# Patient Record
Sex: MALE | Race: WHITE | NOT HISPANIC OR LATINO
[De-identification: names, ages, dates, MRNs, and addresses within clinical notes are randomized per-mention and may not be internally consistent; named-entity substitution may affect disease eponyms.]

---

## 2017-01-06 ENCOUNTER — RECORD ABSTRACTING (OUTPATIENT)
Age: 57
End: 2017-01-06

## 2017-03-16 ENCOUNTER — APPOINTMENT (OUTPATIENT)
Dept: ENDOCRINOLOGY | Facility: CLINIC | Age: 57
End: 2017-03-16

## 2017-03-16 VITALS
DIASTOLIC BLOOD PRESSURE: 80 MMHG | HEART RATE: 72 BPM | SYSTOLIC BLOOD PRESSURE: 102 MMHG | WEIGHT: 220 LBS | BODY MASS INDEX: 29.84 KG/M2

## 2017-03-16 VITALS — BODY MASS INDEX: 32.51 KG/M2 | WEIGHT: 240 LBS | HEIGHT: 72 IN

## 2017-03-16 DIAGNOSIS — Z86.39 PERSONAL HISTORY OF OTHER ENDOCRINE, NUTRITIONAL AND METABOLIC DISEASE: ICD-10-CM

## 2017-03-16 RX ORDER — GLIPIZIDE 5 MG/1
5 TABLET ORAL DAILY
Qty: 30 | Refills: 0 | Status: DISCONTINUED | COMMUNITY
Start: 2017-03-16 | End: 2017-03-16

## 2017-03-17 RX ORDER — CANAGLIFLOZIN 100 MG/1
100 TABLET, FILM COATED ORAL
Qty: 30 | Refills: 5 | Status: DISCONTINUED | COMMUNITY
Start: 2017-03-16 | End: 2017-03-17

## 2017-05-30 ENCOUNTER — EMERGENCY (EMERGENCY)
Facility: HOSPITAL | Age: 57
LOS: 0 days | Discharge: HOME | End: 2017-05-30
Admitting: FAMILY MEDICINE

## 2017-05-30 DIAGNOSIS — M25.532 PAIN IN LEFT WRIST: ICD-10-CM

## 2017-05-30 DIAGNOSIS — I11.9 HYPERTENSIVE HEART DISEASE WITHOUT HEART FAILURE: ICD-10-CM
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
## 2017-05-30 DIAGNOSIS — I25.2 OLD MYOCARDIAL INFARCTION: ICD-10-CM

## 2017-05-30 DIAGNOSIS — S63.502A UNSPECIFIED SPRAIN OF LEFT WRIST, INITIAL ENCOUNTER: ICD-10-CM

## 2017-05-30 DIAGNOSIS — I51.9 HEART DISEASE, UNSPECIFIED: ICD-10-CM

## 2017-05-30 DIAGNOSIS — Z95.0 PRESENCE OF CARDIAC PACEMAKER: ICD-10-CM

## 2017-05-30 DIAGNOSIS — S50.812A ABRASION OF LEFT FOREARM, INITIAL ENCOUNTER: ICD-10-CM

## 2017-06-15 ENCOUNTER — APPOINTMENT (OUTPATIENT)
Dept: ENDOCRINOLOGY | Facility: CLINIC | Age: 57
End: 2017-06-15

## 2017-07-26 ENCOUNTER — APPOINTMENT (OUTPATIENT)
Dept: ENDOCRINOLOGY | Facility: CLINIC | Age: 57
End: 2017-07-26

## 2017-07-28 ENCOUNTER — EMERGENCY (EMERGENCY)
Facility: HOSPITAL | Age: 57
LOS: 0 days | Discharge: HOME | End: 2017-07-29
Admitting: FAMILY MEDICINE

## 2017-07-28 DIAGNOSIS — E78.00 PURE HYPERCHOLESTEROLEMIA, UNSPECIFIED: ICD-10-CM

## 2017-07-28 DIAGNOSIS — I25.10 ATHEROSCLEROTIC HEART DISEASE OF NATIVE CORONARY ARTERY WITHOUT ANGINA PECTORIS: ICD-10-CM

## 2017-07-28 DIAGNOSIS — E11.9 TYPE 2 DIABETES MELLITUS WITHOUT COMPLICATIONS: ICD-10-CM

## 2017-07-28 DIAGNOSIS — I10 ESSENTIAL (PRIMARY) HYPERTENSION: ICD-10-CM

## 2017-07-28 DIAGNOSIS — Z95.0 PRESENCE OF CARDIAC PACEMAKER: ICD-10-CM

## 2017-07-28 DIAGNOSIS — M79.645 PAIN IN LEFT FINGER(S): ICD-10-CM

## 2017-07-28 DIAGNOSIS — I44.1 ATRIOVENTRICULAR BLOCK, SECOND DEGREE: ICD-10-CM

## 2017-07-28 DIAGNOSIS — I21.3 ST ELEVATION (STEMI) MYOCARDIAL INFARCTION OF UNSPECIFIED SITE: ICD-10-CM

## 2017-07-28 DIAGNOSIS — Z87.891 PERSONAL HISTORY OF NICOTINE DEPENDENCE: ICD-10-CM

## 2017-07-28 DIAGNOSIS — L03.012 CELLULITIS OF LEFT FINGER: ICD-10-CM

## 2017-08-31 ENCOUNTER — OUTPATIENT (OUTPATIENT)
Dept: OUTPATIENT SERVICES | Facility: HOSPITAL | Age: 57
LOS: 1 days | Discharge: HOME | End: 2017-08-31

## 2017-08-31 ENCOUNTER — APPOINTMENT (OUTPATIENT)
Dept: ENDOCRINOLOGY | Facility: CLINIC | Age: 57
End: 2017-08-31

## 2017-08-31 VITALS
HEART RATE: 67 BPM | WEIGHT: 220 LBS | HEIGHT: 72 IN | SYSTOLIC BLOOD PRESSURE: 135 MMHG | BODY MASS INDEX: 29.8 KG/M2 | DIASTOLIC BLOOD PRESSURE: 78 MMHG

## 2017-08-31 DIAGNOSIS — E78.00 PURE HYPERCHOLESTEROLEMIA, UNSPECIFIED: ICD-10-CM

## 2017-08-31 DIAGNOSIS — I44.1 ATRIOVENTRICULAR BLOCK, SECOND DEGREE: ICD-10-CM

## 2017-08-31 DIAGNOSIS — E06.3 AUTOIMMUNE THYROIDITIS: ICD-10-CM

## 2017-08-31 DIAGNOSIS — E11.65 TYPE 2 DIABETES MELLITUS WITH HYPERGLYCEMIA: ICD-10-CM

## 2017-08-31 DIAGNOSIS — I25.10 ATHEROSCLEROTIC HEART DISEASE OF NATIVE CORONARY ARTERY WITHOUT ANGINA PECTORIS: ICD-10-CM

## 2017-08-31 DIAGNOSIS — E11.9 TYPE 2 DIABETES MELLITUS WITHOUT COMPLICATIONS: ICD-10-CM

## 2017-08-31 DIAGNOSIS — I10 ESSENTIAL (PRIMARY) HYPERTENSION: ICD-10-CM

## 2017-09-11 DIAGNOSIS — E11.65 TYPE 2 DIABETES MELLITUS WITH HYPERGLYCEMIA: ICD-10-CM

## 2017-09-11 DIAGNOSIS — E78.2 MIXED HYPERLIPIDEMIA: ICD-10-CM

## 2017-09-11 DIAGNOSIS — E66.01 MORBID (SEVERE) OBESITY DUE TO EXCESS CALORIES: ICD-10-CM

## 2017-11-24 ENCOUNTER — OUTPATIENT (OUTPATIENT)
Dept: OUTPATIENT SERVICES | Facility: HOSPITAL | Age: 57
LOS: 1 days | Discharge: HOME | End: 2017-11-24

## 2017-11-24 DIAGNOSIS — E11.9 TYPE 2 DIABETES MELLITUS WITHOUT COMPLICATIONS: ICD-10-CM

## 2017-11-24 DIAGNOSIS — I44.1 ATRIOVENTRICULAR BLOCK, SECOND DEGREE: ICD-10-CM

## 2017-11-24 DIAGNOSIS — R22.1 LOCALIZED SWELLING, MASS AND LUMP, NECK: ICD-10-CM

## 2017-11-24 DIAGNOSIS — I10 ESSENTIAL (PRIMARY) HYPERTENSION: ICD-10-CM

## 2017-11-24 DIAGNOSIS — E78.00 PURE HYPERCHOLESTEROLEMIA, UNSPECIFIED: ICD-10-CM

## 2017-11-24 DIAGNOSIS — I25.10 ATHEROSCLEROTIC HEART DISEASE OF NATIVE CORONARY ARTERY WITHOUT ANGINA PECTORIS: ICD-10-CM

## 2017-11-25 ENCOUNTER — OUTPATIENT (OUTPATIENT)
Dept: OUTPATIENT SERVICES | Facility: HOSPITAL | Age: 57
LOS: 1 days | Discharge: HOME | End: 2017-11-25

## 2017-11-25 DIAGNOSIS — I25.10 ATHEROSCLEROTIC HEART DISEASE OF NATIVE CORONARY ARTERY WITHOUT ANGINA PECTORIS: ICD-10-CM

## 2017-11-25 DIAGNOSIS — E11.9 TYPE 2 DIABETES MELLITUS WITHOUT COMPLICATIONS: ICD-10-CM

## 2017-11-25 DIAGNOSIS — R22.1 LOCALIZED SWELLING, MASS AND LUMP, NECK: ICD-10-CM

## 2017-11-25 DIAGNOSIS — E78.00 PURE HYPERCHOLESTEROLEMIA, UNSPECIFIED: ICD-10-CM

## 2017-11-25 DIAGNOSIS — I10 ESSENTIAL (PRIMARY) HYPERTENSION: ICD-10-CM

## 2017-11-25 DIAGNOSIS — I44.1 ATRIOVENTRICULAR BLOCK, SECOND DEGREE: ICD-10-CM

## 2017-12-08 ENCOUNTER — OUTPATIENT (OUTPATIENT)
Dept: OUTPATIENT SERVICES | Facility: HOSPITAL | Age: 57
LOS: 1 days | Discharge: HOME | End: 2017-12-08

## 2017-12-08 DIAGNOSIS — E11.9 TYPE 2 DIABETES MELLITUS WITHOUT COMPLICATIONS: ICD-10-CM

## 2017-12-08 DIAGNOSIS — I25.10 ATHEROSCLEROTIC HEART DISEASE OF NATIVE CORONARY ARTERY WITHOUT ANGINA PECTORIS: ICD-10-CM

## 2017-12-08 DIAGNOSIS — I10 ESSENTIAL (PRIMARY) HYPERTENSION: ICD-10-CM

## 2017-12-08 DIAGNOSIS — E78.00 PURE HYPERCHOLESTEROLEMIA, UNSPECIFIED: ICD-10-CM

## 2017-12-08 DIAGNOSIS — R22.1 LOCALIZED SWELLING, MASS AND LUMP, NECK: ICD-10-CM

## 2017-12-08 DIAGNOSIS — I44.1 ATRIOVENTRICULAR BLOCK, SECOND DEGREE: ICD-10-CM

## 2018-02-04 ENCOUNTER — EMERGENCY (EMERGENCY)
Facility: HOSPITAL | Age: 58
LOS: 0 days | Discharge: HOME | End: 2018-02-04
Attending: EMERGENCY MEDICINE | Admitting: FAMILY MEDICINE

## 2018-02-04 VITALS
SYSTOLIC BLOOD PRESSURE: 151 MMHG | HEART RATE: 77 BPM | TEMPERATURE: 98 F | DIASTOLIC BLOOD PRESSURE: 88 MMHG | RESPIRATION RATE: 18 BRPM

## 2018-02-04 VITALS — WEIGHT: 229.94 LBS

## 2018-02-04 DIAGNOSIS — R59.0 LOCALIZED ENLARGED LYMPH NODES: ICD-10-CM

## 2018-02-04 DIAGNOSIS — R22.1 LOCALIZED SWELLING, MASS AND LUMP, NECK: ICD-10-CM

## 2018-02-04 LAB
ALBUMIN SERPL ELPH-MCNC: 4.7 G/DL — SIGNIFICANT CHANGE UP (ref 3–5.5)
ALP SERPL-CCNC: 57 U/L — SIGNIFICANT CHANGE UP (ref 30–115)
ALT FLD-CCNC: 20 U/L — SIGNIFICANT CHANGE UP (ref 0–41)
ANION GAP SERPL CALC-SCNC: 8 MMOL/L — SIGNIFICANT CHANGE UP (ref 7–14)
APTT BLD: 26.4 SEC — LOW (ref 27–39.2)
AST SERPL-CCNC: 19 U/L — SIGNIFICANT CHANGE UP (ref 0–41)
BASOPHILS # BLD AUTO: 0.06 K/UL — SIGNIFICANT CHANGE UP (ref 0–0.2)
BASOPHILS NFR BLD AUTO: 0.9 % — SIGNIFICANT CHANGE UP (ref 0–1)
BILIRUB SERPL-MCNC: 1.7 MG/DL — HIGH (ref 0.2–1.2)
BUN SERPL-MCNC: 20 MG/DL — SIGNIFICANT CHANGE UP (ref 10–20)
CALCIUM SERPL-MCNC: 9.9 MG/DL — SIGNIFICANT CHANGE UP (ref 8.5–10.1)
CHLORIDE SERPL-SCNC: 103 MMOL/L — SIGNIFICANT CHANGE UP (ref 98–110)
CO2 SERPL-SCNC: 27 MMOL/L — SIGNIFICANT CHANGE UP (ref 17–32)
CREAT SERPL-MCNC: 1 MG/DL — SIGNIFICANT CHANGE UP (ref 0.7–1.5)
EOSINOPHIL # BLD AUTO: 0.26 K/UL — SIGNIFICANT CHANGE UP (ref 0–0.7)
EOSINOPHIL NFR BLD AUTO: 3.9 % — SIGNIFICANT CHANGE UP (ref 0–8)
GLUCOSE SERPL-MCNC: 245 MG/DL — HIGH (ref 70–110)
HCT VFR BLD CALC: 49.4 % — SIGNIFICANT CHANGE UP (ref 42–52)
HGB BLD-MCNC: 16.5 G/DL — SIGNIFICANT CHANGE UP (ref 14–18)
IMM GRANULOCYTES NFR BLD AUTO: 0.3 % — SIGNIFICANT CHANGE UP (ref 0.1–0.3)
INR BLD: 1.06 RATIO — SIGNIFICANT CHANGE UP (ref 0.65–1.3)
LYMPHOCYTES # BLD AUTO: 1.52 K/UL — SIGNIFICANT CHANGE UP (ref 1.2–3.4)
LYMPHOCYTES # BLD AUTO: 22.9 % — SIGNIFICANT CHANGE UP (ref 20.5–51.1)
MCHC RBC-ENTMCNC: 28.8 PG — SIGNIFICANT CHANGE UP (ref 27–31)
MCHC RBC-ENTMCNC: 33.4 G/DL — SIGNIFICANT CHANGE UP (ref 32–37)
MCV RBC AUTO: 86.4 FL — SIGNIFICANT CHANGE UP (ref 80–94)
MONOCYTES # BLD AUTO: 0.52 K/UL — SIGNIFICANT CHANGE UP (ref 0.1–0.6)
MONOCYTES NFR BLD AUTO: 7.8 % — SIGNIFICANT CHANGE UP (ref 1.7–9.3)
NEUTROPHILS # BLD AUTO: 4.25 K/UL — SIGNIFICANT CHANGE UP (ref 1.4–6.5)
NEUTROPHILS NFR BLD AUTO: 64.2 % — SIGNIFICANT CHANGE UP (ref 42.2–75.2)
PLATELET # BLD AUTO: 178 K/UL — SIGNIFICANT CHANGE UP (ref 130–400)
POTASSIUM SERPL-MCNC: 4.3 MMOL/L — SIGNIFICANT CHANGE UP (ref 3.5–5)
POTASSIUM SERPL-SCNC: 4.3 MMOL/L — SIGNIFICANT CHANGE UP (ref 3.5–5)
PROT SERPL-MCNC: 7.2 G/DL — SIGNIFICANT CHANGE UP (ref 6–8)
PROTHROM AB SERPL-ACNC: 11.5 SEC — SIGNIFICANT CHANGE UP (ref 9.95–12.87)
RBC # BLD: 5.72 M/UL — SIGNIFICANT CHANGE UP (ref 4.7–6.1)
RBC # FLD: 12.3 % — SIGNIFICANT CHANGE UP (ref 11.5–14.5)
SODIUM SERPL-SCNC: 138 MMOL/L — SIGNIFICANT CHANGE UP (ref 135–146)
WBC # BLD: 6.63 K/UL — SIGNIFICANT CHANGE UP (ref 4.8–10.8)
WBC # FLD AUTO: 6.63 K/UL — SIGNIFICANT CHANGE UP (ref 4.8–10.8)

## 2018-02-04 NOTE — ED PROVIDER NOTE - MEDICAL DECISION MAKING DETAILS
Pt presents for swollen gland of neck. Supraclavicular node on the right>> hard>. other lymph nodes also noted. Will need investigation to evaluate for possible malignancy.

## 2018-02-04 NOTE — ED PROVIDER NOTE - ATTENDING CONTRIBUTION TO CARE
Pt is here to evaluate lymphadenopathy of the neck. He has noted swelling to LN right side of the neck. Today also noted pain. No issues swallowing. No chest or abdominal pain. No wt loss. no night sweats. On exam S1S2 rrr, lungs clear, Right side posterior cervical nodes are enlarged the lowest one is about 2.5 cm and nontender. This lymph node appears to be supraclavicular. Abdomen exam is significant for enlarged liver and mild tenderness. Pt states he states he was seen by pmd and Ct scan was not authorized by his insurance. Pt will need labs, Ct scan neck, abd, chest to evaluate lymphadenopathy.

## 2018-02-04 NOTE — ED ADULT NURSE NOTE - OBJECTIVE STATEMENT
Pt alert and oriented and ambulatory. Pt comes in for right neck swollen along with abdominal pain. Pt states it started overnight and denies neck pain but complains of abdominal pain. Right sided neck is swollen

## 2018-02-04 NOTE — ED PROVIDER NOTE - CHIEF COMPLAINT
The patient is a 57y Male complaining of medical evaluation. The patient is a 57y Male complaining of swelling gland right side of the neck

## 2018-02-04 NOTE — ED PROVIDER NOTE - PHYSICAL EXAMINATION
large lymphadenopathy present along the right cervical and supra clavicular nodes - no other lymphadenopathy

## 2018-02-16 ENCOUNTER — TRANSCRIPTION ENCOUNTER (OUTPATIENT)
Age: 58
End: 2018-02-16

## 2018-02-22 ENCOUNTER — APPOINTMENT (OUTPATIENT)
Dept: ENDOCRINOLOGY | Facility: CLINIC | Age: 58
End: 2018-02-22

## 2018-03-20 ENCOUNTER — OUTPATIENT (OUTPATIENT)
Dept: OUTPATIENT SERVICES | Facility: HOSPITAL | Age: 58
LOS: 1 days | Discharge: HOME | End: 2018-03-20

## 2018-03-20 ENCOUNTER — APPOINTMENT (OUTPATIENT)
Dept: PULMONOLOGY | Facility: CLINIC | Age: 58
End: 2018-03-20

## 2018-03-20 VITALS
SYSTOLIC BLOOD PRESSURE: 110 MMHG | OXYGEN SATURATION: 96 % | HEART RATE: 77 BPM | BODY MASS INDEX: 29.8 KG/M2 | WEIGHT: 220 LBS | DIASTOLIC BLOOD PRESSURE: 70 MMHG | HEIGHT: 72 IN

## 2018-03-20 DIAGNOSIS — Z86.79 PERSONAL HISTORY OF OTHER DISEASES OF THE CIRCULATORY SYSTEM: ICD-10-CM

## 2018-03-20 DIAGNOSIS — Z87.891 PERSONAL HISTORY OF NICOTINE DEPENDENCE: ICD-10-CM

## 2018-03-20 DIAGNOSIS — R06.02 SHORTNESS OF BREATH: ICD-10-CM

## 2018-03-20 DIAGNOSIS — Z82.5 FAMILY HISTORY OF ASTHMA AND OTHER CHRONIC LOWER RESPIRATORY DISEASES: ICD-10-CM

## 2018-03-20 DIAGNOSIS — Z87.09 PERSONAL HISTORY OF OTHER DISEASES OF THE RESPIRATORY SYSTEM: ICD-10-CM

## 2018-03-20 DIAGNOSIS — F17.200 NICOTINE DEPENDENCE, UNSPECIFIED, UNCOMPLICATED: ICD-10-CM

## 2018-04-03 ENCOUNTER — OUTPATIENT (OUTPATIENT)
Dept: OUTPATIENT SERVICES | Facility: HOSPITAL | Age: 58
LOS: 1 days | Discharge: HOME | End: 2018-04-03

## 2018-04-03 DIAGNOSIS — R06.02 SHORTNESS OF BREATH: ICD-10-CM

## 2018-04-04 ENCOUNTER — INPATIENT (INPATIENT)
Facility: HOSPITAL | Age: 58
LOS: 4 days | Discharge: HOME | End: 2018-04-09
Attending: INTERNAL MEDICINE | Admitting: INTERNAL MEDICINE

## 2018-04-04 VITALS
DIASTOLIC BLOOD PRESSURE: 63 MMHG | HEART RATE: 69 BPM | SYSTOLIC BLOOD PRESSURE: 120 MMHG | OXYGEN SATURATION: 95 % | TEMPERATURE: 96 F | RESPIRATION RATE: 18 BRPM

## 2018-04-04 DIAGNOSIS — Z95.0 PRESENCE OF CARDIAC PACEMAKER: Chronic | ICD-10-CM

## 2018-04-04 LAB
ALBUMIN SERPL ELPH-MCNC: 4.3 G/DL — SIGNIFICANT CHANGE UP (ref 3.5–5.2)
ALP SERPL-CCNC: 46 U/L — SIGNIFICANT CHANGE UP (ref 30–115)
ALT FLD-CCNC: 20 U/L — SIGNIFICANT CHANGE UP (ref 0–41)
ANION GAP SERPL CALC-SCNC: 14 MMOL/L — SIGNIFICANT CHANGE UP (ref 7–14)
ANION GAP SERPL CALC-SCNC: 14 MMOL/L — SIGNIFICANT CHANGE UP (ref 7–14)
AST SERPL-CCNC: 35 U/L — SIGNIFICANT CHANGE UP (ref 0–41)
BILIRUB SERPL-MCNC: 1.3 MG/DL — HIGH (ref 0.2–1.2)
BUN SERPL-MCNC: 21 MG/DL — HIGH (ref 10–20)
BUN SERPL-MCNC: 23 MG/DL — HIGH (ref 10–20)
CALCIUM SERPL-MCNC: 8.8 MG/DL — SIGNIFICANT CHANGE UP (ref 8.5–10.1)
CALCIUM SERPL-MCNC: 9.1 MG/DL — SIGNIFICANT CHANGE UP (ref 8.5–10.1)
CHLORIDE SERPL-SCNC: 98 MMOL/L — SIGNIFICANT CHANGE UP (ref 98–110)
CHLORIDE SERPL-SCNC: 99 MMOL/L — SIGNIFICANT CHANGE UP (ref 98–110)
CO2 SERPL-SCNC: 25 MMOL/L — SIGNIFICANT CHANGE UP (ref 17–32)
CO2 SERPL-SCNC: 25 MMOL/L — SIGNIFICANT CHANGE UP (ref 17–32)
CREAT SERPL-MCNC: 1.1 MG/DL — SIGNIFICANT CHANGE UP (ref 0.7–1.5)
CREAT SERPL-MCNC: 1.1 MG/DL — SIGNIFICANT CHANGE UP (ref 0.7–1.5)
GLUCOSE SERPL-MCNC: 166 MG/DL — HIGH (ref 70–99)
GLUCOSE SERPL-MCNC: 322 MG/DL — HIGH (ref 70–99)
HCT VFR BLD CALC: 44.2 % — SIGNIFICANT CHANGE UP (ref 42–52)
HGB BLD-MCNC: 14.7 G/DL — SIGNIFICANT CHANGE UP (ref 14–18)
INR BLD: 1.05 RATIO — SIGNIFICANT CHANGE UP (ref 0.65–1.3)
LACTATE SERPL-SCNC: 1.1 MMOL/L — SIGNIFICANT CHANGE UP (ref 0.5–2.2)
MCHC RBC-ENTMCNC: 29.5 PG — SIGNIFICANT CHANGE UP (ref 27–31)
MCHC RBC-ENTMCNC: 33.3 G/DL — SIGNIFICANT CHANGE UP (ref 32–37)
MCV RBC AUTO: 88.6 FL — SIGNIFICANT CHANGE UP (ref 80–94)
NRBC # BLD: 0 /100 WBCS — SIGNIFICANT CHANGE UP (ref 0–0)
PLATELET # BLD AUTO: 169 K/UL — SIGNIFICANT CHANGE UP (ref 130–400)
POTASSIUM SERPL-MCNC: 4.3 MMOL/L — SIGNIFICANT CHANGE UP (ref 3.5–5)
POTASSIUM SERPL-MCNC: 6 MMOL/L — CRITICAL HIGH (ref 3.5–5)
POTASSIUM SERPL-SCNC: 4.3 MMOL/L — SIGNIFICANT CHANGE UP (ref 3.5–5)
POTASSIUM SERPL-SCNC: 6 MMOL/L — CRITICAL HIGH (ref 3.5–5)
PROT SERPL-MCNC: 6.8 G/DL — SIGNIFICANT CHANGE UP (ref 6–8)
PROTHROM AB SERPL-ACNC: 11.3 SEC — SIGNIFICANT CHANGE UP (ref 9.95–12.87)
RBC # BLD: 4.99 M/UL — SIGNIFICANT CHANGE UP (ref 4.7–6.1)
RBC # FLD: 13.3 % — SIGNIFICANT CHANGE UP (ref 11.5–14.5)
SODIUM SERPL-SCNC: 137 MMOL/L — SIGNIFICANT CHANGE UP (ref 135–146)
SODIUM SERPL-SCNC: 138 MMOL/L — SIGNIFICANT CHANGE UP (ref 135–146)
WBC # BLD: 6.9 K/UL — SIGNIFICANT CHANGE UP (ref 4.8–10.8)
WBC # FLD AUTO: 6.9 K/UL — SIGNIFICANT CHANGE UP (ref 4.8–10.8)

## 2018-04-04 RX ORDER — ALBUTEROL 90 UG/1
2 AEROSOL, METERED ORAL EVERY 6 HOURS
Qty: 0 | Refills: 0 | Status: DISCONTINUED | OUTPATIENT
Start: 2018-04-04 | End: 2018-04-06

## 2018-04-04 RX ORDER — METFORMIN HYDROCHLORIDE 850 MG/1
0 TABLET ORAL
Qty: 0 | Refills: 0 | COMMUNITY

## 2018-04-04 RX ORDER — ACETAMINOPHEN 500 MG
650 TABLET ORAL EVERY 6 HOURS
Qty: 0 | Refills: 0 | Status: DISCONTINUED | OUTPATIENT
Start: 2018-04-04 | End: 2018-04-06

## 2018-04-04 RX ORDER — MORPHINE SULFATE 50 MG/1
4 CAPSULE, EXTENDED RELEASE ORAL EVERY 8 HOURS
Qty: 0 | Refills: 0 | Status: DISCONTINUED | OUTPATIENT
Start: 2018-04-04 | End: 2018-04-05

## 2018-04-04 RX ORDER — METFORMIN HYDROCHLORIDE 850 MG/1
1000 TABLET ORAL
Qty: 0 | Refills: 0 | Status: DISCONTINUED | OUTPATIENT
Start: 2018-04-04 | End: 2018-04-05

## 2018-04-04 RX ORDER — ONDANSETRON 8 MG/1
4 TABLET, FILM COATED ORAL EVERY 8 HOURS
Qty: 0 | Refills: 0 | Status: COMPLETED | OUTPATIENT
Start: 2018-04-04 | End: 2018-04-04

## 2018-04-04 RX ORDER — MORPHINE SULFATE 50 MG/1
4 CAPSULE, EXTENDED RELEASE ORAL ONCE
Qty: 0 | Refills: 0 | Status: DISCONTINUED | OUTPATIENT
Start: 2018-04-04 | End: 2018-04-04

## 2018-04-04 RX ORDER — DAPAGLIFLOZIN 10 MG/1
0 TABLET, FILM COATED ORAL
Qty: 0 | Refills: 0 | COMMUNITY

## 2018-04-04 RX ORDER — ENOXAPARIN SODIUM 100 MG/ML
40 INJECTION SUBCUTANEOUS DAILY
Qty: 0 | Refills: 0 | Status: DISCONTINUED | OUTPATIENT
Start: 2018-04-04 | End: 2018-04-05

## 2018-04-04 RX ORDER — PIOGLITAZONE HYDROCHLORIDE 15 MG/1
0 TABLET ORAL
Qty: 0 | Refills: 0 | COMMUNITY

## 2018-04-04 RX ORDER — SIMVASTATIN 20 MG/1
0 TABLET, FILM COATED ORAL
Qty: 0 | Refills: 0 | COMMUNITY

## 2018-04-04 RX ORDER — SIMVASTATIN 20 MG/1
40 TABLET, FILM COATED ORAL AT BEDTIME
Qty: 0 | Refills: 0 | Status: DISCONTINUED | OUTPATIENT
Start: 2018-04-04 | End: 2018-04-06

## 2018-04-04 RX ORDER — PIOGLITAZONE HYDROCHLORIDE 15 MG/1
45 TABLET ORAL DAILY
Qty: 0 | Refills: 0 | Status: DISCONTINUED | OUTPATIENT
Start: 2018-04-04 | End: 2018-04-05

## 2018-04-04 RX ORDER — ONDANSETRON 8 MG/1
8 TABLET, FILM COATED ORAL THREE TIMES A DAY
Qty: 0 | Refills: 0 | Status: DISCONTINUED | OUTPATIENT
Start: 2018-04-04 | End: 2018-04-04

## 2018-04-04 RX ADMIN — Medication 20 MILLIGRAM(S): at 18:35

## 2018-04-04 RX ADMIN — MORPHINE SULFATE 4 MILLIGRAM(S): 50 CAPSULE, EXTENDED RELEASE ORAL at 13:01

## 2018-04-04 RX ADMIN — METFORMIN HYDROCHLORIDE 1000 MILLIGRAM(S): 850 TABLET ORAL at 18:35

## 2018-04-04 RX ADMIN — MORPHINE SULFATE 4 MILLIGRAM(S): 50 CAPSULE, EXTENDED RELEASE ORAL at 14:40

## 2018-04-04 RX ADMIN — PIOGLITAZONE HYDROCHLORIDE 45 MILLIGRAM(S): 15 TABLET ORAL at 18:35

## 2018-04-04 RX ADMIN — MORPHINE SULFATE 4 MILLIGRAM(S): 50 CAPSULE, EXTENDED RELEASE ORAL at 21:30

## 2018-04-04 RX ADMIN — ALBUTEROL 2 PUFF(S): 90 AEROSOL, METERED ORAL at 16:46

## 2018-04-04 RX ADMIN — ONDANSETRON 4 MILLIGRAM(S): 8 TABLET, FILM COATED ORAL at 21:30

## 2018-04-04 RX ADMIN — SIMVASTATIN 40 MILLIGRAM(S): 20 TABLET, FILM COATED ORAL at 21:30

## 2018-04-04 NOTE — CONSULT NOTE ADULT - ATTENDING COMMENTS
Above reviewed and agree. Patient seen and evaluated. Images reviewed.    - Patient will need excisional biopsy of right cervical node. Will plan for Friday as add-on  - please proceed with medical clearance

## 2018-04-04 NOTE — H&P ADULT - PMH
Chronic obstructive pulmonary disease, unspecified COPD type    DM (diabetes mellitus)    High cholesterol    HTN (hypertension)

## 2018-04-04 NOTE — ED PROVIDER NOTE - PHYSICAL EXAMINATION
Vital Signs: I have reviewed the initial vital signs.   Constitutional: WDWN in NAD.  Integumentary: No rash.   HEENT: MMM, no pallor or icterus  NECK: firm nodularity to proximal left neck tracking to the submandibular and supraclavicular region with extension to the right axillary region.    Cardiovascular: RRR   Respiratory: CTA b/l  Gastrointestinal: soft, non tender.   Musculoskeletal: FROM, no edema, no calf pain/swelling/erythema. Neurologic: AAOx3, CN II-XII intact, No facial droop or slurring of speech. No focal deficits.

## 2018-04-04 NOTE — ED PROVIDER NOTE - MEDICAL DECISION MAKING DETAILS
Spoke with ENT SAMI Rondon.  According to old records, patient is in the midst of a pulmonary/sarcoid workup.  ENT will follow and arrange for IR biopsy.  Due to worsening symptoms, increased pain and discomfort and high coordination of care, will admit.

## 2018-04-04 NOTE — H&P ADULT - FAMILY HISTORY
Sibling  Still living? Unknown  Family history of breast cancer, Age at diagnosis: Age Unknown     Mother  Still living? Unknown  Family history of cancer, Age at diagnosis: Age Unknown

## 2018-04-04 NOTE — CONSULT NOTE ADULT - ASSESSMENT
57 year old Male with painful supraclavicular, axillary, mesenteric LAD with soft tissue centrally hypodense nodule adjacent to cervical LN.    PLAN:  1.	Admit to medicine for further work up, possible inpt bx  2.	Further recs to follow from attending

## 2018-04-04 NOTE — ED PROVIDER NOTE - OBJECTIVE STATEMENT
56 y/o male with hx of HTN/DM/Dyslipidemia/PPM presents to ED with progressively worsening right sided neck and axillary swelling, which began approximately 2 months ago.  Is awaiting ENT biopsy, but pain and swelling worsened so patient came to ED.  No fever, chills or nightsweats.  No weight loss.

## 2018-04-04 NOTE — H&P ADULT - NSHPLABSRESULTS_GEN_ALL_CORE
14.7   6.90  )-----------( 169      ( 04 Apr 2018 12:53 )             44.2     04-04    138  |  99  |  23<H>  ----------------------------<  166<H>  x    |  25  |  1.1    Ca    9.1      04 Apr 2018 12:53      < from: 12 Lead ECG (04.04.18 @ 13:19) >      Diagnosis Line Electronic pacemaker  QTc 503 ms  69 BPM    < end of copied text >    < from: CT Abdomen and Pelvis w/ IV Cont (02.04.18 @ 14:00) >    Enlarged right axillary lymph node. Haziness of the mesentery with   enlarged mesenteric lymph nodes, given enlarged cervical lymph nodes   findings concerning for neoplasm versus inflammatory condition.      < end of copied text >      < from: CT Neck Soft Tissue w/ IV Cont (02.04.18 @ 14:00) >    1.  Clustered level V lymphadenopathy along the right posterior triangle   of the neck. At least one lymph node demonstrate central necrosis    2.  Upper aerodigestive tract is unremarkable by CT.    3.  Equivocal lymph nodes elsewhere in the neck which would otherwise be   considered normal.    < end of copied text >

## 2018-04-04 NOTE — H&P ADULT - ASSESSMENT
ffff THis is a 56 yo M patient with COPD, DM2, Heart block s/p PPM, DLD presented with painful right cervical and axillary lymphadenopathy. central necrosis seen in one of them.    # Painful LAD    DDx: sacrcoidosis (if non caseating) vs. lymphoma vs. TB (if caseating) vs. HIV (less likely)    Follow ENT for plan of excisional biopsy  check Quantiferon and PeriHIV  pain meds. patient got morphine in ED and has pinpoint pupils    # DM2  cw oral antidiabetics  check FS    # DLD   cw oral meds    # COPD  stable follow PFTs results done yesterday    #HTN cw Enalapril THis is a 56 yo M patient with COPD, DM2, Heart block s/p PPM, DLD presented with painful right cervical and axillary lymphadenopathy. central necrosis seen in one of them.    # Painful diffuse LAD    DDx: sacrcoidosis (if non caseating) vs. lymphoma vs. TB (if caseating) vs. HIV (less likely) vs. metastatic LAD (less likely)    Follow ENT for plan of excisional biopsy  check Quantiferon and PeriHIV  pain meds. patient got morphine in ED and has pinpoint pupils  age appropriate cancer screening    # DM2  cw oral antidiabetics  check FS    # DLD   cw oral meds    # COPD  stable follow PFTs results done yesterday    #HTN cw Enalapril

## 2018-04-04 NOTE — H&P ADULT - HISTORY OF PRESENT ILLNESS
58 yo m, PMH of DM II, HTN, DLD, and pacemaker placed in 2015 secondary to syncope from high-degree heart block presents for painful Right lymphadenopathy. Patient was in Dr Patrice García for evaluation of potential sarcoidosis and COPD. In 2015, at the time of the pacemaker placement, patient had normal cardiac catheterization and echocardiogram with EF of 55-65% with no significant valvular abnormalities.  CXRs from that time were reviewed and showed no hilar LAD or any evidence to suggest sarcoidosis. Patient went to the emergency room on February 4, 2018 with complaints of Right sided cervical Lymphadenopathy, which he had noticed several months prior to presentation.  Lymph node was originally not painful but became painful about one week prior to his visit, which prompted him to go to the emergency room back then.  No fevers, night sweats, anorexia or significant changes in weight. He underwent CT neck soft tissue, chest, abdomen and pelvis, which found multiple areas of LAD in the R supraclavicular lymph node, R axillary lymph node, and mesenteric lymph nodes.  There was no evidence of mediastinal or hilar LAD at that time.  However, patient is not certain if he took prednisone prior to imaging.  As per wife, patient takes a short-course prednisone tapers every few months secondary to bronchitis.  Patient was discharged from the ED with recommendations to follow up with ENT.  He has an upcoming appointment on 4/12/18 for a lymph node biopsy. As per Dr. Ibrahim assessment, sacrcoidosis is a possibility that explains the cardiac and lymph nodes findings, absence of mediastinal lymph nodes could have been masked by taking steroids. Other considerations were lymphoma with associated bronchiolitis obliterans. Lymph node biopsy by ENT, cardiac MRI and PFTs were recommended prior to follow-up. But patient could not wait for his ENT appointment given the increase in pain.    Also, approximately one week prior to presentation to pulmonary clinic, patient went to an Urgent Care Center with cough with dark sputum (change from baseline clear sputum) and URI symptoms.  He was found to have an oxygen saturation of 90% on room air, which improved to 95% after several nebulizer treatments.  The doctor at the Urgent Care suggested that the patient might have COPD and that he should see a pulmonologist.  The patient was discharged on ProAir and flonase.  He is not taking the flonase and last used the ProAir several days ago.  ROS positive for daily cough with clear sputum and dyspnea on exertion, which has increased over the past year.  No chest pain, no joint pain or arthritis, no eye or skin problems.    Patient is a former 1 PPD smoker for 30-40 years who quit about five months prior to presentation and denies any previous history of asbestosis exposure.  He worked as a .  He has no pets at home. 58 yo m, PMH of DM II, HTN, DLD, and pacemaker placed in 2015 secondary to syncope from high-degree heart block presents for painful Right lymphadenopathy. Patient was in Dr Patrice García for evaluation of potential sarcoidosis and COPD. In 2015, at the time of the pacemaker placement, patient had normal cardiac catheterization and echocardiogram with EF of 55-65% with no significant valvular abnormalities.  CXRs from that time were reviewed and showed no hilar LAD or any evidence to suggest sarcoidosis. Patient went to the emergency room on February 4, 2018 with complaints of Right sided cervical Lymphadenopathy, which he had noticed several months prior to presentation.  Lymph node was originally not painful but became painful about one week prior to his visit, which prompted him to go to the emergency room back then.  No fevers, night sweats, anorexia or significant changes in weight. He underwent CT neck soft tissue, chest, abdomen and pelvis, which found multiple areas of LAD in the R supraclavicular lymph node, R axillary lymph node, and mesenteric lymph nodes.  There was no evidence of mediastinal or hilar LAD at that time.  However, patient is not certain if he took prednisone prior to imaging.  As per wife, patient takes a short-course prednisone tapers every few months secondary to bronchitis.  Patient was discharged from the ED with recommendations to follow up with ENT.  He has an upcoming appointment on 4/12/18 for a lymph node biopsy. As per Dr. Ibrahim assessment, sacrcoidosis is a possibility that explains the cardiac and lymph nodes findings, absence of mediastinal lymph nodes could have been masked by taking steroids. Other considerations were lymphoma with associated bronchiolitis obliterans. Lymph node biopsy by ENT, cardiac MRI and PFTs (done yesterday) were recommended prior to follow-up. But patient could not wait for his ENT appointment given the increase in pain.    Patient is a former 1 PPD smoker for 30-40 years who quit about 6 months prior to presentation and denies any previous history of asbestosis exposure.  He worked as a .  He has no pets at home. Denies exposure to TB but wife works in the hospital. 56 yo m, PMH of DM II, HTN, DLD, and pacemaker placed in 2015 secondary to syncope from high-degree heart block presents for painful Right lymphadenopathy. Patient was in Dr Patrice García for evaluation of potential sarcoidosis and COPD. In 2015, at the time of the pacemaker placement, patient had normal cardiac catheterization and echocardiogram with EF of 55-65% with no significant valvular abnormalities.  CXRs from that time were reviewed and showed no hilar LAD or any evidence to suggest sarcoidosis. Patient went to the emergency room on February 4, 2018 with complaints of Right sided cervical Lymphadenopathy, which he had noticed several months prior to presentation.  Lymph node was originally not painful but became painful about one week prior to his visit, which prompted him to go to the emergency room back then.  No fevers, night sweats, anorexia or significant changes in weight. He underwent CT neck soft tissue, chest, abdomen and pelvis, which found multiple areas of LAD in the R supraclavicular lymph node, R axillary lymph node, and mesenteric lymph nodes.  There was no evidence of mediastinal or hilar LAD at that time.  However, patient is not certain if he took prednisone prior to imaging.  As per wife, patient takes a short-course prednisone tapers every few months secondary to bronchitis.  Patient was discharged from the ED with recommendations to follow up with ENT.  He has an upcoming appointment on 4/12/18 for a lymph node biopsy. As per Dr. Ibrahim assessment, sacrcoidosis is a possibility that explains the cardiac and lymph nodes findings, absence of mediastinal lymph nodes could have been masked by taking steroids. Other considerations were lymphoma with associated bronchiolitis obliterans. Lymph node biopsy by ENT, cardiac MRI and PFTs (done yesterday) were recommended prior to follow-up. But patient could not wait for his ENT appointment given the increase in pain.    Patient is a former 1 PPD smoker for 30-40 years who quit about 6 months prior to presentation and denies any previous history of asbestosis exposure.  He worked as a .  He has no pets at home. Denies exposure to TB but wife works in the hospital. Patient denies any masses other than the LAD and says had a colonoscopy done long time ago that was unremarkable.

## 2018-04-04 NOTE — CONSULT NOTE ADULT - SUBJECTIVE AND OBJECTIVE BOX
Patient is a 57y old Male who presented to ER 18 c/o R neck swelling x few days, found to have cervical/supraclavicular/axillary LAD. CT chest/abd/pelvis was done at that time (results below) and pt was d/c home. Pt returns to ER today c/o increasing size of LN over past 2 months with pain/tenderness x 1 week. Denies fever, chills, N/V, unintentional weight loss, cough, congestion, night sweats, dysphagia, odynophagia. Pt with intermittent steroid use 2* bronchitis but denies no recent steroid or abx use. Seen by pulmo yesterday, PFT done, r/o sarcoidosis, rec cardiac MRI.    PMHx: DM II, HTN, DLD, heart block s/p PPM  PSHx: PPM   MEDS:  Home Medications:  busPIRone:  (2018 11:29)  enalapril:  (2018 11:29)  Farxiga:  (2018 11:29)  metFORMIN:  (2018 11:29)  pioglitazone:  (2018 11:29)  simvastatin:  (2018 11:29)  ALLERGIES: No Known Allergies  SOCIAL Hx: former 1ppd smoker x40 yrs, quit 6 mo ago    VS: T(F): 96.2, Max: 96.2 ( @ 10:51), HR: 69 (69 - 69), BP: 120/63 (120/63 - 120/63), RR: 18, SpO2: 95% (95% - 95%)  GEN: Alert, awake, NAD  HEENT: R superficial cervical, R posterior cervical, R supraclavicular (largest), & R axillary LAD with tenderness to palpation to posterior cervical LN, neck supple, uvula midline, mucosa pink    LABS/IMAGIN.7   6.90  )-----------( 169      ( 2018 12:53 )             44.2     < from: CT Abdomen and Pelvis w/ IV Cont (18 @ 14:00) >  MEDIASTINUM/THORACIC NODES: Partially imaged enlarged right   supraclavicular lymph nodes. Enlarged right axillary lymph node measuring   up to 1.6 cm in short axis.   ABDOMINOPELVIC NODES: Enlarged mesenteric lymph nodes measuring up to 1.3   x 1 cm (series 2 image 92).    < from: CT Neck Soft Tissue w/ IV Cont (18 @ 14:00) >  Right-sided enlarged level 5 cervical chain lymph nodes measuring up to   1.9 x 1.7 cm (series 9 image 193). Enlarged right supraclavicular lymph   nodes measuring up to 1.6 x 1 cm(series 9 image 214).  1 x 1 cm centrally hypodense soft tissue nodule adjacent to right level 5   cervical lymph nodes with surrounding fat stranding (series 9 image 171).   This could reflect inflammatory change or central necrosis with   extracapsular spread.  Subcentimeter right thyroid lobe hypodense nodule.  IMPRESSION:   Clustered level V lymphadenopathy along the right posterior triangle   of the neck. At least one lymph node demonstrate central necrosis Patient is a 57y old Male who presented to ER 18 c/o R neck swelling x few days, found to have cervical/supraclavicular/axillary LAD. CT chest/abd/pelvis was done at that time (results below) and pt was d/c home. Pt returns to ER today c/o increasing size of LN over past 2 months with pain/tenderness x 1 week. Denies fever, chills, N/V, unintentional weight loss, cough, congestion, night sweats, dysphagia, odynophagia. Pt with intermittent steroid use 2* bronchitis but denies no recent steroid or abx use. Seen by pulmo yesterday, PFT done, r/o sarcoidosis, rec cardiac MRI.    PMHx: DM II, HTN, DLD, heart block s/p PPM  PSHx: PPM   MEDS:  Home Medications:  busPIRone:  (2018 11:29)  enalapril:  (2018 11:29)  Farxiga:  (2018 11:29)  metFORMIN:  (2018 11:29)  pioglitazone:  (2018 11:29)  simvastatin:  (2018 11:29)  ALLERGIES: No Known Allergies  SOCIAL Hx: former 1ppd smoker x40 yrs, quit 6 mo ago    VS: T(F): 96.2, Max: 96.2 ( @ 10:51), HR: 69 (69 - 69), BP: 120/63 (120/63 - 120/63), RR: 18, SpO2: 95% (95% - 95%)  GEN: Alert, awake, NAD  HEENT: R superficial cervical, R posterior cervical, R supraclavicular (largest), & R axillary LAD with tenderness to palpation to posterior cervical LN -- non-mobile, no warmth, neck supple, uvula midline, mucosa pink    LABS/IMAGIN.7   6.90  )-----------( 169      ( 2018 12:53 )             44.2     < from: CT Abdomen and Pelvis w/ IV Cont (18 @ 14:00) >  MEDIASTINUM/THORACIC NODES: Partially imaged enlarged right   supraclavicular lymph nodes. Enlarged right axillary lymph node measuring   up to 1.6 cm in short axis.   ABDOMINOPELVIC NODES: Enlarged mesenteric lymph nodes measuring up to 1.3   x 1 cm (series 2 image 92).    < from: CT Neck Soft Tissue w/ IV Cont (18 @ 14:00) >  Right-sided enlarged level 5 cervical chain lymph nodes measuring up to   1.9 x 1.7 cm (series 9 image 193). Enlarged right supraclavicular lymph   nodes measuring up to 1.6 x 1 cm(series 9 image 214).  1 x 1 cm centrally hypodense soft tissue nodule adjacent to right level 5   cervical lymph nodes with surrounding fat stranding (series 9 image 171).   This could reflect inflammatory change or central necrosis with   extracapsular spread.  Subcentimeter right thyroid lobe hypodense nodule.  IMPRESSION:   Clustered level V lymphadenopathy along the right posterior triangle   of the neck. At least one lymph node demonstrate central necrosis Patient is a 57y old Male who presented to ER 18 c/o R neck swelling x few days, found to have cervical/supraclavicular/axillary LAD. CT chest/abd/pelvis was done at that time (results below) and pt was d/c home. Pt returns to ER today c/o increasing size of LN over past 2 months with pain/tenderness x 1 week. Denies fever, chills, N/V, unintentional weight loss, cough, congestion, night sweats, dysphagia, odynophagia. Pt with intermittent steroid use 2* bronchitis but denies no recent steroid or abx use. Seen by pulmo 3/20/18, PFT done yesterday, patient not aware of results, r/o sarcoidosis, rec cardiac MRI.    PMHx: DM II, HTN, DLD, heart block s/p PPM  PSHx: PPM   MEDS:  Home Medications:  busPIRone:  (2018 11:29)  enalapril:  (2018 11:29)  Farxiga:  (2018 11:29)  metFORMIN:  (2018 11:29)  pioglitazone:  (2018 11:29)  simvastatin:  (2018 11:29)  ALLERGIES: No Known Allergies  SOCIAL Hx: former 1ppd smoker x40 yrs, quit 6 mo ago    VS: T(F): 96.2, Max: 96.2 (- @ 10:51), HR: 69 (69 - 69), BP: 120/63 (120/63 - 120/63), RR: 18, SpO2: 95% (95% - 95%)  GEN: Alert, awake, NAD  HEENT: R superficial cervical, R posterior cervical, R supraclavicular (largest), & R axillary LAD with tenderness to palpation to posterior cervical LN -- non-mobile, no warmth, neck supple, uvula midline, mucosa pink  NEURO: CN II-XII intact bilaterally    LABS/IMAGIN.7   6.90  )-----------( 169      ( 2018 12:53 )             44.2     < from: CT Abdomen and Pelvis w/ IV Cont (18 @ 14:00) >  MEDIASTINUM/THORACIC NODES: Partially imaged enlarged right   supraclavicular lymph nodes. Enlarged right axillary lymph node measuring   up to 1.6 cm in short axis.   ABDOMINOPELVIC NODES: Enlarged mesenteric lymph nodes measuring up to 1.3   x 1 cm (series 2 image 92).    < from: CT Neck Soft Tissue w/ IV Cont (18 @ 14:00) >  Right-sided enlarged level 5 cervical chain lymph nodes measuring up to   1.9 x 1.7 cm (series 9 image 193). Enlarged right supraclavicular lymph   nodes measuring up to 1.6 x 1 cm(series 9 image 214).  1 x 1 cm centrally hypodense soft tissue nodule adjacent to right level 5   cervical lymph nodes with surrounding fat stranding (series 9 image 171).   This could reflect inflammatory change or central necrosis with   extracapsular spread.  Subcentimeter right thyroid lobe hypodense nodule.  IMPRESSION:   Clustered level V lymphadenopathy along the right posterior triangle   of the neck. At least one lymph node demonstrate central necrosis

## 2018-04-04 NOTE — ED ADULT NURSE NOTE - OBJECTIVE STATEMENT
pt presents to ED with c/o right sided neck pain and right armpit pain. Pt has swollen lymph nodes, denies sore throat, denies difficulty breathing. Pt denies fevers or chills at home. Pt has appt with ENT.

## 2018-04-04 NOTE — ED PROVIDER NOTE - NS ED ROS FT
Constitutional: (-) fever  (-)chills  (-)sweats  Eyes/ENT: (-) blurry vision, (-) epistaxis  (-)rhinorrhea   (-) sore throat    Cardiovascular: (-) chest pain, (-) palpitations (-) edema   Respiratory: (-) cough, (-) shortness of breath   Gastrointestinal: (-)nausea  (-)vomiting, (-) diarrhea  (-) abdominal pain   Musculoskeletal: (+) neck pain, (-) back pain, (-) joint pain  Integumentary: (-) rash, (-) edema  Neurological: (-) headache, (-) altered mental status  (-)LOC  Psychiatric: (-) hallucinations  Allergic/Immunologic: (-) pruritus

## 2018-04-04 NOTE — H&P ADULT - NSHPPHYSICALEXAM_GEN_ALL_CORE
ICU Vital Signs Last 24 Hrs  T(C): 35.7 (04 Apr 2018 10:51), Max: 35.7 (04 Apr 2018 10:51)  T(F): 96.2 (04 Apr 2018 10:51), Max: 96.2 (04 Apr 2018 10:51)  HR: 69 (04 Apr 2018 10:51) (69 - 69)  BP: 120/63 (04 Apr 2018 10:51) (120/63 - 120/63)  BP(mean): --  ABP: --  ABP(mean): --  RR: 18 (04 Apr 2018 10:51) (18 - 18)  SpO2: 95% (04 Apr 2018 10:51) (95% - 95%)    PHYSICAL EXAM:  GENERAL: NAD, well-developed  HEAD:  Atraumatic, Normocephalic  EYES: EOMI, PERRLA, conjunctiva and sclera clear  NECK: Supple, No JVD  CHEST/LUNG: Clear to auscultation bilaterally; No wheeze  HEART: Regular rate and rhythm; No murmurs, rubs, or gallops  ABDOMEN: Soft, Nontender, Nondistended; Bowel sounds present  EXTREMITIES:  2+ Peripheral Pulses, No clubbing, cyanosis, or edema  PSYCH: AAOx3  NEUROLOGY: non-focal  SKIN: No rashes or lesions ICU Vital Signs Last 24 Hrs  T(C): 35.7 (04 Apr 2018 10:51), Max: 35.7 (04 Apr 2018 10:51)  T(F): 96.2 (04 Apr 2018 10:51), Max: 96.2 (04 Apr 2018 10:51)  HR: 69 (04 Apr 2018 10:51) (69 - 69)  BP: 120/63 (04 Apr 2018 10:51) (120/63 - 120/63)  BP(mean): --  ABP: --  ABP(mean): --  RR: 18 (04 Apr 2018 10:51) (18 - 18)  SpO2: 95% (04 Apr 2018 10:51) (95% - 95%)    PHYSICAL EXAM:  GENERAL: NAD, well-developed  HEAD:  Atraumatic, Normocephalic  EYES: strabismus, bilateral pinpoint pupils (s/p morphine), conjunctiva and sclera clear  NECK: Supple, No JVD. Palpable right cervical LAD in anterior and posterior triangle and in the Right Axilla, mobile, tender LN  CHEST/LUNG: Clear to auscultation bilaterally; No wheeze  HEART: Regular rate and rhythm; No murmurs, rubs, or gallops  ABDOMEN: Soft, Nontender, Nondistended; Bowel sounds present  EXTREMITIES:  2+ Peripheral Pulses, No clubbing, cyanosis, or edema  PSYCH: AAOx3  NEUROLOGY: non-focal  SKIN: No rashes or lesions

## 2018-04-04 NOTE — H&P ADULT - ATTENDING COMMENTS
57 yr old male is being evaluated for painful rt cervical LN.   VITAL SIGNS (Last 24 hrs):  T(C): 35.7 (04-05-18 @ 08:10), Max: 36.1 (04-05-18 @ 04:29)  HR: 71 (04-05-18 @ 08:10) (64 - 72)  BP: 113/63 (04-05-18 @ 08:10) (108/55 - 119/76)  RR: 18 (04-05-18 @ 08:10) (18 - 18)  SpO2: 94% (04-05-18 @ 08:10) (94% - 95%)  Wt(kg): --  Daily     Daily     I&O's Summary                        14.7   6.90  )-----------( 169      ( 04 Apr 2018 12:53 )             44.2   04-05    139  |  101  |  22<H>  ----------------------------<  184<H>  4.4   |  26  |  1.0    Ca    8.9      05 Apr 2018 07:53    TPro  6.8  /  Alb  4.3  /  TBili  1.3<H>  /  DBili  x   /  AST  35  /  ALT  20  /  AlkPhos  46  04-04  O/E  AAOX3  CHEST-B/L AIR ENTRY  CVS-S1S2N  ABD-SOFT, BS+  NO EDEMA  [id number]/MIN PACED RHYTHM.  ASSESSMENT AND PLAN  # PAINFUL CERVICAL LYMPHADENOPATHY-patient is awaiting biopsy. Has extensive hx, he was treated in the past with steroids for bronchitis. He is being evaluated by Dr Ibrahim for potential sarcoidosis. lymph nodes became painful  1 week prior. HIV test is pending.  # S/p pacemaker that was implanted in 2015.  had normal cath done and denies chest pain presently.  # Diabetes on oral agents- metformin and pioglitazone.  Patient is at moderate risk for complications for exicisional biopsy under [place].

## 2018-04-05 LAB
ANION GAP SERPL CALC-SCNC: 12 MMOL/L — SIGNIFICANT CHANGE UP (ref 7–14)
BUN SERPL-MCNC: 22 MG/DL — HIGH (ref 10–20)
CALCIUM SERPL-MCNC: 8.9 MG/DL — SIGNIFICANT CHANGE UP (ref 8.5–10.1)
CHLORIDE SERPL-SCNC: 101 MMOL/L — SIGNIFICANT CHANGE UP (ref 98–110)
CO2 SERPL-SCNC: 26 MMOL/L — SIGNIFICANT CHANGE UP (ref 17–32)
CREAT SERPL-MCNC: 1 MG/DL — SIGNIFICANT CHANGE UP (ref 0.7–1.5)
GLUCOSE SERPL-MCNC: 184 MG/DL — HIGH (ref 70–99)
HIV 1+2 AB+HIV1 P24 AG SERPL QL IA: SIGNIFICANT CHANGE UP
POTASSIUM SERPL-MCNC: 4.4 MMOL/L — SIGNIFICANT CHANGE UP (ref 3.5–5)
POTASSIUM SERPL-SCNC: 4.4 MMOL/L — SIGNIFICANT CHANGE UP (ref 3.5–5)
SODIUM SERPL-SCNC: 139 MMOL/L — SIGNIFICANT CHANGE UP (ref 135–146)

## 2018-04-05 RX ORDER — MORPHINE SULFATE 50 MG/1
4 CAPSULE, EXTENDED RELEASE ORAL EVERY 4 HOURS
Qty: 0 | Refills: 0 | Status: DISCONTINUED | OUTPATIENT
Start: 2018-04-05 | End: 2018-04-06

## 2018-04-05 RX ORDER — ONDANSETRON 8 MG/1
4 TABLET, FILM COATED ORAL EVERY 6 HOURS
Qty: 0 | Refills: 0 | Status: DISCONTINUED | OUTPATIENT
Start: 2018-04-05 | End: 2018-04-06

## 2018-04-05 RX ADMIN — MORPHINE SULFATE 4 MILLIGRAM(S): 50 CAPSULE, EXTENDED RELEASE ORAL at 17:14

## 2018-04-05 RX ADMIN — MORPHINE SULFATE 4 MILLIGRAM(S): 50 CAPSULE, EXTENDED RELEASE ORAL at 12:15

## 2018-04-05 RX ADMIN — MORPHINE SULFATE 4 MILLIGRAM(S): 50 CAPSULE, EXTENDED RELEASE ORAL at 23:05

## 2018-04-05 RX ADMIN — MORPHINE SULFATE 4 MILLIGRAM(S): 50 CAPSULE, EXTENDED RELEASE ORAL at 22:44

## 2018-04-05 RX ADMIN — MORPHINE SULFATE 4 MILLIGRAM(S): 50 CAPSULE, EXTENDED RELEASE ORAL at 10:59

## 2018-04-05 RX ADMIN — METFORMIN HYDROCHLORIDE 1000 MILLIGRAM(S): 850 TABLET ORAL at 06:22

## 2018-04-05 RX ADMIN — ENOXAPARIN SODIUM 40 MILLIGRAM(S): 100 INJECTION SUBCUTANEOUS at 11:01

## 2018-04-05 RX ADMIN — Medication 20 MILLIGRAM(S): at 11:04

## 2018-04-05 RX ADMIN — MORPHINE SULFATE 4 MILLIGRAM(S): 50 CAPSULE, EXTENDED RELEASE ORAL at 16:44

## 2018-04-05 RX ADMIN — SIMVASTATIN 40 MILLIGRAM(S): 20 TABLET, FILM COATED ORAL at 22:39

## 2018-04-05 RX ADMIN — PIOGLITAZONE HYDROCHLORIDE 45 MILLIGRAM(S): 15 TABLET ORAL at 11:00

## 2018-04-05 RX ADMIN — MORPHINE SULFATE 4 MILLIGRAM(S): 50 CAPSULE, EXTENDED RELEASE ORAL at 01:36

## 2018-04-05 NOTE — PROGRESS NOTE ADULT - SUBJECTIVE AND OBJECTIVE BOX
[place], male, 57y ([birth year]),   MRN-[id number]  Admit Date: 04-04-18 (1d)    Chief Complaint  Patient is a 57y old  Male who presents with a chief complaint of Painful Right cervical and axillary LAD (04 Apr 2018 15:30)      Past Medical and Surgical History  PAST MEDICAL & SURGICAL HISTORY:  Chronic obstructive pulmonary disease, unspecified COPD type  DM (diabetes mellitus)  High cholesterol  HTN (hypertension)  Artificial cardiac pacemaker    Current Medications:  MEDICATIONS  (STANDING):  enalapril 20 milliGRAM(s) Oral daily  enoxaparin Injectable 40 milliGRAM(s) SubCutaneous daily  metFORMIN 1000 milliGRAM(s) Oral two times a day  pioglitazone 45 milliGRAM(s) Oral daily  simvastatin 40 milliGRAM(s) Oral at bedtime    MEDICATIONS  (PRN):  acetaminophen   Tablet 650 milliGRAM(s) Oral every 6 hours PRN moderate pain  ALBUTerol    90 MICROgram(s) HFA Inhaler 2 Puff(s) Inhalation every 6 hours PRN Shortness of Breath and/or Wheezing  morphine  - Injectable 4 milliGRAM(s) IV Push every 4 hours PRN Severe Pain (7 - 10)      Interval History:  No acute events overnight. Patient still having pain but tolerable with pain medications    Vital Signs:  T(F): 96.3 (04-05-18 @ 08:10), Max: 97 (04-05-18 @ 04:29)  HR: 71 (04-05-18 @ 08:10) (64 - 72)  BP: 113/63 (04-05-18 @ 08:10) (108/55 - 120/63)  RR: 18 (04-05-18 @ 08:10) (18 - 18)  SpO2: 94% (04-05-18 @ 08:10) (94% - 95%)  CAPILLARY BLOOD GLUCOSE  309 (04 Apr 2018 23:04)  156 (04 Apr 2018 18:35)  156 (04 Apr 2018 16:17)    Physical Exam:  General: Not in distress.   HEENT: Right cervical and axillary palpable lymphadenopathies, tender to palpation  Cardio: Regular rate and rhythm, S1, S2, no murmur, rub, or gallop.  Pulm: Clear to auscultation bilaterally. No wheezing, rales, or rhonchi  Abdomen: Soft, non-tender, non-distended; no guarding or rebound tenderness.  Extremities: No cyanosis or edema bilaterally. No calf tenderness to palpation    Labs and Imaging:  CBC Full  -  ( 04 Apr 2018 12:53 )  WBC Count : 6.90 K/uL  Hemoglobin : 14.7 g/dL  Hematocrit : 44.2 %  Platelet Count - Automated : 169 K/uL  Mean Cell Volume : 88.6 fL  Mean Cell Hemoglobin : 29.5 pg  Mean Cell Hemoglobin Concentration : 33.3 g/dL  Auto Neutrophil # : x  Auto Lymphocyte # : x  Auto Monocyte # : x  Auto Eosinophil # : x  Auto Basophil # : x  Auto Neutrophil % : x  Auto Lymphocyte % : x  Auto Monocyte % : x  Auto Eosinophil % : x  Auto Basophil % : x    RDW: 13.3    PT/INR/PTT: 04-04-18 @ 12:53  11.30 | --        ^      1.05    BMP: 04-05-18 @ 07:53  139 | 101 | 22   -----------------< 184  4.4  | 26 | 1.0  eGFR(AA): 97, eGFR (non-AA): 84  Ca 8.9, Mg --, P --  BMP: 04-04-18 @ 20:38  137 | 98 | 21   -----------------< 322  4.3  | 25 | 1.1  eGFR(AA): 87, eGFR (non-AA): 75  Ca 8.8, Mg --, P --  BMP: 04-04-18 @ 12:53  138 | 99 | 23   -----------------< 166  6.0  | 25 | 1.1  eGFR(AA): 87, eGFR (non-AA): 75  Ca 9.1, Mg --, P --    LFTs: 04-04-18 @ 12:53  TP  6.8  | 4.3 Alb   ---------------  TB  1.3  | --  DB   ---------------  ALT 20  | 35  AST            ^          46  ALK      LFTs: 04-05-18 @ 07:53  Ca  8.9  | -- AST   -----------------  TP  --  | -- ALT  -----------------  Alb --  | -- ALK          ^        --         TB  LFTs: 04-04-18 @ 20:38  Ca  8.8  | -- AST   -----------------  TP  --  | -- ALT  -----------------  Alb --  | -- ALK          ^        --         TB  LFTs: 04-04-18 @ 12:53  Ca  9.1  | 35 AST   -----------------  TP  6.8  | 20 ALT  -----------------  Alb 4.3  | 46 ALK          ^        1.3         TB    Home Medications:  Home Medications:  enalapril: 20 milligram(s) orally once a day (04 Apr 2018 16:36)  ezetimibe 10 mg oral tablet: 1 tab(s) orally once a day (04 Apr 2018 16:36)  Jardiance 25 mg oral tablet: 1 tab(s) orally once a day (in the morning) (04 Apr 2018 16:36)  metFORMIN: 1000 milligram(s) orally 2 times a day (04 Apr 2018 16:36)  pioglitazone: 45 milligram(s) orally once a day (04 Apr 2018 16:36)  ProAir HFA 90 mcg/inh inhalation aerosol: 2 puff(s) inhaled 4 times a day, As Needed (04 Apr 2018 16:36)  simvastatin: 40 milligram(s) orally once a day (at bedtime) (04 Apr 2018 16:36)

## 2018-04-05 NOTE — PROGRESS NOTE ADULT - ASSESSMENT
57 year old Male with painful supraclavicular, cervical, axillary, mesenteric LAD, increasing in size x2 months.     PLAN:  1.	Please provide medical clearance for excisional LN bx under general anesthesia -- to be done 4/6/18 with Dr. Alfaro  2.	NPO after midnight  3.	Pain control prn 57 year old Male with painful supraclavicular, cervical, axillary, mesenteric LAD, increasing in size x2 months.     PLAN:  1.	Please provide medical clearance for excisional LN bx under general anesthesia -- to be done 4/6/18 with Dr. Alfaro  2.	NPO after midnight  3.	Hold Lovenox 4/5 PM, 4/6 AM  4.	Pain control prn

## 2018-04-05 NOTE — PROGRESS NOTE ADULT - ASSESSMENT
THis is a 56 yo M patient with COPD, DM2, Heart block s/p PPM, DLD presented with painful right cervical and axillary lymphadenopathy. central necrosis seen in one of them.    # Painful diffuse lymphadenopathy  - Sacrcoidosis (if non caseating) vs. lymphoma vs. TB (if caseating) vs. HIV vs. metastatic disease  - Excisional biopsy to be done tomorrow by ENT: NPO after midnight starting tonight; hold Lovenox for DVT prophylaxis  - F/U Quantiferon and HIV  - Pain control  - Based on biopsy results, further management will be decided    # DM2  - Will hold oral antidiabetics for now  - Monitor FS     # DLD  - Continue home meds    # COPD  - No acute exacerbation at the moment  - F/U PFT results    #HTN  - Continue Enalapril  - Monitor BP THis is a 56 yo M patient with COPD, DM2, Heart block s/p PPM, DLD presented with painful right cervical and axillary lymphadenopathy. central necrosis seen in one of them.    # Painful diffuse lymphadenopathy  - Sacrcoidosis (if non caseating) vs. lymphoma vs. TB (if caseating) vs. HIV vs. metastatic disease  - Excisional biopsy to be done tomorrow by ENT: NPO after midnight starting tonight; hold Lovenox for DVT prophylaxis (restart prophylaxis after procedure)  - F/U Quantiferon and HIV  - Pain control  - Based on biopsy results, further management will be decided    # DM2  - Will hold oral antidiabetics for now  - Monitor FS     # DLD  - Continue home meds    # COPD  - No acute exacerbation at the moment  - F/U PFT results    #HTN  - Continue Enalapril  - Monitor BP

## 2018-04-05 NOTE — PROGRESS NOTE ADULT - SUBJECTIVE AND OBJECTIVE BOX
Patient is a 57y old Male with painful supraclavicular, cervical, axillary, mesenteric LAD, increasing in size x2 months. Patient seen and examined at bedside. +pain to neck. No fever/chills.    VS: T(F): 97, Max: 97 (04-05-18 @ 04:29)  HR: 69 (64 - 72)  BP: 119/76 (108/55 - 120/63)  RR: 18  SpO2: 95%  GEN: Awake, alert, NAD  HEENT: R cervical, R supraclavicular, & R axillary LAD with tenderness to palpation to posterior cervical LN -- non-mobile, no warmth, neck supple, uvula midline, mucosa pink  NEURO: CN II-XII intact bilaterally

## 2018-04-06 ENCOUNTER — RESULT REVIEW (OUTPATIENT)
Age: 58
End: 2018-04-06

## 2018-04-06 LAB
ALBUMIN SERPL ELPH-MCNC: 4 G/DL — SIGNIFICANT CHANGE UP (ref 3.5–5.2)
ALP SERPL-CCNC: 49 U/L — SIGNIFICANT CHANGE UP (ref 30–115)
ALT FLD-CCNC: 12 U/L — SIGNIFICANT CHANGE UP (ref 0–41)
ANION GAP SERPL CALC-SCNC: 13 MMOL/L — SIGNIFICANT CHANGE UP (ref 7–14)
AST SERPL-CCNC: 13 U/L — SIGNIFICANT CHANGE UP (ref 0–41)
BASOPHILS # BLD AUTO: 0.04 K/UL — SIGNIFICANT CHANGE UP (ref 0–0.2)
BASOPHILS NFR BLD AUTO: 0.6 % — SIGNIFICANT CHANGE UP (ref 0–1)
BILIRUB SERPL-MCNC: 1.3 MG/DL — HIGH (ref 0.2–1.2)
BUN SERPL-MCNC: 21 MG/DL — HIGH (ref 10–20)
CALCIUM SERPL-MCNC: 8.4 MG/DL — LOW (ref 8.5–10.1)
CHLORIDE SERPL-SCNC: 100 MMOL/L — SIGNIFICANT CHANGE UP (ref 98–110)
CO2 SERPL-SCNC: 24 MMOL/L — SIGNIFICANT CHANGE UP (ref 17–32)
CREAT SERPL-MCNC: 0.9 MG/DL — SIGNIFICANT CHANGE UP (ref 0.7–1.5)
EOSINOPHIL # BLD AUTO: 0.25 K/UL — SIGNIFICANT CHANGE UP (ref 0–0.7)
EOSINOPHIL NFR BLD AUTO: 4 % — SIGNIFICANT CHANGE UP (ref 0–8)
GLUCOSE SERPL-MCNC: 138 MG/DL — HIGH (ref 70–99)
HCT VFR BLD CALC: 41.3 % — LOW (ref 42–52)
HGB BLD-MCNC: 13.4 G/DL — LOW (ref 14–18)
IMM GRANULOCYTES NFR BLD AUTO: 0.3 % — SIGNIFICANT CHANGE UP (ref 0.1–0.3)
LYMPHOCYTES # BLD AUTO: 1.02 K/UL — LOW (ref 1.2–3.4)
LYMPHOCYTES # BLD AUTO: 16.2 % — LOW (ref 20.5–51.1)
M TB TUBERC IFN-G BLD QL: 0 IU/ML — SIGNIFICANT CHANGE UP
M TB TUBERC IFN-G BLD QL: 0.02 IU/ML — SIGNIFICANT CHANGE UP
M TB TUBERC IFN-G BLD QL: NEGATIVE — SIGNIFICANT CHANGE UP
MAGNESIUM SERPL-MCNC: 2 MG/DL — SIGNIFICANT CHANGE UP (ref 1.8–2.4)
MCHC RBC-ENTMCNC: 29.1 PG — SIGNIFICANT CHANGE UP (ref 27–31)
MCHC RBC-ENTMCNC: 32.4 G/DL — SIGNIFICANT CHANGE UP (ref 32–37)
MCV RBC AUTO: 89.6 FL — SIGNIFICANT CHANGE UP (ref 80–94)
MITOGEN IGNF BCKGRD COR BLD-ACNC: >10 IU/ML — SIGNIFICANT CHANGE UP
MONOCYTES # BLD AUTO: 0.88 K/UL — HIGH (ref 0.1–0.6)
MONOCYTES NFR BLD AUTO: 14 % — HIGH (ref 1.7–9.3)
NEUTROPHILS # BLD AUTO: 4.09 K/UL — SIGNIFICANT CHANGE UP (ref 1.4–6.5)
NEUTROPHILS NFR BLD AUTO: 64.9 % — SIGNIFICANT CHANGE UP (ref 42.2–75.2)
PLATELET # BLD AUTO: 151 K/UL — SIGNIFICANT CHANGE UP (ref 130–400)
POTASSIUM SERPL-MCNC: 4.5 MMOL/L — SIGNIFICANT CHANGE UP (ref 3.5–5)
POTASSIUM SERPL-SCNC: 4.5 MMOL/L — SIGNIFICANT CHANGE UP (ref 3.5–5)
PROT SERPL-MCNC: 6 G/DL — SIGNIFICANT CHANGE UP (ref 6–8)
RBC # BLD: 4.61 M/UL — LOW (ref 4.7–6.1)
RBC # FLD: 13.2 % — SIGNIFICANT CHANGE UP (ref 11.5–14.5)
SODIUM SERPL-SCNC: 137 MMOL/L — SIGNIFICANT CHANGE UP (ref 135–146)
WBC # BLD: 6.3 K/UL — SIGNIFICANT CHANGE UP (ref 4.8–10.8)
WBC # FLD AUTO: 6.3 K/UL — SIGNIFICANT CHANGE UP (ref 4.8–10.8)

## 2018-04-06 RX ORDER — MORPHINE SULFATE 50 MG/1
2 CAPSULE, EXTENDED RELEASE ORAL
Qty: 0 | Refills: 0 | Status: DISCONTINUED | OUTPATIENT
Start: 2018-04-06 | End: 2018-04-07

## 2018-04-06 RX ORDER — SODIUM CHLORIDE 9 MG/ML
1000 INJECTION INTRAMUSCULAR; INTRAVENOUS; SUBCUTANEOUS
Qty: 0 | Refills: 0 | Status: DISCONTINUED | OUTPATIENT
Start: 2018-04-06 | End: 2018-04-07

## 2018-04-06 RX ORDER — ONDANSETRON 8 MG/1
4 TABLET, FILM COATED ORAL ONCE
Qty: 0 | Refills: 0 | Status: COMPLETED | OUTPATIENT
Start: 2018-04-06 | End: 2018-04-07

## 2018-04-06 RX ORDER — ONDANSETRON 8 MG/1
4 TABLET, FILM COATED ORAL EVERY 6 HOURS
Qty: 0 | Refills: 0 | Status: DISCONTINUED | OUTPATIENT
Start: 2018-04-06 | End: 2018-04-07

## 2018-04-06 RX ORDER — MORPHINE SULFATE 50 MG/1
4 CAPSULE, EXTENDED RELEASE ORAL
Qty: 0 | Refills: 0 | Status: DISCONTINUED | OUTPATIENT
Start: 2018-04-06 | End: 2018-04-07

## 2018-04-06 RX ADMIN — MORPHINE SULFATE 4 MILLIGRAM(S): 50 CAPSULE, EXTENDED RELEASE ORAL at 20:53

## 2018-04-06 RX ADMIN — MORPHINE SULFATE 4 MILLIGRAM(S): 50 CAPSULE, EXTENDED RELEASE ORAL at 09:43

## 2018-04-06 RX ADMIN — ONDANSETRON 4 MILLIGRAM(S): 8 TABLET, FILM COATED ORAL at 09:44

## 2018-04-06 RX ADMIN — SODIUM CHLORIDE 75 MILLILITER(S): 9 INJECTION INTRAMUSCULAR; INTRAVENOUS; SUBCUTANEOUS at 20:53

## 2018-04-06 RX ADMIN — Medication 20 MILLIGRAM(S): at 13:24

## 2018-04-06 RX ADMIN — MORPHINE SULFATE 4 MILLIGRAM(S): 50 CAPSULE, EXTENDED RELEASE ORAL at 10:05

## 2018-04-06 RX ADMIN — SODIUM CHLORIDE 100 MILLILITER(S): 9 INJECTION INTRAMUSCULAR; INTRAVENOUS; SUBCUTANEOUS at 19:23

## 2018-04-06 RX ADMIN — ONDANSETRON 4 MILLIGRAM(S): 8 TABLET, FILM COATED ORAL at 19:20

## 2018-04-06 RX ADMIN — ONDANSETRON 4 MILLIGRAM(S): 8 TABLET, FILM COATED ORAL at 00:02

## 2018-04-06 RX ADMIN — MORPHINE SULFATE 4 MILLIGRAM(S): 50 CAPSULE, EXTENDED RELEASE ORAL at 21:15

## 2018-04-06 NOTE — BRIEF OPERATIVE NOTE - PROCEDURE
<<-----Click on this checkbox to enter Procedure Lymph node biopsy, open, cervical  04/06/2018    Active  [id number]

## 2018-04-06 NOTE — PROGRESS NOTE ADULT - SUBJECTIVE AND OBJECTIVE BOX
No events overnight.  NPO since midnight.    Patient seen and evaluated. To OR for excisional biopsy right neck. Informed written consent obtained after risks, benefits and alternatives discussed with patient extensively.

## 2018-04-06 NOTE — PROGRESS NOTE ADULT - SUBJECTIVE AND OBJECTIVE BOX
POST OP CHECK  Patient is a 57y old Male s/p R cervical LN excisional bx POD#0. Patient seen and examined at bedside. Pain controlled, mild nausea. No fever, chills, vomiting, RUE weakness/paresthesias.    VS: T(F): 98, Max: 98.1 (04-06-18 @ 05:38)  HR: 92 (69 - 99)  BP: 124/70 (121/71 - 140/77)  RR: 29  SpO2: 100%  GEN: Sleepy but arousable  HEENT: adan-incisional TTP R neck, incision soft with penrose drain in place, steri-strip intact with blood stain, R supraclavicular, & R axillary LAD with tenderness to palpation -- non-mobile, no warmth, neck supple  NEURO: full ROM RUE & sensation intact to light touch

## 2018-04-06 NOTE — CHART NOTE - NSCHARTNOTEFT_GEN_A_CORE
PACU ANESTHESIA ADMISSION NOTE      Procedure:   Post op diagnosis:  Lymphadenopathy of head and neck      ____  Intubated  TV:______       Rate: ______      FiO2: ______    _x___  Patent Airway    _x___  Full return of protective reflexes    _x___  Full recovery from anesthesia / back to baseline status    Vitals:            T:  97.5              BP : 140/77               R: 14              Sat: 96%              P: 101      Mental Status:  _x___ Awake   _____ Alert   _____ Drowsy   _____ Sedated    Nausea/Vomiting:  _x___  NO       ______Yes,   See Post - Op Orders         Pain Scale (0-10):  __0___    Treatment: _x___ None    ____ See Post - Op/PCA Orders    Post - Operative Fluids:   __x__ Oral   ____ See Post - Op Orders    Plan: Discharge:   ____Home       _x____Floor     _____Critical Care    _____  Other:_________________    Comments:  No anesthesia issues or complications noted.  Discharge when criteria met.

## 2018-04-06 NOTE — PROGRESS NOTE ADULT - SUBJECTIVE AND OBJECTIVE BOX
SUBJECTIVE:    Patient is a 57y old Male who presents with a chief complaint of Painful Right cervical and axillary LAD (04 Apr 2018 15:30)    Currently admitted to medicine with the primary diagnosis of Neck mass     Today is hospital day 2d. This morning he is resting comfortably in bed and reports no new issues or overnight events.     PAST MEDICAL & SURGICAL HISTORY  Chronic obstructive pulmonary disease, unspecified COPD type  DM (diabetes mellitus)  High cholesterol  HTN (hypertension)  Artificial cardiac pacemaker    SOCIAL HISTORY:  Negative for smoking/alcohol/drug use.     ALLERGIES:  No Known Allergies    MEDICATIONS:  STANDING MEDICATIONS  enalapril 20 milliGRAM(s) Oral daily  simvastatin 40 milliGRAM(s) Oral at bedtime    PRN MEDICATIONS  acetaminophen   Tablet 650 milliGRAM(s) Oral every 6 hours PRN  ALBUTerol    90 MICROgram(s) HFA Inhaler 2 Puff(s) Inhalation every 6 hours PRN  morphine  - Injectable 4 milliGRAM(s) IV Push every 4 hours PRN  ondansetron    Tablet 4 milliGRAM(s) Oral every 6 hours PRN    VITALS:   T(F): 98.1  HR: 69  BP: 125/70  RR: 18  SpO2: 94%    LABS:                        14.7   6.90  )-----------( 169      ( 04 Apr 2018 12:53 )             44.2     04-05    139  |  101  |  22<H>  ----------------------------<  184<H>  4.4   |  26  |  1.0    Ca    8.9      05 Apr 2018 07:53    TPro  6.8  /  Alb  4.3  /  TBili  1.3<H>  /  DBili  x   /  AST  35  /  ALT  20  /  AlkPhos  46  04-04    PT/INR - ( 04 Apr 2018 12:53 )   PT: 11.30 sec;   INR: 1.05 ratio         RADIOLOGY:  CXR (04/04):   No radiographic evidence of acute cardiopulmonary disease.    CT Neck Soft tissue (02/04):  1.  Clustered level V lymphadenopathy along the right posterior triangle of the neck. At least one lymph node demonstrate central necrosis  2.  Equivocal lymph nodes elsewhere in the neck which would otherwise be considered normal.    CT Abdomen and Pelvis (02/04)  Enlarged right axillary lymph node. Haziness of the mesentery with enlarged mesenteric lymph nodes, given enlarged cervical lymph nodes findings concerning for neoplasm versus inflammatory condition.        PHYSICAL EXAM:  GEN: No acute distress, well-developed  EYES: strabismus  NECK: Supple, No JVD. Palpable right cervical LAD in anterior and posterior triangle and in the Right Axilla, mobile, tender LN  LUNGS: Clear to auscultation bilaterally   HEART: S1/S2 present. RRR.   ABD: Soft, non-tender, non-distended. Bowel sounds present  EXT: NC/NC/NE/2+PP/PADRON  NEURO: AAOX3 SUBJECTIVE:    Patient is a 57y old Male who presents with a chief complaint of Painful Right cervical and axillary LAD (04 Apr 2018 15:30)    Currently admitted to medicine with the primary diagnosis of Neck mass     Today is hospital day 2d. This morning he is resting comfortably in bed and reports no new issues or overnight events.     PAST MEDICAL & SURGICAL HISTORY  Chronic obstructive pulmonary disease, unspecified COPD type  DM (diabetes mellitus)  High cholesterol  HTN (hypertension)  Artificial cardiac pacemaker    SOCIAL HISTORY:  Negative for smoking/alcohol/drug use.     ALLERGIES:  No Known Allergies    MEDICATIONS:  STANDING MEDICATIONS  enalapril 20 milliGRAM(s) Oral daily  simvastatin 40 milliGRAM(s) Oral at bedtime    PRN MEDICATIONS  acetaminophen   Tablet 650 milliGRAM(s) Oral every 6 hours PRN  ALBUTerol    90 MICROgram(s) HFA Inhaler 2 Puff(s) Inhalation every 6 hours PRN  morphine  - Injectable 4 milliGRAM(s) IV Push every 4 hours PRN  ondansetron    Tablet 4 milliGRAM(s) Oral every 6 hours PRN    VITALS:   T(F): 98.1  HR: 69  BP: 125/70  RR: 18  SpO2: 94%    LABS:                        14.7   6.90  )-----------( 169      ( 04 Apr 2018 12:53 )             44.2     04-05    139  |  101  |  22<H>  ----------------------------<  184<H>  4.4   |  26  |  1.0    Ca    8.9      05 Apr 2018 07:53    TPro  6.8  /  Alb  4.3  /  TBili  1.3<H>  /  DBili  x   /  AST  35  /  ALT  20  /  AlkPhos  46  04-04    PT/INR - ( 04 Apr 2018 12:53 )   PT: 11.30 sec;   INR: 1.05 ratio         RADIOLOGY:  CXR (04/04):   No radiographic evidence of acute cardiopulmonary disease.    CT Neck Soft tissue (02/04):  1.  Clustered level V lymphadenopathy along the right posterior triangle of the neck. At least one lymph node demonstrate central necrosis  2.  Equivocal lymph nodes elsewhere in the neck which would otherwise be considered normal.    CT Abdomen and Pelvis (02/04)  Enlarged right axillary lymph node. Haziness of the mesentery with enlarged mesenteric lymph nodes, given enlarged cervical lymph nodes findings concerning for neoplasm versus inflammatory condition.        PHYSICAL EXAM:  GEN: No acute distress, well-developed  EYES: strabismus  NECK: Supple, No JVD. Palpable. tender right cervical LAD in anterior and posterior triangle and in the Right Axilla (non tender)  LUNGS: Clear to auscultation bilaterally   HEART: S1/S2 present. RRR.   ABD: Soft, non-tender, non-distended. Bowel sounds present  EXT: NC/NC/NE/2+PP/PADRON  NEURO: AAOX3

## 2018-04-06 NOTE — PROGRESS NOTE ADULT - ASSESSMENT
57 year old Male with painful supraclavicular, cervical, axillary, mesenteric LAD, increasing in size x2 months. S/p R cervical LN excisional bx POD#0.     PLAN:  1.	IVF until tolerating full PO diet, advance as tolerated  2.	Change neck fluff and spandage dressing prn soiling  3.	Penrose to stay in place for at least 24H  4.	Continue IV Clinda  5.	Pain control  6.	Re-start Lovenox 4/7 evening  7.	F/u path  8.	Med/onc c/s

## 2018-04-06 NOTE — PROGRESS NOTE ADULT - ASSESSMENT
56 yo M patient with COPD, DM2, Heart block s/p PPM (2015), DLD presented with painful right cervical and axillary lymphadenopathy    #) Painful Cervical LAD  - ENT consulted: excisional biopsy of right cervical node today  - pt being evaluated by Dr Ibrahim (outpt) for potential sarcoidosis. lymph nodes became painful 1 week prior.   - f/u Quantiferon and HIV  - pain control  - Will consult Hem/Onc with biopsy results    #) DM2  - check Finger stick  - continue metformin and pioglitazone (on hold for procedure)    #) DLD   - continue with atorvastatin    #) COPD  - stable  - Seen by pulmo 3/20/18, f/u PFT done earlier this week  - cw home meds    #) HTN   - continue with Enalapril  - monitor blood pressure    #) Disposition  - discharge to home in 24-48 hrs    #) Full code status 56 yo M patient with COPD, DM2, Heart block s/p PPM (2015), DLD presented with painful right cervical and axillary lymphadenopathy    #) Painful Cervical LAD  - Holding abx for now: no WBC elevation, no fever, patient does not look septic  - ENT consulted: excisional biopsy of right cervical node today  - pt being evaluated by Dr Ibrahim (outpt) for potential sarcoidosis. lymph nodes became painful 1 week prior.   - f/u Quantiferon and HIV  - pain control with morphine  - Will consult Hem/Onc with biopsy results    #) DM2  - check Finger stick  - continue metformin and pioglitazone (on hold for procedure)    #) DLD   - continue with atorvastatin    #) COPD  - stable  - Seen by pulmo 3/20/18, f/u PFT done earlier this week  - continue home meds    #) HTN   - continue with Enalapril  - monitor blood pressure    #) Disposition  - discharge to home in 24-48 hrs    #) Full code status 58 yo M patient with COPD, DM2, Heart block s/p PPM (2015), DLD presented with painful right cervical and axillary lymphadenopathy    #) Painful Cervical LAD/lymphadenitis  - will start pt on clindamycin  - ENT consulted: excisional biopsy of right cervical node today  - pt being evaluated by Dr Ibrahim (outpt) for potential sarcoidosis. lymph nodes became painful 1 week prior.   - f/u Quantiferon and HIV  - pain control with morphine  - Will consult Hem/Onc with biopsy results    #) DM2  - check Finger stick  - continue metformin and pioglitazone (on hold for procedure)    #) DLD   - continue with atorvastatin    #) COPD  - stable  - Seen by pulmo 3/20/18, f/u PFT done earlier this week  - continue home meds    #) HTN   - continue with Enalapril  - monitor blood pressure    #) Disposition  - discharge to home in 24-48 hrs    #) Full code status

## 2018-04-07 LAB
ANION GAP SERPL CALC-SCNC: 15 MMOL/L — HIGH (ref 7–14)
BASOPHILS # BLD AUTO: 0.04 K/UL — SIGNIFICANT CHANGE UP (ref 0–0.2)
BASOPHILS NFR BLD AUTO: 0.6 % — SIGNIFICANT CHANGE UP (ref 0–1)
BUN SERPL-MCNC: 18 MG/DL — SIGNIFICANT CHANGE UP (ref 10–20)
CALCIUM SERPL-MCNC: 8.7 MG/DL — SIGNIFICANT CHANGE UP (ref 8.5–10.1)
CHLORIDE SERPL-SCNC: 97 MMOL/L — LOW (ref 98–110)
CO2 SERPL-SCNC: 25 MMOL/L — SIGNIFICANT CHANGE UP (ref 17–32)
CREAT SERPL-MCNC: 0.9 MG/DL — SIGNIFICANT CHANGE UP (ref 0.7–1.5)
EOSINOPHIL # BLD AUTO: 0.17 K/UL — SIGNIFICANT CHANGE UP (ref 0–0.7)
EOSINOPHIL NFR BLD AUTO: 2.6 % — SIGNIFICANT CHANGE UP (ref 0–8)
GLUCOSE SERPL-MCNC: 169 MG/DL — HIGH (ref 70–99)
HCT VFR BLD CALC: 39.9 % — LOW (ref 42–52)
HGB BLD-MCNC: 13.1 G/DL — LOW (ref 14–18)
IMM GRANULOCYTES NFR BLD AUTO: 0.3 % — SIGNIFICANT CHANGE UP (ref 0.1–0.3)
LYMPHOCYTES # BLD AUTO: 0.67 K/UL — LOW (ref 1.2–3.4)
LYMPHOCYTES # BLD AUTO: 10.1 % — LOW (ref 20.5–51.1)
MCHC RBC-ENTMCNC: 29.2 PG — SIGNIFICANT CHANGE UP (ref 27–31)
MCHC RBC-ENTMCNC: 32.8 G/DL — SIGNIFICANT CHANGE UP (ref 32–37)
MCV RBC AUTO: 89.1 FL — SIGNIFICANT CHANGE UP (ref 80–94)
MONOCYTES # BLD AUTO: 0.76 K/UL — HIGH (ref 0.1–0.6)
MONOCYTES NFR BLD AUTO: 11.4 % — HIGH (ref 1.7–9.3)
NEUTROPHILS # BLD AUTO: 4.98 K/UL — SIGNIFICANT CHANGE UP (ref 1.4–6.5)
NEUTROPHILS NFR BLD AUTO: 75 % — SIGNIFICANT CHANGE UP (ref 42.2–75.2)
NRBC # BLD: 0 /100 WBCS — SIGNIFICANT CHANGE UP (ref 0–0)
PLATELET # BLD AUTO: 162 K/UL — SIGNIFICANT CHANGE UP (ref 130–400)
POTASSIUM SERPL-MCNC: 4.1 MMOL/L — SIGNIFICANT CHANGE UP (ref 3.5–5)
POTASSIUM SERPL-SCNC: 4.1 MMOL/L — SIGNIFICANT CHANGE UP (ref 3.5–5)
RBC # BLD: 4.48 M/UL — LOW (ref 4.7–6.1)
RBC # FLD: 13.2 % — SIGNIFICANT CHANGE UP (ref 11.5–14.5)
SODIUM SERPL-SCNC: 137 MMOL/L — SIGNIFICANT CHANGE UP (ref 135–146)
WBC # BLD: 6.64 K/UL — SIGNIFICANT CHANGE UP (ref 4.8–10.8)
WBC # FLD AUTO: 6.64 K/UL — SIGNIFICANT CHANGE UP (ref 4.8–10.8)

## 2018-04-07 RX ORDER — ENOXAPARIN SODIUM 100 MG/ML
40 INJECTION SUBCUTANEOUS DAILY
Qty: 0 | Refills: 0 | Status: DISCONTINUED | OUTPATIENT
Start: 2018-04-07 | End: 2018-04-09

## 2018-04-07 RX ORDER — SENNA PLUS 8.6 MG/1
1 TABLET ORAL DAILY
Qty: 0 | Refills: 0 | Status: DISCONTINUED | OUTPATIENT
Start: 2018-04-07 | End: 2018-04-09

## 2018-04-07 RX ORDER — ACETAMINOPHEN 500 MG
650 TABLET ORAL EVERY 6 HOURS
Qty: 0 | Refills: 0 | Status: DISCONTINUED | OUTPATIENT
Start: 2018-04-07 | End: 2018-04-09

## 2018-04-07 RX ORDER — MORPHINE SULFATE 50 MG/1
4 CAPSULE, EXTENDED RELEASE ORAL EVERY 4 HOURS
Qty: 0 | Refills: 0 | Status: DISCONTINUED | OUTPATIENT
Start: 2018-04-07 | End: 2018-04-07

## 2018-04-07 RX ORDER — DOCUSATE SODIUM 100 MG
100 CAPSULE ORAL DAILY
Qty: 0 | Refills: 0 | Status: DISCONTINUED | OUTPATIENT
Start: 2018-04-07 | End: 2018-04-09

## 2018-04-07 RX ADMIN — MORPHINE SULFATE 2 MILLIGRAM(S): 50 CAPSULE, EXTENDED RELEASE ORAL at 09:11

## 2018-04-07 RX ADMIN — SENNA PLUS 1 TABLET(S): 8.6 TABLET ORAL at 11:45

## 2018-04-07 RX ADMIN — MORPHINE SULFATE 4 MILLIGRAM(S): 50 CAPSULE, EXTENDED RELEASE ORAL at 03:40

## 2018-04-07 RX ADMIN — ONDANSETRON 4 MILLIGRAM(S): 8 TABLET, FILM COATED ORAL at 09:11

## 2018-04-07 RX ADMIN — MORPHINE SULFATE 4 MILLIGRAM(S): 50 CAPSULE, EXTENDED RELEASE ORAL at 23:14

## 2018-04-07 RX ADMIN — Medication 100 MILLIGRAM(S): at 11:46

## 2018-04-07 RX ADMIN — SODIUM CHLORIDE 75 MILLILITER(S): 9 INJECTION INTRAMUSCULAR; INTRAVENOUS; SUBCUTANEOUS at 09:11

## 2018-04-07 RX ADMIN — MORPHINE SULFATE 2 MILLIGRAM(S): 50 CAPSULE, EXTENDED RELEASE ORAL at 15:28

## 2018-04-07 RX ADMIN — ONDANSETRON 4 MILLIGRAM(S): 8 TABLET, FILM COATED ORAL at 15:32

## 2018-04-07 RX ADMIN — MORPHINE SULFATE 2 MILLIGRAM(S): 50 CAPSULE, EXTENDED RELEASE ORAL at 15:51

## 2018-04-07 RX ADMIN — MORPHINE SULFATE 2 MILLIGRAM(S): 50 CAPSULE, EXTENDED RELEASE ORAL at 11:43

## 2018-04-07 RX ADMIN — MORPHINE SULFATE 4 MILLIGRAM(S): 50 CAPSULE, EXTENDED RELEASE ORAL at 04:00

## 2018-04-07 RX ADMIN — MORPHINE SULFATE 2 MILLIGRAM(S): 50 CAPSULE, EXTENDED RELEASE ORAL at 10:10

## 2018-04-07 RX ADMIN — MORPHINE SULFATE 2 MILLIGRAM(S): 50 CAPSULE, EXTENDED RELEASE ORAL at 12:00

## 2018-04-07 NOTE — PROGRESS NOTE ADULT - SUBJECTIVE AND OBJECTIVE BOX
56 yo male POD #1 s/p right sided cervical LN excisional biopsy  no events noted over night  pt seen and examined at bedside  pt c/o minor incisional pain, no cp/n/v/f/c     Vital Signs Last 24 Hrs  T(C): 36.9 (07 Apr 2018 05:05), Max: 36.9 (07 Apr 2018 05:05)  T(F): 98.4 (07 Apr 2018 05:05), Max: 98.4 (07 Apr 2018 05:05)  HR: 78 (07 Apr 2018 05:05) (76 - 99)  BP: 121/52 (07 Apr 2018 05:05) (121/52 - 140/77)  RR: 18 (07 Apr 2018 05:05) (18 - 29)  SpO2: 97% (07 Apr 2018 08:28) (96% - 100%)    a+ox3 nad  nc/at, perrl  right neck - incision c/d/i, +penrose, fluff moderately soaked with sersonaguinous fluid, fluff changed, no swelling                          13.4   6.30  )-----------( 151      ( 06 Apr 2018 08:10 )             41.3

## 2018-04-07 NOTE — PROGRESS NOTE ADULT - ASSESSMENT
56 yo M patient with COPD, DM2, Heart block s/p PPM (2015), DLD presented with painful right cervical and axillary lymphadenopathy    #) Painful Cervical LAD/lymphadenitis  - Continue IV Clindamycin  - ENT consulted:  S/p R cervical LN excisional bx (04/06/18)  - pt being evaluated by Dr Ibrahim (outpt) for potential sarcoidosis. lymph nodes became painful 1 week prior.   - pain control with morphine  - Will consult Hem/Onc with biopsy results    #) DM2  - check Finger stick  - continue metformin and pioglitazone (on hold for procedure)    #) DLD   - continue with atorvastatin    #) COPD  - stable  - Seen by pulmo 3/20/18, f/u PFT done earlier this week  - continue home meds    #) HTN   - continue with Enalapril  - monitor blood pressure    #) DVT Ppx  - Restart Lovenox 4/7 evening    #) Disposition  - discharge to home in 24-48 hrs    #) Full code status 58 yo M patient with COPD, DM2, Heart block s/p PPM (2015), DLD presented with painful right cervical and axillary lymphadenopathy    #) Painful Cervical LAD/lymphadenitis  - Continue IV Clindamycin  - ENT consulted:  S/p R cervical LN excisional bx (04/06/18)  - pt being evaluated by Dr Ibrahim (outpt) for potential sarcoidosis. lymph nodes became painful 1 week prior.   - pain control with morphine  - Will consult Hem/Onc with biopsy results    #) DM2  - check Finger stick  - continue metformin and pioglitazone (on hold for procedure)    #) DLD   - continue with atorvastatin    #) COPD  - stable  - Seen by pulmo 3/20/18, f/u PFT done earlier this week  - continue home meds    #) HTN   - continue with Enalapril  - monitor blood pressure    #) DVT Ppx  - Restart Lovenox    #) Disposition  - discharge to home in 24-48 hrs    #) Full code status

## 2018-04-07 NOTE — PROGRESS NOTE ADULT - SUBJECTIVE AND OBJECTIVE BOX
Chief Complaint:  Patient is a 57y old  Male who presents with a chief complaint of Painful Right cervical and axillary LAD (04 Apr 2018 15:30)      Interval Events:     Allergies:  No Known Allergies      Home Medications:    Hospital Medications:  docusate sodium 100 milliGRAM(s) Oral daily  enoxaparin Injectable 40 milliGRAM(s) SubCutaneous daily  senna 1 Tablet(s) Oral daily      PMHX/PSHX:  Chronic obstructive pulmonary disease, unspecified COPD type  DM (diabetes mellitus)  High cholesterol  HTN (hypertension)  No pertinent past medical history  Artificial cardiac pacemaker  No significant past surgical history      Family history:  Family history of cancer (Mother)  Family history of breast cancer (Sibling)  No pertinent family history in first degree relatives      ROS:     General:  No wt loss, fevers, chills, night sweats, fatigue,   Eyes:  Good vision, no reported pain  ENT:  No sore throat, pain, runny nose, dysphagia  CV:  No pain, palpitations, hypo/hypertension  Resp:  No dyspnea, cough, tachypnea, wheezing  GI:  No pain, No nausea, No vomiting, No diarrhea, No constipation, No weight loss, No fever, No pruritis, No rectal bleeding, No tarry stools, No dysphagia,  :  No pain, bleeding, incontinence, nocturia  Muscle:  No pain, weakness  Neuro:  No weakness, tingling, memory problems  Psych:  No fatigue, insomnia, mood problems, depression  Endocrine:  No polyuria, polydipsia, cold/heat intolerance  Heme:  No petechiae, ecchymosis, easy bruisability  Skin:  No rash, tattoos, scars, edema      PHYSICAL EXAM:   Vital Signs:  Vital Signs Last 24 Hrs  T(C): 35.9 (07 Apr 2018 14:38), Max: 36.9 (07 Apr 2018 05:05)  T(F): 96.6 (07 Apr 2018 14:38), Max: 98.4 (07 Apr 2018 05:05)  HR: 68 (07 Apr 2018 14:38) (68 - 96)  BP: 130/69 (07 Apr 2018 14:38) (121/52 - 137/75)  BP(mean): --  RR: 20 (07 Apr 2018 14:38) (18 - 29)  SpO2: 97% (07 Apr 2018 08:28) (96% - 100%)  Daily     Daily     GENERAL:  Appears stated age, well-groomed, well-nourished, no distress  HEENT:  NC/AT,  conjunctivae clear and pink, no thyromegaly, nodules, adenopathy, no JVD, sclera -anicteric  CHEST:  Full & symmetric excursion, no increased effort, breath sounds clear  HEART:  Regular rhythm, S1, S2, no murmur/rub/S3/S4, no abdominal bruit, no edema  ABDOMEN:  Soft, non-tender, non-distended, normoactive bowel sounds,  no masses ,no hepato-splenomegaly, no signs of chronic liver disease  EXTEREMITIES:  no cyanosis,clubbing or edema  SKIN:  No rash/erythema/ecchymoses/petechiae/wounds/abscess/warm/dry  NEURO:  Alert, oriented, no asterixis, no tremor, no encephalopathy    LABS:                        13.1   6.64  )-----------( 162      ( 07 Apr 2018 09:14 )             39.9     04-07    137  |  97<L>  |  18  ----------------------------<  169<H>  4.1   |  25  |  0.9    Ca    8.7      07 Apr 2018 09:14  Mg     2.0     04-06    TPro  6.0  /  Alb  4.0  /  TBili  1.3<H>  /  DBili  x   /  AST  13  /  ALT  12  /  AlkPhos  49  04-06    LIVER FUNCTIONS - ( 06 Apr 2018 08:10 )  Alb: 4.0 g/dL / Pro: 6.0 g/dL / ALK PHOS: 49 U/L / ALT: 12 U/L / AST: 13 U/L / GGT: x                   Imaging:

## 2018-04-07 NOTE — PROGRESS NOTE ADULT - ASSESSMENT
56 yo male POD #1 s/p excisional lymph node biopsy of right sided cervical lymph node  doing well, minimal incisional pain    Plan  -medical oncology consult  -may restart Lovenox today  -will keep Penrose for at least 24 hours post op  -analgesia prn  -f/u pathology report

## 2018-04-07 NOTE — PROGRESS NOTE ADULT - ASSESSMENT
58 yo M patient with COPD, DM2, Heart block s/p PPM (2015), DLD presented with painful right cervical and axillary lymphadenopathy    #) Painful Cervical LAD/lymphadenitis  - Continue IV Clindamycin  - ENT consulted:  S/p R cervical LN excisional bx (04/06/18)  - pt being evaluated by Dr Ibrahim (outpt) for potential sarcoidosis. lymph nodes became painful 1 week prior.   - pain control with morphine  - Will consult Hem/Onc with biopsy results    #) DM2  - check Finger stick  - continue metformin and pioglitazone (on hold for procedure)    #) DLD   - continue with atorvastatin    #) COPD  - stable  - Seen by pulmo 3/20/18, f/u PFT done earlier this week  - continue home meds    #) HTN   - continue with Enalapril  - monitor blood pressure    #) DVT Ppx    Disposoition Planning: Feels better- Will observe for the next 24 hrs    Pager No. [phone]

## 2018-04-07 NOTE — PROGRESS NOTE ADULT - SUBJECTIVE AND OBJECTIVE BOX
SUBJECTIVE:    Patient is a 57y old Male who presents with a chief complaint of Painful Right cervical and axillary LAD (04 Apr 2018 15:30)    Currently admitted to medicine with the primary diagnosis of Neck mass     Today is hospital day 3d. This morning he is resting comfortably in bed and reports no new issues or overnight events.     PAST MEDICAL & SURGICAL HISTORY  Chronic obstructive pulmonary disease, unspecified COPD type  DM (diabetes mellitus)  High cholesterol  HTN (hypertension)  Artificial cardiac pacemaker    SOCIAL HISTORY:  Negative for smoking/alcohol/drug use.     ALLERGIES:  No Known Allergies    MEDICATIONS:  STANDING MEDICATIONS  sodium chloride 0.9%. 1000 milliLiter(s) IV Continuous <Continuous>  sodium chloride 0.9%. 1000 milliLiter(s) IV Continuous <Continuous>    PRN MEDICATIONS  morphine  - Injectable 2 milliGRAM(s) IV Push every 10 minutes PRN  ondansetron Injectable 4 milliGRAM(s) IV Push once PRN  ondansetron Injectable 4 milliGRAM(s) IV Push every 6 hours PRN    VITALS:   T(F): 98.4  HR: 78  BP: 121/52  RR: 18  SpO2: 97%    LABS:                        13.4   6.30  )-----------( 151      ( 06 Apr 2018 08:10 )             41.3     04-06    137  |  100  |  21<H>  ----------------------------<  138<H>  4.5   |  24  |  0.9    Ca    8.4<L>      06 Apr 2018 08:10  Mg     2.0     04-06    TPro  6.0  /  Alb  4.0  /  TBili  1.3<H>  /  DBili  x   /  AST  13  /  ALT  12  /  AlkPhos  49  04-06        RADIOLOGY:  CXR (04/04):   No radiographic evidence of acute cardiopulmonary disease.    CT Neck Soft tissue (02/04):  1.  Clustered level V lymphadenopathy along the right posterior triangle of the neck. At least one lymph node demonstrate central necrosis  2.  Equivocal lymph nodes elsewhere in the neck which would otherwise be considered normal.    CT Abdomen and Pelvis (02/04)  Enlarged right axillary lymph node. Haziness of the mesentery with enlarged mesenteric lymph nodes, given enlarged cervical lymph nodes findings concerning for neoplasm versus inflammatory condition.        PHYSICAL EXAM:  GEN: No acute distress, well-developed  EYES: strabismus  NECK: Supple, No JVD. Palpable tender right cervical LAD in anterior and posterior triangle and in the Right Axilla (non tender)  LUNGS: Clear to auscultation bilaterally   HEART: S1/S2 present. RRR.   ABD: Soft, non-tender, non-distended. Bowel sounds present  EXT: NC/NC/NE/2+PP/PADRON  NEURO: AAOX3

## 2018-04-08 LAB
ANION GAP SERPL CALC-SCNC: 10 MMOL/L — SIGNIFICANT CHANGE UP (ref 7–14)
BASOPHILS # BLD AUTO: 0.05 K/UL — SIGNIFICANT CHANGE UP (ref 0–0.2)
BASOPHILS NFR BLD AUTO: 0.9 % — SIGNIFICANT CHANGE UP (ref 0–1)
BUN SERPL-MCNC: 14 MG/DL — SIGNIFICANT CHANGE UP (ref 10–20)
CALCIUM SERPL-MCNC: 8.6 MG/DL — SIGNIFICANT CHANGE UP (ref 8.5–10.1)
CHLORIDE SERPL-SCNC: 99 MMOL/L — SIGNIFICANT CHANGE UP (ref 98–110)
CO2 SERPL-SCNC: 29 MMOL/L — SIGNIFICANT CHANGE UP (ref 17–32)
CREAT SERPL-MCNC: 0.9 MG/DL — SIGNIFICANT CHANGE UP (ref 0.7–1.5)
EOSINOPHIL # BLD AUTO: 0.37 K/UL — SIGNIFICANT CHANGE UP (ref 0–0.7)
EOSINOPHIL NFR BLD AUTO: 6.8 % — SIGNIFICANT CHANGE UP (ref 0–8)
GLUCOSE SERPL-MCNC: 154 MG/DL — HIGH (ref 70–99)
HCT VFR BLD CALC: 39.2 % — LOW (ref 42–52)
HGB BLD-MCNC: 13 G/DL — LOW (ref 14–18)
IMM GRANULOCYTES NFR BLD AUTO: 0.2 % — SIGNIFICANT CHANGE UP (ref 0.1–0.3)
LYMPHOCYTES # BLD AUTO: 0.92 K/UL — LOW (ref 1.2–3.4)
LYMPHOCYTES # BLD AUTO: 16.8 % — LOW (ref 20.5–51.1)
MCHC RBC-ENTMCNC: 29.4 PG — SIGNIFICANT CHANGE UP (ref 27–31)
MCHC RBC-ENTMCNC: 33.2 G/DL — SIGNIFICANT CHANGE UP (ref 32–37)
MCV RBC AUTO: 88.7 FL — SIGNIFICANT CHANGE UP (ref 80–94)
MONOCYTES # BLD AUTO: 0.85 K/UL — HIGH (ref 0.1–0.6)
MONOCYTES NFR BLD AUTO: 15.6 % — HIGH (ref 1.7–9.3)
NEUTROPHILS # BLD AUTO: 3.26 K/UL — SIGNIFICANT CHANGE UP (ref 1.4–6.5)
NEUTROPHILS NFR BLD AUTO: 59.7 % — SIGNIFICANT CHANGE UP (ref 42.2–75.2)
NRBC # BLD: 0 /100 WBCS — SIGNIFICANT CHANGE UP (ref 0–0)
PLATELET # BLD AUTO: 152 K/UL — SIGNIFICANT CHANGE UP (ref 130–400)
POTASSIUM SERPL-MCNC: 4.4 MMOL/L — SIGNIFICANT CHANGE UP (ref 3.5–5)
POTASSIUM SERPL-SCNC: 4.4 MMOL/L — SIGNIFICANT CHANGE UP (ref 3.5–5)
RBC # BLD: 4.42 M/UL — LOW (ref 4.7–6.1)
RBC # FLD: 13 % — SIGNIFICANT CHANGE UP (ref 11.5–14.5)
SODIUM SERPL-SCNC: 138 MMOL/L — SIGNIFICANT CHANGE UP (ref 135–146)
WBC # BLD: 5.46 K/UL — SIGNIFICANT CHANGE UP (ref 4.8–10.8)
WBC # FLD AUTO: 5.46 K/UL — SIGNIFICANT CHANGE UP (ref 4.8–10.8)

## 2018-04-08 RX ADMIN — Medication 650 MILLIGRAM(S): at 11:30

## 2018-04-08 RX ADMIN — Medication 650 MILLIGRAM(S): at 09:56

## 2018-04-08 RX ADMIN — Medication 650 MILLIGRAM(S): at 18:11

## 2018-04-08 RX ADMIN — Medication 100 MILLIGRAM(S): at 11:31

## 2018-04-08 RX ADMIN — SENNA PLUS 1 TABLET(S): 8.6 TABLET ORAL at 11:31

## 2018-04-08 NOTE — PROGRESS NOTE ADULT - ASSESSMENT
56 yo male POD #2 s/p lymph node excisional biopsy, drain removed, incision healing well, pt experiencing minor incisional pain    Medical Oncology did not see pt yet ,awaiting input     cbc wnl    Plan  -analgesia prn  -med/onc c/s  -f/u pathology results

## 2018-04-08 NOTE — PROGRESS NOTE ADULT - ATTENDING COMMENTS
- will plan to remove penrose tomorrow.   - Patient will follow up with me as outpatient on 4/12/18 at 12:00. Patient has follow up information
- followup Heme/Onc recs  - patient has outpatient followup with me on 4/12/18 at 12 noon.
Patient seen and examined independently. Agree with resident note.

## 2018-04-08 NOTE — PROGRESS NOTE ADULT - SUBJECTIVE AND OBJECTIVE BOX
58 yo male POD #2 s/p right cervical lymph node excisional biopsy  no events noted over night  pt seen and examined at bedside  c/o minor incisional pain, no n/v/f/c    a+ox3, NAD  nc/at, perrl  neck - minimal drainage from penrose drain, gauze slightly stained with serous fluid, penrose drain removed w/o issue  minimal ecchymosis around incision site, no swelling or redness  incision covered with fluff and spandage    Vital Signs Last 24 Hrs  T(C): 36.6 (08 Apr 2018 04:40), Max: 36.6 (08 Apr 2018 04:40)  T(F): 97.8 (08 Apr 2018 04:40), Max: 97.8 (08 Apr 2018 04:40)  HR: 72 (08 Apr 2018 04:40) (68 - 77)  BP: 142/85 (08 Apr 2018 04:40) (130/69 - 166/77)  RR: 17 (08 Apr 2018 04:40) (17 - 20)  SpO2: 98% (08 Apr 2018 08:38) (98% - 98%)                          13.0   5.46  )-----------( 152      ( 08 Apr 2018 06:39 )             39.2 58 yo male POD #2 s/p right cervical lymph node excisional biopsy  no events noted over night  pt seen and examined at bedside  c/o minor incisional pain, no n/v/f/c    a+ox3, NAD  nc/at, perrl  neck - minimal drainage from penrose drain, gauze slightly stained with serous fluid, penrose drain removed w/o issue  minimal ecchymosis around incision site, stable supraclavicular fullness/LAD  incision covered with fluff and spandage    Vital Signs Last 24 Hrs  T(C): 36.6 (08 Apr 2018 04:40), Max: 36.6 (08 Apr 2018 04:40)  T(F): 97.8 (08 Apr 2018 04:40), Max: 97.8 (08 Apr 2018 04:40)  HR: 72 (08 Apr 2018 04:40) (68 - 77)  BP: 142/85 (08 Apr 2018 04:40) (130/69 - 166/77)  RR: 17 (08 Apr 2018 04:40) (17 - 20)  SpO2: 98% (08 Apr 2018 08:38) (98% - 98%)                          13.0   5.46  )-----------( 152      ( 08 Apr 2018 06:39 )             39.2

## 2018-04-08 NOTE — PROGRESS NOTE ADULT - SUBJECTIVE AND OBJECTIVE BOX
Chief Complaint:  Patient is a 57y old  Male who presents with a chief complaint of Painful Right cervical and axillary LAD (04 Apr 2018 15:30)      Interval Events:     Allergies:  No Known Allergies      Home Medications:    Hospital Medications:  acetaminophen    Suspension. 650 milliGRAM(s) Oral every 6 hours PRN  docusate sodium 100 milliGRAM(s) Oral daily  enoxaparin Injectable 40 milliGRAM(s) SubCutaneous daily  morphine  - Injectable 4 milliGRAM(s) IV Push every 4 hours PRN  senna 1 Tablet(s) Oral daily      PMHX/PSHX:  Chronic obstructive pulmonary disease, unspecified COPD type  DM (diabetes mellitus)  High cholesterol  HTN (hypertension)  No pertinent past medical history  Artificial cardiac pacemaker  No significant past surgical history      Family history:  Family history of cancer (Mother)  Family history of breast cancer (Sibling)  No pertinent family history in first degree relatives      ROS:     General:  No wt loss, fevers, chills, night sweats, fatigue,   Eyes:  Good vision, no reported pain  ENT:  No sore throat, pain, runny nose, dysphagia  CV:  No pain, palpitations, hypo/hypertension  Resp:  No dyspnea, cough, tachypnea, wheezing  GI:  No pain, No nausea, No vomiting, No diarrhea, No constipation, No weight loss, No fever, No pruritis, No rectal bleeding, No tarry stools, No dysphagia,  :  No pain, bleeding, incontinence, nocturia  Muscle:  No pain, weakness  Neuro:  No weakness, tingling, memory problems  Psych:  No fatigue, insomnia, mood problems, depression  Endocrine:  No polyuria, polydipsia, cold/heat intolerance  Heme:  No petechiae, ecchymosis, easy bruisability  Skin:  No rash, tattoos, scars, edema      PHYSICAL EXAM:   Vital Signs:  Vital Signs Last 24 Hrs  T(C): 36.2 (08 Apr 2018 20:45), Max: 36.6 (08 Apr 2018 04:40)  T(F): 97.2 (08 Apr 2018 20:45), Max: 97.8 (08 Apr 2018 04:40)  HR: 68 (08 Apr 2018 20:45) (68 - 72)  BP: 125/54 (08 Apr 2018 20:45) (125/54 - 142/85)  BP(mean): --  RR: 17 (08 Apr 2018 20:45) (17 - 17)  SpO2: 98% (08 Apr 2018 08:38) (98% - 98%)  Daily     Daily     GENERAL:  Appears stated age, well-groomed, well-nourished, no distress  HEENT:  NC/AT,  conjunctivae clear and pink, no thyromegaly, nodules, adenopathy, no JVD, sclera -anicteric  CHEST:  Full & symmetric excursion, no increased effort, breath sounds clear  HEART:  Regular rhythm, S1, S2, no murmur/rub/S3/S4, no abdominal bruit, no edema  ABDOMEN:  Soft, non-tender, non-distended, normoactive bowel sounds,  no masses ,no hepato-splenomegaly, no signs of chronic liver disease  EXTEREMITIES:  no cyanosis,clubbing or edema  SKIN:  No rash/erythema/ecchymoses/petechiae/wounds/abscess/warm/dry  NEURO:  Alert, oriented, no asterixis, no tremor, no encephalopathy    LABS:                        13.0   5.46  )-----------( 152      ( 08 Apr 2018 06:39 )             39.2     04-08    138  |  99  |  14  ----------------------------<  154<H>  4.4   |  29  |  0.9    Ca    8.6      08 Apr 2018 06:39                Imaging:

## 2018-04-08 NOTE — PROGRESS NOTE ADULT - ASSESSMENT
58 yo M patient with COPD, DM2, Heart block s/p PPM (2015), DLD presented with painful right cervical and axillary lymphadenopathy    #) Painful Cervical LAD/lymphadenitis  - Continue IV Clindamycin  - ENT consulted:  S/p R cervical LN excisional bx (04/06/18)  - pt being evaluated by Dr Ibrahim (outpt) for potential sarcoidosis. lymph nodes became painful 1 week prior.   - pain control with morphine  - Will consult Hem/Onc with biopsy results    #) DM2  - check Finger stick  - continue metformin and pioglitazone (on hold for procedure)    #) DLD   - continue with atorvastatin    #) COPD  - stable  - Seen by pulmo 3/20/18, f/u PFT done earlier this week  - continue home meds    #) HTN   - continue with Enalapril  - monitor blood pressure    #) DVT Ppx  Awaiting biopsy results    Pager No. [phone]

## 2018-04-09 ENCOUNTER — TRANSCRIPTION ENCOUNTER (OUTPATIENT)
Age: 58
End: 2018-04-09

## 2018-04-09 VITALS
TEMPERATURE: 97 F | DIASTOLIC BLOOD PRESSURE: 72 MMHG | SYSTOLIC BLOOD PRESSURE: 128 MMHG | HEART RATE: 74 BPM | RESPIRATION RATE: 18 BRPM

## 2018-04-09 RX ORDER — EZETIMIBE 10 MG/1
1 TABLET ORAL
Qty: 0 | Refills: 0 | COMMUNITY

## 2018-04-09 RX ORDER — ACETAMINOPHEN 500 MG
650 TABLET ORAL EVERY 4 HOURS
Qty: 0 | Refills: 0 | Status: DISCONTINUED | OUTPATIENT
Start: 2018-04-09 | End: 2018-04-09

## 2018-04-09 RX ORDER — OXYCODONE HYDROCHLORIDE 5 MG/1
1 TABLET ORAL
Qty: 10 | Refills: 0
Start: 2018-04-09 | End: 2018-04-11

## 2018-04-09 RX ORDER — ACETAMINOPHEN 500 MG
1 TABLET ORAL
Qty: 60 | Refills: 0 | OUTPATIENT
Start: 2018-04-09 | End: 2018-04-23

## 2018-04-09 RX ORDER — ACETAMINOPHEN 500 MG
500 TABLET ORAL
Qty: 0 | Refills: 0 | Status: DISCONTINUED | OUTPATIENT
Start: 2018-04-09 | End: 2018-04-09

## 2018-04-09 RX ORDER — ACETAMINOPHEN 500 MG
2 TABLET ORAL
Qty: 80 | Refills: 0
Start: 2018-04-09 | End: 2018-04-18

## 2018-04-09 RX ADMIN — ENOXAPARIN SODIUM 40 MILLIGRAM(S): 100 INJECTION SUBCUTANEOUS at 13:15

## 2018-04-09 RX ADMIN — Medication 100 MILLIGRAM(S): at 13:15

## 2018-04-09 RX ADMIN — SENNA PLUS 1 TABLET(S): 8.6 TABLET ORAL at 13:16

## 2018-04-09 RX ADMIN — Medication 650 MILLIGRAM(S): at 13:15

## 2018-04-09 RX ADMIN — Medication 300 MILLIGRAM(S): at 13:15

## 2018-04-09 RX ADMIN — Medication 650 MILLIGRAM(S): at 13:45

## 2018-04-09 NOTE — DISCHARGE NOTE ADULT - PLAN OF CARE
Medical management - Continue PO Clindamycin (till 04/15)  - ENT consulted:  s/p R cervical LN excisional bx (04/06/18)  - pain control with tylenol  - consult Hem/Onc with biopsy results next week at [facility] - check Finger stick  - continue home meds - continue with Enalapril  - monitor blood pressure - continue with atorvastatin

## 2018-04-09 NOTE — PROGRESS NOTE ADULT - SUBJECTIVE AND OBJECTIVE BOX
GERARDLONG  57y  Male  ***My note supercedes ALL resident notes that I sign.  My corrections for their notes are in my note.***    INTERVAL EVENTS: Pt has pain, brittany in neck, but it is controlled w/ oral meds.  Pt prefers to go home and f/u as outpt w/ bx results.  Pt can eat/drink/walk.  He has no B symptoms.  Outpt f/u seems reasonable.    T(F): 97 (04-09-18 @ 13:27), Max: 97.2 (04-08-18 @ 20:45)  HR: 74 (04-09-18 @ 13:27) (68 - 74)  BP: 128/72 (04-09-18 @ 13:27) (125/54 - 138/83)  RR: 18 (04-09-18 @ 13:27) (17 - 18)  SpO2: --    General:  No wt loss, fevers, chills, night sweats, fatigue,   ENT:  No sore throat,  neck: + dressing wrap from LN rxn; + pain in right neck   CV:  S1 S2 RRR  Resp:  clear  GI:  non-tender, non-dist, soft, mildly obese  Ext: no edema, no c/c  LN: + sm node in right axilla  Skin:  No rash    LABS:                        13.0    (    88.7   5.46  )-----------( ---------      152      ( 08 Apr 2018 06:39 )             39.2    (    13.0     RADIOLOGY & ADDITIONAL TESTS:  < from: CT Abdomen and Pelvis w/ IV Cont (02.04.18 @ 14:00) >  IMPRESSION:     Enlarged right axillary lymph node.Haziness of the mesentery with   enlarged mesenteric lymph nodes, given enlarged cervical lymph nodes   findings concerning for neoplasm versus inflammatory condition.      < end of copied text >      < from: CT Neck Soft Tissue w/ IV Cont (02.04.18 @ 14:00) >    IMPRESSION:     1.  Clustered level V lymphadenopathy along the right posterior triangle   of the neck. At least one lymph node demonstrate central necrosis    2.  Upper aerodigestive tract is unremarkable by CT.    3.  Equivocal lymph nodes elsewhere in the neck which would otherwise be   considered normal.    < end of copied text >        clindamycin   Capsule 300 milliGRAM(s) Oral three times a day    acetaminophen   Tablet. 650 milliGRAM(s) Oral every 4 hours PRN  docusate sodium 100 milliGRAM(s) Oral daily  enoxaparin Injectable 40 milliGRAM(s) SubCutaneous daily  senna 1 Tablet(s) Oral daily

## 2018-04-09 NOTE — PROGRESS NOTE ADULT - ASSESSMENT
58 yo M patient with COPD, DM2, Heart block s/p PPM (2015), DLD presented with painful right cervical and axillary lymphadenopathy    #) Painful Cervical LAD/lymphadenitis  can finish clinda 300mg po q8 for total 7 days - doubt infectious cause for this long  - ENT consulted:  S/p R cervical LN excisional bx (04/06/18) - f/u results as outpt  - pain control with oxycodone and APAP  - Will consult Hem/Onc as outpt once biopsy results are known - should be given appt at [facility] for early next week (Mon or Tues)  could start on prednisone as outpt to help pain  told pt hem/onc might readmit him for chemo, he understands    #) DM2  - check Finger stick  - continue metformin and pioglitazone on d/c    #) DLD   - continue with atorvastatin    #) COPD  - stable  - continue home meds    #) HTN   - continue with Enalapril    #) DVT Ppx    OK to d/c home today

## 2018-04-09 NOTE — DISCHARGE NOTE ADULT - MEDICATION SUMMARY - MEDICATIONS TO STOP TAKING
I will STOP taking the medications listed below when I get home from the hospital:    ezetimibe 10 mg oral tablet  -- 1 tab(s) by mouth once a day

## 2018-04-09 NOTE — PROGRESS NOTE ADULT - ASSESSMENT
56 yo M patient with COPD, DM2, Heart block s/p PPM (2015), DLD presented with painful right cervical and axillary lymphadenopathy    #) Painful Cervical LAD/lymphadenitis  - Continue IV Clindamycin  - ENT consulted:  S/p R cervical LN excisional bx (04/06/18)  - pt being evaluated by Dr Ibrahim (outpt) for potential sarcoidosis. lymph nodes became painful 1 week prior.   - pain control with morphine  - Will consult Hem/Onc with biopsy results    #) DM2  - check Finger stick  - will start metformin and pioglitazone    #) DLD   - continue with atorvastatin    #) COPD  - stable  - Seen by pulmo 3/20/18, f/u PFT done earlier this week  - continue home meds    #) HTN   - continue with Enalapril  - monitor blood pressure    #) DVT Ppx  - on Lovenox    #) Disposition  - discharge to home today  - ENT outpatient followup on 4/12/18 at 12 noon.      #) Full code status 56 yo M patient with COPD, DM2, Heart block s/p PPM (2015), DLD presented with painful right cervical and axillary lymphadenopathy    #) Painful Cervical LAD/lymphadenitis vs Lymphoma  - Continue PO Clindamycin (till 04/15)  - ENT consulted:  s/p R cervical LN excisional bx (04/06/18)  - pt being evaluated by Dr Ibrahim (outpt) for potential sarcoidosis. lymph nodes became painful 1 week prior.   - pain control with morphine  - Will consult Hem/Onc with biopsy results    #) DM2  - check Finger stick  - start metformin and pioglitazone    #) DLD   - continue with atorvastatin    #) COPD  - stable  - continue home meds    #) HTN   - continue with Enalapril  - monitor blood pressure    #) DVT Ppx  - on Lovenox    #) Disposition  - discharge to home today  - ENT outpatient followup on 4/12/18 at 12 noon.      #) Full code status

## 2018-04-09 NOTE — DISCHARGE NOTE ADULT - CARE PLAN
Principal Discharge DX:	Neck mass  Goal:	Medical management  Assessment and plan of treatment:	- Continue PO Clindamycin (till 04/15)  - ENT consulted:  s/p R cervical LN excisional bx (04/06/18)  - pain control with tylenol  - consult Hem/Onc with biopsy results next week at [facility]  Secondary Diagnosis:	DM (diabetes mellitus)  Goal:	Medical management  Assessment and plan of treatment:	- check Finger stick  - continue home meds  Secondary Diagnosis:	HTN (hypertension)  Goal:	Medical management  Assessment and plan of treatment:	- continue with Enalapril  - monitor blood pressure  Secondary Diagnosis:	High cholesterol  Goal:	Medical management  Assessment and plan of treatment:	- continue with atorvastatin

## 2018-04-09 NOTE — DISCHARGE NOTE ADULT - CARE PROVIDER_API CALL
Tarun Rider), Internal Medicine; Medical Oncology  [address] [place] [address]  Phone: [phone]  Fax: [phone] Tarun Rider), Internal Medicine; Medical Oncology  [address] [place] [address]  Phone: [phone]  Fax: [phone]    Katerina Alfaro), Surgical Physicians  [address] [place] [address]  Phone: [phone]  Fax: [phone]

## 2018-04-09 NOTE — DISCHARGE NOTE ADULT - CARE PROVIDERS DIRECT ADDRESSES
[email or web address],DirectAddress_Unknown

## 2018-04-09 NOTE — DISCHARGE NOTE ADULT - ADDITIONAL INSTRUCTIONS
Please follow up with ENT outpatient on 04/12/18 at 12 noon  Please follow up with Oncology outpatient at [facility] next week with biopsy results Please follow up with ENT outpatient on 04/12/18 at 12 noon  Please follow up with Oncology outpatient at [facility] next week with biopsy results (Dr. Chambersr: [phone])

## 2018-04-09 NOTE — PROGRESS NOTE ADULT - SUBJECTIVE AND OBJECTIVE BOX
SUBJECTIVE:    Patient is a 57y old Male who presents with a chief complaint of Painful Right cervical and axillary LAD (04 Apr 2018 15:30)    Currently admitted to medicine with the primary diagnosis of Neck mass     Today is hospital day 5d. This morning he is resting comfortably in bed and reports no new issues or overnight events.     PAST MEDICAL & SURGICAL HISTORY  Chronic obstructive pulmonary disease, unspecified COPD type  DM (diabetes mellitus)  High cholesterol  HTN (hypertension)  Artificial cardiac pacemaker    SOCIAL HISTORY:  Negative for smoking/alcohol/drug use.     ALLERGIES:  No Known Allergies    MEDICATIONS:  STANDING MEDICATIONS  docusate sodium 100 milliGRAM(s) Oral daily  enoxaparin Injectable 40 milliGRAM(s) SubCutaneous daily  senna 1 Tablet(s) Oral daily    PRN MEDICATIONS  acetaminophen    Suspension. 650 milliGRAM(s) Oral every 6 hours PRN    VITALS:   T(F): 97  HR: 71  BP: 127/77  RR: 17  SpO2: 98%    LABS:                        13.0   5.46  )-----------( 152      ( 08 Apr 2018 06:39 )             39.2     04-08    138  |  99  |  14  ----------------------------<  154<H>  4.4   |  29  |  0.9    Ca    8.6      08 Apr 2018 06:39      RADIOLOGY:  CXR (04/04):   No radiographic evidence of acute cardiopulmonary disease.    CT Neck Soft tissue (02/04):  1.  Clustered level V lymphadenopathy along the right posterior triangle of the neck. At least one lymph node demonstrate central necrosis  2.  Equivocal lymph nodes elsewhere in the neck which would otherwise be considered normal.    CT Abdomen and Pelvis (02/04)  Enlarged right axillary lymph node. Haziness of the mesentery with enlarged mesenteric lymph nodes, given enlarged cervical lymph nodes findings concerning for neoplasm versus inflammatory condition.        PHYSICAL EXAM:  GEN: No acute distress, well-developed  EYES: strabismus  NECK: Supple, No JVD. Palpable tender right cervical LAD in anterior and posterior triangle and in the Right Axilla (non tender), bandage on right side of neck  LUNGS: Clear to auscultation bilaterally   HEART: S1/S2 present. RRR.   ABD: Soft, non-tender, non-distended. Bowel sounds present  EXT: NC/NC/NE/2+PP/PADRON  NEURO: AAOX3 SUBJECTIVE:    Patient is a 57y old Male who presents with a chief complaint of Painful Right cervical and axillary LAD (04 Apr 2018 15:30)    Currently admitted to medicine with the primary diagnosis of Neck mass     Today is hospital day 5d. This morning he is resting comfortably in bed and reports no new issues or overnight events. He has pain but controlled with Tylenol    PAST MEDICAL & SURGICAL HISTORY  Chronic obstructive pulmonary disease, unspecified COPD type  DM (diabetes mellitus)  High cholesterol  HTN (hypertension)  Artificial cardiac pacemaker    SOCIAL HISTORY:  Negative for smoking/alcohol/drug use.     ALLERGIES:  No Known Allergies    MEDICATIONS:  STANDING MEDICATIONS  docusate sodium 100 milliGRAM(s) Oral daily  enoxaparin Injectable 40 milliGRAM(s) SubCutaneous daily  senna 1 Tablet(s) Oral daily    PRN MEDICATIONS  acetaminophen    Suspension. 650 milliGRAM(s) Oral every 6 hours PRN    VITALS:   T(F): 97  HR: 71  BP: 127/77  RR: 17  SpO2: 98%    LABS:                        13.0   5.46  )-----------( 152      ( 08 Apr 2018 06:39 )             39.2     04-08    138  |  99  |  14  ----------------------------<  154<H>  4.4   |  29  |  0.9    Ca    8.6      08 Apr 2018 06:39      RADIOLOGY:  CXR (04/04):   No radiographic evidence of acute cardiopulmonary disease.    CT Neck Soft tissue (02/04):  1.  Clustered level V lymphadenopathy along the right posterior triangle of the neck. At least one lymph node demonstrate central necrosis  2.  Equivocal lymph nodes elsewhere in the neck which would otherwise be considered normal.    CT Abdomen and Pelvis (02/04)  Enlarged right axillary lymph node. Haziness of the mesentery with enlarged mesenteric lymph nodes, given enlarged cervical lymph nodes findings concerning for neoplasm versus inflammatory condition.        PHYSICAL EXAM:  GEN: No acute distress, well-developed  EYES: strabismus  NECK: Supple, No JVD. Palpable tender right cervical LAD in anterior and posterior triangle and in the Right Axilla (non tender), bandage on right side of neck  LUNGS: Clear to auscultation bilaterally   HEART: S1/S2 present. RRR.   ABD: Soft, non-tender, non-distended. Bowel sounds present  EXT: NC/NC/NE/2+PP/PADRON  NEURO: AAOX3

## 2018-04-09 NOTE — DISCHARGE NOTE ADULT - MEDICATION SUMMARY - MEDICATIONS TO TAKE
I will START or STAY ON the medications listed below when I get home from the hospital:    acetaminophen 500 mg oral tablet  -- 1 tab(s) by mouth 4 times a day, As needed, Moderate Pain (4 - 6)  -- Indication: For Neck pain     enalapril  -- 20 milligram(s) by mouth once a day  -- Indication: For Hypertension    pioglitazone  -- 45 milligram(s) by mouth once a day  -- Indication: For Diabetes Mellitus    metFORMIN  -- 1000 milligram(s) by mouth 2 times a day  -- Indication: For Diabetes Mellitus    Jardiance 25 mg oral tablet  -- 1 tab(s) by mouth once a day (in the morning)  -- Indication: For Diabetes Mellitus    simvastatin  -- 40 milligram(s) by mouth once a day (at bedtime)  -- Indication: For Dislipidemia    ProAir HFA 90 mcg/inh inhalation aerosol  -- 2 puff(s) inhaled 4 times a day, As Needed  -- Indication: For Asthma    clindamycin 300 mg oral capsule  -- 1 cap(s) by mouth 3 times a day  -- Indication: For Lymphadenitis

## 2018-04-09 NOTE — DISCHARGE NOTE ADULT - HOSPITAL COURSE
In progress 56 yo m, PMH of DM II, HTN, DLD, and pacemaker placed in 2015 secondary to heart block presents for painful right lymphadenopathy. Patient previously presented to emergency room on February 4, 2018 with complaints of painless Right sided cervical Lymphadenopathy, which he had noticed several months prior to presentation. He was discharged from ER with outpt followup. This visit  LAD became painful about one week prior to presentation. He denied any fevers, night sweats, anorexia or significant changes in weight. He underwent CT neck soft tissue, chest, abdomen and pelvis, which found multiple areas of LAD in the R supraclavicular lymph node, R axillary lymph node, and mesenteric lymph nodes. Patient was admitted to the floor for further evaluation. He was started on Clindamycin for possible lymphadenitis. ENT was consulted for R cervical LN excisional bx done on 04/06/18 - biopsy results pending. His pain was controlled by opioids and later Tylenol On discharge, patient had stable vital signs and ambulating by himself. He was advised to complete abx course and f/u outpatient ENT on 4/12/18 at 12 noon and oncology output at the [facility] after one week.

## 2018-04-09 NOTE — DISCHARGE NOTE ADULT - PATIENT PORTAL LINK FT
You can access the [facility] Patient Portal, offered by [facility], by registering with the following website: [email or web address]

## 2018-04-10 DIAGNOSIS — L04.0 ACUTE LYMPHADENITIS OF FACE, HEAD AND NECK: ICD-10-CM

## 2018-04-10 DIAGNOSIS — E78.5 HYPERLIPIDEMIA, UNSPECIFIED: ICD-10-CM

## 2018-04-10 DIAGNOSIS — I10 ESSENTIAL (PRIMARY) HYPERTENSION: ICD-10-CM

## 2018-04-10 DIAGNOSIS — M54.2 CERVICALGIA: ICD-10-CM

## 2018-04-10 DIAGNOSIS — J44.9 CHRONIC OBSTRUCTIVE PULMONARY DISEASE, UNSPECIFIED: ICD-10-CM

## 2018-04-10 DIAGNOSIS — Z79.84 LONG TERM (CURRENT) USE OF ORAL HYPOGLYCEMIC DRUGS: ICD-10-CM

## 2018-04-10 DIAGNOSIS — Z95.0 PRESENCE OF CARDIAC PACEMAKER: ICD-10-CM

## 2018-04-10 DIAGNOSIS — Z87.891 PERSONAL HISTORY OF NICOTINE DEPENDENCE: ICD-10-CM

## 2018-04-10 DIAGNOSIS — E11.9 TYPE 2 DIABETES MELLITUS WITHOUT COMPLICATIONS: ICD-10-CM

## 2018-04-10 DIAGNOSIS — Z80.3 FAMILY HISTORY OF MALIGNANT NEOPLASM OF BREAST: ICD-10-CM

## 2018-04-11 PROBLEM — E11.9 TYPE 2 DIABETES MELLITUS WITHOUT COMPLICATIONS: Chronic | Status: ACTIVE | Noted: 2018-04-04

## 2018-04-11 PROBLEM — I10 ESSENTIAL (PRIMARY) HYPERTENSION: Chronic | Status: ACTIVE | Noted: 2018-04-04

## 2018-04-11 PROBLEM — E78.00 PURE HYPERCHOLESTEROLEMIA, UNSPECIFIED: Chronic | Status: ACTIVE | Noted: 2018-04-04

## 2018-04-11 LAB — TM INTERPRETATION: SIGNIFICANT CHANGE UP

## 2018-04-12 ENCOUNTER — APPOINTMENT (OUTPATIENT)
Dept: OTOLARYNGOLOGY | Facility: CLINIC | Age: 58
End: 2018-04-12
Payer: MEDICAID

## 2018-04-12 PROCEDURE — 99024 POSTOP FOLLOW-UP VISIT: CPT

## 2018-04-13 DIAGNOSIS — C85.14 UNSPECIFIED B-CELL LYMPHOMA, LYMPH NODES OF AXILLA AND UPPER LIMB: ICD-10-CM

## 2018-04-14 ENCOUNTER — OUTPATIENT (OUTPATIENT)
Dept: OUTPATIENT SERVICES | Facility: HOSPITAL | Age: 58
LOS: 1 days | Discharge: HOME | End: 2018-04-14

## 2018-04-14 DIAGNOSIS — C83.00 SMALL CELL B-CELL LYMPHOMA, UNSPECIFIED SITE: ICD-10-CM

## 2018-04-14 DIAGNOSIS — E78.2 MIXED HYPERLIPIDEMIA: ICD-10-CM

## 2018-04-14 DIAGNOSIS — Z95.0 PRESENCE OF CARDIAC PACEMAKER: Chronic | ICD-10-CM

## 2018-04-14 DIAGNOSIS — E11.9 TYPE 2 DIABETES MELLITUS WITHOUT COMPLICATIONS: ICD-10-CM

## 2018-04-14 DIAGNOSIS — E66.01 MORBID (SEVERE) OBESITY DUE TO EXCESS CALORIES: ICD-10-CM

## 2018-04-16 ENCOUNTER — OUTPATIENT (OUTPATIENT)
Dept: OUTPATIENT SERVICES | Facility: HOSPITAL | Age: 58
LOS: 1 days | Discharge: HOME | End: 2018-04-16

## 2018-04-16 DIAGNOSIS — C85.90 NON-HODGKIN LYMPHOMA, UNSPECIFIED, UNSPECIFIED SITE: ICD-10-CM

## 2018-04-16 DIAGNOSIS — Z95.0 PRESENCE OF CARDIAC PACEMAKER: Chronic | ICD-10-CM

## 2018-04-20 ENCOUNTER — LABORATORY RESULT (OUTPATIENT)
Age: 58
End: 2018-04-20

## 2018-04-20 ENCOUNTER — APPOINTMENT (OUTPATIENT)
Dept: HEMATOLOGY ONCOLOGY | Facility: CLINIC | Age: 58
End: 2018-04-20

## 2018-04-20 ENCOUNTER — OUTPATIENT (OUTPATIENT)
Dept: OUTPATIENT SERVICES | Facility: HOSPITAL | Age: 58
LOS: 1 days | Discharge: HOME | End: 2018-04-20

## 2018-04-20 VITALS
HEIGHT: 74 IN | DIASTOLIC BLOOD PRESSURE: 72 MMHG | HEART RATE: 68 BPM | BODY MASS INDEX: 28.49 KG/M2 | WEIGHT: 222 LBS | SYSTOLIC BLOOD PRESSURE: 148 MMHG | TEMPERATURE: 96.6 F

## 2018-04-20 DIAGNOSIS — Z95.0 PRESENCE OF CARDIAC PACEMAKER: Chronic | ICD-10-CM

## 2018-04-20 DIAGNOSIS — Z80.3 FAMILY HISTORY OF MALIGNANT NEOPLASM OF BREAST: ICD-10-CM

## 2018-04-20 DIAGNOSIS — C85.90 NON-HODGKIN LYMPHOMA, UNSPECIFIED, UNSPECIFIED SITE: ICD-10-CM

## 2018-04-20 LAB
HCT VFR BLD CALC: 44.3 %
HGB BLD-MCNC: 14.4 G/DL
MCHC RBC-ENTMCNC: 28.6 PG
MCHC RBC-ENTMCNC: 32.5 G/DL
MCV RBC AUTO: 88.1 FL
PLATELET # BLD AUTO: 236 K/UL
PMV BLD: 10.6 FL
RBC # BLD: 5.03 M/UL
RBC # FLD: 13.2 %
WBC # FLD AUTO: 7.17 K/UL

## 2018-04-23 ENCOUNTER — APPOINTMENT (OUTPATIENT)
Dept: OTOLARYNGOLOGY | Facility: CLINIC | Age: 58
End: 2018-04-23

## 2018-04-23 LAB
ALBUMIN SERPL ELPH-MCNC: 4.3 G/DL
ALP BLD-CCNC: 60 U/L
ALT SERPL-CCNC: 23 U/L
ANION GAP SERPL CALC-SCNC: 17 MMOL/L
AST SERPL-CCNC: 19 U/L
BILIRUB SERPL-MCNC: 1.2 MG/DL
BUN SERPL-MCNC: 18 MG/DL
CALCIUM SERPL-MCNC: 9.7 MG/DL
CHLORIDE SERPL-SCNC: 99 MMOL/L
CHROM ANALY OVERALL INTERP SPEC-IMP: SIGNIFICANT CHANGE UP
CO2 SERPL-SCNC: 24 MMOL/L
CREAT SERPL-MCNC: 0.9 MG/DL
GLUCOSE SERPL-MCNC: 194 MG/DL
HBV CORE IGG+IGM SER QL: NONREACTIVE
HBV SURFACE AB SER QL: NONREACTIVE
HBV SURFACE AG SER QL: NONREACTIVE
IGG SER QL IEP: 808 MG/DL
LDH SERPL-CCNC: 228 U/L
POTASSIUM SERPL-SCNC: 4.5 MMOL/L
PROT SERPL-MCNC: 6.7 G/DL
SODIUM SERPL-SCNC: 140 MMOL/L
URATE SERPL-MCNC: 4.7 MG/DL

## 2018-04-24 ENCOUNTER — OTHER (OUTPATIENT)
Age: 58
End: 2018-04-24

## 2018-04-24 ENCOUNTER — APPOINTMENT (OUTPATIENT)
Dept: PULMONOLOGY | Facility: CLINIC | Age: 58
End: 2018-04-24

## 2018-04-24 ENCOUNTER — OUTPATIENT (OUTPATIENT)
Dept: OUTPATIENT SERVICES | Facility: HOSPITAL | Age: 58
LOS: 1 days | Discharge: HOME | End: 2018-04-24

## 2018-04-24 ENCOUNTER — APPOINTMENT (OUTPATIENT)
Dept: HEMATOLOGY ONCOLOGY | Facility: CLINIC | Age: 58
End: 2018-04-24

## 2018-04-24 VITALS
BODY MASS INDEX: 28.23 KG/M2 | WEIGHT: 220 LBS | SYSTOLIC BLOOD PRESSURE: 120 MMHG | HEART RATE: 75 BPM | HEIGHT: 74 IN | DIASTOLIC BLOOD PRESSURE: 80 MMHG

## 2018-04-24 DIAGNOSIS — C83.01 SMALL CELL B-CELL LYMPHOMA, LYMPH NODES OF HEAD, FACE, AND NECK: ICD-10-CM

## 2018-04-24 DIAGNOSIS — Z95.0 PRESENCE OF CARDIAC PACEMAKER: Chronic | ICD-10-CM

## 2018-04-24 DIAGNOSIS — J44.9 CHRONIC OBSTRUCTIVE PULMONARY DISEASE, UNSPECIFIED: ICD-10-CM

## 2018-04-24 DIAGNOSIS — R59.1 GENERALIZED ENLARGED LYMPH NODES: ICD-10-CM

## 2018-04-24 DIAGNOSIS — C83.04 SMALL CELL B-CELL LYMPHOMA, LYMPH NODES OF AXILLA AND UPPER LIMB: ICD-10-CM

## 2018-04-26 ENCOUNTER — APPOINTMENT (OUTPATIENT)
Dept: INFUSION THERAPY | Facility: CLINIC | Age: 58
End: 2018-04-26

## 2018-04-26 ENCOUNTER — LABORATORY RESULT (OUTPATIENT)
Age: 58
End: 2018-04-26

## 2018-04-26 LAB
HCT VFR BLD CALC: 44.4 %
HGB BLD-MCNC: 14.7 G/DL
MCHC RBC-ENTMCNC: 29.3 PG
MCHC RBC-ENTMCNC: 33.1 G/DL
MCV RBC AUTO: 88.4 FL
PLATELET # BLD AUTO: 205 K/UL
PMV BLD: 11 FL
RBC # BLD: 5.02 M/UL
RBC # FLD: 12.9 %
WBC # FLD AUTO: 6.38 K/UL

## 2018-04-26 RX ORDER — BENDAMUSTINE HYDROCHLORIDE 100 MG/20ML
203 INJECTION, POWDER, LYOPHILIZED, FOR SOLUTION INTRAVENOUS ONCE
Qty: 0 | Refills: 0 | Status: COMPLETED | OUTPATIENT
Start: 2018-04-26 | End: 2018-04-26

## 2018-04-26 RX ORDER — ACETAMINOPHEN 500 MG
650 TABLET ORAL ONCE
Qty: 0 | Refills: 0 | Status: COMPLETED | OUTPATIENT
Start: 2018-04-26 | End: 2018-04-26

## 2018-04-26 RX ORDER — DIPHENHYDRAMINE HCL 50 MG
50 CAPSULE ORAL ONCE
Qty: 0 | Refills: 0 | Status: COMPLETED | OUTPATIENT
Start: 2018-04-26 | End: 2018-04-26

## 2018-04-26 RX ORDER — RITUXIMAB 10 MG/ML
848 INJECTION, SOLUTION INTRAVENOUS ONCE
Qty: 0 | Refills: 0 | Status: COMPLETED | OUTPATIENT
Start: 2018-04-26 | End: 2018-04-26

## 2018-04-26 RX ORDER — DEXAMETHASONE 0.5 MG/5ML
8 ELIXIR ORAL ONCE
Qty: 0 | Refills: 0 | Status: COMPLETED | OUTPATIENT
Start: 2018-04-26 | End: 2018-04-26

## 2018-04-26 RX ADMIN — Medication 153 MILLIGRAM(S): at 10:21

## 2018-04-26 RX ADMIN — Medication 650 MILLIGRAM(S): at 10:21

## 2018-04-26 RX ADMIN — Medication 176.4 MILLIGRAM(S): at 10:21

## 2018-04-26 RX ADMIN — RITUXIMAB 169.6 MILLIGRAM(S): 10 INJECTION, SOLUTION INTRAVENOUS at 11:24

## 2018-04-26 RX ADMIN — BENDAMUSTINE HYDROCHLORIDE 313.56 MILLIGRAM(S): 100 INJECTION, POWDER, LYOPHILIZED, FOR SOLUTION INTRAVENOUS at 11:03

## 2018-04-27 ENCOUNTER — APPOINTMENT (OUTPATIENT)
Dept: INFUSION THERAPY | Facility: CLINIC | Age: 58
End: 2018-04-27

## 2018-04-27 LAB
ALBUMIN SERPL ELPH-MCNC: 4.1 G/DL
ALP BLD-CCNC: 64 U/L
ALT SERPL-CCNC: 15 U/L
ANION GAP SERPL CALC-SCNC: 22 MMOL/L
AST SERPL-CCNC: 17 U/L
BILIRUB SERPL-MCNC: 1.3 MG/DL
BUN SERPL-MCNC: 22 MG/DL
CALCIUM SERPL-MCNC: 9.5 MG/DL
CHLORIDE SERPL-SCNC: 100 MMOL/L
CO2 SERPL-SCNC: 20 MMOL/L
CREAT SERPL-MCNC: 1.1 MG/DL
GLUCOSE SERPL-MCNC: 293 MG/DL
POTASSIUM SERPL-SCNC: 4.7 MMOL/L
PROT SERPL-MCNC: 6.7 G/DL
SODIUM SERPL-SCNC: 142 MMOL/L

## 2018-04-27 RX ORDER — BENDAMUSTINE HYDROCHLORIDE 100 MG/20ML
203 INJECTION, POWDER, LYOPHILIZED, FOR SOLUTION INTRAVENOUS ONCE
Qty: 0 | Refills: 0 | Status: COMPLETED | OUTPATIENT
Start: 2018-04-27 | End: 2018-04-27

## 2018-04-27 RX ORDER — DIPHENHYDRAMINE HCL 50 MG
50 CAPSULE ORAL ONCE
Qty: 0 | Refills: 0 | Status: COMPLETED | OUTPATIENT
Start: 2018-04-27 | End: 2018-04-27

## 2018-04-27 RX ORDER — ACETAMINOPHEN 500 MG
650 TABLET ORAL ONCE
Qty: 0 | Refills: 0 | Status: COMPLETED | OUTPATIENT
Start: 2018-04-27 | End: 2018-04-27

## 2018-04-27 RX ORDER — DEXAMETHASONE 0.5 MG/5ML
8 ELIXIR ORAL ONCE
Qty: 0 | Refills: 0 | Status: COMPLETED | OUTPATIENT
Start: 2018-04-27 | End: 2018-04-27

## 2018-04-27 RX ADMIN — Medication 153 MILLIGRAM(S): at 12:31

## 2018-04-27 RX ADMIN — Medication 650 MILLIGRAM(S): at 13:13

## 2018-04-27 RX ADMIN — BENDAMUSTINE HYDROCHLORIDE 313.56 MILLIGRAM(S): 100 INJECTION, POWDER, LYOPHILIZED, FOR SOLUTION INTRAVENOUS at 13:10

## 2018-04-27 RX ADMIN — Medication 176.4 MILLIGRAM(S): at 12:31

## 2018-04-27 RX ADMIN — Medication 650 MILLIGRAM(S): at 12:31

## 2018-05-07 ENCOUNTER — INPATIENT (INPATIENT)
Facility: HOSPITAL | Age: 58
LOS: 2 days | Discharge: HOME HEALTH | End: 2018-05-10
Attending: HOSPITALIST | Admitting: HOSPITALIST
Payer: MEDICAID

## 2018-05-07 VITALS
SYSTOLIC BLOOD PRESSURE: 115 MMHG | TEMPERATURE: 100 F | DIASTOLIC BLOOD PRESSURE: 53 MMHG | OXYGEN SATURATION: 95 % | HEART RATE: 68 BPM | RESPIRATION RATE: 18 BRPM

## 2018-05-07 DIAGNOSIS — Z95.0 PRESENCE OF CARDIAC PACEMAKER: Chronic | ICD-10-CM

## 2018-05-07 LAB
ALBUMIN SERPL ELPH-MCNC: 4.1 G/DL — SIGNIFICANT CHANGE UP (ref 3.5–5.2)
ALP SERPL-CCNC: 55 U/L — SIGNIFICANT CHANGE UP (ref 30–115)
ALT FLD-CCNC: 20 U/L — SIGNIFICANT CHANGE UP (ref 0–41)
ANION GAP SERPL CALC-SCNC: 18 MMOL/L — HIGH (ref 7–14)
APPEARANCE UR: CLEAR — SIGNIFICANT CHANGE UP
AST SERPL-CCNC: 27 U/L — SIGNIFICANT CHANGE UP (ref 0–41)
BASE EXCESS BLDV CALC-SCNC: -1.8 MMOL/L — SIGNIFICANT CHANGE UP (ref -2–2)
BASOPHILS # BLD AUTO: 0 K/UL — SIGNIFICANT CHANGE UP (ref 0–0.2)
BASOPHILS NFR BLD AUTO: 0 % — SIGNIFICANT CHANGE UP (ref 0–1)
BILIRUB SERPL-MCNC: 1.8 MG/DL — HIGH (ref 0.2–1.2)
BILIRUB UR-MCNC: NEGATIVE — SIGNIFICANT CHANGE UP
BUN SERPL-MCNC: 13 MG/DL — SIGNIFICANT CHANGE UP (ref 10–20)
CA-I SERPL-SCNC: 1.16 MMOL/L — SIGNIFICANT CHANGE UP (ref 1.12–1.3)
CALCIUM SERPL-MCNC: 9.1 MG/DL — SIGNIFICANT CHANGE UP (ref 8.5–10.1)
CHLORIDE SERPL-SCNC: 100 MMOL/L — SIGNIFICANT CHANGE UP (ref 98–110)
CK SERPL-CCNC: 59 U/L — SIGNIFICANT CHANGE UP (ref 0–225)
CO2 SERPL-SCNC: 19 MMOL/L — SIGNIFICANT CHANGE UP (ref 17–32)
COLOR SPEC: YELLOW — SIGNIFICANT CHANGE UP
CREAT SERPL-MCNC: 1 MG/DL — SIGNIFICANT CHANGE UP (ref 0.7–1.5)
DIFF PNL FLD: NEGATIVE — SIGNIFICANT CHANGE UP
EOSINOPHIL # BLD AUTO: 0 K/UL — SIGNIFICANT CHANGE UP (ref 0–0.7)
EOSINOPHIL NFR BLD AUTO: 0 % — SIGNIFICANT CHANGE UP (ref 0–8)
GAS PNL BLDV: 139 MMOL/L — SIGNIFICANT CHANGE UP (ref 136–145)
GAS PNL BLDV: SIGNIFICANT CHANGE UP
GIANT PLATELETS BLD QL SMEAR: PRESENT — SIGNIFICANT CHANGE UP
GLUCOSE SERPL-MCNC: 203 MG/DL — HIGH (ref 70–99)
GLUCOSE UR QL: >=1000
HCO3 BLDV-SCNC: 22 MMOL/L — SIGNIFICANT CHANGE UP (ref 22–29)
HCT VFR BLD CALC: 44 % — SIGNIFICANT CHANGE UP (ref 42–52)
HCT VFR BLDA CALC: 46 % — HIGH (ref 34–44)
HGB BLD CALC-MCNC: 15 G/DL — SIGNIFICANT CHANGE UP (ref 14–18)
HGB BLD-MCNC: 14.8 G/DL — SIGNIFICANT CHANGE UP (ref 14–18)
KETONES UR-MCNC: 40
LACTATE BLDV-MCNC: 1.6 MMOL/L — SIGNIFICANT CHANGE UP (ref 0.5–1.6)
LEUKOCYTE ESTERASE UR-ACNC: NEGATIVE — SIGNIFICANT CHANGE UP
LYMPHOCYTES # BLD AUTO: 0 % — LOW (ref 20.5–51.1)
LYMPHOCYTES # BLD AUTO: 0 K/UL — LOW (ref 1.2–3.4)
MANUAL SMEAR VERIFICATION: SIGNIFICANT CHANGE UP
MCHC RBC-ENTMCNC: 29 PG — SIGNIFICANT CHANGE UP (ref 27–31)
MCHC RBC-ENTMCNC: 33.6 G/DL — SIGNIFICANT CHANGE UP (ref 32–37)
MCV RBC AUTO: 86.1 FL — SIGNIFICANT CHANGE UP (ref 80–94)
MONOCYTES # BLD AUTO: 0.6 K/UL — SIGNIFICANT CHANGE UP (ref 0.1–0.6)
MONOCYTES NFR BLD AUTO: 5.8 % — SIGNIFICANT CHANGE UP (ref 1.7–9.3)
NEUTROPHILS # BLD AUTO: 9.68 K/UL — HIGH (ref 1.4–6.5)
NEUTROPHILS NFR BLD AUTO: 94.2 % — HIGH (ref 42.2–75.2)
NITRITE UR-MCNC: NEGATIVE — SIGNIFICANT CHANGE UP
NRBC # BLD: 0 /100 WBCS — SIGNIFICANT CHANGE UP (ref 0–0)
PCO2 BLDV: 35 MMHG — LOW (ref 41–51)
PH BLDV: 7.41 — SIGNIFICANT CHANGE UP (ref 7.26–7.43)
PH UR: 6 — SIGNIFICANT CHANGE UP (ref 5–8)
PLAT MORPH BLD: NORMAL — SIGNIFICANT CHANGE UP
PLATELET # BLD AUTO: 158 K/UL — SIGNIFICANT CHANGE UP (ref 130–400)
PO2 BLDV: 40 MMHG — SIGNIFICANT CHANGE UP (ref 20–40)
POTASSIUM BLDV-SCNC: 4.1 MMOL/L — SIGNIFICANT CHANGE UP (ref 3.3–5.6)
POTASSIUM SERPL-MCNC: 4.5 MMOL/L — SIGNIFICANT CHANGE UP (ref 3.5–5)
POTASSIUM SERPL-SCNC: 4.5 MMOL/L — SIGNIFICANT CHANGE UP (ref 3.5–5)
PROT SERPL-MCNC: 6.5 G/DL — SIGNIFICANT CHANGE UP (ref 6–8)
PROT UR-MCNC: NEGATIVE — SIGNIFICANT CHANGE UP
RBC # BLD: 5.11 M/UL — SIGNIFICANT CHANGE UP (ref 4.7–6.1)
RBC # FLD: 13.3 % — SIGNIFICANT CHANGE UP (ref 11.5–14.5)
RBC BLD AUTO: NORMAL — SIGNIFICANT CHANGE UP
SAO2 % BLDV: 79 % — SIGNIFICANT CHANGE UP
SODIUM SERPL-SCNC: 137 MMOL/L — SIGNIFICANT CHANGE UP (ref 135–146)
SP GR SPEC: >=1.03 — SIGNIFICANT CHANGE UP (ref 1.01–1.03)
URATE SERPL-MCNC: 3.6 MG/DL — SIGNIFICANT CHANGE UP (ref 3.4–8.8)
UROBILINOGEN FLD QL: 0.2 — SIGNIFICANT CHANGE UP (ref 0.2–0.2)
WBC # BLD: 10.28 K/UL — SIGNIFICANT CHANGE UP (ref 4.8–10.8)
WBC # FLD AUTO: 10.28 K/UL — SIGNIFICANT CHANGE UP (ref 4.8–10.8)

## 2018-05-07 RX ORDER — IPRATROPIUM/ALBUTEROL SULFATE 18-103MCG
3 AEROSOL WITH ADAPTER (GRAM) INHALATION ONCE
Qty: 0 | Refills: 0 | Status: DISCONTINUED | OUTPATIENT
Start: 2018-05-07 | End: 2018-05-10

## 2018-05-07 RX ORDER — SODIUM CHLORIDE 9 MG/ML
1000 INJECTION INTRAMUSCULAR; INTRAVENOUS; SUBCUTANEOUS ONCE
Qty: 0 | Refills: 0 | Status: COMPLETED | OUTPATIENT
Start: 2018-05-07 | End: 2018-05-07

## 2018-05-07 RX ORDER — OXYCODONE AND ACETAMINOPHEN 5; 325 MG/1; MG/1
2 TABLET ORAL ONCE
Qty: 0 | Refills: 0 | Status: DISCONTINUED | OUTPATIENT
Start: 2018-05-07 | End: 2018-05-07

## 2018-05-07 RX ORDER — FAMOTIDINE 10 MG/ML
20 INJECTION INTRAVENOUS ONCE
Qty: 0 | Refills: 0 | Status: COMPLETED | OUTPATIENT
Start: 2018-05-07 | End: 2018-05-07

## 2018-05-07 RX ORDER — IPRATROPIUM/ALBUTEROL SULFATE 18-103MCG
3 AEROSOL WITH ADAPTER (GRAM) INHALATION ONCE
Qty: 0 | Refills: 0 | Status: COMPLETED | OUTPATIENT
Start: 2018-05-07 | End: 2018-05-07

## 2018-05-07 RX ORDER — DIPHENHYDRAMINE HCL 50 MG
50 CAPSULE ORAL ONCE
Qty: 0 | Refills: 0 | Status: COMPLETED | OUTPATIENT
Start: 2018-05-07 | End: 2018-05-07

## 2018-05-07 RX ORDER — EPINEPHRINE 0.3 MG/.3ML
0.3 INJECTION INTRAMUSCULAR; SUBCUTANEOUS ONCE
Qty: 0 | Refills: 0 | Status: COMPLETED | OUTPATIENT
Start: 2018-05-07 | End: 2018-05-07

## 2018-05-07 RX ORDER — SODIUM CHLORIDE 9 MG/ML
1000 INJECTION, SOLUTION INTRAVENOUS ONCE
Qty: 0 | Refills: 0 | Status: COMPLETED | OUTPATIENT
Start: 2018-05-07 | End: 2018-05-07

## 2018-05-07 RX ORDER — CEFEPIME 1 G/1
INJECTION, POWDER, FOR SOLUTION INTRAMUSCULAR; INTRAVENOUS
Qty: 0 | Refills: 0 | Status: DISCONTINUED | OUTPATIENT
Start: 2018-05-07 | End: 2018-05-08

## 2018-05-07 RX ORDER — ACETAMINOPHEN 500 MG
650 TABLET ORAL ONCE
Qty: 0 | Refills: 0 | Status: COMPLETED | OUTPATIENT
Start: 2018-05-07 | End: 2018-05-07

## 2018-05-07 RX ORDER — CEFEPIME 1 G/1
1000 INJECTION, POWDER, FOR SOLUTION INTRAMUSCULAR; INTRAVENOUS ONCE
Qty: 0 | Refills: 0 | Status: COMPLETED | OUTPATIENT
Start: 2018-05-07 | End: 2018-05-07

## 2018-05-07 RX ORDER — CEFEPIME 1 G/1
1000 INJECTION, POWDER, FOR SOLUTION INTRAMUSCULAR; INTRAVENOUS EVERY 12 HOURS
Qty: 0 | Refills: 0 | Status: DISCONTINUED | OUTPATIENT
Start: 2018-05-08 | End: 2018-05-08

## 2018-05-07 RX ORDER — CEFTRIAXONE 500 MG/1
1 INJECTION, POWDER, FOR SOLUTION INTRAMUSCULAR; INTRAVENOUS ONCE
Qty: 0 | Refills: 0 | Status: DISCONTINUED | OUTPATIENT
Start: 2018-05-07 | End: 2018-05-07

## 2018-05-07 RX ADMIN — SODIUM CHLORIDE 1000 MILLILITER(S): 9 INJECTION, SOLUTION INTRAVENOUS at 20:05

## 2018-05-07 RX ADMIN — SODIUM CHLORIDE 2400 MILLILITER(S): 9 INJECTION INTRAMUSCULAR; INTRAVENOUS; SUBCUTANEOUS at 18:05

## 2018-05-07 RX ADMIN — EPINEPHRINE 0.3 MILLIGRAM(S): 0.3 INJECTION INTRAMUSCULAR; SUBCUTANEOUS at 18:46

## 2018-05-07 RX ADMIN — EPINEPHRINE 0.3 MILLIGRAM(S): 0.3 INJECTION INTRAMUSCULAR; SUBCUTANEOUS at 20:02

## 2018-05-07 RX ADMIN — Medication 3 MILLILITER(S): at 19:57

## 2018-05-07 RX ADMIN — CEFEPIME 100 MILLIGRAM(S): 1 INJECTION, POWDER, FOR SOLUTION INTRAMUSCULAR; INTRAVENOUS at 20:17

## 2018-05-07 RX ADMIN — Medication 50 MILLIGRAM(S): at 18:05

## 2018-05-07 RX ADMIN — Medication 650 MILLIGRAM(S): at 20:05

## 2018-05-07 RX ADMIN — FAMOTIDINE 20 MILLIGRAM(S): 10 INJECTION INTRAVENOUS at 18:04

## 2018-05-07 RX ADMIN — Medication 125 MILLIGRAM(S): at 18:04

## 2018-05-07 RX ADMIN — OXYCODONE AND ACETAMINOPHEN 2 TABLET(S): 5; 325 TABLET ORAL at 23:36

## 2018-05-07 NOTE — ED ADULT TRIAGE NOTE - CHIEF COMPLAINT QUOTE
BIBA from home, pt took chemotherapy called [facility], has redness from nipple line up, dyspnea on exertion

## 2018-05-07 NOTE — ED PROVIDER NOTE - CARE PLAN
Principal Discharge DX:	Medication reaction  Secondary Diagnosis:	Leg pain, bilateral  Secondary Diagnosis:	Lymphoma

## 2018-05-07 NOTE — ED PROVIDER NOTE - OBJECTIVE STATEMENT
57 yr old male hx of lymphoma recently started on chemo here for eval of possible allergic reaction. pt last chemo was april 25th/26th with Dr. Rider. Today pt woke up was fine. around 11am took acyclovir and bactrim which was followed by development of diffuse red rash, sob, leg cramping. pt has been taking bactrim and acyclovir for approx 1 week now. pt denies vomiting, abd pain, back pain.

## 2018-05-07 NOTE — ED PROVIDER NOTE - PROGRESS NOTE DETAILS
discussed with heme onc unlikely related to chemo meds pt not improved after solumedrol, pepcid, benadryl will give epin pt noted to hypotensive, epi given. no improvement in sx's map now 67, pt moved to crit. Dr. Wong aware PT ENDORSED TO ME BY DR. CODY. BURN AT BEDSIDE. PT GIVEN ADDITIONAL DOSE OF EPI, AND ALBUTEROL. PT REPORTS SXS IMPROVED. LEG PAIN PERSISTENT. PT STILL WITH TACHYPNEA. AWAITING CT CHEST TO R/O PE. CASE D/W HEME/ONC FELLOW, NO ADDITIONAL RECOMMENDATIONS

## 2018-05-07 NOTE — ED PROVIDER NOTE - ATTENDING CONTRIBUTION TO CARE
.attm I personally evaluated the patient. I reviewed the Resident’s or Physician Assistant’s note (as assigned above), and agree with the findings and plan except as documented in my note.

## 2018-05-07 NOTE — ED ADULT NURSE NOTE - OBJECTIVE STATEMENT
56 y/o male presents to the ED c/o possible allergic reaction after taking acyclovir and bactrim. Pt developed generalized red rash, SOB, and leg cramping.

## 2018-05-08 LAB
CULTURE RESULTS: SIGNIFICANT CHANGE UP
SPECIMEN SOURCE: SIGNIFICANT CHANGE UP

## 2018-05-08 RX ORDER — SODIUM CHLORIDE 9 MG/ML
1000 INJECTION, SOLUTION INTRAVENOUS
Qty: 0 | Refills: 0 | Status: DISCONTINUED | OUTPATIENT
Start: 2018-05-08 | End: 2018-05-10

## 2018-05-08 RX ORDER — ALBUTEROL 90 UG/1
2 AEROSOL, METERED ORAL EVERY 6 HOURS
Qty: 0 | Refills: 0 | Status: DISCONTINUED | OUTPATIENT
Start: 2018-05-08 | End: 2018-05-10

## 2018-05-08 RX ORDER — ACETAMINOPHEN 500 MG
650 TABLET ORAL EVERY 6 HOURS
Qty: 0 | Refills: 0 | Status: DISCONTINUED | OUTPATIENT
Start: 2018-05-08 | End: 2018-05-10

## 2018-05-08 RX ORDER — INSULIN GLARGINE 100 [IU]/ML
15 INJECTION, SOLUTION SUBCUTANEOUS EVERY MORNING
Qty: 0 | Refills: 0 | Status: DISCONTINUED | OUTPATIENT
Start: 2018-05-08 | End: 2018-05-10

## 2018-05-08 RX ORDER — DEXTROSE 50 % IN WATER 50 %
25 SYRINGE (ML) INTRAVENOUS ONCE
Qty: 0 | Refills: 0 | Status: DISCONTINUED | OUTPATIENT
Start: 2018-05-08 | End: 2018-05-10

## 2018-05-08 RX ORDER — SIMVASTATIN 20 MG/1
40 TABLET, FILM COATED ORAL AT BEDTIME
Qty: 0 | Refills: 0 | Status: DISCONTINUED | OUTPATIENT
Start: 2018-05-08 | End: 2018-05-10

## 2018-05-08 RX ORDER — DIPHENHYDRAMINE HCL 50 MG
25 CAPSULE ORAL EVERY 8 HOURS
Qty: 0 | Refills: 0 | Status: DISCONTINUED | OUTPATIENT
Start: 2018-05-08 | End: 2018-05-10

## 2018-05-08 RX ORDER — INSULIN LISPRO 100/ML
VIAL (ML) SUBCUTANEOUS
Qty: 0 | Refills: 0 | Status: DISCONTINUED | OUTPATIENT
Start: 2018-05-08 | End: 2018-05-10

## 2018-05-08 RX ORDER — GLUCAGON INJECTION, SOLUTION 0.5 MG/.1ML
1 INJECTION, SOLUTION SUBCUTANEOUS ONCE
Qty: 0 | Refills: 0 | Status: DISCONTINUED | OUTPATIENT
Start: 2018-05-08 | End: 2018-05-10

## 2018-05-08 RX ORDER — ENOXAPARIN SODIUM 100 MG/ML
40 INJECTION SUBCUTANEOUS DAILY
Qty: 0 | Refills: 0 | Status: DISCONTINUED | OUTPATIENT
Start: 2018-05-08 | End: 2018-05-10

## 2018-05-08 RX ORDER — INSULIN LISPRO 100/ML
5 VIAL (ML) SUBCUTANEOUS
Qty: 0 | Refills: 0 | Status: DISCONTINUED | OUTPATIENT
Start: 2018-05-08 | End: 2018-05-10

## 2018-05-08 RX ORDER — DEXTROSE 50 % IN WATER 50 %
12.5 SYRINGE (ML) INTRAVENOUS ONCE
Qty: 0 | Refills: 0 | Status: DISCONTINUED | OUTPATIENT
Start: 2018-05-08 | End: 2018-05-10

## 2018-05-08 RX ORDER — DEXTROSE 50 % IN WATER 50 %
1 SYRINGE (ML) INTRAVENOUS ONCE
Qty: 0 | Refills: 0 | Status: DISCONTINUED | OUTPATIENT
Start: 2018-05-08 | End: 2018-05-10

## 2018-05-08 RX ADMIN — Medication 25 MILLIGRAM(S): at 21:35

## 2018-05-08 RX ADMIN — Medication 5 UNIT(S): at 17:20

## 2018-05-08 RX ADMIN — SIMVASTATIN 40 MILLIGRAM(S): 20 TABLET, FILM COATED ORAL at 21:35

## 2018-05-08 RX ADMIN — INSULIN GLARGINE 15 UNIT(S): 100 INJECTION, SOLUTION SUBCUTANEOUS at 08:56

## 2018-05-08 RX ADMIN — Medication 5 UNIT(S): at 11:41

## 2018-05-08 RX ADMIN — ENOXAPARIN SODIUM 40 MILLIGRAM(S): 100 INJECTION SUBCUTANEOUS at 11:43

## 2018-05-08 RX ADMIN — Medication 650 MILLIGRAM(S): at 17:57

## 2018-05-08 RX ADMIN — Medication 650 MILLIGRAM(S): at 05:45

## 2018-05-08 RX ADMIN — Medication 25 MILLIGRAM(S): at 05:45

## 2018-05-08 RX ADMIN — Medication 650 MILLIGRAM(S): at 06:15

## 2018-05-08 RX ADMIN — Medication 25 MILLIGRAM(S): at 13:37

## 2018-05-08 RX ADMIN — OXYCODONE AND ACETAMINOPHEN 2 TABLET(S): 5; 325 TABLET ORAL at 00:15

## 2018-05-08 RX ADMIN — Medication 5 UNIT(S): at 08:55

## 2018-05-08 RX ADMIN — Medication 650 MILLIGRAM(S): at 17:27

## 2018-05-08 RX ADMIN — Medication 2: at 17:20

## 2018-05-08 NOTE — H&P ADULT - ASSESSMENT
57 M with h/o NHL admitted for possible anaphylaxis reaction      1- Hypotension/nausea/rash/throat tightness  bp improved,   rash and other symptoms have improved  could be reaction to bactrim?  yesterday was 4th dose of acyclovir and bactri,  had fever of 101, UA negative, no leucocytosis, no cough, cxr clear, bcx sent in ed, given cefepime in ED,   will hold off abx for now  c/w benadryl 25 mg po q8 hrs for 2 days    2- NHL-   low grade right cervical LN biopsy proven nisreen marginal non hodgkins lymphoma few weeks ago, received chemotherpy on april 25th by dr rainey and was started on acyclovir and bactrim on m,w,f,   onc consult prior to discharge    3- HTN- hold lisinopril 20 q24  pt was hypotensive in ED, bp stabe now    4- DM2- hold oral antidiabetic meds  fs  start insulin lantus 15 and lispro 5  adjust as needed    5- DLD- /w statin    6- full code  ambulate as tolerated  dvt ppx- lovenox 40 q24    7- copd- stable  no wheezing  c/w proair prn  got nebs in ed    anticipate d/c in 24-48 hrs

## 2018-05-08 NOTE — CONSULT NOTE ADULT - ATTENDING COMMENTS
patient examined , above note read and modified . Likely anaphylactic reaction ( sulfa induced ? ) , responded to therapy  . Stable for discharge , continue to hold antibiotics and allopurinol . follow up as outpatient .

## 2018-05-08 NOTE — H&P ADULT - NSHPPHYSICALEXAM_GEN_ALL_CORE
PHYSICAL EXAM:  GENERAL: NAD, speaks in full sentences, no signs of respiratory distress  HEAD:  Atraumatic, Normocephalic  EYES: conjunctiva and sclera clear  oral cavity- mild erythema in oropharynx, dry mucosa    NECK: Supple  CHEST/LUNG: Clear to auscultation bilaterally; No wheeze; No crackles; No accessory muscles used  midline scar present from heart surgery when he was an infant  HEART: Regular rate and rhythm; No murmurs;   ABDOMEN: Soft, Nontender, Nondistended; Bowel sounds present; No guarding  EXTREMITIES:  2+ Peripheral Pulses, No cyanosis or edema  PSYCH: AAOx3  NEUROLOGY: non-focal  SKIN: macular erythematous rash noted over back, UE , face which as per patient is much better than at the time of presentation

## 2018-05-08 NOTE — H&P ADULT - PMH
Chronic obstructive pulmonary disease, unspecified COPD type    DM (diabetes mellitus)    High cholesterol    HTN (hypertension)    Non Hodgkin's lymphoma    Pacemaker

## 2018-05-08 NOTE — H&P ADULT - ATTENDING COMMENTS
patient seen and examined independently on morning rounds, chart reviewed and discussed with medicine resident and agree with the above Hpi, phyiscial exam and assessment and plan with the following addendum:     56 yo man with h/o NHL (followed by Dr. Rider and on chemotherapy---last april 25th) who p/w new onset fever and erythematous rash after second series of acylovir and bactrim (given at home).  He reports subjective chills and facial erythema with some associated sob and difficulty swallowing.  In the ED he was started on iv solumedrol- given epinephrine and benadryl and admitted to medicine for monitoring.  no need for intubation- airway remained open with no further episodes.    pmhx- as per above    allergies- nkda (but presenting with anaphylactic reaction to bactrim vs acyclovri)    ROS- as per above otherwise unremarkable    soc al- - lives with wife ( in ED)- former smoker quit <1 year ago, denies any etoh or drug use    PE:  GEN-NAD, AAOx3  CHEST- Clear to auscultation bilaterally, fair air entry  CVS- +s1/s2 RRR no murmurs  ABD- soft NT ND +bs  EXT- no e/c/c  SKIN- no rashes     labs/radiology reviewed    a/p:   #Anaphylactic reaction- to ? bactrim (sulfa) vs acyclovir  -cont with benadryl prn  -f/u with onc need for reinitiating ppx (bactrim/acyclovir)---if want to continue would give next dose in am prior to discharging to monitor for any signs of allergic reaction/anaphylaxis  -afebrile overnight  -monitor bp (improving)    #NHL-  f/u with oncology (Dr. Rider)  -l;ast chemo 4/25    #HTN- bp meds on hold 2/2 hypotension on presention  -restart bp meds as tolerated    #DM- monitor fs and cont insulin    #copd- stable  -cont o2 suppl and nebs prn    #DVT/GI ppx    full code    anticipate likely d/c home in am if no complications from next dose of acyclovir and bactrim patient seen and examined independently on morning rounds, chart reviewed and discussed with medicine resident and agree with the above Hpi, phyiscial exam and assessment and plan with the following addendum:     58 yo man with h/o NHL (followed by Dr. Rider and on chemotherapy---last april 25th) who p/w new onset fever and erythematous rash after second series of acylovir and bactrim (given at home).  He reports subjective chills and facial erythema with some associated sob and difficulty swallowing.  In the ED he was started on iv solumedrol- given epinephrine and benadryl and admitted to medicine for monitoring.  no need for intubation- airway remained open with no further episodes.    pmhx- as per above    allergies- nkda (but presenting with anaphylactic reaction to bactrim vs acyclovri)    ROS- as per above otherwise unremarkable    soc al- - lives with wife ( in ED)- former smoker quit <1 year ago, denies any etoh or drug use    PE:  GEN-NAD, AAOx3  HEENT: ncat, eomi, perrla  CHEST- Clear to auscultation bilaterally, fair air entry  CVS- +s1/s2 RRR no murmurs  ABD- soft NT ND +bs  EXT- no e/c/c  SKIN- no rashes     labs/radiology reviewed    a/p:   #Anaphylactic reaction- to ? bactrim (sulfa) vs acyclovir  -cont with benadryl prn  -f/u with onc need for reinitiating ppx (bactrim/acyclovir)---if want to continue would give next dose in am prior to discharging to monitor for any signs of allergic reaction/anaphylaxis  -afebrile overnight  -monitor bp (improving)    #NHL-  f/u with oncology (Dr. Rider)  -l;ast chemo 4/25    #leg pain- bilateral calf  -recently diagnosed with NHL-  -will check doppler to r/o DVT    #HTN- bp meds on hold 2/2 hypotension on presention  -restart bp meds as tolerated    #DM- monitor fs and cont insulin    #copd- stable  -cont o2 suppl and nebs prn    #DVT/GI ppx    full code    anticipate likely d/c home in am if no complications from next dose of acyclovir and bactrim

## 2018-05-08 NOTE — CONSULT NOTE ADULT - SUBJECTIVE AND OBJECTIVE BOX
HPI:  57 m with pmh listed. 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 was brought to ED , found to have low BP, was given 2L IVF ,  solumedrol 125 iv, benadrul im 50, epinephrine 0.3 im twice, for possible anaphylaxis reaction and percoset for leg pain.    patient improved clinically and was admitted to medicine for monitoring.     no other symptoms, has solo taking lisinopril and other meds for long time, no swelling of lips or tongue , no abdominal pain (08 May 2018 03:34)      Onc History: 57 year old obese male with PMH of COPD, DM, PM for syncope initially presented to ER February 4, 2018 with complaints of a tender swollen lymph node on right side of neck for several months which was biopsied of right cervical lymph node by ENT found to have low grade B-cell lymphoma started on chemotherapy with rituxan and bendamustine on April 26.    PAST MEDICAL & SURGICAL HISTORY:  Pacemaker  Non Hodgkin's lymphoma  Chronic obstructive pulmonary disease, unspecified COPD type  DM (diabetes mellitus)  High cholesterol  HTN (hypertension)  Artificial cardiac pacemaker    Home Medications:  acyclovir:  (08 May 2018 04:18)  Bactrim  mg-160 mg oral tablet: 1  orally 3 times a week (08 May 2018 04:18)  enalapril: 20 milligram(s) orally once a day (08 May 2018 04:18)  Jardiance 25 mg oral tablet: 1 tab(s) orally once a day (in the morning) (08 May 2018 04:18)  metFORMIN: 1000 milligram(s) orally 2 times a day (08 May 2018 04:18)  pioglitazone: 45 milligram(s) orally once a day (08 May 2018 04:18)  ProAir HFA 90 mcg/inh inhalation aerosol: 2 puff(s) inhaled 4 times a day, As Needed (08 May 2018 04:18)  simvastatin: 40 milligram(s) orally once a day (at bedtime) (08 May 2018 04:18)    _______________________________________________________________________________________________________  MEDICATIONS  (STANDING):  ALBUTerol/ipratropium for Nebulization. 3 milliLiter(s) Nebulizer once  dextrose 5%. 1000 milliLiter(s) (50 mL/Hr) IV Continuous <Continuous>  dextrose 50% Injectable 12.5 Gram(s) IV Push once  dextrose 50% Injectable 25 Gram(s) IV Push once  dextrose 50% Injectable 25 Gram(s) IV Push once  diphenhydrAMINE   Capsule 25 milliGRAM(s) Oral every 8 hours  enoxaparin Injectable 40 milliGRAM(s) SubCutaneous daily  insulin glargine Injectable (LANTUS) 15 Unit(s) SubCutaneous every morning  insulin lispro (HumaLOG) corrective regimen sliding scale   SubCutaneous three times a day before meals  insulin lispro Injectable (HumaLOG) 5 Unit(s) SubCutaneous three times a day before meals  simvastatin 40 milliGRAM(s) Oral at bedtime    MEDICATIONS  (PRN):  acetaminophen   Tablet 650 milliGRAM(s) Oral every 6 hours PRN For Temp greater than 38 C (100.4 F)  acetaminophen   Tablet. 650 milliGRAM(s) Oral every 6 hours PRN Mild Pain (1 - 3)  ALBUTerol    90 MICROgram(s) HFA Inhaler 2 Puff(s) Inhalation every 6 hours PRN Shortness of Breath  dextrose Gel 1 Dose(s) Oral once PRN Blood Glucose LESS THAN 70 milliGRAM(s)/deciliter  glucagon  Injectable 1 milliGRAM(s) IntraMuscular once PRN Glucose LESS THAN 70 milligrams/deciliter    Allergies  No Known Allergies  ______________________________________________________________________________________________________  LABS:                        14.8   10.28 )-----------( 158      ( 07 May 2018 18:14 )             44.0     05-07    137  |  100  |  13  ----------------------------<  203<H>  4.5   |  19  |  1.0    Ca    9.1      07 May 2018 18:14    TPro  6.5  /  Alb  4.1  /  TBili  1.8<H>  /  DBili  x   /  AST  27  /  ALT  20  /  AlkPhos  55  05-07      CARDIAC MARKERS ( 07 May 2018 18:14 )  x     / x     / 59 U/L / x     / x            Urinalysis Basic - ( 07 May 2018 19:40 )    Color: Yellow / Appearance: Clear / SG: >=1.030 / pH: x  Gluc: x / Ketone: 40  / Bili: Negative / Urobili: 0.2   Blood: x / Protein: Negative / Nitrite: Negative   Leuk Esterase: Negative / RBC: x / WBC x   Sq Epi: x / Non Sq Epi: x / Bacteria: x    Blood Gas Venous - Lactate: 1.6 mmoL/L (05.07.18 @ 19:40)    Blood Gas Profile - Venous (05.07.18 @ 19:40)    pH, Venous: 7.41    pCO2, Venous: 35 mmHg    pO2, Venous: 40 mmHg    HCO3, Venous: 22 mmoL/L    Base Excess, Venous: -1.8 mmoL/L    Oxygen Saturation, Venous: 79 %    < from: CT Angio Chest w/ IV Cont (05.07.18 @ 22:43) >  No central or lobar pulmonary embolism.    Less prominent multiple right axillary lymph node measuring up to 1.9 cm,   and multiple subcentimeter supraclavicular lymph nodes, recently   evaluated on PET/CT to be FDG avid.     < end of copied text >    < from: Xray Chest 1 View- PORTABLE-Urgent (05.07.18 @ 20:51) >  No radiographic evidence of acute cardiopulmonary disease.    < end of copied text >    < from: NM PET/CT Onc FDG Skull to Thigh, Inital (04.16.18 @ 15:08) >  1. FDG avid multilevel enlarged right cervical lymphadenopathy with a max   SUV 9.25, right supraclavicular lymphadenopathy with a max SUV 9.06 and   left supraclavicular lymphadenopathy with a max SUV 11.34 and FDG avid   right thoracic inlet lymph node with a max SUV 8.49 consistent with   biopsy-proven lymphoma.    2. Enlarged FDG avid right axillary lymph nodes with a max SUV 10.64   consistent with a lymphoma.    3. Non-FDG avid increased in number and enlarged mesenteric lymph nodes   consistent with lymphoma.    4. Haziness in the mesentery non-FDG avid.    5. No other areas of abnormal increased uptake is seen.    < end of copied text >    Surgical Pathology Report:   ACCESSION No:  [id number]    LONG GEE                      2        Surgical Final Report          Final Diagnosis  Lymph nodes, right neck, excisional biopsy:  - Low grade B-cell lymphoma, consistent with nisreen marginal zone  lymphoma. See microscopic description and  summary.    Verified by: Radha Rose MD  (Electronic Signature)  Reported on: 04/12/18 11:16 EDT,[facility], [address]rd Floor,  [place] [address]  Histology technical processing performed at [address] [place] [address]  _________________________________________________________________    Microscopic Description  H&E stained sections of lymph nodes show effacement of the nisreen  architecture by a vaguely nodular lymphoid infiltrate. The  infiltrate is composed of small lymphocytes with irregular  nuclei, condensed chromatin, inconspicuous nucleoli, and moderate  amounts of pale cytoplasm. Scattered immunoblasts are present.  Plasma cells are not prominent. Scattered residual lymphoid  follicles are seen. The capsule is thickened.    Immunostains performed with adequate controls on sections from  block 1B show the neoplastic cells are CD20+ CD79a+ PAX5+ B-cells  that co-express BCL2. They are negative for CD5, CD10, BCL6,  cyclinD1, and CD43. The proliferation index (Ki67 labeling)  ranges from 5 to 20% in different areas excluding the germinal  centers. CD21 and CD23 highlight disrupted follicular dendritic  cell meshworks. CD3 stains background T-cells.    Per report ([id number]), flow cytometry studies performed at  [facility], [place] [employer] show monotypic B-cells (38% of  cells), positive for kappa, CD19, CD20, FMC-7, CD23, minimal  CD10; negative for CD5. Viability is 78%.      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    Dr. HERIBERTO Alfaro was informed of the diagnosis, 4/12/18.    Clinical History  Right neck excisional lymph node biopsy    Diffuse cervical and axillary lymphadenopathy            LONG GEE                      2        Surgical Final Report            Specimen(s) Submitted  Right neck level 5 lymph node    Gross Description  The specimen is received fresh, and labeled "right neck level 5  lymphnode" and consists of one fragment of tan pink to tan red  soft tissue, measuring 3.5 x 2 x 1.5 cm. Three lymph nodes are  identified, measuring 2.4 x 1.8 x 1.7 cm, 1.3 x 1.1 x 0.7 cm, 1 x  0.9 x 0.6 cm.  The largest lymph node is serially sectioned and  the rest of the two lymph nodes are bisected. Touch prep was  performed and representative sections were submitted for flow  cytometry study and cytogenetic study.    Summary of Sections:  1A-1C: sections from the largest lymph node -3  1D: sections from the medium sized lymph node -1  1E: section from the smallest lymph node -1    Total: 5 blocks    Specimen was received and underwent gross examination at [facility], [address] [place] [address].    04/09/18 11:14 ws    Surg Per Op Diagnosis  Lymphadenopathy (04.06.18 @ 07:52) HPI:  57 m with pmh listed. 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 was brought to ED , found to have low BP, was given 2L IVF ,  solumedrol 125 iv, benadrul im 50, epinephrine 0.3 im twice, for possible anaphylaxis reaction and percoset for leg pain.    patient improved clinically and was admitted to medicine for monitoring.     no other symptoms, has solo taking lisinopril and other meds for long time, no swelling of lips or tongue , no abdominal pain (08 May 2018 03:34)      Onc History: 57 year old obese male with PMH of COPD, DM, PM for syncope initially presented to ER February 4, 2018 with complaints of a tender swollen lymph node on right side of neck for several months which was biopsied of right cervical lymph node by ENT found to have low grade B-cell lymphoma started on chemotherapy with rituxan and bendamustine on April 26, 27 and supposed to follow up in a month.    PAST MEDICAL & SURGICAL HISTORY:  Pacemaker  Non Hodgkin's lymphoma  Chronic obstructive pulmonary disease, unspecified COPD type  DM (diabetes mellitus)  High cholesterol  HTN (hypertension)  Artificial cardiac pacemaker    Home Medications:  acyclovir:  (08 May 2018 04:18)  Bactrim  mg-160 mg oral tablet: 1  orally 3 times a week (08 May 2018 04:18)  enalapril: 20 milligram(s) orally once a day (08 May 2018 04:18)  Jardiance 25 mg oral tablet: 1 tab(s) orally once a day (in the morning) (08 May 2018 04:18)  metFORMIN: 1000 milligram(s) orally 2 times a day (08 May 2018 04:18)  pioglitazone: 45 milligram(s) orally once a day (08 May 2018 04:18)  ProAir HFA 90 mcg/inh inhalation aerosol: 2 puff(s) inhaled 4 times a day, As Needed (08 May 2018 04:18)  simvastatin: 40 milligram(s) orally once a day (at bedtime) (08 May 2018 04:18)    _______________________________________________________________________________________________________  MEDICATIONS  (STANDING):  ALBUTerol/ipratropium for Nebulization. 3 milliLiter(s) Nebulizer once  dextrose 5%. 1000 milliLiter(s) (50 mL/Hr) IV Continuous <Continuous>  dextrose 50% Injectable 12.5 Gram(s) IV Push once  dextrose 50% Injectable 25 Gram(s) IV Push once  dextrose 50% Injectable 25 Gram(s) IV Push once  diphenhydrAMINE   Capsule 25 milliGRAM(s) Oral every 8 hours  enoxaparin Injectable 40 milliGRAM(s) SubCutaneous daily  insulin glargine Injectable (LANTUS) 15 Unit(s) SubCutaneous every morning  insulin lispro (HumaLOG) corrective regimen sliding scale   SubCutaneous three times a day before meals  insulin lispro Injectable (HumaLOG) 5 Unit(s) SubCutaneous three times a day before meals  simvastatin 40 milliGRAM(s) Oral at bedtime    MEDICATIONS  (PRN):  acetaminophen   Tablet 650 milliGRAM(s) Oral every 6 hours PRN For Temp greater than 38 C (100.4 F)  acetaminophen   Tablet. 650 milliGRAM(s) Oral every 6 hours PRN Mild Pain (1 - 3)  ALBUTerol    90 MICROgram(s) HFA Inhaler 2 Puff(s) Inhalation every 6 hours PRN Shortness of Breath  dextrose Gel 1 Dose(s) Oral once PRN Blood Glucose LESS THAN 70 milliGRAM(s)/deciliter  glucagon  Injectable 1 milliGRAM(s) IntraMuscular once PRN Glucose LESS THAN 70 milligrams/deciliter    Allergies  No Known Allergies  ______________________________________________________________________________________________________  LABS:                        14.8   10.28 )-----------( 158      ( 07 May 2018 18:14 )             44.0     05-07    137  |  100  |  13  ----------------------------<  203<H>  4.5   |  19  |  1.0    Ca    9.1      07 May 2018 18:14    TPro  6.5  /  Alb  4.1  /  TBili  1.8<H>  /  DBili  x   /  AST  27  /  ALT  20  /  AlkPhos  55  05-07      CARDIAC MARKERS ( 07 May 2018 18:14 )  x     / x     / 59 U/L / x     / x            Urinalysis Basic - ( 07 May 2018 19:40 )    Color: Yellow / Appearance: Clear / SG: >=1.030 / pH: x  Gluc: x / Ketone: 40  / Bili: Negative / Urobili: 0.2   Blood: x / Protein: Negative / Nitrite: Negative   Leuk Esterase: Negative / RBC: x / WBC x   Sq Epi: x / Non Sq Epi: x / Bacteria: x    Blood Gas Venous - Lactate: 1.6 mmoL/L (05.07.18 @ 19:40)    Blood Gas Profile - Venous (05.07.18 @ 19:40)    pH, Venous: 7.41    pCO2, Venous: 35 mmHg    pO2, Venous: 40 mmHg    HCO3, Venous: 22 mmoL/L    Base Excess, Venous: -1.8 mmoL/L    Oxygen Saturation, Venous: 79 %    < from: CT Angio Chest w/ IV Cont (05.07.18 @ 22:43) >  No central or lobar pulmonary embolism.    Less prominent multiple right axillary lymph node measuring up to 1.9 cm,   and multiple subcentimeter supraclavicular lymph nodes, recently   evaluated on PET/CT to be FDG avid.     < end of copied text >    < from: Xray Chest 1 View- PORTABLE-Urgent (05.07.18 @ 20:51) >  No radiographic evidence of acute cardiopulmonary disease.    < end of copied text >    < from: NM PET/CT Onc FDG Skull to Thigh, Inital (04.16.18 @ 15:08) >  1. FDG avid multilevel enlarged right cervical lymphadenopathy with a max   SUV 9.25, right supraclavicular lymphadenopathy with a max SUV 9.06 and   left supraclavicular lymphadenopathy with a max SUV 11.34 and FDG avid   right thoracic inlet lymph node with a max SUV 8.49 consistent with   biopsy-proven lymphoma.    2. Enlarged FDG avid right axillary lymph nodes with a max SUV 10.64   consistent with a lymphoma.    3. Non-FDG avid increased in number and enlarged mesenteric lymph nodes   consistent with lymphoma.    4. Haziness in the mesentery non-FDG avid.    5. No other areas of abnormal increased uptake is seen.    < end of copied text >    Surgical Pathology Report:   ACCESSION No:  [id number]    LONG GEE                      2        Surgical Final Report          Final Diagnosis  Lymph nodes, right neck, excisional biopsy:  - Low grade B-cell lymphoma, consistent with nisreen marginal zone  lymphoma. See microscopic description and  summary.    Verified by: Radha Rose MD  (Electronic Signature)  Reported on: 04/12/18 11:16 EDT,[facility], 3rd Floor,  [place] [address]  Histology technical processing performed at [address] [place] [id number]  _________________________________________________________________    Microscopic Description  H&E stained sections of lymph nodes show effacement of the nisreen  architecture by a vaguely nodular lymphoid infiltrate. The  infiltrate is composed of small lymphocytes with irregular  nuclei, condensed chromatin, inconspicuous nucleoli, and moderate  amounts of pale cytoplasm. Scattered immunoblasts are present.  Plasma cells are not prominent. Scattered residual lymphoid  follicles are seen. The capsule is thickened.    Immunostains performed with adequate controls on sections from  block 1B show the neoplastic cells are CD20+ CD79a+ PAX5+ B-cells  that co-express BCL2. They are negative for CD5, CD10, BCL6,  cyclinD1, and CD43. The proliferation index (Ki67 labeling)  ranges from 5 to 20% in different areas excluding the germinal  centers. CD21 and CD23 highlight disrupted follicular dendritic  cell meshworks. CD3 stains background T-cells.    Per report ([id number]), flow cytometry studies performed at  [facility] show monotypic B-cells (38% of  cells), positive for kappa, CD19, CD20, FMC-7, CD23, minimal  CD10; negative for CD5. Viability is 78%.      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    Dr. HERIBEROT Alfaro was informed of the diagnosis, 4/12/18.    Clinical History  Right neck excisional lymph node biopsy    Diffuse cervical and axillary lymphadenopathy            LONG GEE                      2        Surgical Final Report            Specimen(s) Submitted  Right neck level 5 lymph node    Gross Description  The specimen is received fresh, and labeled "right neck level 5  lymphnode" and consists of one fragment of tan pink to tan red  soft tissue, measuring 3.5 x 2 x 1.5 cm. Three lymph nodes are  identified, measuring 2.4 x 1.8 x 1.7 cm, 1.3 x 1.1 x 0.7 cm, 1 x  0.9 x 0.6 cm.  The largest lymph node is serially sectioned and  the rest of the two lymph nodes are bisected. Touch prep was  performed and representative sections were submitted for flow  cytometry study and cytogenetic study.    Summary of Sections:  1A-1C: sections from the largest lymph node -3  1D: sections from the medium sized lymph node -1  1E: section from the smallest lymph node -1    Total: 5 blocks    Specimen was received and underwent gross examination at [facility], [address] [place] [id number].    04/09/18 11:14 ws    Surg Per Op Diagnosis  Lymphadenopathy (04.06.18 @ 07:52) HPI:  57 m with pmh listed. 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 was brought to ED , found to have low BP, was given 2L IVF ,  solumedrol 125 iv, benadrul im 50, epinephrine 0.3 im twice, for possible anaphylaxis reaction and percoset for leg pain.    patient improved clinically and was admitted to medicine for monitoring.     no other symptoms, has solo taking lisinopril and other meds for long time, no swelling of lips or tongue , no abdominal pain (08 May 2018 03:34)      Onc History: 57 year old obese male with PMH of COPD, DM, PM for syncope initially presented to ER February 4, 2018 with complaints of a tender swollen lymph node on right side of neck for several months which was biopsied of right cervical lymph node by ENT found to have low grade B-cell lymphoma started on chemotherapy with rituxan and bendamustine on April 26, 27 and supposed to follow up in a month.    PAST MEDICAL & SURGICAL HISTORY:  Pacemaker  Non Hodgkin's lymphoma  Chronic obstructive pulmonary disease, unspecified COPD type  DM (diabetes mellitus)  High cholesterol  HTN (hypertension)  Artificial cardiac pacemaker    Home Medications:  acyclovir:  (08 May 2018 04:18)  Bactrim  mg-160 mg oral tablet: 1  orally 3 times a week (08 May 2018 04:18)  enalapril: 20 milligram(s) orally once a day (08 May 2018 04:18)  Jardiance 25 mg oral tablet: 1 tab(s) orally once a day (in the morning) (08 May 2018 04:18)  metFORMIN: 1000 milligram(s) orally 2 times a day (08 May 2018 04:18)  pioglitazone: 45 milligram(s) orally once a day (08 May 2018 04:18)  ProAir HFA 90 mcg/inh inhalation aerosol: 2 puff(s) inhaled 4 times a day, As Needed (08 May 2018 04:18)  simvastatin: 40 milligram(s) orally once a day (at bedtime) (08 May 2018 04:18)  _______________________________________________________________________________________________________  MEDICATIONS  (STANDING):  ALBUTerol/ipratropium for Nebulization. 3 milliLiter(s) Nebulizer once  dextrose 5%. 1000 milliLiter(s) (50 mL/Hr) IV Continuous <Continuous>  dextrose 50% Injectable 12.5 Gram(s) IV Push once  dextrose 50% Injectable 25 Gram(s) IV Push once  dextrose 50% Injectable 25 Gram(s) IV Push once  diphenhydrAMINE   Capsule 25 milliGRAM(s) Oral every 8 hours  enoxaparin Injectable 40 milliGRAM(s) SubCutaneous daily  insulin glargine Injectable (LANTUS) 15 Unit(s) SubCutaneous every morning  insulin lispro (HumaLOG) corrective regimen sliding scale   SubCutaneous three times a day before meals  insulin lispro Injectable (HumaLOG) 5 Unit(s) SubCutaneous three times a day before meals  simvastatin 40 milliGRAM(s) Oral at bedtime    MEDICATIONS  (PRN):  acetaminophen   Tablet 650 milliGRAM(s) Oral every 6 hours PRN For Temp greater than 38 C (100.4 F)  acetaminophen   Tablet. 650 milliGRAM(s) Oral every 6 hours PRN Mild Pain (1 - 3)  ALBUTerol    90 MICROgram(s) HFA Inhaler 2 Puff(s) Inhalation every 6 hours PRN Shortness of Breath  dextrose Gel 1 Dose(s) Oral once PRN Blood Glucose LESS THAN 70 milliGRAM(s)/deciliter  glucagon  Injectable 1 milliGRAM(s) IntraMuscular once PRN Glucose LESS THAN 70 milligrams/deciliter    Allergies  No Known Allergies  ______________________________________________________________________________________________________  LABS:                        14.8   10.28 )-----------( 158      ( 07 May 2018 18:14 )             44.0     05-07    137  |  100  |  13  ----------------------------<  203<H>  4.5   |  19  |  1.0    Ca    9.1      07 May 2018 18:14    TPro  6.5  /  Alb  4.1  /  TBili  1.8<H>  /  DBili  x   /  AST  27  /  ALT  20  /  AlkPhos  55  05-07  CARDIAC MARKERS ( 07 May 2018 18:14 )  x     / x     / 59 U/L / x     / x        Urinalysis Basic - ( 07 May 2018 19:40 )    Color: Yellow / Appearance: Clear / SG: >=1.030 / pH: x  Gluc: x / Ketone: 40  / Bili: Negative / Urobili: 0.2   Blood: x / Protein: Negative / Nitrite: Negative   Leuk Esterase: Negative / RBC: x / WBC x   Sq Epi: x / Non Sq Epi: x / Bacteria: x    Blood Gas Venous - Lactate: 1.6 mmoL/L (05.07.18 @ 19:40)    Blood Gas Profile - Venous (05.07.18 @ 19:40)    pH, Venous: 7.41    pCO2, Venous: 35 mmHg    pO2, Venous: 40 mmHg    HCO3, Venous: 22 mmoL/L    Base Excess, Venous: -1.8 mmoL/L    Oxygen Saturation, Venous: 79 %    < from: CT Angio Chest w/ IV Cont (05.07.18 @ 22:43) >  No central or lobar pulmonary embolism.    Less prominent multiple right axillary lymph node measuring up to 1.9 cm,   and multiple subcentimeter supraclavicular lymph nodes, recently   evaluated on PET/CT to be FDG avid.     < end of copied text >    < from: Xray Chest 1 View- PORTABLE-Urgent (05.07.18 @ 20:51) >  No radiographic evidence of acute cardiopulmonary disease.    < end of copied text >    < from: NM PET/CT Onc FDG Skull to Thigh, Inital (04.16.18 @ 15:08) >  1. FDG avid multilevel enlarged right cervical lymphadenopathy with a max   SUV 9.25, right supraclavicular lymphadenopathy with a max SUV 9.06 and   left supraclavicular lymphadenopathy with a max SUV 11.34 and FDG avid   right thoracic inlet lymph node with a max SUV 8.49 consistent with   biopsy-proven lymphoma.    2. Enlarged FDG avid right axillary lymph nodes with a max SUV 10.64   consistent with a lymphoma.    3. Non-FDG avid increased in number and enlarged mesenteric lymph nodes   consistent with lymphoma.    4. Haziness in the mesentery non-FDG avid.    5. No other areas of abnormal increased uptake is seen.    < end of copied text >    Surgical Pathology Report:   ACCESSION No:  [id number]    LONG GEE                      2      Surgical Final Report  Final Diagnosis  Lymph nodes, right neck, excisional biopsy:  - Low grade B-cell lymphoma, consistent with nisreen marginal zone  lymphoma. See microscopic description and  summary.    Verified by: Radha Rose MD  (Electronic Signature)  Reported on: 04/12/18 11:16 EDT,[facility], 3rd Floor,  [place] [address]  Histology technical processing performed at [address] [place] [id number]  _________________________________________________________________    Microscopic Description  H&E stained sections of lymph nodes show effacement of the nisreen  architecture by a vaguely nodular lymphoid infiltrate. The  infiltrate is composed of small lymphocytes with irregular  nuclei, condensed chromatin, inconspicuous nucleoli, and moderate  amounts of pale cytoplasm. Scattered immunoblasts are present.  Plasma cells are not prominent. Scattered residual lymphoid  follicles are seen. The capsule is thickened.    Immunostains performed with adequate controls on sections from  block 1B show the neoplastic cells are CD20+ CD79a+ PAX5+ B-cells  that co-express BCL2. They are negative for CD5, CD10, BCL6,  cyclinD1, and CD43. The proliferation index (Ki67 labeling)  ranges from 5 to 20% in different areas excluding the germinal  centers. CD21 and CD23 highlight disrupted follicular dendritic  cell meshworks. CD3 stains background T-cells.    Per report ([id number]), flow cytometry studies performed at  [facility] show monotypic B-cells (38% of  cells), positive for kappa, CD19, CD20, FMC-7, CD23, minimal  CD10; negative for CD5. Viability is 78%.      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    Dr. HERIBERTO Alfaro was informed of the diagnosis, 4/12/18.    Clinical History  Right neck excisional lymph node biopsy  Diffuse cervical and axillary lymphadenopathy      LONG GEE                      2  Surgical Final Report  Specimen(s) Submitted  Right neck level 5 lymph node    Gross Description  The specimen is received fresh, and labeled "right neck level 5  lymphnode" and consists of one fragment of tan pink to tan red  soft tissue, measuring 3.5 x 2 x 1.5 cm. Three lymph nodes are  identified, measuring 2.4 x 1.8 x 1.7 cm, 1.3 x 1.1 x 0.7 cm, 1 x  0.9 x 0.6 cm.  The largest lymph node is serially sectioned and  the rest of the two lymph nodes are bisected. Touch prep was  performed and representative sections were submitted for flow  cytometry study and cytogenetic study.    Summary of Sections:  1A-1C: sections from the largest lymph node -3  1D: sections from the medium sized lymph node -1  1E: section from the smallest lymph node -1    Total: 5 blocks    Specimen was received and underwent gross examination at [facility], [address] [place] [id number].    04/09/18 11:14 ws    Surg Per Op Diagnosis  Lymphadenopathy (04.06.18 @ 07:52)

## 2018-05-08 NOTE — H&P ADULT - HISTORY OF PRESENT ILLNESS
57 m with pmh listed. was diagnosed with low grade right cervical LN biopsy proven nisreen marginal non hodgkins lymphoma few weeks ago, received chemotherpy on april 25th by dr rainey and was started on acyclovir and bactrim on m,w,f, yesterday was 4th dose of acyclovir and bactri,m he reports that around noon he suddenly had diffuse rash over hi UE, alena, chest and face, his face was all red, had nausea, lightheaded, fever 101, throat closing sensation, and pain in his b/l shins. was brought to ED , found to have low BP, was given 2L IVF ,  solumedrol 125 iv, benadrul im 50, epinephrine 0.3 im twice, for possible anaphylaxis reaction and percoset for leg pain.    patient improved clinically and was admitted to medicine for monitoring.     no other symptoms, has solo taking lisinopril and other meds for long time, no swelling of lips or tongue , no abdominal pain

## 2018-05-08 NOTE — CONSULT NOTE ADULT - ASSESSMENT
57 year old obese male with PMH of COPD, DM, PPM, low grade diffuse B-Cell lymphoma recently diagnosed and started on chemotherapy and prophylactic antibiotics and antiviral presented with fever, shortness breath, bilateral leg pain, throat pain and malaise    # Allergic reaction   - c/w treatment as per medical team    # Low grade diffuse B-cell lymphoma  - c/w outpatient chemotherapy if medically stable 57 year old obese male with PMH of COPD, DM, PPM, low grade diffuse B-Cell lymphoma recently diagnosed and started on chemotherapy and prophylactic antibiotics and antiviral presented with fever, shortness breath, bilateral leg pain, throat pain and malaise, swelling , mild stridor . responded promptly to epinephrine and steroids.     # Allergic reaction   - c/w treatment as per medical team    # Low grade diffuse B-cell lymphoma  - c/w outpatient chemotherapy if medically stable 57 year old obese male with PMH of COPD, DM, PPM, low grade diffuse B-Cell lymphoma recently diagnosed and started on chemotherapy and prophylactic antibiotics and antiviral presented with fever, shortness breath, bilateral leg pain, throat pain and malaise, swelling , rash ,mild stridor . responded promptly to epinephrine and steroids.     # Allergic reaction   - c/w treatment as per medical team    # Low grade diffuse B-cell lymphoma  - c/w outpatient chemotherapy if medically stable

## 2018-05-08 NOTE — H&P ADULT - NSHPLABSRESULTS_GEN_ALL_CORE
ct chest with contrast-negative    pet scan -1. FDG avid multilevel enlarged right cervical lymphadenopathy with a max   SUV 9.25, right supraclavicular lymphadenopathy with a max SUV 9.06 and   left supraclavicular lymphadenopathy with a max SUV 11.34 and FDG avid   right thoracic inlet lymph node with a max SUV 8.49 consistent with   biopsy-proven lymphoma.    2. Enlarged FDG avid right axillary lymph nodes with a max SUV 10.64   consistent with a lymphoma.    3. Non-FDG avid increased in number and enlarged mesenteric lymph nodes   consistent with lymphoma.    4. Haziness in the mesentery non-FDG avid.

## 2018-05-09 LAB
ESTIMATED AVERAGE GLUCOSE: 220 MG/DL — HIGH (ref 68–114)
HBA1C BLD-MCNC: 9.3 % — HIGH (ref 4–5.6)

## 2018-05-09 PROCEDURE — 93970 EXTREMITY STUDY: CPT | Mod: 26

## 2018-05-09 RX ORDER — FAMOTIDINE 10 MG/ML
20 INJECTION INTRAVENOUS
Qty: 0 | Refills: 0 | Status: DISCONTINUED | OUTPATIENT
Start: 2018-05-09 | End: 2018-05-10

## 2018-05-09 RX ADMIN — ENOXAPARIN SODIUM 40 MILLIGRAM(S): 100 INJECTION SUBCUTANEOUS at 11:35

## 2018-05-09 RX ADMIN — FAMOTIDINE 20 MILLIGRAM(S): 10 INJECTION INTRAVENOUS at 18:29

## 2018-05-09 RX ADMIN — Medication 5 UNIT(S): at 07:54

## 2018-05-09 RX ADMIN — SIMVASTATIN 40 MILLIGRAM(S): 20 TABLET, FILM COATED ORAL at 21:16

## 2018-05-09 RX ADMIN — Medication 5 UNIT(S): at 13:12

## 2018-05-09 RX ADMIN — Medication 1: at 07:56

## 2018-05-09 RX ADMIN — Medication 25 MILLIGRAM(S): at 05:59

## 2018-05-09 RX ADMIN — Medication 5 UNIT(S): at 18:27

## 2018-05-09 RX ADMIN — Medication 25 MILLIGRAM(S): at 13:19

## 2018-05-09 RX ADMIN — Medication 2: at 13:12

## 2018-05-09 RX ADMIN — Medication 20 MILLIGRAM(S): at 18:29

## 2018-05-09 RX ADMIN — Medication 25 MILLIGRAM(S): at 21:16

## 2018-05-09 RX ADMIN — INSULIN GLARGINE 15 UNIT(S): 100 INJECTION, SOLUTION SUBCUTANEOUS at 07:57

## 2018-05-09 NOTE — PROGRESS NOTE ADULT - ASSESSMENT
57-year-old Male with recent diagnosis of NHL presents with drug reaction (rash/fever/throat closing) and bilateral leg pain.    #Anaphylactic reaction - suspected 2/2 Bactrim or Acyclovir  -symptoms resolved  -f/u with onc need for reinitiating ppx abx (bactrim/acyclovir)---if want to continue would give next dose in am prior to discharging to monitor for any signs of allergic reaction/anaphylaxis  -BP improving (101-116/57-76)    #NHL  -f/u with oncology (Dr. Rider)  -last chemo 4/25    #Leg pain - bilateral calf  -Recent Dx of NHL  -f/u venous duplex this morning    #HTN  -bp meds on hold 2/2 hypotension on presentation  -restart bp meds as tolerated    #DM  -basal bolus insulin regimen  -FS range:    #COPD  -stable  -O2 + Nebs PRN    #DVT PPx  -c/w Lovenox    full code    anticipate likely d/c home in am if no complications from next dose of acyclovir and bactrim . 57-year-old Male with recent diagnosis of NHL presents with drug reaction (rash/fever/throat closing) and bilateral leg pain.    #Anaphylactic reaction - suspected 2/2 Bactrim or Acyclovir  -symptoms resolved  -f/u with onc need for reinitiating ppx abx (bactrim/acyclovir)---if want to continue would give next dose in am prior to discharging to monitor for any signs of allergic reaction/anaphylaxis  -BP improving (101-116/57-76)    #NHL  -f/u with oncology (Dr. Rider)  -last chemo 4/25    #Leg pain - bilateral calf  -Recent Dx of NHL  -f/u venous duplex this morning    #HTN  -bp meds on hold 2/2 hypotension on presentation  -restart bp meds as tolerated    #DM  -basal bolus insulin regimen  -FS range: 177-253    #COPD  -stable  -O2 + Nebs PRN    #DVT PPx  -c/w Lovenox    full code    anticipate likely d/c home in am if no complications from next dose of acyclovir and bactrim . 57-year-old Male with recent diagnosis of NHL presents with drug reaction (rash/fever/throat closing) and bilateral leg pain.    #Anaphylactic reaction - suspected 2/2 Bactrim or Acyclovir  -symptoms resolved  -f/u with onc need for reinitiating ppx abx (bactrim/acyclovir)---no px needed on discharge per heme/onc  -BP improving (101-116/57-76)    #NHL  -f/u with oncology (Dr. Rider), pt has an appointment  -last chemo 4/25    #Leg pain - bilateral calf  -Recent Dx of NHL  -f/u venous duplex this morning    #HTN  -bp meds on hold 2/2 hypotension on presentation  -restart bp meds as tolerated    #DM  -basal bolus insulin regimen  -FS range: 177-253    #COPD  -stable  -O2 + Nebs PRN    #DVT PPx  -c/w Lovenox    full code    anticipate likely d/c home in am if no complications from next dose of acyclovir and bactrim .

## 2018-05-09 NOTE — PROGRESS NOTE ADULT - SUBJECTIVE AND OBJECTIVE BOX
SUBJECTIVE:    Patient is a 57y old Male who presents with a chief complaint of rash, nausea, hypotension, lightheadedness (08 May 2018 03:34)    Currently admitted to the floor with principal diagnosis of drug reaction.    Today is hospital day 1d. This morning he is resting comfortably in bed and reports no new issues or overnight events.     PAST MEDICAL & SURGICAL HISTORY  Pacemaker  Non Hodgkin's lymphoma  Chronic obstructive pulmonary disease, unspecified COPD type  DM (diabetes mellitus)  High cholesterol  HTN (hypertension)  Artificial cardiac pacemaker    SOCIAL HISTORY:  Negative for smoking/alcohol/drug use.     ALLERGIES:  No Known Allergies    MEDICATIONS:  STANDING MEDICATIONS  ALBUTerol/ipratropium for Nebulization. 3 milliLiter(s) Nebulizer once  dextrose 5%. 1000 milliLiter(s) IV Continuous <Continuous>  dextrose 50% Injectable 12.5 Gram(s) IV Push once  dextrose 50% Injectable 25 Gram(s) IV Push once  dextrose 50% Injectable 25 Gram(s) IV Push once  diphenhydrAMINE   Capsule 25 milliGRAM(s) Oral every 8 hours  enoxaparin Injectable 40 milliGRAM(s) SubCutaneous daily  insulin glargine Injectable (LANTUS) 15 Unit(s) SubCutaneous every morning  insulin lispro (HumaLOG) corrective regimen sliding scale   SubCutaneous three times a day before meals  insulin lispro Injectable (HumaLOG) 5 Unit(s) SubCutaneous three times a day before meals  simvastatin 40 milliGRAM(s) Oral at bedtime    PRN MEDICATIONS  acetaminophen   Tablet 650 milliGRAM(s) Oral every 6 hours PRN  acetaminophen   Tablet. 650 milliGRAM(s) Oral every 6 hours PRN  ALBUTerol    90 MICROgram(s) HFA Inhaler 2 Puff(s) Inhalation every 6 hours PRN  dextrose Gel 1 Dose(s) Oral once PRN  glucagon  Injectable 1 milliGRAM(s) IntraMuscular once PRN    VITALS:   T(F): 97.5  HR: 74  BP: 116/76  RR: 18  SpO2: 96%    LABS:                        14.8   10.28 )-----------( 158      ( 07 May 2018 18:14 )             44.0     05-07    137  |  100  |  13  ----------------------------<  203<H>  4.5   |  19  |  1.0    Ca    9.1      07 May 2018 18:14    TPro  6.5  /  Alb  4.1  /  TBili  1.8<H>  /  DBili  x   /  AST  27  /  ALT  20  /  AlkPhos  55  05-07      Urinalysis Basic - ( 07 May 2018 19:40 )    Color: Yellow / Appearance: Clear / SG: >=1.030 / pH: x  Gluc: x / Ketone: 40  / Bili: Negative / Urobili: 0.2   Blood: x / Protein: Negative / Nitrite: Negative   Leuk Esterase: Negative / RBC: x / WBC x   Sq Epi: x / Non Sq Epi: x / Bacteria: x    Culture - Urine (collected 07 May 2018 19:40)  Source: .Urine Clean Catch (Midstream)  Final Report (08 May 2018 21:01):    <10,000 CFU/ml Normal Urogenital amarilys present    Culture - Blood (collected 07 May 2018 19:17)  Source: .Blood Blood  Preliminary Report (09 May 2018 03:01):    No growth to date.    Culture - Blood (collected 07 May 2018 19:17)  Source: .Blood Blood  Preliminary Report (09 May 2018 01:02):    No growth to date.      CARDIAC MARKERS ( 07 May 2018 18:14 )  x     / x     / 59 U/L / x     / x          RADIOLOGY:    PHYSICAL EXAM:  GEN: No acute distress  LUNGS: Clear to auscultation bilaterally   HEART: S1/S2 present. RRR.   ABD: Soft, non-tender, non-distended. Bowel sounds present  EXT: b/l leg pain /w tenderness on palpation  NEURO: AAOX3

## 2018-05-09 NOTE — PROGRESS NOTE ADULT - SUBJECTIVE AND OBJECTIVE BOX
LONG GEE  57y  Male      Patient is a 57y old  Male who presents with a chief complaint of rash, nausea, hypotension, lightheadedness (08 May 2018 03:34)      INTERVAL HPI/OVERNIGHT EVENTS: c/o a bit of sore throat but no dysphagia. unsure if his voice is a bit more hoarse than baseline. denies itching or hives anywhere. no dizziness      REVIEW OF SYSTEMS:  as above  All other review of systems negative    T(C): 36 (05-09-18 @ 12:30), Max: 36.4 (05-09-18 @ 05:40)  HR: 78 (05-09-18 @ 12:30) (74 - 81)  BP: 118/72 (05-09-18 @ 12:30) (116/76 - 120/65)  RR: 18 (05-09-18 @ 12:30) (18 - 18)  SpO2: 96% (05-08-18 @ 21:38) (96% - 96%)  Wt(kg): --Vital Signs Last 24 Hrs  T(C): 36 (09 May 2018 12:30), Max: 36.4 (09 May 2018 05:40)  T(F): 96.8 (09 May 2018 12:30), Max: 97.5 (09 May 2018 05:40)  HR: 78 (09 May 2018 12:30) (74 - 81)  BP: 118/72 (09 May 2018 12:30) (116/76 - 120/65)  BP(mean): --  RR: 18 (09 May 2018 12:30) (18 - 18)  SpO2: 96% (08 May 2018 21:38) (96% - 96%)        PHYSICAL EXAM:  GENERAL: NAD  PSYCH: no agitation, baseline mentation  HEENT: patent airway  NERVOUS SYSTEM:  Alert & Oriented X3, no new focal deficits  PULMONARY: Clear to percussion bilaterally; No rales, rhonchi, wheezing, or rubs/ NO STRIDOR  CARDIOVASCULAR: Regular rate and rhythm; No murmurs, rubs, or gallops  GI: Soft, Nontender, Nondistended; Bowel sounds present  EXTREMITIES:  2+ Peripheral Pulses, No clubbing, cyanosis, or edema  SKIN no hives, R neck scar    Consultant(s) Notes Reviewed:  [x ] YES  [ ] NO    Discussed with Consultants/Other Providers [ x] YES     LABS              Urinalysis Basic - ( 07 May 2018 19:40 )    Color: Yellow / Appearance: Clear / SG: >=1.030 / pH: x  Gluc: x / Ketone: 40  / Bili: Negative / Urobili: 0.2   Blood: x / Protein: Negative / Nitrite: Negative   Leuk Esterase: Negative / RBC: x / WBC x   Sq Epi: x / Non Sq Epi: x / Bacteria: x        Lactate Trend        CAPILLARY BLOOD GLUCOSE  86 (09 May 2018 16:33)            RADIOLOGY & ADDITIONAL TESTS:    Imaging Personally Reviewed:  [ ] YES  [ ] NO    HEALTH ISSUES - PROBLEM Dx:

## 2018-05-09 NOTE — PROGRESS NOTE ADULT - ASSESSMENT
58 yo man with h/o NHL (followed by Dr. Rider and on chemotherapy---last april 25th) who p/w new onset fever and erythematous rash after second series of acylovir and bactrim (given at home)      #Anaphylactic reaction- to ? bactrim (sulfa) vs acyclovir  c/w supportive care benadryl/pepcid  +short course lowdose prednisone  epi pen for home, CM in am  will need outpatient allergy/immunology testing     #NHL-  f/u with oncology (Dr. Rider)  -last chemo 4/25    #leg pain- bilateral calf  -recently diagnosed with NHL-  -will check doppler to r/o DVT pending    #HTN- bp meds on hold 2/2 hypotension on presentation  -restart bp meds as tolerated    #DM- monitor fs and cont insulin    #copd- stable  -cont o2 suppl and nebs prn    #DVT/GI ppx    full code    likely d/c tomorrow if us duplex negative

## 2018-05-10 ENCOUNTER — TRANSCRIPTION ENCOUNTER (OUTPATIENT)
Age: 58
End: 2018-05-10

## 2018-05-10 VITALS — TEMPERATURE: 97 F | RESPIRATION RATE: 19 BRPM

## 2018-05-10 RX ORDER — EPINEPHRINE 0.3 MG/.3ML
0.3 INJECTION INTRAMUSCULAR; SUBCUTANEOUS
Qty: 1 | Refills: 0
Start: 2018-05-10 | End: 2018-05-11

## 2018-05-10 RX ORDER — EPINEPHRINE 0.3 MG/.3ML
0.3 INJECTION INTRAMUSCULAR; SUBCUTANEOUS
Qty: 1 | Refills: 0 | OUTPATIENT
Start: 2018-05-10 | End: 2018-05-11

## 2018-05-10 RX ORDER — AZTREONAM 2 G
1 VIAL (EA) INJECTION
Qty: 0 | Refills: 0 | COMMUNITY

## 2018-05-10 RX ORDER — DIPHENHYDRAMINE HCL 50 MG
1 CAPSULE ORAL
Qty: 0 | Refills: 0 | COMMUNITY
Start: 2018-05-10

## 2018-05-10 RX ORDER — FAMOTIDINE 10 MG/ML
1 INJECTION INTRAVENOUS
Qty: 60 | Refills: 0 | OUTPATIENT
Start: 2018-05-10 | End: 2018-06-08

## 2018-05-10 RX ORDER — ACYCLOVIR SODIUM 500 MG
0 VIAL (EA) INTRAVENOUS
Qty: 0 | Refills: 0 | COMMUNITY

## 2018-05-10 RX ADMIN — Medication 5 UNIT(S): at 12:02

## 2018-05-10 RX ADMIN — FAMOTIDINE 20 MILLIGRAM(S): 10 INJECTION INTRAVENOUS at 05:20

## 2018-05-10 RX ADMIN — Medication 5 UNIT(S): at 08:00

## 2018-05-10 RX ADMIN — Medication 1: at 08:00

## 2018-05-10 RX ADMIN — Medication 20 MILLIGRAM(S): at 05:19

## 2018-05-10 RX ADMIN — ENOXAPARIN SODIUM 40 MILLIGRAM(S): 100 INJECTION SUBCUTANEOUS at 12:02

## 2018-05-10 RX ADMIN — Medication 25 MILLIGRAM(S): at 05:20

## 2018-05-10 RX ADMIN — INSULIN GLARGINE 15 UNIT(S): 100 INJECTION, SOLUTION SUBCUTANEOUS at 07:59

## 2018-05-10 RX ADMIN — Medication 25 MILLIGRAM(S): at 12:02

## 2018-05-10 RX ADMIN — Medication 3: at 12:01

## 2018-05-10 NOTE — DISCHARGE NOTE ADULT - CARE PROVIDERS DIRECT ADDRESSES
[email or web address],DirectAddress_Unknown,DirectAddress_Unknown

## 2018-05-10 NOTE — DISCHARGE NOTE ADULT - MEDICATION SUMMARY - MEDICATIONS TO TAKE
I will START or STAY ON the medications listed below when I get home from the hospital:    acetaminophen 325 mg oral capsule  -- 2 cap(s) by mouth every 6 hours, As Needed   -- Indication: For Leg pain, bilateral    Roxicodone 5 mg oral tablet  -- 1 tab(s) by mouth every 6 hours, As Needed -for severe pain MDD:20 mg  -- Caution federal law prohibits the transfer of this drug to any person other  than the person for whom it was prescribed.  It is very important that you take or use this exactly as directed.  Do not skip doses or discontinue unless directed by your doctor.  May cause drowsiness.  Alcohol may intensify this effect.  Use care when operating dangerous machinery.  This prescription cannot be refilled.  Using more of this medication than prescribed may cause serious breathing problems.    -- Indication: For Leg pain, bilateral    enalapril  -- 20 milligram(s) by mouth once a day  -- Indication: For HTN    pioglitazone  -- 45 milligram(s) by mouth once a day  -- Indication: For DM    metFORMIN  -- 1000 milligram(s) by mouth 2 times a day  -- Indication: For DM    Jardiance 25 mg oral tablet  -- 1 tab(s) by mouth once a day (in the morning)  -- Indication: For DM    simvastatin  -- 40 milligram(s) by mouth once a day (at bedtime)  -- Indication: For DLD    ProAir HFA 90 mcg/inh inhalation aerosol  -- 2 puff(s) inhaled 4 times a day, As Needed  -- Indication: For COPD    EpiPen 2-Emmett 0.3 mg injectable kit  -- 0.3 milligram(s) intramuscularly prn   -- Obtain medical advice before taking any non-prescription drugs as some may affect the action of this medication.    -- Indication: For Medication reaction    famotidine 20 mg oral tablet  -- 1 tab(s) by mouth 2 times a day  -- Indication: For Medication reaction I will START or STAY ON the medications listed below when I get home from the hospital:    acetaminophen 325 mg oral capsule  -- 2 cap(s) by mouth every 6 hours, As Needed   -- Indication: For Leg pain, bilateral    Roxicodone 5 mg oral tablet  -- 1 tab(s) by mouth every 6 hours, As Needed -for severe pain MDD:20 mg  -- Caution federal law prohibits the transfer of this drug to any person other  than the person for whom it was prescribed.  It is very important that you take or use this exactly as directed.  Do not skip doses or discontinue unless directed by your doctor.  May cause drowsiness.  Alcohol may intensify this effect.  Use care when operating dangerous machinery.  This prescription cannot be refilled.  Using more of this medication than prescribed may cause serious breathing problems.    -- Indication: For Leg pain, bilateral    enalapril  -- 20 milligram(s) by mouth once a day  -- Indication: For HTN    pioglitazone  -- 45 milligram(s) by mouth once a day  -- Indication: For DM    metFORMIN  -- 1000 milligram(s) by mouth 2 times a day  -- Indication: For DM    Jardiance 25 mg oral tablet  -- 1 tab(s) by mouth once a day (in the morning)  -- Indication: For DM    diphenhydrAMINE 25 mg oral capsule  -- 1 cap(s) by mouth every 8 hours, As Needed for allergy symptoms  -- Indication: For Anaphylactic reaction due to adverse effect of correct drug or medicament properly administered, initial encounter    simvastatin  -- 40 milligram(s) by mouth once a day (at bedtime)  -- Indication: For DLD    ProAir HFA 90 mcg/inh inhalation aerosol  -- 2 puff(s) inhaled 4 times a day, As Needed  -- Indication: For COPD    EpiPen 2-Emmett 0.3 mg injectable kit  -- 0.3 milligram(s) intramuscularly prn as instructed for anaphylactic rxn  -- Obtain medical advice before taking any non-prescription drugs as some may affect the action of this medication.    -- Indication: For Anaphylactic reaction due to adverse effect of correct drug or medicament properly administered, initial encounter    famotidine 20 mg oral tablet  -- 1 tab(s) by mouth 2 times a day  -- Indication: For Medication reaction

## 2018-05-10 NOTE — DISCHARGE NOTE ADULT - MEDICATION SUMMARY - MEDICATIONS TO STOP TAKING
I will STOP taking the medications listed below when I get home from the hospital:    acyclovir    Bactrim  mg-160 mg oral tablet  -- 1  by mouth 3 times a week

## 2018-05-10 NOTE — DISCHARGE NOTE ADULT - PLAN OF CARE
Stop taking Bactrim and Acyclovir.  Use your Epipen as instructed in case of a future anaphylactic reactions. Prevent future Allergic reactions

## 2018-05-10 NOTE — DISCHARGE NOTE ADULT - PATIENT PORTAL LINK FT
You can access the [facility] Patient Portal, offered by [facility], by registering with the following website: [email or web address]

## 2018-05-10 NOTE — DISCHARGE NOTE ADULT - CARE PLAN
Principal Discharge DX:	Anaphylactic reaction due to adverse effect of correct drug or medicament properly administered, initial encounter  Goal:	Prevent future Allergic reactions  Assessment and plan of treatment:	Stop taking Bactrim and Acyclovir.  Use your Epipen as instructed in case of a future anaphylactic reactions.

## 2018-05-10 NOTE — DISCHARGE NOTE ADULT - CARE PROVIDER_API CALL
Tarun Rider), Internal Medicine; Medical Oncology  [address] [place] [address]  Phone: [phone]  Fax: [phone]    Demarcus Herron), Family Medicine  [address] [place] [address]  Phone: [phone]  Fax: [phone]    Elidia James), Allergy and Immunology; Pediatrics  [address] [place] [address]  Phone: [phone]  Fax: [phone]

## 2018-05-10 NOTE — DISCHARGE NOTE ADULT - PRINCIPAL DIAGNOSIS
Anaphylactic reaction due to adverse effect of correct drug or medicament properly administered, initial encounter

## 2018-05-10 NOTE — DISCHARGE NOTE ADULT - HOSPITAL COURSE
57-year-old male with COPD, DM, HTN, pacemaker, recently diagnosed with non-hodgkin's lymphoma, p/w one day of diffuse rash over b/l UE, chest, face, back a/w nausea, fever (101), feeling of throat closing, pain in b/l legs. Patient started chemo on 4/25, also was started on Bactrim + Acyclovir for PPx, received 4th dose one day prior to onset of symptoms. In the ED patient was hypotensive, received 2L IVF, solumedrol IV, benadryl IM and 2x epinephrine. His symptoms improved and he was admitted to the medical floor. 57-year-old male with COPD, DM, HTN, pacemaker, recently diagnosed with non-hodgkin's lymphoma, PTED 5/8 /w one day of diffuse rash over b/l UE, chest, face, back a/w nausea, fever (101), feeling of throat closing, pain in b/l legs. Patient started chemo on 4/25, also was started on Bactrim + Acyclovir for PPx, received 4th dose one day prior to onset of symptoms. In the ED patient was hypotensive, received 2L IVF, solumedrol IV, benadryl IM and 2x epinephrine. His symptoms improved and he was admitted to the medical floor.    Patient had an unremarkable stay on the floor. Vital signs were stable throughout. He was consistently ambulatory, tolerated PO, had good urine output and regular BMs. He received home meds, benadryl and pepcid throughout. Low-dose prednisone PO was added on the day prior to discharge. Patient's leg pain improved while on the floor. Venous duplex of b/l lower extremity was negative.    Patient was examined at bedside on the day of discharge. He is breathing comfortably on room air, no new complaints and no acute events overnight. He reports mild residual bilateral leg pain. Patient has been deemed stable for discharge by the medical team on the floor. He is going home with [facility]. He will f/u with allergy/immunology outpatient for chemical sensitivity testing. 57-year-old male with COPD, DM, HTN, pacemaker, recently diagnosed with non-hodgkin's lymphoma, PTED 5/8 /w one day of diffuse rash over b/l UE, chest, face, back a/w nausea, fever (101), feeling of throat closing, pain in b/l legs. Patient started chemo on 4/25, also was started on Bactrim + Acyclovir for PPx, received 4th dose one day prior to onset of symptoms. In the ED patient was hypotensive, received 2L IVF, solumedrol IV, benadryl IM and 2x epinephrine. CXR was negative. Symptoms improved and he was admitted to the medical floor.    Patient had an unremarkable stay on the floor. Vital signs were stable throughout. He was consistently ambulatory, tolerated PO, had good urine output and regular BMs. He received home meds, benadryl and pepcid throughout. Low-dose prednisone PO was added on the day prior to discharge. Patient's leg pain improved while on the floor. Venous duplex of b/l lower extremity was negative.    Patient was examined at bedside on the day of discharge. He is breathing comfortably on room air, no new complaints and no acute events overnight. He reports mild residual bilateral leg pain. Patient has been deemed stable for discharge by the medical team on the floor. He is going home with [facility]. He will f/u with allergy/immunology outpatient for chemical sensitivity testing. 57-year-old male with COPD, DM, HTN, pacemaker, recently diagnosed with non-hodgkin's lymphoma, presented on 5/8 /w one day of diffuse rash over b/l UE, chest, face, back a/w nausea, fever (101), feeling of throat closing, pain in b/l legs. Patient started chemo on 4/25, also was started on Bactrim + Acyclovir for PPx, received 4th dose one day prior to onset of symptoms. In the ED patient was hypotensive, received 2L IVF, solumedrol IV, benadryl IM and 2x epinephrine. CXR was negative. Symptoms improved and he was admitted to the medical floor.    Patient had an unremarkable stay on the floor. Vital signs were stable throughout. He was consistently ambulatory, tolerated PO, had good urine output and regular BMs. He received home meds, benadryl and pepcid throughout. Low-dose prednisone PO was added on the day prior to discharge. Patient's leg pain improved while on the floor. Venous duplex of b/l lower extremity was negative.    Patient was examined at bedside on the day of discharge. He is breathing comfortably on room air, no new complaints and no acute events overnight. He reports mild residual bilateral leg pain, a venous duplex study was negative. Patient has been deemed stable for discharge by the medical team on the floor. He is going home with [facility]. He will f/u with allergy/immunology outpatient for chemical sensitivity testing.

## 2018-05-13 LAB
CULTURE RESULTS: SIGNIFICANT CHANGE UP
CULTURE RESULTS: SIGNIFICANT CHANGE UP
SPECIMEN SOURCE: SIGNIFICANT CHANGE UP
SPECIMEN SOURCE: SIGNIFICANT CHANGE UP

## 2018-05-14 DIAGNOSIS — Z79.4 LONG TERM (CURRENT) USE OF INSULIN: ICD-10-CM

## 2018-05-14 DIAGNOSIS — G89.3 NEOPLASM RELATED PAIN (ACUTE) (CHRONIC): ICD-10-CM

## 2018-05-14 DIAGNOSIS — Z95.0 PRESENCE OF CARDIAC PACEMAKER: ICD-10-CM

## 2018-05-14 DIAGNOSIS — T88.6XXA ANAPHYLACTIC REACTION DUE TO ADVERSE EFFECT OF CORRECT DRUG OR MEDICAMENT PROPERLY ADMINISTERED, INITIAL ENCOUNTER: ICD-10-CM

## 2018-05-14 DIAGNOSIS — C85.90 NON-HODGKIN LYMPHOMA, UNSPECIFIED, UNSPECIFIED SITE: ICD-10-CM

## 2018-05-14 DIAGNOSIS — E66.8 OTHER OBESITY: ICD-10-CM

## 2018-05-14 DIAGNOSIS — R21 RASH AND OTHER NONSPECIFIC SKIN ERUPTION: ICD-10-CM

## 2018-05-14 DIAGNOSIS — I11.0 HYPERTENSIVE HEART DISEASE WITH HEART FAILURE: ICD-10-CM

## 2018-05-14 DIAGNOSIS — Z80.9 FAMILY HISTORY OF MALIGNANT NEOPLASM, UNSPECIFIED: ICD-10-CM

## 2018-05-14 DIAGNOSIS — Z80.3 FAMILY HISTORY OF MALIGNANT NEOPLASM OF BREAST: ICD-10-CM

## 2018-05-14 DIAGNOSIS — E78.00 PURE HYPERCHOLESTEROLEMIA, UNSPECIFIED: ICD-10-CM
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
## 2018-05-14 DIAGNOSIS — J44.9 CHRONIC OBSTRUCTIVE PULMONARY DISEASE, UNSPECIFIED: ICD-10-CM

## 2018-05-14 DIAGNOSIS — T37.5X5A ADVERSE EFFECT OF ANTIVIRAL DRUGS, INITIAL ENCOUNTER: ICD-10-CM

## 2018-05-14 DIAGNOSIS — L27.0 GENERALIZED SKIN ERUPTION DUE TO DRUGS AND MEDICAMENTS TAKEN INTERNALLY: ICD-10-CM

## 2018-05-14 DIAGNOSIS — E11.9 TYPE 2 DIABETES MELLITUS WITHOUT COMPLICATIONS: ICD-10-CM

## 2018-05-14 DIAGNOSIS — I95.9 HYPOTENSION, UNSPECIFIED: ICD-10-CM

## 2018-05-15 ENCOUNTER — LABORATORY RESULT (OUTPATIENT)
Age: 58
End: 2018-05-15

## 2018-05-15 ENCOUNTER — APPOINTMENT (OUTPATIENT)
Dept: HEMATOLOGY ONCOLOGY | Facility: CLINIC | Age: 58
End: 2018-05-15

## 2018-05-15 VITALS
SYSTOLIC BLOOD PRESSURE: 152 MMHG | DIASTOLIC BLOOD PRESSURE: 92 MMHG | HEART RATE: 97 BPM | RESPIRATION RATE: 18 BRPM | BODY MASS INDEX: 26.56 KG/M2 | WEIGHT: 207 LBS | TEMPERATURE: 96.2 F | HEIGHT: 74 IN

## 2018-05-15 LAB
HCT VFR BLD CALC: 47.1 %
HGB BLD-MCNC: 15.6 G/DL
MCHC RBC-ENTMCNC: 29.1 PG
MCHC RBC-ENTMCNC: 33.1 G/DL
MCV RBC AUTO: 87.9 FL
PLATELET # BLD AUTO: 213 K/UL
PMV BLD: 10.4 FL
RBC # BLD: 5.36 M/UL
RBC # FLD: 13.5 %
WBC # FLD AUTO: 6.87 K/UL

## 2018-05-16 LAB
ALBUMIN SERPL ELPH-MCNC: 4.6 G/DL
ALP BLD-CCNC: 60 U/L
ALT SERPL-CCNC: 27 U/L
ANION GAP SERPL CALC-SCNC: 16 MMOL/L
AST SERPL-CCNC: 18 U/L
BILIRUB SERPL-MCNC: 2.5 MG/DL
BUN SERPL-MCNC: 21 MG/DL
CALCIUM SERPL-MCNC: 10.2 MG/DL
CHLORIDE SERPL-SCNC: 99 MMOL/L
CO2 SERPL-SCNC: 24 MMOL/L
CREAT SERPL-MCNC: 1.1 MG/DL
GLUCOSE SERPL-MCNC: 221 MG/DL
POTASSIUM SERPL-SCNC: 4.5 MMOL/L
PROT SERPL-MCNC: 7.3 G/DL
SODIUM SERPL-SCNC: 139 MMOL/L

## 2018-05-24 ENCOUNTER — LABORATORY RESULT (OUTPATIENT)
Age: 58
End: 2018-05-24

## 2018-05-24 ENCOUNTER — APPOINTMENT (OUTPATIENT)
Dept: HEMATOLOGY ONCOLOGY | Facility: CLINIC | Age: 58
End: 2018-05-24

## 2018-05-24 ENCOUNTER — APPOINTMENT (OUTPATIENT)
Dept: INFUSION THERAPY | Facility: CLINIC | Age: 58
End: 2018-05-24

## 2018-05-24 LAB
HCT VFR BLD CALC: 43.6 %
HGB BLD-MCNC: 14.7 G/DL
MCHC RBC-ENTMCNC: 29.6 PG
MCHC RBC-ENTMCNC: 33.7 G/DL
MCV RBC AUTO: 87.9 FL
PLATELET # BLD AUTO: 100 K/UL
PMV BLD: 11.2 FL
RBC # BLD: 4.96 M/UL
RBC # FLD: 13.2 %
WBC # FLD AUTO: 4.21 K/UL

## 2018-05-24 RX ORDER — BENDAMUSTINE HYDROCHLORIDE 100 MG/20ML
160 INJECTION, POWDER, LYOPHILIZED, FOR SOLUTION INTRAVENOUS ONCE
Qty: 0 | Refills: 0 | Status: COMPLETED | OUTPATIENT
Start: 2018-05-24 | End: 2018-05-24

## 2018-05-24 RX ORDER — DIPHENHYDRAMINE HCL 50 MG
50 CAPSULE ORAL ONCE
Qty: 0 | Refills: 0 | Status: COMPLETED | OUTPATIENT
Start: 2018-05-24 | End: 2018-05-24

## 2018-05-24 RX ORDER — DEXAMETHASONE 0.5 MG/5ML
8 ELIXIR ORAL ONCE
Qty: 0 | Refills: 0 | Status: COMPLETED | OUTPATIENT
Start: 2018-05-24 | End: 2018-05-24

## 2018-05-24 RX ORDER — ACETAMINOPHEN 500 MG
650 TABLET ORAL ONCE
Qty: 0 | Refills: 0 | Status: COMPLETED | OUTPATIENT
Start: 2018-05-24 | End: 2018-05-24

## 2018-05-24 RX ORDER — RITUXIMAB 10 MG/ML
850 INJECTION, SOLUTION INTRAVENOUS ONCE
Qty: 0 | Refills: 0 | Status: COMPLETED | OUTPATIENT
Start: 2018-05-24 | End: 2018-05-24

## 2018-05-24 RX ADMIN — BENDAMUSTINE HYDROCHLORIDE 310.68 MILLIGRAM(S): 100 INJECTION, POWDER, LYOPHILIZED, FOR SOLUTION INTRAVENOUS at 10:55

## 2018-05-24 RX ADMIN — Medication 153 MILLIGRAM(S): at 10:08

## 2018-05-24 RX ADMIN — Medication 650 MILLIGRAM(S): at 10:08

## 2018-05-24 RX ADMIN — RITUXIMAB 170 MILLIGRAM(S): 10 INJECTION, SOLUTION INTRAVENOUS at 10:56

## 2018-05-24 RX ADMIN — Medication 650 MILLIGRAM(S): at 10:07

## 2018-05-24 RX ADMIN — Medication 176.4 MILLIGRAM(S): at 10:07

## 2018-05-25 ENCOUNTER — APPOINTMENT (OUTPATIENT)
Dept: INFUSION THERAPY | Facility: CLINIC | Age: 58
End: 2018-05-25

## 2018-05-25 RX ORDER — DEXAMETHASONE 0.5 MG/5ML
8 ELIXIR ORAL ONCE
Qty: 0 | Refills: 0 | Status: COMPLETED | OUTPATIENT
Start: 2018-05-25 | End: 2018-05-25

## 2018-05-25 RX ORDER — DIPHENHYDRAMINE HCL 50 MG
50 CAPSULE ORAL ONCE
Qty: 0 | Refills: 0 | Status: COMPLETED | OUTPATIENT
Start: 2018-05-25 | End: 2018-05-25

## 2018-05-25 RX ORDER — BENDAMUSTINE HYDROCHLORIDE 100 MG/20ML
160 INJECTION, POWDER, LYOPHILIZED, FOR SOLUTION INTRAVENOUS ONCE
Qty: 0 | Refills: 0 | Status: COMPLETED | OUTPATIENT
Start: 2018-05-25 | End: 2018-05-25

## 2018-05-25 RX ORDER — ACETAMINOPHEN 500 MG
650 TABLET ORAL ONCE
Qty: 0 | Refills: 0 | Status: COMPLETED | OUTPATIENT
Start: 2018-05-25 | End: 2018-05-25

## 2018-05-25 RX ADMIN — Medication 176.4 MILLIGRAM(S): at 13:04

## 2018-05-25 RX ADMIN — Medication 650 MILLIGRAM(S): at 13:05

## 2018-05-25 RX ADMIN — Medication 153 MILLIGRAM(S): at 13:04

## 2018-05-25 RX ADMIN — BENDAMUSTINE HYDROCHLORIDE 310.68 MILLIGRAM(S): 100 INJECTION, POWDER, LYOPHILIZED, FOR SOLUTION INTRAVENOUS at 13:40

## 2018-06-12 ENCOUNTER — APPOINTMENT (OUTPATIENT)
Dept: HEMATOLOGY ONCOLOGY | Facility: CLINIC | Age: 58
End: 2018-06-12

## 2018-06-12 ENCOUNTER — LABORATORY RESULT (OUTPATIENT)
Age: 58
End: 2018-06-12

## 2018-06-12 VITALS
HEART RATE: 76 BPM | HEIGHT: 74 IN | BODY MASS INDEX: 27.21 KG/M2 | RESPIRATION RATE: 18 BRPM | TEMPERATURE: 97.9 F | SYSTOLIC BLOOD PRESSURE: 119 MMHG | WEIGHT: 212 LBS | DIASTOLIC BLOOD PRESSURE: 78 MMHG

## 2018-06-12 RX ORDER — SULFAMETHOXAZOLE AND TRIMETHOPRIM 400; 80 MG/1; MG/1
400-80 TABLET ORAL TWICE DAILY
Qty: 24 | Refills: 0 | Status: DISCONTINUED | COMMUNITY
Start: 2018-04-20 | End: 2018-06-12

## 2018-06-12 RX ORDER — ALLOPURINOL 300 MG/1
300 TABLET ORAL
Qty: 30 | Refills: 0 | Status: DISCONTINUED | COMMUNITY
Start: 2018-04-20 | End: 2018-06-12

## 2018-06-12 RX ORDER — ACYCLOVIR 400 MG/1
400 TABLET ORAL TWICE DAILY
Qty: 24 | Refills: 4 | Status: DISCONTINUED | COMMUNITY
Start: 2018-04-20 | End: 2018-06-12

## 2018-06-13 LAB
HCT VFR BLD CALC: 44.3 %
HGB BLD-MCNC: 15 G/DL
MCHC RBC-ENTMCNC: 29.6 PG
MCHC RBC-ENTMCNC: 33.9 G/DL
MCV RBC AUTO: 87.4 FL
PLATELET # BLD AUTO: 220 K/UL
PMV BLD: 11.1 FL
RBC # BLD: 5.07 M/UL
RBC # FLD: 13.3 %
WBC # FLD AUTO: 4.04 K/UL

## 2018-06-21 ENCOUNTER — APPOINTMENT (OUTPATIENT)
Dept: INFUSION THERAPY | Facility: CLINIC | Age: 58
End: 2018-06-21

## 2018-06-21 ENCOUNTER — LABORATORY RESULT (OUTPATIENT)
Age: 58
End: 2018-06-21

## 2018-06-21 ENCOUNTER — APPOINTMENT (OUTPATIENT)
Dept: HEMATOLOGY ONCOLOGY | Facility: CLINIC | Age: 58
End: 2018-06-21

## 2018-06-21 LAB
ALBUMIN SERPL ELPH-MCNC: 4.2 G/DL
ALP BLD-CCNC: 47 U/L
ALT SERPL-CCNC: 20 U/L
ANION GAP SERPL CALC-SCNC: 14 MMOL/L
AST SERPL-CCNC: 18 U/L
BILIRUB SERPL-MCNC: 0.8 MG/DL
BUN SERPL-MCNC: 16 MG/DL
CALCIUM SERPL-MCNC: 8.6 MG/DL
CHLORIDE SERPL-SCNC: 102 MMOL/L
CO2 SERPL-SCNC: 21 MMOL/L
CREAT SERPL-MCNC: 0.9 MG/DL
GLUCOSE SERPL-MCNC: 296 MG/DL
MAGNESIUM SERPL-MCNC: 1.9 MG/DL
POTASSIUM SERPL-SCNC: 4.1 MMOL/L
PROT SERPL-MCNC: 6.3 G/DL
SODIUM SERPL-SCNC: 137 MMOL/L

## 2018-06-21 RX ORDER — DEXAMETHASONE 0.5 MG/5ML
8 ELIXIR ORAL ONCE
Qty: 0 | Refills: 0 | Status: COMPLETED | OUTPATIENT
Start: 2018-06-21 | End: 2018-06-21

## 2018-06-21 RX ORDER — ACETAMINOPHEN 500 MG
650 TABLET ORAL ONCE
Qty: 0 | Refills: 0 | Status: COMPLETED | OUTPATIENT
Start: 2018-06-21 | End: 2018-06-21

## 2018-06-21 RX ORDER — BENDAMUSTINE HYDROCHLORIDE 100 MG/20ML
128 INJECTION, POWDER, LYOPHILIZED, FOR SOLUTION INTRAVENOUS ONCE
Qty: 0 | Refills: 0 | Status: COMPLETED | OUTPATIENT
Start: 2018-06-21 | End: 2018-06-21

## 2018-06-21 RX ORDER — DIPHENHYDRAMINE HCL 50 MG
50 CAPSULE ORAL ONCE
Qty: 0 | Refills: 0 | Status: COMPLETED | OUTPATIENT
Start: 2018-06-21 | End: 2018-06-21

## 2018-06-21 RX ORDER — RITUXIMAB 10 MG/ML
850 INJECTION, SOLUTION INTRAVENOUS ONCE
Qty: 0 | Refills: 0 | Status: COMPLETED | OUTPATIENT
Start: 2018-06-21 | End: 2018-06-21

## 2018-06-21 RX ADMIN — BENDAMUSTINE HYDROCHLORIDE 308.52 MILLIGRAM(S): 100 INJECTION, POWDER, LYOPHILIZED, FOR SOLUTION INTRAVENOUS at 10:51

## 2018-06-21 RX ADMIN — Medication 153 MILLIGRAM(S): at 10:04

## 2018-06-21 RX ADMIN — Medication 650 MILLIGRAM(S): at 10:04

## 2018-06-21 RX ADMIN — Medication 650 MILLIGRAM(S): at 10:05

## 2018-06-21 RX ADMIN — Medication 176.4 MILLIGRAM(S): at 10:04

## 2018-06-21 RX ADMIN — RITUXIMAB 170 MILLIGRAM(S): 10 INJECTION, SOLUTION INTRAVENOUS at 10:51

## 2018-06-22 ENCOUNTER — APPOINTMENT (OUTPATIENT)
Dept: INFUSION THERAPY | Facility: CLINIC | Age: 58
End: 2018-06-22

## 2018-06-22 VITALS
RESPIRATION RATE: 18 BRPM | SYSTOLIC BLOOD PRESSURE: 132 MMHG | DIASTOLIC BLOOD PRESSURE: 58 MMHG | HEART RATE: 71 BPM | TEMPERATURE: 96.1 F

## 2018-06-22 RX ORDER — BENDAMUSTINE HYDROCHLORIDE 100 MG/20ML
128 INJECTION, POWDER, LYOPHILIZED, FOR SOLUTION INTRAVENOUS ONCE
Qty: 0 | Refills: 0 | Status: COMPLETED | OUTPATIENT
Start: 2018-06-22 | End: 2018-06-22

## 2018-06-22 RX ORDER — DEXAMETHASONE 0.5 MG/5ML
8 ELIXIR ORAL ONCE
Qty: 0 | Refills: 0 | Status: COMPLETED | OUTPATIENT
Start: 2018-06-22 | End: 2018-06-22

## 2018-06-22 RX ORDER — DIPHENHYDRAMINE HCL 50 MG
50 CAPSULE ORAL ONCE
Qty: 0 | Refills: 0 | Status: COMPLETED | OUTPATIENT
Start: 2018-06-22 | End: 2018-06-22

## 2018-06-22 RX ORDER — ACETAMINOPHEN 500 MG
650 TABLET ORAL ONCE
Qty: 0 | Refills: 0 | Status: COMPLETED | OUTPATIENT
Start: 2018-06-22 | End: 2018-06-22

## 2018-06-22 RX ADMIN — BENDAMUSTINE HYDROCHLORIDE 308.52 MILLIGRAM(S): 100 INJECTION, POWDER, LYOPHILIZED, FOR SOLUTION INTRAVENOUS at 10:35

## 2018-06-22 RX ADMIN — Medication 153 MILLIGRAM(S): at 10:02

## 2018-06-22 RX ADMIN — Medication 650 MILLIGRAM(S): at 10:01

## 2018-06-22 RX ADMIN — Medication 176.4 MILLIGRAM(S): at 10:02

## 2018-06-22 RX ADMIN — Medication 650 MILLIGRAM(S): at 10:35

## 2018-06-25 LAB
HCT VFR BLD CALC: 43.7 %
HGB BLD-MCNC: 14.9 G/DL
MCHC RBC-ENTMCNC: 29.7 PG
MCHC RBC-ENTMCNC: 34.1 G/DL
MCV RBC AUTO: 87.2 FL
PLATELET # BLD AUTO: 184 K/UL
PMV BLD: 11 FL
RBC # BLD: 5.01 M/UL
RBC # FLD: 13.2 %
WBC # FLD AUTO: 2.51 K/UL

## 2018-06-28 ENCOUNTER — TRANSCRIPTION ENCOUNTER (OUTPATIENT)
Age: 58
End: 2018-06-28

## 2018-07-16 ENCOUNTER — LABORATORY RESULT (OUTPATIENT)
Age: 58
End: 2018-07-16

## 2018-07-16 ENCOUNTER — APPOINTMENT (OUTPATIENT)
Dept: HEMATOLOGY ONCOLOGY | Facility: CLINIC | Age: 58
End: 2018-07-16

## 2018-07-16 VITALS
HEART RATE: 74 BPM | TEMPERATURE: 97.6 F | SYSTOLIC BLOOD PRESSURE: 150 MMHG | DIASTOLIC BLOOD PRESSURE: 84 MMHG | HEIGHT: 74 IN | RESPIRATION RATE: 18 BRPM | WEIGHT: 220 LBS | BODY MASS INDEX: 28.23 KG/M2

## 2018-07-17 LAB
ALBUMIN SERPL ELPH-MCNC: 4.6 G/DL
ALP BLD-CCNC: 61 U/L
ALT SERPL-CCNC: 27 U/L
ANION GAP SERPL CALC-SCNC: 15 MMOL/L
AST SERPL-CCNC: 21 U/L
BILIRUB SERPL-MCNC: 1.3 MG/DL
BUN SERPL-MCNC: 13 MG/DL
CALCIUM SERPL-MCNC: 9.6 MG/DL
CHLORIDE SERPL-SCNC: 99 MMOL/L
CK SERPL-CCNC: 82 U/L
CO2 SERPL-SCNC: 26 MMOL/L
CREAT SERPL-MCNC: 0.9 MG/DL
GLUCOSE SERPL-MCNC: 232 MG/DL
HCT VFR BLD CALC: 43 %
HGB BLD-MCNC: 15.1 G/DL
LDH SERPL-CCNC: 236 U/L
MCHC RBC-ENTMCNC: 30.2 PG
MCHC RBC-ENTMCNC: 35.1 G/DL
MCV RBC AUTO: 86 FL
PLATELET # BLD AUTO: 150 K/UL
PMV BLD: 11.5 FL
POTASSIUM SERPL-SCNC: 4.2 MMOL/L
PROT SERPL-MCNC: 6.9 G/DL
RBC # BLD: 5 M/UL
RBC # FLD: 12.8 %
SODIUM SERPL-SCNC: 140 MMOL/L
WBC # FLD AUTO: 6.92 K/UL

## 2018-07-18 ENCOUNTER — APPOINTMENT (OUTPATIENT)
Dept: INFUSION THERAPY | Facility: CLINIC | Age: 58
End: 2018-07-18

## 2018-07-19 ENCOUNTER — APPOINTMENT (OUTPATIENT)
Dept: INFUSION THERAPY | Facility: CLINIC | Age: 58
End: 2018-07-19

## 2018-07-19 PROBLEM — J44.9 CHRONIC OBSTRUCTIVE PULMONARY DISEASE, UNSPECIFIED: Chronic | Status: ACTIVE | Noted: 2018-04-04

## 2018-07-19 PROBLEM — C85.90 NON-HODGKIN LYMPHOMA, UNSPECIFIED, UNSPECIFIED SITE: Chronic | Status: ACTIVE | Noted: 2018-05-08

## 2018-07-19 PROBLEM — Z95.0 PRESENCE OF CARDIAC PACEMAKER: Chronic | Status: ACTIVE | Noted: 2018-05-08

## 2018-07-25 ENCOUNTER — OUTPATIENT (OUTPATIENT)
Dept: OUTPATIENT SERVICES | Facility: HOSPITAL | Age: 58
LOS: 1 days | Discharge: HOME | End: 2018-07-25

## 2018-07-25 DIAGNOSIS — C85.90 NON-HODGKIN LYMPHOMA, UNSPECIFIED, UNSPECIFIED SITE: ICD-10-CM

## 2018-07-25 DIAGNOSIS — Z95.0 PRESENCE OF CARDIAC PACEMAKER: Chronic | ICD-10-CM

## 2018-07-26 ENCOUNTER — OUTPATIENT (OUTPATIENT)
Dept: OUTPATIENT SERVICES | Facility: HOSPITAL | Age: 58
LOS: 1 days | Discharge: HOME | End: 2018-07-26

## 2018-07-26 DIAGNOSIS — C85.90 NON-HODGKIN LYMPHOMA, UNSPECIFIED, UNSPECIFIED SITE: ICD-10-CM

## 2018-07-26 DIAGNOSIS — Z95.0 PRESENCE OF CARDIAC PACEMAKER: Chronic | ICD-10-CM

## 2018-07-30 ENCOUNTER — APPOINTMENT (OUTPATIENT)
Dept: HEMATOLOGY ONCOLOGY | Facility: CLINIC | Age: 58
End: 2018-07-30

## 2018-07-30 ENCOUNTER — OUTPATIENT (OUTPATIENT)
Dept: OUTPATIENT SERVICES | Facility: HOSPITAL | Age: 58
LOS: 1 days | Discharge: HOME | End: 2018-07-30

## 2018-07-30 VITALS
RESPIRATION RATE: 18 BRPM | WEIGHT: 220 LBS | SYSTOLIC BLOOD PRESSURE: 151 MMHG | BODY MASS INDEX: 28.23 KG/M2 | TEMPERATURE: 97.6 F | HEIGHT: 74 IN | DIASTOLIC BLOOD PRESSURE: 84 MMHG | HEART RATE: 87 BPM

## 2018-07-30 DIAGNOSIS — C85.90 NON-HODGKIN LYMPHOMA, UNSPECIFIED, UNSPECIFIED SITE: ICD-10-CM

## 2018-07-30 DIAGNOSIS — Z95.0 PRESENCE OF CARDIAC PACEMAKER: Chronic | ICD-10-CM

## 2018-08-14 ENCOUNTER — OUTPATIENT (OUTPATIENT)
Dept: OUTPATIENT SERVICES | Facility: HOSPITAL | Age: 58
LOS: 1 days | Discharge: HOME | End: 2018-08-14

## 2018-08-14 ENCOUNTER — APPOINTMENT (OUTPATIENT)
Dept: PULMONOLOGY | Facility: CLINIC | Age: 58
End: 2018-08-14

## 2018-08-14 VITALS
HEIGHT: 74 IN | SYSTOLIC BLOOD PRESSURE: 121 MMHG | WEIGHT: 218 LBS | DIASTOLIC BLOOD PRESSURE: 76 MMHG | BODY MASS INDEX: 27.98 KG/M2 | HEART RATE: 101 BPM

## 2018-08-14 DIAGNOSIS — Z95.0 PRESENCE OF CARDIAC PACEMAKER: Chronic | ICD-10-CM

## 2018-08-22 ENCOUNTER — OUTPATIENT (OUTPATIENT)
Dept: OUTPATIENT SERVICES | Facility: HOSPITAL | Age: 58
LOS: 1 days | Discharge: HOME | End: 2018-08-22

## 2018-08-22 DIAGNOSIS — I50.9 HEART FAILURE, UNSPECIFIED: ICD-10-CM

## 2018-08-22 DIAGNOSIS — Z95.0 PRESENCE OF CARDIAC PACEMAKER: Chronic | ICD-10-CM

## 2018-08-27 ENCOUNTER — APPOINTMENT (OUTPATIENT)
Dept: HEMATOLOGY ONCOLOGY | Facility: CLINIC | Age: 58
End: 2018-08-27

## 2018-08-27 VITALS
DIASTOLIC BLOOD PRESSURE: 84 MMHG | RESPIRATION RATE: 18 BRPM | WEIGHT: 218 LBS | HEART RATE: 79 BPM | TEMPERATURE: 96 F | SYSTOLIC BLOOD PRESSURE: 135 MMHG | HEIGHT: 74 IN | BODY MASS INDEX: 27.98 KG/M2

## 2018-08-31 ENCOUNTER — APPOINTMENT (OUTPATIENT)
Dept: INFUSION THERAPY | Facility: CLINIC | Age: 58
End: 2018-08-31

## 2018-08-31 ENCOUNTER — LABORATORY RESULT (OUTPATIENT)
Age: 58
End: 2018-08-31

## 2018-08-31 RX ORDER — DEXAMETHASONE 0.5 MG/5ML
8 ELIXIR ORAL ONCE
Qty: 0 | Refills: 0 | Status: COMPLETED | OUTPATIENT
Start: 2018-08-31 | End: 2018-08-31

## 2018-08-31 RX ORDER — DIPHENHYDRAMINE HCL 50 MG
50 CAPSULE ORAL ONCE
Qty: 0 | Refills: 0 | Status: COMPLETED | OUTPATIENT
Start: 2018-08-31 | End: 2018-08-31

## 2018-08-31 RX ORDER — ACETAMINOPHEN 500 MG
650 TABLET ORAL ONCE
Qty: 0 | Refills: 0 | Status: COMPLETED | OUTPATIENT
Start: 2018-08-31 | End: 2018-08-31

## 2018-08-31 RX ORDER — RITUXIMAB 10 MG/ML
850 INJECTION, SOLUTION INTRAVENOUS ONCE
Qty: 0 | Refills: 0 | Status: COMPLETED | OUTPATIENT
Start: 2018-08-31 | End: 2018-08-31

## 2018-08-31 RX ADMIN — Medication 152.4 MILLIGRAM(S): at 09:16

## 2018-08-31 RX ADMIN — Medication 650 MILLIGRAM(S): at 09:16

## 2018-08-31 RX ADMIN — Medication 650 MILLIGRAM(S): at 09:17

## 2018-08-31 RX ADMIN — RITUXIMAB 170 MILLIGRAM(S): 10 INJECTION, SOLUTION INTRAVENOUS at 10:03

## 2018-08-31 RX ADMIN — Medication 153 MILLIGRAM(S): at 09:16

## 2018-09-04 LAB
ALBUMIN SERPL ELPH-MCNC: 4.4 G/DL
ALP BLD-CCNC: 56 U/L
ALT SERPL-CCNC: 23 U/L
ANION GAP SERPL CALC-SCNC: 15 MMOL/L
AST SERPL-CCNC: 19 U/L
BILIRUB SERPL-MCNC: 1.3 MG/DL
BUN SERPL-MCNC: 15 MG/DL
CALCIUM SERPL-MCNC: 9.5 MG/DL
CHLORIDE SERPL-SCNC: 99 MMOL/L
CO2 SERPL-SCNC: 23 MMOL/L
CREAT SERPL-MCNC: 0.9 MG/DL
GLUCOSE SERPL-MCNC: 321 MG/DL
HCT VFR BLD CALC: 44.4 %
HGB BLD-MCNC: 15.3 G/DL
MCHC RBC-ENTMCNC: 29.8 PG
MCHC RBC-ENTMCNC: 34.5 G/DL
MCV RBC AUTO: 86.4 FL
PLATELET # BLD AUTO: 145 K/UL
PMV BLD: 10.8 FL
POTASSIUM SERPL-SCNC: 4.2 MMOL/L
PROT SERPL-MCNC: 6.5 G/DL
RBC # BLD: 5.14 M/UL
RBC # FLD: 12.1 %
SODIUM SERPL-SCNC: 137 MMOL/L
WBC # FLD AUTO: 3.66 K/UL

## 2018-10-19 ENCOUNTER — OUTPATIENT (OUTPATIENT)
Dept: OUTPATIENT SERVICES | Facility: HOSPITAL | Age: 58
LOS: 1 days | Discharge: HOME | End: 2018-10-19

## 2018-10-19 DIAGNOSIS — E78.2 MIXED HYPERLIPIDEMIA: ICD-10-CM

## 2018-10-19 DIAGNOSIS — Z95.0 PRESENCE OF CARDIAC PACEMAKER: Chronic | ICD-10-CM

## 2018-10-19 DIAGNOSIS — C83.00 SMALL CELL B-CELL LYMPHOMA, UNSPECIFIED SITE: ICD-10-CM

## 2018-10-19 DIAGNOSIS — E11.9 TYPE 2 DIABETES MELLITUS WITHOUT COMPLICATIONS: ICD-10-CM

## 2018-10-23 ENCOUNTER — APPOINTMENT (OUTPATIENT)
Dept: PULMONOLOGY | Facility: CLINIC | Age: 58
End: 2018-10-23

## 2018-10-23 ENCOUNTER — LABORATORY RESULT (OUTPATIENT)
Age: 58
End: 2018-10-23

## 2018-10-23 ENCOUNTER — APPOINTMENT (OUTPATIENT)
Dept: HEMATOLOGY ONCOLOGY | Facility: CLINIC | Age: 58
End: 2018-10-23

## 2018-10-23 VITALS
SYSTOLIC BLOOD PRESSURE: 138 MMHG | DIASTOLIC BLOOD PRESSURE: 84 MMHG | HEIGHT: 74 IN | HEART RATE: 84 BPM | RESPIRATION RATE: 18 BRPM | BODY MASS INDEX: 27.72 KG/M2 | TEMPERATURE: 97.2 F | WEIGHT: 216 LBS

## 2018-10-24 LAB
ALBUMIN SERPL ELPH-MCNC: 4.5 G/DL
ALP BLD-CCNC: 66 U/L
ALT SERPL-CCNC: 25 U/L
ANION GAP SERPL CALC-SCNC: 18 MMOL/L
AST SERPL-CCNC: 21 U/L
BILIRUB SERPL-MCNC: 1.2 MG/DL
BUN SERPL-MCNC: 14 MG/DL
CALCIUM SERPL-MCNC: 9.5 MG/DL
CHLORIDE SERPL-SCNC: 97 MMOL/L
CO2 SERPL-SCNC: 24 MMOL/L
CREAT SERPL-MCNC: 1 MG/DL
GLUCOSE SERPL-MCNC: 440 MG/DL
HCT VFR BLD CALC: 44.1 %
HGB BLD-MCNC: 15.1 G/DL
MCHC RBC-ENTMCNC: 29.7 PG
MCHC RBC-ENTMCNC: 34.2 G/DL
MCV RBC AUTO: 86.8 FL
PLATELET # BLD AUTO: 153 K/UL
PMV BLD: 11 FL
POTASSIUM SERPL-SCNC: 4.1 MMOL/L
PROT SERPL-MCNC: 6.6 G/DL
RBC # BLD: 5.08 M/UL
RBC # FLD: 12.3 %
SODIUM SERPL-SCNC: 139 MMOL/L
WBC # FLD AUTO: 5.02 K/UL

## 2018-10-26 ENCOUNTER — OUTPATIENT (OUTPATIENT)
Dept: OUTPATIENT SERVICES | Facility: HOSPITAL | Age: 58
LOS: 1 days | Discharge: HOME | End: 2018-10-26

## 2018-10-26 ENCOUNTER — APPOINTMENT (OUTPATIENT)
Dept: INFUSION THERAPY | Facility: CLINIC | Age: 58
End: 2018-10-26

## 2018-10-26 DIAGNOSIS — Z95.0 PRESENCE OF CARDIAC PACEMAKER: Chronic | ICD-10-CM

## 2018-10-26 DIAGNOSIS — C85.90 NON-HODGKIN LYMPHOMA, UNSPECIFIED, UNSPECIFIED SITE: ICD-10-CM

## 2018-10-26 RX ORDER — RITUXIMAB 10 MG/ML
850 INJECTION, SOLUTION INTRAVENOUS ONCE
Qty: 0 | Refills: 0 | Status: COMPLETED | OUTPATIENT
Start: 2018-10-26 | End: 2018-10-26

## 2018-10-26 RX ORDER — DIPHENHYDRAMINE HCL 50 MG
50 CAPSULE ORAL ONCE
Qty: 0 | Refills: 0 | Status: COMPLETED | OUTPATIENT
Start: 2018-10-26 | End: 2018-10-26

## 2018-10-26 RX ORDER — DEXAMETHASONE 0.5 MG/5ML
8 ELIXIR ORAL ONCE
Qty: 0 | Refills: 0 | Status: COMPLETED | OUTPATIENT
Start: 2018-10-26 | End: 2018-10-26

## 2018-10-26 RX ORDER — ACETAMINOPHEN 500 MG
650 TABLET ORAL ONCE
Qty: 0 | Refills: 0 | Status: COMPLETED | OUTPATIENT
Start: 2018-10-26 | End: 2018-10-26

## 2018-10-26 RX ADMIN — Medication 650 MILLIGRAM(S): at 09:35

## 2018-10-26 RX ADMIN — Medication 50 MILLIGRAM(S): at 09:35

## 2018-10-26 RX ADMIN — Medication 152.4 MILLIGRAM(S): at 09:35

## 2018-10-26 RX ADMIN — RITUXIMAB 170 MILLIGRAM(S): 10 INJECTION, SOLUTION INTRAVENOUS at 09:59

## 2018-10-27 LAB
ALBUMIN SERPL ELPH-MCNC: 4.2 G/DL
ALP BLD-CCNC: 58 U/L
ALT SERPL-CCNC: 25 U/L
ANION GAP SERPL CALC-SCNC: 15 MMOL/L
AST SERPL-CCNC: 19 U/L
BILIRUB SERPL-MCNC: 1.3 MG/DL
BUN SERPL-MCNC: 16 MG/DL
CALCIUM SERPL-MCNC: 9.1 MG/DL
CHLORIDE SERPL-SCNC: 101 MMOL/L
CO2 SERPL-SCNC: 24 MMOL/L
CREAT SERPL-MCNC: 1.1 MG/DL
GLUCOSE SERPL-MCNC: 384 MG/DL
POTASSIUM SERPL-SCNC: 4.2 MMOL/L
PROT SERPL-MCNC: 6.5 G/DL
SODIUM SERPL-SCNC: 140 MMOL/L

## 2018-11-05 DIAGNOSIS — Z51.12 ENCOUNTER FOR ANTINEOPLASTIC IMMUNOTHERAPY: ICD-10-CM

## 2018-11-13 ENCOUNTER — APPOINTMENT (OUTPATIENT)
Dept: PULMONOLOGY | Facility: CLINIC | Age: 58
End: 2018-11-13

## 2018-11-13 ENCOUNTER — OUTPATIENT (OUTPATIENT)
Dept: OUTPATIENT SERVICES | Facility: HOSPITAL | Age: 58
LOS: 1 days | Discharge: HOME | End: 2018-11-13

## 2018-11-13 VITALS
WEIGHT: 217 LBS | OXYGEN SATURATION: 93 % | HEIGHT: 74 IN | SYSTOLIC BLOOD PRESSURE: 128 MMHG | BODY MASS INDEX: 27.85 KG/M2 | DIASTOLIC BLOOD PRESSURE: 80 MMHG | HEART RATE: 79 BPM

## 2018-11-13 DIAGNOSIS — Z95.0 PRESENCE OF CARDIAC PACEMAKER: Chronic | ICD-10-CM

## 2018-11-13 RX ORDER — BLOOD-GLUCOSE METER
W/DEVICE KIT MISCELLANEOUS
Qty: 1 | Refills: 0 | Status: COMPLETED | COMMUNITY
Start: 2017-04-05 | End: 2018-11-13

## 2018-11-26 ENCOUNTER — OUTPATIENT (OUTPATIENT)
Dept: OUTPATIENT SERVICES | Facility: HOSPITAL | Age: 58
LOS: 1 days | Discharge: HOME | End: 2018-11-26

## 2018-11-26 DIAGNOSIS — R06.02 SHORTNESS OF BREATH: ICD-10-CM

## 2018-11-26 DIAGNOSIS — Z95.0 PRESENCE OF CARDIAC PACEMAKER: Chronic | ICD-10-CM

## 2018-12-17 ENCOUNTER — APPOINTMENT (OUTPATIENT)
Dept: HEMATOLOGY ONCOLOGY | Facility: CLINIC | Age: 58
End: 2018-12-17

## 2018-12-17 ENCOUNTER — LABORATORY RESULT (OUTPATIENT)
Age: 58
End: 2018-12-17

## 2018-12-17 VITALS
HEIGHT: 74 IN | WEIGHT: 220 LBS | DIASTOLIC BLOOD PRESSURE: 106 MMHG | RESPIRATION RATE: 16 BRPM | TEMPERATURE: 96.3 F | BODY MASS INDEX: 28.23 KG/M2 | SYSTOLIC BLOOD PRESSURE: 155 MMHG | HEART RATE: 70 BPM

## 2018-12-17 LAB
HCT VFR BLD CALC: 44.9 %
HGB BLD-MCNC: 15.5 G/DL
MCHC RBC-ENTMCNC: 29.9 PG
MCHC RBC-ENTMCNC: 34.5 G/DL
MCV RBC AUTO: 86.5 FL
PLATELET # BLD AUTO: 165 K/UL
PMV BLD: 10.7 FL
RBC # BLD: 5.19 M/UL
RBC # FLD: 12.2 %
WBC # FLD AUTO: 4.42 K/UL

## 2018-12-17 NOTE — PHYSICAL EXAM
[Ulcers] : no ulcers [Mucositis] : no mucositis [Thrush] : no thrush [Normal] : no peripheral adenopathy appreciated [de-identified] : no residual adenopathy .  [de-identified] : no organomegaly, mild RUQ tenderness, no masses , no rebound.

## 2018-12-17 NOTE — HISTORY OF PRESENT ILLNESS
[de-identified] : 57 year old obese male with PMH of COPD , diabetes, PPP for syncope. Patient went to the emergency room on February 4, 2018 with complaints of a swollen lymph node on right side of neck, which he had noticed several months prior to presentation. Lymph node was originally not painful but became painful about one week prior to presentation, which prompted him to go to the emergency room. No fevers, night sweats, anorexia or significant changes in weight. In the ED, patient underwent CT neck soft tissue, chest, abdomen and pelvis, which found multiple areas of LAD,Right-sided enlarged level 5 cervical chain lymph nodes measuring up to  1.9 x 1.7 cm (series 9 image 193). Enlarged right supraclavicular lymph  nodes measuring up to 1.6 x 1 cm(series 9 image 214).   in the R supraclavicular lymph node, R axillary lymph node,  Enlarged mesenteric lymph nodes measuring up to 1.3  x 1 cm (series 2 image 92)., There was no evidence of mediastinal or hilar LAD at that time. However, patient is not certain if he took prednisone prior to imaging. As per wife, patient takes short-course prednisone tapers every few months secondary to bronchitis. OTTONIEL plummer is a former 1 PPD smoker for 30-40 years who quit about five months prior to presentation and denies any previous history of asbestosis exposure. He worked as a . He has no pets at home. Biopsy of right cervical lymph node by ENT  Low grade B-cell lymphoma, consistent with nisreen marginal zone lymphoma.  the neoplastic cells are CD20+ CD79a+ PAX5+ B-cells that co-express BCL2. They are negative for CD5, CD10, BCL6, cyclinD1, and CD43. The proliferation index (Ki67 labeling) ranges from 5 to 20% in different areas excluding the germinal centers, flow cytometry studies performed at [facility] [employer] show monotypic B-cells (38% of cells), positive for kappa, CD19, CD20, FMC-7, CD23, minimal CD10; negative for CD5. Viability is 78%.   *** In summary, the findings are diagnostic of low grade B-cell lymphoma, the features are consistent with nisreen marginal zone lymphoma.     2 , peripheral blood flow showed - A MINUTE MONOCLONAL, CD10+ B-CELL POPULATION IS OBSERVED (0.14%). kappa and CD 20 bright . Lab work showed normal CBC , Hb A1c 9, negative HIV , Hep c and SPEP .    \par Since surgery 2 weeks ago he noted marked increase in painful  nisreen masses in the neck , supraclavicular and right axillae , he takes percocet to sleep with partial relief  , PET showed generalized adenopathy with maximum SUV 9 . No bone or marrow uptake. He complains of dyspnea on exertion , mild cough , improved with short course of prednisone, he lost few lbs and denies fever or night sweats. \par No history of EGD or colonoscopy . \par  [de-identified] : 08/27/2018 : Patient returns for follow up after BR X 3 with near complete response. Treatment was held due to worsening complaints of weakness, exertional dyspnea. RUQ pain . He followed with pulmonary and was placed on inhaled bronchodilators , He had MUGA scan and may require a second pacemaker lead. He denies any B symptoms or any new lumps or adenopathy . \par \par 10/23/2018 Patient returns for next dose of rituxan , He denies any new complaints . He was seen by GI for right sided abdominal tenderness, felt to be muscular in origin , CT scan apparently shows abdominal hernias probably unrelated to his complaint . He is scheduled for surveillance colonoscopy . He denies any B symptoms, he continues to have dyspnea on exertion and went on social security disability .\par \par 12/17/2018 Patient returns for next dose of rituxan maintenance . He denies any B symptoms , continues to complain of mild RUQ pain and tenderness. He is followed by cardiology and pulmonary and is undergoing work up to rule out cardiac sarcoidosis .

## 2018-12-17 NOTE — ASSESSMENT
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 \par  \par

## 2018-12-18 ENCOUNTER — APPOINTMENT (OUTPATIENT)
Dept: HEMATOLOGY ONCOLOGY | Facility: CLINIC | Age: 58
End: 2018-12-18

## 2018-12-18 LAB
ALBUMIN SERPL ELPH-MCNC: 4.6 G/DL
ALP BLD-CCNC: 54 U/L
ALT SERPL-CCNC: 25 U/L
ANION GAP SERPL CALC-SCNC: 16 MMOL/L
AST SERPL-CCNC: 21 U/L
BILIRUB SERPL-MCNC: 1.3 MG/DL
BUN SERPL-MCNC: 13 MG/DL
CALCIUM SERPL-MCNC: 9.5 MG/DL
CHLORIDE SERPL-SCNC: 103 MMOL/L
CO2 SERPL-SCNC: 25 MMOL/L
CREAT SERPL-MCNC: 1 MG/DL
GLUCOSE SERPL-MCNC: 171 MG/DL
POTASSIUM SERPL-SCNC: 4.4 MMOL/L
PROT SERPL-MCNC: 6.9 G/DL
SODIUM SERPL-SCNC: 144 MMOL/L

## 2018-12-21 ENCOUNTER — APPOINTMENT (OUTPATIENT)
Dept: INFUSION THERAPY | Facility: CLINIC | Age: 58
End: 2018-12-21

## 2018-12-21 RX ORDER — RITUXIMAB 10 MG/ML
850 INJECTION, SOLUTION INTRAVENOUS ONCE
Qty: 0 | Refills: 0 | Status: COMPLETED | OUTPATIENT
Start: 2018-12-21 | End: 2018-12-21

## 2018-12-21 RX ORDER — DEXAMETHASONE 0.5 MG/5ML
8 ELIXIR ORAL ONCE
Qty: 0 | Refills: 0 | Status: COMPLETED | OUTPATIENT
Start: 2018-12-21 | End: 2018-12-21

## 2018-12-21 RX ORDER — DIPHENHYDRAMINE HCL 50 MG
50 CAPSULE ORAL ONCE
Qty: 0 | Refills: 0 | Status: COMPLETED | OUTPATIENT
Start: 2018-12-21 | End: 2018-12-21

## 2018-12-21 RX ORDER — ACETAMINOPHEN 500 MG
650 TABLET ORAL ONCE
Qty: 0 | Refills: 0 | Status: COMPLETED | OUTPATIENT
Start: 2018-12-21 | End: 2018-12-21

## 2018-12-21 RX ADMIN — Medication 50 MILLIGRAM(S): at 09:04

## 2018-12-21 RX ADMIN — RITUXIMAB 170 MILLIGRAM(S): 10 INJECTION, SOLUTION INTRAVENOUS at 09:30

## 2018-12-21 RX ADMIN — Medication 650 MILLIGRAM(S): at 09:04

## 2018-12-21 RX ADMIN — Medication 650 MILLIGRAM(S): at 09:03

## 2018-12-21 RX ADMIN — Medication 152.4 MILLIGRAM(S): at 09:04

## 2018-12-29 ENCOUNTER — TRANSCRIPTION ENCOUNTER (OUTPATIENT)
Age: 58
End: 2018-12-29

## 2019-01-22 ENCOUNTER — OUTPATIENT (OUTPATIENT)
Dept: OUTPATIENT SERVICES | Facility: HOSPITAL | Age: 59
LOS: 1 days | Discharge: HOME | End: 2019-01-22

## 2019-01-22 DIAGNOSIS — Z95.0 PRESENCE OF CARDIAC PACEMAKER: Chronic | ICD-10-CM

## 2019-01-23 DIAGNOSIS — G47.33 OBSTRUCTIVE SLEEP APNEA (ADULT) (PEDIATRIC): ICD-10-CM

## 2019-01-30 ENCOUNTER — INPATIENT (INPATIENT)
Facility: HOSPITAL | Age: 59
LOS: 0 days | Discharge: HOME | End: 2019-01-31
Attending: INTERNAL MEDICINE | Admitting: INTERNAL MEDICINE

## 2019-01-30 VITALS
OXYGEN SATURATION: 98 % | RESPIRATION RATE: 18 BRPM | DIASTOLIC BLOOD PRESSURE: 77 MMHG | HEART RATE: 85 BPM | SYSTOLIC BLOOD PRESSURE: 173 MMHG | TEMPERATURE: 96 F

## 2019-01-30 DIAGNOSIS — Z95.0 PRESENCE OF CARDIAC PACEMAKER: Chronic | ICD-10-CM

## 2019-01-30 LAB
ALBUMIN SERPL ELPH-MCNC: 4.5 G/DL — SIGNIFICANT CHANGE UP (ref 3.5–5.2)
ALP SERPL-CCNC: 63 U/L — SIGNIFICANT CHANGE UP (ref 30–115)
ALT FLD-CCNC: 28 U/L — SIGNIFICANT CHANGE UP (ref 0–41)
ANION GAP SERPL CALC-SCNC: 17 MMOL/L — HIGH (ref 7–14)
AST SERPL-CCNC: 20 U/L — SIGNIFICANT CHANGE UP (ref 0–41)
BASE EXCESS BLDV CALC-SCNC: 4.1 MMOL/L — HIGH (ref -2–2)
BILIRUB SERPL-MCNC: 1.1 MG/DL — SIGNIFICANT CHANGE UP (ref 0.2–1.2)
BUN SERPL-MCNC: 14 MG/DL — SIGNIFICANT CHANGE UP (ref 10–20)
CA-I SERPL-SCNC: 1.22 MMOL/L — SIGNIFICANT CHANGE UP (ref 1.12–1.3)
CALCIUM SERPL-MCNC: 9.7 MG/DL — SIGNIFICANT CHANGE UP (ref 8.5–10.1)
CHLORIDE SERPL-SCNC: 96 MMOL/L — LOW (ref 98–110)
CO2 SERPL-SCNC: 24 MMOL/L — SIGNIFICANT CHANGE UP (ref 17–32)
CREAT SERPL-MCNC: 1.1 MG/DL — SIGNIFICANT CHANGE UP (ref 0.7–1.5)
D DIMER BLD IA.RAPID-MCNC: 58 NG/ML DDU — SIGNIFICANT CHANGE UP (ref 0–230)
GAS PNL BLDV: 140 MMOL/L — SIGNIFICANT CHANGE UP (ref 136–145)
GAS PNL BLDV: SIGNIFICANT CHANGE UP
GLUCOSE SERPL-MCNC: 324 MG/DL — HIGH (ref 70–99)
HCO3 BLDV-SCNC: 30 MMOL/L — HIGH (ref 22–29)
HCT VFR BLD CALC: 44.4 % — SIGNIFICANT CHANGE UP (ref 42–52)
HCT VFR BLDA CALC: 47.4 % — HIGH (ref 34–44)
HGB BLD CALC-MCNC: 15.5 G/DL — SIGNIFICANT CHANGE UP (ref 14–18)
HGB BLD-MCNC: 15.6 G/DL — SIGNIFICANT CHANGE UP (ref 14–18)
INR BLD: 0.96 RATIO — SIGNIFICANT CHANGE UP (ref 0.65–1.3)
LACTATE BLDV-MCNC: 1.6 MMOL/L — SIGNIFICANT CHANGE UP (ref 0.5–1.6)
LACTATE SERPL-SCNC: 1.7 MMOL/L — SIGNIFICANT CHANGE UP (ref 0.5–2.2)
LIDOCAIN IGE QN: 33 U/L — SIGNIFICANT CHANGE UP (ref 7–60)
MCHC RBC-ENTMCNC: 29.9 PG — SIGNIFICANT CHANGE UP (ref 27–31)
MCHC RBC-ENTMCNC: 35.1 G/DL — SIGNIFICANT CHANGE UP (ref 32–37)
MCV RBC AUTO: 85.1 FL — SIGNIFICANT CHANGE UP (ref 80–94)
NRBC # BLD: 0 /100 WBCS — SIGNIFICANT CHANGE UP (ref 0–0)
NT-PROBNP SERPL-SCNC: 51 PG/ML — SIGNIFICANT CHANGE UP (ref 0–300)
PCO2 BLDV: 47 MMHG — SIGNIFICANT CHANGE UP (ref 41–51)
PH BLDV: 7.41 — SIGNIFICANT CHANGE UP (ref 7.26–7.43)
PLATELET # BLD AUTO: 175 K/UL — SIGNIFICANT CHANGE UP (ref 130–400)
PO2 BLDV: 36 MMHG — SIGNIFICANT CHANGE UP (ref 20–40)
POTASSIUM BLDV-SCNC: 3.9 MMOL/L — SIGNIFICANT CHANGE UP (ref 3.3–5.6)
POTASSIUM SERPL-MCNC: 4.3 MMOL/L — SIGNIFICANT CHANGE UP (ref 3.5–5)
POTASSIUM SERPL-SCNC: 4.3 MMOL/L — SIGNIFICANT CHANGE UP (ref 3.5–5)
PROT SERPL-MCNC: 7 G/DL — SIGNIFICANT CHANGE UP (ref 6–8)
PROTHROM AB SERPL-ACNC: 11.1 SEC — SIGNIFICANT CHANGE UP (ref 9.95–12.87)
RBC # BLD: 5.22 M/UL — SIGNIFICANT CHANGE UP (ref 4.7–6.1)
RBC # FLD: 11.9 % — SIGNIFICANT CHANGE UP (ref 11.5–14.5)
SAO2 % BLDV: 73 % — SIGNIFICANT CHANGE UP
SODIUM SERPL-SCNC: 137 MMOL/L — SIGNIFICANT CHANGE UP (ref 135–146)
TROPONIN T SERPL-MCNC: <0.01 NG/ML — SIGNIFICANT CHANGE UP
WBC # BLD: 4.65 K/UL — LOW (ref 4.8–10.8)
WBC # FLD AUTO: 4.65 K/UL — LOW (ref 4.8–10.8)

## 2019-01-30 RX ORDER — ACETAMINOPHEN 500 MG
650 TABLET ORAL EVERY 6 HOURS
Qty: 0 | Refills: 0 | Status: DISCONTINUED | OUTPATIENT
Start: 2019-01-30 | End: 2019-01-31

## 2019-01-30 RX ORDER — SIMVASTATIN 20 MG/1
40 TABLET, FILM COATED ORAL AT BEDTIME
Qty: 0 | Refills: 0 | Status: DISCONTINUED | OUTPATIENT
Start: 2019-01-30 | End: 2019-01-31

## 2019-01-30 RX ORDER — ASPIRIN/CALCIUM CARB/MAGNESIUM 324 MG
325 TABLET ORAL ONCE
Qty: 0 | Refills: 0 | Status: COMPLETED | OUTPATIENT
Start: 2019-01-30 | End: 2019-01-30

## 2019-01-30 RX ORDER — OXYCODONE HYDROCHLORIDE 5 MG/1
5 TABLET ORAL
Qty: 0 | Refills: 0 | Status: DISCONTINUED | OUTPATIENT
Start: 2019-01-30 | End: 2019-01-31

## 2019-01-30 RX ADMIN — Medication 325 MILLIGRAM(S): at 21:30

## 2019-01-30 NOTE — ED ADULT NURSE REASSESSMENT NOTE - NS ED NURSE REASSESS COMMENT FT1
pt assessed by oncoming rn. pt [id number]. pt states he is still having chest pain. denies radiation/sob. vss. pt on continuous cardiac monitoring. wife at bedside. no s/s of acute distress at this time. rn will cotninue to monitor.

## 2019-01-30 NOTE — ED ADULT NURSE NOTE - OBJECTIVE STATEMENT
pt presents with mid to left chest pain that started this afternoon. denies dizziness, sob, n/v/d, abdominal pain, fever. states pain does not radiate

## 2019-01-30 NOTE — ED PROVIDER NOTE - PHYSICAL EXAMINATION
CONSTITUTIONAL: WA / WN / NAD  HEAD: NCAT  EYES: PERRL; EOMI;   ENT: Normal pharynx; mucous membranes pink/moist, no erythema.  NECK: Supple; no meningeal signs  CARD: RRR; nl S1/S2; no M/R/G.   RESP: Respiratory rate and effort are normal; breath sounds clear and equal bilaterally.  ABD: Soft, ND + rlq ttp.  MSK/EXT: No gross deformities; full range of motion. + b/l calf tenderness   SKIN: Warm and dry;   NEURO: AAOx3  PSYCH: Memory Intact, Normal Affect

## 2019-01-30 NOTE — ED PROVIDER NOTE - MEDICAL DECISION MAKING DETAILS
I personally evaluated the patient. I reviewed the Resident’s or Physician Assistant’s note (as assigned above), and agree with the findings and plan except as documented in my note.   EKG: displays LBBB which is old, cxr: napd, Admitted for further eval.

## 2019-01-30 NOTE — H&P ADULT - HISTORY OF PRESENT ILLNESS
58 year old male with a pmh of dm htn, hld, sinus bradycardia s/p PPM, copd & non hodgkin  lymphoma (last chemo was december 21) presented with chest pain. since 2pm. as per patient, he felt weak and later chest pain  which is sharp, non radiating ,7/10 now 4/10 along with sob but denies any headache, dizziness, palpitations ,leg swelling, n/v.  he had muga study in may which is nl and had recent echo done 2 months ago with     he also c/o chronic abd pain in ruq for which he has multiple ct scan which were nl except for lymphadenopathy. never had colonoscopy done

## 2019-01-30 NOTE — H&P ADULT - ATTENDING COMMENTS
Doubt patient has ACS.   Pain is musculoskeletal.   once troponin negative and Flu negative, D/C home  Patient agrees for the out patient stress testing.   Following with EP - Dr. Honeycutt

## 2019-01-30 NOTE — ED PROVIDER NOTE - NS ED ROS FT
Constitutional: See HPI.  Eyes: No visual changes  ENMT: No neck pain  Cardiac: see hpi  Respiratory: No cough or respiratory distress.  GI: No nausea, vomiting +abdominal pain.  : No dysuria, frequency or burning.    MS: No myalgia, muscle weakness, joint pain or back pain.  Neuro: No headache   Skin: No skin rash.

## 2019-01-30 NOTE — ED PROVIDER NOTE - OBJECTIVE STATEMENT
58 year old male with a pmh of dm htn hld aicd copd & lymphoma (last chemo was december 21) presents here c/o chest pain that began at 2pm today. Chest pain is associated with shortness of breathe. Patient also admits to having chronic abdominal pain that was imaged in [facility] on december 20th via pet scan and has been following up with a gastroenterologist.

## 2019-01-30 NOTE — H&P ADULT - ASSESSMENT
58 year old male with a pmh of dm htn, hld, sinus bradycardia s/p PPM, copd & non hodgkin  lymphoma (last chemo was december 21) presented with chest pain    1) chest pain - r/o acs  vs msk ( chest tender to palpate but no trauma)  repeat  cex 2   repeat ekg in am  check echo  consider cardio eval   start asa    2) dm- monitor fs  start insulin   maintain fs <200    3) chronic abd pain fopr more than 1 year- lft nl  as per family, patient had ct scan before which was nl  was supposed to get colonoscopy  bentyl prn  us abd since pain is in ruq    4) htn- c/w enalapril    5) copd -c/w nebs prn    6) chronic leg pain- c/w tylenol prn    7) dvt ppx   diet- dash cc  dispo- home 58 year old male with a pmh of dm htn, hld, sinus bradycardia s/p PPM, copd & non hodgkin  lymphoma (last chemo was december 21) presented with chest pain    1) chest pain - Likely muskuloskelatal. resolved now. feels better also   Flu ruled out.   Troponin negative       2) dm- monitor fs  start insulin   maintain fs <200    3) chronic abd pain for  more than 1 year- lft nl  as per family, patient had ct scan before which was nl  was supposed to get colonoscopy  bentyl prn  us abd since pain is in ruq    4) htn- c/w enalapril    5) copd -c/w nebs prn    6) chronic leg pain- c/w tylenol prn    7) dvt ppx   diet- dash cc  dispo- home

## 2019-01-30 NOTE — H&P ADULT - NSHPSOURCEINFORD_GEN_ALL_CORE
Patient/Spouse/Significant Other Regular diet as tolerated, regular activity as tolerated, no heavy lifting for first two weeks.  Nothing per vagina: no intercourse, tampons or douching.  Call your provider if you experience fevers, chills, worsening abdominal pain, inability to urinate or vaginal bleeding more than light spotting.

## 2019-01-30 NOTE — H&P ADULT - NSHPPHYSICALEXAM_GEN_ALL_CORE
T(C): 36.2 (01-30-19 @ 19:26), Max: 36.2 (01-30-19 @ 19:26)  HR: 71 (01-30-19 @ 19:26) (71 - 85)  BP: 160/97 (01-30-19 @ 19:26) (145/90 - 173/77)  RR: 18 (01-30-19 @ 19:26) (18 - 18)  SpO2: 99% (01-30-19 @ 19:26) (98% - 99%)      CONSTITUTIONAL: Well-developed; well-nourished; in no acute distress.  SKIN: Skin exam is warm and dry, no acute rash.  HEAD: Normocephalic; atraumatic.  NECK: Supple; non tender.  No lymphadenopathy.  CARD: S1, S2 normal; no murmurs, gallops, or rubs. Regular rate and rhythm. ttp on chest  RESP: No wheezes, rales or rhonchi.  ABD soft; non-distended; ttp ruq  EXT: Normal ROM. No clubbing, cyanosis or edema.  NEURO: Alert, oriented. Grossly unremarkable. No focal deficits.  PSYCH: Cooperative, appropriate.

## 2019-01-30 NOTE — ED PROVIDER NOTE - ATTENDING CONTRIBUTION TO CARE
58 year old male with a pmh of dm htn hld aicd copd & lymphoma (last chemo was december 21) presents here c/o chest pain, dull midsternal, non radiating, no n/v/d, no loc, no fever. No sob. No hemoptysis.     CONSTITUTIONAL: Well-developed; well-nourished; in no acute distress. Sitting up and providing appropriate history and physical examination  SKIN: skin exam is warm and dry, no acute rash.  HEAD: Normocephalic; atraumatic.  EYES: PERRL, 3 mm bilateral, no nystagmus, EOM intact; conjunctiva and sclera clear.  ENT: No nasal discharge; airway clear.  NECK: Supple; non tender. + full passive ROM in all directions. No JVD  CARD: S1, S2 normal; no murmurs, gallops, or rubs. Regular rate and rhythm. + Symmetric Strong Pulses  RESP: No wheezes, rales or rhonchi. Good air movement bilaterally  ABD: soft; non-distended; non-tender. No Rebound, No Guarding, No signs of peritonitis, No CVA tenderness. No pulsatile abdominal mass. + Strong and Symmetric Pulses  EXT: Normal ROM. No clubbing, cyanosis or edema. Dp and Pt Pulses intact. Cap refill less than 3 seconds  NEURO: CN 2-12 intact, normal finger to nose, normal romberg, stable gait, no sensory or motor deficits, Alert, oriented, grossly unremarkable. No Focal deficits. GCS 15. NIH 0  PSYCH: Cooperative, appropriate.       EKG: displays LBBB which is old, cxr: napd, Admitted for further eval

## 2019-01-30 NOTE — H&P ADULT - NSHPLABSRESULTS_GEN_ALL_CORE
15.6   4.65  )-----------( 175      ( 30 Jan 2019 18:49 )             44.4       01-30    137  |  96<L>  |  14  ----------------------------<  324<H>  4.3   |  24  |  1.1    Ca    9.7      30 Jan 2019 18:49    TPro  7.0  /  Alb  4.5  /  TBili  1.1  /  DBili  x   /  AST  20  /  ALT  28  /  AlkPhos  63  01-30                  PT/INR - ( 30 Jan 2019 18:49 )   PT: 11.10 sec;   INR: 0.96 ratio             Lactate Trend  01-30 @ 19:30 Lactate:1.7       CARDIAC MARKERS ( 30 Jan 2019 18:49 )  x     / <0.01 ng/mL / x     / x     / x            CAPILLARY BLOOD GLUCOSE        cxr- no acute cp disease    < from: 12 Lead ECG (01.30.19 @ 18:26) >     Normal sinus rhythm  Left bundle branch block    < end of copied text >    < from: CT Abdomen and Pelvis w/ Oral Cont and w/ IV Cont (07.26.18 @ 12:47) >      Interval decrease in size of mesenteric lymph nodes, now subcentimeter in  short axis diameter.  Interval decrease in associated haziness/claudio mesentery.    < end of copied text >    < from: NM MUGA Scan (08.22.18 @ 11:49) >    1. Left ventricular ejection fraction of  54% which is within the range   of normal  2. Left ventricle with normal right and left ventricular wall motion

## 2019-01-31 ENCOUNTER — TRANSCRIPTION ENCOUNTER (OUTPATIENT)
Age: 59
End: 2019-01-31

## 2019-01-31 VITALS
SYSTOLIC BLOOD PRESSURE: 169 MMHG | WEIGHT: 285.5 LBS | TEMPERATURE: 98 F | HEART RATE: 100 BPM | HEIGHT: 66 IN | RESPIRATION RATE: 18 BRPM | DIASTOLIC BLOOD PRESSURE: 78 MMHG

## 2019-01-31 LAB
ANION GAP SERPL CALC-SCNC: 14 MMOL/L — SIGNIFICANT CHANGE UP (ref 7–14)
BASOPHILS # BLD AUTO: 0.04 K/UL — SIGNIFICANT CHANGE UP (ref 0–0.2)
BASOPHILS NFR BLD AUTO: 0.9 % — SIGNIFICANT CHANGE UP (ref 0–1)
BUN SERPL-MCNC: 14 MG/DL — SIGNIFICANT CHANGE UP (ref 10–20)
CALCIUM SERPL-MCNC: 9.2 MG/DL — SIGNIFICANT CHANGE UP (ref 8.5–10.1)
CHLORIDE SERPL-SCNC: 100 MMOL/L — SIGNIFICANT CHANGE UP (ref 98–110)
CK SERPL-CCNC: 53 U/L — SIGNIFICANT CHANGE UP (ref 0–225)
CK SERPL-CCNC: 59 U/L — SIGNIFICANT CHANGE UP (ref 0–225)
CO2 SERPL-SCNC: 29 MMOL/L — SIGNIFICANT CHANGE UP (ref 17–32)
CREAT SERPL-MCNC: 1.1 MG/DL — SIGNIFICANT CHANGE UP (ref 0.7–1.5)
EOSINOPHIL # BLD AUTO: 0.27 K/UL — SIGNIFICANT CHANGE UP (ref 0–0.7)
EOSINOPHIL NFR BLD AUTO: 6 % — SIGNIFICANT CHANGE UP (ref 0–8)
ESTIMATED AVERAGE GLUCOSE: 255 MG/DL — HIGH (ref 68–114)
FLU A RESULT: NEGATIVE — SIGNIFICANT CHANGE UP
FLU A RESULT: NEGATIVE — SIGNIFICANT CHANGE UP
FLUAV AG NPH QL: NEGATIVE — SIGNIFICANT CHANGE UP
FLUBV AG NPH QL: NEGATIVE — SIGNIFICANT CHANGE UP
GLUCOSE BLDC GLUCOMTR-MCNC: 230 MG/DL — HIGH (ref 70–99)
GLUCOSE BLDC GLUCOMTR-MCNC: 250 MG/DL — HIGH (ref 70–99)
GLUCOSE BLDC GLUCOMTR-MCNC: 252 MG/DL — HIGH (ref 70–99)
GLUCOSE SERPL-MCNC: 245 MG/DL — HIGH (ref 70–99)
HBA1C BLD-MCNC: 10.5 % — HIGH (ref 4–5.6)
HCT VFR BLD CALC: 43.1 % — SIGNIFICANT CHANGE UP (ref 42–52)
HGB BLD-MCNC: 14.6 G/DL — SIGNIFICANT CHANGE UP (ref 14–18)
IMM GRANULOCYTES NFR BLD AUTO: 1.1 % — HIGH (ref 0.1–0.3)
LYMPHOCYTES # BLD AUTO: 0.47 K/UL — LOW (ref 1.2–3.4)
LYMPHOCYTES # BLD AUTO: 10.4 % — LOW (ref 20.5–51.1)
MAGNESIUM SERPL-MCNC: 1.9 MG/DL — SIGNIFICANT CHANGE UP (ref 1.8–2.4)
MCHC RBC-ENTMCNC: 29.1 PG — SIGNIFICANT CHANGE UP (ref 27–31)
MCHC RBC-ENTMCNC: 33.9 G/DL — SIGNIFICANT CHANGE UP (ref 32–37)
MCV RBC AUTO: 85.9 FL — SIGNIFICANT CHANGE UP (ref 80–94)
MONOCYTES # BLD AUTO: 0.67 K/UL — HIGH (ref 0.1–0.6)
MONOCYTES NFR BLD AUTO: 14.9 % — HIGH (ref 1.7–9.3)
NEUTROPHILS # BLD AUTO: 3.01 K/UL — SIGNIFICANT CHANGE UP (ref 1.4–6.5)
NEUTROPHILS NFR BLD AUTO: 66.7 % — SIGNIFICANT CHANGE UP (ref 42.2–75.2)
PLATELET # BLD AUTO: 160 K/UL — SIGNIFICANT CHANGE UP (ref 130–400)
POTASSIUM SERPL-MCNC: 3.9 MMOL/L — SIGNIFICANT CHANGE UP (ref 3.5–5)
POTASSIUM SERPL-SCNC: 3.9 MMOL/L — SIGNIFICANT CHANGE UP (ref 3.5–5)
RBC # BLD: 5.02 M/UL — SIGNIFICANT CHANGE UP (ref 4.7–6.1)
RBC # FLD: 12.1 % — SIGNIFICANT CHANGE UP (ref 11.5–14.5)
RSV RESULT: NEGATIVE — SIGNIFICANT CHANGE UP
RSV RNA RESP QL NAA+PROBE: NEGATIVE — SIGNIFICANT CHANGE UP
SODIUM SERPL-SCNC: 143 MMOL/L — SIGNIFICANT CHANGE UP (ref 135–146)
TROPONIN T SERPL-MCNC: <0.01 NG/ML — SIGNIFICANT CHANGE UP
TROPONIN T SERPL-MCNC: <0.01 NG/ML — SIGNIFICANT CHANGE UP
WBC # BLD: 4.51 K/UL — LOW (ref 4.8–10.8)
WBC # FLD AUTO: 4.51 K/UL — LOW (ref 4.8–10.8)

## 2019-01-31 RX ORDER — PANTOPRAZOLE SODIUM 20 MG/1
40 TABLET, DELAYED RELEASE ORAL
Qty: 0 | Refills: 0 | Status: DISCONTINUED | OUTPATIENT
Start: 2019-01-31 | End: 2019-01-31

## 2019-01-31 RX ORDER — INSULIN LISPRO 100/ML
5 VIAL (ML) SUBCUTANEOUS
Qty: 0 | Refills: 0 | Status: DISCONTINUED | OUTPATIENT
Start: 2019-01-31 | End: 2019-01-31

## 2019-01-31 RX ORDER — DEXTROSE 50 % IN WATER 50 %
12.5 SYRINGE (ML) INTRAVENOUS ONCE
Qty: 0 | Refills: 0 | Status: DISCONTINUED | OUTPATIENT
Start: 2019-01-31 | End: 2019-01-31

## 2019-01-31 RX ORDER — IBUPROFEN 200 MG
600 TABLET ORAL ONCE
Qty: 0 | Refills: 0 | Status: COMPLETED | OUTPATIENT
Start: 2019-01-31 | End: 2019-01-31

## 2019-01-31 RX ORDER — DEXTROSE 50 % IN WATER 50 %
25 SYRINGE (ML) INTRAVENOUS ONCE
Qty: 0 | Refills: 0 | Status: DISCONTINUED | OUTPATIENT
Start: 2019-01-31 | End: 2019-01-31

## 2019-01-31 RX ORDER — ENOXAPARIN SODIUM 100 MG/ML
40 INJECTION SUBCUTANEOUS EVERY 24 HOURS
Qty: 0 | Refills: 0 | Status: DISCONTINUED | OUTPATIENT
Start: 2019-01-31 | End: 2019-01-31

## 2019-01-31 RX ORDER — SODIUM CHLORIDE 9 MG/ML
1000 INJECTION, SOLUTION INTRAVENOUS
Qty: 0 | Refills: 0 | Status: DISCONTINUED | OUTPATIENT
Start: 2019-01-31 | End: 2019-01-31

## 2019-01-31 RX ORDER — DEXTROSE 50 % IN WATER 50 %
15 SYRINGE (ML) INTRAVENOUS ONCE
Qty: 0 | Refills: 0 | Status: DISCONTINUED | OUTPATIENT
Start: 2019-01-31 | End: 2019-01-31

## 2019-01-31 RX ORDER — GLUCAGON INJECTION, SOLUTION 0.5 MG/.1ML
1 INJECTION, SOLUTION SUBCUTANEOUS ONCE
Qty: 0 | Refills: 0 | Status: DISCONTINUED | OUTPATIENT
Start: 2019-01-31 | End: 2019-01-31

## 2019-01-31 RX ORDER — INSULIN GLARGINE 100 [IU]/ML
15 INJECTION, SOLUTION SUBCUTANEOUS AT BEDTIME
Qty: 0 | Refills: 0 | Status: DISCONTINUED | OUTPATIENT
Start: 2019-01-31 | End: 2019-01-31

## 2019-01-31 RX ADMIN — Medication 5 UNIT(S): at 09:08

## 2019-01-31 RX ADMIN — Medication 20 MILLIGRAM(S): at 06:18

## 2019-01-31 RX ADMIN — Medication 600 MILLIGRAM(S): at 12:26

## 2019-01-31 RX ADMIN — Medication 5 UNIT(S): at 12:26

## 2019-01-31 RX ADMIN — INSULIN GLARGINE 15 UNIT(S): 100 INJECTION, SOLUTION SUBCUTANEOUS at 01:23

## 2019-01-31 RX ADMIN — PANTOPRAZOLE SODIUM 40 MILLIGRAM(S): 20 TABLET, DELAYED RELEASE ORAL at 09:09

## 2019-01-31 RX ADMIN — ENOXAPARIN SODIUM 40 MILLIGRAM(S): 100 INJECTION SUBCUTANEOUS at 09:08

## 2019-01-31 NOTE — DISCHARGE NOTE ADULT - PLAN OF CARE
Treat R/o musculoskeletal pain  your inpatient cardiac workup was negative  continue symptomatic pain management  Follow up with cardiologist as outpatient for a cardiac stress test

## 2019-01-31 NOTE — DISCHARGE NOTE ADULT - CARE PROVIDER_API CALL
Demarcus Herron)  Family Medicine  [address], [address]  [place] [address]  Phone: [phone]  Fax: [phone]

## 2019-01-31 NOTE — DISCHARGE NOTE ADULT - CARE PROVIDERS DIRECT ADDRESSES
[email or web address]

## 2019-01-31 NOTE — DISCHARGE NOTE ADULT - HOSPITAL COURSE
58 year old male with a pmh of dm htn, hld, sinus bradycardia s/p PPM, copd & non Hodgkin's  lymphoma (last chemo was december 21) presented with chest pain. since 2pm. as per patient, he felt weak and later chest pain  which is sharp, non radiating ,7/10 now 4/10 along with sob but denies any headache, dizziness, palpitations ,leg swelling, n/v.  he had muga study in may which is nl and had recent echo done 2 months ago with . Pain was reproducible on physical exam and improved next morning.    -R/o msk pain  -repeat  cex 2 negative   -ECG showing only LBBB ( old)  -D-dimers negative  -CXR negative  -Flu PC negative  -Recommended pain control and follow up with cardiologist for stress test

## 2019-01-31 NOTE — DISCHARGE NOTE ADULT - CARE PLAN
Principal Discharge DX:	Chest pain, unspecified type  Goal:	Treat  Assessment and plan of treatment:	R/o musculoskeletal pain  your inpatient cardiac workup was negative  continue symptomatic pain management  Follow up with cardiologist as outpatient for a cardiac stress test

## 2019-02-01 ENCOUNTER — OUTPATIENT (OUTPATIENT)
Dept: OUTPATIENT SERVICES | Facility: HOSPITAL | Age: 59
LOS: 1 days | End: 2019-02-01
Payer: MEDICAID

## 2019-02-01 DIAGNOSIS — Z95.0 PRESENCE OF CARDIAC PACEMAKER: Chronic | ICD-10-CM

## 2019-02-01 PROCEDURE — G9001: CPT

## 2019-02-05 DIAGNOSIS — I10 ESSENTIAL (PRIMARY) HYPERTENSION: ICD-10-CM

## 2019-02-05 DIAGNOSIS — R07.89 OTHER CHEST PAIN: ICD-10-CM

## 2019-02-05 DIAGNOSIS — E11.9 TYPE 2 DIABETES MELLITUS WITHOUT COMPLICATIONS: ICD-10-CM

## 2019-02-05 DIAGNOSIS — E78.00 PURE HYPERCHOLESTEROLEMIA, UNSPECIFIED: ICD-10-CM

## 2019-02-05 DIAGNOSIS — G89.29 OTHER CHRONIC PAIN: ICD-10-CM

## 2019-02-05 DIAGNOSIS — I44.7 LEFT BUNDLE-BRANCH BLOCK, UNSPECIFIED: ICD-10-CM

## 2019-02-05 DIAGNOSIS — R10.9 UNSPECIFIED ABDOMINAL PAIN: ICD-10-CM

## 2019-02-05 DIAGNOSIS — M79.606 PAIN IN LEG, UNSPECIFIED: ICD-10-CM

## 2019-02-05 DIAGNOSIS — J44.9 CHRONIC OBSTRUCTIVE PULMONARY DISEASE, UNSPECIFIED: ICD-10-CM

## 2019-02-05 DIAGNOSIS — E78.5 HYPERLIPIDEMIA, UNSPECIFIED: ICD-10-CM

## 2019-02-05 DIAGNOSIS — C85.90 NON-HODGKIN LYMPHOMA, UNSPECIFIED, UNSPECIFIED SITE: ICD-10-CM

## 2019-02-05 DIAGNOSIS — Z95.0 PRESENCE OF CARDIAC PACEMAKER: ICD-10-CM

## 2019-02-05 DIAGNOSIS — Z88.8 ALLERGY STATUS TO OTHER DRUGS, MEDICAMENTS AND BIOLOGICAL SUBSTANCES: ICD-10-CM

## 2019-02-22 DIAGNOSIS — Z71.89 OTHER SPECIFIED COUNSELING: ICD-10-CM

## 2019-02-25 ENCOUNTER — APPOINTMENT (OUTPATIENT)
Dept: HEMATOLOGY ONCOLOGY | Facility: CLINIC | Age: 59
End: 2019-02-25

## 2019-02-25 ENCOUNTER — LABORATORY RESULT (OUTPATIENT)
Age: 59
End: 2019-02-25

## 2019-02-25 VITALS
HEART RATE: 83 BPM | BODY MASS INDEX: 28.62 KG/M2 | SYSTOLIC BLOOD PRESSURE: 126 MMHG | WEIGHT: 223 LBS | TEMPERATURE: 96.6 F | RESPIRATION RATE: 14 BRPM | DIASTOLIC BLOOD PRESSURE: 89 MMHG | HEIGHT: 74 IN

## 2019-02-25 LAB
HCT VFR BLD CALC: 46.5 %
HGB BLD-MCNC: 15.7 G/DL
MCHC RBC-ENTMCNC: 29.3 PG
MCHC RBC-ENTMCNC: 33.8 G/DL
MCV RBC AUTO: 86.9 FL
PLATELET # BLD AUTO: 178 K/UL
PMV BLD: 10.7 FL
RBC # BLD: 5.35 M/UL
RBC # FLD: 12.1 %
WBC # FLD AUTO: 4.26 K/UL

## 2019-02-26 ENCOUNTER — OUTPATIENT (OUTPATIENT)
Dept: OUTPATIENT SERVICES | Facility: HOSPITAL | Age: 59
LOS: 1 days | Discharge: HOME | End: 2019-02-26

## 2019-02-26 ENCOUNTER — APPOINTMENT (OUTPATIENT)
Dept: INFUSION THERAPY | Facility: CLINIC | Age: 59
End: 2019-02-26

## 2019-02-26 DIAGNOSIS — Z95.0 PRESENCE OF CARDIAC PACEMAKER: Chronic | ICD-10-CM

## 2019-02-26 DIAGNOSIS — Z51.12 ENCOUNTER FOR ANTINEOPLASTIC IMMUNOTHERAPY: ICD-10-CM

## 2019-02-26 DIAGNOSIS — C85.90 NON-HODGKIN LYMPHOMA, UNSPECIFIED, UNSPECIFIED SITE: ICD-10-CM

## 2019-02-26 LAB
ALBUMIN SERPL ELPH-MCNC: 4.8 G/DL
ALP BLD-CCNC: 57 U/L
ALT SERPL-CCNC: 32 U/L
ANION GAP SERPL CALC-SCNC: 14 MMOL/L
AST SERPL-CCNC: 22 U/L
BILIRUB SERPL-MCNC: 1.6 MG/DL
BUN SERPL-MCNC: 15 MG/DL
CALCIUM SERPL-MCNC: 9.4 MG/DL
CHLORIDE SERPL-SCNC: 102 MMOL/L
CO2 SERPL-SCNC: 24 MMOL/L
CREAT SERPL-MCNC: 1.1 MG/DL
GLUCOSE SERPL-MCNC: 241 MG/DL
POTASSIUM SERPL-SCNC: 4.3 MMOL/L
PROT SERPL-MCNC: 6.9 G/DL
SODIUM SERPL-SCNC: 140 MMOL/L

## 2019-02-26 RX ORDER — DEXAMETHASONE 0.5 MG/5ML
8 ELIXIR ORAL ONCE
Qty: 0 | Refills: 0 | Status: COMPLETED | OUTPATIENT
Start: 2019-02-26 | End: 2019-02-26

## 2019-02-26 RX ORDER — RITUXIMAB 10 MG/ML
850 INJECTION, SOLUTION INTRAVENOUS ONCE
Qty: 0 | Refills: 0 | Status: COMPLETED | OUTPATIENT
Start: 2019-02-26 | End: 2019-02-26

## 2019-02-26 RX ORDER — ACETAMINOPHEN 500 MG
650 TABLET ORAL ONCE
Qty: 0 | Refills: 0 | Status: COMPLETED | OUTPATIENT
Start: 2019-02-26 | End: 2019-02-26

## 2019-02-26 RX ORDER — DIPHENHYDRAMINE HCL 50 MG
50 CAPSULE ORAL ONCE
Qty: 0 | Refills: 0 | Status: COMPLETED | OUTPATIENT
Start: 2019-02-26 | End: 2019-02-26

## 2019-02-26 RX ADMIN — Medication 152.4 MILLIGRAM(S): at 09:56

## 2019-02-26 RX ADMIN — Medication 50 MILLIGRAM(S): at 09:56

## 2019-02-26 RX ADMIN — RITUXIMAB 170 MILLIGRAM(S): 10 INJECTION, SOLUTION INTRAVENOUS at 10:17

## 2019-02-26 RX ADMIN — Medication 650 MILLIGRAM(S): at 09:56

## 2019-02-26 RX ADMIN — RITUXIMAB 850 MILLIGRAM(S): 10 INJECTION, SOLUTION INTRAVENOUS at 13:57

## 2019-02-26 NOTE — ASSESSMENT
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 \par \par PLAN:\par - Continue maintenance rituxan every 2 months for a total of 2 yrs. Cycle 7 tmrw. \par - Last CT scans in  07/2018. We will try to obtain the PET scan results done at [facility] in 12/2018. Based on that we will decide on further imaging studies. \par - Continues to have pain in the RLQ. Workup done in the past. \par - Pulmonary f/u to r/o sarcoidosis. \par - Labs ordered. \par \par RTC in 2 months.\par Pt seen and examined with \par \par  \par

## 2019-02-26 NOTE — HISTORY OF PRESENT ILLNESS
[de-identified] : 57 year old obese male with PMH of COPD , diabetes, PPP for syncope. Patient went to the emergency room on February 4, 2018 with complaints of a swollen lymph node on right side of neck, which he had noticed several months prior to presentation. Lymph node was originally not painful but became painful about one week prior to presentation, which prompted him to go to the emergency room. No fevers, night sweats, anorexia or significant changes in weight. In the ED, patient underwent CT neck soft tissue, chest, abdomen and pelvis, which found multiple areas of LAD,Right-sided enlarged level 5 cervical chain lymph nodes measuring up to  1.9 x 1.7 cm (series 9 image 193). Enlarged right supraclavicular lymph  nodes measuring up to 1.6 x 1 cm(series 9 image 214).   in the R supraclavicular lymph node, R axillary lymph node,  Enlarged mesenteric lymph nodes measuring up to 1.3  x 1 cm (series 2 image 92)., There was no evidence of mediastinal or hilar LAD at that time. However, patient is not certain if he took prednisone prior to imaging. As per wife, patient takes short-course prednisone tapers every few months secondary to bronchitis. OTTONIEL plummer is a former 1 PPD smoker for 30-40 years who quit about five months prior to presentation and denies any previous history of asbestosis exposure. He worked as a . He has no pets at home. Biopsy of right cervical lymph node by ENT  Low grade B-cell lymphoma, consistent with nisreen marginal zone lymphoma.  the neoplastic cells are CD20+ CD79a+ PAX5+ B-cells that co-express BCL2. They are negative for CD5, CD10, BCL6, cyclinD1, and CD43. The proliferation index (Ki67 labeling) ranges from 5 to 20% in different areas excluding the germinal centers, flow cytometry studies performed at [facility] show monotypic B-cells (38% of cells), positive for kappa, CD19, CD20, FMC-7, CD23, minimal CD10; negative for CD5. Viability is 78%.   *** In summary, the findings are diagnostic of low grade B-cell lymphoma, the features are consistent with nisreen marginal zone lymphoma.     2 , peripheral blood flow showed - A MINUTE MONOCLONAL, CD10+ B-CELL POPULATION IS OBSERVED (0.14%). kappa and CD 20 bright . Lab work showed normal CBC , Hb A1c 9, negative HIV , Hep c and SPEP .    \par Since surgery 2 weeks ago he noted marked increase in painful  nisreen masses in the neck , supraclavicular and right axillae , he takes percocet to sleep with partial relief  , PET showed generalized adenopathy with maximum SUV 9 . No bone or marrow uptake. He complains of dyspnea on exertion , mild cough , improved with short course of prednisone, he lost few lbs and denies fever or night sweats. \par No history of EGD or colonoscopy . \par  [de-identified] : 08/27/2018 : Patient returns for follow up after BR X 3 with near complete response. Treatment was held due to worsening complaints of weakness, exertional dyspnea. RUQ pain . He followed with pulmonary and was placed on inhaled bronchodilators , He had MUGA scan and may require a second pacemaker lead. He denies any B symptoms or any new lumps or adenopathy . \par \par 10/23/2018 Patient returns for next dose of rituxan , He denies any new complaints . He was seen by GI for right sided abdominal tenderness, felt to be muscular in origin , CT scan apparently shows abdominal hernias probably unrelated to his complaint . He is scheduled for surveillance colonoscopy . He denies any B symptoms, he continues to have dyspnea on exertion and went on social security disability .\par \par 12/17/2018 \par Patient returns for next dose of rituxan maintenance . He denies any B symptoms , continues to complain of mild RUQ pain and tenderness. He is followed by cardiology and pulmonary and is undergoing work up to rule out cardiac sarcoidosis . \par \par 2/25/19:\par Doing well. No major complaints.\par Denies fever, nausea, vomiting, chest pain, SOB, abdominal pain, bowel and bladder problems.\par Due for Rituxan tmrw (cycle 7. \par C/o RUQ pain and tenderness. \par Last CT scans in 07/2018. \par Had PET scan done at [facility] in Dec 2018 but we didin't have the results. \par

## 2019-03-28 ENCOUNTER — OUTPATIENT (OUTPATIENT)
Dept: OUTPATIENT SERVICES | Facility: HOSPITAL | Age: 59
LOS: 1 days | Discharge: HOME | End: 2019-03-28

## 2019-03-28 DIAGNOSIS — Z95.0 PRESENCE OF CARDIAC PACEMAKER: Chronic | ICD-10-CM

## 2019-03-28 DIAGNOSIS — R07.9 CHEST PAIN, UNSPECIFIED: ICD-10-CM

## 2019-04-22 ENCOUNTER — LABORATORY RESULT (OUTPATIENT)
Age: 59
End: 2019-04-22

## 2019-04-22 ENCOUNTER — APPOINTMENT (OUTPATIENT)
Dept: HEMATOLOGY ONCOLOGY | Facility: CLINIC | Age: 59
End: 2019-04-22

## 2019-04-22 VITALS
TEMPERATURE: 96 F | HEART RATE: 86 BPM | RESPIRATION RATE: 14 BRPM | BODY MASS INDEX: 28.23 KG/M2 | DIASTOLIC BLOOD PRESSURE: 92 MMHG | HEIGHT: 74 IN | SYSTOLIC BLOOD PRESSURE: 148 MMHG | WEIGHT: 220 LBS

## 2019-04-22 LAB
HCT VFR BLD CALC: 45.9 %
HGB BLD-MCNC: 16 G/DL
MCHC RBC-ENTMCNC: 29.7 PG
MCHC RBC-ENTMCNC: 34.9 G/DL
MCV RBC AUTO: 85.2 FL
PLATELET # BLD AUTO: 176 K/UL
PMV BLD: 10.8 FL
RBC # BLD: 5.39 M/UL
RBC # FLD: 12 %
WBC # FLD AUTO: 4.52 K/UL

## 2019-04-23 ENCOUNTER — APPOINTMENT (OUTPATIENT)
Dept: INFUSION THERAPY | Facility: CLINIC | Age: 59
End: 2019-04-23

## 2019-04-23 LAB
ALBUMIN SERPL ELPH-MCNC: 4.5 G/DL
ALP BLD-CCNC: 58 U/L
ALT SERPL-CCNC: 35 U/L
ANION GAP SERPL CALC-SCNC: 15 MMOL/L
AST SERPL-CCNC: 23 U/L
BILIRUB SERPL-MCNC: 1.6 MG/DL
BUN SERPL-MCNC: 17 MG/DL
CALCIUM SERPL-MCNC: 9.1 MG/DL
CHLORIDE SERPL-SCNC: 101 MMOL/L
CO2 SERPL-SCNC: 24 MMOL/L
CREAT SERPL-MCNC: 1 MG/DL
GLUCOSE SERPL-MCNC: 301 MG/DL
LDH SERPL-CCNC: 257 U/L
POTASSIUM SERPL-SCNC: 4.4 MMOL/L
PROT SERPL-MCNC: 6.6 G/DL
SODIUM SERPL-SCNC: 140 MMOL/L

## 2019-04-23 RX ORDER — DIPHENHYDRAMINE HCL 50 MG
50 CAPSULE ORAL ONCE
Qty: 0 | Refills: 0 | Status: COMPLETED | OUTPATIENT
Start: 2019-04-23 | End: 2019-04-23

## 2019-04-23 RX ORDER — ACETAMINOPHEN 500 MG
650 TABLET ORAL ONCE
Qty: 0 | Refills: 0 | Status: COMPLETED | OUTPATIENT
Start: 2019-04-23 | End: 2019-04-23

## 2019-04-23 RX ORDER — RITUXIMAB 10 MG/ML
850 INJECTION, SOLUTION INTRAVENOUS ONCE
Qty: 0 | Refills: 0 | Status: COMPLETED | OUTPATIENT
Start: 2019-04-23 | End: 2019-04-23

## 2019-04-23 RX ORDER — DEXAMETHASONE 0.5 MG/5ML
8 ELIXIR ORAL ONCE
Qty: 0 | Refills: 0 | Status: COMPLETED | OUTPATIENT
Start: 2019-04-23 | End: 2019-04-23

## 2019-04-23 RX ADMIN — RITUXIMAB 850 MILLIGRAM(S): 10 INJECTION, SOLUTION INTRAVENOUS at 13:15

## 2019-04-23 RX ADMIN — Medication 152.4 MILLIGRAM(S): at 08:57

## 2019-04-23 RX ADMIN — Medication 650 MILLIGRAM(S): at 09:33

## 2019-04-23 RX ADMIN — Medication 650 MILLIGRAM(S): at 08:57

## 2019-04-23 RX ADMIN — Medication 8 MILLIGRAM(S): at 09:15

## 2019-04-23 RX ADMIN — Medication 50 MILLIGRAM(S): at 08:57

## 2019-04-23 RX ADMIN — RITUXIMAB 170 MILLIGRAM(S): 10 INJECTION, SOLUTION INTRAVENOUS at 09:32

## 2019-04-23 NOTE — HISTORY OF PRESENT ILLNESS
[de-identified] : 57 year old obese male with PMH of COPD , diabetes, PPP for syncope. Patient went to the emergency room on February 4, 2018 with complaints of a swollen lymph node on right side of neck, which he had noticed several months prior to presentation. Lymph node was originally not painful but became painful about one week prior to presentation, which prompted him to go to the emergency room. No fevers, night sweats, anorexia or significant changes in weight. In the ED, patient underwent CT neck soft tissue, chest, abdomen and pelvis, which found multiple areas of LAD,Right-sided enlarged level 5 cervical chain lymph nodes measuring up to  1.9 x 1.7 cm (series 9 image 193). Enlarged right supraclavicular lymph  nodes measuring up to 1.6 x 1 cm(series 9 image 214).   in the R supraclavicular lymph node, R axillary lymph node,  Enlarged mesenteric lymph nodes measuring up to 1.3  x 1 cm (series 2 image 92)., There was no evidence of mediastinal or hilar LAD at that time. However, patient is not certain if he took prednisone prior to imaging. As per wife, patient takes short-course prednisone tapers every few months secondary to bronchitis. OTTONIEL plummer is a former 1 PPD smoker for 30-40 years who quit about five months prior to presentation and denies any previous history of asbestosis exposure. He worked as a . He has no pets at home. Biopsy of right cervical lymph node by ENT  Low grade B-cell lymphoma, consistent with nisreen marginal zone lymphoma.  the neoplastic cells are CD20+ CD79a+ PAX5+ B-cells that co-express BCL2. They are negative for CD5, CD10, BCL6, cyclinD1, and CD43. The proliferation index (Ki67 labeling) ranges from 5 to 20% in different areas excluding the germinal centers, flow cytometry studies performed at [facility] show monotypic B-cells (38% of cells), positive for kappa, CD19, CD20, FMC-7, CD23, minimal CD10; negative for CD5. Viability is 78%.   *** In summary, the findings are diagnostic of low grade B-cell lymphoma, the features are consistent with nisreen marginal zone lymphoma.     2 , peripheral blood flow showed - A MINUTE MONOCLONAL, CD10+ B-CELL POPULATION IS OBSERVED (0.14%). kappa and CD 20 bright . Lab work showed normal CBC , Hb A1c 9, negative HIV , Hep c and SPEP .    \par Since surgery 2 weeks ago he noted marked increase in painful  nisreen masses in the neck , supraclavicular and right axillae , he takes percocet to sleep with partial relief  , PET showed generalized adenopathy with maximum SUV 9 . No bone or marrow uptake. He complains of dyspnea on exertion , mild cough , improved with short course of prednisone, he lost few lbs and denies fever or night sweats. \par No history of EGD or colonoscopy . \par  [de-identified] : 08/27/2018 : Patient returns for follow up after BR X 3 with near complete response. Treatment was held due to worsening complaints of weakness, exertional dyspnea. RUQ pain . He followed with pulmonary and was placed on inhaled bronchodilators , He had MUGA scan and may require a second pacemaker lead. He denies any B symptoms or any new lumps or adenopathy . \par \par 10/23/2018 Patient returns for next dose of rituxan , He denies any new complaints . He was seen by GI for right sided abdominal tenderness, felt to be muscular in origin , CT scan apparently shows abdominal hernias probably unrelated to his complaint . He is scheduled for surveillance colonoscopy . He denies any B symptoms, he continues to have dyspnea on exertion and went on social security disability .\par \par 12/17/2018 \par Patient returns for next dose of rituxan maintenance . He denies any B symptoms , continues to complain of mild RUQ pain and tenderness. He is followed by cardiology and pulmonary and is undergoing work up to rule out cardiac sarcoidosis . \par \par 2/25/19:\par Doing well. No major complaints.\par Denies fever, nausea, vomiting, chest pain, SOB, abdominal pain, bowel and bladder problems.\par Due for Rituxan tmrw (cycle 7. \par C/o RUQ pain and tenderness. \par Last CT scans in 07/2018. \par [facility] but we didin't have the results. \par \par 4/22/19:\par Doing well. No major complaints.\par Denies fever, nausea, vomiting, chest pain, SOB, abdominal pain, bowel and bladder problems.\par S/p 7 cycles Rituxan. \par C/o RUQ pain and tenderness. \par Trying to get the PET scan results. \par

## 2019-04-23 NOTE — PHYSICAL EXAM
[Normal] : no peripheral adenopathy appreciated [Ulcers] : no ulcers [Thrush] : no thrush [Mucositis] : no mucositis [de-identified] : no organomegaly, mild RUQ tenderness, no masses , no rebound.  [de-identified] : no residual adenopathy .

## 2019-04-23 NOTE — ASSESSMENT
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 \par \par PLAN:\par - Continue maintenance rituxan every 2 months for a total of 2 yrs. Cycle 8 tmrw. \par - Last CT scans in  07/2018. Last PET Scan done at [facility] in 12/2018. Will repeat imaging in 2-3 months. \par - Continues to have pain/tenderness  in the RUQ. Workup done in the past. \par - Pulmonary f/u to r/o sarcoidosis. \par - Labs ordered. \par \par RTC in 2 months.\par Pt seen and examined with \par

## 2019-05-02 ENCOUNTER — APPOINTMENT (OUTPATIENT)
Dept: CARDIOLOGY | Facility: CLINIC | Age: 59
End: 2019-05-02
Payer: MEDICAID

## 2019-05-02 PROCEDURE — 93000 ELECTROCARDIOGRAM COMPLETE: CPT

## 2019-05-02 PROCEDURE — 99214 OFFICE O/P EST MOD 30 MIN: CPT

## 2019-05-07 ENCOUNTER — TRANSCRIPTION ENCOUNTER (OUTPATIENT)
Age: 59
End: 2019-05-07

## 2019-05-11 ENCOUNTER — LABORATORY RESULT (OUTPATIENT)
Age: 59
End: 2019-05-11

## 2019-05-11 ENCOUNTER — OUTPATIENT (OUTPATIENT)
Dept: OUTPATIENT SERVICES | Facility: HOSPITAL | Age: 59
LOS: 1 days | Discharge: HOME | End: 2019-05-11

## 2019-05-11 DIAGNOSIS — Z95.0 PRESENCE OF CARDIAC PACEMAKER: Chronic | ICD-10-CM

## 2019-05-11 DIAGNOSIS — I25.10 ATHEROSCLEROTIC HEART DISEASE OF NATIVE CORONARY ARTERY WITHOUT ANGINA PECTORIS: ICD-10-CM

## 2019-05-18 ENCOUNTER — EMERGENCY (EMERGENCY)
Facility: HOSPITAL | Age: 59
LOS: 0 days | Discharge: HOME | End: 2019-05-18
Attending: EMERGENCY MEDICINE | Admitting: EMERGENCY MEDICINE
Payer: MEDICAID

## 2019-05-18 VITALS
TEMPERATURE: 96 F | DIASTOLIC BLOOD PRESSURE: 90 MMHG | HEART RATE: 89 BPM | OXYGEN SATURATION: 96 % | RESPIRATION RATE: 18 BRPM | SYSTOLIC BLOOD PRESSURE: 154 MMHG

## 2019-05-18 VITALS
RESPIRATION RATE: 18 BRPM | TEMPERATURE: 97 F | OXYGEN SATURATION: 96 % | DIASTOLIC BLOOD PRESSURE: 88 MMHG | SYSTOLIC BLOOD PRESSURE: 144 MMHG | HEART RATE: 75 BPM

## 2019-05-18 DIAGNOSIS — Z79.891 LONG TERM (CURRENT) USE OF OPIATE ANALGESIC: ICD-10-CM

## 2019-05-18 DIAGNOSIS — I10 ESSENTIAL (PRIMARY) HYPERTENSION: ICD-10-CM

## 2019-05-18 DIAGNOSIS — Z79.02 LONG TERM (CURRENT) USE OF ANTITHROMBOTICS/ANTIPLATELETS: ICD-10-CM

## 2019-05-18 DIAGNOSIS — Z79.51 LONG TERM (CURRENT) USE OF INHALED STEROIDS: ICD-10-CM

## 2019-05-18 DIAGNOSIS — Z79.811 LONG TERM (CURRENT) USE OF AROMATASE INHIBITORS: ICD-10-CM

## 2019-05-18 DIAGNOSIS — Z79.4 LONG TERM (CURRENT) USE OF INSULIN: ICD-10-CM

## 2019-05-18 DIAGNOSIS — R05 COUGH: ICD-10-CM

## 2019-05-18 DIAGNOSIS — Z79.84 LONG TERM (CURRENT) USE OF ORAL HYPOGLYCEMIC DRUGS: ICD-10-CM

## 2019-05-18 DIAGNOSIS — J44.9 CHRONIC OBSTRUCTIVE PULMONARY DISEASE, UNSPECIFIED: ICD-10-CM

## 2019-05-18 DIAGNOSIS — Z95.0 PRESENCE OF CARDIAC PACEMAKER: Chronic | ICD-10-CM

## 2019-05-18 DIAGNOSIS — Z79.1 LONG TERM (CURRENT) USE OF NON-STEROIDAL ANTI-INFLAMMATORIES (NSAID): ICD-10-CM

## 2019-05-18 DIAGNOSIS — Z79.899 OTHER LONG TERM (CURRENT) DRUG THERAPY: ICD-10-CM

## 2019-05-18 DIAGNOSIS — E11.9 TYPE 2 DIABETES MELLITUS WITHOUT COMPLICATIONS: ICD-10-CM

## 2019-05-18 LAB
ALBUMIN SERPL ELPH-MCNC: 4.5 G/DL — SIGNIFICANT CHANGE UP (ref 3.5–5.2)
ALP SERPL-CCNC: 62 U/L — SIGNIFICANT CHANGE UP (ref 30–115)
ALT FLD-CCNC: 34 U/L — SIGNIFICANT CHANGE UP (ref 0–41)
ANION GAP SERPL CALC-SCNC: 13 MMOL/L — SIGNIFICANT CHANGE UP (ref 7–14)
APTT BLD: 29.2 SEC — SIGNIFICANT CHANGE UP (ref 27–39.2)
AST SERPL-CCNC: 24 U/L — SIGNIFICANT CHANGE UP (ref 0–41)
BASE EXCESS BLDV CALC-SCNC: 3.4 MMOL/L — HIGH (ref -2–2)
BASOPHILS # BLD AUTO: 0.05 K/UL — SIGNIFICANT CHANGE UP (ref 0–0.2)
BASOPHILS NFR BLD AUTO: 1.2 % — HIGH (ref 0–1)
BILIRUB SERPL-MCNC: 1.1 MG/DL — SIGNIFICANT CHANGE UP (ref 0.2–1.2)
BUN SERPL-MCNC: 14 MG/DL — SIGNIFICANT CHANGE UP (ref 10–20)
CA-I SERPL-SCNC: 1.24 MMOL/L — SIGNIFICANT CHANGE UP (ref 1.12–1.3)
CALCIUM SERPL-MCNC: 9.5 MG/DL — SIGNIFICANT CHANGE UP (ref 8.5–10.1)
CHLORIDE SERPL-SCNC: 100 MMOL/L — SIGNIFICANT CHANGE UP (ref 98–110)
CO2 SERPL-SCNC: 25 MMOL/L — SIGNIFICANT CHANGE UP (ref 17–32)
CREAT SERPL-MCNC: 1.1 MG/DL — SIGNIFICANT CHANGE UP (ref 0.7–1.5)
D DIMER BLD IA.RAPID-MCNC: 63 NG/ML DDU — SIGNIFICANT CHANGE UP (ref 0–230)
EOSINOPHIL # BLD AUTO: 0.2 K/UL — SIGNIFICANT CHANGE UP (ref 0–0.7)
EOSINOPHIL NFR BLD AUTO: 5 % — SIGNIFICANT CHANGE UP (ref 0–8)
GAS PNL BLDV: 139 MMOL/L — SIGNIFICANT CHANGE UP (ref 136–145)
GAS PNL BLDV: SIGNIFICANT CHANGE UP
GAS PNL BLDV: SIGNIFICANT CHANGE UP
GLUCOSE SERPL-MCNC: 276 MG/DL — HIGH (ref 70–99)
HCO3 BLDV-SCNC: 29 MMOL/L — SIGNIFICANT CHANGE UP (ref 22–29)
HCT VFR BLD CALC: 45.5 % — SIGNIFICANT CHANGE UP (ref 42–52)
HCT VFR BLDA CALC: 50 % — HIGH (ref 34–44)
HGB BLD CALC-MCNC: 16 G/DL — SIGNIFICANT CHANGE UP (ref 14–18)
HGB BLD-MCNC: 15.3 G/DL — SIGNIFICANT CHANGE UP (ref 14–18)
IMM GRANULOCYTES NFR BLD AUTO: 0.5 % — HIGH (ref 0.1–0.3)
INR BLD: 1.02 RATIO — SIGNIFICANT CHANGE UP (ref 0.65–1.3)
LACTATE BLDV-MCNC: 1.3 MMOL/L — SIGNIFICANT CHANGE UP (ref 0.5–1.6)
LACTATE SERPL-SCNC: 1.5 MMOL/L — SIGNIFICANT CHANGE UP (ref 0.5–2.2)
LYMPHOCYTES # BLD AUTO: 0.51 K/UL — LOW (ref 1.2–3.4)
LYMPHOCYTES # BLD AUTO: 12.7 % — LOW (ref 20.5–51.1)
MAGNESIUM SERPL-MCNC: 2 MG/DL — SIGNIFICANT CHANGE UP (ref 1.8–2.4)
MCHC RBC-ENTMCNC: 29.7 PG — SIGNIFICANT CHANGE UP (ref 27–31)
MCHC RBC-ENTMCNC: 33.6 G/DL — SIGNIFICANT CHANGE UP (ref 32–37)
MCV RBC AUTO: 88.3 FL — SIGNIFICANT CHANGE UP (ref 80–94)
MONOCYTES # BLD AUTO: 0.61 K/UL — HIGH (ref 0.1–0.6)
MONOCYTES NFR BLD AUTO: 15.2 % — HIGH (ref 1.7–9.3)
NEUTROPHILS # BLD AUTO: 2.62 K/UL — SIGNIFICANT CHANGE UP (ref 1.4–6.5)
NEUTROPHILS NFR BLD AUTO: 65.4 % — SIGNIFICANT CHANGE UP (ref 42.2–75.2)
NRBC # BLD: 0 /100 WBCS — SIGNIFICANT CHANGE UP (ref 0–0)
NT-PROBNP SERPL-SCNC: 52 PG/ML — SIGNIFICANT CHANGE UP (ref 0–300)
PCO2 BLDV: 45 MMHG — SIGNIFICANT CHANGE UP (ref 41–51)
PH BLDV: 7.41 — SIGNIFICANT CHANGE UP (ref 7.26–7.43)
PLATELET # BLD AUTO: 209 K/UL — SIGNIFICANT CHANGE UP (ref 130–400)
PO2 BLDV: 38 MMHG — SIGNIFICANT CHANGE UP (ref 20–40)
POTASSIUM BLDV-SCNC: 4 MMOL/L — SIGNIFICANT CHANGE UP (ref 3.3–5.6)
POTASSIUM SERPL-MCNC: 4.3 MMOL/L — SIGNIFICANT CHANGE UP (ref 3.5–5)
POTASSIUM SERPL-SCNC: 4.3 MMOL/L — SIGNIFICANT CHANGE UP (ref 3.5–5)
PROT SERPL-MCNC: 6.6 G/DL — SIGNIFICANT CHANGE UP (ref 6–8)
PROTHROM AB SERPL-ACNC: 11.7 SEC — SIGNIFICANT CHANGE UP (ref 9.95–12.87)
RBC # BLD: 5.15 M/UL — SIGNIFICANT CHANGE UP (ref 4.7–6.1)
RBC # FLD: 12 % — SIGNIFICANT CHANGE UP (ref 11.5–14.5)
SAO2 % BLDV: 75 % — SIGNIFICANT CHANGE UP
SODIUM SERPL-SCNC: 138 MMOL/L — SIGNIFICANT CHANGE UP (ref 135–146)
TROPONIN T SERPL-MCNC: <0.01 NG/ML — SIGNIFICANT CHANGE UP
WBC # BLD: 4.01 K/UL — LOW (ref 4.8–10.8)
WBC # FLD AUTO: 4.01 K/UL — LOW (ref 4.8–10.8)

## 2019-05-18 PROCEDURE — 71045 X-RAY EXAM CHEST 1 VIEW: CPT | Mod: 26

## 2019-05-18 PROCEDURE — 71275 CT ANGIOGRAPHY CHEST: CPT | Mod: 26

## 2019-05-18 PROCEDURE — 99285 EMERGENCY DEPT VISIT HI MDM: CPT

## 2019-05-18 PROCEDURE — 93010 ELECTROCARDIOGRAM REPORT: CPT

## 2019-05-18 NOTE — ED PROVIDER NOTE - ATTENDING CONTRIBUTION TO CARE
58 y m pmh copd, htn, dm, hld, pacemaker, non hodgkin's lymphoma stage IV on chemo (last dose 4/23) pw cough, dy non prodcutive for 5 days, no chest pain, no n/v/d, no loc, no fever    CONSTITUTIONAL: Well-developed; well-nourished; in no acute distress. Sitting up and providing appropriate history and physical examination  SKIN: skin exam is warm and dry, no acute rash.  HEAD: Normocephalic; atraumatic.  EYES: PERRL, 3 mm bilateral, no nystagmus, EOM intact; conjunctiva and sclera clear.  ENT: + pharyngeal erythema, no exudate, no edema, No nasal discharge; airway clear.  NECK: Supple; non tender. + full passive ROM in all directions. No JVD  CARD: S1, S2 normal; no murmurs, gallops, or rubs. Regular rate and rhythm. + Symmetric Strong Pulses  RESP: No wheezes, rales or rhonchi. Good air movement bilaterally  ABD: soft; non-distended; non-tender. No Rebound, No Guarding, No signs of peritonitis, No CVA tenderness. No pulsatile abdominal mass. + Strong and Symmetric Pulses  EXT: Normal ROM. No clubbing, cyanosis or edema. Dp and Pt Pulses intact. Cap refill less than 3 seconds  NEURO: CN 2-12 intact, normal finger to nose, normal romberg, stable gait, no sensory or motor deficits, Alert, oriented, grossly unremarkable. No Focal deficits. GCS 15. NIH 0  PSYCH: Cooperative, appropriate.

## 2019-05-18 NOTE — ED PROVIDER NOTE - NS ED ROS FT
Eyes:  No visual changes, eye pain or discharge.  ENMT:  No hearing changes, pain, discharge or infections. No neck pain or stiffness.  Cardiac:  No chest pain, SOB or edema.   Respiratory: +productive cough. No hemoptysis. No sob, respiratory distress.   GI:  No nausea, vomiting, diarrhea or abdominal pain.  :  No dysuria, frequency or burning.  MS:  No myalgia, muscle weakness, joint pain or back pain.  Neuro:  No headache or weakness.  No LOC.  Skin:  No skin rash.   Endocrine: No history of thyroid disease. +DM.

## 2019-05-18 NOTE — ED PROVIDER NOTE - CLINICAL SUMMARY MEDICAL DECISION MAKING FREE TEXT BOX
I personally evaluated the patient. I reviewed the Resident’s or Physician Assistant’s note (as assigned above), and agree with the findings and plan except as documented in my note. labs and imaging reviewed. I have fully discussed the medical management and delivery of care with the patient. I have discussed any available labs, imaging and treatment options with the patient. Patient confirms understanding and has been given detailed return precautions. Patient instructed to return to the ED should symptoms persist or worsen. Patient has demonstrated capacity and has verbalized understanding. Patient is well appearing upon discharge.

## 2019-05-18 NOTE — ED ADULT NURSE NOTE - NSIMPLEMENTINTERV_GEN_ALL_ED
Implemented All Universal Safety Interventions:  [place] to call system. Call bell, personal items and telephone within reach. Instruct patient to call for assistance. Room bathroom lighting operational. Non-slip footwear when patient is off stretcher. Physically safe environment: no spills, clutter or unnecessary equipment. Stretcher in lowest position, wheels locked, appropriate side rails in place.

## 2019-05-18 NOTE — ED PROVIDER NOTE - CARE PROVIDERS DIRECT ADDRESSES
[email or web address]

## 2019-05-18 NOTE — ED PROVIDER NOTE - OBJECTIVE STATEMENT
58 y m pmh copd, htn, dm, hld, pacemaker, non hodgkin's lymphoma stage IV on chemo (last dose 4/23) pw cough. Cough for the past 5-6 days. Mildly productive. Tried albuterol nebs a few times per day with no improvement. Treated with abx outpatient with no improvement. Denies fever, chills, back pain, n/v, abd pain, diarrhea, headache.

## 2019-05-18 NOTE — ED ADULT TRIAGE NOTE - CHIEF COMPLAINT QUOTE
Patient c/o productive cough x 1 week, seen in urgent care recently but symptoms have not subsided. Patient denies CO SOB Fevers and chills.

## 2019-05-18 NOTE — ED PROVIDER NOTE - PHYSICAL EXAMINATION
CONSTITUTIONAL: Well-developed; well-nourished; in no acute distress.   SKIN: warm, dry  HEAD: Normocephalic; atraumatic.  EYES: normal sclera and conjunctiva   ENT: No nasal discharge; airway clear.  NECK: Supple; non tender.  CARD: S1, S2 normal; no murmurs, gallops, or rubs. Regular rate and rhythm.   RESP: No wheezes, rales or rhonchi. No respiratory distress. No retractions, no accessory muscle use.   ABD: soft ntnd  EXT: Normal ROM.  No clubbing, cyanosis or edema.   LYMPH: No acute cervical adenopathy.  NEURO: Alert, oriented, grossly unremarkable  PSYCH: Cooperative, appropriate.

## 2019-05-18 NOTE — ED PROVIDER NOTE - CARE PROVIDER_API CALL
Demarcus Herron)  Family Medicine  [address], [address]  [place] [address]  Phone: [phone]  Fax: [phone]  Follow Up Time:

## 2019-05-22 ENCOUNTER — OUTPATIENT (OUTPATIENT)
Dept: OUTPATIENT SERVICES | Facility: HOSPITAL | Age: 59
LOS: 1 days | Discharge: HOME | End: 2019-05-22
Payer: MEDICAID

## 2019-05-22 DIAGNOSIS — Z95.0 PRESENCE OF CARDIAC PACEMAKER: Chronic | ICD-10-CM

## 2019-05-22 DIAGNOSIS — R07.9 CHEST PAIN, UNSPECIFIED: ICD-10-CM

## 2019-05-22 PROCEDURE — 75574 CT ANGIO HRT W/3D IMAGE: CPT | Mod: 26

## 2019-05-23 ENCOUNTER — APPOINTMENT (OUTPATIENT)
Dept: CARDIOLOGY | Facility: CLINIC | Age: 59
End: 2019-05-23
Payer: MEDICAID

## 2019-05-23 PROCEDURE — 99213 OFFICE O/P EST LOW 20 MIN: CPT

## 2019-05-24 ENCOUNTER — LABORATORY RESULT (OUTPATIENT)
Age: 59
End: 2019-05-24

## 2019-05-24 ENCOUNTER — OUTPATIENT (OUTPATIENT)
Dept: OUTPATIENT SERVICES | Facility: HOSPITAL | Age: 59
LOS: 1 days | Discharge: HOME | End: 2019-05-24

## 2019-05-24 DIAGNOSIS — I25.10 ATHEROSCLEROTIC HEART DISEASE OF NATIVE CORONARY ARTERY WITHOUT ANGINA PECTORIS: ICD-10-CM

## 2019-05-24 DIAGNOSIS — Z79.01 LONG TERM (CURRENT) USE OF ANTICOAGULANTS: ICD-10-CM

## 2019-05-24 DIAGNOSIS — Z01.810 ENCOUNTER FOR PREPROCEDURAL CARDIOVASCULAR EXAMINATION: ICD-10-CM

## 2019-05-24 DIAGNOSIS — Z95.0 PRESENCE OF CARDIAC PACEMAKER: Chronic | ICD-10-CM

## 2019-05-27 ENCOUNTER — TRANSCRIPTION ENCOUNTER (OUTPATIENT)
Age: 59
End: 2019-05-27

## 2019-05-31 NOTE — PROGRESS NOTE ADULT - PROVIDER SPECIALTY LIST ADULT
ENT
Internal Medicine
Urology
room air

## 2019-06-03 ENCOUNTER — APPOINTMENT (OUTPATIENT)
Dept: CARDIOLOGY | Facility: CLINIC | Age: 59
End: 2019-06-03
Payer: MEDICAID

## 2019-06-03 ENCOUNTER — OUTPATIENT (OUTPATIENT)
Dept: OUTPATIENT SERVICES | Facility: HOSPITAL | Age: 59
LOS: 1 days | Discharge: HOME | End: 2019-06-03

## 2019-06-03 DIAGNOSIS — Z95.0 PRESENCE OF CARDIAC PACEMAKER: Chronic | ICD-10-CM

## 2019-06-03 DIAGNOSIS — C83.00 SMALL CELL B-CELL LYMPHOMA, UNSPECIFIED SITE: ICD-10-CM

## 2019-06-03 DIAGNOSIS — E66.01 MORBID (SEVERE) OBESITY DUE TO EXCESS CALORIES: ICD-10-CM

## 2019-06-03 DIAGNOSIS — E11.9 TYPE 2 DIABETES MELLITUS WITHOUT COMPLICATIONS: ICD-10-CM

## 2019-06-03 DIAGNOSIS — E78.2 MIXED HYPERLIPIDEMIA: ICD-10-CM

## 2019-06-03 DIAGNOSIS — Z00.01 ENCOUNTER FOR GENERAL ADULT MEDICAL EXAMINATION WITH ABNORMAL FINDINGS: ICD-10-CM

## 2019-06-03 PROCEDURE — 93970 EXTREMITY STUDY: CPT

## 2019-06-05 ENCOUNTER — APPOINTMENT (OUTPATIENT)
Dept: CARDIOLOGY | Facility: CLINIC | Age: 59
End: 2019-06-05
Payer: MEDICAID

## 2019-06-05 PROCEDURE — 99213 OFFICE O/P EST LOW 20 MIN: CPT | Mod: 25

## 2019-06-05 PROCEDURE — 93280 PM DEVICE PROGR EVAL DUAL: CPT | Mod: 59

## 2019-06-14 ENCOUNTER — APPOINTMENT (OUTPATIENT)
Dept: CARDIOLOGY | Facility: CLINIC | Age: 59
End: 2019-06-14
Payer: MEDICAID

## 2019-06-14 PROCEDURE — 99213 OFFICE O/P EST LOW 20 MIN: CPT

## 2019-06-17 ENCOUNTER — APPOINTMENT (OUTPATIENT)
Dept: INFUSION THERAPY | Facility: CLINIC | Age: 59
End: 2019-06-17
Payer: MEDICAID

## 2019-06-17 ENCOUNTER — LABORATORY RESULT (OUTPATIENT)
Age: 59
End: 2019-06-17

## 2019-06-17 ENCOUNTER — APPOINTMENT (OUTPATIENT)
Dept: HEMATOLOGY ONCOLOGY | Facility: CLINIC | Age: 59
End: 2019-06-17
Payer: MEDICAID

## 2019-06-17 VITALS
RESPIRATION RATE: 14 BRPM | HEIGHT: 74 IN | TEMPERATURE: 98.7 F | HEART RATE: 85 BPM | SYSTOLIC BLOOD PRESSURE: 117 MMHG | BODY MASS INDEX: 27.21 KG/M2 | WEIGHT: 212 LBS | DIASTOLIC BLOOD PRESSURE: 76 MMHG

## 2019-06-17 LAB
ALBUMIN SERPL ELPH-MCNC: 4 G/DL
ALP BLD-CCNC: 59 U/L
ALT SERPL-CCNC: 28 U/L
ANION GAP SERPL CALC-SCNC: 12 MMOL/L
AST SERPL-CCNC: 24 U/L
BILIRUB SERPL-MCNC: 1.1 MG/DL
BUN SERPL-MCNC: 20 MG/DL
CALCIUM SERPL-MCNC: 9.6 MG/DL
CHLORIDE SERPL-SCNC: 98 MMOL/L
CO2 SERPL-SCNC: 26 MMOL/L
CREAT SERPL-MCNC: 0.8 MG/DL
GLUCOSE SERPL-MCNC: 307 MG/DL
HCT VFR BLD CALC: 47.3 %
HGB BLD-MCNC: 16 G/DL
MCHC RBC-ENTMCNC: 29.9 PG
MCHC RBC-ENTMCNC: 33.8 G/DL
MCV RBC AUTO: 88.2 FL
PLATELET # BLD AUTO: 223 K/UL
PMV BLD: 10.9 FL
POTASSIUM SERPL-SCNC: 4.4 MMOL/L
PROT SERPL-MCNC: 6.5 G/DL
RBC # BLD: 5.36 M/UL
RBC # FLD: 11.9 %
SODIUM SERPL-SCNC: 136 MMOL/L
WBC # FLD AUTO: 6.76 K/UL

## 2019-06-17 PROCEDURE — 99214 OFFICE O/P EST MOD 30 MIN: CPT

## 2019-06-17 RX ORDER — DEXAMETHASONE 0.5 MG/5ML
8 ELIXIR ORAL ONCE
Refills: 0 | Status: COMPLETED | OUTPATIENT
Start: 2019-06-17 | End: 2019-06-17

## 2019-06-17 RX ORDER — DIPHENHYDRAMINE HCL 50 MG
50 CAPSULE ORAL ONCE
Refills: 0 | Status: COMPLETED | OUTPATIENT
Start: 2019-06-17 | End: 2019-06-17

## 2019-06-17 RX ORDER — RITUXIMAB 10 MG/ML
850 INJECTION, SOLUTION INTRAVENOUS ONCE
Refills: 0 | Status: COMPLETED | OUTPATIENT
Start: 2019-06-17 | End: 2019-06-17

## 2019-06-17 RX ORDER — ACETAMINOPHEN 500 MG
650 TABLET ORAL ONCE
Refills: 0 | Status: COMPLETED | OUTPATIENT
Start: 2019-06-17 | End: 2019-06-17

## 2019-06-17 RX ADMIN — Medication 8 MILLIGRAM(S): at 10:56

## 2019-06-17 RX ADMIN — RITUXIMAB 170 MILLIGRAM(S): 10 INJECTION, SOLUTION INTRAVENOUS at 11:03

## 2019-06-17 RX ADMIN — Medication 650 MILLIGRAM(S): at 10:35

## 2019-06-17 RX ADMIN — Medication 50 MILLIGRAM(S): at 10:35

## 2019-06-17 RX ADMIN — Medication 152.4 MILLIGRAM(S): at 10:36

## 2019-06-17 NOTE — PHYSICAL EXAM
[Ulcers] : no ulcers [Mucositis] : no mucositis [Thrush] : no thrush [Normal] : no peripheral adenopathy appreciated [de-identified] : no residual adenopathy .  [de-identified] : no organomegaly, mild RUQ tenderness, no masses , no rebound.

## 2019-06-17 NOTE — ASSESSMENT
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 No evidenc of disease on CT chest . \par S/P pneumonia ? \par left atrial thrombus , scheduled for ANITA . \par COntinue rituxan , check IgG level , consider IvIG .\par \par \par \par \par

## 2019-06-18 LAB — IGG SER QL IEP: 695 MG/DL

## 2019-06-21 ENCOUNTER — APPOINTMENT (OUTPATIENT)
Dept: OTOLARYNGOLOGY | Facility: CLINIC | Age: 59
End: 2019-06-21
Payer: MEDICAID

## 2019-06-21 VITALS — HEIGHT: 72 IN | WEIGHT: 212 LBS | BODY MASS INDEX: 28.71 KG/M2

## 2019-06-21 PROCEDURE — 31575 DIAGNOSTIC LARYNGOSCOPY: CPT

## 2019-06-21 PROCEDURE — 99213 OFFICE O/P EST LOW 20 MIN: CPT | Mod: 25

## 2019-06-21 NOTE — ASSESSMENT
[FreeTextEntry1] : - will proceed with CT neck with contrast ASAP\par - will have him f/up next week with Dr Rider, message left for him\par - CBC\par - Augmentin for possible pharyngitis/laryngitis persistent for several weeks now\par

## 2019-06-21 NOTE — REASON FOR VISIT
[Subsequent Evaluation] : a subsequent evaluation for [FreeTextEntry2] : neck mass,clogged right ear, hoarseness

## 2019-06-21 NOTE — HISTORY OF PRESENT ILLNESS
[de-identified] : 57 Year old M who was recently evaluated in the hospital for diffuse cervical LAD x several months. They recently became painful. He was evaluated by the ENT service in the hospital who performed excisional lymph node biopsy of right neck on 4/6/18. Patient denies weight loss, fevers, night sweats. Here for followup.\par \par Of note he has a remote history of a tracheostomy and chest (airway?) surgery done when he was an infant. He doesn't know why.  [FreeTextEntry1] : \par 6/21/19 Patient is here today c/o clogged right ear. Patient admits his ear has been clogged for a while. Patient uses qtips. \par Patient also c/o neck mass. Patients wife states he woke up Thursday morning with it. He has a h/o lymphoma, seen by Heme-onc on 6/17. he has been on maintenance Rituxan for one year.  New right sided neck mass appeared suddenly the day before yesterday, has become bigger. Mildly painful to touch. \par Patient also c/o hoarseness for about 1 month, worse over the past 3 days. Wife states he has been sick for about 1 month with bronchitis and pneumonia. On abx for this - Levaquin x 1 week, completed 1 week ago. Patient has been having cough which finally sub sided about 3-4 days ago. He denies dysphagia. Symptoms began after her was recovering from pneumonia, becoming worse over last 3-4 days. He denies reflux, no PND. CBC May 27, 2019: WBC 7.25.

## 2019-06-21 NOTE — CONSULT LETTER
[Dear  ___] : Dear  [unfilled], [Consult Letter:] : I had the pleasure of evaluating your patient, [unfilled]. [Please see my note below.] : Please see my note below. [Consult Closing:] : Thank you very much for allowing me to participate in the care of this patient.  If you have any questions, please do not hesitate to contact me. [Sincerely,] : Sincerely, [FreeTextEntry2] : Tarun Rider MD [FreeTextEntry3] : Katerina Alfaro MD\par Otolaryngology - Head & Neck Surgery\par

## 2019-06-21 NOTE — PHYSICAL EXAM
[Midline] : trachea located in midline position [Normal] : no rashes [de-identified] : right mobile, mildly tender, moderately erythematous lymphadenopathy, level IV, lateral to SCM, 1x2cm [de-identified] : right cerumen impaction [de-identified] : see above

## 2019-06-24 ENCOUNTER — FORM ENCOUNTER (OUTPATIENT)
Age: 59
End: 2019-06-24

## 2019-06-25 ENCOUNTER — OUTPATIENT (OUTPATIENT)
Dept: OUTPATIENT SERVICES | Facility: HOSPITAL | Age: 59
LOS: 1 days | Discharge: HOME | End: 2019-06-25
Payer: MEDICAID

## 2019-06-25 DIAGNOSIS — Z95.0 PRESENCE OF CARDIAC PACEMAKER: Chronic | ICD-10-CM

## 2019-06-25 DIAGNOSIS — R22.1 LOCALIZED SWELLING, MASS AND LUMP, NECK: ICD-10-CM

## 2019-06-25 PROCEDURE — 70491 CT SOFT TISSUE NECK W/DYE: CPT | Mod: 26

## 2019-06-27 ENCOUNTER — OUTPATIENT (OUTPATIENT)
Dept: OUTPATIENT SERVICES | Facility: HOSPITAL | Age: 59
LOS: 1 days | Discharge: HOME | End: 2019-06-27

## 2019-06-27 ENCOUNTER — APPOINTMENT (OUTPATIENT)
Dept: ENDOCRINOLOGY | Facility: CLINIC | Age: 59
End: 2019-06-27

## 2019-06-27 VITALS
BODY MASS INDEX: 28.44 KG/M2 | HEIGHT: 72 IN | HEART RATE: 99 BPM | SYSTOLIC BLOOD PRESSURE: 113 MMHG | WEIGHT: 210 LBS | DIASTOLIC BLOOD PRESSURE: 82 MMHG

## 2019-06-27 DIAGNOSIS — Z95.0 PRESENCE OF CARDIAC PACEMAKER: Chronic | ICD-10-CM

## 2019-06-27 RX ORDER — BLOOD SUGAR DIAGNOSTIC
STRIP MISCELLANEOUS 3 TIMES DAILY
Qty: 100 | Refills: 5 | Status: ACTIVE | COMMUNITY
Start: 2019-06-27 | End: 1900-01-01

## 2019-06-27 NOTE — PHYSICAL EXAM
[Alert] : alert [No Acute Distress] : no acute distress [Well Developed] : well developed [Well Nourished] : well nourished [EOMI] : extra ocular movement intact [Normal Sclera/Conjunctiva] : normal sclera/conjunctiva [Normal Oropharynx] : the oropharynx was normal [No Proptosis] : no proptosis [No Respiratory Distress] : no respiratory distress [Thyroid Not Enlarged] : the thyroid was not enlarged [No Thyroid Nodules] : there were no palpable thyroid nodules [No Accessory Muscle Use] : no accessory muscle use [Clear to Auscultation] : lungs were clear to auscultation bilaterally [Regular Rhythm] : with a regular rhythm [Normal Rate] : heart rate was normal  [Normal S1, S2] : normal S1 and S2 [No Edema] : there was no peripheral edema [Normal Bowel Sounds] : normal bowel sounds [Pedal Pulses Normal] : the pedal pulses are present [Not Distended] : not distended [Not Tender] : non-tender [Soft] : abdomen soft [Post Cervical Nodes] : posterior cervical nodes [Anterior Cervical Nodes] : anterior cervical nodes [Axillary Nodes] : axillary nodes [No Spinal Tenderness] : no spinal tenderness [Normal] : normal and non tender [Normal Gait] : normal gait [Spine Straight] : spine straight [No Stigmata of Cushings Syndrome] : no stigmata of cushings syndrome [Normal Strength/Tone] : muscle strength and tone were normal [Acanthosis Nigricans] : no acanthosis nigricans [No Rash] : no rash [Normal Reflexes] : deep tendon reflexes were 2+ and symmetric [Oriented x3] : oriented to person, place, and time [No Tremors] : no tremors

## 2019-06-27 NOTE — ASSESSMENT
[FreeTextEntry1] : add insulin, and adjust other medications. NOT SEEN FOR 2 YEARS, EXTREMELY NON COMPLIANT. [Carbohydrate Consistent Diet] : carbohydrate consistent diet [Hypoglycemia Management] : hypoglycemia management [Long Term Vascular Complications] : long term vascular complications of diabetes [Diabetes Foot Care] : diabetes foot care [Importance of Diet and Exercise] : importance of diet and exercise to improve glycemic control, achieve weight loss and improve cardiovascular health [Action and use of Insulin] : action and use of short and long-acting insulin [Insulin Self-Administration] : insulin self-administration

## 2019-06-27 NOTE — HISTORY OF PRESENT ILLNESS
[FreeTextEntry1] : NOT SEEN FOR 2 YUEARS, NON COMPLIANT WITH DIABETES APPOINTMENTS. poorly controlled type 2 diabetes, and hyperlipidemia

## 2019-07-02 ENCOUNTER — OUTPATIENT (OUTPATIENT)
Dept: OUTPATIENT SERVICES | Facility: HOSPITAL | Age: 59
LOS: 1 days | Discharge: HOME | End: 2019-07-02

## 2019-07-02 ENCOUNTER — OTHER (OUTPATIENT)
Age: 59
End: 2019-07-02

## 2019-07-02 DIAGNOSIS — Z95.0 PRESENCE OF CARDIAC PACEMAKER: Chronic | ICD-10-CM

## 2019-07-08 ENCOUNTER — LABORATORY RESULT (OUTPATIENT)
Age: 59
End: 2019-07-08

## 2019-07-08 ENCOUNTER — OUTPATIENT (OUTPATIENT)
Dept: OUTPATIENT SERVICES | Facility: HOSPITAL | Age: 59
LOS: 1 days | Discharge: HOME | End: 2019-07-08

## 2019-07-08 DIAGNOSIS — Z01.810 ENCOUNTER FOR PREPROCEDURAL CARDIOVASCULAR EXAMINATION: ICD-10-CM

## 2019-07-08 DIAGNOSIS — Z79.01 LONG TERM (CURRENT) USE OF ANTICOAGULANTS: ICD-10-CM

## 2019-07-08 DIAGNOSIS — I25.10 ATHEROSCLEROTIC HEART DISEASE OF NATIVE CORONARY ARTERY WITHOUT ANGINA PECTORIS: ICD-10-CM

## 2019-07-08 DIAGNOSIS — Z95.0 PRESENCE OF CARDIAC PACEMAKER: Chronic | ICD-10-CM

## 2019-07-15 ENCOUNTER — EMERGENCY (EMERGENCY)
Facility: HOSPITAL | Age: 59
LOS: 0 days | Discharge: HOME | End: 2019-07-16
Attending: EMERGENCY MEDICINE | Admitting: EMERGENCY MEDICINE
Payer: MEDICAID

## 2019-07-15 VITALS
HEART RATE: 81 BPM | TEMPERATURE: 97 F | RESPIRATION RATE: 18 BRPM | DIASTOLIC BLOOD PRESSURE: 72 MMHG | OXYGEN SATURATION: 95 % | SYSTOLIC BLOOD PRESSURE: 138 MMHG

## 2019-07-15 VITALS
OXYGEN SATURATION: 95 % | HEART RATE: 79 BPM | RESPIRATION RATE: 18 BRPM | DIASTOLIC BLOOD PRESSURE: 79 MMHG | SYSTOLIC BLOOD PRESSURE: 119 MMHG

## 2019-07-15 DIAGNOSIS — R07.89 OTHER CHEST PAIN: ICD-10-CM

## 2019-07-15 DIAGNOSIS — Z88.8 ALLERGY STATUS TO OTHER DRUGS, MEDICAMENTS AND BIOLOGICAL SUBSTANCES: ICD-10-CM

## 2019-07-15 DIAGNOSIS — Z95.0 PRESENCE OF CARDIAC PACEMAKER: Chronic | ICD-10-CM

## 2019-07-15 DIAGNOSIS — R07.9 CHEST PAIN, UNSPECIFIED: ICD-10-CM

## 2019-07-15 LAB
ALBUMIN SERPL ELPH-MCNC: 4.2 G/DL — SIGNIFICANT CHANGE UP (ref 3.5–5.2)
ALP SERPL-CCNC: 44 U/L — SIGNIFICANT CHANGE UP (ref 30–115)
ALT FLD-CCNC: 26 U/L — SIGNIFICANT CHANGE UP (ref 0–41)
ANION GAP SERPL CALC-SCNC: 14 MMOL/L — SIGNIFICANT CHANGE UP (ref 7–14)
APTT BLD: 34.2 SEC — SIGNIFICANT CHANGE UP (ref 27–39.2)
AST SERPL-CCNC: 31 U/L — SIGNIFICANT CHANGE UP (ref 0–41)
BILIRUB SERPL-MCNC: 1.4 MG/DL — HIGH (ref 0.2–1.2)
BUN SERPL-MCNC: 13 MG/DL — SIGNIFICANT CHANGE UP (ref 10–20)
CALCIUM SERPL-MCNC: 9.4 MG/DL — SIGNIFICANT CHANGE UP (ref 8.5–10.1)
CHLORIDE SERPL-SCNC: 101 MMOL/L — SIGNIFICANT CHANGE UP (ref 98–110)
CO2 SERPL-SCNC: 24 MMOL/L — SIGNIFICANT CHANGE UP (ref 17–32)
CREAT SERPL-MCNC: 1.1 MG/DL — SIGNIFICANT CHANGE UP (ref 0.7–1.5)
GLUCOSE SERPL-MCNC: 135 MG/DL — HIGH (ref 70–99)
HCT VFR BLD CALC: 41.1 % — LOW (ref 42–52)
HGB BLD-MCNC: 13.9 G/DL — LOW (ref 14–18)
INR BLD: 1.32 RATIO — HIGH (ref 0.65–1.3)
MCHC RBC-ENTMCNC: 29.8 PG — SIGNIFICANT CHANGE UP (ref 27–31)
MCHC RBC-ENTMCNC: 33.8 G/DL — SIGNIFICANT CHANGE UP (ref 32–37)
MCV RBC AUTO: 88.2 FL — SIGNIFICANT CHANGE UP (ref 80–94)
NRBC # BLD: 0 /100 WBCS — SIGNIFICANT CHANGE UP (ref 0–0)
PLATELET # BLD AUTO: 184 K/UL — SIGNIFICANT CHANGE UP (ref 130–400)
POTASSIUM SERPL-MCNC: 5.1 MMOL/L — HIGH (ref 3.5–5)
POTASSIUM SERPL-SCNC: 5.1 MMOL/L — HIGH (ref 3.5–5)
PROT SERPL-MCNC: 6.7 G/DL — SIGNIFICANT CHANGE UP (ref 6–8)
PROTHROM AB SERPL-ACNC: 15.1 SEC — HIGH (ref 9.95–12.87)
RBC # BLD: 4.66 M/UL — LOW (ref 4.7–6.1)
RBC # FLD: 12.8 % — SIGNIFICANT CHANGE UP (ref 11.5–14.5)
SODIUM SERPL-SCNC: 139 MMOL/L — SIGNIFICANT CHANGE UP (ref 135–146)
TROPONIN T SERPL-MCNC: <0.01 NG/ML — SIGNIFICANT CHANGE UP
WBC # BLD: 3.73 K/UL — LOW (ref 4.8–10.8)
WBC # FLD AUTO: 3.73 K/UL — LOW (ref 4.8–10.8)

## 2019-07-15 PROCEDURE — 93010 ELECTROCARDIOGRAM REPORT: CPT

## 2019-07-15 PROCEDURE — 71045 X-RAY EXAM CHEST 1 VIEW: CPT | Mod: 26

## 2019-07-15 PROCEDURE — 99285 EMERGENCY DEPT VISIT HI MDM: CPT

## 2019-07-15 RX ORDER — NITROGLYCERIN 6.5 MG
0.4 CAPSULE, EXTENDED RELEASE ORAL ONCE
Refills: 0 | Status: COMPLETED | OUTPATIENT
Start: 2019-07-15 | End: 2019-07-15

## 2019-07-15 RX ADMIN — Medication 0.4 MILLIGRAM(S): at 20:49

## 2019-07-15 NOTE — ED PROVIDER NOTE - ATTENDING CONTRIBUTION TO CARE
58 yo m hx  lymphoma on chemo last dose june, next august, copd, atrial thrombus, dm, c/o left sided cp on and off since this am, but constant for the last 45 min. dull ache over left pectoral region around ppm side. no cough, sob, fever, chills. no n, v, leg pain or swelling. pt had US duplex recently neg for dvt, CCTA 2 months ago with CAD RADS score of 1 (no obstructive lesions).   pt in nad, comfortable, ent nml, neck sup, ctab, rrr, ab soft, nt, nd. ppm site cdi, nt. no LE edema or tenderness.   ekg is paced. will get cxr, labs.

## 2019-07-15 NOTE — ED ADULT NURSE NOTE - PAIN: RADIATION
Side chest pain from on and off from Today in AM ,progressively worse from 1 hours ago  continues pain .

## 2019-07-15 NOTE — ED ADULT NURSE REASSESSMENT NOTE - NS ED NURSE REASSESS COMMENT FT1
pt reassessed A/O times 4 Vs stable on cardiac monitor . report slight decrease on chest pain level to # 5 in scale 0-10 , comfort provide ED attending made aware on going nursing observation .

## 2019-07-15 NOTE — ED PROVIDER NOTE - CLINICAL SUMMARY MEDICAL DECISION MAKING FREE TEXT BOX
59 male here for chest discomfort had labs imaging supportive care trended biomarkers with reevalaution, no acute findings, will discharge with outpatient management.

## 2019-07-15 NOTE — ED PROVIDER NOTE - OBJECTIVE STATEMENT
59M with pmh of CAD, HTN, HLD,l DM, pacemaker, COPD, and stage IV NHL on chemo, and clot in R atrium presents with L chest pain described as "throbbing" since 1 hour ago, continuous, without radiation, or vomiting. Denies LE swelling or tenderness, exogenous hormone use, recent travel, although admits to history of LE DVT on xarelto.

## 2019-07-16 LAB — TROPONIN T SERPL-MCNC: <0.01 NG/ML — SIGNIFICANT CHANGE UP

## 2019-07-23 ENCOUNTER — OUTPATIENT (OUTPATIENT)
Dept: OUTPATIENT SERVICES | Facility: HOSPITAL | Age: 59
LOS: 1 days | Discharge: HOME | End: 2019-07-23
Payer: MEDICAID

## 2019-07-23 VITALS
DIASTOLIC BLOOD PRESSURE: 87 MMHG | WEIGHT: 212.97 LBS | RESPIRATION RATE: 15 BRPM | OXYGEN SATURATION: 96 % | SYSTOLIC BLOOD PRESSURE: 115 MMHG | HEART RATE: 73 BPM

## 2019-07-23 DIAGNOSIS — I31.3 PERICARDIAL EFFUSION (NONINFLAMMATORY): ICD-10-CM

## 2019-07-23 DIAGNOSIS — Z95.0 PRESENCE OF CARDIAC PACEMAKER: Chronic | ICD-10-CM

## 2019-07-23 LAB — GLUCOSE BLDC GLUCOMTR-MCNC: 119 MG/DL — HIGH (ref 70–99)

## 2019-07-23 PROCEDURE — 93325 DOPPLER ECHO COLOR FLOW MAPG: CPT | Mod: 26

## 2019-07-23 PROCEDURE — 93320 DOPPLER ECHO COMPLETE: CPT | Mod: 26

## 2019-07-23 PROCEDURE — 93312 ECHO TRANSESOPHAGEAL: CPT | Mod: 26,59

## 2019-07-23 NOTE — PRE-ANESTHESIA EVALUATION ADULT - NSANTHPMHFT_GEN_ALL_CORE
COPD (not on home O2, users inhalers), HTN, HLD, sinus bradycardia s/p PPM, DM, non-hodgkin's lymphoma s/p chemo, right atrial clot as per CT scan

## 2019-07-23 NOTE — H&P CARDIOLOGY - HISTORY OF PRESENT ILLNESS
58 male with hx of congenital heart disease (repaired) presenting for an elective ANITA. Recent CCTA showing a RVH and RAA thrombus.

## 2019-08-08 ENCOUNTER — TRANSCRIPTION ENCOUNTER (OUTPATIENT)
Age: 59
End: 2019-08-08

## 2019-08-19 ENCOUNTER — APPOINTMENT (OUTPATIENT)
Dept: INFUSION THERAPY | Facility: CLINIC | Age: 59
End: 2019-08-19
Payer: MEDICAID

## 2019-08-19 ENCOUNTER — OUTPATIENT (OUTPATIENT)
Dept: OUTPATIENT SERVICES | Facility: HOSPITAL | Age: 59
LOS: 1 days | Discharge: HOME | End: 2019-08-19

## 2019-08-19 ENCOUNTER — LABORATORY RESULT (OUTPATIENT)
Age: 59
End: 2019-08-19

## 2019-08-19 ENCOUNTER — APPOINTMENT (OUTPATIENT)
Dept: HEMATOLOGY ONCOLOGY | Facility: CLINIC | Age: 59
End: 2019-08-19
Payer: MEDICAID

## 2019-08-19 VITALS
RESPIRATION RATE: 14 BRPM | SYSTOLIC BLOOD PRESSURE: 142 MMHG | BODY MASS INDEX: 29.53 KG/M2 | TEMPERATURE: 98.1 F | HEIGHT: 72 IN | HEART RATE: 75 BPM | WEIGHT: 218 LBS | DIASTOLIC BLOOD PRESSURE: 79 MMHG

## 2019-08-19 DIAGNOSIS — Z95.0 PRESENCE OF CARDIAC PACEMAKER: Chronic | ICD-10-CM

## 2019-08-19 DIAGNOSIS — C85.90 NON-HODGKIN LYMPHOMA, UNSPECIFIED, UNSPECIFIED SITE: ICD-10-CM

## 2019-08-19 LAB
HCT VFR BLD CALC: 42.7 %
HGB BLD-MCNC: 14.4 G/DL
MCHC RBC-ENTMCNC: 30 PG
MCHC RBC-ENTMCNC: 33.7 G/DL
MCV RBC AUTO: 89 FL
PLATELET # BLD AUTO: 174 K/UL
PMV BLD: 10.8 FL
RBC # BLD: 4.8 M/UL
RBC # FLD: 13 %
WBC # FLD AUTO: 5.8 K/UL

## 2019-08-19 PROCEDURE — 99214 OFFICE O/P EST MOD 30 MIN: CPT

## 2019-08-19 RX ORDER — DIPHENHYDRAMINE HCL 50 MG
50 CAPSULE ORAL ONCE
Refills: 0 | Status: COMPLETED | OUTPATIENT
Start: 2019-08-19 | End: 2019-08-19

## 2019-08-19 RX ORDER — RITUXIMAB 10 MG/ML
850 INJECTION, SOLUTION INTRAVENOUS ONCE
Refills: 0 | Status: COMPLETED | OUTPATIENT
Start: 2019-08-19 | End: 2019-08-19

## 2019-08-19 RX ORDER — DEXAMETHASONE 0.5 MG/5ML
8 ELIXIR ORAL ONCE
Refills: 0 | Status: COMPLETED | OUTPATIENT
Start: 2019-08-19 | End: 2019-08-19

## 2019-08-19 RX ORDER — ACETAMINOPHEN 500 MG
650 TABLET ORAL ONCE
Refills: 0 | Status: COMPLETED | OUTPATIENT
Start: 2019-08-19 | End: 2019-08-19

## 2019-08-19 RX ADMIN — RITUXIMAB 170 MILLIGRAM(S): 10 INJECTION, SOLUTION INTRAVENOUS at 12:09

## 2019-08-19 RX ADMIN — Medication 152.4 MILLIGRAM(S): at 11:38

## 2019-08-19 RX ADMIN — Medication 8 MILLIGRAM(S): at 11:58

## 2019-08-19 RX ADMIN — Medication 650 MILLIGRAM(S): at 11:37

## 2019-08-19 RX ADMIN — Medication 650 MILLIGRAM(S): at 11:38

## 2019-08-19 RX ADMIN — Medication 50 MILLIGRAM(S): at 11:37

## 2019-08-19 NOTE — PHYSICAL EXAM
[Normal] : no peripheral adenopathy appreciated [Fully active, able to carry on all pre-disease performance without restriction] : Status 0 - Fully active, able to carry on all pre-disease performance without restriction [Ulcers] : no ulcers [Mucositis] : no mucositis [Thrush] : no thrush [de-identified] : no organomegaly, mild RUQ tenderness, no masses , no rebound.  [de-identified] : no residual adenopathy .  ? Lipoma in suboccipital area

## 2019-08-19 NOTE — ASSESSMENT
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 No evidence of disease on CT neck from Jun 2019 . \par No left atrial thrombus on ANITA\par Continue rituxan. On maintenance rituxan since Aug 2018\par Repeat PET scan\par RTO in 2 months\par \par \par \par

## 2019-08-19 NOTE — HISTORY OF PRESENT ILLNESS
[de-identified] : 08/27/2018 : Patient returns for follow up after BR X 3 with near complete response. Treatment was held due to worsening complaints of weakness, exertional dyspnea. RUQ pain . He followed with pulmonary and was placed on inhaled bronchodilators , He had MUGA scan and may require a second pacemaker lead. He denies any B symptoms or any new lumps or adenopathy . \par \par 10/23/2018 Patient returns for next dose of rituxan , He denies any new complaints . He was seen by GI for right sided abdominal tenderness, felt to be muscular in origin , CT scan apparently shows abdominal hernias probably unrelated to his complaint . He is scheduled for surveillance colonoscopy . He denies any B symptoms, he continues to have dyspnea on exertion and went on social security disability .\par \par 12/17/2018 Patient returns for next dose of rituxan maintenance . He denies any B symptoms , continues to complain of mild RUQ pain and tenderness. He is followed by cardiology and pulmonary and is undergoing work up to rule out cardiac sarcoidosis . \par \par 06/17/2019 Patient returns for follow up . he has been on maintenance rituxan for one year and was treated recently as outpatient for suspected pneumonia , he lost 9 lbs and is feeling better now. He had cardiac work up with revealed left atrial thrombus and started on xarelto , CT angio was negative , Cardiac CT showed LAD plaque without oclusion . \par \par 8/19/19: Pt returns for a f/u visit and next dose of rituxan. He only had an episode of URI about a week ago for which he was treated with abx. Denies having any fever, night sweats. Had recently lost weight but then regained. His ANITA did not show any e/o thrombus, so xarelto was d/c'd. No other complaints. Last CT neck from Jun 2019 was negative.  [de-identified] : 57 year old obese male with PMH of COPD , diabetes, PPP for syncope. Patient went to the emergency room on February 4, 2018 with complaints of a swollen lymph node on right side of neck, which he had noticed several months prior to presentation. Lymph node was originally not painful but became painful about one week prior to presentation, which prompted him to go to the emergency room. No fevers, night sweats, anorexia or significant changes in weight. In the ED, patient underwent CT neck soft tissue, chest, abdomen and pelvis, which found multiple areas of LAD,Right-sided enlarged level 5 cervical chain lymph nodes measuring up to  1.9 x 1.7 cm (series 9 image 193). Enlarged right supraclavicular lymph  nodes measuring up to 1.6 x 1 cm(series 9 image 214).   in the R supraclavicular lymph node, R axillary lymph node,  Enlarged mesenteric lymph nodes measuring up to 1.3  x 1 cm (series 2 image 92)., There was no evidence of mediastinal or hilar LAD at that time. However, patient is not certain if he took prednisone prior to imaging. As per wife, patient takes short-course prednisone tapers every few months secondary to bronchitis. OTTONIEL plummer is a former 1 PPD smoker for 30-40 years who quit about five months prior to presentation and denies any previous history of asbestosis exposure. He worked as a . He has no pets at home. Biopsy of right cervical lymph node by ENT  Low grade B-cell lymphoma, consistent with nisreen marginal zone lymphoma.  the neoplastic cells are CD20+ CD79a+ PAX5+ B-cells that co-express BCL2. They are negative for CD5, CD10, BCL6, cyclinD1, and CD43. The proliferation index (Ki67 labeling) ranges from 5 to 20% in different areas excluding the germinal centers, flow cytometry studies performed at [facility] show monotypic B-cells (38% of cells), positive for kappa, CD19, CD20, FMC-7, CD23, minimal CD10; negative for CD5. Viability is 78%.   *** In summary, the findings are diagnostic of low grade B-cell lymphoma, the features are consistent with nisreen marginal zone lymphoma.     2 , peripheral blood flow showed - A MINUTE MONOCLONAL, CD10+ B-CELL POPULATION IS OBSERVED (0.14%). kappa and CD 20 bright . Lab work showed normal CBC , Hb A1c 9, negative HIV , Hep c and SPEP .    \par Since surgery 2 weeks ago he noted marked increase in painful  nisreen masses in the neck , supraclavicular and right axillae , he takes percocet to sleep with partial relief  , PET showed generalized adenopathy with maximum SUV 9 . No bone or marrow uptake. He complains of dyspnea on exertion , mild cough , improved with short course of prednisone, he lost few lbs and denies fever or night sweats. \par No history of EGD or colonoscopy . \par

## 2019-08-20 LAB
ALBUMIN SERPL ELPH-MCNC: 4.4 G/DL
ALP BLD-CCNC: 41 U/L
ALT SERPL-CCNC: 19 U/L
ANION GAP SERPL CALC-SCNC: 11 MMOL/L
AST SERPL-CCNC: 28 U/L
BILIRUB SERPL-MCNC: 2.2 MG/DL
BUN SERPL-MCNC: 22 MG/DL
CALCIUM SERPL-MCNC: 8.9 MG/DL
CHLORIDE SERPL-SCNC: 106 MMOL/L
CO2 SERPL-SCNC: 25 MMOL/L
CREAT SERPL-MCNC: 0.9 MG/DL
GLUCOSE SERPL-MCNC: 117 MG/DL
LDH SERPL-CCNC: 445 U/L
POTASSIUM SERPL-SCNC: 4.5 MMOL/L
PROT SERPL-MCNC: 6.6 G/DL
SODIUM SERPL-SCNC: 142 MMOL/L

## 2019-09-04 ENCOUNTER — OTHER (OUTPATIENT)
Age: 59
End: 2019-09-04

## 2019-09-10 NOTE — ED PROVIDER NOTE - PENDING LAB RAD OPT OUT
[de-identified] M with PMH CAD, MI, HTN, AS, AAA, COPD, CKD 3, spinal stenosis and neurogenic claudication returns to the Vascular Office today for re-evaluation of lower extremity tiredness, heaviness, and occasional pain  He describes symptoms occurring during the day, when he walks and finishes his activity, his legs feel very tired, heavy, and sometimes he feels like he "just can't get up and go" but other days he has no symptoms  He denies arterial claudication, denies rest pain  He has some swelling to B/L legs/ankles/feet and telangiectasias/purple discoloration to the feet/toes  We discussed this may be multifactorial, possibly related to chronic venous insufficiency, neurogenic claudication, spinal stenosis, or neuropathy  Presently, we will treat him for his chronic venous insufficiency  We discussed the pathophysiology of varicose veins and venous insufficiency  Patient expressed understanding  Will start with conservative measures to aid in symptom relief and progression of disease      PLAN:  -compression stockings 15-20mmHg Rx given, to be worn during waking hours and removed at bedtime  -moisturizer to legs to maintain good skin integrity  -elevate legs throughout the day as able  -exercise daily as tolerated  -continue weight loss and low-fat low-chol, low-sodium diet  -continue diuretic therapy as ordered by PCP/Cards  -continue with good BP control per Cards  -continue with inhaler/oxygen PRN per PCP  -return for f/u in 4 weeks to discuss treatment plan and conservative therapy  -if you have any questions or concerns, please call our office to discuss Exclude Pending Lab and Radiology orders from printing on the Patient's Discharge Instructions, due to Privacy Concerns.

## 2019-09-16 ENCOUNTER — OUTPATIENT (OUTPATIENT)
Dept: OUTPATIENT SERVICES | Facility: HOSPITAL | Age: 59
LOS: 1 days | Discharge: HOME | End: 2019-09-16

## 2019-09-16 ENCOUNTER — LABORATORY RESULT (OUTPATIENT)
Age: 59
End: 2019-09-16

## 2019-09-16 ENCOUNTER — APPOINTMENT (OUTPATIENT)
Dept: CARDIOLOGY | Facility: CLINIC | Age: 59
End: 2019-09-16
Payer: MEDICAID

## 2019-09-16 DIAGNOSIS — I25.10 ATHEROSCLEROTIC HEART DISEASE OF NATIVE CORONARY ARTERY WITHOUT ANGINA PECTORIS: ICD-10-CM

## 2019-09-16 DIAGNOSIS — Z95.0 PRESENCE OF CARDIAC PACEMAKER: Chronic | ICD-10-CM

## 2019-09-16 DIAGNOSIS — Z01.810 ENCOUNTER FOR PREPROCEDURAL CARDIOVASCULAR EXAMINATION: ICD-10-CM

## 2019-09-16 DIAGNOSIS — Z79.01 LONG TERM (CURRENT) USE OF ANTICOAGULANTS: ICD-10-CM

## 2019-09-16 PROCEDURE — 93280 PM DEVICE PROGR EVAL DUAL: CPT | Mod: 59

## 2019-09-16 PROCEDURE — 99213 OFFICE O/P EST LOW 20 MIN: CPT | Mod: 25

## 2019-09-18 ENCOUNTER — OUTPATIENT (OUTPATIENT)
Dept: OUTPATIENT SERVICES | Facility: HOSPITAL | Age: 59
LOS: 1 days | Discharge: HOME | End: 2019-09-18
Payer: MEDICAID

## 2019-09-18 DIAGNOSIS — Z95.0 PRESENCE OF CARDIAC PACEMAKER: Chronic | ICD-10-CM

## 2019-09-18 LAB
GLUCOSE BLDC GLUCOMTR-MCNC: 97 MG/DL — SIGNIFICANT CHANGE UP (ref 70–99)
HCT VFR BLD CALC: 46.3 % — SIGNIFICANT CHANGE UP (ref 42–52)
HGB BLD-MCNC: 15.8 G/DL — SIGNIFICANT CHANGE UP (ref 14–18)
MCHC RBC-ENTMCNC: 30.3 PG — SIGNIFICANT CHANGE UP (ref 27–31)
MCHC RBC-ENTMCNC: 34.1 G/DL — SIGNIFICANT CHANGE UP (ref 32–37)
MCV RBC AUTO: 88.9 FL — SIGNIFICANT CHANGE UP (ref 80–94)
NRBC # BLD: 0 /100 WBCS — SIGNIFICANT CHANGE UP (ref 0–0)
PLATELET # BLD AUTO: 181 K/UL — SIGNIFICANT CHANGE UP (ref 130–400)
RBC # BLD: 5.21 M/UL — SIGNIFICANT CHANGE UP (ref 4.7–6.1)
RBC # FLD: 12.6 % — SIGNIFICANT CHANGE UP (ref 11.5–14.5)
WBC # BLD: 4.61 K/UL — LOW (ref 4.8–10.8)
WBC # FLD AUTO: 4.61 K/UL — LOW (ref 4.8–10.8)

## 2019-09-18 PROCEDURE — 93458 L HRT ARTERY/VENTRICLE ANGIO: CPT | Mod: 26

## 2019-09-18 RX ORDER — PIOGLITAZONE HYDROCHLORIDE 15 MG/1
45 TABLET ORAL
Qty: 0 | Refills: 0 | DISCHARGE

## 2019-09-18 RX ORDER — ALBUTEROL 90 UG/1
2 AEROSOL, METERED ORAL
Qty: 0 | Refills: 0 | DISCHARGE

## 2019-09-18 RX ORDER — SIMVASTATIN 20 MG/1
40 TABLET, FILM COATED ORAL
Qty: 0 | Refills: 0 | DISCHARGE

## 2019-09-18 RX ORDER — EMPAGLIFLOZIN 10 MG/1
1 TABLET, FILM COATED ORAL
Qty: 0 | Refills: 0 | DISCHARGE

## 2019-09-18 NOTE — CHART NOTE - NSCHARTNOTEFT_GEN_A_CORE
PRE-OP DIAGNOSIS: congenital heart disease, complains of chest discomfort , DM, HTN abnormal CTA coronaries     PROCEDURE: [facility] with coronary angiography    Physician: Dr Power  Assistant: Sigrid Casanova    ANESTHESIA TYPE:  [  ]General Anesthesia  [  ] Sedation  [ x ] Local/Regional    ESTIMATED BLOOD LOSS:    10   mL    CONDITION  [  ] Critical  [  ] Serious  [  ]Fair  [ x ]Good      SPECIMENS REMOVED (IF APPLICABLE): N/A      IV CONTRAST:   50     mL      IMPLANTS (IF APPLICABLE)      FINDINGS    Left Heart Catheterization:  LVEF%: 60  LVEDP: normal   [x ] Normal Coronary Arteries  [ ] Luminal Irregularities  [ ] Non-obstructive CAD      LEFT HEART CATHETERIZATION                                    Left main normal     LAD:   normal                     Diag: normal     Left Circumflex: normal   OM: normal     Right Coronary Artery: normal   RPDA normal       DOMINANCE: Right    ACCESS: right femoral  CLOSURE: angio seal     INTERVENTION  none         POST-OP DIAGNOSIS  normal coronary arteries         PLAN OF CARE  [ x] D/C Home today  [x ]  Continue home medication

## 2019-09-26 ENCOUNTER — FORM ENCOUNTER (OUTPATIENT)
Age: 59
End: 2019-09-26

## 2019-09-27 ENCOUNTER — OUTPATIENT (OUTPATIENT)
Dept: OUTPATIENT SERVICES | Facility: HOSPITAL | Age: 59
LOS: 1 days | Discharge: HOME | End: 2019-09-27
Payer: MEDICAID

## 2019-09-27 DIAGNOSIS — Z95.0 PRESENCE OF CARDIAC PACEMAKER: Chronic | ICD-10-CM

## 2019-09-27 DIAGNOSIS — C85.90 NON-HODGKIN LYMPHOMA, UNSPECIFIED, UNSPECIFIED SITE: ICD-10-CM

## 2019-09-27 PROCEDURE — 71260 CT THORAX DX C+: CPT | Mod: 26

## 2019-09-27 PROCEDURE — 74177 CT ABD & PELVIS W/CONTRAST: CPT | Mod: 26

## 2019-10-01 DIAGNOSIS — J44.9 CHRONIC OBSTRUCTIVE PULMONARY DISEASE, UNSPECIFIED: ICD-10-CM

## 2019-10-01 DIAGNOSIS — R94.39 ABNORMAL RESULT OF OTHER CARDIOVASCULAR FUNCTION STUDY: ICD-10-CM

## 2019-10-01 DIAGNOSIS — I20.9 ANGINA PECTORIS, UNSPECIFIED: ICD-10-CM

## 2019-10-01 DIAGNOSIS — C91.10 CHRONIC LYMPHOCYTIC LEUKEMIA OF B-CELL TYPE NOT HAVING ACHIEVED REMISSION: ICD-10-CM

## 2019-10-01 DIAGNOSIS — I10 ESSENTIAL (PRIMARY) HYPERTENSION: ICD-10-CM

## 2019-10-01 DIAGNOSIS — E78.49 OTHER HYPERLIPIDEMIA: ICD-10-CM

## 2019-10-01 DIAGNOSIS — Z87.891 PERSONAL HISTORY OF NICOTINE DEPENDENCE: ICD-10-CM

## 2019-10-01 DIAGNOSIS — E11.9 TYPE 2 DIABETES MELLITUS WITHOUT COMPLICATIONS: ICD-10-CM

## 2019-10-01 DIAGNOSIS — Z95.0 PRESENCE OF CARDIAC PACEMAKER: ICD-10-CM

## 2019-10-01 DIAGNOSIS — Z88.8 ALLERGY STATUS TO OTHER DRUGS, MEDICAMENTS AND BIOLOGICAL SUBSTANCES STATUS: ICD-10-CM

## 2019-10-01 DIAGNOSIS — Z79.84 LONG TERM (CURRENT) USE OF ORAL HYPOGLYCEMIC DRUGS: ICD-10-CM

## 2019-10-04 ENCOUNTER — OTHER (OUTPATIENT)
Age: 59
End: 2019-10-04

## 2019-10-08 ENCOUNTER — OTHER (OUTPATIENT)
Age: 59
End: 2019-10-08

## 2019-10-13 NOTE — PRE-OP CHECKLIST - ORDERS/MEDICATION ADMINISTRATION RECORD ON CHART
Past Medical History:   Diagnosis Date    Arthritis     Back pain     Cataract     Coronary artery disease     Diabetes mellitus, type 2     Hyperlipemia     Hypertension     Hypothyroidism        Past Surgical History:   Procedure Laterality Date    CATARACT EXTRACTION Bilateral        Review of patient's allergies indicates:   Allergen Reactions    Eggs [egg derived] Other (See Comments)    Fosamax [alendronate]      hallucinations    Lisinopril Other (See Comments)     Dry Cough       No current facility-administered medications on file prior to encounter.      Current Outpatient Medications on File Prior to Encounter   Medication Sig    amLODIPine (NORVASC) 5 MG tablet Take 1 tablet (5 mg total) by mouth once daily.    ASPIRIN (ASPIR-81 ORAL) Take by mouth once daily.     atorvastatin (LIPITOR) 20 MG tablet TAKE 1 TABLET BY MOUTH ONCE DAILY    blood sugar diagnostic (FREESTYLE LITE STRIPS) Strp Inject 1 each into the skin 3 (three) times daily.    calcium carbonate (OS-VARGHESE) 600 mg calcium (1,500 mg) Tab Take 600 mg by mouth once.    cetirizine (ZYRTEC) 10 MG tablet Take 1 tablet (10 mg total) by mouth once daily.    ciprofloxacin-dexamethasone 0.3-0.1% (CIPRODEX) 0.3-0.1 % DrpS Place 4 drops into both ears 2 (two) times daily.    fish oil-omega-3 fatty acids 300-1,000 mg capsule Take 2 g by mouth once daily.    fluocinolone acetonide oil 0.01 % Drop Place 4 drops in ear(s) every other day.    fluocinonide 0.05% (LIDEX) 0.05 % cream Apply topically as needed.     fluticasone propionate (FLONASE) 50 mcg/actuation nasal spray 1 spray (50 mcg total) by Each Nostril route 2 (two) times daily as needed for Rhinitis.    irbesartan (AVAPRO) 300 MG tablet Take 1 tablet (300 mg total) by mouth every evening.    lancets Misc 1 lancet by Misc.(Non-Drug; Combo Route) route 3 (three) times daily.    LANTUS SOLOSTAR U-100 INSULIN glargine 100 units/mL (3mL) SubQ pen INJECT 40 UNITS SUBCUTANEOUSLY ONCE  DAILY IN THE EVENING    levocetirizine (XYZAL) 5 MG tablet Take 5 mg by mouth every evening.    levothyroxine (SYNTHROID) 88 MCG tablet Take 1 tablet (88 mcg total) by mouth once daily.    meclizine (ANTIVERT) 25 mg tablet Take 1 tablet (25 mg total) by mouth 3 (three) times daily as needed.    metFORMIN (GLUCOPHAGE) 1000 MG tablet Take 1 tablet (1,000 mg total) by mouth 2 (two) times daily with meals.    metoprolol succinate (TOPROL-XL) 200 MG 24 hr tablet Take 1 tablet (200 mg total) by mouth once daily.    omeprazole (PRILOSEC) 40 MG capsule TAKE 1 CAPSULE BY MOUTH ONCE DAILY    SAXagliptin (ONGLYZA) 5 mg Tab tablet Take 1 tablet (5 mg total) by mouth once daily.    acetaminophen (TYLENOL) 500 MG tablet Take 1 tablet (500 mg total) by mouth every 6 (six) hours as needed for Pain.    albuterol (PROVENTIL/VENTOLIN HFA) 90 mcg/actuation inhaler Inhale 1-2 puffs into the lungs daily as needed for Wheezing. Rescue    ammonium lactate 12 % Crea APPLY  ONE GRAM OF  CREAM TOPICALLY TO AFFECTED AREA TWICE DAILY    azithromycin (Z-TAN) 250 MG tablet Take 1 tablet (250 mg total) by mouth once daily. Take first 2 tablets together, then 1 every day until finished.    blood-glucose meter (FREESTYLE SYSTEM KIT) kit Use as instructed    gabapentin (NEURONTIN) 100 MG capsule Take 1 capsule (100 mg total) by mouth 3 (three) times daily.    ibuprofen (ADVIL,MOTRIN) 800 MG tablet Take 1 tablet (800 mg total) by mouth every 6 (six) hours as needed for Pain.    traZODone (DESYREL) 50 MG tablet Take 1 tablet (50 mg total) by mouth nightly as needed for Insomnia.     Family History     Problem Relation (Age of Onset)    Cataracts Brother    No Known Problems Mother, Father, Sister, Maternal Aunt, Maternal Uncle, Paternal Aunt, Paternal Uncle, Maternal Grandmother, Maternal Grandfather, Paternal Grandmother, Paternal Grandfather        Tobacco Use    Smoking status: Never Smoker    Smokeless tobacco: Never Used    Substance and Sexual Activity    Alcohol use: No     Alcohol/week: 0.0 standard drinks    Drug use: No    Sexual activity: Not Currently     Review of Systems   Constitutional: Positive for fatigue. Negative for chills and fever.   Eyes: Negative for photophobia and visual disturbance.   Respiratory: Negative for cough and shortness of breath.    Cardiovascular: Positive for chest pain and palpitations. Negative for leg swelling.   Gastrointestinal: Negative for abdominal pain, diarrhea, nausea and vomiting.   Genitourinary: Negative for dysuria, frequency and urgency.   Skin: Negative for pallor, rash and wound.   Neurological: Positive for weakness and headaches. Negative for light-headedness.   Psychiatric/Behavioral: Negative for confusion and decreased concentration.     Objective:     Vital Signs (Most Recent):  Temp: 97.8 °F (36.6 °C) (10/13/19 1245)  Pulse: 105 (10/13/19 1516)  Resp: (!) 28 (10/13/19 1516)  BP: (!) 146/66 (10/13/19 1516)  SpO2: 95 % (10/13/19 1516) Vital Signs (24h Range):  Temp:  [97.8 °F (36.6 °C)] 97.8 °F (36.6 °C)  Pulse:  [] 105  Resp:  [18-28] 28  SpO2:  [95 %-97 %] 95 %  BP: (136-216)/(59-88) 146/66     Weight: 61.2 kg (135 lb)  Body mass index is 29.21 kg/m².    Physical Exam   Constitutional: She is oriented to person, place, and time. She appears well-developed and well-nourished. No distress.   HENT:   Head: Normocephalic and atraumatic.   Right Ear: External ear normal.   Left Ear: External ear normal.   Nose: Nose normal.   Mouth/Throat: Oropharynx is clear and moist.   Eyes: Pupils are equal, round, and reactive to light. Conjunctivae and EOM are normal.   Neck: Normal range of motion. Neck supple.   Cardiovascular: Normal rate, regular rhythm and intact distal pulses.   Pulmonary/Chest: Effort normal and breath sounds normal. No respiratory distress. She has no wheezes.   Abdominal: Soft. Bowel sounds are normal. She exhibits no distension. There is no tenderness.    No palpable hepatomegaly or splenomegaly    Musculoskeletal: Normal range of motion. She exhibits no edema or tenderness.   Neurological: She is alert and oriented to person, place, and time.   Skin: Skin is warm and dry.   Psychiatric: She has a normal mood and affect. Thought content normal.   Nursing note and vitals reviewed.        CRANIAL NERVES     CN III, IV, VI   Pupils are equal, round, and reactive to light.  Extraocular motions are normal.        Significant Labs: All pertinent labs within the past 24 hours have been reviewed.    Significant Imaging: I have reviewed all pertinent imaging results/findings within the past 24 hours.   done

## 2019-10-15 ENCOUNTER — LABORATORY RESULT (OUTPATIENT)
Age: 59
End: 2019-10-15

## 2019-10-15 ENCOUNTER — APPOINTMENT (OUTPATIENT)
Dept: HEMATOLOGY ONCOLOGY | Facility: CLINIC | Age: 59
End: 2019-10-15
Payer: MEDICAID

## 2019-10-15 ENCOUNTER — APPOINTMENT (OUTPATIENT)
Dept: INFUSION THERAPY | Facility: CLINIC | Age: 59
End: 2019-10-15
Payer: MEDICAID

## 2019-10-15 VITALS
DIASTOLIC BLOOD PRESSURE: 72 MMHG | BODY MASS INDEX: 29.8 KG/M2 | HEART RATE: 94 BPM | HEIGHT: 72 IN | RESPIRATION RATE: 14 BRPM | SYSTOLIC BLOOD PRESSURE: 138 MMHG | TEMPERATURE: 96.3 F | WEIGHT: 220 LBS

## 2019-10-15 LAB
HCT VFR BLD CALC: 45.8 %
HGB BLD-MCNC: 15.7 G/DL
MCHC RBC-ENTMCNC: 30.1 PG
MCHC RBC-ENTMCNC: 34.3 G/DL
MCV RBC AUTO: 87.9 FL
PLATELET # BLD AUTO: 187 K/UL
PMV BLD: 10.3 FL
RBC # BLD: 5.21 M/UL
RBC # FLD: 12.3 %
WBC # FLD AUTO: 3.88 K/UL

## 2019-10-15 PROCEDURE — 99213 OFFICE O/P EST LOW 20 MIN: CPT

## 2019-10-15 RX ORDER — DIPHENHYDRAMINE HCL 50 MG
50 CAPSULE ORAL ONCE
Refills: 0 | Status: COMPLETED | OUTPATIENT
Start: 2019-10-15 | End: 2019-10-15

## 2019-10-15 RX ORDER — DEXAMETHASONE 0.5 MG/5ML
8 ELIXIR ORAL ONCE
Refills: 0 | Status: COMPLETED | OUTPATIENT
Start: 2019-10-15 | End: 2019-10-15

## 2019-10-15 RX ORDER — RITUXIMAB 10 MG/ML
850 INJECTION, SOLUTION INTRAVENOUS ONCE
Refills: 0 | Status: COMPLETED | OUTPATIENT
Start: 2019-10-15 | End: 2019-10-15

## 2019-10-15 RX ORDER — ACETAMINOPHEN 500 MG
650 TABLET ORAL ONCE
Refills: 0 | Status: COMPLETED | OUTPATIENT
Start: 2019-10-15 | End: 2019-10-15

## 2019-10-15 RX ADMIN — Medication 50 MILLIGRAM(S): at 13:40

## 2019-10-15 RX ADMIN — RITUXIMAB 170 MILLIGRAM(S): 10 INJECTION, SOLUTION INTRAVENOUS at 14:38

## 2019-10-15 RX ADMIN — RITUXIMAB 850 MILLIGRAM(S): 10 INJECTION, SOLUTION INTRAVENOUS at 17:35

## 2019-10-15 RX ADMIN — Medication 650 MILLIGRAM(S): at 13:40

## 2019-10-15 RX ADMIN — Medication 8 MILLIGRAM(S): at 14:00

## 2019-10-15 RX ADMIN — Medication 152.4 MILLIGRAM(S): at 13:40

## 2019-10-16 LAB
ALBUMIN SERPL ELPH-MCNC: 4.8 G/DL
ALP BLD-CCNC: 49 U/L
ALT SERPL-CCNC: 17 U/L
ANION GAP SERPL CALC-SCNC: 15 MMOL/L
AST SERPL-CCNC: 21 U/L
BILIRUB SERPL-MCNC: 1.7 MG/DL
BUN SERPL-MCNC: 16 MG/DL
CALCIUM SERPL-MCNC: 9.6 MG/DL
CHLORIDE SERPL-SCNC: 101 MMOL/L
CO2 SERPL-SCNC: 26 MMOL/L
CREAT SERPL-MCNC: 1.1 MG/DL
GLUCOSE SERPL-MCNC: 130 MG/DL
LDH SERPL-CCNC: 271 U/L
POTASSIUM SERPL-SCNC: 4.9 MMOL/L
PROT SERPL-MCNC: 7.2 G/DL
SODIUM SERPL-SCNC: 142 MMOL/L

## 2019-10-16 NOTE — HISTORY OF PRESENT ILLNESS
[de-identified] : 57 year old obese male with PMH of COPD , diabetes, PPP for syncope. Patient went to the emergency room on February 4, 2018 with complaints of a swollen lymph node on right side of neck, which he had noticed several months prior to presentation. Lymph node was originally not painful but became painful about one week prior to presentation, which prompted him to go to the emergency room. No fevers, night sweats, anorexia or significant changes in weight. In the ED, patient underwent CT neck soft tissue, chest, abdomen and pelvis, which found multiple areas of LAD,Right-sided enlarged level 5 cervical chain lymph nodes measuring up to  1.9 x 1.7 cm (series 9 image 193). Enlarged right supraclavicular lymph  nodes measuring up to 1.6 x 1 cm(series 9 image 214).   in the R supraclavicular lymph node, R axillary lymph node,  Enlarged mesenteric lymph nodes measuring up to 1.3  x 1 cm (series 2 image 92)., There was no evidence of mediastinal or hilar LAD at that time. However, patient is not certain if he took prednisone prior to imaging. As per wife, patient takes short-course prednisone tapers every few months secondary to bronchitis. OTTONIEL plummer is a former 1 PPD smoker for 30-40 years who quit about five months prior to presentation and denies any previous history of asbestosis exposure. He worked as a . He has no pets at home. Biopsy of right cervical lymph node by ENT  Low grade B-cell lymphoma, consistent with nisreen marginal zone lymphoma.  the neoplastic cells are CD20+ CD79a+ PAX5+ B-cells that co-express BCL2. They are negative for CD5, CD10, BCL6, cyclinD1, and CD43. The proliferation index (Ki67 labeling) ranges from 5 to 20% in different areas excluding the germinal centers, flow cytometry studies performed at [facility] show monotypic B-cells (38% of cells), positive for kappa, CD19, CD20, FMC-7, CD23, minimal CD10; negative for CD5. Viability is 78%.   *** In summary, the findings are diagnostic of low grade B-cell lymphoma, the features are consistent with nisreen marginal zone lymphoma.     2 , peripheral blood flow showed - A MINUTE MONOCLONAL, CD10+ B-CELL POPULATION IS OBSERVED (0.14%). kappa and CD 20 bright . Lab work showed normal CBC , Hb A1c 9, negative HIV , Hep c and SPEP .    \par Since surgery 2 weeks ago he noted marked increase in painful  nisreen masses in the neck , supraclavicular and right axillae , he takes percocet to sleep with partial relief  , PET showed generalized adenopathy with maximum SUV 9 . No bone or marrow uptake. He complains of dyspnea on exertion , mild cough , improved with short course of prednisone, he lost few lbs and denies fever or night sweats. \par No history of EGD or colonoscopy . \par  [de-identified] : 08/27/2018 : Patient returns for follow up after BR X 3 with near complete response. Treatment was held due to worsening complaints of weakness, exertional dyspnea. RUQ pain . He followed with pulmonary and was placed on inhaled bronchodilators , He had MUGA scan and may require a second pacemaker lead. He denies any B symptoms or any new lumps or adenopathy . \par \par 10/23/2018 Patient returns for next dose of rituxan , He denies any new complaints . He was seen by GI for right sided abdominal tenderness, felt to be muscular in origin , CT scan apparently shows abdominal hernias probably unrelated to his complaint . He is scheduled for surveillance colonoscopy . He denies any B symptoms, he continues to have dyspnea on exertion and went on social security disability .\par \par 12/17/2018 Patient returns for next dose of rituxan maintenance . He denies any B symptoms , continues to complain of mild RUQ pain and tenderness. He is followed by cardiology and pulmonary and is undergoing work up to rule out cardiac sarcoidosis . \par \par 06/17/2019 Patient returns for follow up . he has been on maintenance rituxan for one year and was treated recently as outpatient for suspected pneumonia , he lost 9 lbs and is feeling better now. He had cardiac work up with revealed left atrial thrombus and started on xarelto , CT angio was negative , Cardiac CT showed LAD plaque without oclusion . \par \par 8/19/19: Pt returns for a f/u visit and next dose of rituxan. He only had an episode of URI about a week ago for which he was treated with abx. Denies having any fever, night sweats. Had recently lost weight but then regained. His ANITA did not show any e/o thrombus, so xarelto was d/c'd. No other complaints. Last CT neck from Jun 2019 was negative. \par \par 10/15/19: Pt returns for a f/u visit and for next dose of rituxan. He has no fresh complaints. CBC is acceptable. Last CT abdomen and chest did not show any e/o recurrence. Since July 26, 2018, no significant change in multiple subcentimeter lymph nodes at the root of the small bowel mesentery, with overall unchanged surrounding mesenteric infiltration.

## 2019-10-16 NOTE — PHYSICAL EXAM
[Fully active, able to carry on all pre-disease performance without restriction] : Status 0 - Fully active, able to carry on all pre-disease performance without restriction [Normal] : no peripheral adenopathy appreciated [Ulcers] : no ulcers [Mucositis] : no mucositis [Thrush] : no thrush [de-identified] : no residual adenopathy .  ? Lipoma in suboccipital area [de-identified] : no organomegaly, mild RUQ tenderness, no masses , no rebound.

## 2019-10-16 NOTE — REASON FOR VISIT
[Follow-Up Visit] : a follow-up [Spouse] : spouse [FreeTextEntry2] : Kimberly marginal zone lymphoma

## 2019-10-16 NOTE — ASSESSMENT
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 No evidence of disease on CT neck from Jun 2019 . CT abdomen showed  no significant change in multiple subcentimeter lymph nodes at the root of the small bowel mesentery, with overall unchanged surrounding mesenteric infiltration since July 26, 2018.\par \par No left atrial thrombus on ANITA\par \par Continue rituxan. On maintenance rituxan since Aug 2018\par \par RTO in 2 months\par \par \par \par

## 2019-11-07 ENCOUNTER — OUTPATIENT (OUTPATIENT)
Dept: OUTPATIENT SERVICES | Facility: HOSPITAL | Age: 59
LOS: 1 days | Discharge: HOME | End: 2019-11-07

## 2019-11-07 ENCOUNTER — APPOINTMENT (OUTPATIENT)
Dept: ENDOCRINOLOGY | Facility: CLINIC | Age: 59
End: 2019-11-07

## 2019-11-07 VITALS
DIASTOLIC BLOOD PRESSURE: 83 MMHG | HEIGHT: 72 IN | WEIGHT: 220 LBS | BODY MASS INDEX: 29.8 KG/M2 | HEART RATE: 71 BPM | SYSTOLIC BLOOD PRESSURE: 128 MMHG

## 2019-11-07 DIAGNOSIS — Z95.0 PRESENCE OF CARDIAC PACEMAKER: Chronic | ICD-10-CM

## 2019-11-07 NOTE — PHYSICAL EXAM
[Alert] : alert [No Acute Distress] : no acute distress [Well Nourished] : well nourished [Well Developed] : well developed [EOMI] : extra ocular movement intact [No Accessory Muscle Use] : no accessory muscle use [No Respiratory Distress] : no respiratory distress [Clear to Auscultation] : lungs were clear to auscultation bilaterally [Normal Rate] : heart rate was normal  [Regular Rhythm] : with a regular rhythm [Normal S1, S2] : normal S1 and S2 [Not Tender] : non-tender [Soft] : abdomen soft [Not Distended] : not distended

## 2019-11-10 LAB
CHOLEST SERPL-MCNC: 165 MG/DL
CHOLEST/HDLC SERPL: 3 RATIO
CREAT SPEC-SCNC: 155 MG/DL
ESTIMATED AVERAGE GLUCOSE: 180 MG/DL
HBA1C MFR BLD HPLC: 7.9 %
HDLC SERPL-MCNC: 55 MG/DL
LDLC SERPL CALC-MCNC: 111 MG/DL
MICROALBUMIN 24H UR DL<=1MG/L-MCNC: 31.1 MG/DL
MICROALBUMIN/CREAT 24H UR-RTO: 201 MG/G
TRIGL SERPL-MCNC: 89 MG/DL

## 2019-11-10 NOTE — ASSESSMENT
[Carbohydrate Consistent Diet] : carbohydrate consistent diet [Smoking Cessation] : smoking cessation [Diabetes Foot Care] : diabetes foot care [Retinopathy Screening] : Patient was referred to ophthalmology for retinopathy screening [Importance of Diet and Exercise] : importance of diet and exercise to improve glycemic control, achieve weight loss and improve cardiovascular health [FreeTextEntry1] : Patient is a 59 year old M with DMII and HLD, hx of difficulty with compliance, presenting for f/u.\par \par #DMII\par -last A1C 10.3--also on chemo, affects FSG\par -Recheck A1C today and microalbumin/Cr ratio\par -Reports compliance with medications, keep meds the same for now--metformin, pioglitazone, jardiance, insulin, trulicity\par -Saw podiatrist last week\par -Referral to ophtho given today\par \par #HLD\par -Lipid panel today\par -Continue atorvastatin, ezetimibe\par \par RTC 6 months or sooner PRN [Hypoglycemia Management] : hypoglycemia management

## 2019-11-10 NOTE — HISTORY OF PRESENT ILLNESS
[FreeTextEntry1] : Patient is a 59 year old M with DMII and HLD here for f/u. Last seen here 6/2019.\par Feels well today, reports no problems. \par Checking FSG every other day or so-range from 88-low 200's. Taking insulin every day--30 daily, Trulicity weekly. Also taking oral meds, denies missing doses. Last A1C June 10.3. Gained 10 pounds since last visit.\par -Has been taking chemo and steroids, states his sugars go up with that.\par -Has not seen an ophtho in a long time. Sees a podiatrist for his feet, saw one last week. \par \par ROS\par +R hand sometimes is clumsy, dropping cups of water or coffee. R-handed. \par +coughing fits that sometimes interferes with driving, tesselon pearls help, non-productive

## 2019-12-10 ENCOUNTER — LABORATORY RESULT (OUTPATIENT)
Age: 59
End: 2019-12-10

## 2019-12-10 ENCOUNTER — APPOINTMENT (OUTPATIENT)
Dept: HEMATOLOGY ONCOLOGY | Facility: CLINIC | Age: 59
End: 2019-12-10
Payer: MEDICAID

## 2019-12-10 ENCOUNTER — APPOINTMENT (OUTPATIENT)
Dept: INFUSION THERAPY | Facility: CLINIC | Age: 59
End: 2019-12-10
Payer: MEDICAID

## 2019-12-10 VITALS
SYSTOLIC BLOOD PRESSURE: 133 MMHG | HEIGHT: 72 IN | DIASTOLIC BLOOD PRESSURE: 89 MMHG | HEART RATE: 86 BPM | RESPIRATION RATE: 14 BRPM | BODY MASS INDEX: 30.34 KG/M2 | WEIGHT: 224 LBS | TEMPERATURE: 96.4 F

## 2019-12-10 DIAGNOSIS — Z00.00 ENCOUNTER FOR GENERAL ADULT MEDICAL EXAMINATION W/OUT ABNORMAL FINDINGS: ICD-10-CM

## 2019-12-10 LAB
ALBUMIN SERPL ELPH-MCNC: 4.4 G/DL
ALP BLD-CCNC: 51 U/L
ALT SERPL-CCNC: 16 U/L
ANION GAP SERPL CALC-SCNC: 13 MMOL/L
AST SERPL-CCNC: 23 U/L
BILIRUB SERPL-MCNC: 1.1 MG/DL
BUN SERPL-MCNC: 14 MG/DL
CALCIUM SERPL-MCNC: 9.1 MG/DL
CHLORIDE SERPL-SCNC: 100 MMOL/L
CO2 SERPL-SCNC: 25 MMOL/L
CREAT SERPL-MCNC: 1.1 MG/DL
GLUCOSE SERPL-MCNC: 186 MG/DL
HCT VFR BLD CALC: 45.1 %
HGB BLD-MCNC: 15.6 G/DL
MCHC RBC-ENTMCNC: 30.3 PG
MCHC RBC-ENTMCNC: 34.6 G/DL
MCV RBC AUTO: 87.6 FL
PLATELET # BLD AUTO: 218 K/UL
PMV BLD: 10.4 FL
POTASSIUM SERPL-SCNC: 4 MMOL/L
PROT SERPL-MCNC: 6.8 G/DL
RBC # BLD: 5.15 M/UL
RBC # FLD: 12 %
SODIUM SERPL-SCNC: 138 MMOL/L
WBC # FLD AUTO: 5.58 K/UL

## 2019-12-10 PROCEDURE — 99214 OFFICE O/P EST MOD 30 MIN: CPT

## 2019-12-10 RX ORDER — RITUXIMAB 10 MG/ML
850 INJECTION, SOLUTION INTRAVENOUS ONCE
Refills: 0 | Status: COMPLETED | OUTPATIENT
Start: 2019-12-10 | End: 2019-12-10

## 2019-12-10 RX ORDER — DIPHENHYDRAMINE HCL 50 MG
50 CAPSULE ORAL ONCE
Refills: 0 | Status: COMPLETED | OUTPATIENT
Start: 2019-12-10 | End: 2019-12-10

## 2019-12-10 RX ORDER — ACETAMINOPHEN 500 MG
650 TABLET ORAL ONCE
Refills: 0 | Status: COMPLETED | OUTPATIENT
Start: 2019-12-10 | End: 2019-12-10

## 2019-12-10 RX ORDER — DEXAMETHASONE 0.5 MG/5ML
8 ELIXIR ORAL ONCE
Refills: 0 | Status: COMPLETED | OUTPATIENT
Start: 2019-12-10 | End: 2019-12-10

## 2019-12-10 RX ADMIN — Medication 650 MILLIGRAM(S): at 16:16

## 2019-12-10 RX ADMIN — Medication 50 MILLIGRAM(S): at 16:16

## 2019-12-10 RX ADMIN — RITUXIMAB 170 MILLIGRAM(S): 10 INJECTION, SOLUTION INTRAVENOUS at 17:05

## 2019-12-10 RX ADMIN — Medication 8 MILLIGRAM(S): at 17:05

## 2019-12-10 RX ADMIN — Medication 152.4 MILLIGRAM(S): at 16:50

## 2019-12-11 LAB — LDH SERPL-CCNC: 279 U/L

## 2019-12-11 NOTE — ASSESSMENT
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 No evidence of disease on CT neck from Jun 2019 . CT abdomen from 9/2019 showed  no significant change in multiple subcentimeter lymph nodes at the root of the small bowel mesentery, with overall unchanged surrounding mesenteric infiltration since July 26, 2018. CT chest did not show any e/o disease\par Waxing and waning cervical lymphadenopathy ?\par Plan:\par Continue rituxan. On maintenance rituxan since Aug 2018\par Check CMP, LDH\par RTO in 2 months\par \par \par \par

## 2019-12-11 NOTE — PHYSICAL EXAM
[Fully active, able to carry on all pre-disease performance without restriction] : Status 0 - Fully active, able to carry on all pre-disease performance without restriction [Normal] : no peripheral adenopathy appreciated [Mucositis] : no mucositis [Ulcers] : no ulcers [Thrush] : no thrush [de-identified] : no organomegaly, mild RUQ tenderness, no masses , no rebound.  [de-identified] : no residual adenopathy .  ? Lipoma in suboccipital area

## 2019-12-11 NOTE — HISTORY OF PRESENT ILLNESS
[de-identified] : 57 year old obese male with PMH of COPD , diabetes, PPP for syncope. Patient went to the emergency room on February 4, 2018 with complaints of a swollen lymph node on right side of neck, which he had noticed several months prior to presentation. Lymph node was originally not painful but became painful about one week prior to presentation, which prompted him to go to the emergency room. No fevers, night sweats, anorexia or significant changes in weight. In the ED, patient underwent CT neck soft tissue, chest, abdomen and pelvis, which found multiple areas of LAD,Right-sided enlarged level 5 cervical chain lymph nodes measuring up to  1.9 x 1.7 cm (series 9 image 193). Enlarged right supraclavicular lymph  nodes measuring up to 1.6 x 1 cm(series 9 image 214).   in the R supraclavicular lymph node, R axillary lymph node,  Enlarged mesenteric lymph nodes measuring up to 1.3  x 1 cm (series 2 image 92)., There was no evidence of mediastinal or hilar LAD at that time. However, patient is not certain if he took prednisone prior to imaging. As per wife, patient takes short-course prednisone tapers every few months secondary to bronchitis. OTTONIEL plummer is a former 1 PPD smoker for 30-40 years who quit about five months prior to presentation and denies any previous history of asbestosis exposure. He worked as a . He has no pets at home. Biopsy of right cervical lymph node by ENT  Low grade B-cell lymphoma, consistent with nisreen marginal zone lymphoma.  the neoplastic cells are CD20+ CD79a+ PAX5+ B-cells that co-express BCL2. They are negative for CD5, CD10, BCL6, cyclinD1, and CD43. The proliferation index (Ki67 labeling) ranges from 5 to 20% in different areas excluding the germinal centers, flow cytometry studies performed at [facility] show monotypic B-cells (38% of cells), positive for kappa, CD19, CD20, FMC-7, CD23, minimal CD10; negative for CD5. Viability is 78%.   *** In summary, the findings are diagnostic of low grade B-cell lymphoma, the features are consistent with nisreen marginal zone lymphoma.     2 , peripheral blood flow showed - A MINUTE MONOCLONAL, CD10+ B-CELL POPULATION IS OBSERVED (0.14%). kappa and CD 20 bright . Lab work showed normal CBC , Hb A1c 9, negative HIV , Hep c and SPEP .    \par Since surgery 2 weeks ago he noted marked increase in painful  nisreen masses in the neck , supraclavicular and right axillae , he takes percocet to sleep with partial relief  , PET showed generalized adenopathy with maximum SUV 9 . No bone or marrow uptake. He complains of dyspnea on exertion , mild cough , improved with short course of prednisone, he lost few lbs and denies fever or night sweats. \par No history of EGD or colonoscopy . \par  [de-identified] : 08/27/2018 : Patient returns for follow up after BR X 3 with near complete response. Treatment was held due to worsening complaints of weakness, exertional dyspnea. RUQ pain . He followed with pulmonary and was placed on inhaled bronchodilators , He had MUGA scan and may require a second pacemaker lead. He denies any B symptoms or any new lumps or adenopathy . \par \par 10/23/2018 Patient returns for next dose of rituxan , He denies any new complaints . He was seen by GI for right sided abdominal tenderness, felt to be muscular in origin , CT scan apparently shows abdominal hernias probably unrelated to his complaint . He is scheduled for surveillance colonoscopy . He denies any B symptoms, he continues to have dyspnea on exertion and went on social security disability .\par \par 12/17/2018 Patient returns for next dose of rituxan maintenance . He denies any B symptoms , continues to complain of mild RUQ pain and tenderness. He is followed by cardiology and pulmonary and is undergoing work up to rule out cardiac sarcoidosis . \par \par 06/17/2019 Patient returns for follow up . he has been on maintenance rituxan for one year and was treated recently as outpatient for suspected pneumonia , he lost 9 lbs and is feeling better now. He had cardiac work up with revealed left atrial thrombus and started on xarelto , CT angio was negative , Cardiac CT showed LAD plaque without oclusion . \par \par 8/19/19: Pt returns for a f/u visit and next dose of rituxan. He only had an episode of URI about a week ago for which he was treated with abx. Denies having any fever, night sweats. Had recently lost weight but then regained. His ANITA did not show any e/o thrombus, so xarelto was d/c'd. No other complaints. Last CT neck from Jun 2019 was negative. \par \par 10/15/19: Pt returns for a f/u visit and for next dose of rituxan. He has no fresh complaints. CBC is acceptable. Last CT abdomen and chest did not show any e/o recurrence. Since July 26, 2018, no significant change in multiple subcentimeter lymph nodes at the root of the small bowel mesentery, with overall unchanged surrounding mesenteric infiltration.\par \par 12/10/19: Pt returns for a f/u visit. No fresh complaints. CBC normal.

## 2019-12-26 ENCOUNTER — EMERGENCY (EMERGENCY)
Facility: HOSPITAL | Age: 59
LOS: 0 days | Discharge: HOME | End: 2019-12-26
Admitting: STUDENT IN AN ORGANIZED HEALTH CARE EDUCATION/TRAINING PROGRAM
Payer: MEDICAID

## 2019-12-26 VITALS
RESPIRATION RATE: 18 BRPM | TEMPERATURE: 98 F | HEART RATE: 83 BPM | OXYGEN SATURATION: 97 % | SYSTOLIC BLOOD PRESSURE: 171 MMHG | DIASTOLIC BLOOD PRESSURE: 87 MMHG

## 2019-12-26 DIAGNOSIS — Z95.0 PRESENCE OF CARDIAC PACEMAKER: Chronic | ICD-10-CM

## 2019-12-26 DIAGNOSIS — Z88.8 ALLERGY STATUS TO OTHER DRUGS, MEDICAMENTS AND BIOLOGICAL SUBSTANCES STATUS: ICD-10-CM

## 2019-12-26 DIAGNOSIS — K03.81 CRACKED TOOTH: ICD-10-CM

## 2019-12-26 DIAGNOSIS — K08.89 OTHER SPECIFIED DISORDERS OF TEETH AND SUPPORTING STRUCTURES: ICD-10-CM

## 2019-12-26 PROCEDURE — 99282 EMERGENCY DEPT VISIT SF MDM: CPT

## 2019-12-26 NOTE — CONSULT NOTE ADULT - SUBJECTIVE AND OBJECTIVE BOX
Emergency #7    S: 58 yo male patient presented to clinic with chief complaint of "I was eating a cookie yesterday and my front tooth broke. It doesn't hurt, but I want to have a tooth placed there to replace the gap."     O: Patient had broken #7 to the gumline. No acute swelling seen. Clean fracture and no periapical infection noted in periapical radiograph. 1 panoramic radiograph taken at this visit. Noted missing #13 and 14 on opposite side. Informed patient that there is also large caries on #6 and #8 that need to be addressed as well. Multiple caries noted on other existing crowns that also need to be addressed.    A: Recommended that the patient have a complete treatment plan before proceeding with any treatment on #7. Informed patient that he needs to find a dentist and have a complete exam done and have a comprehensive treatment plan made before proceeding.     P: Patient needs to find a private dentist or become patient of record of this clinic to have comprehensive treatment plan done. No immediate treatment needed since patient is not currently in pain.     No prescriptions given at this visit.    Recommendations:   1) Follow-up with primary dentist for care   2) Return to the emergency room if patient has trouble breathing or has swelling    Abbie Bauer DDS, [id number] (spectra number)

## 2019-12-29 NOTE — ED PROVIDER NOTE - PHYSICAL EXAMINATION
Gen: NAD, AOx3  Head: NCAT  HEENT: PERRL, oral mucosa moist, normal conjunctiva, oropharynx clear without exudate or erythema  Dental: R cental incisor fracture at gumline with no edema/erythema present  Lung: CTAB, no respiratory distress, no wheezing, rales, rhonchi  CV: normal s1/s2, rrr, Normal perfusion, pulses 2+ throughout  MSK: No edema, no visible deformities, full range of motion in all 4 extremities  Neuro: No focal neurologic deficits   Skin: No rash   Psych: normal affect

## 2019-12-29 NOTE — ED PROVIDER NOTE - OBJECTIVE STATEMENT
60 yo male with a pmh of lymphoma, HTN, DM, and COPD presents with dental pain. pt states that yesterday he bit into a cookie causing his tooth to break. he denies any other symptoms including fevers, chill, headache, recent illness/travel, cough, abdominal pain, chest pain, or SOB

## 2019-12-29 NOTE — ED PROVIDER NOTE - NS ED ROS FT
Constitutional: (-) fever  Eyes/ENT: (-) visual changes   Cardiovascular: (-) chest pain, (-) syncope  Respiratory: (-) cough, (-) shortness of breath  Musculoskeletal: (-) neck pain, (-) back pain, (-) joint pain  Integumentary: (-) rash, (-) edema  Neurological: (-) headache, (-) altered mental status  Allergic/Immunologic: (-) pruritus

## 2020-01-02 ENCOUNTER — OUTPATIENT (OUTPATIENT)
Dept: OUTPATIENT SERVICES | Facility: HOSPITAL | Age: 60
LOS: 1 days | Discharge: HOME | End: 2020-01-02

## 2020-01-02 DIAGNOSIS — Z95.0 PRESENCE OF CARDIAC PACEMAKER: Chronic | ICD-10-CM

## 2020-01-02 DIAGNOSIS — Z01.21 ENCOUNTER FOR DENTAL EXAMINATION AND CLEANING WITH ABNORMAL FINDINGS: ICD-10-CM

## 2020-01-15 ENCOUNTER — APPOINTMENT (OUTPATIENT)
Dept: CARDIOLOGY | Facility: CLINIC | Age: 60
End: 2020-01-15
Payer: MEDICAID

## 2020-01-15 PROCEDURE — 93280 PM DEVICE PROGR EVAL DUAL: CPT | Mod: 59

## 2020-01-15 PROCEDURE — 99213 OFFICE O/P EST LOW 20 MIN: CPT | Mod: 25

## 2020-01-27 RX ORDER — ALBIGLUTIDE 50 MG/.5ML
50 INJECTION, POWDER, LYOPHILIZED, FOR SOLUTION SUBCUTANEOUS
Qty: 1 | Refills: 5 | Status: DISCONTINUED | COMMUNITY
Start: 2017-03-16 | End: 2020-01-27

## 2020-02-03 ENCOUNTER — APPOINTMENT (OUTPATIENT)
Dept: HEMATOLOGY ONCOLOGY | Facility: CLINIC | Age: 60
End: 2020-02-03
Payer: MEDICAID

## 2020-02-03 ENCOUNTER — LABORATORY RESULT (OUTPATIENT)
Age: 60
End: 2020-02-03

## 2020-02-03 ENCOUNTER — OUTPATIENT (OUTPATIENT)
Dept: OUTPATIENT SERVICES | Facility: HOSPITAL | Age: 60
LOS: 1 days | Discharge: HOME | End: 2020-02-03

## 2020-02-03 ENCOUNTER — APPOINTMENT (OUTPATIENT)
Dept: INFUSION THERAPY | Facility: CLINIC | Age: 60
End: 2020-02-03
Payer: MEDICAID

## 2020-02-03 VITALS
TEMPERATURE: 96.1 F | HEIGHT: 72 IN | BODY MASS INDEX: 31.15 KG/M2 | SYSTOLIC BLOOD PRESSURE: 147 MMHG | RESPIRATION RATE: 14 BRPM | HEART RATE: 85 BPM | DIASTOLIC BLOOD PRESSURE: 73 MMHG | WEIGHT: 230 LBS

## 2020-02-03 DIAGNOSIS — C85.90 NON-HODGKIN LYMPHOMA, UNSPECIFIED, UNSPECIFIED SITE: ICD-10-CM

## 2020-02-03 DIAGNOSIS — Z95.0 PRESENCE OF CARDIAC PACEMAKER: Chronic | ICD-10-CM

## 2020-02-03 LAB
HCT VFR BLD CALC: 47.2 %
HGB BLD-MCNC: 15.7 G/DL
MCHC RBC-ENTMCNC: 30.1 PG
MCHC RBC-ENTMCNC: 33.3 G/DL
MCV RBC AUTO: 90.4 FL
PLATELET # BLD AUTO: 178 K/UL
PMV BLD: 11 FL
RBC # BLD: 5.22 M/UL
RBC # FLD: 12.6 %
WBC # FLD AUTO: 3.88 K/UL

## 2020-02-03 PROCEDURE — 99213 OFFICE O/P EST LOW 20 MIN: CPT

## 2020-02-03 RX ORDER — DEXAMETHASONE 0.5 MG/5ML
8 ELIXIR ORAL ONCE
Refills: 0 | Status: COMPLETED | OUTPATIENT
Start: 2020-02-03 | End: 2020-02-03

## 2020-02-03 RX ORDER — RITUXIMAB 10 MG/ML
850 INJECTION, SOLUTION INTRAVENOUS ONCE
Refills: 0 | Status: COMPLETED | OUTPATIENT
Start: 2020-02-03 | End: 2020-02-03

## 2020-02-03 RX ORDER — ACETAMINOPHEN 500 MG
650 TABLET ORAL ONCE
Refills: 0 | Status: COMPLETED | OUTPATIENT
Start: 2020-02-03 | End: 2020-02-03

## 2020-02-03 RX ORDER — DIPHENHYDRAMINE HCL 50 MG
50 CAPSULE ORAL ONCE
Refills: 0 | Status: COMPLETED | OUTPATIENT
Start: 2020-02-03 | End: 2020-02-03

## 2020-02-03 RX ADMIN — Medication 152.4 MILLIGRAM(S): at 10:29

## 2020-02-03 RX ADMIN — Medication 650 MILLIGRAM(S): at 10:29

## 2020-02-03 RX ADMIN — Medication 650 MILLIGRAM(S): at 10:30

## 2020-02-03 RX ADMIN — Medication 8 MILLIGRAM(S): at 10:50

## 2020-02-03 RX ADMIN — RITUXIMAB 170 MILLIGRAM(S): 10 INJECTION, SOLUTION INTRAVENOUS at 11:21

## 2020-02-03 RX ADMIN — Medication 50 MILLIGRAM(S): at 10:29

## 2020-02-04 LAB
ALBUMIN SERPL ELPH-MCNC: 4.7 G/DL
ALP BLD-CCNC: 47 U/L
ALT SERPL-CCNC: 21 U/L
ANION GAP SERPL CALC-SCNC: 16 MMOL/L
AST SERPL-CCNC: 27 U/L
BILIRUB SERPL-MCNC: 1.9 MG/DL
BUN SERPL-MCNC: 24 MG/DL
CALCIUM SERPL-MCNC: 9.5 MG/DL
CHLORIDE SERPL-SCNC: 103 MMOL/L
CO2 SERPL-SCNC: 23 MMOL/L
CREAT SERPL-MCNC: 1 MG/DL
GLUCOSE SERPL-MCNC: 182 MG/DL
LDH SERPL-CCNC: 308 U/L
POTASSIUM SERPL-SCNC: 4.9 MMOL/L
PROT SERPL-MCNC: 7.1 G/DL
SODIUM SERPL-SCNC: 142 MMOL/L

## 2020-02-04 NOTE — HISTORY OF PRESENT ILLNESS
[de-identified] : 57 year old obese male with PMH of COPD , diabetes, PPP for syncope. Patient went to the emergency room on February 4, 2018 with complaints of a swollen lymph node on right side of neck, which he had noticed several months prior to presentation. Lymph node was originally not painful but became painful about one week prior to presentation, which prompted him to go to the emergency room. No fevers, night sweats, anorexia or significant changes in weight. In the ED, patient underwent CT neck soft tissue, chest, abdomen and pelvis, which found multiple areas of LAD,Right-sided enlarged level 5 cervical chain lymph nodes measuring up to  1.9 x 1.7 cm (series 9 image 193). Enlarged right supraclavicular lymph  nodes measuring up to 1.6 x 1 cm(series 9 image 214).   in the R supraclavicular lymph node, R axillary lymph node,  Enlarged mesenteric lymph nodes measuring up to 1.3  x 1 cm (series 2 image 92)., There was no evidence of mediastinal or hilar LAD at that time. However, patient is not certain if he took prednisone prior to imaging. As per wife, patient takes short-course prednisone tapers every few months secondary to bronchitis. OTTONIEL plummer is a former 1 PPD smoker for 30-40 years who quit about five months prior to presentation and denies any previous history of asbestosis exposure. He worked as a . He has no pets at home. Biopsy of right cervical lymph node by ENT  Low grade B-cell lymphoma, consistent with nisreen marginal zone lymphoma.  the neoplastic cells are CD20+ CD79a+ PAX5+ B-cells that co-express BCL2. They are negative for CD5, CD10, BCL6, cyclinD1, and CD43. The proliferation index (Ki67 labeling) ranges from 5 to 20% in different areas excluding the germinal centers, flow cytometry studies performed at [facility] show monotypic B-cells (38% of cells), positive for kappa, CD19, CD20, FMC-7, CD23, minimal CD10; negative for CD5. Viability is 78%.   *** In summary, the findings are diagnostic of low grade B-cell lymphoma, the features are consistent with nisreen marginal zone lymphoma.     2 , peripheral blood flow showed - A MINUTE MONOCLONAL, CD10+ B-CELL POPULATION IS OBSERVED (0.14%). kappa and CD 20 bright . Lab work showed normal CBC , Hb A1c 9, negative HIV , Hep c and SPEP .    \par Since surgery 2 weeks ago he noted marked increase in painful  nisreen masses in the neck , supraclavicular and right axillae , he takes percocet to sleep with partial relief  , PET showed generalized adenopathy with maximum SUV 9 . No bone or marrow uptake. He complains of dyspnea on exertion , mild cough , improved with short course of prednisone, he lost few lbs and denies fever or night sweats. \par No history of EGD or colonoscopy . \par  [de-identified] : 08/27/2018 : Patient returns for follow up after BR X 3 with near complete response. Treatment was held due to worsening complaints of weakness, exertional dyspnea. RUQ pain . He followed with pulmonary and was placed on inhaled bronchodilators , He had MUGA scan and may require a second pacemaker lead. He denies any B symptoms or any new lumps or adenopathy . \par \par 10/23/2018 Patient returns for next dose of rituxan , He denies any new complaints . He was seen by GI for right sided abdominal tenderness, felt to be muscular in origin , CT scan apparently shows abdominal hernias probably unrelated to his complaint . He is scheduled for surveillance colonoscopy . He denies any B symptoms, he continues to have dyspnea on exertion and went on social security disability .\par \par 12/17/2018 Patient returns for next dose of rituxan maintenance . He denies any B symptoms , continues to complain of mild RUQ pain and tenderness. He is followed by cardiology and pulmonary and is undergoing work up to rule out cardiac sarcoidosis . \par \par 06/17/2019 Patient returns for follow up . he has been on maintenance rituxan for one year and was treated recently as outpatient for suspected pneumonia , he lost 9 lbs and is feeling better now. He had cardiac work up with revealed left atrial thrombus and started on xarelto , CT angio was negative , Cardiac CT showed LAD plaque without oclusion . \par \par 8/19/19: Pt returns for a f/u visit and next dose of rituxan. He only had an episode of URI about a week ago for which he was treated with abx. Denies having any fever, night sweats. Had recently lost weight but then regained. His ANITA did not show any e/o thrombus, so xarelto was d/c'd. No other complaints. Last CT neck from Jun 2019 was negative. \par \par 10/15/19: Pt returns for a f/u visit and for next dose of rituxan. He has no fresh complaints. CBC is acceptable. Last CT abdomen and chest did not show any e/o recurrence. Since July 26, 2018, no significant change in multiple subcentimeter lymph nodes at the root of the small bowel mesentery, with overall unchanged surrounding mesenteric infiltration.\par \par 12/10/19: Pt returns for a f/u visit. No fresh complaints. CBC normal. \par \par 02/03/2020. \par patient returns for a follow up visit. He is on maintenance Rituxan. Will complete 2 years 08/2020. His lab work reviewed today and acceptable for treatment. \par He recently had his pacemaker battery changed. And he stopped Ac as per cardiologist as there was no more thrombus noted on echo.

## 2020-02-04 NOTE — PHYSICAL EXAM
[Fully active, able to carry on all pre-disease performance without restriction] : Status 0 - Fully active, able to carry on all pre-disease performance without restriction [Normal] : no peripheral adenopathy appreciated [Ulcers] : no ulcers [Mucositis] : no mucositis [Thrush] : no thrush [de-identified] : no residual adenopathy .  ? Lipoma in suboccipital area [de-identified] : no organomegaly, mild RUQ tenderness, no masses , no rebound.

## 2020-02-04 NOTE — ASSESSMENT
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 No evidence of disease on CT neck from Jun 2019 . CT abdomen from 9/2019 showed  no significant change in multiple subcentimeter lymph nodes at the root of the small bowel mesentery, with overall unchanged surrounding mesenteric infiltration since July 26, 2018. CT chest did not show any e/o disease\par Waxing and waning cervical lymphadenopathy ?\par \par Plan:\par Continue rituxan. On maintenance rituxan since Aug 2018.\par Check CMP, LDH.\par will plan to repeat imaging in June 2020.\par RTO in 2 months\par \par Patient seen and examined with Dr. Rider \par \par \par

## 2020-02-10 ENCOUNTER — OUTPATIENT (OUTPATIENT)
Dept: OUTPATIENT SERVICES | Facility: HOSPITAL | Age: 60
LOS: 1 days | Discharge: HOME | End: 2020-02-10

## 2020-02-10 DIAGNOSIS — Z95.0 PRESENCE OF CARDIAC PACEMAKER: Chronic | ICD-10-CM

## 2020-02-14 ENCOUNTER — OUTPATIENT (OUTPATIENT)
Dept: OUTPATIENT SERVICES | Facility: HOSPITAL | Age: 60
LOS: 1 days | Discharge: HOME | End: 2020-02-14

## 2020-02-14 DIAGNOSIS — K02.62 DENTAL CARIES ON SMOOTH SURFACE PENETRATING INTO DENTIN: ICD-10-CM

## 2020-02-14 DIAGNOSIS — Z95.0 PRESENCE OF CARDIAC PACEMAKER: Chronic | ICD-10-CM

## 2020-02-21 ENCOUNTER — OUTPATIENT (OUTPATIENT)
Dept: OUTPATIENT SERVICES | Facility: HOSPITAL | Age: 60
LOS: 1 days | Discharge: HOME | End: 2020-02-21

## 2020-02-21 DIAGNOSIS — Z95.0 PRESENCE OF CARDIAC PACEMAKER: Chronic | ICD-10-CM

## 2020-02-21 DIAGNOSIS — K02.62 DENTAL CARIES ON SMOOTH SURFACE PENETRATING INTO DENTIN: ICD-10-CM

## 2020-03-11 ENCOUNTER — OUTPATIENT (OUTPATIENT)
Dept: OUTPATIENT SERVICES | Facility: HOSPITAL | Age: 60
LOS: 1 days | Discharge: HOME | End: 2020-03-11

## 2020-03-11 DIAGNOSIS — Z95.0 PRESENCE OF CARDIAC PACEMAKER: Chronic | ICD-10-CM

## 2020-03-30 ENCOUNTER — APPOINTMENT (OUTPATIENT)
Dept: HEMATOLOGY ONCOLOGY | Facility: CLINIC | Age: 60
End: 2020-03-30
Payer: MEDICAID

## 2020-03-30 ENCOUNTER — APPOINTMENT (OUTPATIENT)
Dept: INFUSION THERAPY | Facility: CLINIC | Age: 60
End: 2020-03-30
Payer: MEDICAID

## 2020-03-30 ENCOUNTER — LABORATORY RESULT (OUTPATIENT)
Age: 60
End: 2020-03-30

## 2020-03-30 VITALS
HEIGHT: 72 IN | WEIGHT: 228 LBS | BODY MASS INDEX: 30.88 KG/M2 | TEMPERATURE: 96.7 F | RESPIRATION RATE: 14 BRPM | HEART RATE: 78 BPM | DIASTOLIC BLOOD PRESSURE: 80 MMHG | SYSTOLIC BLOOD PRESSURE: 151 MMHG

## 2020-03-30 LAB
HCT VFR BLD CALC: 46.8 %
HGB BLD-MCNC: 16 G/DL
MCHC RBC-ENTMCNC: 30.2 PG
MCHC RBC-ENTMCNC: 34.2 G/DL
MCV RBC AUTO: 88.3 FL
PLATELET # BLD AUTO: 171 K/UL
PMV BLD: 10.6 FL
RBC # BLD: 5.3 M/UL
RBC # FLD: 12.2 %
WBC # FLD AUTO: 3.29 K/UL

## 2020-03-30 PROCEDURE — 99213 OFFICE O/P EST LOW 20 MIN: CPT

## 2020-03-30 RX ORDER — RITUXIMAB 10 MG/ML
850 INJECTION, SOLUTION INTRAVENOUS ONCE
Refills: 0 | Status: COMPLETED | OUTPATIENT
Start: 2020-03-30 | End: 2020-03-30

## 2020-03-30 RX ORDER — DEXAMETHASONE 0.5 MG/5ML
8 ELIXIR ORAL ONCE
Refills: 0 | Status: COMPLETED | OUTPATIENT
Start: 2020-03-30 | End: 2020-03-30

## 2020-03-30 RX ORDER — ACETAMINOPHEN 500 MG
650 TABLET ORAL ONCE
Refills: 0 | Status: COMPLETED | OUTPATIENT
Start: 2020-03-30 | End: 2020-03-30

## 2020-03-30 RX ORDER — DIPHENHYDRAMINE HCL 50 MG
50 CAPSULE ORAL ONCE
Refills: 0 | Status: COMPLETED | OUTPATIENT
Start: 2020-03-30 | End: 2020-03-30

## 2020-03-30 RX ADMIN — Medication 650 MILLIGRAM(S): at 09:51

## 2020-03-30 RX ADMIN — Medication 152.4 MILLIGRAM(S): at 09:50

## 2020-03-30 RX ADMIN — Medication 8 MILLIGRAM(S): at 10:05

## 2020-03-30 RX ADMIN — Medication 50 MILLIGRAM(S): at 09:50

## 2020-03-30 RX ADMIN — RITUXIMAB 170 MILLIGRAM(S): 10 INJECTION, SOLUTION INTRAVENOUS at 10:19

## 2020-03-30 NOTE — HISTORY OF PRESENT ILLNESS
[de-identified] : 08/27/2018 : Patient returns for follow up after BR X 3 with near complete response. Treatment was held due to worsening complaints of weakness, exertional dyspnea. RUQ pain . He followed with pulmonary and was placed on inhaled bronchodilators , He had MUGA scan and may require a second pacemaker lead. He denies any B symptoms or any new lumps or adenopathy . \par \par 10/23/2018 Patient returns for next dose of rituxan , He denies any new complaints . He was seen by GI for right sided abdominal tenderness, felt to be muscular in origin , CT scan apparently shows abdominal hernias probably unrelated to his complaint . He is scheduled for surveillance colonoscopy . He denies any B symptoms, he continues to have dyspnea on exertion and went on social security disability .\par \par 12/17/2018 Patient returns for next dose of rituxan maintenance . He denies any B symptoms , continues to complain of mild RUQ pain and tenderness. He is followed by cardiology and pulmonary and is undergoing work up to rule out cardiac sarcoidosis . \par \par 06/17/2019 Patient returns for follow up . he has been on maintenance rituxan for one year and was treated recently as outpatient for suspected pneumonia , he lost 9 lbs and is feeling better now. He had cardiac work up with revealed left atrial thrombus and started on xarelto , CT angio was negative , Cardiac CT showed LAD plaque without oclusion . \par \par 8/19/19: Pt returns for a f/u visit and next dose of rituxan. He only had an episode of URI about a week ago for which he was treated with abx. Denies having any fever, night sweats. Had recently lost weight but then regained. His ANITA did not show any e/o thrombus, so xarelto was d/c'd. No other complaints. Last CT neck from Jun 2019 was negative. \par \par 10/15/19: Pt returns for a f/u visit and for next dose of rituxan. He has no fresh complaints. CBC is acceptable. Last CT abdomen and chest did not show any e/o recurrence. Since July 26, 2018, no significant change in multiple subcentimeter lymph nodes at the root of the small bowel mesentery, with overall unchanged surrounding mesenteric infiltration.\par \par 12/10/19: Pt returns for a f/u visit. No fresh complaints. CBC normal. \par \par 02/03/2020. \par patient returns for a follow up visit. He is on maintenance Rituxan. Will complete 2 years 08/2020. His lab work reviewed today and acceptable for treatment. \par He recently had his pacemaker battery changed. And he stopped Ac as per cardiologist as there was no more thrombus noted on echo. \par \par 3/30/2020: Patient is here for a follow-up visit for lymphoma on maintenance Rituxan. He is feeling well with no new complaints. He is due for Rituxan again today.  Patient denies fever, chills, nausea, vomiting, new pain or bleeding.\par  [de-identified] : 57 year old obese male with PMH of COPD , diabetes, PPP for syncope. Patient went to the emergency room on February 4, 2018 with complaints of a swollen lymph node on right side of neck, which he had noticed several months prior to presentation. Lymph node was originally not painful but became painful about one week prior to presentation, which prompted him to go to the emergency room. No fevers, night sweats, anorexia or significant changes in weight. In the ED, patient underwent CT neck soft tissue, chest, abdomen and pelvis, which found multiple areas of LAD,Right-sided enlarged level 5 cervical chain lymph nodes measuring up to  1.9 x 1.7 cm (series 9 image 193). Enlarged right supraclavicular lymph  nodes measuring up to 1.6 x 1 cm(series 9 image 214).   in the R supraclavicular lymph node, R axillary lymph node,  Enlarged mesenteric lymph nodes measuring up to 1.3  x 1 cm (series 2 image 92)., There was no evidence of mediastinal or hilar LAD at that time. However, patient is not certain if he took prednisone prior to imaging. As per wife, patient takes short-course prednisone tapers every few months secondary to bronchitis. OTTONIEL plummer is a former 1 PPD smoker for 30-40 years who quit about five months prior to presentation and denies any previous history of asbestosis exposure. He worked as a . He has no pets at home. Biopsy of right cervical lymph node by ENT  Low grade B-cell lymphoma, consistent with nisreen marginal zone lymphoma.  the neoplastic cells are CD20+ CD79a+ PAX5+ B-cells that co-express BCL2. They are negative for CD5, CD10, BCL6, cyclinD1, and CD43. The proliferation index (Ki67 labeling) ranges from 5 to 20% in different areas excluding the germinal centers, flow cytometry studies performed at [facility] show monotypic B-cells (38% of cells), positive for kappa, CD19, CD20, FMC-7, CD23, minimal CD10; negative for CD5. Viability is 78%.   *** In summary, the findings are diagnostic of low grade B-cell lymphoma, the features are consistent with nisreen marginal zone lymphoma.     2 , peripheral blood flow showed - A MINUTE MONOCLONAL, CD10+ B-CELL POPULATION IS OBSERVED (0.14%). kappa and CD 20 bright . Lab work showed normal CBC , Hb A1c 9, negative HIV , Hep c and SPEP .    \par Since surgery 2 weeks ago he noted marked increase in painful  nisreen masses in the neck , supraclavicular and right axillae , he takes percocet to sleep with partial relief  , PET showed generalized adenopathy with maximum SUV 9 . No bone or marrow uptake. He complains of dyspnea on exertion , mild cough , improved with short course of prednisone, he lost few lbs and denies fever or night sweats. \par No history of EGD or colonoscopy . \par

## 2020-03-30 NOTE — PHYSICAL EXAM
[Fully active, able to carry on all pre-disease performance without restriction] : Status 0 - Fully active, able to carry on all pre-disease performance without restriction [Normal] : no peripheral adenopathy appreciated [Ulcers] : no ulcers [Mucositis] : no mucositis [Thrush] : no thrush [de-identified] : no residual adenopathy .  ? Lipoma in suboccipital area [de-identified] : no organomegaly, mild RUQ tenderness, no masses , no rebound.

## 2020-03-30 NOTE — ASSESSMENT
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 No evidence of disease on CT neck from Jun 2019 . CT abdomen from 9/2019 showed  no significant change in multiple subcentimeter lymph nodes at the root of the small bowel mesentery, with overall unchanged surrounding mesenteric infiltration since July 26, 2018. CT chest did not show any e/o disease\par Waxing and waning cervical lymphadenopathy ?\par \par Plan:\par --Continue Rituxan cycle 14 today. On maintenance Rituxan since Aug 2018.\par --CBC reviewed with pt . \par --Check CMP, LDH.\par --will plan to repeat imaging in June 2020 as per Dr. Rider\par \par RTO in 2 months\par Patient seen and examined with Dr. Chu\par \par \par

## 2020-03-30 NOTE — HISTORY OF PRESENT ILLNESS
[de-identified] : 08/27/2018 : Patient returns for follow up after BR X 3 with near complete response. Treatment was held due to worsening complaints of weakness, exertional dyspnea. RUQ pain . He followed with pulmonary and was placed on inhaled bronchodilators , He had MUGA scan and may require a second pacemaker lead. He denies any B symptoms or any new lumps or adenopathy . \par \par 10/23/2018 Patient returns for next dose of rituxan , He denies any new complaints . He was seen by GI for right sided abdominal tenderness, felt to be muscular in origin , CT scan apparently shows abdominal hernias probably unrelated to his complaint . He is scheduled for surveillance colonoscopy . He denies any B symptoms, he continues to have dyspnea on exertion and went on social security disability .\par \par 12/17/2018 Patient returns for next dose of rituxan maintenance . He denies any B symptoms , continues to complain of mild RUQ pain and tenderness. He is followed by cardiology and pulmonary and is undergoing work up to rule out cardiac sarcoidosis . \par \par 06/17/2019 Patient returns for follow up . he has been on maintenance rituxan for one year and was treated recently as outpatient for suspected pneumonia , he lost 9 lbs and is feeling better now. He had cardiac work up with revealed left atrial thrombus and started on xarelto , CT angio was negative , Cardiac CT showed LAD plaque without oclusion . \par \par 8/19/19: Pt returns for a f/u visit and next dose of rituxan. He only had an episode of URI about a week ago for which he was treated with abx. Denies having any fever, night sweats. Had recently lost weight but then regained. His ANITA did not show any e/o thrombus, so xarelto was d/c'd. No other complaints. Last CT neck from Jun 2019 was negative. \par \par 10/15/19: Pt returns for a f/u visit and for next dose of rituxan. He has no fresh complaints. CBC is acceptable. Last CT abdomen and chest did not show any e/o recurrence. Since July 26, 2018, no significant change in multiple subcentimeter lymph nodes at the root of the small bowel mesentery, with overall unchanged surrounding mesenteric infiltration.\par \par 12/10/19: Pt returns for a f/u visit. No fresh complaints. CBC normal. \par \par 02/03/2020. \par patient returns for a follow up visit. He is on maintenance Rituxan. Will complete 2 years 08/2020. His lab work reviewed today and acceptable for treatment. \par He recently had his pacemaker battery changed. And he stopped Ac as per cardiologist as there was no more thrombus noted on echo. \par \par 3/30/2020: Patient is here for a follow-up visit for lymphoma on maintenance Rituxan. He is feeling well with no new complaints. He is due for Rituxan again today.  Patient denies fever, chills, nausea, vomiting, new pain or bleeding.\par  [de-identified] : 57 year old obese male with PMH of COPD , diabetes, PPP for syncope. Patient went to the emergency room on February 4, 2018 with complaints of a swollen lymph node on right side of neck, which he had noticed several months prior to presentation. Lymph node was originally not painful but became painful about one week prior to presentation, which prompted him to go to the emergency room. No fevers, night sweats, anorexia or significant changes in weight. In the ED, patient underwent CT neck soft tissue, chest, abdomen and pelvis, which found multiple areas of LAD,Right-sided enlarged level 5 cervical chain lymph nodes measuring up to  1.9 x 1.7 cm (series 9 image 193). Enlarged right supraclavicular lymph  nodes measuring up to 1.6 x 1 cm(series 9 image 214).   in the R supraclavicular lymph node, R axillary lymph node,  Enlarged mesenteric lymph nodes measuring up to 1.3  x 1 cm (series 2 image 92)., There was no evidence of mediastinal or hilar LAD at that time. However, patient is not certain if he took prednisone prior to imaging. As per wife, patient takes short-course prednisone tapers every few months secondary to bronchitis. OTTONIEL plummer is a former 1 PPD smoker for 30-40 years who quit about five months prior to presentation and denies any previous history of asbestosis exposure. He worked as a . He has no pets at home. Biopsy of right cervical lymph node by ENT  Low grade B-cell lymphoma, consistent with nisreen marginal zone lymphoma.  the neoplastic cells are CD20+ CD79a+ PAX5+ B-cells that co-express BCL2. They are negative for CD5, CD10, BCL6, cyclinD1, and CD43. The proliferation index (Ki67 labeling) ranges from 5 to 20% in different areas excluding the germinal centers, flow cytometry studies performed at [facility] [employer] show monotypic B-cells (38% of cells), positive for kappa, CD19, CD20, FMC-7, CD23, minimal CD10; negative for CD5. Viability is 78%.   *** In summary, the findings are diagnostic of low grade B-cell lymphoma, the features are consistent with nisreen marginal zone lymphoma.     2 , peripheral blood flow showed - A MINUTE MONOCLONAL, CD10+ B-CELL POPULATION IS OBSERVED (0.14%). kappa and CD 20 bright . Lab work showed normal CBC , Hb A1c 9, negative HIV , Hep c and SPEP .    \par Since surgery 2 weeks ago he noted marked increase in painful  nisreen masses in the neck , supraclavicular and right axillae , he takes percocet to sleep with partial relief  , PET showed generalized adenopathy with maximum SUV 9 . No bone or marrow uptake. He complains of dyspnea on exertion , mild cough , improved with short course of prednisone, he lost few lbs and denies fever or night sweats. \par No history of EGD or colonoscopy . \par

## 2020-03-30 NOTE — PHYSICAL EXAM
[Fully active, able to carry on all pre-disease performance without restriction] : Status 0 - Fully active, able to carry on all pre-disease performance without restriction [Normal] : no peripheral adenopathy appreciated [Ulcers] : no ulcers [Mucositis] : no mucositis [Thrush] : no thrush [de-identified] : no residual adenopathy .  ? Lipoma in suboccipital area [de-identified] : no organomegaly, mild RUQ tenderness, no masses , no rebound.

## 2020-03-31 LAB
ALBUMIN SERPL ELPH-MCNC: 4.8 G/DL
ALP BLD-CCNC: 60 U/L
ALT SERPL-CCNC: 17 U/L
ANION GAP SERPL CALC-SCNC: 14 MMOL/L
AST SERPL-CCNC: 18 U/L
BILIRUB SERPL-MCNC: 1.4 MG/DL
BUN SERPL-MCNC: 17 MG/DL
CALCIUM SERPL-MCNC: 9.7 MG/DL
CHLORIDE SERPL-SCNC: 105 MMOL/L
CO2 SERPL-SCNC: 24 MMOL/L
CREAT SERPL-MCNC: 1.1 MG/DL
GLUCOSE SERPL-MCNC: 136 MG/DL
LDH SERPL-CCNC: 270 U/L
POTASSIUM SERPL-SCNC: 4.5 MMOL/L
PROT SERPL-MCNC: 7 G/DL
SODIUM SERPL-SCNC: 143 MMOL/L

## 2020-04-01 ENCOUNTER — OUTPATIENT (OUTPATIENT)
Dept: OUTPATIENT SERVICES | Facility: HOSPITAL | Age: 60
LOS: 1 days | End: 2020-04-01

## 2020-04-01 DIAGNOSIS — Z95.0 PRESENCE OF CARDIAC PACEMAKER: Chronic | ICD-10-CM

## 2020-04-27 DIAGNOSIS — Z71.89 OTHER SPECIFIED COUNSELING: ICD-10-CM

## 2020-05-22 ENCOUNTER — APPOINTMENT (OUTPATIENT)
Dept: HEMATOLOGY ONCOLOGY | Facility: CLINIC | Age: 60
End: 2020-05-22

## 2020-05-22 ENCOUNTER — OUTPATIENT (OUTPATIENT)
Dept: OUTPATIENT SERVICES | Facility: HOSPITAL | Age: 60
LOS: 1 days | Discharge: HOME | End: 2020-05-22

## 2020-05-22 DIAGNOSIS — Z95.0 PRESENCE OF CARDIAC PACEMAKER: Chronic | ICD-10-CM

## 2020-05-23 LAB — SARS-COV-2 RNA SPEC QL NAA+PROBE: SIGNIFICANT CHANGE UP

## 2020-05-26 ENCOUNTER — APPOINTMENT (OUTPATIENT)
Dept: INFUSION THERAPY | Facility: CLINIC | Age: 60
End: 2020-05-26
Payer: MEDICAID

## 2020-05-26 ENCOUNTER — OUTPATIENT (OUTPATIENT)
Dept: OUTPATIENT SERVICES | Facility: HOSPITAL | Age: 60
LOS: 1 days | Discharge: HOME | End: 2020-05-26

## 2020-05-26 ENCOUNTER — LABORATORY RESULT (OUTPATIENT)
Age: 60
End: 2020-05-26

## 2020-05-26 ENCOUNTER — APPOINTMENT (OUTPATIENT)
Dept: HEMATOLOGY ONCOLOGY | Facility: CLINIC | Age: 60
End: 2020-05-26
Payer: MEDICAID

## 2020-05-26 VITALS
WEIGHT: 230 LBS | HEART RATE: 89 BPM | HEIGHT: 72 IN | RESPIRATION RATE: 14 BRPM | DIASTOLIC BLOOD PRESSURE: 83 MMHG | TEMPERATURE: 98.5 F | SYSTOLIC BLOOD PRESSURE: 138 MMHG | OXYGEN SATURATION: 99 % | BODY MASS INDEX: 31.15 KG/M2

## 2020-05-26 DIAGNOSIS — C85.90 NON-HODGKIN LYMPHOMA, UNSPECIFIED, UNSPECIFIED SITE: ICD-10-CM

## 2020-05-26 DIAGNOSIS — Z51.11 ENCOUNTER FOR ANTINEOPLASTIC CHEMOTHERAPY: ICD-10-CM

## 2020-05-26 DIAGNOSIS — Z95.0 PRESENCE OF CARDIAC PACEMAKER: Chronic | ICD-10-CM

## 2020-05-26 LAB
HCT VFR BLD CALC: 45.5 %
HGB BLD-MCNC: 15.8 G/DL
MCHC RBC-ENTMCNC: 30.3 PG
MCHC RBC-ENTMCNC: 34.7 G/DL
MCV RBC AUTO: 87.3 FL
PLATELET # BLD AUTO: 164 K/UL
PMV BLD: 11.3 FL
RBC # BLD: 5.21 M/UL
RBC # FLD: 12.3 %
WBC # FLD AUTO: 4.89 K/UL

## 2020-05-26 PROCEDURE — 99213 OFFICE O/P EST LOW 20 MIN: CPT

## 2020-05-26 RX ORDER — RITUXIMAB 10 MG/ML
850 INJECTION, SOLUTION INTRAVENOUS ONCE
Refills: 0 | Status: COMPLETED | OUTPATIENT
Start: 2020-05-26 | End: 2020-05-26

## 2020-05-26 RX ORDER — DIPHENHYDRAMINE HCL 50 MG
50 CAPSULE ORAL ONCE
Refills: 0 | Status: COMPLETED | OUTPATIENT
Start: 2020-05-26 | End: 2020-05-26

## 2020-05-26 RX ORDER — DEXAMETHASONE 0.5 MG/5ML
8 ELIXIR ORAL ONCE
Refills: 0 | Status: COMPLETED | OUTPATIENT
Start: 2020-05-26 | End: 2020-05-26

## 2020-05-26 RX ORDER — ACETAMINOPHEN 500 MG
650 TABLET ORAL ONCE
Refills: 0 | Status: COMPLETED | OUTPATIENT
Start: 2020-05-26 | End: 2020-05-26

## 2020-05-26 RX ADMIN — Medication 50 MILLIGRAM(S): at 15:09

## 2020-05-26 RX ADMIN — RITUXIMAB 170 MILLIGRAM(S): 10 INJECTION, SOLUTION INTRAVENOUS at 15:42

## 2020-05-26 RX ADMIN — Medication 152.4 MILLIGRAM(S): at 15:08

## 2020-05-26 RX ADMIN — Medication 650 MILLIGRAM(S): at 15:09

## 2020-05-26 NOTE — ASSESSMENT
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 No evidence of disease on CT neck from Jun 2019 . CT abdomen from 9/2019 showed  no significant change in multiple subcentimeter lymph nodes at the root of the small bowel mesentery, with overall unchanged surrounding mesenteric infiltration since July 26, 2018. CT chest did not show any e/o disease\par Waxing and waning cervical lymphadenopathy ?\par Plan:Rituxan , last maintenance dose .\par follow up in 3 months for exam and repeat imaging ( PET ? ) \par \par

## 2020-05-26 NOTE — HISTORY OF PRESENT ILLNESS
[de-identified] : 57 year old obese male with PMH of COPD , diabetes, PPP for syncope. Patient went to the emergency room on February 4, 2018 with complaints of a swollen lymph node on right side of neck, which he had noticed several months prior to presentation. Lymph node was originally not painful but became painful about one week prior to presentation, which prompted him to go to the emergency room. No fevers, night sweats, anorexia or significant changes in weight. In the ED, patient underwent CT neck soft tissue, chest, abdomen and pelvis, which found multiple areas of LAD,Right-sided enlarged level 5 cervical chain lymph nodes measuring up to  1.9 x 1.7 cm (series 9 image 193). Enlarged right supraclavicular lymph  nodes measuring up to 1.6 x 1 cm(series 9 image 214).   in the R supraclavicular lymph node, R axillary lymph node,  Enlarged mesenteric lymph nodes measuring up to 1.3  x 1 cm (series 2 image 92)., There was no evidence of mediastinal or hilar LAD at that time. However, patient is not certain if he took prednisone prior to imaging. As per wife, patient takes short-course prednisone tapers every few months secondary to bronchitis. OTTONIEL plummer is a former 1 PPD smoker for 30-40 years who quit about five months prior to presentation and denies any previous history of asbestosis exposure. He worked as a . He has no pets at home. Biopsy of right cervical lymph node by ENT  Low grade B-cell lymphoma, consistent with nisreen marginal zone lymphoma.  the neoplastic cells are CD20+ CD79a+ PAX5+ B-cells that co-express BCL2. They are negative for CD5, CD10, BCL6, cyclinD1, and CD43. The proliferation index (Ki67 labeling) ranges from 5 to 20% in different areas excluding the germinal centers, flow cytometry studies performed at [facility] show monotypic B-cells (38% of cells), positive for kappa, CD19, CD20, FMC-7, CD23, minimal CD10; negative for CD5. Viability is 78%.   *** In summary, the findings are diagnostic of low grade B-cell lymphoma, the features are consistent with nisreen marginal zone lymphoma.     2 , peripheral blood flow showed - A MINUTE MONOCLONAL, CD10+ B-CELL POPULATION IS OBSERVED (0.14%). kappa and CD 20 bright . Lab work showed normal CBC , Hb A1c 9, negative HIV , Hep c and SPEP .    \par Since surgery 2 weeks ago he noted marked increase in painful  nisreen masses in the neck , supraclavicular and right axillae , he takes percocet to sleep with partial relief  , PET showed generalized adenopathy with maximum SUV 9 . No bone or marrow uptake. He complains of dyspnea on exertion , mild cough , improved with short course of prednisone, he lost few lbs and denies fever or night sweats. \par No history of EGD or colonoscopy . \par  [de-identified] : 08/27/2018 : Patient returns for follow up after BR X 3 with near complete response. Treatment was held due to worsening complaints of weakness, exertional dyspnea. RUQ pain . He followed with pulmonary and was placed on inhaled bronchodilators , He had MUGA scan and may require a second pacemaker lead. He denies any B symptoms or any new lumps or adenopathy . \par \par 10/23/2018 Patient returns for next dose of rituxan , He denies any new complaints . He was seen by GI for right sided abdominal tenderness, felt to be muscular in origin , CT scan apparently shows abdominal hernias probably unrelated to his complaint . He is scheduled for surveillance colonoscopy . He denies any B symptoms, he continues to have dyspnea on exertion and went on social security disability .\par \par 12/17/2018 Patient returns for next dose of rituxan maintenance . He denies any B symptoms , continues to complain of mild RUQ pain and tenderness. He is followed by cardiology and pulmonary and is undergoing work up to rule out cardiac sarcoidosis . \par \par 06/17/2019 Patient returns for follow up . he has been on maintenance rituxan for one year and was treated recently as outpatient for suspected pneumonia , he lost 9 lbs and is feeling better now. He had cardiac work up with revealed left atrial thrombus and started on xarelto , CT angio was negative , Cardiac CT showed LAD plaque without oclusion . \par \par 05/26/2020 Patient returns for last dose of rituxan , he feels well , denies B symptoms , infections , abnormal lumps .

## 2020-05-26 NOTE — PHYSICAL EXAM
[Ulcers] : no ulcers [Mucositis] : no mucositis [Thrush] : no thrush [Normal] : normoactive bowel sounds, soft and nontender, no hepatosplenomegaly or masses appreciated [de-identified] : no residual adenopathy .  [de-identified] : no organomegaly, mild RUQ tenderness, no masses , no rebound.

## 2020-05-27 LAB
ALBUMIN SERPL ELPH-MCNC: 5 G/DL
ALP BLD-CCNC: 48 U/L
ALT SERPL-CCNC: 18 U/L
ANION GAP SERPL CALC-SCNC: 16 MMOL/L
AST SERPL-CCNC: 25 U/L
BILIRUB SERPL-MCNC: 1.5 MG/DL
BUN SERPL-MCNC: 21 MG/DL
CALCIUM SERPL-MCNC: 9.7 MG/DL
CHLORIDE SERPL-SCNC: 101 MMOL/L
CO2 SERPL-SCNC: 26 MMOL/L
CREAT SERPL-MCNC: 1.2 MG/DL
GLUCOSE SERPL-MCNC: 94 MG/DL
LDH SERPL-CCNC: 393 U/L
POTASSIUM SERPL-SCNC: 4.6 MMOL/L
PROT SERPL-MCNC: 7.4 G/DL
SODIUM SERPL-SCNC: 143 MMOL/L

## 2020-06-08 DIAGNOSIS — Z11.59 ENCOUNTER FOR SCREENING FOR OTHER VIRAL DISEASES: ICD-10-CM

## 2020-06-10 ENCOUNTER — OUTPATIENT (OUTPATIENT)
Dept: OUTPATIENT SERVICES | Facility: HOSPITAL | Age: 60
LOS: 1 days | Discharge: HOME | End: 2020-06-10

## 2020-06-10 DIAGNOSIS — Z95.0 PRESENCE OF CARDIAC PACEMAKER: Chronic | ICD-10-CM

## 2020-06-12 RX ORDER — LANCETS
EACH MISCELLANEOUS
Qty: 100 | Refills: 2 | Status: DISCONTINUED | COMMUNITY
Start: 2017-04-05 | End: 2020-06-12

## 2020-06-25 ENCOUNTER — OUTPATIENT (OUTPATIENT)
Dept: OUTPATIENT SERVICES | Facility: HOSPITAL | Age: 60
LOS: 1 days | Discharge: HOME | End: 2020-06-25

## 2020-06-25 ENCOUNTER — APPOINTMENT (OUTPATIENT)
Dept: ENDOCRINOLOGY | Facility: CLINIC | Age: 60
End: 2020-06-25

## 2020-06-25 VITALS
HEART RATE: 75 BPM | SYSTOLIC BLOOD PRESSURE: 137 MMHG | BODY MASS INDEX: 31.15 KG/M2 | HEIGHT: 72 IN | TEMPERATURE: 94.9 F | DIASTOLIC BLOOD PRESSURE: 83 MMHG | WEIGHT: 230 LBS

## 2020-06-25 DIAGNOSIS — Z95.0 PRESENCE OF CARDIAC PACEMAKER: Chronic | ICD-10-CM

## 2020-06-25 NOTE — PHYSICAL EXAM
[Alert] : alert [Well Nourished] : well nourished [Well Developed] : well developed [No Acute Distress] : no acute distress [No Proptosis] : no proptosis [Normal Sclera/Conjunctiva] : normal sclera/conjunctiva [EOMI] : extra ocular movement intact [Thyroid Not Enlarged] : the thyroid was not enlarged [Normal Oropharynx] : the oropharynx was normal [No Respiratory Distress] : no respiratory distress [No Accessory Muscle Use] : no accessory muscle use [No Thyroid Nodules] : no palpable thyroid nodules [Clear to Auscultation] : lungs were clear to auscultation bilaterally [Normal S1, S2] : normal S1 and S2 [No Edema] : no peripheral edema [Normal Rate] : heart rate was normal [Regular Rhythm] : with a regular rhythm [Pedal Pulses Normal] : the pedal pulses are present [Normal Bowel Sounds] : normal bowel sounds [Not Tender] : non-tender [Not Distended] : not distended [Soft] : abdomen soft [Normal Posterior Cervical Nodes] : no posterior cervical lymphadenopathy [No Spinal Tenderness] : no spinal tenderness [Normal Anterior Cervical Nodes] : no anterior cervical lymphadenopathy [Spine Straight] : spine straight [No Stigmata of Cushings Syndrome] : no stigmata of Cushings Syndrome [Normal Gait] : normal gait [Normal Strength/Tone] : muscle strength and tone were normal [No Rash] : no rash [No Tremors] : no tremors [Acanthosis Nigricans] : no acanthosis nigricans [Normal Reflexes] : deep tendon reflexes were 2+ and symmetric [Oriented x3] : oriented to person, place, and time

## 2020-06-25 NOTE — ASSESSMENT
[FreeTextEntry1] : no major changes, continue current medications. [Diabetes Foot Care] : diabetes foot care [Long Term Vascular Complications] : long term vascular complications of diabetes [Importance of Diet and Exercise] : importance of diet and exercise to improve glycemic control, achieve weight loss and improve cardiovascular health [Carbohydrate Consistent Diet] : carbohydrate consistent diet

## 2020-06-26 ENCOUNTER — OUTPATIENT (OUTPATIENT)
Dept: OUTPATIENT SERVICES | Facility: HOSPITAL | Age: 60
LOS: 1 days | Discharge: HOME | End: 2020-06-26

## 2020-06-26 DIAGNOSIS — Z95.0 PRESENCE OF CARDIAC PACEMAKER: Chronic | ICD-10-CM

## 2020-06-26 DIAGNOSIS — K02.62 DENTAL CARIES ON SMOOTH SURFACE PENETRATING INTO DENTIN: ICD-10-CM

## 2020-07-02 ENCOUNTER — INPATIENT (INPATIENT)
Facility: HOSPITAL | Age: 60
LOS: 1 days | Discharge: HOME | End: 2020-07-04
Attending: INTERNAL MEDICINE | Admitting: INTERNAL MEDICINE
Payer: MEDICARE

## 2020-07-02 VITALS
HEART RATE: 79 BPM | TEMPERATURE: 98 F | OXYGEN SATURATION: 100 % | RESPIRATION RATE: 19 BRPM | DIASTOLIC BLOOD PRESSURE: 78 MMHG | SYSTOLIC BLOOD PRESSURE: 184 MMHG

## 2020-07-02 DIAGNOSIS — Z95.0 PRESENCE OF CARDIAC PACEMAKER: Chronic | ICD-10-CM

## 2020-07-02 DIAGNOSIS — Z98.890 OTHER SPECIFIED POSTPROCEDURAL STATES: Chronic | ICD-10-CM

## 2020-07-02 LAB
ALBUMIN SERPL ELPH-MCNC: 4.9 G/DL — SIGNIFICANT CHANGE UP (ref 3.5–5.2)
ALP SERPL-CCNC: 44 U/L — SIGNIFICANT CHANGE UP (ref 30–115)
ALT FLD-CCNC: 19 U/L — SIGNIFICANT CHANGE UP (ref 0–41)
ANION GAP SERPL CALC-SCNC: 11 MMOL/L — SIGNIFICANT CHANGE UP (ref 7–14)
AST SERPL-CCNC: 22 U/L — SIGNIFICANT CHANGE UP (ref 0–41)
BASOPHILS # BLD AUTO: 0.04 K/UL — SIGNIFICANT CHANGE UP (ref 0–0.2)
BASOPHILS NFR BLD AUTO: 0.7 % — SIGNIFICANT CHANGE UP (ref 0–1)
BILIRUB SERPL-MCNC: 1.7 MG/DL — HIGH (ref 0.2–1.2)
BUN SERPL-MCNC: 18 MG/DL — SIGNIFICANT CHANGE UP (ref 10–20)
CALCIUM SERPL-MCNC: 9.8 MG/DL — SIGNIFICANT CHANGE UP (ref 8.5–10.1)
CHLORIDE SERPL-SCNC: 101 MMOL/L — SIGNIFICANT CHANGE UP (ref 98–110)
CO2 SERPL-SCNC: 29 MMOL/L — SIGNIFICANT CHANGE UP (ref 17–32)
CREAT SERPL-MCNC: 1.4 MG/DL — SIGNIFICANT CHANGE UP (ref 0.7–1.5)
EOSINOPHIL # BLD AUTO: 0.08 K/UL — SIGNIFICANT CHANGE UP (ref 0–0.7)
EOSINOPHIL NFR BLD AUTO: 1.4 % — SIGNIFICANT CHANGE UP (ref 0–8)
GLUCOSE BLDC GLUCOMTR-MCNC: 101 MG/DL — HIGH (ref 70–99)
GLUCOSE SERPL-MCNC: 125 MG/DL — HIGH (ref 70–99)
HCT VFR BLD CALC: 45.1 % — SIGNIFICANT CHANGE UP (ref 42–52)
HGB BLD-MCNC: 15.2 G/DL — SIGNIFICANT CHANGE UP (ref 14–18)
IMM GRANULOCYTES NFR BLD AUTO: 0.7 % — HIGH (ref 0.1–0.3)
LYMPHOCYTES # BLD AUTO: 0.35 K/UL — LOW (ref 1.2–3.4)
LYMPHOCYTES # BLD AUTO: 6 % — LOW (ref 20.5–51.1)
MCHC RBC-ENTMCNC: 30.6 PG — SIGNIFICANT CHANGE UP (ref 27–31)
MCHC RBC-ENTMCNC: 33.7 G/DL — SIGNIFICANT CHANGE UP (ref 32–37)
MCV RBC AUTO: 90.7 FL — SIGNIFICANT CHANGE UP (ref 80–94)
MONOCYTES # BLD AUTO: 0.52 K/UL — SIGNIFICANT CHANGE UP (ref 0.1–0.6)
MONOCYTES NFR BLD AUTO: 8.9 % — SIGNIFICANT CHANGE UP (ref 1.7–9.3)
NEUTROPHILS # BLD AUTO: 4.83 K/UL — SIGNIFICANT CHANGE UP (ref 1.4–6.5)
NEUTROPHILS NFR BLD AUTO: 82.3 % — HIGH (ref 42.2–75.2)
NRBC # BLD: 0 /100 WBCS — SIGNIFICANT CHANGE UP (ref 0–0)
PLATELET # BLD AUTO: 160 K/UL — SIGNIFICANT CHANGE UP (ref 130–400)
POTASSIUM SERPL-MCNC: 4 MMOL/L — SIGNIFICANT CHANGE UP (ref 3.5–5)
POTASSIUM SERPL-SCNC: 4 MMOL/L — SIGNIFICANT CHANGE UP (ref 3.5–5)
PROT SERPL-MCNC: 6.9 G/DL — SIGNIFICANT CHANGE UP (ref 6–8)
RBC # BLD: 4.97 M/UL — SIGNIFICANT CHANGE UP (ref 4.7–6.1)
RBC # FLD: 12.8 % — SIGNIFICANT CHANGE UP (ref 11.5–14.5)
SODIUM SERPL-SCNC: 141 MMOL/L — SIGNIFICANT CHANGE UP (ref 135–146)
TROPONIN T SERPL-MCNC: <0.01 NG/ML — SIGNIFICANT CHANGE UP
WBC # BLD: 5.86 K/UL — SIGNIFICANT CHANGE UP (ref 4.8–10.8)
WBC # FLD AUTO: 5.86 K/UL — SIGNIFICANT CHANGE UP (ref 4.8–10.8)

## 2020-07-02 PROCEDURE — 71045 X-RAY EXAM CHEST 1 VIEW: CPT | Mod: 26

## 2020-07-02 PROCEDURE — 99223 1ST HOSP IP/OBS HIGH 75: CPT

## 2020-07-02 PROCEDURE — 99497 ADVNCD CARE PLAN 30 MIN: CPT | Mod: 25

## 2020-07-02 PROCEDURE — 99285 EMERGENCY DEPT VISIT HI MDM: CPT | Mod: GC

## 2020-07-02 PROCEDURE — 93010 ELECTROCARDIOGRAM REPORT: CPT

## 2020-07-02 RX ORDER — VALSARTAN 80 MG/1
1 TABLET ORAL
Qty: 0 | Refills: 0 | DISCHARGE

## 2020-07-02 RX ORDER — CHLORHEXIDINE GLUCONATE 213 G/1000ML
1 SOLUTION TOPICAL
Refills: 0 | Status: DISCONTINUED | OUTPATIENT
Start: 2020-07-02 | End: 2020-07-04

## 2020-07-02 RX ORDER — SODIUM CHLORIDE 9 MG/ML
1000 INJECTION INTRAMUSCULAR; INTRAVENOUS; SUBCUTANEOUS
Refills: 0 | Status: DISCONTINUED | OUTPATIENT
Start: 2020-07-02 | End: 2020-07-04

## 2020-07-02 RX ORDER — BUDESONIDE AND FORMOTEROL FUMARATE DIHYDRATE 160; 4.5 UG/1; UG/1
2 AEROSOL RESPIRATORY (INHALATION)
Qty: 0 | Refills: 0 | DISCHARGE

## 2020-07-02 NOTE — H&P ADULT - ASSESSMENT
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 The pt states he was sitting in the yard and became weak and dizzy. 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 The pt states he was sitting in the yard and became weak and dizzy. Reports 6/10 chest pain    # Weakness and chest pain  - monitor VS, sxs   - monitor trops x3, first set negative   - Pt is afebrile, no wbc, no signs of infection  - COVID neg    # HTN  - elevated today, start     DM  - hold oral meds  - check fs  - start and adjust insulin s/s prn    # Aggressive nisreen marginal zone lymphoma  - maintenance on Rituxan, will complete 02/2020  - rpt imaging planned June 2020 59 M with a pmh of congenital heart disease s/p repair, HTN, DLD, DM, marginal zone lymphoma on chemo, RAA thrombus (was on xarelto and resolved on last ANITA), COPD, ex-smoker, dm htn, hld, sinus bradycardia s/p PPM, non hodgkin  lymphoma (last chemo was december 21) presents w/ cc weakness and subsequent chest pain en route to the hospital. The pt states he was sitting in the yard and became weak and dizzy, s/p collapse. Developed 6/10 chest pain i ambulance which has since improved.    # Atypical chest pain, reproducible, unlikely ACS  - monitor VS, sxs   - monitor trops x3, first set negative     # Sudden Onset Weakness which has improved, unclear etiology   - Pt is afebrile, no wbc, no signs of infection  - COVID neg  - check CK     # RADHA on CKD     # HTN  - elevated today  - pt does not have his meds, wife will bring shortly.     # DM  - hold oral meds  - check fs  -  start insulin if glu>180  - start and adjust insulin s/s prn    # Aggressive nisreen marginal zone lymphoma  - maintenance on Rituxan, will complete 02/2020  - rpt imaging planned June 2020  - pt states he has appt with  in August for PET scan  - c/s heme onc     #  COPD not on home O2  - start duonebs prn for now   - CXR  clear      DVT ppx heparin sq  gi ppx ptx  full code  dispo: acute  pt does not have med list,  states wife will bring. please review and input meds. 59 M with a pmh of congenital heart disease s/p repair, HTN, DLD, DM, marginal zone lymphoma on chemo, RAA thrombus (was on xarelto and resolved on last ANITA), COPD, ex-smoker, dm htn, hld, sinus bradycardia s/p PPM, non hodgkin  lymphoma (last chemo was december 21) presents w/ cc weakness and subsequent chest pain en route to the hospital. The pt states he was sitting in the yard and became weak and dizzy, s/p collapse. Developed 6/10 chest pain i ambulance which has since improved.    # Atypical chest pain, reproducible, unlikely ACS  - monitor VS, sxs   - monitor trops x3, first set negative     # Sudden Onset Weakness which has improved, unclear etiology   - Pt is afebrile, no wbc, no signs of infection  - COVID neg  - check CK     # RADHA secondary to dehydration  - Creat 1.4, baseline 0.9-1.1  - Start IVF for hydration  - Trend BMP    # HTN  - elevated today  - pt does not have his meds, wife will bring shortly.     # DM  - hold oral meds  - check fs  -  start insulin if glu>180  - start and adjust insulin s/s prn    # Aggressive nisreen marginal zone lymphoma  - maintenance on Rituxan, will complete 02/2020  - rpt imaging planned June 2020  - pt states he has appt with  in August for PET scan  - c/s heme onc     #  COPD not on home O2  - start duonebs prn for now   - CXR  clear      DVT ppx heparin sq  gi ppx ptx  full code  dispo: acute  pt does not have med list,  states wife will bring. please review and input meds. 59 M with a pmh of congenital heart disease s/p repair, HTN, DLD, DM, marginal zone lymphoma on chemo, RAA thrombus (was on xarelto and resolved on last ANITA), COPD, ex-smoker, dm htn, hld, sinus bradycardia s/p PPM, non hodgkin  lymphoma (last chemo was december 21) presents w/ cc weakness and subsequent chest pain en route to the hospital. The pt states he was sitting in the yard and became weak and dizzy, s/p collapse. Developed 6/10 chest pain i ambulance which has since improved.    # Atypical chest pain, reproducible, unlikely ACS  - monitor VS, sxs   - monitor trops x3, first set negative   - CT coronaries 5/2019 ca-rads1; minimal non-obstructive CAD    - c/w tele monitoring, if no events, can d/c tomorrow afternoon    # Sudden Onset Weakness which has improved, unclear etiology   - Pt is afebrile, no wbc, no signs of infection, electrolyte abnormalities, check Mg  - COVID neg  - check CK   - possibly due to rituxan, however,less likely as pt has been on this med for almost 2 years     # RADHA secondary to dehydration  - Creat 1.4, baseline 0.9-1.1  - Start IVF for hydration  - Trend BMP    # HTN  - elevated today  - pt does not have his meds, wife will bring shortly.   - start amlodipine,  monitor BP    # DM  - hold oral meds  - check fs  -  start insulin if glu>180  - start and adjust insulin s/s prn    # Aggressive nisreen marginal zone lymphoma  - maintenance on Rituxan, will complete 02/2020  - rpt imaging planned June 2020  - pt states he has appt with  in August for PET scan  - c/s heme onc     #  COPD not on home O2  - start duonebs prn for now   - CXR  clear      DVT ppx heparin sq  gi ppx ptx  full code  dispo: acute  pt does not have med list,  states wife will bring. please review and input meds.

## 2020-07-02 NOTE — ED PROVIDER NOTE - NS ED ROS FT
Review of Systems:  	•	CONSTITUTIONAL - no fever, +weakness  	•	SKIN - no rash, no lesions  	•	HEMATOLOGIC - no bleeding, no bruising  	•	EYES - no discharge, no injection  	•	ENT - no sore throat, no runny nose  	•	RESPIRATORY - no shortness of breath, no cough  	•	CARDIAC - +chest pain, no palpitations  	•	GI - no abd pain, no nausea, no vomiting, no diarrhea  	•	GENITO-URINARY - no dysuria, no hematuria  	•	MUSCULOSKELETAL - no joint pain, no muscle aches  	•	NEUROLOGIC - no dizziness, no headache

## 2020-07-02 NOTE — H&P ADULT - HISTORY OF PRESENT ILLNESS
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 The pt states he was sitting in the yard and became weak and dizzy. Reports 6/10 chest pain    In the ED: VS stable except BP elevated 184/78. No meds were given for this. 59 M  with a pmh of congenital heart disease s/p repair, HTN, DLD, RAA thrombus (was on xarelto and resolved on last ANITA), COPD, exsmoker, dm htn, hld, sinus bradycardia s/p PPM, marginal cell lymphoma  on maintenance rituxan (last chemo was december 21) presents w/ cc weakness and subsequent chest pain en route to the hospital. The pt states he was sitting in the yard and developed sudden generalized weakness. He states his limbs felt limp and he fell onto his carpet. EMS was called, en route to the hospital, the pt developed sudden 6/10 chest pain, substernal,  nonradiating, which was reproducible. The pt states the pain has since improved.     He denies recent fevers, dyspnea, ab pain, weight loss, night sweats, masses, diarrhea    In the ED: VS stable except BP elevated 184/78. No meds were given for this. 59 M  with a pmh of congenital heart disease s/p repair, HTN, DLD, RAA thrombus (was on xarelto and resolved on last ANITA), COPD, exsmoker, dm htn, hld, sinus bradycardia s/p PPM, marginal cell lymphoma  on maintenance rituxan (last chemo was december 21) presents w/ cc weakness and subsequent chest pain en route to the hospital. The pt states he was sitting in the yard and developed sudden generalized weakness. He states his limbs felt limp and he fell, denies hitting his  head, LOC, lightheadedness, dizziness. EMS was called, en route to the hospital, the pt developed sudden 6/10 chest pain, substernal,  nonradiating, which was reproducible. The pt states the pain has since improved.     He denies recent fevers, dyspnea, ab pain, weight loss, night sweats, masses, diarrhea    In the ED: VS stable except BP elevated 184/78. No meds were given for this.

## 2020-07-02 NOTE — H&P ADULT - ATTENDING COMMENTS
Patient declines need to contact wife (employed at [employer]) at this time.     PHYSICAL EXAM:    CONSTITUTIONAL: NAD  ENMT: EOMI, PERRLA, No tonsillar erythema, exudates, or enlargement, neck supple, No JVD  PSYCH: Alert & Oriented X3  RESPIRATORY: Clear to percussion bilaterally; No rales, rhonchi, wheezing, or rubs  CARDIOVASCULAR: Regular rate and rhythm; No murmurs, rubs, or gallops, negative edema, reproducible left sided anterior chest wall tenderness   GASTROINTESTINAL: Soft, Nontender, Nondistended; Bowel sounds present  EXTREMITIES:  2+ Peripheral Pulses, No clubbing, cyanosis  SKIN: No rashes or lesions    60 yo M with PMHx of Congential Heart Disease s/p surgery and PPM, HTN, HLD, DM II, COPD not on home oxygen, NHL s/p chemotherapy presented with complaint of generalized weakness occurring around noon on day of presentation, patient states he was outdoors when he went back into the house and felt weak, diaphoretic, causing his to fall onto his knees, denies antecedent chest pain, changes in vision, headaches, loss of consciousness, head trauma. Patient immediately called for EMS, while en route to hospital patient felt left sided, nonradiating, sharp chest pain that is still felt currently on palpation of left breast. Patient attributes symptoms to possibly being out in the sun, remaining ROS unrevealing.     #Hypertensive Urgency on admission, Atypical Chest Pain (s/p C in September 2019 normal coronary arteries): Chest pain likely musculoskeletal as reproducible, perhaps sprained in some way during fall onto knees earlier?, troponin negative x1, f/u repeat level, EKG unrevealing of acute ischemic changes, awaiting PPM interrogation, COVID-19 result pending though no evidence of infection, blood pressure improved without administration of medications, continue home antihypertensives for now, monitor     #DM II (monitor fingersticks, start basal bolus insulin if greater than 180)    #NHL last seen by Oncology in May, Dr. Rider, advised to follow up as outpatient    Patient is full code.    Disposition: Acute

## 2020-07-02 NOTE — H&P ADULT - NSHPLABSRESULTS_GEN_ALL_CORE
Labs    07-02    141  |  101  |  18  ----------------------------<  125<H>  4.0   |  29  |  1.4    Ca    9.8      02 Jul 2020 16:40    TPro  6.9  /  Alb  4.9  /  TBili  1.7<H>  /  DBili  x   /  AST  22  /  ALT  19  /  AlkPhos  44  07-02          CARDIAC MARKERS ( 02 Jul 2020 16:40 )  x     / <0.01 ng/mL / x     / x     / x            LIVER FUNCTIONS - ( 02 Jul 2020 16:40 )  Alb: 4.9 g/dL / Pro: 6.9 g/dL / ALK PHOS: 44 U/L / ALT: 19 U/L / AST: 22 U/L / GGT: x           < from: 12 Lead ECG (07.02.20 @ 15:35) >    Diagnosis Line Atrial-sensed ventricular-paced rhythm  Abnormal ECG   ms  no STW changes

## 2020-07-02 NOTE — ED PROVIDER NOTE - PHYSICAL EXAMINATION
Vital Signs: Reviewed  GEN: alert, NAD, speaks full sentences  HEAD:  normocephalic, atraumatic  EYES:  PERRLA; conjunctivae without injection, drainage or discharge  ENMT:  nasal mucosa moist; mouth moist without ulcerations or lesions; throat moist without erythema, exudate, ulcerations or lesions  NECK:  supple  CARDIAC:  regular rate, normal S1 and S2, no murmurs; radial pulses 2+ b/l  RESP:  respiratory rate and effort appear normal for age; lungs are clear to auscultation bilaterally; no rales or wheezes  ABDOMEN:  soft, nontender, nondistended  MUSCULOSKELETAL/NEURO: mild tenderness on palpation of LT anterior chest wall; normal movement, normal tone  SKIN:  normal skin color for age and race, well-perfused; warm and dry

## 2020-07-02 NOTE — H&P ADULT - NSHPPHYSICALEXAM_GEN_ALL_CORE
GENERAL: No acute distress, well-developed  HEAD:  Atraumatic, Normocephalic  EYES: EOMI, PERRLA, conjunctiva and sclera clear  NECK: Supple, no lymphadenopathy, no JVD  CHEST/LUNG: CTAB; No wheezes, rales, or rhonchi, point  tenderness to palpation on chest  HEART: Regular rate and rhythm; No murmurs, rubs, or gallops  ABDOMEN: Soft, non-tender, non-distended; normal bowel sounds, no organomegaly  EXTREMITIES:  2+ peripheral pulses b/l, No clubbing, cyanosis, or edema,  pt is c/o pain in various  body parts with palpation  NEUROLOGY: A&O x 3, no focal deficits  SKIN: No rashes or lesions

## 2020-07-02 NOTE — ED ADULT NURSE NOTE - OBJECTIVE STATEMENT
Patient states he was sitting in yard when he became weak and dizzy approximately 3 hours prior to arrival. Patient states he is feeling a little better at this time but still reports chest pain 6/10.

## 2020-07-02 NOTE — ED ADULT TRIAGE NOTE - CHIEF COMPLAINT QUOTE
Patient presents with dizziness and weakness after sitting outside. On ambulance ride noted to also have chest pain.

## 2020-07-02 NOTE — ED PROVIDER NOTE - CLINICAL SUMMARY MEDICAL DECISION MAKING FREE TEXT BOX
Attending Note:   58 yo M PMH stage 4 lymphoma on chemo, last chemo in May, COPD, HTN, DM, pacemaker presents to ED for weakness x1 day. Reports he feels like he has no energy, decreased PO and today while he was on his way to the hospital he developed CP. No SOB, cough, fever, chills, nausea, vomiting or diarrhea. ROS: Weakness and CP. On exam: Const: (+) Very weak and tired appearing, appears stated age. Eyes: PERRL, no conjunctival injection. HENT:  Neck supple without meningismus. CV: RRR, Warm, well-perfused extremities. RESP: CTA B/L, no tachypnea . GI: soft, non-tender, non-distended. MSK: No gross deformities appreciated. Skin: Warm, dry. No rashes. Neuro: Alert, CNs II-XII grossly intact. Sensation and motor function of extremities grossly intact. Psych: Appropriate mood and affect.  MDM: 58 yo M pt p/w weakness. Labs normal. Pt is consistently weak and due to CP earlier will place in OBS for further eval 58 yo M pt p/w weakness. Labs normal. Pt is consistently weak. Will admit patient for further management.

## 2020-07-02 NOTE — H&P ADULT - NSICDXPASTMEDICALHX_GEN_ALL_CORE_FT
PAST MEDICAL HISTORY:  Chronic obstructive pulmonary disease, unspecified COPD type     DM (diabetes mellitus)     High cholesterol     HTN (hypertension)     Non Hodgkin's lymphoma     Pacemaker

## 2020-07-02 NOTE — ED PROVIDER NOTE - ATTENDING CONTRIBUTION TO CARE
58 yo M PMH stage 4 lymphoma on chemo, last chemo in May, COPD, HTN, DM, pacemaker presents to ED for weakness x1 day. Reports he feels like he has no energy, decreased PO and today while he was on his way to the hospital he developed CP. No SOB, cough, fever, chills, nausea, vomiting or diarrhea.   ROS: Weakness and CP.   On exam: Const: (+) Very weak and tired appearing, appears stated age. Eyes: PERRL, no conjunctival injection. HENT:  Neck supple without meningismus. CV: RRR, Warm, well-perfused extremities. RESP: CTA B/L, no tachypnea . GI: soft, non-tender, non-distended. MSK: No gross deformities appreciated. Skin: Warm, dry. No rashes. Neuro: Alert, CNs II-XII grossly intact. Sensation and motor function of extremities grossly intact. Psych: Appropriate mood and affect.

## 2020-07-02 NOTE — H&P ADULT - NSICDXFAMILYHX_GEN_ALL_CORE_FT
FAMILY HISTORY:  Mother  Still living? Unknown  Family history of cancer, Age at diagnosis: Age Unknown    Sibling  Still living? Unknown  Family history of breast cancer, Age at diagnosis: Age Unknown

## 2020-07-02 NOTE — ED PROVIDER NOTE - OBJECTIVE STATEMENT
59yM pmhx HTN, HLD, DM, COPD, s/p PPM, stage 4 non-hodgkin lymphoma c/o weakness starting today after he was sitting outside, associated w/ episode of chest pain that began as he was in the ambulance on way to the hospital. Pt sees Dr Rider, last chemo 5/26. Pt denies fever/chills, cough, SOB, abd pain, n/v/d.

## 2020-07-03 ENCOUNTER — TRANSCRIPTION ENCOUNTER (OUTPATIENT)
Age: 60
End: 2020-07-03

## 2020-07-03 LAB
ALBUMIN SERPL ELPH-MCNC: 4.5 G/DL — SIGNIFICANT CHANGE UP (ref 3.5–5.2)
ALP SERPL-CCNC: 43 U/L — SIGNIFICANT CHANGE UP (ref 30–115)
ALT FLD-CCNC: 18 U/L — SIGNIFICANT CHANGE UP (ref 0–41)
ANION GAP SERPL CALC-SCNC: 13 MMOL/L — SIGNIFICANT CHANGE UP (ref 7–14)
APPEARANCE UR: CLEAR — SIGNIFICANT CHANGE UP
AST SERPL-CCNC: 20 U/L — SIGNIFICANT CHANGE UP (ref 0–41)
BACTERIA # UR AUTO: NEGATIVE — SIGNIFICANT CHANGE UP
BASOPHILS # BLD AUTO: 0.03 K/UL — SIGNIFICANT CHANGE UP (ref 0–0.2)
BASOPHILS NFR BLD AUTO: 0.7 % — SIGNIFICANT CHANGE UP (ref 0–1)
BILIRUB SERPL-MCNC: 1.7 MG/DL — HIGH (ref 0.2–1.2)
BILIRUB UR-MCNC: NEGATIVE — SIGNIFICANT CHANGE UP
BUN SERPL-MCNC: 21 MG/DL — HIGH (ref 10–20)
CALCIUM SERPL-MCNC: 9.4 MG/DL — SIGNIFICANT CHANGE UP (ref 8.5–10.1)
CHLORIDE SERPL-SCNC: 103 MMOL/L — SIGNIFICANT CHANGE UP (ref 98–110)
CK SERPL-CCNC: 86 U/L — SIGNIFICANT CHANGE UP (ref 0–225)
CO2 SERPL-SCNC: 26 MMOL/L — SIGNIFICANT CHANGE UP (ref 17–32)
COLOR SPEC: SIGNIFICANT CHANGE UP
CREAT SERPL-MCNC: 1.3 MG/DL — SIGNIFICANT CHANGE UP (ref 0.7–1.5)
DIFF PNL FLD: NEGATIVE — SIGNIFICANT CHANGE UP
EOSINOPHIL # BLD AUTO: 0.19 K/UL — SIGNIFICANT CHANGE UP (ref 0–0.7)
EOSINOPHIL NFR BLD AUTO: 4.7 % — SIGNIFICANT CHANGE UP (ref 0–8)
EPI CELLS # UR: 0 /HPF — SIGNIFICANT CHANGE UP (ref 0–5)
GLUCOSE BLDC GLUCOMTR-MCNC: 109 MG/DL — HIGH (ref 70–99)
GLUCOSE BLDC GLUCOMTR-MCNC: 129 MG/DL — HIGH (ref 70–99)
GLUCOSE BLDC GLUCOMTR-MCNC: 161 MG/DL — HIGH (ref 70–99)
GLUCOSE SERPL-MCNC: 108 MG/DL — HIGH (ref 70–99)
GLUCOSE UR QL: ABNORMAL
HCT VFR BLD CALC: 44 % — SIGNIFICANT CHANGE UP (ref 42–52)
HCV AB S/CO SERPL IA: 0.04 COI — SIGNIFICANT CHANGE UP
HCV AB SERPL-IMP: SIGNIFICANT CHANGE UP
HGB BLD-MCNC: 14.3 G/DL — SIGNIFICANT CHANGE UP (ref 14–18)
HYALINE CASTS # UR AUTO: 1 /LPF — SIGNIFICANT CHANGE UP (ref 0–7)
IMM GRANULOCYTES NFR BLD AUTO: 0.5 % — HIGH (ref 0.1–0.3)
KETONES UR-MCNC: NEGATIVE — SIGNIFICANT CHANGE UP
LEUKOCYTE ESTERASE UR-ACNC: NEGATIVE — SIGNIFICANT CHANGE UP
LYMPHOCYTES # BLD AUTO: 0.57 K/UL — LOW (ref 1.2–3.4)
LYMPHOCYTES # BLD AUTO: 14 % — LOW (ref 20.5–51.1)
MAGNESIUM SERPL-MCNC: 2 MG/DL — SIGNIFICANT CHANGE UP (ref 1.8–2.4)
MCHC RBC-ENTMCNC: 29.3 PG — SIGNIFICANT CHANGE UP (ref 27–31)
MCHC RBC-ENTMCNC: 32.5 G/DL — SIGNIFICANT CHANGE UP (ref 32–37)
MCV RBC AUTO: 90.2 FL — SIGNIFICANT CHANGE UP (ref 80–94)
MONOCYTES # BLD AUTO: 0.65 K/UL — HIGH (ref 0.1–0.6)
MONOCYTES NFR BLD AUTO: 16 % — HIGH (ref 1.7–9.3)
NEUTROPHILS # BLD AUTO: 2.6 K/UL — SIGNIFICANT CHANGE UP (ref 1.4–6.5)
NEUTROPHILS NFR BLD AUTO: 64.1 % — SIGNIFICANT CHANGE UP (ref 42.2–75.2)
NITRITE UR-MCNC: NEGATIVE — SIGNIFICANT CHANGE UP
NRBC # BLD: 0 /100 WBCS — SIGNIFICANT CHANGE UP (ref 0–0)
PH UR: 6.5 — SIGNIFICANT CHANGE UP (ref 5–8)
PLATELET # BLD AUTO: 170 K/UL — SIGNIFICANT CHANGE UP (ref 130–400)
POTASSIUM SERPL-MCNC: 4.4 MMOL/L — SIGNIFICANT CHANGE UP (ref 3.5–5)
POTASSIUM SERPL-SCNC: 4.4 MMOL/L — SIGNIFICANT CHANGE UP (ref 3.5–5)
PROT SERPL-MCNC: 6.5 G/DL — SIGNIFICANT CHANGE UP (ref 6–8)
PROT UR-MCNC: ABNORMAL
RBC # BLD: 4.88 M/UL — SIGNIFICANT CHANGE UP (ref 4.7–6.1)
RBC # FLD: 12.8 % — SIGNIFICANT CHANGE UP (ref 11.5–14.5)
RBC CASTS # UR COMP ASSIST: 0 /HPF — SIGNIFICANT CHANGE UP (ref 0–4)
SARS-COV-2 RNA SPEC QL NAA+PROBE: SIGNIFICANT CHANGE UP
SODIUM SERPL-SCNC: 142 MMOL/L — SIGNIFICANT CHANGE UP (ref 135–146)
SP GR SPEC: 1.03 — HIGH (ref 1.01–1.02)
TROPONIN T SERPL-MCNC: <0.01 NG/ML — SIGNIFICANT CHANGE UP
TROPONIN T SERPL-MCNC: <0.01 NG/ML — SIGNIFICANT CHANGE UP
UROBILINOGEN FLD QL: SIGNIFICANT CHANGE UP
WBC # BLD: 4.06 K/UL — LOW (ref 4.8–10.8)
WBC # FLD AUTO: 4.06 K/UL — LOW (ref 4.8–10.8)
WBC UR QL: 0 /HPF — SIGNIFICANT CHANGE UP (ref 0–5)

## 2020-07-03 PROCEDURE — 93280 PM DEVICE PROGR EVAL DUAL: CPT | Mod: 26

## 2020-07-03 PROCEDURE — 99222 1ST HOSP IP/OBS MODERATE 55: CPT | Mod: 25

## 2020-07-03 PROCEDURE — 99233 SBSQ HOSP IP/OBS HIGH 50: CPT

## 2020-07-03 PROCEDURE — 93010 ELECTROCARDIOGRAM REPORT: CPT

## 2020-07-03 RX ORDER — IPRATROPIUM/ALBUTEROL SULFATE 18-103MCG
3 AEROSOL WITH ADAPTER (GRAM) INHALATION EVERY 6 HOURS
Refills: 0 | Status: DISCONTINUED | OUTPATIENT
Start: 2020-07-03 | End: 2020-07-04

## 2020-07-03 RX ORDER — GLUCAGON INJECTION, SOLUTION 0.5 MG/.1ML
1 INJECTION, SOLUTION SUBCUTANEOUS ONCE
Refills: 0 | Status: DISCONTINUED | OUTPATIENT
Start: 2020-07-03 | End: 2020-07-04

## 2020-07-03 RX ORDER — HEPARIN SODIUM 5000 [USP'U]/ML
5000 INJECTION INTRAVENOUS; SUBCUTANEOUS EVERY 8 HOURS
Refills: 0 | Status: DISCONTINUED | OUTPATIENT
Start: 2020-07-03 | End: 2020-07-04

## 2020-07-03 RX ORDER — IPRATROPIUM/ALBUTEROL SULFATE 18-103MCG
3 AEROSOL WITH ADAPTER (GRAM) INHALATION
Qty: 0 | Refills: 0 | DISCHARGE
Start: 2020-07-03

## 2020-07-03 RX ORDER — DEXTROSE 50 % IN WATER 50 %
15 SYRINGE (ML) INTRAVENOUS ONCE
Refills: 0 | Status: DISCONTINUED | OUTPATIENT
Start: 2020-07-03 | End: 2020-07-04

## 2020-07-03 RX ORDER — SODIUM CHLORIDE 9 MG/ML
1000 INJECTION, SOLUTION INTRAVENOUS
Refills: 0 | Status: DISCONTINUED | OUTPATIENT
Start: 2020-07-03 | End: 2020-07-04

## 2020-07-03 RX ORDER — DEXTROSE 50 % IN WATER 50 %
12.5 SYRINGE (ML) INTRAVENOUS ONCE
Refills: 0 | Status: DISCONTINUED | OUTPATIENT
Start: 2020-07-03 | End: 2020-07-04

## 2020-07-03 RX ORDER — DEXTROSE 50 % IN WATER 50 %
25 SYRINGE (ML) INTRAVENOUS ONCE
Refills: 0 | Status: DISCONTINUED | OUTPATIENT
Start: 2020-07-03 | End: 2020-07-04

## 2020-07-03 RX ADMIN — HEPARIN SODIUM 5000 UNIT(S): 5000 INJECTION INTRAVENOUS; SUBCUTANEOUS at 21:24

## 2020-07-03 NOTE — PROGRESS NOTE ADULT - ATTENDING COMMENTS
Patient seen and examined independently. Agree with resident note/ history / physical exam and plan of care with following exceptions/additions/updates. Case discussed with patient/pt decision maker, house-staff and nursing.     pt is feeling better. no dizziness, no cp, no n/v  Vital Signs Last 24 Hrs  T(C): 36.7 (03 Jul 2020 08:09), Max: 36.7 (02 Jul 2020 15:40)  T(F): 98 (03 Jul 2020 08:09), Max: 98 (02 Jul 2020 15:40)  HR: 70 (03 Jul 2020 08:09) (70 - 79)  BP: 129/84 (03 Jul 2020 08:09) (129/84 - 184/78)  RR: 18 (03 Jul 2020 08:09) (18 - 19)  SpO2: 97% (03 Jul 2020 08:09) (97% - 100%)  Orthostatic bp and HR checked, neg   Physical exam:   constitutional NAD, AAOX3, Respiratory  lungs CTA, CVS heart RRR, GI: abdomen Soft NT, ND, BS+, skin: intact  neuro exam non focal.                           14.3   4.06  )-----------( 170      ( 03 Jul 2020 06:00 )             44.0   07-03    142  |  103  |  21<H>  ----------------------------<  108<H>  4.4   |  26  |  1.3    Ca    9.4      03 Jul 2020 06:00  Mg     2.0     07-03    TPro  6.5  /  Alb  4.5  /  TBili  1.7<H>  /  DBili  x   /  AST  20  /  ALT  18  /  AlkPhos  43  07-03    a/p  #presyncope, paolo, probably due to dehydration, and vasodilatation, repeat bmp, less likely arrythmia, awaiting PPM interogation.   #HTN cont meds  #dementia, cont meds.   #PAST MEDICAL & SURGICAL HISTORY:  Pacemaker  Non Hodgkin's lymphoma  Chronic obstructive pulmonary disease, unspecified COPD type  DM (diabetes mellitus)  High cholesterol  HTN   S/P transesophageal echocardiogram (ANITA)  Artificial cardiac pacemaker       dc home if ppm interogation is negative  aki pt and his wife ( who works here ) at the bedside  aki resident  time spent 35 min

## 2020-07-03 NOTE — PROGRESS NOTE ADULT - SUBJECTIVE AND OBJECTIVE BOX
SUBJECTIVE:    Patient is a 59y old Male who presents with a chief complaint of weakness of unknown origin, chest pain (2020 18:40)    Overnight Events: Patient is feeling better today, no acute events overnight.  VS are stable, he denied any complaints.    PAST MEDICAL & SURGICAL HISTORY  Pacemaker  Non Hodgkin's lymphoma  Chronic obstructive pulmonary disease, unspecified COPD type  DM (diabetes mellitus)  High cholesterol  HTN (hypertension)  S/P transesophageal echocardiogram (ANITA)  Artificial cardiac pacemaker    SOCIAL HISTORY:  Negative for smoking/alcohol/drug use.     ALLERGIES:  acyclovir (Anaphylaxis)  Bactrim (Anaphylaxis)    MEDICATIONS:  STANDING MEDICATIONS  chlorhexidine 4% Liquid 1 Application(s) Topical <User Schedule>  dextrose 5%. 1000 milliLiter(s) IV Continuous <Continuous>  dextrose 50% Injectable 12.5 Gram(s) IV Push once  dextrose 50% Injectable 25 Gram(s) IV Push once  dextrose 50% Injectable 25 Gram(s) IV Push once  heparin   Injectable 5000 Unit(s) SubCutaneous every 8 hours  sodium chloride 0.9%. 1000 milliLiter(s) IV Continuous <Continuous>    PRN MEDICATIONS  albuterol/ipratropium for Nebulization 3 milliLiter(s) Nebulizer every 6 hours PRN  dextrose 40% Gel 15 Gram(s) Oral once PRN  glucagon  Injectable 1 milliGRAM(s) IntraMuscular once PRN    VITALS:   T(F): 98, Max: 98 (20 @ 15:40)  HR: 70 (70 - 79)  BP: 129/84 (129/84 - 184/78)  RR: 18 (18 - 19)  SpO2: 97% (97% - 100%)    LABS:                        14.3   4.06  )-----------( 170      ( 2020 06:00 )             44.0     07-03    142  |  103  |  21<H>  ----------------------------<  108<H>  4.4   |  26  |  1.3    Ca    9.4      2020 06:00  Mg     2.0     07-03    TPro  6.5  /  Alb  4.5  /  TBili  1.7<H>  /  DBili  x   /  AST  20  /  ALT  18  /  AlkPhos  43  07-03      Urinalysis Basic - ( 2020 02:30 )    Color: Light Yellow / Appearance: Clear / S.029 / pH: x  Gluc: x / Ketone: Negative  / Bili: Negative / Urobili: <2 mg/dL   Blood: x / Protein: 30 mg/dL / Nitrite: Negative   Leuk Esterase: Negative / RBC: 0 /HPF / WBC 0 /HPF   Sq Epi: x / Non Sq Epi: 0 /HPF / Bacteria: Negative        Troponin T, Serum: <0.01 ng/mL (20 @ 06:00)  Creatine Kinase, Serum: 86 U/L (20 @ 06:00)  Troponin T, Serum: <0.01 ng/mL (20 @ 23:57)  Troponin T, Serum: <0.01 ng/mL (20 @ 16:40)      CARDIAC MARKERS ( 2020 06:00 )  x     / <0.01 ng/mL / 86 U/L / x     / x      CARDIAC MARKERS ( 2020 23:57 )  x     / <0.01 ng/mL / x     / x     / x      CARDIAC MARKERS ( 2020 16:40 )  x     / <0.01 ng/mL / x     / x     / x              PHYSICAL EXAM:  GEN: NAD, comfortable  LUNGS: CTAB, no w/r/r  HEART: RRR, s1 and s2 appreciated, no m/r/g  ABD: soft, NT/ND, +BS  EXT: no edema, PP b/l  NEURO: AAOX3

## 2020-07-03 NOTE — PROGRESS NOTE ADULT - ASSESSMENT
59 M with a pmh of congenital heart disease s/p repair, HTN, DLD, DM, marginal zone lymphoma on chemo, RAA thrombus (was on xarelto and resolved on last ANITA), COPD, ex-smoker, dm htn, hld, sinus bradycardia s/p PPM, non hodgkin  lymphoma (last chemo was december 21) presents w/ cc weakness and subsequent chest pain en route to the hospital. The pt states he was sitting in the yard and became weak and dizzy, s/p collapse. Developed 6/10 chest pain in ambulance which has since improved.    # Atypical chest pain, reproducible, unlikely ACS  - monitor trops x3 negative  - ECG showed ventricular paced rythm  - CT coronaries 5/2019 ca-rads1; minimal non-obstructive CAD    - c/w tele monitoring, no events overnight  - c/s EP for device interrogation    # Sudden Onset Weakness which has improved, unclear etiology   - Pt is afebrile, no wbc, no signs of infection, electrolyte abnormalities  - COVID neg  - possibly due to rituxan, however,less likely as pt has been on this med for almost 2 years     # RADHA secondary to dehydration- improving  - Creat 1.4, baseline 0.9-1.1  - Start IVF for hydration  - Creatinine trending down    # HTN  - start amlodipine,  monitor BP    # DM  - hold oral meds  - check fs, controlled for now  -  start insulin if glu>180  - start and adjust insulin s/s prn    # Aggressive nisreen marginal zone lymphoma  - maintenance on Rituxan, will complete 02/2020  - rpt imaging planned June 2020  - pt states he has appt with  in August for PET scan  - f/u with hem/onc as OP    #  COPD not on home O2  - start duonebs prn for now   - CXR  clear      DVT ppx heparin sq  gi ppx ptx  full code  dispo: acute 59 M with a pmh of congenital heart disease s/p repair, HTN, DLD, DM, marginal zone lymphoma on chemo, RAA thrombus (was on xarelto and resolved on last ANITA), COPD, ex-smoker, dm htn, hld, sinus bradycardia s/p PPM, non hodgkin  lymphoma (last chemo was december 21) presents w/ cc weakness and subsequent chest pain en route to the hospital. The pt states he was sitting in the yard and became weak and dizzy, s/p collapse. Developed 6/10 chest pain in ambulance which has since improved.    # presyncope, possibly due to dehydration, plus heat exhaution/vasodilatation when he was outide in the son, had a short episode of acute sharp, Atypical chest pain, reproducible, unlikely ACS  - monitor trops x3 negative  - ECG showed ventricular paced rythm  - CT coronaries 5/2019 ca-rads1; minimal non-obstructive CAD    - c/w tele monitoring, no events overnight  - c/s EP for device interrogation  received IVF,   check orthostatics,     # RADHA secondary to dehydration- improving  - Creat 1.4, 1.1  -received IVF      # HTN  - start amlodipine,  monitor BP    # DM  - hold oral meds  - check fs, controlled for now  -  start insulin if glu>180  - start and adjust insulin s/s prn    # Aggressive nisreen marginal zone lymphoma  - maintenance on Rituxan, will complete 02/2020  - rpt imaging planned June 2020  - pt states he has appt with  in August for PET scan  - f/u with hem/onc as OP    #  COPD not on home O2  - start duonebs prn for now   - CXR  clear      DVT ppx heparin sq  gi ppx ptx  full code  dispo: acute

## 2020-07-03 NOTE — DISCHARGE NOTE PROVIDER - HOSPITAL COURSE
59 M with a pmh of congenital heart disease s/p repair, HTN, DLD, DM, marginal zone lymphoma on chemo, RAA thrombus (was on xarelto and resolved on last ANITA), COPD, ex-smoker, dm htn, hld, sinus bradycardia s/p PPM, non hodgkin  lymphoma (last chemo was december 21) presents w/ cc weakness and subsequent chest pain en route to the hospital. The pt states he was sitting in the yard and became weak and dizzy, s/p collapse. Developed 6/10 chest pain in ambulance which has since improved.    His weakness and presyncope were likely caused by dehydration. Labs showed acute kidney injury.    Cardiac workup including serial ecgs and cardiac enzymes were negative.    Orthostatic Vital signs were normal.    Pacemaker interrogation showed...........    Patient is stable and ready for discharge. He will follow up with his primary care doctor in the clinics. 59 M with a pmh of congenital heart disease s/p repair, HTN, DLD, DM, marginal zone lymphoma on chemo, RAA thrombus (was on xarelto and resolved on last ANITA), COPD, ex-smoker, dm htn, hld, sinus bradycardia s/p PPM, non hodgkin  lymphoma (last chemo was december 21) presents w/ cc weakness and subsequent chest pain en route to the hospital. The pt states he was sitting in the yard and became weak and dizzy, s/p collapse. Developed 6/10 chest pain in ambulance which has since improved.    His weakness and presyncope were likely caused by dehydration. Labs showed acute kidney injury.    Cardiac workup including serial ecgs and cardiac enzymes were negative.    Orthostatic Vital signs were normal.    Pacemaker interrogated by Dr. Jolly, will follow up outpatient on 7/13    Patient is stable and ready for discharge. He will follow up with his primary care doctor in the clinics.        # presyncope, possibly due to dehydration, plus heat exhaution/vasodilatation when he was outside in the son, had a short episode of acute sharp, Atypical chest pain, reproducible, unlikely ACS    - monitor trops x3 negative    - ECG showed ventricular paced rhythm    - CT coronaries 5/2019 ca-rads1; minimal non-obstructive CAD      - c/s EP for device interrogation- okay to d/c per EP and follow up OP    received IVF,          # RADHA secondary to dehydration- improving    - Creat 1.1    -received IVF        # HTN    - monitored BP     - stable inpatient        # DM    - held oral meds    - monitored FS inpatient        # Aggressive nisreen marginal zone lymphoma    - maintenance on Rituxan, will complete 02/2020    - rpt imaging planned June 2020    - pt states he has appt with  in August for PET scan    - f/u with hem/onc as OP        # COPD not on home O2    - started duonebs prn inpatient    - CXR  clear

## 2020-07-03 NOTE — DISCHARGE NOTE PROVIDER - NSDCCPCAREPLAN_GEN_ALL_CORE_FT
PRINCIPAL DISCHARGE DIAGNOSIS  Diagnosis: Weakness  Assessment and Plan of Treatment: You presented for weakness and chest pain while on your way to the hospital. Cardiac work up including ecgs and serial cardiac enzymes were normal. Orthostatic vital signs were normal as well. Your symptoms were likely due to dehydration. Increase your fluid intake and follow up with your primary care doctor in the clinics.      SECONDARY DISCHARGE DIAGNOSES  Diagnosis: Acute kidney injury  Assessment and Plan of Treatment: Your labs showed acute kidney injury likely caused by dehydration. Increase your fluid intake and repeat blood work in a week. Follow up with your primary care doctor in the clincis in a week. PRINCIPAL DISCHARGE DIAGNOSIS  Diagnosis: Weakness  Assessment and Plan of Treatment: You presented for weakness and chest pain while on your way to the hospital. Cardiac work up including ecgs and serial cardiac enzymes were normal. Orthostatic vital signs were normal as well. Your symptoms were likely due to dehydration. Increase your fluid intake and follow up with your primary care doctor in the clinics. Your pacemaker was interoggated by EP and will need to follow up outpatient on 7/13      SECONDARY DISCHARGE DIAGNOSES  Diagnosis: Acute kidney injury  Assessment and Plan of Treatment: Your labs showed acute kidney injury likely caused by dehydration. Increase your fluid intake and repeat blood work in a week. Follow up with your primary care doctor in the clinics in a week.

## 2020-07-03 NOTE — DISCHARGE NOTE PROVIDER - PROVIDER TOKENS
PROVIDER:[TOKEN:[[id number]],FOLLOWUP:[1 week]] PROVIDER:[TOKEN:[[id number]],FOLLOWUP:[1 week]],FREE:[LAST:[Shanae],FIRST:[Gretchen],PHONE:[phone]],FAX:[(   [phone]    -],ADDRESS:[[address] [place] [address]],SCHEDULEDAPPT:[07/13/2020]]

## 2020-07-03 NOTE — DISCHARGE NOTE PROVIDER - NSDCFUSCHEDAPPT_GEN_ALL_CORE_FT
[place] ; 07/13/2020 ; [facility] [address] [place] ; 08/18/2020 ; [facility] [address] [place] ; 07/13/2020 ; [facility] [address] [place] ; 08/18/2020 ; [facility] [address] [place] ; 07/13/2020 ; [facility] [address] [place] ; 08/18/2020 ; [facility] [address]

## 2020-07-03 NOTE — DISCHARGE NOTE PROVIDER - CARE PROVIDERS DIRECT ADDRESSES
[email or web address]

## 2020-07-03 NOTE — DISCHARGE NOTE PROVIDER - NSDCMRMEDTOKEN_GEN_ALL_CORE_FT
amLODIPine 2.5 mg oral tablet: 1 tab(s) orally once a day  aspirin 81 mg oral tablet: 1 tab(s) orally once a day  atorvastatin 40 mg oral tablet: 1 tab(s) orally once a day  budesonide-formoterol 160 mcg-4.5 mcg/inh inhalation aerosol: 2 puff(s) inhaled 2 times a day  ezetimibe 10 mg oral tablet: 1 tab(s) orally once a day  ipratropium-albuterol 0.5 mg-2.5 mg/3 mLinhalation solution: 3 milliliter(s) inhaled every 6 hours, As needed, Shortness of Breath and/or Wheezing  Jardiance 25 mg oral tablet: 1 tab(s) orally once a day (in the morning)  metFORMIN: 1000 milligram(s) orally 2 times a day  pioglitazone 30 mg oral tablet: 1 tab(s) orally once a day  Spiriva Respimat 1.25 mcg/inh inhalation aerosol: 2 puff(s) inhaled once a day  Tresiba 100 units/mL subcutaneous solution: 30 unit(s) subcutaneous once a day  Trulicity Pen 1.5 mg/0.5 mL subcutaneous solution:

## 2020-07-03 NOTE — DISCHARGE NOTE PROVIDER - CARE PROVIDER_API CALL
Demarcus Herron  FAMILY MEDICINE  [address] [place] [address]  Phone: [phone]  Fax: [phone]  Follow Up Time: 1 week Demarcus Herron  FAMILY MEDICINE  [address] [place] [address]  Phone: [phone]  Fax: [phone]  Follow Up Time: 1 week    Gretchen Jolly  [address] [place] [address] [address]   [place] [address]  Phone: [phone]  Fax: (   )    -  Scheduled Appointment: 07/13/2020

## 2020-07-04 ENCOUNTER — TRANSCRIPTION ENCOUNTER (OUTPATIENT)
Age: 60
End: 2020-07-04

## 2020-07-04 VITALS
RESPIRATION RATE: 20 BRPM | HEART RATE: 68 BPM | SYSTOLIC BLOOD PRESSURE: 136 MMHG | TEMPERATURE: 97 F | WEIGHT: 224.87 LBS | DIASTOLIC BLOOD PRESSURE: 77 MMHG

## 2020-07-04 LAB
ALBUMIN SERPL ELPH-MCNC: 4.4 G/DL — SIGNIFICANT CHANGE UP (ref 3.5–5.2)
ALP SERPL-CCNC: 43 U/L — SIGNIFICANT CHANGE UP (ref 30–115)
ALT FLD-CCNC: 18 U/L — SIGNIFICANT CHANGE UP (ref 0–41)
ANION GAP SERPL CALC-SCNC: 13 MMOL/L — SIGNIFICANT CHANGE UP (ref 7–14)
AST SERPL-CCNC: 19 U/L — SIGNIFICANT CHANGE UP (ref 0–41)
BASOPHILS # BLD AUTO: 0.04 K/UL — SIGNIFICANT CHANGE UP (ref 0–0.2)
BASOPHILS NFR BLD AUTO: 1 % — SIGNIFICANT CHANGE UP (ref 0–1)
BILIRUB SERPL-MCNC: 1.7 MG/DL — HIGH (ref 0.2–1.2)
BUN SERPL-MCNC: 21 MG/DL — HIGH (ref 10–20)
CALCIUM SERPL-MCNC: 9.2 MG/DL — SIGNIFICANT CHANGE UP (ref 8.5–10.1)
CHLORIDE SERPL-SCNC: 103 MMOL/L — SIGNIFICANT CHANGE UP (ref 98–110)
CO2 SERPL-SCNC: 25 MMOL/L — SIGNIFICANT CHANGE UP (ref 17–32)
CREAT SERPL-MCNC: 1.1 MG/DL — SIGNIFICANT CHANGE UP (ref 0.7–1.5)
EOSINOPHIL # BLD AUTO: 0.25 K/UL — SIGNIFICANT CHANGE UP (ref 0–0.7)
EOSINOPHIL NFR BLD AUTO: 6.4 % — SIGNIFICANT CHANGE UP (ref 0–8)
GLUCOSE BLDC GLUCOMTR-MCNC: 115 MG/DL — HIGH (ref 70–99)
GLUCOSE BLDC GLUCOMTR-MCNC: 122 MG/DL — HIGH (ref 70–99)
GLUCOSE SERPL-MCNC: 121 MG/DL — HIGH (ref 70–99)
HCT VFR BLD CALC: 44.3 % — SIGNIFICANT CHANGE UP (ref 42–52)
HGB BLD-MCNC: 14.4 G/DL — SIGNIFICANT CHANGE UP (ref 14–18)
IMM GRANULOCYTES NFR BLD AUTO: 0.5 % — HIGH (ref 0.1–0.3)
LYMPHOCYTES # BLD AUTO: 0.59 K/UL — LOW (ref 1.2–3.4)
LYMPHOCYTES # BLD AUTO: 15 % — LOW (ref 20.5–51.1)
MCHC RBC-ENTMCNC: 29.2 PG — SIGNIFICANT CHANGE UP (ref 27–31)
MCHC RBC-ENTMCNC: 32.5 G/DL — SIGNIFICANT CHANGE UP (ref 32–37)
MCV RBC AUTO: 89.9 FL — SIGNIFICANT CHANGE UP (ref 80–94)
MONOCYTES # BLD AUTO: 0.6 K/UL — SIGNIFICANT CHANGE UP (ref 0.1–0.6)
MONOCYTES NFR BLD AUTO: 15.3 % — HIGH (ref 1.7–9.3)
NEUTROPHILS # BLD AUTO: 2.43 K/UL — SIGNIFICANT CHANGE UP (ref 1.4–6.5)
NEUTROPHILS NFR BLD AUTO: 61.8 % — SIGNIFICANT CHANGE UP (ref 42.2–75.2)
NRBC # BLD: 0 /100 WBCS — SIGNIFICANT CHANGE UP (ref 0–0)
PLATELET # BLD AUTO: 163 K/UL — SIGNIFICANT CHANGE UP (ref 130–400)
POTASSIUM SERPL-MCNC: 4 MMOL/L — SIGNIFICANT CHANGE UP (ref 3.5–5)
POTASSIUM SERPL-SCNC: 4 MMOL/L — SIGNIFICANT CHANGE UP (ref 3.5–5)
PROT SERPL-MCNC: 6.5 G/DL — SIGNIFICANT CHANGE UP (ref 6–8)
RBC # BLD: 4.93 M/UL — SIGNIFICANT CHANGE UP (ref 4.7–6.1)
RBC # FLD: 13 % — SIGNIFICANT CHANGE UP (ref 11.5–14.5)
SODIUM SERPL-SCNC: 141 MMOL/L — SIGNIFICANT CHANGE UP (ref 135–146)
WBC # BLD: 3.93 K/UL — LOW (ref 4.8–10.8)
WBC # FLD AUTO: 3.93 K/UL — LOW (ref 4.8–10.8)

## 2020-07-04 PROCEDURE — 99239 HOSP IP/OBS DSCHRG MGMT >30: CPT

## 2020-07-04 PROCEDURE — 93280 PM DEVICE PROGR EVAL DUAL: CPT | Mod: 26

## 2020-07-04 RX ADMIN — HEPARIN SODIUM 5000 UNIT(S): 5000 INJECTION INTRAVENOUS; SUBCUTANEOUS at 05:08

## 2020-07-04 NOTE — CONSULT NOTE ADULT - SUBJECTIVE AND OBJECTIVE BOX
Patient is a 59y old  Male who presents with a chief complaint of weakness of unknown origin, chest pain (2020 13:16)        HPI:  59 M  with a pmh of congenital heart disease s/p repair, HTN, DLD, RAA thrombus (was on xarelto and resolved on last ANITA), COPD, exsmoker, dm htn, hld, sinus bradycardia s/p PPM, marginal cell lymphoma  on maintenance rituxan (last chemo was ) presents w/ cc weakness and subsequent chest pain en route to the hospital. The pt states he was sitting in the yard and developed sudden generalized weakness. He states his limbs felt limp and he fell, denies hitting his  head, LOC, lightheadedness, dizziness. EMS was called, en route to the hospital, the pt developed sudden 6/10 chest pain, substernal,  nonradiating, which was reproducible. The pt states the pain has since improved.     He denies recent fevers, dyspnea, ab pain, weight loss, night sweats, masses, diarrhea    In the ED: VS stable except BP elevated 184/78. No meds were given for this. (2020 18:40)      Electrophysiology:  59y Male with h/o congenital heart disease, s/p repair, HTN, HLD, h/o RAA thrombus, COPD, former smoker, DM, symptomatic bradycardia s/p DC PPM (Biotronic), lymphoma, admitted with syncopal episode, associated with chest pain in route to the hospital.  Pt reports being outside in hot weather, EMS was called , noted blood glucose was low  PPM was interrogated, working properly, no events    REVIEW OF SYSTEMS    [x ] A ten-point review of systems was otherwise negative except as noted.      PAST MEDICAL & SURGICAL HISTORY:  Pacemaker  Non Hodgkin's lymphoma  Chronic obstructive pulmonary disease, unspecified COPD type  DM (diabetes mellitus)  High cholesterol  HTN (hypertension)  S/P transesophageal echocardiogram (ANITA)  Artificial cardiac pacemaker      Home Medications:  amLODIPine 2.5 mg oral tablet: 1 tab(s) orally once a day (2020 23:00)  aspirin 81 mg oral tablet: 1 tab(s) orally once a day (2020 23:00)  atorvastatin 40 mg oral tablet: 1 tab(s) orally once a day (2020 23:00)  budesonide-formoterol 160 mcg-4.5 mcg/inh inhalation aerosol: 2 puff(s) inhaled 2 times a day (2020 23:00)  ezetimibe 10 mg oral tablet: 1 tab(s) orally once a day (2020 23:00)  ipratropium-albuterol 0.5 mg-2.5 mg/3 mLinhalation solution: 3 milliliter(s) inhaled every 6 hours, As needed, Shortness of Breath and/or Wheezing (2020 11:35)  Jardiance 25 mg oral tablet: 1 tab(s) orally once a day (in the morning) (2020 23:00)  metFORMIN: 1000 milligram(s) orally 2 times a day (2020 23:00)  pioglitazone 30 mg oral tablet: 1 tab(s) orally once a day (2020 23:00)  Spiriva Respimat 1.25 mcg/inh inhalation aerosol: 2 puff(s) inhaled once a day (2020 23:00)  Tresiba 100 units/mL subcutaneous solution: 30 unit(s) subcutaneous once a day (2020 23:00)  Trulicity Pen 1.5 mg/0.5 mL subcutaneous solution:  (2020 23:00)      Allergies:  acyclovir (Anaphylaxis)  Bactrim (Anaphylaxis)      FAMILY HISTORY:  Family history of cancer (Mother)  Family history of breast cancer (Sibling): in sister at the age of 59      SOCIAL HISTORY: former smoker    CIGARETTES:  ALCOHOL:        MEDICATIONS  (STANDING):  chlorhexidine 4% Liquid 1 Application(s) Topical <User Schedule>  dextrose 5%. 1000 milliLiter(s) (50 mL/Hr) IV Continuous <Continuous>  dextrose 50% Injectable 12.5 Gram(s) IV Push once  dextrose 50% Injectable 25 Gram(s) IV Push once  dextrose 50% Injectable 25 Gram(s) IV Push once  heparin   Injectable 5000 Unit(s) SubCutaneous every 8 hours    MEDICATIONS  (PRN):  albuterol/ipratropium for Nebulization 3 milliLiter(s) Nebulizer every 6 hours PRN Shortness of Breath and/or Wheezing  dextrose 40% Gel 15 Gram(s) Oral once PRN Blood Glucose LESS THAN 70 milliGRAM(s)/deciLiter  glucagon  Injectable 1 milliGRAM(s) IntraMuscular once PRN Glucose <70 milliGRAM(s)/deciLiter      Vital Signs Last 24 Hrs  T(C): 35.9 (2020 05:16), Max: 36.9 (2020 16:04)  T(F): 96.7 (2020 05:16), Max: 98.4 (2020 16:04)  HR: 68 (2020 05:16) (68 - 73)  BP: 136/77 (2020 05:16) (136/77 - 170/92)  BP(mean): --  RR: 20 (2020 05:16) (18 - 20)  SpO2: 97% (2020 20:02) (97% - 98%)    PHYSICAL EXAM:    GENERAL: In no apparent distress, well nourished, and hydrated.  HEAD:  Atraumatic, Normocephalic  EYES: EOMI, PERRLA, conjunctiva and sclera clear  NECK: Supple and normal thyroid.  No JVD or carotid bruit.  Carotid pulse is 2+ bilaterally.  HEART: Regular rate and rhythm; No murmurs, rubs, or gallops.  PULMONARY: decreased BS b/l .  No rales, wheezing, or rhonchi bilaterally.  ABDOMEN: Soft, Nontender, Nondistended; Bowel sounds present  EXTREMITIES:  2+ Peripheral Pulses, No clubbing, cyanosis, or edema  NEUROLOGICAL: Grossly nonfocal    I&O's Detail    2020 07:01  -  2020 11:19  --------------------------------------------------------  IN:    Oral Fluid: 380 mL  Total IN: 380 mL    OUT:  Total OUT: 0 mL    Total NET: 380 mL        Daily Height in cm: 182.88 (2020 16:48)    Daily Weight in k (2020 05:16)    INTERPRETATION OF TELEMETRY:    ECG:  < from: 12 Lead ECG (20 @ 10:26) >  Ventricular Rate 70 BPM    Atrial Rate 70 BPM    P-R Interval 146 ms    QRS Duration 192 ms    Q-T Interval 474 ms    QTC Calculation(Bezet) 511 ms    P Axis 61 degrees    R Axis 79 degrees    T Axis 102 degrees    Diagnosis Line A-V pacing  Left bundle branch block  Abnormal ECG    < end of copied text >          LABS:                        14.4   3.93  )-----------( 163      ( 2020 05:44 )             44.3     07-04    141  |  103  |  21<H>  ----------------------------<  121<H>  4.0   |  25  |  1.1    Ca    9.2      2020 05:44  Mg     2.0     07-03    TPro  6.5  /  Alb  4.4  /  TBili  1.7<H>  /  DBili  x   /  AST  19  /  ALT  18  /  AlkPhos  43  07-04    CARDIAC MARKERS ( 2020 06:00 )  x     / <0.01 ng/mL / 86 U/L / x     / x      CARDIAC MARKERS ( 2020 23:57 )  x     / <0.01 ng/mL / x     / x     / x      CARDIAC MARKERS ( 2020 16:40 )  x     / <0.01 ng/mL / x     / x     / x            Urinalysis Basic - ( 2020 02:30 )    Color: Light Yellow / Appearance: Clear / S.029 / pH: x  Gluc: x / Ketone: Negative  / Bili: Negative / Urobili: <2 mg/dL   Blood: x / Protein: 30 mg/dL / Nitrite: Negative   Leuk Esterase: Negative / RBC: 0 /HPF / WBC 0 /HPF   Sq Epi: x / Non Sq Epi: 0 /HPF / Bacteria: Negative      BNP            RADIOLOGY & ADDITIONAL STUDIES:

## 2020-07-04 NOTE — CONSULT NOTE ADULT - ASSESSMENT
59y Male with h/o congenital heart disease, s/p repair, HTN, HLD, h/o RAA thrombus, COPD, former smoker, DM, symptomatic bradycardia s/p DC PPM (TOOVIA), lymphoma, admitted with syncopal episode    PPM no tachy or shala events,   RV lead threshold increase, amplitude adjusted    con't current meds  increase PO fluid intake  avoid hypoglycemia  f/u as out pt with Dr Muller 7/13 59y Male with h/o congenital heart disease, s/p repair, HTN, HLD, h/o RAA thrombus, COPD, former smoker, DM, symptomatic bradycardia s/p DC PPM (BiotFileboard), lymphoma, admitted with    pre syncopal episode after sitting out side  in thr heat .No loss of consciosness,no dyspnea, no palpitations  Assessment      Post DDD BIOTRNIK Pace maker   intrrogation showed well functioning  PPM in  DDDR Mode    No atrial or ventricular evevnts  Good capture  thresholds Pace dependent  Weakness episode could  be  hypotensive  mechanism  due to  dehydration    Plan    optimized Vlead outputs see print out  advised   hydration  in hot weather  con't current meds  increase PO fluid intake  avoid hypoglycemia  f/u as out pt with Dr Muller 7/13

## 2020-07-04 NOTE — PROGRESS NOTE ADULT - SUBJECTIVE AND OBJECTIVE BOX
pt seen and examined.   Vital Signs Last 24 Hrs  T(C): 35.9 (04 Jul 2020 05:16), Max: 36.9 (03 Jul 2020 16:04)  T(F): 96.7 (04 Jul 2020 05:16), Max: 98.4 (03 Jul 2020 16:04)  HR: 68 (04 Jul 2020 05:16) (68 - 73)  BP: 136/77 (04 Jul 2020 05:16) (136/77 - 170/92)  RR: 20 (04 Jul 2020 05:16) (18 - 20)  SpO2: 97% (03 Jul 2020 20:02) (97% - 98%)    Physical exam:   constitutional NAD, AAOX3, Respiratory  lungs CTA, CVS heart RRR, GI: abdomen Soft NT, ND, BS+, skin: intact  neuro exam non focal.                           14.4   3.93  )-----------( 163      ( 04 Jul 2020 05:44 )             44.3   07-04    141  |  103  |  21<H>  ----------------------------<  121<H>  4.0   |  25  |  1.1    Ca    9.2      04 Jul 2020 05:44  Mg     2.0     07-03    TPro  6.5  /  Alb  4.4  /  TBili  1.7<H>  /  DBili  x   /  AST  19  /  ALT  18  /  AlkPhos  43  07-04    discussed with Dr Moncada ( EP_    a/p  presyncope probably due to dehydration and vasodilatation /hypotension , resolved    dc home   fu with pmd, ep and oncology as outpt   dw resident  dw pt   time spent 35 min

## 2020-07-04 NOTE — DISCHARGE NOTE NURSING/CASE MANAGEMENT/SOCIAL WORK - PATIENT PORTAL LINK FT
You can access the FollowMyHealth Patient Portal offered by [facility] by registering at the following website: [email or web address]. By joining [employer]’s FollowMyHealth portal, you will also be able to view your health information using other applications (apps) compatible with our system.

## 2020-07-07 DIAGNOSIS — E11.9 TYPE 2 DIABETES MELLITUS WITHOUT COMPLICATIONS: ICD-10-CM

## 2020-07-07 DIAGNOSIS — Z87.891 PERSONAL HISTORY OF NICOTINE DEPENDENCE: ICD-10-CM

## 2020-07-07 DIAGNOSIS — I16.0 HYPERTENSIVE URGENCY: ICD-10-CM

## 2020-07-07 DIAGNOSIS — Z79.82 LONG TERM (CURRENT) USE OF ASPIRIN: ICD-10-CM

## 2020-07-07 DIAGNOSIS — Z88.1 ALLERGY STATUS TO OTHER ANTIBIOTIC AGENTS STATUS: ICD-10-CM

## 2020-07-07 DIAGNOSIS — E86.0 DEHYDRATION: ICD-10-CM

## 2020-07-07 DIAGNOSIS — R53.1 WEAKNESS: ICD-10-CM
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
## 2020-07-07 DIAGNOSIS — Z79.899 OTHER LONG TERM (CURRENT) DRUG THERAPY: ICD-10-CM

## 2020-07-07 DIAGNOSIS — E78.5 HYPERLIPIDEMIA, UNSPECIFIED: ICD-10-CM

## 2020-07-07 DIAGNOSIS — R00.1 BRADYCARDIA, UNSPECIFIED: ICD-10-CM

## 2020-07-07 DIAGNOSIS — N17.9 ACUTE KIDNEY FAILURE, UNSPECIFIED: ICD-10-CM

## 2020-07-07 DIAGNOSIS — R07.89 OTHER CHEST PAIN: ICD-10-CM

## 2020-07-07 DIAGNOSIS — J44.9 CHRONIC OBSTRUCTIVE PULMONARY DISEASE, UNSPECIFIED: ICD-10-CM

## 2020-07-07 DIAGNOSIS — Z79.84 LONG TERM (CURRENT) USE OF ORAL HYPOGLYCEMIC DRUGS: ICD-10-CM

## 2020-07-07 DIAGNOSIS — Z87.74 PERSONAL HISTORY OF (CORRECTED) CONGENITAL MALFORMATIONS OF HEART AND CIRCULATORY SYSTEM: ICD-10-CM

## 2020-07-07 DIAGNOSIS — Z95.0 PRESENCE OF CARDIAC PACEMAKER: ICD-10-CM

## 2020-07-07 DIAGNOSIS — T67.5XXA HEAT EXHAUSTION, UNSPECIFIED, INITIAL ENCOUNTER: ICD-10-CM

## 2020-07-07 DIAGNOSIS — C85.80 OTHER SPECIFIED TYPES OF NON-HODGKIN LYMPHOMA, UNSPECIFIED SITE: ICD-10-CM
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
## 2020-07-07 DIAGNOSIS — Z20.828 CONTACT WITH AND (SUSPECTED) EXPOSURE TO OTHER VIRAL COMMUNICABLE DISEASES: ICD-10-CM
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
## 2020-07-07 DIAGNOSIS — Z88.8 ALLERGY STATUS TO OTHER DRUGS, MEDICAMENTS AND BIOLOGICAL SUBSTANCES: ICD-10-CM

## 2020-07-07 DIAGNOSIS — I10 ESSENTIAL (PRIMARY) HYPERTENSION: ICD-10-CM

## 2020-07-14 ENCOUNTER — APPOINTMENT (OUTPATIENT)
Dept: CARDIOLOGY | Facility: CLINIC | Age: 60
End: 2020-07-14
Payer: MEDICARE

## 2020-07-14 PROCEDURE — 99213 OFFICE O/P EST LOW 20 MIN: CPT | Mod: 25

## 2020-07-14 PROCEDURE — 93280 PM DEVICE PROGR EVAL DUAL: CPT | Mod: 59

## 2020-07-24 ENCOUNTER — RX RENEWAL (OUTPATIENT)
Age: 60
End: 2020-07-24

## 2020-07-30 ENCOUNTER — INPATIENT (INPATIENT)
Facility: HOSPITAL | Age: 60
LOS: 5 days | Discharge: NURSING, REHAB OR LONG-TERM CARE | End: 2020-08-05
Attending: INTERNAL MEDICINE | Admitting: INTERNAL MEDICINE
Payer: MEDICARE

## 2020-07-30 VITALS
HEART RATE: 124 BPM | OXYGEN SATURATION: 97 % | RESPIRATION RATE: 26 BRPM | SYSTOLIC BLOOD PRESSURE: 185 MMHG | DIASTOLIC BLOOD PRESSURE: 124 MMHG | TEMPERATURE: 97 F

## 2020-07-30 DIAGNOSIS — Z95.0 PRESENCE OF CARDIAC PACEMAKER: Chronic | ICD-10-CM

## 2020-07-30 DIAGNOSIS — Z98.890 OTHER SPECIFIED POSTPROCEDURAL STATES: Chronic | ICD-10-CM

## 2020-07-30 LAB
ALBUMIN SERPL ELPH-MCNC: 4.6 G/DL — SIGNIFICANT CHANGE UP (ref 3.5–5.2)
ALBUMIN SERPL ELPH-MCNC: 4.9 G/DL — SIGNIFICANT CHANGE UP (ref 3.5–5.2)
ALP SERPL-CCNC: 49 U/L — SIGNIFICANT CHANGE UP (ref 30–115)
ALP SERPL-CCNC: 51 U/L — SIGNIFICANT CHANGE UP (ref 30–115)
ALT FLD-CCNC: 22 U/L — SIGNIFICANT CHANGE UP (ref 0–41)
ALT FLD-CCNC: 24 U/L — SIGNIFICANT CHANGE UP (ref 0–41)
AMYLASE P1 CFR SERPL: 103 U/L — SIGNIFICANT CHANGE UP (ref 25–115)
ANION GAP SERPL CALC-SCNC: 10 MMOL/L — SIGNIFICANT CHANGE UP (ref 7–14)
ANION GAP SERPL CALC-SCNC: 16 MMOL/L — HIGH (ref 7–14)
APTT BLD: 29.8 SEC — SIGNIFICANT CHANGE UP (ref 27–39.2)
AST SERPL-CCNC: 23 U/L — SIGNIFICANT CHANGE UP (ref 0–41)
AST SERPL-CCNC: 26 U/L — SIGNIFICANT CHANGE UP (ref 0–41)
BASE EXCESS BLDV CALC-SCNC: 0.8 MMOL/L — SIGNIFICANT CHANGE UP (ref -2–2)
BASOPHILS # BLD AUTO: 0.05 K/UL — SIGNIFICANT CHANGE UP (ref 0–0.2)
BASOPHILS NFR BLD AUTO: 0.8 % — SIGNIFICANT CHANGE UP (ref 0–1)
BILIRUB SERPL-MCNC: 1.7 MG/DL — HIGH (ref 0.2–1.2)
BILIRUB SERPL-MCNC: 1.9 MG/DL — HIGH (ref 0.2–1.2)
BUN SERPL-MCNC: 20 MG/DL — SIGNIFICANT CHANGE UP (ref 10–20)
BUN SERPL-MCNC: 21 MG/DL — HIGH (ref 10–20)
CA-I SERPL-SCNC: 1.19 MMOL/L — SIGNIFICANT CHANGE UP (ref 1.12–1.3)
CALCIUM SERPL-MCNC: 9.4 MG/DL — SIGNIFICANT CHANGE UP (ref 8.5–10.1)
CALCIUM SERPL-MCNC: 9.5 MG/DL — SIGNIFICANT CHANGE UP (ref 8.5–10.1)
CHLORIDE SERPL-SCNC: 100 MMOL/L — SIGNIFICANT CHANGE UP (ref 98–110)
CHLORIDE SERPL-SCNC: 101 MMOL/L — SIGNIFICANT CHANGE UP (ref 98–110)
CO2 SERPL-SCNC: 25 MMOL/L — SIGNIFICANT CHANGE UP (ref 17–32)
CO2 SERPL-SCNC: 28 MMOL/L — SIGNIFICANT CHANGE UP (ref 17–32)
CREAT SERPL-MCNC: 1 MG/DL — SIGNIFICANT CHANGE UP (ref 0.7–1.5)
CREAT SERPL-MCNC: 1.1 MG/DL — SIGNIFICANT CHANGE UP (ref 0.7–1.5)
EOSINOPHIL # BLD AUTO: 0.21 K/UL — SIGNIFICANT CHANGE UP (ref 0–0.7)
EOSINOPHIL NFR BLD AUTO: 3.4 % — SIGNIFICANT CHANGE UP (ref 0–8)
GAS PNL BLDV: 140 MMOL/L — SIGNIFICANT CHANGE UP (ref 136–145)
GAS PNL BLDV: SIGNIFICANT CHANGE UP
GLUCOSE BLDC GLUCOMTR-MCNC: 148 MG/DL — HIGH (ref 70–99)
GLUCOSE BLDC GLUCOMTR-MCNC: 150 MG/DL — HIGH (ref 70–99)
GLUCOSE SERPL-MCNC: 144 MG/DL — HIGH (ref 70–99)
GLUCOSE SERPL-MCNC: 235 MG/DL — HIGH (ref 70–99)
HCO3 BLDV-SCNC: 27 MMOL/L — SIGNIFICANT CHANGE UP (ref 22–29)
HCT VFR BLD CALC: 45.7 % — SIGNIFICANT CHANGE UP (ref 42–52)
HCT VFR BLD CALC: 47 % — SIGNIFICANT CHANGE UP (ref 42–52)
HCT VFR BLDA CALC: 50.8 % — HIGH (ref 34–44)
HGB BLD CALC-MCNC: 16.6 G/DL — SIGNIFICANT CHANGE UP (ref 14–18)
HGB BLD-MCNC: 15.2 G/DL — SIGNIFICANT CHANGE UP (ref 14–18)
HGB BLD-MCNC: 15.8 G/DL — SIGNIFICANT CHANGE UP (ref 14–18)
IMM GRANULOCYTES NFR BLD AUTO: 0.8 % — HIGH (ref 0.1–0.3)
INR BLD: 1.03 RATIO — SIGNIFICANT CHANGE UP (ref 0.65–1.3)
LACTATE BLDV-MCNC: 2.8 MMOL/L — HIGH (ref 0.5–1.6)
LACTATE SERPL-SCNC: 1.6 MMOL/L — SIGNIFICANT CHANGE UP (ref 0.7–2)
LIDOCAIN IGE QN: 16 U/L — SIGNIFICANT CHANGE UP (ref 7–60)
LYMPHOCYTES # BLD AUTO: 1.52 K/UL — SIGNIFICANT CHANGE UP (ref 1.2–3.4)
LYMPHOCYTES # BLD AUTO: 24.4 % — SIGNIFICANT CHANGE UP (ref 20.5–51.1)
MCHC RBC-ENTMCNC: 30.1 PG — SIGNIFICANT CHANGE UP (ref 27–31)
MCHC RBC-ENTMCNC: 30.2 PG — SIGNIFICANT CHANGE UP (ref 27–31)
MCHC RBC-ENTMCNC: 33.3 G/DL — SIGNIFICANT CHANGE UP (ref 32–37)
MCHC RBC-ENTMCNC: 33.6 G/DL — SIGNIFICANT CHANGE UP (ref 32–37)
MCV RBC AUTO: 89.5 FL — SIGNIFICANT CHANGE UP (ref 80–94)
MCV RBC AUTO: 90.7 FL — SIGNIFICANT CHANGE UP (ref 80–94)
MONOCYTES # BLD AUTO: 0.78 K/UL — HIGH (ref 0.1–0.6)
MONOCYTES NFR BLD AUTO: 12.5 % — HIGH (ref 1.7–9.3)
NEUTROPHILS # BLD AUTO: 3.63 K/UL — SIGNIFICANT CHANGE UP (ref 1.4–6.5)
NEUTROPHILS NFR BLD AUTO: 58.1 % — SIGNIFICANT CHANGE UP (ref 42.2–75.2)
NRBC # BLD: 0 /100 WBCS — SIGNIFICANT CHANGE UP (ref 0–0)
NRBC # BLD: 0 /100 WBCS — SIGNIFICANT CHANGE UP (ref 0–0)
PCO2 BLDV: 49 MMHG — SIGNIFICANT CHANGE UP (ref 41–51)
PH BLDV: 7.35 — SIGNIFICANT CHANGE UP (ref 7.26–7.43)
PLATELET # BLD AUTO: 178 K/UL — SIGNIFICANT CHANGE UP (ref 130–400)
PLATELET # BLD AUTO: 192 K/UL — SIGNIFICANT CHANGE UP (ref 130–400)
PO2 BLDV: 45 MMHG — HIGH (ref 20–40)
POTASSIUM BLDV-SCNC: 3.3 MMOL/L — SIGNIFICANT CHANGE UP (ref 3.3–5.6)
POTASSIUM SERPL-MCNC: 3.6 MMOL/L — SIGNIFICANT CHANGE UP (ref 3.5–5)
POTASSIUM SERPL-MCNC: 5 MMOL/L — SIGNIFICANT CHANGE UP (ref 3.5–5)
POTASSIUM SERPL-SCNC: 3.6 MMOL/L — SIGNIFICANT CHANGE UP (ref 3.5–5)
POTASSIUM SERPL-SCNC: 5 MMOL/L — SIGNIFICANT CHANGE UP (ref 3.5–5)
PROT SERPL-MCNC: 6.5 G/DL — SIGNIFICANT CHANGE UP (ref 6–8)
PROT SERPL-MCNC: 7.1 G/DL — SIGNIFICANT CHANGE UP (ref 6–8)
PROTHROM AB SERPL-ACNC: 11.9 SEC — SIGNIFICANT CHANGE UP (ref 9.95–12.87)
RBC # BLD: 5.04 M/UL — SIGNIFICANT CHANGE UP (ref 4.7–6.1)
RBC # BLD: 5.25 M/UL — SIGNIFICANT CHANGE UP (ref 4.7–6.1)
RBC # FLD: 12.7 % — SIGNIFICANT CHANGE UP (ref 11.5–14.5)
RBC # FLD: 12.8 % — SIGNIFICANT CHANGE UP (ref 11.5–14.5)
SAO2 % BLDV: 81 % — SIGNIFICANT CHANGE UP
SODIUM SERPL-SCNC: 138 MMOL/L — SIGNIFICANT CHANGE UP (ref 135–146)
SODIUM SERPL-SCNC: 142 MMOL/L — SIGNIFICANT CHANGE UP (ref 135–146)
TROPONIN T SERPL-MCNC: <0.01 NG/ML — SIGNIFICANT CHANGE UP
WBC # BLD: 5.55 K/UL — SIGNIFICANT CHANGE UP (ref 4.8–10.8)
WBC # BLD: 6.24 K/UL — SIGNIFICANT CHANGE UP (ref 4.8–10.8)
WBC # FLD AUTO: 5.55 K/UL — SIGNIFICANT CHANGE UP (ref 4.8–10.8)
WBC # FLD AUTO: 6.24 K/UL — SIGNIFICANT CHANGE UP (ref 4.8–10.8)

## 2020-07-30 PROCEDURE — 99291 CRITICAL CARE FIRST HOUR: CPT | Mod: CS,GC

## 2020-07-30 PROCEDURE — 0042T: CPT

## 2020-07-30 PROCEDURE — 99291 CRITICAL CARE FIRST HOUR: CPT

## 2020-07-30 PROCEDURE — 70450 CT HEAD/BRAIN W/O DYE: CPT | Mod: 26

## 2020-07-30 PROCEDURE — 74018 RADEX ABDOMEN 1 VIEW: CPT | Mod: 26

## 2020-07-30 PROCEDURE — 93010 ELECTROCARDIOGRAM REPORT: CPT

## 2020-07-30 RX ORDER — TIOTROPIUM BROMIDE 18 UG/1
1 CAPSULE ORAL; RESPIRATORY (INHALATION) DAILY
Refills: 0 | Status: DISCONTINUED | OUTPATIENT
Start: 2020-07-30 | End: 2020-08-05

## 2020-07-30 RX ORDER — SODIUM CHLORIDE 9 MG/ML
1000 INJECTION, SOLUTION INTRAVENOUS
Refills: 0 | Status: DISCONTINUED | OUTPATIENT
Start: 2020-07-30 | End: 2020-08-05

## 2020-07-30 RX ORDER — DEXTROSE 50 % IN WATER 50 %
12.5 SYRINGE (ML) INTRAVENOUS ONCE
Refills: 0 | Status: DISCONTINUED | OUTPATIENT
Start: 2020-07-30 | End: 2020-08-05

## 2020-07-30 RX ORDER — ATORVASTATIN CALCIUM 80 MG/1
40 TABLET, FILM COATED ORAL AT BEDTIME
Refills: 0 | Status: DISCONTINUED | OUTPATIENT
Start: 2020-07-30 | End: 2020-08-05

## 2020-07-30 RX ORDER — ACETAMINOPHEN 500 MG
650 TABLET ORAL EVERY 6 HOURS
Refills: 0 | Status: DISCONTINUED | OUTPATIENT
Start: 2020-07-30 | End: 2020-08-02

## 2020-07-30 RX ORDER — LACTULOSE 10 G/15ML
10 SOLUTION ORAL ONCE
Refills: 0 | Status: COMPLETED | OUTPATIENT
Start: 2020-07-30 | End: 2020-07-30

## 2020-07-30 RX ORDER — IPRATROPIUM/ALBUTEROL SULFATE 18-103MCG
3 AEROSOL WITH ADAPTER (GRAM) INHALATION EVERY 6 HOURS
Refills: 0 | Status: DISCONTINUED | OUTPATIENT
Start: 2020-07-30 | End: 2020-08-05

## 2020-07-30 RX ORDER — INSULIN LISPRO 100/ML
VIAL (ML) SUBCUTANEOUS
Refills: 0 | Status: DISCONTINUED | OUTPATIENT
Start: 2020-07-30 | End: 2020-08-05

## 2020-07-30 RX ORDER — EMPAGLIFLOZIN 25 MG/1
25 TABLET, FILM COATED ORAL
Qty: 30 | Refills: 5 | Status: DISCONTINUED | COMMUNITY
Start: 2017-03-17 | End: 2020-07-30

## 2020-07-30 RX ORDER — ESMOLOL HCL 100MG/10ML
50 VIAL (ML) INTRAVENOUS
Qty: 2500 | Refills: 0 | Status: DISCONTINUED | OUTPATIENT
Start: 2020-07-30 | End: 2020-08-01

## 2020-07-30 RX ORDER — BUDESONIDE AND FORMOTEROL FUMARATE DIHYDRATE 160; 4.5 UG/1; UG/1
2 AEROSOL RESPIRATORY (INHALATION)
Refills: 0 | Status: DISCONTINUED | OUTPATIENT
Start: 2020-07-30 | End: 2020-08-05

## 2020-07-30 RX ORDER — GLUCAGON INJECTION, SOLUTION 0.5 MG/.1ML
1 INJECTION, SOLUTION SUBCUTANEOUS ONCE
Refills: 0 | Status: DISCONTINUED | OUTPATIENT
Start: 2020-07-30 | End: 2020-08-05

## 2020-07-30 RX ORDER — DEXTROSE 50 % IN WATER 50 %
15 SYRINGE (ML) INTRAVENOUS ONCE
Refills: 0 | Status: DISCONTINUED | OUTPATIENT
Start: 2020-07-30 | End: 2020-08-05

## 2020-07-30 RX ORDER — EMPAGLIFLOZIN AND METFORMIN HYDROCHLORIDE 12.5; 1 MG/1; MG/1
12.5-1 TABLET ORAL
Qty: 180 | Refills: 3 | Status: DISCONTINUED | COMMUNITY
Start: 2020-06-25 | End: 2020-07-30

## 2020-07-30 RX ORDER — DEXTROSE 50 % IN WATER 50 %
25 SYRINGE (ML) INTRAVENOUS ONCE
Refills: 0 | Status: DISCONTINUED | OUTPATIENT
Start: 2020-07-30 | End: 2020-08-05

## 2020-07-30 RX ORDER — ALTEPLASE 100 MG
81 KIT INTRAVENOUS ONCE
Refills: 0 | Status: COMPLETED | OUTPATIENT
Start: 2020-07-30 | End: 2020-07-30

## 2020-07-30 RX ORDER — PANTOPRAZOLE SODIUM 20 MG/1
40 TABLET, DELAYED RELEASE ORAL
Refills: 0 | Status: DISCONTINUED | OUTPATIENT
Start: 2020-07-30 | End: 2020-07-31

## 2020-07-30 RX ORDER — CHLORHEXIDINE GLUCONATE 213 G/1000ML
1 SOLUTION TOPICAL
Refills: 0 | Status: DISCONTINUED | OUTPATIENT
Start: 2020-07-30 | End: 2020-08-05

## 2020-07-30 RX ORDER — ALTEPLASE 100 MG
9 KIT INTRAVENOUS ONCE
Refills: 0 | Status: COMPLETED | OUTPATIENT
Start: 2020-07-30 | End: 2020-07-30

## 2020-07-30 RX ORDER — METFORMIN HYDROCHLORIDE 1000 MG/1
1000 TABLET, COATED ORAL TWICE DAILY
Qty: 60 | Refills: 5 | Status: DISCONTINUED | COMMUNITY
Start: 2017-03-16 | End: 2020-07-30

## 2020-07-30 RX ADMIN — BUDESONIDE AND FORMOTEROL FUMARATE DIHYDRATE 2 PUFF(S): 160; 4.5 AEROSOL RESPIRATORY (INHALATION) at 21:04

## 2020-07-30 RX ADMIN — ALTEPLASE 540 MILLIGRAM(S): KIT at 14:35

## 2020-07-30 RX ADMIN — ALTEPLASE 81 MILLIGRAM(S): KIT at 14:36

## 2020-07-30 RX ADMIN — Medication 650 MILLIGRAM(S): at 21:56

## 2020-07-30 RX ADMIN — Medication 650 MILLIGRAM(S): at 22:27

## 2020-07-30 NOTE — ED PROVIDER NOTE - OBJECTIVE STATEMENT
59yo male with pmhx of COPD, diabetes, pacemaker in place, and non-Hodgkin lymphoma BIBEMS for altered mental status. His last known normal was at noon when he called his wife about not feeling well. He was soon found by his nephew with dizziness, new aphasia and weakness, moaning and grunting.

## 2020-07-30 NOTE — H&P ADULT - HISTORY OF PRESENT ILLNESS
59 M  with a pmh of congenital heart disease s/p repair, HTN, DLD, RAA thrombus (was on xarelto and resolved on last ANITA), COPD, exsmoker, dm htn, hld, sinus bradycardia s/p PPM, marginal cell lymphoma on maintenance rituxan (last chemo was december 21) presents with expressive aphasia and right sided weakness. He called his nephew about 12:30 this afternoon because he was feeling weak. EMS was called; by the time he came to ED he was unable to articulate any words and was only moaning. As per family he was completely fine at 11AM, and had reported no new complaints.    In the ED: T 96.4, , 185/124, 97% on RA. Labs were stable since last admission. Code stroke was called. CT head showed no acute changes, but was limited due to motion artifact. He received TPA and then CT angiogram; read is pending. 59 M  with a pmh of congenital heart disease s/p repair, HTN, DLD, RAA thrombus (was on xarelto and resolved on last ANITA), COPD, dm, sinus bradycardia s/p PPM, marginal cell lymphoma on maintenance rituxan (last chemo was december 21) presents with expressive aphasia and right sided weakness. He called his nephew about 12:30 this afternoon because he was feeling weak. EMS was called; by the time he came to ED he was unable to articulate any words and was only moaning. As per family he was completely fine at 11AM, and had reported no new complaints.    In the ED: T 96.4, , 185/124, 97% on RA. Labs were stable since last admission. Code stroke was called. CT head showed no acute changes, but was limited due to motion artifact. He received TPA and then CT angiogram; read is pending.

## 2020-07-30 NOTE — CONSULT NOTE ADULT - SUBJECTIVE AND OBJECTIVE BOX
Neurocritical Care Consult Note:    1. Brief Presentation: expressive aphasia    2. Today's Acute Problems:     3. Relevant brief History: 59 M  with a pmh of congenital heart disease s/p repair, HTN, DLD, RAA thrombus (was on xarelto and resolved on last ANITA), COPD, exsmoker, dm htn, hld, sinus bradycardia s/p PPM, marginal cell lymphoma on maintenance rituxan (last chemo was december 21) presents with expressive aphasia and right sided weakness. He called his nephew about 12:30 this afternoon because he was feeling weak. EMS was called; by the time he came to ED he was unable to articulate any words and was only moaning. As per family he was completely fine at 11AM, and had reported no new complaints.    In the ED: T 96.4, , 185/124, 97% on RA. Labs were stable since last admission. Code stroke was called. CT head showed no acute changes, but was limited due to motion artifact. He received TPA, CTA/CTP are negative for signs of acute ischemia.      4-Yesterday's Plan:    5. Last 24 hour updates: s/p IV tpa at 14.35 pm and admitted to ICU    6. Medications:   atorvastatin 40 milliGRAM(s) Oral at bedtime  budesonide 160 MICROgram(s)/formoterol 4.5 MICROgram(s) Inhaler 2 Puff(s) Inhalation two times a day  chlorhexidine 4% Liquid 1 Application(s) Topical <User Schedule>  dextrose 5%. 1000 milliLiter(s) IV Continuous <Continuous>  dextrose 50% Injectable 12.5 Gram(s) IV Push once  dextrose 50% Injectable 25 Gram(s) IV Push once  dextrose 50% Injectable 25 Gram(s) IV Push once  esMOLOL  Infusion 50 MICROgram(s)/kG/Min IV Continuous <Continuous>  insulin lispro (HumaLOG) corrective regimen sliding scale   SubCutaneous three times a day before meals  pantoprazole    Tablet 40 milliGRAM(s) Oral before breakfast  tiotropium 18 MICROgram(s) Capsule 1 Capsule(s) Inhalation daily      7. Ancillary Management:   Chest PT[ ]   Head of bed >35 [x ]   Out of bed to chair [ ]   PT/OT/SP Eval [ ]   Spirometry[ ]   DVT prophalaxis[ ]    8.Neuro:   Awake: Spontaneously[ x] Occasionally[ ] To Voice [ ] To painful stimuli [ ]   AIert [ ]. Following commands: 3 steps[ ], 2 steps[ ], 1 step [x ], None [ ]   Orientation: 0[ ], 1[ ], 2[ ], 3[ ]. Tracking objects with eyes: [x ]   Language: expressive aphasia  Time off sedation for exam: N/A  Pupils: Right   >2   Left    >2      Corneal:  +    Gag reflex: +    EOMI: +    NIH STROKE SCALE  Item	                                                        Score  1 a.	Level of Consciousness	               	0  1 b. LOC Questions	                                2  1 c.	LOC Commands	                               	0  2.	Best Gaze	                                        0  3.	Visual	                                                0  4.	Facial Palsy	                                        1  5 a.	Motor Arm - Left	                                0  5 b.	Motor Arm - Right	                        2  6 a.	Motor Leg - Left	                                0  6 b.	Motor Leg - Right	                                2  7.	Limb Ataxia	                                        0  8.	Sensory	                                                0  9.	Language	                                        2  10.	Dysarthria	                                        2  11.	Extinction and Inattention  	        0  ______________________________________  TOTAL	                                                        11      mRS:  0 No symptoms at all  1 No significant disability despite symptoms; able to carry out all usual duties and activities without assistance  2 Slight disability; unable to carry out all previous activities, but able to look after own affairs  3 Moderate disability; requiring some help, but able to walk without assistance  4 Moderately severe disability; unable to walk without assistance and unable to attend to own bodily needs without assistance  5 Severe disability; bedridden, incontinent and requiring constant nursing care and attention  6 Dead    ICHs:  Age >=80  GCS Score: 3-4 (2 pts)   GCS Score: 5-12 (1 pt)   ICH Volume: >30  (+) IVH  (+) Infratentorial    Last CTH: < from: CT Brain Stroke Protocol (07.30.20 @ 14:09) >    EXAM:  CT BRAIN STROKE PROTOCOL            PROCEDURE DATE:  07/30/2020            INTERPRETATION:  CLINICAL INDICATION: Right-sided weakness. Altered mental status. Code stroke    Technique: CT of the head was performed without contrast.    Multiple contiguous axial images were acquired from the skullbase to the vertex without the administration of intravenous contrast.  Coronal and sagittal reformations were made.    COMPARISON:  prior head CT dated 4/12/2015    FINDINGS:    Markedly limited examination due to motion streak artifact at the level of the [place].    The ventricles and sulci are unremarkable in appearance.     There is no intraparenchymal hematoma, mass effect or midline shift. No abnormal extra-axial fluid collections are present.    The calvarium is intact. The visualized intraorbital compartments, paranasal sinuses and mastoid complexes appear free of acute disease. Leftward nasal septal deviation with septal spurring.    IMPRESSION:  Limited examination due to motion and streak artifact.    No CT evidence of acute intracranial pathology. No gross evidence for intracranial hemorrhage, mass effect or midline shift.    These findings were discussed with MYRNA Cui of stroke neurology at 7/30/2020 2:13 PM by Dr. Stuart with read back confirmation.    < end of copied text >      Last CTA/MRA:    Last CTP: < from: CT Perfusion w/ Maps w/ IV Cont (07.30.20 @ 15:42) >  EXAM:  CT PERFUSION W MAPS IC            PROCEDURE DATE:  07/30/2020            INTERPRETATION:  Clinical History / Reason for exam: Stroke code. Right-sided weakness and altered mental status.    Technique: CT angiogram of the head and neck including perfusion study of the brain.  Contiguous CT axial images of the head and neck were obtained following the bolus intravenous administration of 120 cc Optiray with multiple 3-D and MIP reformats with perfusion mapping performed on a separate 3D workstation ([employer]).    Correlation made with accompanying noncontrast head CT.  CT neck dated 6/25/2019 is reviewed.    Findings:    CTA neck:  There is streak artifact from first pass contrast within the venous structures limiting evaluation.    The visualized aortic arch and great vessel origins are patent.    The common, internal and external carotid arteries appear grossly patent.    The vertebral arteries appear grossly patent, dominant on the right.    CTA brain: The distal segments of the internal carotid arteries are patent.  The anterior and middle cerebral arteries are patent.    The distal vertebral arteries are patent. The basilar artery is patent. The posterior cerebral arteries are patent.    Perfusion:  There is no evidence of focal perfusion deficit to suggest acute cerebral ischemia.    Other:  Mildly prominent lymph nodes in the left supraclavicular region, nonspecific.  8 mm right thyroid nodule, stable.    IMPRESSION:    1.  No gross evidence of major vascular stenosis or occlusion (artifact is noted in the neck). Normal perfusion images.    2.  Mildly prominent left supraclavicular lymph nodes, nonspecific. Follow-up with a chest CT may be obtained.              < end of copied text >          9. Cardiovascular:   Vital Signs Last 24 Hrs  T(C): 35.9 (30 Jul 2020 17:00), Max: 36.1 (30 Jul 2020 13:50)  T(F): 96.6 (30 Jul 2020 17:00), Max: 96.9 (30 Jul 2020 13:50)  HR: 72 (30 Jul 2020 19:15) (72 - 124)  BP: 133/78 (30 Jul 2020 19:15) (133/78 - 196/70)  BP(mean): 0 (30 Jul 2020 19:15) (0 - 110)  RR: 22 (30 Jul 2020 19:15) (22 - 43)  SpO2: 98% (30 Jul 2020 19:15) (92% - 99%)     Last Echo:    Last EKG:    CVP   MAP/CPP/SBP target:   CO:      CI:       Enzymes/Trop:    10. Respiratory:   ABG:    VBG:    Chest Xray:        Peak Pressure/[place] Pressure:    11.GI:    Prophalaxis:     Bowel mvt:     Abd distension:   LIVER FUNCTIONS - ( 30 Jul 2020 14:04 )  Alb: 4.9 g/dL / Pro: 7.1 g/dL / ALK PHOS: 51 U/L / ALT: 24 U/L / AST: 26 U/L / GGT: x             12.Renal/Fluids/Electrolytes:    07-30    142  |  101  |  21<H>  ----------------------------<  235<H>  3.6   |  25  |  1.1    Ca    9.5      30 Jul 2020 14:04    TPro  7.1  /  Alb  4.9  /  TBili  1.9<H>  /  DBili  x   /  AST  26  /  ALT  24  /  AlkPhos  51  07-30      I&O's Detail    30 Jul 2020 07:01  -  30 Jul 2020 19:38  --------------------------------------------------------  IN:  Total IN: 0 mL    OUT:    Indwelling Catheter - Urethral: 200 mL  Total OUT: 200 mL    Total NET: -200 mL          13.ID:   TMax:   Vital Signs Last 24 Hrs  T(C): 35.9 (30 Jul 2020 17:00), Max: 36.1 (30 Jul 2020 13:50)  T(F): 96.6 (30 Jul 2020 17:00), Max: 96.9 (30 Jul 2020 13:50)  HR: 72 (30 Jul 2020 19:15) (72 - 124)  BP: 133/78 (30 Jul 2020 19:15) (133/78 - 196/70)  BP(mean): 0 (30 Jul 2020 19:15) (0 - 110)  RR: 22 (30 Jul 2020 19:15) (22 - 43)  SpO2: 98% (30 Jul 2020 19:15) (92% - 99%)           Lines: Central[] Date inserted: Peripheral[]    14. Hematology:                         15.8   6.24  )-----------( 192      ( 30 Jul 2020 14:04 )             47.0      07-30    142  |  101  |  21<H>  ----------------------------<  235<H>  3.6   |  25  |  1.1    Ca    9.5      30 Jul 2020 14:04    TPro  7.1  /  Alb  4.9  /  TBili  1.9<H>  /  DBili  x   /  AST  26  /  ALT  24  /  AlkPhos  51  07-30     PT/INR - ( 30 Jul 2020 14:04 )   PT: 11.90 sec;   INR: 1.03 ratio         PTT - ( 30 Jul 2020 14:04 )  PTT:29.8 sec    DVT Prophylaxis Lovenox[ ] Heparin[ ] Venodynes[ ] SCD's[ ]

## 2020-07-30 NOTE — CONSULT NOTE ADULT - ASSESSMENT
59 M  with a pmh of congenital heart disease s/p repair, HTN, DLD, RAA thrombus (was on xarelto and resolved on last ANITA), COPD, exsmoker, dm htn, hld, sinus bradycardia s/p PPM, marginal cell lymphoma on maintenance rituxan (last chemo was december 21) presents with expressive aphasia and right sided weakness. LWK is 11.30 am then developed acute generalized weakness and expressive aphasia and dysarthria. In ED stroke code was called, NIHSS is 11, his CTH is negative for acute pathology and his was within the window for IV tpa which was given at 14.35 pm.    ICU monitoring with post tpa protocol\  NO AC or Antiplatelets for 24 hrs  CTH in 24 hour post tpa or sooner if worsening neuro status  Keep syst <180 >120 and diast <105>60  Speech and swallow eval and treat  Echo        Neuroattending note will follow

## 2020-07-30 NOTE — ED PROVIDER NOTE - PROGRESS NOTE DETAILS
Authored by Dr. Sales: tpa administered at 14:33.  dr jaye daley at bedside. Authored by Dr. Sales: d/w icu. accepted.   went to CT perfusion.

## 2020-07-30 NOTE — ED ADULT NURSE NOTE - OBJECTIVE STATEMENT
pt brought to ED for severe aphasia at home, last known well when we spoke to his nephew at 1240 this afternoon. pt unable to speak, able to follow commands. can not hold his limbs. stroke code initiated

## 2020-07-30 NOTE — ED PROVIDER NOTE - CRITICAL CARE PROVIDED
consult w/ pt's family directly relating to pts condition/direct patient care (not related to procedure)/consultation with other physicians/interpretation of diagnostic studies/documentation/additional history taking

## 2020-07-30 NOTE — CONSULT NOTE ADULT - ASSESSMENT
IMPRESSION:  CVA s/p TPA    PLAN:    CNS: avoid CNS depressants. Neuro checks q1h. F/u Neuro recs.    HEENT: Oral care    PULMONARY:  HOB @ 45 degrees.  Aspiration precautions. Target SpO2>94%.     CARDIOVASCULAR: ECHO. Lipid panel. A1c. Target -160.     GI: GI prophylaxis.  S&S eval.     RENAL:  Follow up lytes.  Correct as needed.     INFECTIOUS DISEASE: No abx.     HEMATOLOGICAL:  DVT prophylaxis.    ENDOCRINE:  Follow up FS.  Insulin protocol if needed.    MUSCULOSKELETAL: bedrest    MICU monitoring IMPRESSION:    CVA s/p TPA  hx of lymphoma  SP ANITA ( 1 Y AGO no thrombus)      PLAN:    CNS: avoid CNS depressants. Neuro checks q1h. F/u Neuro recs. repeat head CT IN 24 OR stat if worsening MS    HEENT: Oral care    PULMONARY:  HOB @ 45 degrees.  Aspiration precautions. Target SpO2>94%. ( Chest ct can be done OP)    CARDIOVASCULAR: ECHO. Lipid panel. A1c. BP control, cardio eval    GI: GI prophylaxis.  S&S eval.     RENAL:  Follow up lytes.  Correct as needed.     INFECTIOUS DISEASE: No abx.     HEMATOLOGICAL:  DVT prophylaxis.    ENDOCRINE:  Follow up FS.  Insulin protocol if needed.    MUSCULOSKELETAL: bedrest    MICU monitoring

## 2020-07-30 NOTE — H&P ADULT - ASSESSMENT
59 M with a pmh of congenital heart disease s/p repair, HTN, DLD, RAA thrombus (was on xarelto and resolved on last ANITA), COPD, exsmoker, dm htn, hld, sinus bradycardia s/p PPM, marginal cell lymphoma on maintenance rituxan (last chemo was december 21) presents with expressive aphasia and right sided weakness.  In the ED: T 96.4, , 185/124, 97% on RA. Labs were stable since last admission. Code stroke was called. CT head showed no acute changes, but was limited due to motion artifact. He received TPA and then CT angiogram; read is pending.    # Stroke s/p TPA  - expressive aphasia and right sided weakness  - CT showed no acute pathology  - CTA read pending  - Neuro checks q1  - Plan as per neuro    # HTN  - Will start esmolol drip to keep -160  - Home meds held    # DM  - hold oral meds  - check fs  - start insulin if glu>180  - start and adjust insulin s/s prn    # Aggressive nisreen marginal zone lymphoma  - maintenance on Rituxan, last dose 12/31  - Per family he has appt with  in August for PET scan    # COPD not on home O2  - Continue home meds  - Duonebs PRN      DVT ppx seq  Gi ppx: protonix  full code  dispo: acute, penidng neuro recs

## 2020-07-30 NOTE — ED PROVIDER NOTE - PHYSICAL EXAMINATION
CONSTITUTIONAL: moaning and grunting upon arrival, non-verbal  SKIN: warm, dry, no rash  HEAD: Normocephalic; atraumatic. no abrasions/lesions  EYES: PERRL, EOMI, no conjunctival erythema, no sclera icterus   ENT: No nasal discharge; airway clear, normal tympanic membranes b/l  NECK: Supple; non tender.  CARD: S1, S2 normal; no murmurs, gallops, or rubs. Regular rate and rhythm.   RESP: No wheezes, rales or rhonchi.  ABD: soft non-distended, generalized abdominal tenderness  EXT: No clubbing, cyanosis or edema.   NEURO: Alert and oriented x2, newly aphasic and dysarthric, no pronator drift, some resistance against gravity b/l LE's, sensation to pain b/l LE and UE's

## 2020-07-30 NOTE — ED PROVIDER NOTE - ATTENDING CONTRIBUTION TO CARE
dm, htn, acute onset of aphasia, LKW ~12. moaning. unable to express himself.  R sided weakness c/w L sided.   a/p: labs, imaging, reassess

## 2020-07-30 NOTE — H&P ADULT - NSHPPHYSICALEXAM_GEN_ALL_CORE
ICU Vital Signs Last 24 Hrs  T(C): 36.1 (30 Jul 2020 13:50), Max: 36.1 (30 Jul 2020 13:50)  T(F): 96.9 (30 Jul 2020 13:50), Max: 96.9 (30 Jul 2020 13:50)  HR: 90 (30 Jul 2020 15:35) (87 - 124)  BP: 135/66 (30 Jul 2020 15:35) (135/66 - 196/70)  RR: 22 (30 Jul 2020 15:35) (22 - 26)  SpO2: 96% (30 Jul 2020 15:35) (92% - 97%)        GENERAL: Lying in bed moaning  HEAD:  Atraumatic, Normocephalic  ENT: Moist mucous membranes  CHEST/LUNG: Clear to auscultation bilaterally; No rales, rhonchi, wheezing, or rubs. Unlabored respirations  HEART: Regular rate and rhythm; No murmurs, rubs, or gallops  ABDOMEN: Bowel sounds present; Soft, Nontender, Nondistended. No hepatomegally  EXTREMITIES:  2+ Peripheral Pulses, brisk capillary refill. No clubbing, cyanosis, or edema  NERVOUS SYSTEM:  Alert. Follows commands. 4/5 strength on right side

## 2020-07-30 NOTE — CONSULT NOTE ADULT - ATTENDING COMMENTS
History, events, data and scans reviewed, patient was examined at bedside on 07/30/2020.
patient seen and examined, agree with above, CVA sp tpa s/p stroke code ( ANITA 1 year ago no thrombus) admit to MICU, Neuro f/up

## 2020-07-30 NOTE — H&P ADULT - NSHPLABSRESULTS_GEN_ALL_CORE
LABS:  cret                        15.8   6.24  )-----------( 192      ( 30 Jul 2020 14:04 )             47.0     07-30    142  |  101  |  21<H>  ----------------------------<  235<H>  3.6   |  25  |  1.1    Ca    9.5      30 Jul 2020 14:04    TPro  7.1  /  Alb  4.9  /  TBili  1.9<H>  /  DBili  x   /  AST  26  /  ALT  24  /  AlkPhos  51  07-30    PT/INR - ( 30 Jul 2020 14:04 )   PT: 11.90 sec;   INR: 1.03 ratio         PTT - ( 30 Jul 2020 14:04 )  PTT:29.8 sec          < from: CT Brain Stroke Protocol (07.30.20 @ 14:09) >    IMPRESSION:  Limited examination due to motion and streak artifact.    No CT evidence of acute intracranial pathology. No gross evidence for intracranial hemorrhage, mass effect or midline shift.    These findings were discussed with MYRNA Cui of stroke neurology at 7/30/2020 2:13 PM by Dr. Stuart with read back confirmation.    < end of copied text >

## 2020-07-30 NOTE — CONSULT NOTE ADULT - SUBJECTIVE AND OBJECTIVE BOX
Patient is a 60y old  Male who presents with a chief complaint of     HPI:  60 y.o. m w/ PMHx of congenital heart disease s/p repair, HTN, DLD, RAA thrombus (was on xarelto and resolved on last ANITA), COPD, exsmoker, dm htn, hld, sinus bradycardia s/p PPM, marginal cell lymphoma presents p/w expressive aphasia and right sided weakness. In ER, stroke code was called. CT H was negative. TPA was given.     LKW: 12pm  TPA given: 2:30pm    PAST MEDICAL & SURGICAL HISTORY:  Pacemaker  Non Hodgkin's lymphoma  Chronic obstructive pulmonary disease, unspecified COPD type  DM (diabetes mellitus)  High cholesterol  HTN (hypertension)  S/P transesophageal echocardiogram (ANITA)  Artificial cardiac pacemaker      SOCIAL HX:   Smoking   former smoker                   ETOH   denies                         Other denies illicit drug use    FAMILY HISTORY:  Family history of cancer (Mother)  Family history of breast cancer (Sibling): in sister at the age of 59  :  No known cardiovacular family hisotry     Review Of Systems:     All ROS are negative except per HPI       Allergies    acyclovir (Anaphylaxis)  Bactrim (Anaphylaxis)    Intolerances          PHYSICAL EXAM    ICU Vital Signs Last 24 Hrs  T(C): 36.1 (30 Jul 2020 13:50), Max: 36.1 (30 Jul 2020 13:50)  T(F): 96.9 (30 Jul 2020 13:50), Max: 96.9 (30 Jul 2020 13:50)  HR: 100 (30 Jul 2020 14:35) (100 - 124)  BP: 152/79 (30 Jul 2020 14:35) (152/79 - 185/124)  BP(mean): --  ABP: --  ABP(mean): --  RR: 24 (30 Jul 2020 14:35) (24 - 26)  SpO2: 95% (30 Jul 2020 14:35) (92% - 97%)      CONSTITUTIONAL:  NAD    ENT:   Airway patent,   Mouth with normal mucosa.   No thrush    EYES:   pupils equal,   round and reactive to light.    CARDIAC:   Normal rate,   Regular rhythm.    Heart sounds S1, S2.   No edema    Vascular:   normal systolic impulse  no bruits    RESPIRATORY:   No wheezing   Normal chest expansion  No use of accessory muscles    GASTROINTESTINAL:  Abdomen soft   Non-tender,   No guarding,   + BS    GENITOURINARY  normal genitalia for sex  no edema    MUSCULOSKELETAL:   Range of motion is not limited,  No clubbing, cyanosis    NEUROLOGICAL:   Aphasic  Right sided weakness    SKIN:   Skin normal color for race,   Warm and dry  No evidence of rash.    PSYCHIATRIC:   Aphasic  Right sided weakness    HEME LYMPH:   No cervical  lymphadenopathy.  No inguinal lymphadenopathy              LABS:                          15.8   6.24  )-----------( 192      ( 30 Jul 2020 14:04 )             47.0                                               07-30    142  |  101  |  21<H>  ----------------------------<  235<H>  3.6   |  25  |  1.1    Ca    9.5      30 Jul 2020 14:04    TPro  7.1  /  Alb  4.9  /  TBili  1.9<H>  /  DBili  x   /  AST  26  /  ALT  24  /  AlkPhos  51  07-30      PT/INR - ( 30 Jul 2020 14:04 )   PT: 11.90 sec;   INR: 1.03 ratio         PTT - ( 30 Jul 2020 14:04 )  PTT:29.8 sec                                           CARDIAC MARKERS ( 30 Jul 2020 14:04 )  x     / <0.01 ng/mL / x     / x     / x                                                LIVER FUNCTIONS - ( 30 Jul 2020 14:04 )  Alb: 4.9 g/dL / Pro: 7.1 g/dL / ALK PHOS: 51 U/L / ALT: 24 U/L / AST: 26 U/L / GGT: x                                                                                                                                    MEDICATIONS  (STANDING):  alteplase    Bolus 9 milliGRAM(s) IV Bolus Once  alteplase    IVPB 81 milliGRAM(s) IV Intermittent Once    MEDICATIONS  (PRN): Patient is a 60y old  Male who presents with a chief complaint of aphasia    HPI:  60 y.o. m w/ PMHx of congenital heart disease s/p repair, HTN, DLD, RAA thrombus (was on xarelto and resolved on last ANITA), COPD, exsmoker, dm htn, hld, sinus bradycardia s/p PPM, marginal cell lymphoma presents p/w expressive aphasia and right sided weakness. In ER, stroke code was called. CT H was negative. TPA was given. ct perfusion done called for MICU    LKW: 12pm  TPA given: 2:30pm    PAST MEDICAL & SURGICAL HISTORY:  Pacemaker  Non Hodgkin's lymphoma  Chronic obstructive pulmonary disease, unspecified COPD type  DM (diabetes mellitus)  High cholesterol  HTN (hypertension)  S/P transesophageal echocardiogram (ANITA)  Artificial cardiac pacemaker      SOCIAL HX:   Smoking   former smoker                   ETOH   denies                         Other denies illicit drug use    FAMILY HISTORY:  Family history of cancer (Mother)  Family history of breast cancer (Sibling): in sister at the age of 59  :  No known cardiovacular family hisotry     Review Of Systems:     All ROS are negative except per HPI       Allergies    acyclovir (Anaphylaxis)  Bactrim (Anaphylaxis)    Intolerances          PHYSICAL EXAM    ICU Vital Signs Last 24 Hrs  T(C): 36.1 (30 Jul 2020 13:50), Max: 36.1 (30 Jul 2020 13:50)  T(F): 96.9 (30 Jul 2020 13:50), Max: 96.9 (30 Jul 2020 13:50)  HR: 100 (30 Jul 2020 14:35) (100 - 124)  BP: 152/79 (30 Jul 2020 14:35) (152/79 - 185/124)  RR: 24 (30 Jul 2020 14:35) (24 - 26)  SpO2: 95% (30 Jul 2020 14:35) (92% - 97%)      CONSTITUTIONAL:  NAD    ENT:   Airway patent,   Mouth with normal mucosa.   No thrush    EYES:   pupils equal,   round and reactive to light.    CARDIAC:     christina 3/6      RESPIRATORY:   No wheezing   Normal chest expansion  No use of accessory muscles    GASTROINTESTINAL:  Abdomen soft   Non-tender,   No guarding,   + BS      MUSCULOSKELETAL:   Range of motion is not limited,  No clubbing, cyanosis    NEUROLOGICAL:   Aphasic  Right sided weakness  follows commands    SKIN:   Skin normal color for race,   Warm and dry  No evidence of rash.                  LABS:                          15.8   6.24  )-----------( 192      ( 30 Jul 2020 14:04 )             47.0                                               07-30    142  |  101  |  21<H>  ----------------------------<  235<H>  3.6   |  25  |  1.1    Ca    9.5      30 Jul 2020 14:04    TPro  7.1  /  Alb  4.9  /  TBili  1.9<H>  /  DBili  x   /  AST  26  /  ALT  24  /  AlkPhos  51  07-30      PT/INR - ( 30 Jul 2020 14:04 )   PT: 11.90 sec;   INR: 1.03 ratio         PTT - ( 30 Jul 2020 14:04 )  PTT:29.8 sec                                           CARDIAC MARKERS ( 30 Jul 2020 14:04 )  x     / <0.01 ng/mL / x     / x     / x                                                LIVER FUNCTIONS - ( 30 Jul 2020 14:04 )  Alb: 4.9 g/dL / Pro: 7.1 g/dL / ALK PHOS: 51 U/L / ALT: 24 U/L / AST: 26 U/L / GGT: x                                                                                                                                    MEDICATIONS  (STANDING):  alteplase    Bolus 9 milliGRAM(s) IV Bolus Once  alteplase    IVPB 81 milliGRAM(s) IV Intermittent Once    MEDICATIONS  (PRN):

## 2020-07-31 LAB
A1C WITH ESTIMATED AVERAGE GLUCOSE RESULT: 7.2 % — HIGH (ref 4–5.6)
ALBUMIN SERPL ELPH-MCNC: 4.5 G/DL — SIGNIFICANT CHANGE UP (ref 3.5–5.2)
ALP SERPL-CCNC: 48 U/L — SIGNIFICANT CHANGE UP (ref 30–115)
ALT FLD-CCNC: 20 U/L — SIGNIFICANT CHANGE UP (ref 0–41)
ANION GAP SERPL CALC-SCNC: 12 MMOL/L — SIGNIFICANT CHANGE UP (ref 7–14)
APTT BLD: 31.3 SEC — SIGNIFICANT CHANGE UP (ref 27–39.2)
AST SERPL-CCNC: 21 U/L — SIGNIFICANT CHANGE UP (ref 0–41)
BASOPHILS # BLD AUTO: 0 K/UL — SIGNIFICANT CHANGE UP (ref 0–0.2)
BASOPHILS NFR BLD AUTO: 0 % — SIGNIFICANT CHANGE UP (ref 0–1)
BILIRUB SERPL-MCNC: 1.6 MG/DL — HIGH (ref 0.2–1.2)
BUN SERPL-MCNC: 15 MG/DL — SIGNIFICANT CHANGE UP (ref 10–20)
CALCIUM SERPL-MCNC: 9.9 MG/DL — SIGNIFICANT CHANGE UP (ref 8.5–10.1)
CHLORIDE SERPL-SCNC: 102 MMOL/L — SIGNIFICANT CHANGE UP (ref 98–110)
CHOLEST SERPL-MCNC: 166 MG/DL — SIGNIFICANT CHANGE UP (ref 100–200)
CO2 SERPL-SCNC: 29 MMOL/L — SIGNIFICANT CHANGE UP (ref 17–32)
CREAT SERPL-MCNC: 1 MG/DL — SIGNIFICANT CHANGE UP (ref 0.7–1.5)
EOSINOPHIL # BLD AUTO: 0.22 K/UL — SIGNIFICANT CHANGE UP (ref 0–0.7)
EOSINOPHIL NFR BLD AUTO: 4.4 % — SIGNIFICANT CHANGE UP (ref 0–8)
ESTIMATED AVERAGE GLUCOSE: 160 MG/DL — HIGH (ref 68–114)
GAS PNL BLDA: SIGNIFICANT CHANGE UP
GIANT PLATELETS BLD QL SMEAR: PRESENT — SIGNIFICANT CHANGE UP
GLUCOSE BLDC GLUCOMTR-MCNC: 123 MG/DL — HIGH (ref 70–99)
GLUCOSE BLDC GLUCOMTR-MCNC: 152 MG/DL — HIGH (ref 70–99)
GLUCOSE BLDC GLUCOMTR-MCNC: 174 MG/DL — HIGH (ref 70–99)
GLUCOSE SERPL-MCNC: 140 MG/DL — HIGH (ref 70–99)
HCT VFR BLD CALC: 45.9 % — SIGNIFICANT CHANGE UP (ref 42–52)
HDLC SERPL-MCNC: 52 MG/DL — SIGNIFICANT CHANGE UP
HGB BLD-MCNC: 15.3 G/DL — SIGNIFICANT CHANGE UP (ref 14–18)
INR BLD: 1.1 RATIO — SIGNIFICANT CHANGE UP (ref 0.65–1.3)
LIPID PNL WITH DIRECT LDL SERPL: 92 MG/DL — SIGNIFICANT CHANGE UP (ref 4–129)
LYMPHOCYTES # BLD AUTO: 0.53 K/UL — LOW (ref 1.2–3.4)
LYMPHOCYTES # BLD AUTO: 10.5 % — LOW (ref 20.5–51.1)
MANUAL SMEAR VERIFICATION: SIGNIFICANT CHANGE UP
MCHC RBC-ENTMCNC: 29.7 PG — SIGNIFICANT CHANGE UP (ref 27–31)
MCHC RBC-ENTMCNC: 33.3 G/DL — SIGNIFICANT CHANGE UP (ref 32–37)
MCV RBC AUTO: 89.1 FL — SIGNIFICANT CHANGE UP (ref 80–94)
MONOCYTES # BLD AUTO: 0.4 K/UL — SIGNIFICANT CHANGE UP (ref 0.1–0.6)
MONOCYTES NFR BLD AUTO: 7.9 % — SIGNIFICANT CHANGE UP (ref 1.7–9.3)
NEUTROPHILS # BLD AUTO: 3.88 K/UL — SIGNIFICANT CHANGE UP (ref 1.4–6.5)
NEUTROPHILS NFR BLD AUTO: 77.2 % — HIGH (ref 42.2–75.2)
PLAT MORPH BLD: NORMAL — SIGNIFICANT CHANGE UP
PLATELET # BLD AUTO: 173 K/UL — SIGNIFICANT CHANGE UP (ref 130–400)
POIKILOCYTOSIS BLD QL AUTO: SLIGHT — SIGNIFICANT CHANGE UP
POTASSIUM SERPL-MCNC: 4.1 MMOL/L — SIGNIFICANT CHANGE UP (ref 3.5–5)
POTASSIUM SERPL-SCNC: 4.1 MMOL/L — SIGNIFICANT CHANGE UP (ref 3.5–5)
PROT SERPL-MCNC: 6.5 G/DL — SIGNIFICANT CHANGE UP (ref 6–8)
PROTHROM AB SERPL-ACNC: 12.7 SEC — SIGNIFICANT CHANGE UP (ref 9.95–12.87)
RBC # BLD: 5.15 M/UL — SIGNIFICANT CHANGE UP (ref 4.7–6.1)
RBC # FLD: 12.8 % — SIGNIFICANT CHANGE UP (ref 11.5–14.5)
RBC BLD AUTO: NORMAL — SIGNIFICANT CHANGE UP
SARS-COV-2 RNA SPEC QL NAA+PROBE: SIGNIFICANT CHANGE UP
SODIUM SERPL-SCNC: 143 MMOL/L — SIGNIFICANT CHANGE UP (ref 135–146)
TOTAL CHOLESTEROL/HDL RATIO MEASUREMENT: 3.2 RATIO — LOW (ref 4–5.5)
TRIGL SERPL-MCNC: 103 MG/DL — SIGNIFICANT CHANGE UP (ref 10–149)
WBC # BLD: 5.03 K/UL — SIGNIFICANT CHANGE UP (ref 4.8–10.8)
WBC # FLD AUTO: 5.03 K/UL — SIGNIFICANT CHANGE UP (ref 4.8–10.8)

## 2020-07-31 PROCEDURE — 70450 CT HEAD/BRAIN W/O DYE: CPT | Mod: 26

## 2020-07-31 PROCEDURE — 74176 CT ABD & PELVIS W/O CONTRAST: CPT | Mod: 26

## 2020-07-31 PROCEDURE — 93306 TTE W/DOPPLER COMPLETE: CPT | Mod: 26

## 2020-07-31 PROCEDURE — 99291 CRITICAL CARE FIRST HOUR: CPT

## 2020-07-31 PROCEDURE — 76705 ECHO EXAM OF ABDOMEN: CPT | Mod: 26

## 2020-07-31 RX ORDER — METRONIDAZOLE 500 MG
TABLET ORAL
Refills: 0 | Status: DISCONTINUED | OUTPATIENT
Start: 2020-07-31 | End: 2020-07-31

## 2020-07-31 RX ORDER — SODIUM CHLORIDE 9 MG/ML
1000 INJECTION INTRAMUSCULAR; INTRAVENOUS; SUBCUTANEOUS
Refills: 0 | Status: DISCONTINUED | OUTPATIENT
Start: 2020-07-31 | End: 2020-08-01

## 2020-07-31 RX ORDER — ASPIRIN/CALCIUM CARB/MAGNESIUM 324 MG
81 TABLET ORAL DAILY
Refills: 0 | Status: DISCONTINUED | OUTPATIENT
Start: 2020-07-31 | End: 2020-08-05

## 2020-07-31 RX ORDER — METRONIDAZOLE 500 MG
500 TABLET ORAL EVERY 8 HOURS
Refills: 0 | Status: DISCONTINUED | OUTPATIENT
Start: 2020-07-31 | End: 2020-07-31

## 2020-07-31 RX ORDER — METRONIDAZOLE 500 MG
500 TABLET ORAL ONCE
Refills: 0 | Status: COMPLETED | OUTPATIENT
Start: 2020-07-31 | End: 2020-07-31

## 2020-07-31 RX ORDER — SODIUM CHLORIDE 9 MG/ML
250 INJECTION INTRAMUSCULAR; INTRAVENOUS; SUBCUTANEOUS ONCE
Refills: 0 | Status: COMPLETED | OUTPATIENT
Start: 2020-07-31 | End: 2020-07-31

## 2020-07-31 RX ORDER — PANTOPRAZOLE SODIUM 20 MG/1
40 TABLET, DELAYED RELEASE ORAL DAILY
Refills: 0 | Status: DISCONTINUED | OUTPATIENT
Start: 2020-07-31 | End: 2020-08-02

## 2020-07-31 RX ADMIN — Medication 10 MILLIGRAM(S): at 02:07

## 2020-07-31 RX ADMIN — Medication 100 MILLIGRAM(S): at 11:13

## 2020-07-31 RX ADMIN — Medication 2: at 11:23

## 2020-07-31 RX ADMIN — TIOTROPIUM BROMIDE 1 CAPSULE(S): 18 CAPSULE ORAL; RESPIRATORY (INHALATION) at 08:59

## 2020-07-31 RX ADMIN — Medication 100 MILLIGRAM(S): at 02:00

## 2020-07-31 RX ADMIN — CHLORHEXIDINE GLUCONATE 1 APPLICATION(S): 213 SOLUTION TOPICAL at 05:26

## 2020-07-31 RX ADMIN — PANTOPRAZOLE SODIUM 40 MILLIGRAM(S): 20 TABLET, DELAYED RELEASE ORAL at 11:14

## 2020-07-31 RX ADMIN — BUDESONIDE AND FORMOTEROL FUMARATE DIHYDRATE 2 PUFF(S): 160; 4.5 AEROSOL RESPIRATORY (INHALATION) at 11:12

## 2020-07-31 RX ADMIN — SODIUM CHLORIDE 75 MILLILITER(S): 9 INJECTION INTRAMUSCULAR; INTRAVENOUS; SUBCUTANEOUS at 11:12

## 2020-07-31 RX ADMIN — SODIUM CHLORIDE 3000 MILLILITER(S): 9 INJECTION INTRAMUSCULAR; INTRAVENOUS; SUBCUTANEOUS at 23:55

## 2020-07-31 RX ADMIN — Medication 2: at 06:08

## 2020-07-31 NOTE — PROGRESS NOTE ADULT - SUBJECTIVE AND OBJECTIVE BOX
Patient is a 60y old  Male who presents with a chief complaint of AMS (31 Jul 2020 04:40)        Over Night Events:  NO events overnight.  Lethargic RUQ pain         ROS:     All ROS are negative except HPI         PHYSICAL EXAM    ICU Vital Signs Last 24 Hrs  T(C): 36.2 (31 Jul 2020 04:00), Max: 36.7 (30 Jul 2020 20:00)  T(F): 97.2 (31 Jul 2020 04:00), Max: 98 (30 Jul 2020 20:00)  HR: 72 (31 Jul 2020 08:00) (70 - 124)  BP: 122/77 (31 Jul 2020 08:00) (107/68 - 196/70)  BP(mean): 0 (31 Jul 2020 08:00) (0 - 121)  ABP: --  ABP(mean): --  RR: 29 (31 Jul 2020 08:00) (13 - 43)  SpO2: 99% (31 Jul 2020 08:00) (92% - 100%)      CONSTITUTIONAL:  Well nourished.  NAD.  Lethargic     ENT:   Airway patent,   Mouth with normal mucosa.   No thrush    EYES:   Pupils equal,   Round and reactive to light.    CARDIAC:   Normal rate,   Regular rhythm.    No edema      Vascular:  Normal systolic impulse  No Carotid bruits    RESPIRATORY:   No wheezing  Bilateral BS  Normal chest expansion  Not tachypneic,  No use of accessory muscles    GASTROINTESTINAL:  Abdomen soft,   Non-tender,   No guarding,   + BS    MUSCULOSKELETAL:   Range of motion is not limited,  No clubbing, cyanosis    NEUROLOGICAL:   Lethargic   right sided weakness  Follows simple commands     SKIN:   Skin normal color for race,   Warm and dry and intact.   No evidence of rash.    PSYCHIATRIC:   No apparent risk to self or others.    HEMATOLOGICAL:  No cervical  lymphadenopathy.  no inguinal lymphadenopathy      07-30-20 @ 07:01  -  07-31-20 @ 07:00  --------------------------------------------------------  IN:    IV PiggyBack: 100 mL    Sodium Chloride 0.9% IV Bolus: 250 mL  Total IN: 350 mL    OUT:    Indwelling Catheter - Urethral: 1895 mL  Total OUT: 1895 mL    Total NET: -1545 mL          LABS:                            15.3   5.03  )-----------( 173      ( 31 Jul 2020 04:23 )             45.9                                               07-31    143  |  102  |  15  ----------------------------<  140<H>  4.1   |  29  |  1.0    Ca    9.9      31 Jul 2020 04:23    TPro  6.5  /  Alb  4.5  /  TBili  1.6<H>  /  DBili  x   /  AST  21  /  ALT  20  /  AlkPhos  48  07-31      PT/INR - ( 31 Jul 2020 04:23 )   PT: 12.70 sec;   INR: 1.10 ratio         PTT - ( 31 Jul 2020 04:23 )  PTT:31.3 sec                                           CARDIAC MARKERS ( 30 Jul 2020 14:04 )  x     / <0.01 ng/mL / x     / x     / x                                                LIVER FUNCTIONS - ( 31 Jul 2020 04:23 )  Alb: 4.5 g/dL / Pro: 6.5 g/dL / ALK PHOS: 48 U/L / ALT: 20 U/L / AST: 21 U/L / GGT: x                                                                                                                                       MEDICATIONS  (STANDING):  atorvastatin 40 milliGRAM(s) Oral at bedtime  budesonide 160 MICROgram(s)/formoterol 4.5 MICROgram(s) Inhaler 2 Puff(s) Inhalation two times a day  chlorhexidine 4% Liquid 1 Application(s) Topical <User Schedule>  dextrose 5%. 1000 milliLiter(s) (50 mL/Hr) IV Continuous <Continuous>  dextrose 50% Injectable 12.5 Gram(s) IV Push once  dextrose 50% Injectable 25 Gram(s) IV Push once  dextrose 50% Injectable 25 Gram(s) IV Push once  esMOLOL  Infusion 50 MICROgram(s)/kG/Min (33 mL/Hr) IV Continuous <Continuous>  insulin lispro (HumaLOG) corrective regimen sliding scale   SubCutaneous three times a day before meals  metroNIDAZOLE  IVPB      metroNIDAZOLE  IVPB 500 milliGRAM(s) IV Intermittent every 8 hours  pantoprazole  Injectable 40 milliGRAM(s) IV Push daily  tiotropium 18 MICROgram(s) Capsule 1 Capsule(s) Inhalation daily    MEDICATIONS  (PRN):  acetaminophen  Suppository .. 650 milliGRAM(s) Rectal every 6 hours PRN Severe Pain (7 - 10)  albuterol/ipratropium for Nebulization. 3 milliLiter(s) Nebulizer every 6 hours PRN Shortness of Breath and/or Wheezing  bisacodyl Suppository 10 milliGRAM(s) Rectal daily PRN Constipation  dextrose 40% Gel 15 Gram(s) Oral once PRN Blood Glucose LESS THAN 70 milliGRAM(s)/deciliter  glucagon  Injectable 1 milliGRAM(s) IntraMuscular once PRN Glucose LESS THAN 70 milligrams/deciliter      New X-rays reviewed:                                                                                  ECHO    CXR interpreted by me:  No done

## 2020-07-31 NOTE — OCCUPATIONAL THERAPY INITIAL EVALUATION ADULT - SPECIFY REASON(S)
attempted to see pt for OT evaluation, upon initiation of evaluation, EEG tech arrived to place VEEG leads.  will hold OT evaluation until pt cleared following VEEG NAUSEA/PAIN

## 2020-07-31 NOTE — CHART NOTE - NSCHARTNOTEFT_GEN_A_CORE
Suggestions:  -CTH 24hr post tPA (tPA given yesterday at 2:35pm; please repeat CTH today at 2:35pm). May repeat sooner IF acute change in neuro status  -Obtain MRI brain   -24-48hr video EGG for continued symptoms (nonverbal, right sided weakness, etc...)  -May continue ASA only if 24hr post tPA repeat CTH is negative   -C/w high dose statin  -Continue to keep SBP goal < 180/110  -C/w order for TTE  -PT/OT/SLP eval and tx    Bruna August, NP  [phone]

## 2020-07-31 NOTE — PROGRESS NOTE ADULT - ASSESSMENT
ASSESSMENT & PLAN    Patient is a 60y old Male who presents with right sided weakness (31 Jul 2020 08:57). Patient also had AMS and expressive aphasia  Currently admitted to the ICU  with the primary diagnosis of stroke.     Today is hospital day 1d, and this morning he is in NAD and reports No overnight events.       # Stroke s/p TPA  - expressive aphasia and right sided weakness  - CT showed no acute pathology  - Neuro checks q1  - repeat ct negative for acute cranial pathology  -May continue ASA only if 24hr post tPA repeat CTH is negative   -C/w high dose statin  -Continue to keep SBP goal < 180/110  -PT/OT/SLP eval and tx      # HTN  - Will start esmolol drip to keep -160  - Home meds held    # DM  - hold oral meds  - check fs  - start insulin if glu>180  - start and adjust insulin s/s prn    # Aggressive nisreen marginal zone lymphoma  - maintenance on Rituxan, last dose 12/31  - Per family he has appt with  in August for PET scan    # COPD not on home O2  - Continue home meds  - Duonebs PRN      DVT ppx seq  Gi ppx: protonix  full code  dispo: acute,

## 2020-07-31 NOTE — SWALLOW BEDSIDE ASSESSMENT ADULT - COMMENTS
pt received lethargic, O2 via NC. pt responsive to verbal stim. +aphasic. able to follow simple step commands. family present bedside, report no hx of dysphagia. pt is currently not a candidate for PO diet 2/2 +lethargy.

## 2020-07-31 NOTE — PROGRESS NOTE ADULT - SUBJECTIVE AND OBJECTIVE BOX
LONG GEE 60y Male  MRN#: [id number]   CODE STATUS:________    HPI   59 M  with a pmh of congenital heart disease s/p repair, HTN, DLD, RAA thrombus (was on xarelto and resolved on last ANITA), COPD, dm, sinus bradycardia s/p PPM, marginal cell lymphoma on maintenance rituxan (last chemo was december 21) presents with expressive aphasia and right sided weakness. He called his nephew about 12:30 this afternoon because he was feeling weak. EMS was called; by the time he came to ED he was unable to articulate any words and was only moaning. As per family he was completely fine at 11AM, and had reported no new complaints.    In the ED: T 96.4, , 185/124, 97% on RA. Labs were stable since last admission. Code stroke was called. CT head showed no acute changes, but was limited due to motion artifact. He received TPA and then CT angiogram; read is pending.      SUBJECTIVE  Patient is a 60y old Male who presents with right sided weakness (31 Jul 2020 08:57). Patient also had AMS and expressive aphasia  Currently admitted to the ICU  with the primary diagnosis of stroke.     Today is hospital day 1d, and this morning he is in NAD and reports No overnight events.     Present Today:           Mckenna Catheter ()No/ ()Yes? Indication:          Central Line ()No/ ()Yes? Indication:          IV Fluids ()No/ ()Yes? Type:  Rate:  Indication:      OBJECTIVE  PAST MEDICAL & SURGICAL HISTORY  Pacemaker  Non Hodgkin's lymphoma  Chronic obstructive pulmonary disease, unspecified COPD type  DM (diabetes mellitus)  High cholesterol  HTN (hypertension)  S/P transesophageal echocardiogram (ANITA)  Artificial cardiac pacemaker    ALLERGIES:  acyclovir (Anaphylaxis)  Bactrim (Anaphylaxis)    MEDICATIONS:  STANDING MEDICATIONS  atorvastatin 40 milliGRAM(s) Oral at bedtime  budesonide 160 MICROgram(s)/formoterol 4.5 MICROgram(s) Inhaler 2 Puff(s) Inhalation two times a day  chlorhexidine 4% Liquid 1 Application(s) Topical <User Schedule>  dextrose 5%. 1000 milliLiter(s) IV Continuous <Continuous>  dextrose 50% Injectable 12.5 Gram(s) IV Push once  dextrose 50% Injectable 25 Gram(s) IV Push once  dextrose 50% Injectable 25 Gram(s) IV Push once  esMOLOL  Infusion 50 MICROgram(s)/kG/Min IV Continuous <Continuous>  insulin lispro (HumaLOG) corrective regimen sliding scale   SubCutaneous three times a day before meals  pantoprazole  Injectable 40 milliGRAM(s) IV Push daily  sodium chloride 0.9%. 1000 milliLiter(s) IV Continuous <Continuous>  tiotropium 18 MICROgram(s) Capsule 1 Capsule(s) Inhalation daily    PRN MEDICATIONS  acetaminophen  Suppository .. 650 milliGRAM(s) Rectal every 6 hours PRN  albuterol/ipratropium for Nebulization. 3 milliLiter(s) Nebulizer every 6 hours PRN  bisacodyl Suppository 10 milliGRAM(s) Rectal daily PRN  dextrose 40% Gel 15 Gram(s) Oral once PRN  glucagon  Injectable 1 milliGRAM(s) IntraMuscular once PRN      VITAL SIGNS: Last 24 Hours  T(C): 36.5 (31 Jul 2020 12:00), Max: 36.7 (30 Jul 2020 20:00)  T(F): 97.7 (31 Jul 2020 12:00), Max: 98 (30 Jul 2020 20:00)  HR: 68 (31 Jul 2020 15:00) (68 - 90)  BP: 163/86 (31 Jul 2020 15:00) (107/68 - 163/86)  BP(mean): 0 (31 Jul 2020 15:00) (0 - 121)  RR: 28 (31 Jul 2020 15:00) (13 - 43)  SpO2: 96% (31 Jul 2020 15:00) (94% - 100%)    LABS:                        15.3   5.03  )-----------( 173      ( 31 Jul 2020 04:23 )             45.9     07-31    143  |  102  |  15  ----------------------------<  140<H>  4.1   |  29  |  1.0    Ca    9.9      31 Jul 2020 04:23    TPro  6.5  /  Alb  4.5  /  TBili  1.6<H>  /  DBili  x   /  AST  21  /  ALT  20  /  AlkPhos  48  07-31    PT/INR - ( 31 Jul 2020 04:23 )   PT: 12.70 sec;   INR: 1.10 ratio         PTT - ( 31 Jul 2020 04:23 )  PTT:31.3 sec    ABG - ( 31 Jul 2020 15:28 )  pH, Arterial: 7.48  pH, Blood: x     /  pCO2: 38    /  pO2: 72    / HCO3: 28    / Base Excess: 4.7   /  SaO2: 96                Lactate, Blood: 1.6 mmol/L (07-30-20 @ 20:39)      CARDIAC MARKERS ( 30 Jul 2020 14:04 )  x     / <0.01 ng/mL / x     / x     / x          RADIOLOGY:      PHYSICAL EXAM:    GENERAL: Lying in bed moaning  	HEAD:  Atraumatic, Normocephalic  	ENT: Moist mucous membranes  	CHEST/LUNG: Clear to auscultation bilaterally; No rales, rhonchi, wheezing, or rubs. Unlabored respirations  	HEART: Regular rate and rhythm; No murmurs, rubs, or gallops  	ABDOMEN: Bowel sounds present; Soft, Nontender, Nondistended. No hepatomegally  	EXTREMITIES:  2+ Peripheral Pulses, brisk capillary refill. No clubbing, cyanosis, or edema  NERVOUS SYSTEM:  Alert. Follows commands. 4/5 strength on right side

## 2020-07-31 NOTE — PROGRESS NOTE ADULT - ASSESSMENT
IMPRESSION:    CVA s/p TPA  HO lymphoma  Probable CARMEN       PLAN:    CNS: avoid CNS depressants. Neuro checks.  FU with Neuro. Repeat CTH now     HEENT: Oral care    PULMONARY:  HOB @ 45 degrees.  Aspiration precautions. Target SpO2>94%. End tidal CO2.  Might need ABG.  CXR     CARDIOVASCULAR: FU ECHO. Lipid panel. A1c. BP control.     GI: GI prophylaxis.  S&S eval. CTA NC     RENAL:  Follow up lytes.  Correct as needed.     INFECTIOUS DISEASE: No abx.     HEMATOLOGICAL:  DVT prophylaxis.    ENDOCRINE:  Follow up FS.      MUSCULOSKELETAL: bedrest    MICU monitoring

## 2020-08-01 LAB
ANION GAP SERPL CALC-SCNC: 11 MMOL/L — SIGNIFICANT CHANGE UP (ref 7–14)
BUN SERPL-MCNC: 15 MG/DL — SIGNIFICANT CHANGE UP (ref 10–20)
CALCIUM SERPL-MCNC: 9.3 MG/DL — SIGNIFICANT CHANGE UP (ref 8.5–10.1)
CHLORIDE SERPL-SCNC: 104 MMOL/L — SIGNIFICANT CHANGE UP (ref 98–110)
CO2 SERPL-SCNC: 28 MMOL/L — SIGNIFICANT CHANGE UP (ref 17–32)
CREAT SERPL-MCNC: 1.1 MG/DL — SIGNIFICANT CHANGE UP (ref 0.7–1.5)
GLUCOSE BLDC GLUCOMTR-MCNC: 118 MG/DL — HIGH (ref 70–99)
GLUCOSE BLDC GLUCOMTR-MCNC: 124 MG/DL — HIGH (ref 70–99)
GLUCOSE BLDC GLUCOMTR-MCNC: 124 MG/DL — HIGH (ref 70–99)
GLUCOSE SERPL-MCNC: 115 MG/DL — HIGH (ref 70–99)
HCT VFR BLD CALC: 43.5 % — SIGNIFICANT CHANGE UP (ref 42–52)
HGB BLD-MCNC: 14 G/DL — SIGNIFICANT CHANGE UP (ref 14–18)
MAGNESIUM SERPL-MCNC: 2 MG/DL — SIGNIFICANT CHANGE UP (ref 1.8–2.4)
MCHC RBC-ENTMCNC: 29.5 PG — SIGNIFICANT CHANGE UP (ref 27–31)
MCHC RBC-ENTMCNC: 32.2 G/DL — SIGNIFICANT CHANGE UP (ref 32–37)
MCV RBC AUTO: 91.6 FL — SIGNIFICANT CHANGE UP (ref 80–94)
NRBC # BLD: 0 /100 WBCS — SIGNIFICANT CHANGE UP (ref 0–0)
PHOSPHATE SERPL-MCNC: 3.5 MG/DL — SIGNIFICANT CHANGE UP (ref 2.1–4.9)
PLATELET # BLD AUTO: 158 K/UL — SIGNIFICANT CHANGE UP (ref 130–400)
POTASSIUM SERPL-MCNC: 4.3 MMOL/L — SIGNIFICANT CHANGE UP (ref 3.5–5)
POTASSIUM SERPL-SCNC: 4.3 MMOL/L — SIGNIFICANT CHANGE UP (ref 3.5–5)
RBC # BLD: 4.75 M/UL — SIGNIFICANT CHANGE UP (ref 4.7–6.1)
RBC # FLD: 13.2 % — SIGNIFICANT CHANGE UP (ref 11.5–14.5)
SODIUM SERPL-SCNC: 143 MMOL/L — SIGNIFICANT CHANGE UP (ref 135–146)
WBC # BLD: 4.05 K/UL — LOW (ref 4.8–10.8)
WBC # FLD AUTO: 4.05 K/UL — LOW (ref 4.8–10.8)

## 2020-08-01 PROCEDURE — 99232 SBSQ HOSP IP/OBS MODERATE 35: CPT

## 2020-08-01 PROCEDURE — 95720 EEG PHY/QHP EA INCR W/VEEG: CPT

## 2020-08-01 PROCEDURE — 71045 X-RAY EXAM CHEST 1 VIEW: CPT | Mod: 26

## 2020-08-01 RX ORDER — POLYETHYLENE GLYCOL 3350 17 G/17G
17 POWDER, FOR SOLUTION ORAL
Refills: 0 | Status: DISCONTINUED | OUTPATIENT
Start: 2020-08-01 | End: 2020-08-02

## 2020-08-01 RX ADMIN — CHLORHEXIDINE GLUCONATE 1 APPLICATION(S): 213 SOLUTION TOPICAL at 06:54

## 2020-08-01 RX ADMIN — PANTOPRAZOLE SODIUM 40 MILLIGRAM(S): 20 TABLET, DELAYED RELEASE ORAL at 12:01

## 2020-08-01 RX ADMIN — Medication 81 MILLIGRAM(S): at 12:01

## 2020-08-01 RX ADMIN — ATORVASTATIN CALCIUM 40 MILLIGRAM(S): 80 TABLET, FILM COATED ORAL at 22:40

## 2020-08-01 NOTE — PROGRESS NOTE ADULT - ASSESSMENT
59 M  with a pmh of congenital heart disease s/p repair, HTN, DLD, RAA thrombus (was on xarelto and resolved on last ANITA), COPD, exsmoker, dm htn, hld, sinus bradycardia s/p PPM, marginal cell lymphoma on maintenance rituxan (last chemo was december 21) presents with expressive aphasia and right sided weakness. LWK is 11.30 am then developed acute generalized weakness and expressive aphasia and dysarthria. In ED stroke code was called, NIHSS is 11, his CTH is negative for acute pathology and his was within the window for IV tpa which was given at 14.35 pm.    - Continue ASA  - High intensity statin  - MRI Brain if no contraindication  - Keep BP < 180/110  - Check TTE  - PT/OT/SLP eval and tx  - f/u vEEG

## 2020-08-01 NOTE — SWALLOW BEDSIDE ASSESSMENT ADULT - SWALLOW EVAL: DIAGNOSIS
pt with +lethargy, not a candidate for PO diet 2/2 high risk of aspiration vs penetration
+toleration for thin liquids, puree and regular solids w/o overt s/s penetration/aspiration.

## 2020-08-01 NOTE — SWALLOW BEDSIDE ASSESSMENT ADULT - NS ASR SWALLOW FINDINGS DISCUS
Patient/Family/RN MD Charles/Physician/Nursing
MERCEDES Rincon MD spectra [phone]/Patient/Physician/Nursing

## 2020-08-01 NOTE — PROGRESS NOTE ADULT - SUBJECTIVE AND OBJECTIVE BOX
Neurocritical Care Consult Note:    1. Brief Presentation: expressive aphasia    2. Today's Acute Problems:   - Acute L MCA territory stroke    3. Relevant brief History: 59 M  with a pmh of congenital heart disease s/p repair, HTN, DLD, RAA thrombus (was on xarelto and resolved on last ANITA), COPD, exsmoker, dm htn, hld, sinus bradycardia s/p PPM, marginal cell lymphoma on maintenance rituxan (last chemo was december 21) presents with expressive aphasia and right sided weakness. He called his nephew about 12:30 this afternoon because he was feeling weak. EMS was called; by the time he came to ED he was unable to articulate any words and was only moaning. As per family he was completely fine at 11AM, and had reported no new complaints.    In the ED: T 96.4, , 185/124, 97% on RA. Labs were stable since last admission. Code stroke was called. CT head showed no acute changes, but was limited due to motion artifact. He received TPA, CTA/CTP are negative for signs of acute ischemia.      4-Yesterday's Plan:  - Continue post-tPA protocol  - f/u 24 hr CT head  - Can start ASA if CT negative  - High intensity statin  - MRI Brain if no contraindication  - Keep BP < 180/110  - Check TTE  - PT/OT/SLP eval and tx  - vEEG monitoring      5. Last 24 hour updates: He is no longer aphasic, which is new from my examination yesterday. He is speaking in full sentences.    6. Medications:   atorvastatin 40 milliGRAM(s) Oral at bedtime  budesonide 160 MICROgram(s)/formoterol 4.5 MICROgram(s) Inhaler 2 Puff(s) Inhalation two times a day  chlorhexidine 4% Liquid 1 Application(s) Topical <User Schedule>  dextrose 5%. 1000 milliLiter(s) IV Continuous <Continuous>  dextrose 50% Injectable 12.5 Gram(s) IV Push once  dextrose 50% Injectable 25 Gram(s) IV Push once  dextrose 50% Injectable 25 Gram(s) IV Push once  esMOLOL  Infusion 50 MICROgram(s)/kG/Min IV Continuous <Continuous>  insulin lispro (HumaLOG) corrective regimen sliding scale   SubCutaneous three times a day before meals  pantoprazole    Tablet 40 milliGRAM(s) Oral before breakfast  tiotropium 18 MICROgram(s) Capsule 1 Capsule(s) Inhalation daily      7. Ancillary Management:   Chest PT[ ]   Head of bed >35 [x ]   Out of bed to chair [ ]   PT/OT/SP Eval [ ]   Spirometry[ ]   DVT prophalaxis[ ]    8. Neurologic Examination:  Mentation: Awake, alert. Aphasia is now resolved. Naming intact. No dysarthria. Follows complex commands. No neglect.  Cranial Nerves:  	II – Visual fields full.  	III/IV/VI – Pupils 4 mm. PERRL. EOMI.  	V – Grossly intact sensation.  	VII – R facial palsy.  	VIII – No nystagmus.  	IX/X – Symmetric palate rise. Uvula midline.  	XI – Hemiparetic R SCM.  	XII – Tongue protrusion midline.  Motor: RUE 2+/5, RLE 2-/5. Normal bulk and tone.  Sensory: Grossly intact.  Reflexes: 2+ generally.  Cerebellum: No dysmetria. Gait deferred.    NIH STROKE SCALE  Item	                                                        Score  1 a.	Level of Consciousness	               	0  1 b. LOC Questions	                                2  1 c.	LOC Commands	                               	0  2.	Best Gaze	                                        0  3.	Visual	                                                0  4.	Facial Palsy	                                        1  5 a.	Motor Arm - Left	                                0  5 b.	Motor Arm - Right	                        2  6 a.	Motor Leg - Left	                                0  6 b.	Motor Leg - Right	                                2  7.	Limb Ataxia	                                        0  8.	Sensory	                                                0  9.	Language	                                        2  10.	Dysarthria	                                        2  11.	Extinction and Inattention  	        0  ______________________________________  TOTAL	                                                        11      mRS:  0 No symptoms at all  1 No significant disability despite symptoms; able to carry out all usual duties and activities without assistance  2 Slight disability; unable to carry out all previous activities, but able to look after own affairs  3 Moderate disability; requiring some help, but able to walk without assistance  4 Moderately severe disability; unable to walk without assistance and unable to attend to own bodily needs without assistance  5 Severe disability; bedridden, incontinent and requiring constant nursing care and attention  6 Dead    Last CTH:   < from: CT Head No Cont (07.31.20 @ 11:58) >  IMPRESSION:    Allowing for the limitation from streak artifact, no acute intracranial pathology.    < end of copied text >      Last CTA/MRA:    Last CTP: < from: CT Perfusion w/ Maps w/ IV Cont (07.30.20 @ 15:42) >  EXAM:  CT PERFUSION W MAPS IC          INTERPRETATION:  Clinical History / Reason for exam: Stroke code. Right-sided weakness and altered mental status.    Technique: CT angiogram of the head and neck including perfusion study of the brain.  Contiguous CT axial images of the head and neck were obtained following the bolus intravenous administration of 120 cc Optiray with multiple 3-D and MIP reformats with perfusion mapping performed on a separate 3D workstation ([employer]).    Correlation made with accompanying noncontrast head CT.  CT neck dated 6/25/2019 is reviewed.    Findings:    CTA neck:  There is streak artifact from first pass contrast within the venous structures limiting evaluation.    The visualized aortic arch and great vessel origins are patent.    The common, internal and external carotid arteries appear grossly patent.    The vertebral arteries appear grossly patent, dominant on the right.    CTA brain: The distal segments of the internal carotid arteries are patent.  The anterior and middle cerebral arteries are patent.    The distal vertebral arteries are patent. The basilar artery is patent. The posterior cerebral arteries are patent.    Perfusion:  There is no evidence of focal perfusion deficit to suggest acute cerebral ischemia.    Other:  Mildly prominent lymph nodes in the left supraclavicular region, nonspecific.  8 mm right thyroid nodule, stable.    IMPRESSION:    1.  No gross evidence of major vascular stenosis or occlusion (artifact is noted in the neck). Normal perfusion images.    2.  Mildly prominent left supraclavicular lymph nodes, nonspecific. Follow-up with a chest CT may be obtained.      9. Cardiovascular:   Vital Signs Last 24 Hrs  T(C): 35.9 (30 Jul 2020 17:00), Max: 36.1 (30 Jul 2020 13:50)  T(F): 96.6 (30 Jul 2020 17:00), Max: 96.9 (30 Jul 2020 13:50)  HR: 72 (30 Jul 2020 19:15) (72 - 124)  BP: 133/78 (30 Jul 2020 19:15) (133/78 - 196/70)  BP(mean): 0 (30 Jul 2020 19:15) (0 - 110)  RR: 22 (30 Jul 2020 19:15) (22 - 43)  SpO2: 98% (30 Jul 2020 19:15) (92% - 99%)     Last Echo:    Last EKG: < from: 12 Lead ECG (07.30.20 @ 14:44) >  Atrial-sensed ventricular-paced rhythm  Abnormal ECG    < end of copied text >      CVP   MAP/CPP/SBP target:   CO:      CI:       Enzymes/Trop:    10. Respiratory:   ABG:    VBG:    Chest Xray: < from: Xray Chest 1 View-PORTABLE IMMEDIATE (07.02.20 @ 17:05) >  Impression:      No radiographic evidence of acute cardiopulmonary disease.    < end of copied text >          Peak Pressure/[place] Pressure:    11.GI:    Prophalaxis:     Bowel mvt:     Abd distension:   LIVER FUNCTIONS - ( 30 Jul 2020 14:04 )  Alb: 4.9 g/dL / Pro: 7.1 g/dL / ALK PHOS: 51 U/L / ALT: 24 U/L / AST: 26 U/L / GGT: x             12.Renal/Fluids/Electrolytes:    07-30    142  |  101  |  21<H>  ----------------------------<  235<H>  3.6   |  25  |  1.1    Ca    9.5      30 Jul 2020 14:04    TPro  7.1  /  Alb  4.9  /  TBili  1.9<H>  /  DBili  x   /  AST  26  /  ALT  24  /  AlkPhos  51  07-30      I&O's Detail    30 Jul 2020 07:01  -  30 Jul 2020 19:38  --------------------------------------------------------  IN:  Total IN: 0 mL    OUT:    Indwelling Catheter - Urethral: 200 mL  Total OUT: 200 mL    Total NET: -200 mL          13.ID:   TMax:   Vital Signs Last 24 Hrs  T(C): 35.9 (30 Jul 2020 17:00), Max: 36.1 (30 Jul 2020 13:50)  T(F): 96.6 (30 Jul 2020 17:00), Max: 96.9 (30 Jul 2020 13:50)  HR: 72 (30 Jul 2020 19:15) (72 - 124)  BP: 133/78 (30 Jul 2020 19:15) (133/78 - 196/70)  BP(mean): 0 (30 Jul 2020 19:15) (0 - 110)  RR: 22 (30 Jul 2020 19:15) (22 - 43)  SpO2: 98% (30 Jul 2020 19:15) (92% - 99%)           Lines: Central[] Date inserted: Peripheral[]    14. Hematology:                         15.8   6.24  )-----------( 192      ( 30 Jul 2020 14:04 )             47.0      07-30    142  |  101  |  21<H>  ----------------------------<  235<H>  3.6   |  25  |  1.1    Ca    9.5      30 Jul 2020 14:04    TPro  7.1  /  Alb  4.9  /  TBili  1.9<H>  /  DBili  x   /  AST  26  /  ALT  24  /  AlkPhos  51  07-30     PT/INR - ( 30 Jul 2020 14:04 )   PT: 11.90 sec;   INR: 1.03 ratio         PTT - ( 30 Jul 2020 14:04 )  PTT:29.8 sec    DVT Prophylaxis Lovenox[ ] Heparin[ ] Venodynes[ ] SCD's[ ]

## 2020-08-01 NOTE — PHYSICAL THERAPY INITIAL EVALUATION ADULT - SPECIFY REASON(S)
Pt is currently undergoing VEEG and therapy will be put on hold until test in complete. PT will ffup as appropriate.

## 2020-08-01 NOTE — PROGRESS NOTE ADULT - SUBJECTIVE AND OBJECTIVE BOX
Patient is a 60y old  Male who presents with a chief complaint of AMS (01 Aug 2020 06:00)        Over Night Events:        ROS:     All ROS are negative except HPI         PHYSICAL EXAM    ICU Vital Signs Last 24 Hrs  T(C): 35.9 (02 Aug 2020 04:00), Max: 36.3 (01 Aug 2020 12:00)  T(F): 96.7 (02 Aug 2020 04:00), Max: 97.4 (01 Aug 2020 12:00)  HR: 66 (02 Aug 2020 06:00) (63 - 80)  BP: 165/90 (02 Aug 2020 06:00) (128/82 - 170/94)  BP(mean): 0 (02 Aug 2020 06:00) (0 - 128)  ABP: --  ABP(mean): --  RR: 14 (02 Aug 2020 06:00) (10 - 25)  SpO2: 96% (02 Aug 2020 06:00) (95% - 97%)      CONSTITUTIONAL:  Well nourished.  NAD    ENT:   Airway patent,   Mouth with normal mucosa.   No thrush    EYES:   Pupils equal,   Round and reactive to light.    CARDIAC:   Normal rate,   Regular rhythm.    No edema      Vascular:  Normal systolic impulse  No Carotid bruits    RESPIRATORY:   No wheezing  Bilateral BS  Normal chest expansion  Not tachypneic,  No use of accessory muscles    GASTROINTESTINAL:  Abdomen soft,   Non-tender,   No guarding,   + BS    MUSCULOSKELETAL:   Range of motion is not limited,  No clubbing, cyanosis    NEUROLOGICAL:   Alert and oriented   No motor  deficits.    SKIN:   Skin normal color for race,   Warm and dry and intact.   No evidence of rash.    PSYCHIATRIC:   Normal mood and affect.   No apparent risk to self or others.    HEMATOLOGICAL:  No cervical  lymphadenopathy.  no inguinal lymphadenopathy      07-31-20 @ 07:01  -  08-01-20 @ 07:00  --------------------------------------------------------  IN:    IV PiggyBack: 100 mL    sodium chloride 0.9%: 1650 mL  Total IN: 1750 mL    OUT:    Indwelling Catheter - Urethral: 1878 mL  Total OUT: 1878 mL    Total NET: -128 mL      08-01-20 @ 07:01  -  08-02-20 @ 06:55  --------------------------------------------------------  IN:    sodium chloride 0.9%: 300 mL  Total IN: 300 mL    OUT:    Indwelling Catheter - Urethral: 2278 mL  Total OUT: 2278 mL    Total NET: -1978 mL          LABS:                            14.5   4.22  )-----------( 154      ( 02 Aug 2020 04:30 )             44.7                                               08-02    141  |  103  |  16  ----------------------------<  152<H>  4.1   |  27  |  1.1    Ca    9.4      02 Aug 2020 04:30  Phos  3.5     08-01  Mg     2.0     08-02                                                                                                                                                                                                                       ABG - ( 31 Jul 2020 15:28 )  pH, Arterial: 7.48  pH, Blood: x     /  pCO2: 38    /  pO2: 72    / HCO3: 28    / Base Excess: 4.7   /  SaO2: 96                  MEDICATIONS  (STANDING):  aspirin  chewable 81 milliGRAM(s) Oral daily  atorvastatin 40 milliGRAM(s) Oral at bedtime  budesonide 160 MICROgram(s)/formoterol 4.5 MICROgram(s) Inhaler 2 Puff(s) Inhalation two times a day  chlorhexidine 4% Liquid 1 Application(s) Topical <User Schedule>  dextrose 5%. 1000 milliLiter(s) (50 mL/Hr) IV Continuous <Continuous>  dextrose 50% Injectable 12.5 Gram(s) IV Push once  dextrose 50% Injectable 25 Gram(s) IV Push once  dextrose 50% Injectable 25 Gram(s) IV Push once  insulin lispro (HumaLOG) corrective regimen sliding scale   SubCutaneous three times a day before meals  pantoprazole  Injectable 40 milliGRAM(s) IV Push daily  polyethylene glycol 3350 17 Gram(s) Oral two times a day  tiotropium 18 MICROgram(s) Capsule 1 Capsule(s) Inhalation daily    MEDICATIONS  (PRN):  acetaminophen  Suppository .. 650 milliGRAM(s) Rectal every 6 hours PRN Severe Pain (7 - 10)  albuterol/ipratropium for Nebulization. 3 milliLiter(s) Nebulizer every 6 hours PRN Shortness of Breath and/or Wheezing  bisacodyl Suppository 10 milliGRAM(s) Rectal daily PRN Constipation  dextrose 40% Gel 15 Gram(s) Oral once PRN Blood Glucose LESS THAN 70 milliGRAM(s)/deciliter  glucagon  Injectable 1 milliGRAM(s) IntraMuscular once PRN Glucose LESS THAN 70 milligrams/deciliter      New X-rays reviewed:                                                                                  ECHO    CXR interpreted by me:

## 2020-08-01 NOTE — PROGRESS NOTE ADULT - ATTENDING COMMENTS
Continue to be dysarthric with R HP.  MRI or repeat CT (whichever comes first before 2 PM), continue post tpA stroke protocol until then.   VEEG monitoring. ASA and sq heparin if 24 hour CT/MRI -ve
Patient seen and examined and agree with above except as noted.  Patients history, notes, labs, vitals, imaging and meds reviewed personally.  VEEG was normal  Is s/p TPA so awaiting MRI brain w/o MALDONADO    Plan  1. Can be downgraded from ICU to Stroke unit  2. MRI brain w/o MALDONADO  3. Continue current meds  4. d/c VEEG

## 2020-08-01 NOTE — PROGRESS NOTE ADULT - ASSESSMENT
IMPRESSION:    CVA s/p TPA  HO lymphoma  Probable CARMEN       PLAN:    CNS: Avoid CNS depressants. Neuro checks.  FU with Neuro.  ANTHONY Neuro     HEENT: Oral care    PULMONARY:  HOB @ 45 degrees.  Aspiration precautions. Target SpO2>94%. NIV during sleep     CARDIOVASCULAR: FU ECHO. BP control     GI: GI prophylaxis.  Feeding per speech and swallow      RENAL:  Follow up lytes.  Correct as needed.     INFECTIOUS DISEASE: No abx.     HEMATOLOGICAL:  DVT prophylaxis.    ENDOCRINE:  Follow up FS.      MUSCULOSKELETAL: bedrest    Stroke unit per neuro

## 2020-08-01 NOTE — PROGRESS NOTE ADULT - ASSESSMENT
ASSESSMENT & PLAN    Patient is a 60y old Male who presents with right sided weakness (31 Jul 2020 08:57). Patient also had AMS and expressive aphasia  Currently admitted to the ICU  with the primary diagnosis of stroke.     Today is hospital day 1d, and this morning he is in NAD and reports No overnight events.         1. Acute LMCA  Stroke s/p TPA  - r sided weakness/expressive aphasia   -CT head showed no acute changes, but was limited due to motion artifact. He received TPA, CTA/CTP are negative for signs of acute ischemia.  -repeat CT head  : no evidence of intracranial hemmorhage, mass effect, midline shift. Mild chronic / stable  microvesicular ischemia noted   -CW statin  -restarted aspirin 7/31  -TTE: mild LVH . Normal LV systolic function . EF = 56%  - SBP goal < 180/110  -FU Veeg   -Order MRI brain   -PT/OT after Veeg resulted      2. HTN  - SP esmolol drip   - Home meds held    3.  DM  - Hold oral meds  - Check fs  - Start insulin if glu>180  - Start and adjust insulin s/s prn    4.  Aggressive nisreen marginal zone lymphoma  - Maintenance on Rituxan, last dose 12/31  - op FU     5.  COPD   - Not on home O2  - Continue home meds  - Duonebs PRN    DVT ppx:  seq  Gi ppx: protonix  Code status: full code  Dispo:  [facility] when medically stable ASSESSMENT & PLAN    Patient is a 60y old Male who presents with right sided weakness (31 Jul 2020 08:57). Patient also had AMS and expressive aphasia  Currently admitted to the ICU  with the primary diagnosis of stroke.     Social History (marital status, living situation, occupation, tobacco use, alcohol and drug use, and sexual history): Remote history of smoking  	No alcohol or recreational drugs  Lives at home with family      Today is hospital day 1d, and this morning he is in NAD and reports No overnight events.         1. Acute LMCA  Stroke s/p TPA  - r sided weakness/expressive aphasia   -CT head showed no acute changes, but was limited due to motion artifact. He received TPA, CTA/CTP are negative for signs of acute ischemia.  -repeat CT head  : no evidence of intracranial hemmorhage, mass effect, midline shift. Mild chronic / stable  microvesicular ischemia noted   -CW statin  -restarted aspirin 7/31  -TTE: mild LVH . Normal LV systolic function . EF = 56%  - SBP goal < 180/110  -FU Veeg   -Order MRI brain   -PT/OT after Veeg resulted      2. HTN  - SP esmolol drip   - Home meds held    3.  DM  - Hold oral meds  - Check fs  - Start insulin if glu>180  - Start and adjust insulin s/s prn    4.  Aggressive nisreen marginal zone lymphoma  - Maintenance on Rituxan, last dose 12/31  - op FU     5.  COPD   - Not on home O2  - Continue home meds  - Duonebs PRN    DVT ppx:  seq  Gi ppx: protonix  Code status: full code  Dispo:  [facility] when medically stable ASSESSMENT & PLAN    Patient is a 60y old Male who presents with right sided weakness (31 Jul 2020 08:57). Patient also had AMS and expressive aphasia  Currently admitted to the ICU  with the primary diagnosis of stroke.     Social History (marital status, living situation, occupation, tobacco use, alcohol and drug use, and sexual history): Remote history of smoking  	No alcohol or recreational drugs  Lives at home with family      Today is hospital day 1d, and this morning he is in NAD and  as per neuro this morning (6am) He is no longer aphasic, which is new from yesterday.      1. Acute LMCA  Stroke s/p TPA  - r sided weakness/expressive aphasia   -CT head showed no acute changes, but was limited due to motion artifact. He received TPA, CTA/CTP are negative for signs of acute ischemia.  -repeat CT head  : no evidence of intracranial hemmorhage, mass effect, midline shift. Mild chronic / stable  microvesicular ischemia noted   -CW statin  -restarted aspirin 7/31. Continue  -TTE: mild LVH . Normal LV systolic function . EF = 56%  - SBP goal < 180/110  - High intensity statin  - MRI Brain if no contraindication  - Keep BP < 180/110  - PT/OT  - f/u vEEG    2. HTN  - SP esmolol drip   - Home meds held    3.  DM  - Hold oral meds  - Check fs  - Start insulin if glu>180  - Start and adjust insulin s/s prn    4.  Aggressive nisreen marginal zone lymphoma  - Maintenance on Rituxan, last dose 12/31  - op FU     5.  COPD   - Not on home O2  - Continue home meds  - Duonebs PRN    DVT ppx:  seq  Gi ppx: protonix  Code status: full code  Dispo:  [facility] when medically stable

## 2020-08-01 NOTE — SWALLOW BEDSIDE ASSESSMENT ADULT - NS SPL SWALLOW CLINIC TRIAL FT
+toleration for thin liquids, puree and regular solids w/o overt s/s penetration/aspiration.
pt with min PO intake, despite max encouragement by SLP

## 2020-08-01 NOTE — SWALLOW BEDSIDE ASSESSMENT ADULT - ORAL PHASE
Delayed oral transit time/Decreased anterior-posterior movement of the bolus
Within functional limits

## 2020-08-01 NOTE — SWALLOW BEDSIDE ASSESSMENT ADULT - SLP PERTINENT HISTORY OF CURRENT PROBLEM
59 M  with a pmh of congenital heart disease s/p repair, HTN, DLD, RAA thrombus (was on xarelto and resolved on last ANITA), COPD, dm, sinus bradycardia s/p PPM, marginal cell lymphoma on maintenance rituxan (last chemo was december 21) presents with expressive aphasia and right sided weakness. -CT, pending MRI
60 y/o M with a pmh of congenital heart disease s/p repair, HTN, DLD, RAA thrombus (was on xarelto and resolved on last ANITA), COPD, dm, sinus bradycardia s/p PPM, marginal cell lymphoma on maintenance rituxan (last chemo was december 21) presents with expressive aphasia and right sided weakness. TPA administered @ 14:35. CTH (-) No MRI completed. Per neurology note, pt w/ L MCA territory infarct.

## 2020-08-02 LAB
ANION GAP SERPL CALC-SCNC: 11 MMOL/L — SIGNIFICANT CHANGE UP (ref 7–14)
B BURGDOR C6 AB SER-ACNC: NEGATIVE — SIGNIFICANT CHANGE UP
B BURGDOR IGG+IGM SER-ACNC: <0.01 INDEX — SIGNIFICANT CHANGE UP (ref 0.01–0.89)
BUN SERPL-MCNC: 16 MG/DL — SIGNIFICANT CHANGE UP (ref 10–20)
CALCIUM SERPL-MCNC: 9.4 MG/DL — SIGNIFICANT CHANGE UP (ref 8.5–10.1)
CHLORIDE SERPL-SCNC: 103 MMOL/L — SIGNIFICANT CHANGE UP (ref 98–110)
CO2 SERPL-SCNC: 27 MMOL/L — SIGNIFICANT CHANGE UP (ref 17–32)
CREAT SERPL-MCNC: 1.1 MG/DL — SIGNIFICANT CHANGE UP (ref 0.7–1.5)
GLUCOSE BLDC GLUCOMTR-MCNC: 107 MG/DL — HIGH (ref 70–99)
GLUCOSE BLDC GLUCOMTR-MCNC: 123 MG/DL — HIGH (ref 70–99)
GLUCOSE BLDC GLUCOMTR-MCNC: 147 MG/DL — HIGH (ref 70–99)
GLUCOSE BLDC GLUCOMTR-MCNC: 166 MG/DL — HIGH (ref 70–99)
GLUCOSE SERPL-MCNC: 152 MG/DL — HIGH (ref 70–99)
HCT VFR BLD CALC: 44.7 % — SIGNIFICANT CHANGE UP (ref 42–52)
HGB BLD-MCNC: 14.5 G/DL — SIGNIFICANT CHANGE UP (ref 14–18)
MAGNESIUM SERPL-MCNC: 2 MG/DL — SIGNIFICANT CHANGE UP (ref 1.8–2.4)
MCHC RBC-ENTMCNC: 29.5 PG — SIGNIFICANT CHANGE UP (ref 27–31)
MCHC RBC-ENTMCNC: 32.4 G/DL — SIGNIFICANT CHANGE UP (ref 32–37)
MCV RBC AUTO: 91 FL — SIGNIFICANT CHANGE UP (ref 80–94)
NRBC # BLD: 0 /100 WBCS — SIGNIFICANT CHANGE UP (ref 0–0)
PLATELET # BLD AUTO: 154 K/UL — SIGNIFICANT CHANGE UP (ref 130–400)
POTASSIUM SERPL-MCNC: 4.1 MMOL/L — SIGNIFICANT CHANGE UP (ref 3.5–5)
POTASSIUM SERPL-SCNC: 4.1 MMOL/L — SIGNIFICANT CHANGE UP (ref 3.5–5)
RBC # BLD: 4.91 M/UL — SIGNIFICANT CHANGE UP (ref 4.7–6.1)
RBC # FLD: 13.1 % — SIGNIFICANT CHANGE UP (ref 11.5–14.5)
SODIUM SERPL-SCNC: 141 MMOL/L — SIGNIFICANT CHANGE UP (ref 135–146)
WBC # BLD: 4.22 K/UL — LOW (ref 4.8–10.8)
WBC # FLD AUTO: 4.22 K/UL — LOW (ref 4.8–10.8)

## 2020-08-02 RX ORDER — POLYETHYLENE GLYCOL 3350 17 G/17G
17 POWDER, FOR SOLUTION ORAL DAILY
Refills: 0 | Status: DISCONTINUED | OUTPATIENT
Start: 2020-08-03 | End: 2020-08-05

## 2020-08-02 RX ORDER — ACETAMINOPHEN 500 MG
650 TABLET ORAL EVERY 6 HOURS
Refills: 0 | Status: DISCONTINUED | OUTPATIENT
Start: 2020-08-02 | End: 2020-08-05

## 2020-08-02 RX ORDER — PANTOPRAZOLE SODIUM 20 MG/1
40 TABLET, DELAYED RELEASE ORAL
Refills: 0 | Status: DISCONTINUED | OUTPATIENT
Start: 2020-08-03 | End: 2020-08-05

## 2020-08-02 RX ADMIN — Medication 650 MILLIGRAM(S): at 09:00

## 2020-08-02 RX ADMIN — PANTOPRAZOLE SODIUM 40 MILLIGRAM(S): 20 TABLET, DELAYED RELEASE ORAL at 11:40

## 2020-08-02 RX ADMIN — BUDESONIDE AND FORMOTEROL FUMARATE DIHYDRATE 2 PUFF(S): 160; 4.5 AEROSOL RESPIRATORY (INHALATION) at 07:51

## 2020-08-02 RX ADMIN — CHLORHEXIDINE GLUCONATE 1 APPLICATION(S): 213 SOLUTION TOPICAL at 06:09

## 2020-08-02 RX ADMIN — Medication 2: at 10:47

## 2020-08-02 RX ADMIN — Medication 81 MILLIGRAM(S): at 11:40

## 2020-08-02 RX ADMIN — Medication 650 MILLIGRAM(S): at 09:30

## 2020-08-02 RX ADMIN — BUDESONIDE AND FORMOTEROL FUMARATE DIHYDRATE 2 PUFF(S): 160; 4.5 AEROSOL RESPIRATORY (INHALATION) at 20:51

## 2020-08-02 RX ADMIN — POLYETHYLENE GLYCOL 3350 17 GRAM(S): 17 POWDER, FOR SOLUTION ORAL at 06:09

## 2020-08-02 RX ADMIN — ATORVASTATIN CALCIUM 40 MILLIGRAM(S): 80 TABLET, FILM COATED ORAL at 22:32

## 2020-08-02 RX ADMIN — BUDESONIDE AND FORMOTEROL FUMARATE DIHYDRATE 2 PUFF(S): 160; 4.5 AEROSOL RESPIRATORY (INHALATION) at 06:09

## 2020-08-02 NOTE — OCCUPATIONAL THERAPY INITIAL EVALUATION ADULT - PLANNED THERAPY INTERVENTIONS, OT EVAL
motor coordination training/ROM/balance training/bed mobility training/fine motor coordination training/parent/caregiver training.../transfer training/cognitive, visual perceptual/neuromuscular re-education/strengthening/stretching/ADL retraining

## 2020-08-02 NOTE — OCCUPATIONAL THERAPY INITIAL EVALUATION ADULT - IMPAIRED TRANSFERS: SIT/STAND, REHAB EVAL
decreased ROM/cognition/impaired coordination/decreased flexibility/ataxic/impaired balance/decreased strength

## 2020-08-02 NOTE — OCCUPATIONAL THERAPY INITIAL EVALUATION ADULT - LEVEL OF INDEPENDENCE: BED TO CHAIR, REHAB EVAL
unable to perform/pt with fatigue after sit to stand, returned to bed, linda use of full body lift with unit staff at this time

## 2020-08-02 NOTE — OCCUPATIONAL THERAPY INITIAL EVALUATION ADULT - SPECIFY REASON(S)
attempted to see pt for OT evaluation, however pt being transferred to ICU to CEU on first attempt, on second attempt pt not in room, will follow up

## 2020-08-02 NOTE — OCCUPATIONAL THERAPY INITIAL EVALUATION ADULT - IMPAIRMENTS CONTRIBUTING IMPAIRED BED MOBILITY, REHAB EVAL
cognition/impaired coordination/impaired motor control/decreased ROM/ataxic/impaired balance/decreased strength

## 2020-08-02 NOTE — PHARMACOTHERAPY INTERVENTION NOTE - COMMENTS
recommended changing Miralax 17g po q24h
recommended changing pantoprazole to po
1) Pt came in with ischemic stroke, was on atorva 40mg daily at home. Rec to consider going up on the dose to have pt on high intensity especially LDL still elevated at 90 2) Not on dvt ppx; past 24 hours from alteplase administration (dose given on 7/30). Rec to consider adding.    MD will follow up with neuro

## 2020-08-02 NOTE — OCCUPATIONAL THERAPY INITIAL EVALUATION ADULT - GENERAL OBSERVATIONS, REHAB EVAL
Pt undergoing video EEG. OT to cont when appropriate.
pt received semi isaacs in bed in NAD, +tele, +pulse oxi, +BP cuff, +IV lock, spouse present, agreeable to OT evaluation, left semi isaacs in bed in NAD, all lines intact
stated

## 2020-08-02 NOTE — PROGRESS NOTE ADULT - ASSESSMENT
IMPRESSION:    CVA s/p TPA  HO lymphoma  Probable CARMEN       PLAN:    CNS: Avoid CNS depressants.  FU with Neuro.      HEENT: Oral care    PULMONARY:  HOB @ 45 degrees.  Aspiration precautions. Target SpO2>94%. NIV during sleep     CARDIOVASCULAR: Continue BP control     GI: GI prophylaxis.  Feeding per speech and swallow      RENAL:  Follow up lytes.  Correct as needed.     INFECTIOUS DISEASE: No abx.     HEMATOLOGICAL:  DVT prophylaxis.    ENDOCRINE:  Follow up FS.      MUSCULOSKELETAL: OOB to chair     DC Clarisa     Down grade to CEU

## 2020-08-02 NOTE — PROGRESS NOTE ADULT - SUBJECTIVE AND OBJECTIVE BOX
Patient is a 60y old  Male who presents with a chief complaint of AMS (01 Aug 2020 08:55)        Over Night Events:  Looks and feels better.  More awake.  Tolerating PO         ROS:     All ROS are negative except HPI         PHYSICAL EXAM    ICU Vital Signs Last 24 Hrs  T(C): 35.9 (02 Aug 2020 04:00), Max: 36.3 (01 Aug 2020 12:00)  T(F): 96.7 (02 Aug 2020 04:00), Max: 97.4 (01 Aug 2020 12:00)  HR: 68 (02 Aug 2020 08:00) (64 - 80)  BP: 162/91 (02 Aug 2020 08:00) (128/82 - 172/84)  BP(mean): 0 (02 Aug 2020 08:00) (0 - 130)  ABP: --  ABP(mean): --  RR: 18 (02 Aug 2020 08:00) (10 - 25)  SpO2: 97% (02 Aug 2020 08:00) (95% - 98%)      CONSTITUTIONAL:  Well nourished.  NAD    ENT:   Airway patent,   Mouth with normal mucosa.   No thrush    EYES:   Pupils equal,   Round and reactive to light.    CARDIAC:   Normal rate,   Regular rhythm.    No edema      Vascular:  Normal systolic impulse  No Carotid bruits    RESPIRATORY:   No wheezing  Bilateral BS  Normal chest expansion  Not tachypneic,  No use of accessory muscles    GASTROINTESTINAL:  Abdomen soft,   Non-tender,   No guarding,   + BS    MUSCULOSKELETAL:   Range of motion is not limited,  No clubbing, cyanosis    NEUROLOGICAL:   Alert  Follows simple commands   Right sided weakness     SKIN:   Skin normal color for race,   Warm and dry  No evidence of rash.    PSYCHIATRIC:   Normal mood and affect.   No apparent risk to self or others.    HEMATOLOGICAL:  No cervical  lymphadenopathy.  no inguinal lymphadenopathy      08-01-20 @ 07:01  -  08-02-20 @ 07:00  --------------------------------------------------------  IN:    sodium chloride 0.9%: 300 mL  Total IN: 300 mL    OUT:    Indwelling Catheter - Urethral: 2378 mL  Total OUT: 2378 mL    Total NET: -2078 mL      08-02-20 @ 07:01  -  08-02-20 @ 08:55  --------------------------------------------------------  IN:  Total IN: 0 mL    OUT:    Indwelling Catheter - Urethral: 150 mL  Total OUT: 150 mL    Total NET: -150 mL          LABS:                            14.5   4.22  )-----------( 154      ( 02 Aug 2020 04:30 )             44.7                                               08-02    141  |  103  |  16  ----------------------------<  152<H>  4.1   |  27  |  1.1    Ca    9.4      02 Aug 2020 04:30  Phos  3.5     08-01  Mg     2.0     08-02                                                                                                                                                                                                                       ABG - ( 31 Jul 2020 15:28 )  pH, Arterial: 7.48  pH, Blood: x     /  pCO2: 38    /  pO2: 72    / HCO3: 28    / Base Excess: 4.7   /  SaO2: 96                  MEDICATIONS  (STANDING):  aspirin  chewable 81 milliGRAM(s) Oral daily  atorvastatin 40 milliGRAM(s) Oral at bedtime  budesonide 160 MICROgram(s)/formoterol 4.5 MICROgram(s) Inhaler 2 Puff(s) Inhalation two times a day  chlorhexidine 4% Liquid 1 Application(s) Topical <User Schedule>  dextrose 5%. 1000 milliLiter(s) (50 mL/Hr) IV Continuous <Continuous>  dextrose 50% Injectable 12.5 Gram(s) IV Push once  dextrose 50% Injectable 25 Gram(s) IV Push once  dextrose 50% Injectable 25 Gram(s) IV Push once  insulin lispro (HumaLOG) corrective regimen sliding scale   SubCutaneous three times a day before meals  pantoprazole  Injectable 40 milliGRAM(s) IV Push daily  polyethylene glycol 3350 17 Gram(s) Oral two times a day  tiotropium 18 MICROgram(s) Capsule 1 Capsule(s) Inhalation daily    MEDICATIONS  (PRN):  acetaminophen  Suppository .. 650 milliGRAM(s) Rectal every 6 hours PRN Severe Pain (7 - 10)  albuterol/ipratropium for Nebulization. 3 milliLiter(s) Nebulizer every 6 hours PRN Shortness of Breath and/or Wheezing  bisacodyl Suppository 10 milliGRAM(s) Rectal daily PRN Constipation  dextrose 40% Gel 15 Gram(s) Oral once PRN Blood Glucose LESS THAN 70 milliGRAM(s)/deciliter  glucagon  Injectable 1 milliGRAM(s) IntraMuscular once PRN Glucose LESS THAN 70 milligrams/deciliter      New X-rays reviewed:                                                                                  ECHO    CXR interpreted by me:

## 2020-08-02 NOTE — OCCUPATIONAL THERAPY INITIAL EVALUATION ADULT - RANGE OF MOTION EXAMINATION, UPPER EXTREMITY
Left UE Active ROM was WNL (within normal limits)/required increased time for all movements due to decreased processing speed and ?able motor planning deficits; RUE: shoulder ~1/3 AROM WFL PROM, elbow WFL AROM with increased time, wrist/digits WFL increased time to perform

## 2020-08-02 NOTE — OCCUPATIONAL THERAPY INITIAL EVALUATION ADULT - MUSCLE TONE ASSESSMENT, REHAB EVAL
normal/mildly increased tone/Left UE/?able increase in tone vs. pt resisting/decreased motor planning, continue to asses/Right UE

## 2020-08-03 LAB
ANION GAP SERPL CALC-SCNC: 10 MMOL/L — SIGNIFICANT CHANGE UP (ref 7–14)
BUN SERPL-MCNC: 15 MG/DL — SIGNIFICANT CHANGE UP (ref 10–20)
CALCIUM SERPL-MCNC: 8.9 MG/DL — SIGNIFICANT CHANGE UP (ref 8.5–10.1)
CHLORIDE SERPL-SCNC: 106 MMOL/L — SIGNIFICANT CHANGE UP (ref 98–110)
CO2 SERPL-SCNC: 26 MMOL/L — SIGNIFICANT CHANGE UP (ref 17–32)
CREAT SERPL-MCNC: 1 MG/DL — SIGNIFICANT CHANGE UP (ref 0.7–1.5)
GLUCOSE BLDC GLUCOMTR-MCNC: 122 MG/DL — HIGH (ref 70–99)
GLUCOSE BLDC GLUCOMTR-MCNC: 126 MG/DL — HIGH (ref 70–99)
GLUCOSE BLDC GLUCOMTR-MCNC: 135 MG/DL — HIGH (ref 70–99)
GLUCOSE BLDC GLUCOMTR-MCNC: 210 MG/DL — HIGH (ref 70–99)
GLUCOSE SERPL-MCNC: 140 MG/DL — HIGH (ref 70–99)
HCT VFR BLD CALC: 44.6 % — SIGNIFICANT CHANGE UP (ref 42–52)
HGB BLD-MCNC: 14.9 G/DL — SIGNIFICANT CHANGE UP (ref 14–18)
MAGNESIUM SERPL-MCNC: 1.9 MG/DL — SIGNIFICANT CHANGE UP (ref 1.8–2.4)
MCHC RBC-ENTMCNC: 30.3 PG — SIGNIFICANT CHANGE UP (ref 27–31)
MCHC RBC-ENTMCNC: 33.4 G/DL — SIGNIFICANT CHANGE UP (ref 32–37)
MCV RBC AUTO: 90.7 FL — SIGNIFICANT CHANGE UP (ref 80–94)
NRBC # BLD: 0 /100 WBCS — SIGNIFICANT CHANGE UP (ref 0–0)
PLATELET # BLD AUTO: 158 K/UL — SIGNIFICANT CHANGE UP (ref 130–400)
POTASSIUM SERPL-MCNC: 3.9 MMOL/L — SIGNIFICANT CHANGE UP (ref 3.5–5)
POTASSIUM SERPL-SCNC: 3.9 MMOL/L — SIGNIFICANT CHANGE UP (ref 3.5–5)
RBC # BLD: 4.92 M/UL — SIGNIFICANT CHANGE UP (ref 4.7–6.1)
RBC # FLD: 12.8 % — SIGNIFICANT CHANGE UP (ref 11.5–14.5)
SODIUM SERPL-SCNC: 142 MMOL/L — SIGNIFICANT CHANGE UP (ref 135–146)
WBC # BLD: 3.21 K/UL — LOW (ref 4.8–10.8)
WBC # FLD AUTO: 3.21 K/UL — LOW (ref 4.8–10.8)

## 2020-08-03 PROCEDURE — 99291 CRITICAL CARE FIRST HOUR: CPT

## 2020-08-03 RX ORDER — AMLODIPINE BESYLATE 2.5 MG/1
5 TABLET ORAL DAILY
Refills: 0 | Status: DISCONTINUED | OUTPATIENT
Start: 2020-08-03 | End: 2020-08-05

## 2020-08-03 RX ADMIN — BUDESONIDE AND FORMOTEROL FUMARATE DIHYDRATE 2 PUFF(S): 160; 4.5 AEROSOL RESPIRATORY (INHALATION) at 21:45

## 2020-08-03 RX ADMIN — Medication 81 MILLIGRAM(S): at 11:10

## 2020-08-03 RX ADMIN — PANTOPRAZOLE SODIUM 40 MILLIGRAM(S): 20 TABLET, DELAYED RELEASE ORAL at 06:02

## 2020-08-03 RX ADMIN — ATORVASTATIN CALCIUM 40 MILLIGRAM(S): 80 TABLET, FILM COATED ORAL at 21:45

## 2020-08-03 RX ADMIN — TIOTROPIUM BROMIDE 1 CAPSULE(S): 18 CAPSULE ORAL; RESPIRATORY (INHALATION) at 07:51

## 2020-08-03 RX ADMIN — Medication 4: at 12:02

## 2020-08-03 RX ADMIN — BUDESONIDE AND FORMOTEROL FUMARATE DIHYDRATE 2 PUFF(S): 160; 4.5 AEROSOL RESPIRATORY (INHALATION) at 08:49

## 2020-08-03 RX ADMIN — AMLODIPINE BESYLATE 5 MILLIGRAM(S): 2.5 TABLET ORAL at 16:09

## 2020-08-03 RX ADMIN — POLYETHYLENE GLYCOL 3350 17 GRAM(S): 17 POWDER, FOR SOLUTION ORAL at 11:12

## 2020-08-03 NOTE — PROGRESS NOTE ADULT - SUBJECTIVE AND OBJECTIVE BOX
Neurocritical Care Progress Note:    1. Brief Presentation: Expressive aphasia    2. Today's Acute Problems:   - Acute L MCA territory stroke  - Dysconjugate right eye    3. Relevant brief History: 59 M  with a pmh of congenital heart disease s/p repair, HTN, DLD, RAA thrombus (was on xarelto and resolved on last ANITA), COPD, exsmoker, dm htn, hld, sinus bradycardia s/p PPM, marginal cell lymphoma on maintenance rituxan (last chemo was december 21) presents with expressive aphasia and right sided weakness. He called his nephew about 12:30 this afternoon because he was feeling weak. EMS was called; by the time he came to ED he was unable to articulate any words and was only moaning. As per family he was completely fine at 11AM, and had reported no new complaints. In the ED: T 96.4, , 185/124, 97% on RA. Labs were stable since last admission. Code stroke was called. CT head showed no acute changes, but was limited due to motion artifact. He received TPA, CTA/CTP are negative for signs of acute ischemia.      4-Yesterday's Plan:  - Continue ASA  - High intensity statin  - MRI Brain if no contraindication  - Keep BP < 180/110  - Check TTE  - PT/OT/SLP eval and tx  - f/u vEEG        6. Medications:   amLODIPine   Tablet 5 milliGRAM(s) Oral daily  aspirin  chewable 81 milliGRAM(s) Oral daily  atorvastatin 40 milliGRAM(s) Oral at bedtime  budesonide 160 MICROgram(s)/formoterol 4.5 MICROgram(s) Inhaler 2 Puff(s) Inhalation two times a day  chlorhexidine 4% Liquid 1 Application(s) Topical <User Schedule>  dextrose 5%. 1000 milliLiter(s) IV Continuous <Continuous>  dextrose 50% Injectable 12.5 Gram(s) IV Push once  dextrose 50% Injectable 25 Gram(s) IV Push once  dextrose 50% Injectable 25 Gram(s) IV Push once  insulin lispro (HumaLOG) corrective regimen sliding scale   SubCutaneous three times a day before meals  pantoprazole    Tablet 40 milliGRAM(s) Oral before breakfast  polyethylene glycol 3350 17 Gram(s) Oral daily  tiotropium 18 MICROgram(s) Capsule 1 Capsule(s) Inhalation daily      7. Ancillary Management:   Chest PT[ ]   Head of bed >35 [x ]   Out of bed to chair [x ]   PT/OT/SP Eval [x ]   Spirometry[ ]   DVT prophalaxis[x ]    8.Neuro:   Neurologic Exam:  Mental status: Awake, alert and oriented x 3. Attention and concentration intact. Fund of knowledge appropriate.  Language: Naming, repetition, fluency, and comprehension intact. No dysarthria, no aphasia.  Cranial nerves: Pupils equally round and reactive to light, visual fields full, no nystagmus, dysconjugate right eye (strabismus) w/ chronic divergent squint, face symmetric, hearing intact   Motor:  Normal bulk and tone, strength 3/5 in right upper and lower extremities;  strength 3/5. Strength 5/5 in left upper and lower extremities;  strength 5/5. No tremors or tics noted  Sensation: Intact to light touch, proprioception, and noxious stimuli. No neglect.   Coordination: No dysmetria on finger-to-nose. No clumsiness.  Reflexes: 2+ generally          NIH STROKE SCALE  Item	                                                        Score  1 a.	Level of Consciousness	               	0  1 b. LOC Questions	                                0  1 c.	LOC Commands	                               	0  2.	Best Gaze	                                        0  3.	Visual	                                                0  4.	Facial Palsy	                                        0  5 a.	Motor Arm - Left	                                0  5 b.	Motor Arm - Right	                        1  6 a.	Motor Leg - Left	                                0  6 b.	Motor Leg - Right	                                1  7.	Limb Ataxia	                                        0  8.	Sensory	                                                0  9.	Language	                                        0  10.	Dysarthria	                                        0  11.	Extinction and Inattention  	        0  ______________________________________  TOTAL	                                                        2    Total NIHSS on admission:  11    NIHSS yesterday:  11    NIHSS today: 2        mRS:  0 No symptoms at all  1 No significant disability despite symptoms; able to carry out all usual duties and activities without assistance  2 Slight disability; unable to carry out all previous activities, but able to look after own affairs  3 Moderate disability; requiring some help, but able to walk without assistance  4 Moderately severe disability; unable to walk without assistance and unable to attend to own bodily needs without assistance  5 Severe disability; bedridden, incontinent and requiring constant nursing care and attention  6 Dead        Last CTH:< from: CT Head No Cont (07.31.20 @ 11:58) >  IMPRESSION:    Allowing for the limitation from streak artifact, no acute intracranial pathology.    Mild chronic microvascular ischemic disease.    < end of copied text >      Last CTA/MRA: n/a    Last CTP: < from: CT Perfusion w/ Maps w/ IV Cont (07.30.20 @ 15:42) >  IMPRESSION:    1.  No gross evidence of major vascular stenosis or occlusion (artifact is noted in the neck). Normal perfusion images.    2.  Mildly prominent left supraclavicular lymph nodes, nonspecific. Follow-up with a chest CT may be obtained.    < end of copied text >      Last MRI: n/a    Last TCD: n/a    Last EEG: < from: EEG w/ Video Each 12-26 Hours, Unmonitored (08.01.20 @ 12:00) >  Focal Slowing:  None      Generalized Slowing:  None    Interictal Activity  No epileptiform activity    Activation and Provocation Procedures:  Not performed    Events:  No events captured    Impression  This is a normal Video EEG study with no epileptiform or other abnormalities. None of the patient's typical events were captured precluding direct electro-clinical correlation.    < end of copied text >          9. Cardiovascular:   Vital Signs Last 24 Hrs  T(C): 36.4 (03 Aug 2020 13:53), Max: 36.4 (02 Aug 2020 18:00)  T(F): 97.5 (03 Aug 2020 13:53), Max: 97.6 (02 Aug 2020 18:00)  HR: 69 (03 Aug 2020 16:07) (55 - 69)  BP: 145/84 (03 Aug 2020 16:07) (145/84 - 169/91)  BP(mean): 108 (03 Aug 2020 16:07) (96 - 119)  RR: 20 (03 Aug 2020 13:53) (20 - 20)  SpO2: 96% (03 Aug 2020 16:07) (96% - 100%)   Blood Gas Arterial, Lactate (07.31.20 @ 15:28)    Blood Gas Arterial, Lactate: 0.8 mmoL/L      Last Echo: < from: Transthoracic Echocardiogram (07.31.20 @ 09:49) >  Summary:   1. Normal global left ventricular systolic function.   2. LV Ejection Fraction by Chávez's Method with a biplane EF of 56 %.   3. Mild concentric left ventricular hypertrophy.   4. Mildly increased LV wall thickness.   5. Normal left ventricular internal cavity size.   6. LA volume Index is 14.9 ml/m² ml/m2.   7. There is mild aortic root calcification.    PHYSICIAN INTERPRETATION:  Left Ventricle: The left ventricular internal cavity size is normal. Leftventricular wall thickness is mildly increased. There is mild concentric left ventricular hypertrophy. Global LV systolic function was normal.      LV Wall Scoring:  All segments are normal.    Right Ventricle: Normal right ventricular size and function.  Left Atrium: Normal left atrial size. LA volume Index is 14.9 ml/m² ml/m2.  Right Atrium: Normal right atrial size.  Pericardium: There is no evidence of pericardial effusion.  Mitral Valve: Structurally normal mitral valve, with normal leaflet excursion. The mitral valve is normal in structure. No evidence of mitral valve regurgitation is seen.  Tricuspid Valve: Structurally normal tricuspid valve, with normal leaflet excursion. The tricuspid valve is normal in structure. Mild tricuspid regurgitation is visualized.  Aortic Valve: Normal trileaflet aortic valve with normal opening. The aortic valve is normal. No evidence of aortic valve regurgitation is seen.  Pulmonic Valve: Structurally normal pulmonic valve, with normal leaflet excursion. The pulmonic valve is normal. No indication of pulmonic valve regurgitation.  Aorta: The aortic root and ascending aorta are structurally normal, with no evidence of dilitation. There is mild aortic root calcification.  Pulmonary Artery: The mainpulmonary artery is normal in size.  Venous: The inferior vena cava was normal sized, with respiratory size variation greater than 50%.    < end of copied text >      Last EKG: < from: 12 Lead ECG (07.30.20 @ 14:44) >  Diagnosis Line *** Poor data quality, interpretation may be adversely affected  Atrial-sensed ventricular-paced rhythm  Abnormal ECG    < end of copied text >      CVP   MAP/CPP/SBP target:   CO:      CI:       Enzymes/Trop:  Troponin T, Serum (07.30.20 @ 14:04)    Troponin T, Serum: <0.01 ng/mL      10. Respiratory:   ABG: n/a    VBG:  Blood Gas Profile - Venous (07.30.20 @ 13:57)    pH, Venous: 7.35    pCO2, Venous: 49 mmHg    pO2, Venous: 45 mmHg    HCO3, Venous: 27 mmoL/L    Base Excess, Venous: 0.8 mmoL/L    Oxygen Saturation, Venous: 81 %      Chest Xray: < from: Xray Chest 1 View- PORTABLE-Routine (08.01.20 @ 04:37) >  Impression:    No radiographic evidence of acute cardiopulmonary disease.    < end of copied text >          Peak Pressure/[place] Pressure:    11.GI:    Prophalaxis:     Bowel mvt:     Abd distension:       12.Renal/Fluids/Electrolytes:    08-03    142  |  106  |  15  ----------------------------<  140<H>  3.9   |  26  |  1.0    Ca    8.9      03 Aug 2020 06:03  Mg     1.9     08-03        I&O's Detail    02 Aug 2020 07:01  -  03 Aug 2020 07:00  --------------------------------------------------------  IN:  Total IN: 0 mL    OUT:    Indwelling Catheter - Urethral: 585 mL    Voided: 800 mL  Total OUT: 1385 mL    Total NET: -1385 mL      03 Aug 2020 07:01  -  03 Aug 2020 16:14  --------------------------------------------------------  IN:    Oral Fluid: 240 mL  Total IN: 240 mL    OUT:    Voided: 800 mL  Total OUT: 800 mL    Total NET: -560 mL          13.ID:   TMax:   Vital Signs Last 24 Hrs  T(C): 36.4 (03 Aug 2020 13:53), Max: 36.4 (02 Aug 2020 18:00)  T(F): 97.5 (03 Aug 2020 13:53), Max: 97.6 (02 Aug 2020 18:00)  HR: 69 (03 Aug 2020 16:07) (55 - 69)  BP: 145/84 (03 Aug 2020 16:07) (145/84 - 169/91)  BP(mean): 108 (03 Aug 2020 16:07) (96 - 119)  RR: 20 (03 Aug 2020 13:53) (20 - 20)  SpO2: 96% (03 Aug 2020 16:07) (96% - 100%)   Lactate, Blood: 1.6 mmol/L (07-30 @ 20:39)          Lines: Central[] Date inserted: Peripheral[]    14. Hematology:                         14.9   3.21  )-----------( 158      ( 03 Aug 2020 06:03 )             44.6      08-03    142  |  106  |  15  ----------------------------<  140<H>  3.9   |  26  |  1.0    Ca    8.9      03 Aug 2020 06:03  Mg     1.9     08-03           DVT Prophylaxis Lovenox[ ] Heparin[ ] Venodynes[ ] SCD's[ ]    15. Impression:        16. Suggestions:  -Please obtain MRI brain w/o  -F/u lyme disease titer      17. Disposition: Neurocritical Care Progress Note:    1. Brief Presentation: Expressive aphasia    2. Today's Acute Problems:   - Acute L MCA territory stroke  - Dysconjugate right eye    3. Relevant brief History: 59 M  with a pmh of congenital heart disease s/p repair, HTN, DLD, RAA thrombus (was on xarelto and resolved on last ANITA), COPD, exsmoker, dm htn, hld, sinus bradycardia s/p PPM, marginal cell lymphoma on maintenance rituxan (last chemo was december 21) presents with expressive aphasia and right sided weakness. He called his nephew about 12:30 this afternoon because he was feeling weak. EMS was called; by the time he came to ED he was unable to articulate any words and was only moaning. As per family he was completely fine at 11AM, and had reported no new complaints. In the ED: T 96.4, , 185/124, 97% on RA. Labs were stable since last admission. Code stroke was called. CT head showed no acute changes, but was limited due to motion artifact. He received TPA, CTA/CTP are negative for signs of acute ischemia.    4-Yesterday's Plan:  - Continue ASA  - High intensity statin  - MRI Brain if no contraindication  - Keep BP < 180/110  - Check TTE  - PT/OT/SLP eval and tx  - f/u vEEG    6. Medications:   amLODIPine   Tablet 5 milliGRAM(s) Oral daily  aspirin  chewable 81 milliGRAM(s) Oral daily  atorvastatin 40 milliGRAM(s) Oral at bedtime  budesonide 160 MICROgram(s)/formoterol 4.5 MICROgram(s) Inhaler 2 Puff(s) Inhalation two times a day  chlorhexidine 4% Liquid 1 Application(s) Topical <User Schedule>  dextrose 5%. 1000 milliLiter(s) IV Continuous <Continuous>  dextrose 50% Injectable 12.5 Gram(s) IV Push once  dextrose 50% Injectable 25 Gram(s) IV Push once  dextrose 50% Injectable 25 Gram(s) IV Push once  insulin lispro (HumaLOG) corrective regimen sliding scale   SubCutaneous three times a day before meals  pantoprazole    Tablet 40 milliGRAM(s) Oral before breakfast  polyethylene glycol 3350 17 Gram(s) Oral daily  tiotropium 18 MICROgram(s) Capsule 1 Capsule(s) Inhalation daily    7. Ancillary Management:   Chest PT[ ]   Head of bed >35 [x ]   Out of bed to chair [x ]   PT/OT/SP Eval [x ]   Spirometry[ ]   DVT prophalaxis[x ]    8.Neuro:   Neurologic Exam:  Mental status: Awake, alert and oriented x 3. Attention and concentration intact. Fund of knowledge appropriate.  Language: Naming, repetition, fluency, and comprehension intact. No dysarthria, no aphasia.  Cranial nerves: Pupils equally round and reactive to light, visual fields full, no nystagmus, dysconjugate right eye (strabismus) w/ chronic divergent squint, face symmetric, hearing intact   Motor:  Normal bulk and tone, strength 3/5 in right upper and lower extremities;  strength 3/5. Strength 5/5 in left upper and lower extremities;  strength 5/5. No tremors or tics noted  Sensation: Intact to light touch, proprioception, and noxious stimuli. No neglect.   Coordination: No dysmetria on finger-to-nose. No clumsiness.  Reflexes: 2+ generally    NIH STROKE SCALE  Item	                                                        Score  1 a.	Level of Consciousness	               	0  1 b. LOC Questions	                                0  1 c.	LOC Commands	                               	0  2.	Best Gaze	                                        0  3.	Visual	                                                0  4.	Facial Palsy	                                        0  5 a.	Motor Arm - Left	                                0  5 b.	Motor Arm - Right	                        1  6 a.	Motor Leg - Left	                                0  6 b.	Motor Leg - Right	                                1  7.	Limb Ataxia	                                        0  8.	Sensory	                                                0  9.	Language	                                        0  10.	Dysarthria	                                        0  11.	Extinction and Inattention  	        0  ______________________________________  TOTAL	                                                        2    Total NIHSS on admission:  11    NIHSS yesterday:  11    NIHSS today: 2    mRS:  0 No symptoms at all  1 No significant disability despite symptoms; able to carry out all usual duties and activities without assistance  2 Slight disability; unable to carry out all previous activities, but able to look after own affairs  3 Moderate disability; requiring some help, but able to walk without assistance  4 Moderately severe disability; unable to walk without assistance and unable to attend to own bodily needs without assistance  5 Severe disability; bedridden, incontinent and requiring constant nursing care and attention  6 Dead    Last CTH:< from: CT Head No Cont (07.31.20 @ 11:58) >  IMPRESSION:    Allowing for the limitation from streak artifact, no acute intracranial pathology.    Mild chronic microvascular ischemic disease.    < end of copied text >    Last CTA/MRA: n/a    Last CTP: < from: CT Perfusion w/ Maps w/ IV Cont (07.30.20 @ 15:42) >  IMPRESSION:    1.  No gross evidence of major vascular stenosis or occlusion (artifact is noted in the neck). Normal perfusion images.    2.  Mildly prominent left supraclavicular lymph nodes, nonspecific. Follow-up with a chest CT may be obtained.    < end of copied text >    Last MRI: n/a    Last TCD: n/a    Last EEG: < from: EEG w/ Video Each 12-26 Hours, Unmonitored (08.01.20 @ 12:00) >  Focal Slowing:  None      Generalized Slowing:  None    Interictal Activity  No epileptiform activity    Activation and Provocation Procedures:  Not performed    Events:  No events captured    Impression  This is a normal Video EEG study with no epileptiform or other abnormalities. None of the patient's typical events were captured precluding direct electro-clinical correlation.    < end of copied text >    9. Cardiovascular:   Vital Signs Last 24 Hrs  T(C): 36.4 (03 Aug 2020 13:53), Max: 36.4 (02 Aug 2020 18:00)  T(F): 97.5 (03 Aug 2020 13:53), Max: 97.6 (02 Aug 2020 18:00)  HR: 69 (03 Aug 2020 16:07) (55 - 69)  BP: 145/84 (03 Aug 2020 16:07) (145/84 - 169/91)  BP(mean): 108 (03 Aug 2020 16:07) (96 - 119)  RR: 20 (03 Aug 2020 13:53) (20 - 20)  SpO2: 96% (03 Aug 2020 16:07) (96% - 100%)   Blood Gas Arterial, Lactate (07.31.20 @ 15:28)    Blood Gas Arterial, Lactate: 0.8 mmoL/L    Last Echo: < from: Transthoracic Echocardiogram (07.31.20 @ 09:49) >  Summary:   1. Normal global left ventricular systolic function.   2. LV Ejection Fraction by Chávez's Method with a biplane EF of 56 %.   3. Mild concentric left ventricular hypertrophy.   4. Mildly increased LV wall thickness.   5. Normal left ventricular internal cavity size.   6. LA volume Index is 14.9 ml/m² ml/m2.   7. There is mild aortic root calcification.    PHYSICIAN INTERPRETATION:  Left Ventricle: The left ventricular internal cavity size is normal. Leftventricular wall thickness is mildly increased. There is mild concentric left ventricular hypertrophy. Global LV systolic function was normal.      LV Wall Scoring:  All segments are normal.    Right Ventricle: Normal right ventricular size and function.  Left Atrium: Normal left atrial size. LA volume Index is 14.9 ml/m² ml/m2.  Right Atrium: Normal right atrial size.  Pericardium: There is no evidence of pericardial effusion.  Mitral Valve: Structurally normal mitral valve, with normal leaflet excursion. The mitral valve is normal in structure. No evidence of mitral valve regurgitation is seen.  Tricuspid Valve: Structurally normal tricuspid valve, with normal leaflet excursion. The tricuspid valve is normal in structure. Mild tricuspid regurgitation is visualized.  Aortic Valve: Normal trileaflet aortic valve with normal opening. The aortic valve is normal. No evidence of aortic valve regurgitation is seen.  Pulmonic Valve: Structurally normal pulmonic valve, with normal leaflet excursion. The pulmonic valve is normal. No indication of pulmonic valve regurgitation.  Aorta: The aortic root and ascending aorta are structurally normal, with no evidence of dilitation. There is mild aortic root calcification.  Pulmonary Artery: The mainpulmonary artery is normal in size.  Venous: The inferior vena cava was normal sized, with respiratory size variation greater than 50%.    < end of copied text >    Last EKG: < from: 12 Lead ECG (07.30.20 @ 14:44) >  Diagnosis Line *** Poor data quality, interpretation may be adversely affected  Atrial-sensed ventricular-paced rhythm  Abnormal ECG    < end of copied text >    CVP   MAP/CPP/SBP target:   CO:      CI:       Enzymes/Trop:  Troponin T, Serum (07.30.20 @ 14:04)    Troponin T, Serum: <0.01 ng/mL    10. Respiratory:   ABG: n/a    VBG:  Blood Gas Profile - Venous (07.30.20 @ 13:57)    pH, Venous: 7.35    pCO2, Venous: 49 mmHg    pO2, Venous: 45 mmHg    HCO3, Venous: 27 mmoL/L    Base Excess, Venous: 0.8 mmoL/L    Oxygen Saturation, Venous: 81 %    Chest Xray: < from: Xray Chest 1 View- PORTABLE-Routine (08.01.20 @ 04:37) >  Impression:    No radiographic evidence of acute cardiopulmonary disease.    < end of copied text >    Peak Pressure/[place] Pressure: n/a    11.GI:  Prophalaxis:   pantoprazole      Bowel mvt:     Abd distension:     12.Renal/Fluids/Electrolytes:    08-03    142  |  106  |  15  ----------------------------<  140<H>  3.9   |  26  |  1.0    Ca    8.9      03 Aug 2020 06:03  Mg     1.9     08-03        I&O's Detail    02 Aug 2020 07:01  -  03 Aug 2020 07:00  --------------------------------------------------------  IN:  Total IN: 0 mL    OUT:    Indwelling Catheter - Urethral: 585 mL    Voided: 800 mL  Total OUT: 1385 mL    Total NET: -1385 mL      03 Aug 2020 07:01  -  03 Aug 2020 16:14  --------------------------------------------------------  IN:    Oral Fluid: 240 mL  Total IN: 240 mL    OUT:    Voided: 800 mL  Total OUT: 800 mL    Total NET: -560 mL    13.ID:   Urinalysis (07.03.20 @ 02:30)    pH Urine: 6.5    Blood, Urine: Negative    Glucose Qualitative, Urine: >= 1000 mg/dL    Color: Light Yellow    Urine Appearance: Clear    Bilirubin: Negative    Ketone - Urine: Negative    Specific Gravity: 1.029    Protein, Urine: 30 mg/dL    Urobilinogen: <2 mg/dL    Nitrite: Negative    Leukocyte Esterase Concentration: Negative    Urine Microscopic-Add On (NC) (07.03.20 @ 02:30)    Bacteria: Negative    Epithelial Cells: 0 /HPF    Red Blood Cell - Urine: 0 /HPF    White Blood Cell - Urine: 0 /HPF    Hyaline Casts: 1 /LPF    Lines: Central[] Date inserted: Peripheral[x]    14. Hematology:                         14.9   3.21  )-----------( 158      ( 03 Aug 2020 06:03 )             44.6      08-03    142  |  106  |  15  ----------------------------<  140<H>  3.9   |  26  |  1.0    Ca    8.9      03 Aug 2020 06:03  Mg     1.9     08-03    DVT Prophylaxis Lovenox[ ] Heparin[ ] Venodynes[ ] SCD's[x ]    15. 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 LWK is 11.30 am then developed acute generalized weakness and expressive aphasia and dysarthria. In ED stroke code was called, NIHSS is 11, his CTH is negative for acute pathology and his was within the window for IV tpa which was given at 14.35 pm. vEEG with no indication of seizure activity, CTP negative, and repeat CTH stable. Exam significant only for right sided weakness. NIHSS 2. Will pursue MRI to r/o underlying abnormalities.     16. Suggestions:  -Please obtain MRI brain w/o to exclude abnormalities not seen on CTH  -F/u lyme disease titer  -C/w ASA and high dose statin   -Continue to keep SBP < 180/110  -C/w PT/OT/speech and swallow eval and tx  -Medical management as per primary care team    17. Disposition:  -Continue medical management on CEU    Bruna August NP  [id number] Neurocritical Care Progress Note:    1. Brief Presentation: Expressive aphasia    2. 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 Relevant brief History: 59 M  with a pmh of congenital heart disease s/p repair, HTN, DLD, RAA thrombus (was on xarelto and resolved on last ANITA), COPD, exsmoker, dm htn, hld, sinus bradycardia s/p PPM, marginal cell lymphoma on maintenance rituxan (last chemo was december 21) presents with expressive aphasia and right sided weakness. He called his nephew about 12:30 this afternoon because he was feeling weak. EMS was called; by the time he came to ED he was unable to articulate any words and was only moaning. As per family he was completely fine at 11AM, and had reported no new complaints. In the ED: T 96.4, , 185/124, 97% on RA. Labs were stable since last admission. Code stroke was called. CT head showed no acute changes, but was limited due to motion artifact. He received TPA, CTA/CTP are negative for signs of acute ischemia.    4-Yesterday's Plan:  - Continue ASA  - High intensity statin  - MRI Brain if no contraindication  - Keep BP < 180/110  - Check TTE  - PT/OT/SLP eval and tx  - f/u vEEG    6. Medications:   amLODIPine   Tablet 5 milliGRAM(s) Oral daily  aspirin  chewable 81 milliGRAM(s) Oral daily  atorvastatin 40 milliGRAM(s) Oral at bedtime  budesonide 160 MICROgram(s)/formoterol 4.5 MICROgram(s) Inhaler 2 Puff(s) Inhalation two times a day  chlorhexidine 4% Liquid 1 Application(s) Topical <User Schedule>  dextrose 5%. 1000 milliLiter(s) IV Continuous <Continuous>  dextrose 50% Injectable 12.5 Gram(s) IV Push once  dextrose 50% Injectable 25 Gram(s) IV Push once  dextrose 50% Injectable 25 Gram(s) IV Push once  insulin lispro (HumaLOG) corrective regimen sliding scale   SubCutaneous three times a day before meals  pantoprazole    Tablet 40 milliGRAM(s) Oral before breakfast  polyethylene glycol 3350 17 Gram(s) Oral daily  tiotropium 18 MICROgram(s) Capsule 1 Capsule(s) Inhalation daily    7. Ancillary Management:   Chest PT[ ]   Head of bed >35 [x ]   Out of bed to chair [x ]   PT/OT/SP Eval [x ]   Spirometry[ ]   DVT prophalaxis[x ]    8.Neuro:   Neurologic Exam:  Mental status: Awake, alert and oriented x 3. Attention and concentration intact. Fund of knowledge appropriate.  Language: Naming, repetition, fluency, and comprehension intact. No dysarthria, no aphasia.  Cranial nerves: Pupils equally round and reactive to light, visual fields full, no nystagmus, dysconjugate right eye (strabismus) w/ chronic divergent squint, face symmetric, hearing intact   Motor:  Normal bulk and tone, strength 3/5 in right upper and lower extremities;  strength 3/5. Strength 5/5 in left upper and lower extremities;  strength 5/5. No tremors or tics noted  Sensation: Intact to light touch, proprioception, and noxious stimuli. No neglect.   Coordination: No dysmetria on finger-to-nose. No clumsiness.  Reflexes: 2+ generally    NIH STROKE SCALE  Item	                                                        Score  1 a.	Level of Consciousness	               	0  1 b. LOC Questions	                                0  1 c.	LOC Commands	                               	0  2.	Best Gaze	                                        0  3.	Visual	                                                0  4.	Facial Palsy	                                        0  5 a.	Motor Arm - Left	                                0  5 b.	Motor Arm - Right	                        1  6 a.	Motor Leg - Left	                                0  6 b.	Motor Leg - Right	                                1  7.	Limb Ataxia	                                        0  8.	Sensory	                                                0  9.	Language	                                        0  10.	Dysarthria	                                        0  11.	Extinction and Inattention  	        0  ______________________________________  TOTAL	                                                        2    Total NIHSS on admission:  11    NIHSS yesterday:  11    NIHSS today: 2    mRS:  0 No symptoms at all  1 No significant disability despite symptoms; able to carry out all usual duties and activities without assistance  2 Slight disability; unable to carry out all previous activities, but able to look after own affairs  3 Moderate disability; requiring some help, but able to walk without assistance  4 Moderately severe disability; unable to walk without assistance and unable to attend to own bodily needs without assistance  5 Severe disability; bedridden, incontinent and requiring constant nursing care and attention  6 Dead    Last CTH:< from: CT Head No Cont (07.31.20 @ 11:58) >  IMPRESSION:    Allowing for the limitation from streak artifact, no acute intracranial pathology.    Mild chronic microvascular ischemic disease.    < end of copied text >    Last CTA/MRA: n/a    Last CTP: < from: CT Perfusion w/ Maps w/ IV Cont (07.30.20 @ 15:42) >  IMPRESSION:    1.  No gross evidence of major vascular stenosis or occlusion (artifact is noted in the neck). Normal perfusion images.    2.  Mildly prominent left supraclavicular lymph nodes, nonspecific. Follow-up with a chest CT may be obtained.    < end of copied text >    Last MRI: n/a    Last TCD: n/a    Last EEG: < from: EEG w/ Video Each 12-26 Hours, Unmonitored (08.01.20 @ 12:00) >  Focal Slowing:  None      Generalized Slowing:  None    Interictal Activity  No epileptiform activity    Activation and Provocation Procedures:  Not performed    Events:  No events captured    Impression  This is a normal Video EEG study with no epileptiform or other abnormalities. None of the patient's typical events were captured precluding direct electro-clinical correlation.    < end of copied text >    9. Cardiovascular:   Vital Signs Last 24 Hrs  T(C): 36.4 (03 Aug 2020 13:53), Max: 36.4 (02 Aug 2020 18:00)  T(F): 97.5 (03 Aug 2020 13:53), Max: 97.6 (02 Aug 2020 18:00)  HR: 69 (03 Aug 2020 16:07) (55 - 69)  BP: 145/84 (03 Aug 2020 16:07) (145/84 - 169/91)  BP(mean): 108 (03 Aug 2020 16:07) (96 - 119)  RR: 20 (03 Aug 2020 13:53) (20 - 20)  SpO2: 96% (03 Aug 2020 16:07) (96% - 100%)   Blood Gas Arterial, Lactate (07.31.20 @ 15:28)    Blood Gas Arterial, Lactate: 0.8 mmoL/L    Last Echo: < from: Transthoracic Echocardiogram (07.31.20 @ 09:49) >  Summary:   1. Normal global left ventricular systolic function.   2. LV Ejection Fraction by Chávez's Method with a biplane EF of 56 %.   3. Mild concentric left ventricular hypertrophy.   4. Mildly increased LV wall thickness.   5. Normal left ventricular internal cavity size.   6. LA volume Index is 14.9 ml/m² ml/m2.   7. There is mild aortic root calcification.    PHYSICIAN INTERPRETATION:  Left Ventricle: The left ventricular internal cavity size is normal. Leftventricular wall thickness is mildly increased. There is mild concentric left ventricular hypertrophy. Global LV systolic function was normal.      LV Wall Scoring:  All segments are normal.    Right Ventricle: Normal right ventricular size and function.  Left Atrium: Normal left atrial size. LA volume Index is 14.9 ml/m² ml/m2.  Right Atrium: Normal right atrial size.  Pericardium: There is no evidence of pericardial effusion.  Mitral Valve: Structurally normal mitral valve, with normal leaflet excursion. The mitral valve is normal in structure. No evidence of mitral valve regurgitation is seen.  Tricuspid Valve: Structurally normal tricuspid valve, with normal leaflet excursion. The tricuspid valve is normal in structure. Mild tricuspid regurgitation is visualized.  Aortic Valve: Normal trileaflet aortic valve with normal opening. The aortic valve is normal. No evidence of aortic valve regurgitation is seen.  Pulmonic Valve: Structurally normal pulmonic valve, with normal leaflet excursion. The pulmonic valve is normal. No indication of pulmonic valve regurgitation.  Aorta: The aortic root and ascending aorta are structurally normal, with no evidence of dilitation. There is mild aortic root calcification.  Pulmonary Artery: The mainpulmonary artery is normal in size.  Venous: The inferior vena cava was normal sized, with respiratory size variation greater than 50%.    < end of copied text >    Last EKG: < from: 12 Lead ECG (07.30.20 @ 14:44) >  Diagnosis Line *** Poor data quality, interpretation may be adversely affected  Atrial-sensed ventricular-paced rhythm  Abnormal ECG    < end of copied text >    CVP   MAP/CPP/SBP target:   CO:      CI:       Enzymes/Trop:  Troponin T, Serum (07.30.20 @ 14:04)    Troponin T, Serum: <0.01 ng/mL    10. Respiratory:   ABG: n/a    VBG:  Blood Gas Profile - Venous (07.30.20 @ 13:57)    pH, Venous: 7.35    pCO2, Venous: 49 mmHg    pO2, Venous: 45 mmHg    HCO3, Venous: 27 mmoL/L    Base Excess, Venous: 0.8 mmoL/L    Oxygen Saturation, Venous: 81 %    Chest Xray: < from: Xray Chest 1 View- PORTABLE-Routine (08.01.20 @ 04:37) >  Impression:    No radiographic evidence of acute cardiopulmonary disease.    < end of copied text >    Peak Pressure/[place] Pressure: n/a    11.GI:  Prophalaxis:   pantoprazole      Bowel mvt:     Abd distension:     12.Renal/Fluids/Electrolytes:    08-03    142  |  106  |  15  ----------------------------<  140<H>  3.9   |  26  |  1.0    Ca    8.9      03 Aug 2020 06:03  Mg     1.9     08-03        I&O's Detail    02 Aug 2020 07:01  -  03 Aug 2020 07:00  --------------------------------------------------------  IN:  Total IN: 0 mL    OUT:    Indwelling Catheter - Urethral: 585 mL    Voided: 800 mL  Total OUT: 1385 mL    Total NET: -1385 mL      03 Aug 2020 07:01  -  03 Aug 2020 16:14  --------------------------------------------------------  IN:    Oral Fluid: 240 mL  Total IN: 240 mL    OUT:    Voided: 800 mL  Total OUT: 800 mL    Total NET: -560 mL    13.ID:   Urinalysis (07.03.20 @ 02:30)    pH Urine: 6.5    Blood, Urine: Negative    Glucose Qualitative, Urine: >= 1000 mg/dL    Color: Light Yellow    Urine Appearance: Clear    Bilirubin: Negative    Ketone - Urine: Negative    Specific Gravity: 1.029    Protein, Urine: 30 mg/dL    Urobilinogen: <2 mg/dL    Nitrite: Negative    Leukocyte Esterase Concentration: Negative    Urine Microscopic-Add On (NC) (07.03.20 @ 02:30)    Bacteria: Negative    Epithelial Cells: 0 /HPF    Red Blood Cell - Urine: 0 /HPF    White Blood Cell - Urine: 0 /HPF    Hyaline Casts: 1 /LPF    Lines: Central[] Date inserted: Peripheral[x]    14. Hematology:                         14.9   3.21  )-----------( 158      ( 03 Aug 2020 06:03 )             44.6      08-03    142  |  106  |  15  ----------------------------<  140<H>  3.9   |  26  |  1.0    Ca    8.9      03 Aug 2020 06:03  Mg     1.9     08-03    DVT Prophylaxis Lovenox[ ] Heparin[ ] Venodynes[ ] SCD's[x ]    15. 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 LWK is 11.30 am then developed acute generalized weakness and expressive aphasia and dysarthria. In ED stroke code was called, NIHSS is 11, his CTH is negative for acute pathology and his was within the window for IV tpa which was given at 14.35 pm. vEEG with no indication of seizure activity, CTP negative, and repeat CTH stable. Exam significant only for right sided weakness. NIHSS 2. Will pursue MRI to r/o underlying abnormalities.     16. Suggestions:  -Please obtain MRI brain w/o to exclude abnormalities not seen on CTH  -F/u lyme disease titer  -C/w ASA and high dose statin   -Continue to keep SBP < 180/110  -C/w PT/OT/speech and swallow eval and tx  -Medical management as per primary care team    17. Disposition:  -Continue medical management on CEU    Bruna August, MYRNA  [phone]

## 2020-08-03 NOTE — PHYSICAL THERAPY INITIAL EVALUATION ADULT - GENERAL OBSERVATIONS, REHAB EVAL
pt encountered supine in bed in NAd - agreeable to PT IE - + telemetry BP cuff, pulse ox - /82 in supine - pt with right sided weakness - decreased sensation on right side -

## 2020-08-03 NOTE — CONSULT NOTE ADULT - ASSESSMENT
IMPRESSION: Rehab of CVA / right hemiparesis / dysarthria    PRECAUTIONS: [  ] Cardiac  [  ] Respiratory  [  ] Seizures [  ] Contact Isolation  [  ] Droplet Isolation  [  ] Other    Weight Bearing Status:     RECOMMENDATION:    Out of Bed to Chair     DVT/Decubiti Prophylaxis    REHAB PLAN:     [ x  ] Bedside P/T 3-5 times a week   [  x ]   Bedside O/T  2-3 times a week             [   ] No Rehab Therapy Indicated                   [ x  ]  Speech Therapy   Conditioning/ROM                                    ADL  Bed Mobility                                               Conditioning/ROM  Transfers                                                     Bed Mobility  Sitting /Standing Balance                         Transfers                                        Gait Training                                               Sitting/Standing Balance  Stair Training [   ]Applicable                    Home equipment Eval                                                                        Splinting  [   ] Only      GOALS:   ADL   [   ]   Independent                    Transfers  [   ] Independent                          Ambulation  [   ] Independent     [    ] With device                            [   ]  CG                                                         [   ]  CG                                                                  [   ] CG                            [    ] Min A                                                   [   ] Min A                                                              [   ] Min  A          DISCHARGE PLAN:   [ x  ]  Good candidate for Intensive Rehabilitation/Hospital based                                             Will tolerate 3hrs Intensive Rehab Daily                                       [    ]  Short Term Rehab in Skilled Nursing Facility                                       [    ]  Home with Outpatient or  services                                         [    ]  Possible Candidate for Intensive Hospital based Rehab

## 2020-08-03 NOTE — PROGRESS NOTE ADULT - ASSESSMENT
IMPRESSION:    CVA s/p TPA  HO lymphoma  Probable CARMEN refusing NIV       PLAN:    CNS: Avoid CNS depressants.  FU with Neuro.      HEENT: Oral care    PULMONARY:  HOB @ 45 degrees.  Aspiration precautions. Target SpO2>94%. Encourage NIV during sleep     CARDIOVASCULAR: Continue BP control.  Avoid volume overload     GI: GI prophylaxis.  Feeding per speech and swallow      RENAL:  Follow up lytes.  Correct as needed.     INFECTIOUS DISEASE: No abx.     HEMATOLOGICAL:  DVT prophylaxis.    ENDOCRINE:  Follow up FS.      MUSCULOSKELETAL: OOB to chair with PT / OT

## 2020-08-03 NOTE — PROGRESS NOTE ADULT - SUBJECTIVE AND OBJECTIVE BOX
Patient is a 60y old  Male who presents with a chief complaint of AMS (02 Aug 2020 08:53)        Over Night Events:  Resting in bed.  Did not use NIV last night         ROS:     All ROS are negative except HPI         PHYSICAL EXAM    ICU Vital Signs Last 24 Hrs  T(C): 36.4 (02 Aug 2020 18:00), Max: 36.4 (02 Aug 2020 18:00)  T(F): 97.6 (02 Aug 2020 18:00), Max: 97.6 (02 Aug 2020 18:00)  HR: 55 (03 Aug 2020 00:50) (55 - 70)  BP: 149/84 (02 Aug 2020 18:00) (148/84 - 165/89)  BP(mean): 0 (02 Aug 2020 11:00) (0 - 130)  ABP: --  ABP(mean): --  RR: 20 (02 Aug 2020 18:00) (15 - 21)  SpO2: 100% (03 Aug 2020 00:50) (96% - 100%)      CONSTITUTIONAL:  Well nourished.  NAD    ENT:   Airway patent,   Mouth with normal mucosa.   No thrush    EYES:   Pupils equal,   Round and reactive to light.    CARDIAC:   Normal rate,   Regular rhythm.    No edema      Vascular:  Normal systolic impulse  No Carotid bruits    RESPIRATORY:   No wheezing  Bilateral BS  Normal chest expansion  Not tachypneic,  No use of accessory muscles    GASTROINTESTINAL:  Abdomen soft,   Non-tender,   No guarding,   + BS    MUSCULOSKELETAL:   Range of motion is not limited,  No clubbing, cyanosis    NEUROLOGICAL:   Alert follows simple commands     SKIN:   Skin normal color for race,   Warm and dry and intact.   No evidence of rash.    PSYCHIATRIC:   Normal mood and affect.   No apparent risk to self or others.    HEMATOLOGICAL:  No cervical  lymphadenopathy.  no inguinal lymphadenopathy      08-02-20 @ 07:01  -  08-03-20 @ 07:00  --------------------------------------------------------  IN:  Total IN: 0 mL    OUT:    Indwelling Catheter - Urethral: 585 mL    Voided: 800 mL  Total OUT: 1385 mL    Total NET: -1385 mL          LABS:                            14.9   3.21  )-----------( 158      ( 03 Aug 2020 06:03 )             44.6                                               08-03    142  |  106  |  15  ----------------------------<  140<H>  3.9   |  26  |  1.0    Ca    8.9      03 Aug 2020 06:03  Mg     1.9     08-03                                                                                                                                                                                                                           MEDICATIONS  (STANDING):  aspirin  chewable 81 milliGRAM(s) Oral daily  atorvastatin 40 milliGRAM(s) Oral at bedtime  budesonide 160 MICROgram(s)/formoterol 4.5 MICROgram(s) Inhaler 2 Puff(s) Inhalation two times a day  chlorhexidine 4% Liquid 1 Application(s) Topical <User Schedule>  dextrose 5%. 1000 milliLiter(s) (50 mL/Hr) IV Continuous <Continuous>  dextrose 50% Injectable 12.5 Gram(s) IV Push once  dextrose 50% Injectable 25 Gram(s) IV Push once  dextrose 50% Injectable 25 Gram(s) IV Push once  insulin lispro (HumaLOG) corrective regimen sliding scale   SubCutaneous three times a day before meals  pantoprazole    Tablet 40 milliGRAM(s) Oral before breakfast  polyethylene glycol 3350 17 Gram(s) Oral daily  tiotropium 18 MICROgram(s) Capsule 1 Capsule(s) Inhalation daily    MEDICATIONS  (PRN):  acetaminophen   Tablet .. 650 milliGRAM(s) Oral every 6 hours PRN Severe Pain (7 - 10)  albuterol/ipratropium for Nebulization. 3 milliLiter(s) Nebulizer every 6 hours PRN Shortness of Breath and/or Wheezing  bisacodyl Suppository 10 milliGRAM(s) Rectal daily PRN Constipation  dextrose 40% Gel 15 Gram(s) Oral once PRN Blood Glucose LESS THAN 70 milliGRAM(s)/deciliter  glucagon  Injectable 1 milliGRAM(s) IntraMuscular once PRN Glucose LESS THAN 70 milligrams/deciliter      New X-rays reviewed:                                                                                  ECHO    CXR interpreted by me:

## 2020-08-03 NOTE — SPEECH LANGUAGE PATHOLOGY EVALUATION - COMMENTS
adequate impacted by marcos hoff pt received AAOx4. on RA. scheduled for MRI on this date. no apparent cognitive / linguistic deficits.

## 2020-08-03 NOTE — PROGRESS NOTE ADULT - SUBJECTIVE AND OBJECTIVE BOX
SUBJECTIVE:    Patient is a 60y old Male who presents with a chief complaint of AMS (03 Aug 2020 09:25)    Currently admitted to medicine with the primary diagnosis of Cerebrovascular accident (CVA), unspecified mechanism     Today is hospital day 4d. This morning he is resting comfortably in bed and reports no new issues or overnight events. Reports residual weakness in right upper and lower extremities with some improvement.       PAST MEDICAL & SURGICAL HISTORY  Pacemaker  Non Hodgkin's lymphoma  Chronic obstructive pulmonary disease, unspecified COPD type  DM (diabetes mellitus)  High cholesterol  HTN (hypertension)  S/P transesophageal echocardiogram (ANITA)  Artificial cardiac pacemaker    SOCIAL HISTORY:  Negative for smoking/alcohol/drug use.     ALLERGIES:  acyclovir (Anaphylaxis)  Bactrim (Anaphylaxis)    MEDICATIONS:  STANDING MEDICATIONS  amLODIPine   Tablet 5 milliGRAM(s) Oral daily  aspirin  chewable 81 milliGRAM(s) Oral daily  atorvastatin 40 milliGRAM(s) Oral at bedtime  budesonide 160 MICROgram(s)/formoterol 4.5 MICROgram(s) Inhaler 2 Puff(s) Inhalation two times a day  chlorhexidine 4% Liquid 1 Application(s) Topical <User Schedule>  dextrose 5%. 1000 milliLiter(s) IV Continuous <Continuous>  dextrose 50% Injectable 12.5 Gram(s) IV Push once  dextrose 50% Injectable 25 Gram(s) IV Push once  dextrose 50% Injectable 25 Gram(s) IV Push once  insulin lispro (HumaLOG) corrective regimen sliding scale   SubCutaneous three times a day before meals  pantoprazole    Tablet 40 milliGRAM(s) Oral before breakfast  polyethylene glycol 3350 17 Gram(s) Oral daily  tiotropium 18 MICROgram(s) Capsule 1 Capsule(s) Inhalation daily    PRN MEDICATIONS  acetaminophen   Tablet .. 650 milliGRAM(s) Oral every 6 hours PRN  albuterol/ipratropium for Nebulization. 3 milliLiter(s) Nebulizer every 6 hours PRN  bisacodyl Suppository 10 milliGRAM(s) Rectal daily PRN  dextrose 40% Gel 15 Gram(s) Oral once PRN  glucagon  Injectable 1 milliGRAM(s) IntraMuscular once PRN    VITALS:   T(F): 97.5  HR: 69  BP: 169/91  RR: 20  SpO2: 96%    LABS:                        14.9   3.21  )-----------( 158      ( 03 Aug 2020 06:03 )             44.6     08-03    142  |  106  |  15  ----------------------------<  140<H>  3.9   |  26  |  1.0    Ca    8.9      03 Aug 2020 06:03  Mg     1.9     08-03          RADIOLOGY:      PHYSICAL EXAM:    GEN: NAD, lying bed comfortably  LUNGS: Clear to auscultation bilaterally.   HEART: Regular S1/S2. Bradycardic.   ABD: Soft, non-tender. Bowel sounds present.  EXT: 2+ peripheral pulses. No clubbing, cyanosis, or edema.   NEURO: AAOX3. Muscle strength 3/5 in RUE and RLE, 5/5 LUE and LLE.

## 2020-08-03 NOTE — PHYSICAL THERAPY INITIAL EVALUATION ADULT - LEVEL OF INDEPENDENCE: GAIT, REHAB EVAL
moderate assist (50% patients effort)/pt with difficulty weight bearing on righe leg - able to hop to chair - unsteady - weakness -

## 2020-08-03 NOTE — PROGRESS NOTE ADULT - ASSESSMENT
59 year old male with a PMH of congenital heart disease s/p repair, DM, HTN, DLD, COPD, sinus bradycardia s/p PPM presents with expressive aphasia and right sided weakness. In ED stroke code was called, NIHSS was 11, his CTH was negative for acute pathology and was within the window for IV tpa which was given.     # CVA s/p tpa  - Presented with aphasia and R sided weakness  - Moderate improvement from presentation   - Q4 neuro checks   - Continue ASA and statin   - F/u MRI brain w/o MALDONADO  - PT/OT  - Physiatry noted    # DM   - SSI regimen   - Fingersticks AC     #HTN: controlled  - continue home amlodipine    #COPD  - Continue duoneb, symbicort, spiriva    # Sinus bradycardia   - Follow lyme panel     # Aggressive nisreen marginal zone lymphoma  - maintenance on Rituxan, last dose 12/31  - Per family he has appt with  in August for PET scan    Diet: DASH/TLC  GI PPx: Protonix  DVT PPx: SCDs  Activity: as tolerated  Dispo: CEU for now

## 2020-08-03 NOTE — CONSULT NOTE ADULT - SUBJECTIVE AND OBJECTIVE BOX
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 He called his nephew about 12:30 this afternoon because he was feeling weak. EMS was called; by the time he came to ED he was unable to articulate any words and was only moaning. As per family he was completely fine at 11AM, and had reported no new complaints.    In the ED: T 96.4, , 185/124, 97% on RA. Labs were stable since last admission. Code stroke was called. CT head showed no acute changes, but was limited due to motion artifact. He received TPA and then CT angiogram; read is pending. (30 Jul 2020 15:27)      PAST MEDICAL & SURGICAL HISTORY:  Pacemaker  Non Hodgkin's lymphoma  Chronic obstructive pulmonary disease, unspecified COPD type  DM (diabetes mellitus)  High cholesterol  HTN (hypertension)  S/P transesophageal echocardiogram (ANITA)  Artificial cardiac pacemaker      Hospital Course:    TODAY'S SUBJECTIVE & REVIEW OF SYMPTOMS:     Constitutional WNL   Cardio WNL   Resp WNL   GI WNL  Heme WNL  Endo WNL  Skin WNL  MSK WNL  Neuro right side weakness / numbness  Cognitive WNL  Psych WNL      MEDICATIONS  (STANDING):  aspirin  chewable 81 milliGRAM(s) Oral daily  atorvastatin 40 milliGRAM(s) Oral at bedtime  budesonide 160 MICROgram(s)/formoterol 4.5 MICROgram(s) Inhaler 2 Puff(s) Inhalation two times a day  chlorhexidine 4% Liquid 1 Application(s) Topical <User Schedule>  dextrose 5%. 1000 milliLiter(s) (50 mL/Hr) IV Continuous <Continuous>  dextrose 50% Injectable 12.5 Gram(s) IV Push once  dextrose 50% Injectable 25 Gram(s) IV Push once  dextrose 50% Injectable 25 Gram(s) IV Push once  insulin lispro (HumaLOG) corrective regimen sliding scale   SubCutaneous three times a day before meals  pantoprazole    Tablet 40 milliGRAM(s) Oral before breakfast  polyethylene glycol 3350 17 Gram(s) Oral daily  tiotropium 18 MICROgram(s) Capsule 1 Capsule(s) Inhalation daily    MEDICATIONS  (PRN):  acetaminophen   Tablet .. 650 milliGRAM(s) Oral every 6 hours PRN Severe Pain (7 - 10)  albuterol/ipratropium for Nebulization. 3 milliLiter(s) Nebulizer every 6 hours PRN Shortness of Breath and/or Wheezing  bisacodyl Suppository 10 milliGRAM(s) Rectal daily PRN Constipation  dextrose 40% Gel 15 Gram(s) Oral once PRN Blood Glucose LESS THAN 70 milliGRAM(s)/deciliter  glucagon  Injectable 1 milliGRAM(s) IntraMuscular once PRN Glucose LESS THAN 70 milligrams/deciliter      FAMILY HISTORY:  Family history of cancer (Mother)  Family history of breast cancer (Sibling): in sister at the age of 59      Allergies    acyclovir (Anaphylaxis)  Bactrim (Anaphylaxis)    Intolerances        SOCIAL HISTORY:    [  ] Etoh  [  ] Smoking  [  ] Substance abuse     Home Environment:  [  ] Home Alone  [ x ] Lives with Family  [  ] Home Health Aid    Dwelling:  [  ] Apartment  [ x ] Private House  [  ] Adult Home  [  ] Skilled Nursing Facility      [  ] Short Term  [  ] Long Term  [x  ] Stairs       Elevator [  ]    FUNCTIONAL STATUS PTA: (Check all that apply)  Ambulation: [ x  ]Independent    [  ] Dependent     [  ] Non-Ambulatory  Assistive Device: [  ] SA Cane  [  ]  Q Cane  [  ] Walker  [  ]  Wheelchair  ADL : [x  ] Independent  [  ]  Dependent       Vital Signs Last 24 Hrs  T(C): 36.4 (02 Aug 2020 18:00), Max: 36.4 (02 Aug 2020 18:00)  T(F): 97.6 (02 Aug 2020 18:00), Max: 97.6 (02 Aug 2020 18:00)  HR: 65 (03 Aug 2020 08:01) (55 - 70)  BP: 164/91 (03 Aug 2020 08:01) (148/84 - 165/89)  BP(mean): 119 (03 Aug 2020 08:01) (0 - 121)  RR: 20 (02 Aug 2020 18:00) (15 - 21)  SpO2: 97% (03 Aug 2020 08:01) (96% - 100%)      PHYSICAL EXAM: Alert & Oriented X3  GENERAL: NAD, well-groomed, well-developed  HEAD:  Atraumatic, Normocephalic  CHEST/LUNG: Clear   HEART: S1S2+  ABDOMEN: Soft, Nontender  EXTREMITIES:  no calf tenderness    NERVOUS SYSTEM:  Cranial Nerves 2-12 intact [  ] Abnormal  [ x ]+ dysarthria  ROM: WFL all extremities [  ]  Abnormal [ x ]2-3/5 right side  Motor Strength: WFL all extremities  [  ]  Abnormal [x  ]decreased light touch right side  Sensation: intact to light touch [  ] Abnormal [ x ]  Reflexes: Symmetric [  ]  Abnormal [  ]    FUNCTIONAL STATUS:  Bed Mobility: Independent [  ]  Supervision [  ]  Needs Assistance [ x ]  N/A [  ]  Transfers: Independent [  ]  Supervision [  ]  Needs Assistance [x  ]  N/A [  ]   Ambulation: Independent [  ]  Supervision [  ]  Needs Assistance [  ]  N/A [  ]  ADL: Independent [  ] Requires Assistance [  ] N/A [  ]      LABS:                        14.9   3.21  )-----------( 158      ( 03 Aug 2020 06:03 )             44.6     08-03    142  |  106  |  15  ----------------------------<  140<H>  3.9   |  26  |  1.0    Ca    8.9      03 Aug 2020 06:03  Mg     1.9     08-03            RADIOLOGY & ADDITIONAL STUDIES:

## 2020-08-03 NOTE — SPEECH LANGUAGE PATHOLOGY EVALUATION - SLP PERTINENT HISTORY OF CURRENT PROBLEM
58 y/o M with a pmh of congenital heart disease s/p repair, HTN, DLD, RAA thrombus (was on xarelto and resolved on last ANITA), COPD, dm, sinus bradycardia s/p PPM, marginal cell lymphoma on maintenance rituxan (last chemo was december 21) presents with expressive aphasia and right sided weakness. TPA administered @ 14:35. CTH (-) No MRI completed. Per neurology note, pt w/ L MCA territory infarct.

## 2020-08-03 NOTE — PHYSICAL THERAPY INITIAL EVALUATION ADULT - PERTINENT HX OF CURRENT PROBLEM, REHAB EVAL
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

## 2020-08-04 ENCOUNTER — RECORD ABSTRACTING (OUTPATIENT)
Age: 60
End: 2020-08-04

## 2020-08-04 LAB
ALBUMIN SERPL ELPH-MCNC: 4.6 G/DL — SIGNIFICANT CHANGE UP (ref 3.5–5.2)
ALP SERPL-CCNC: 50 U/L — SIGNIFICANT CHANGE UP (ref 30–115)
ALT FLD-CCNC: 26 U/L — SIGNIFICANT CHANGE UP (ref 0–41)
ANION GAP SERPL CALC-SCNC: 12 MMOL/L — SIGNIFICANT CHANGE UP (ref 7–14)
AST SERPL-CCNC: 26 U/L — SIGNIFICANT CHANGE UP (ref 0–41)
BILIRUB SERPL-MCNC: 2.3 MG/DL — HIGH (ref 0.2–1.2)
BUN SERPL-MCNC: 15 MG/DL — SIGNIFICANT CHANGE UP (ref 10–20)
CALCIUM SERPL-MCNC: 9.5 MG/DL — SIGNIFICANT CHANGE UP (ref 8.5–10.1)
CHLORIDE SERPL-SCNC: 104 MMOL/L — SIGNIFICANT CHANGE UP (ref 98–110)
CO2 SERPL-SCNC: 24 MMOL/L — SIGNIFICANT CHANGE UP (ref 17–32)
CREAT SERPL-MCNC: 1 MG/DL — SIGNIFICANT CHANGE UP (ref 0.7–1.5)
GLUCOSE BLDC GLUCOMTR-MCNC: 125 MG/DL — HIGH (ref 70–99)
GLUCOSE BLDC GLUCOMTR-MCNC: 145 MG/DL — HIGH (ref 70–99)
GLUCOSE BLDC GLUCOMTR-MCNC: 154 MG/DL — HIGH (ref 70–99)
GLUCOSE BLDC GLUCOMTR-MCNC: 242 MG/DL — HIGH (ref 70–99)
GLUCOSE SERPL-MCNC: 142 MG/DL — HIGH (ref 70–99)
HCT VFR BLD CALC: 47.1 % — SIGNIFICANT CHANGE UP (ref 42–52)
HGB BLD-MCNC: 15.7 G/DL — SIGNIFICANT CHANGE UP (ref 14–18)
MAGNESIUM SERPL-MCNC: 2.1 MG/DL — SIGNIFICANT CHANGE UP (ref 1.8–2.4)
MCHC RBC-ENTMCNC: 29.8 PG — SIGNIFICANT CHANGE UP (ref 27–31)
MCHC RBC-ENTMCNC: 33.3 G/DL — SIGNIFICANT CHANGE UP (ref 32–37)
MCV RBC AUTO: 89.5 FL — SIGNIFICANT CHANGE UP (ref 80–94)
NRBC # BLD: 0 /100 WBCS — SIGNIFICANT CHANGE UP (ref 0–0)
PLATELET # BLD AUTO: 161 K/UL — SIGNIFICANT CHANGE UP (ref 130–400)
POTASSIUM SERPL-MCNC: 4.2 MMOL/L — SIGNIFICANT CHANGE UP (ref 3.5–5)
POTASSIUM SERPL-SCNC: 4.2 MMOL/L — SIGNIFICANT CHANGE UP (ref 3.5–5)
PROT SERPL-MCNC: 6.6 G/DL — SIGNIFICANT CHANGE UP (ref 6–8)
RBC # BLD: 5.26 M/UL — SIGNIFICANT CHANGE UP (ref 4.7–6.1)
RBC # FLD: 12.6 % — SIGNIFICANT CHANGE UP (ref 11.5–14.5)
SODIUM SERPL-SCNC: 140 MMOL/L — SIGNIFICANT CHANGE UP (ref 135–146)
WBC # BLD: 3.73 K/UL — LOW (ref 4.8–10.8)
WBC # FLD AUTO: 3.73 K/UL — LOW (ref 4.8–10.8)

## 2020-08-04 RX ADMIN — BUDESONIDE AND FORMOTEROL FUMARATE DIHYDRATE 2 PUFF(S): 160; 4.5 AEROSOL RESPIRATORY (INHALATION) at 09:57

## 2020-08-04 RX ADMIN — PANTOPRAZOLE SODIUM 40 MILLIGRAM(S): 20 TABLET, DELAYED RELEASE ORAL at 09:53

## 2020-08-04 RX ADMIN — Medication 4: at 11:46

## 2020-08-04 RX ADMIN — AMLODIPINE BESYLATE 5 MILLIGRAM(S): 2.5 TABLET ORAL at 05:56

## 2020-08-04 RX ADMIN — TIOTROPIUM BROMIDE 1 CAPSULE(S): 18 CAPSULE ORAL; RESPIRATORY (INHALATION) at 07:38

## 2020-08-04 RX ADMIN — Medication 2: at 17:23

## 2020-08-04 RX ADMIN — ATORVASTATIN CALCIUM 40 MILLIGRAM(S): 80 TABLET, FILM COATED ORAL at 21:17

## 2020-08-04 RX ADMIN — POLYETHYLENE GLYCOL 3350 17 GRAM(S): 17 POWDER, FOR SOLUTION ORAL at 11:47

## 2020-08-04 RX ADMIN — Medication 81 MILLIGRAM(S): at 11:47

## 2020-08-04 RX ADMIN — Medication 650 MILLIGRAM(S): at 10:54

## 2020-08-04 RX ADMIN — BUDESONIDE AND FORMOTEROL FUMARATE DIHYDRATE 2 PUFF(S): 160; 4.5 AEROSOL RESPIRATORY (INHALATION) at 21:17

## 2020-08-04 NOTE — PROGRESS NOTE ADULT - SUBJECTIVE AND OBJECTIVE BOX
Patient is a 60y old  Male who presents with a chief complaint of AMS (04 Aug 2020 06:22)        Over Night Events: Resting in bed.  No SOB at rest.  no HA, N, or Vomiting         ROS:     All ROS are negative except HPI         PHYSICAL EXAM    ICU Vital Signs Last 24 Hrs  T(C): 36.3 (04 Aug 2020 05:55), Max: 36.5 (03 Aug 2020 22:00)  T(F): 97.4 (04 Aug 2020 05:55), Max: 97.7 (03 Aug 2020 22:00)  HR: 68 (04 Aug 2020 05:55) (59 - 69)  BP: 144/76 (04 Aug 2020 05:55) (136/75 - 169/91)  BP(mean): 108 (03 Aug 2020 16:07) (96 - 108)  ABP: --  ABP(mean): --  RR: 20 (04 Aug 2020 05:55) (20 - 20)  SpO2: 95% (04 Aug 2020 05:55) (95% - 96%)      CONSTITUTIONAL:  Well nourished.  NAD    ENT:   Airway patent,   Mouth with normal mucosa.   No thrush    EYES:   Pupils equal,   Round and reactive to light.    CARDIAC:   Normal rate,   Regular rhythm.    No edema      Vascular:  Normal systolic impulse  No Carotid bruits    RESPIRATORY:   No wheezing  Bilateral BS  Normal chest expansion  Not tachypneic,  No use of accessory muscles    GASTROINTESTINAL:  Abdomen soft,   Non-tender,   No guarding,   + BS    MUSCULOSKELETAL:   Range of motion is not limited,  No clubbing, cyanosis    NEUROLOGICAL:   Alert and oriented   Right sided weakness     SKIN:   Skin normal color for race,   Warm and dry and intact.   No evidence of rash.    PSYCHIATRIC:   Normal mood and affect.   No apparent risk to self or others.    HEMATOLOGICAL:  No cervical  lymphadenopathy.  no inguinal lymphadenopathy      08-03-20 @ 07:01  -  08-04-20 @ 07:00  --------------------------------------------------------  IN:    Oral Fluid: 360 mL  Total IN: 360 mL    OUT:    Voided: 1500 mL  Total OUT: 1500 mL    Total NET: -1140 mL          LABS:                            14.9   3.21  )-----------( 158      ( 03 Aug 2020 06:03 )             44.6                                               08-03    142  |  106  |  15  ----------------------------<  140<H>  3.9   |  26  |  1.0    Ca    8.9      03 Aug 2020 06:03  Mg     1.9     08-03                                                                                                                                                                                                                           MEDICATIONS  (STANDING):  amLODIPine   Tablet 5 milliGRAM(s) Oral daily  aspirin  chewable 81 milliGRAM(s) Oral daily  atorvastatin 40 milliGRAM(s) Oral at bedtime  budesonide 160 MICROgram(s)/formoterol 4.5 MICROgram(s) Inhaler 2 Puff(s) Inhalation two times a day  chlorhexidine 4% Liquid 1 Application(s) Topical <User Schedule>  dextrose 5%. 1000 milliLiter(s) (50 mL/Hr) IV Continuous <Continuous>  dextrose 50% Injectable 12.5 Gram(s) IV Push once  dextrose 50% Injectable 25 Gram(s) IV Push once  dextrose 50% Injectable 25 Gram(s) IV Push once  insulin lispro (HumaLOG) corrective regimen sliding scale   SubCutaneous three times a day before meals  pantoprazole    Tablet 40 milliGRAM(s) Oral before breakfast  polyethylene glycol 3350 17 Gram(s) Oral daily  tiotropium 18 MICROgram(s) Capsule 1 Capsule(s) Inhalation daily    MEDICATIONS  (PRN):  acetaminophen   Tablet .. 650 milliGRAM(s) Oral every 6 hours PRN Severe Pain (7 - 10)  albuterol/ipratropium for Nebulization. 3 milliLiter(s) Nebulizer every 6 hours PRN Shortness of Breath and/or Wheezing  bisacodyl Suppository 10 milliGRAM(s) Rectal daily PRN Constipation  dextrose 40% Gel 15 Gram(s) Oral once PRN Blood Glucose LESS THAN 70 milliGRAM(s)/deciliter  glucagon  Injectable 1 milliGRAM(s) IntraMuscular once PRN Glucose LESS THAN 70 milligrams/deciliter      New X-rays reviewed:                                                                                  ECHO    CXR interpreted by me:

## 2020-08-04 NOTE — PROGRESS NOTE ADULT - SUBJECTIVE AND OBJECTIVE BOX
SUBJECTIVE:    Patient is a 60y old Male who presents with a chief complaint of AMS (03 Aug 2020 16:14)    Currently admitted to medicine with the primary diagnosis of Cerebrovascular accident (CVA), unspecified mechanism     Today is hospital day 5d. This morning he is resting comfortably in bed and reports no new issues or overnight events.    PAST MEDICAL & SURGICAL HISTORY  Pacemaker  Non Hodgkin's lymphoma  Chronic obstructive pulmonary disease, unspecified COPD type  DM (diabetes mellitus)  High cholesterol  HTN (hypertension)  S/P transesophageal echocardiogram (ANITA)  Artificial cardiac pacemaker    SOCIAL HISTORY:  Negative for smoking/alcohol/drug use.     ALLERGIES:  acyclovir (Anaphylaxis)  Bactrim (Anaphylaxis)    MEDICATIONS:  STANDING MEDICATIONS  amLODIPine   Tablet 5 milliGRAM(s) Oral daily  aspirin  chewable 81 milliGRAM(s) Oral daily  atorvastatin 40 milliGRAM(s) Oral at bedtime  budesonide 160 MICROgram(s)/formoterol 4.5 MICROgram(s) Inhaler 2 Puff(s) Inhalation two times a day  chlorhexidine 4% Liquid 1 Application(s) Topical <User Schedule>  dextrose 5%. 1000 milliLiter(s) IV Continuous <Continuous>  dextrose 50% Injectable 12.5 Gram(s) IV Push once  dextrose 50% Injectable 25 Gram(s) IV Push once  dextrose 50% Injectable 25 Gram(s) IV Push once  insulin lispro (HumaLOG) corrective regimen sliding scale   SubCutaneous three times a day before meals  pantoprazole    Tablet 40 milliGRAM(s) Oral before breakfast  polyethylene glycol 3350 17 Gram(s) Oral daily  tiotropium 18 MICROgram(s) Capsule 1 Capsule(s) Inhalation daily    PRN MEDICATIONS  acetaminophen   Tablet .. 650 milliGRAM(s) Oral every 6 hours PRN  albuterol/ipratropium for Nebulization. 3 milliLiter(s) Nebulizer every 6 hours PRN  bisacodyl Suppository 10 milliGRAM(s) Rectal daily PRN  dextrose 40% Gel 15 Gram(s) Oral once PRN  glucagon  Injectable 1 milliGRAM(s) IntraMuscular once PRN    VITALS:   T(F): 97.4  HR: 68  BP: 144/76  RR: 20  SpO2: 95%    LABS:                        14.9   3.21  )-----------( 158      ( 03 Aug 2020 06:03 )             44.6     08-03    142  |  106  |  15  ----------------------------<  140<H>  3.9   |  26  |  1.0    Ca    8.9      03 Aug 2020 06:03  Mg     1.9     08-03                    RADIOLOGY:    PHYSICAL EXAM:    GEN: NAD, lying bed comfortably  HEENT: EOMI, PERRLA, conjunctiva and sclera clear. MMM.  LUNGS: Clear to auscultation bilaterally. No rales, rhonchi, or wheezing.  HEART: S1/S2 present. RRR.   ABD: Soft, non-tender, non-distended. Bowel sounds present.  EXT: 2+ peripheral pulses. No clubbing, cyanosis, or edema.   NEURO: AAOX3. Speech clear. No focal neurological deficits. Sensation grossly intact.   Skin: No rashes or lesions. SUBJECTIVE:    Patient is a 60y old Male who presents with a chief complaint of AMS (03 Aug 2020 16:14)    Currently admitted to medicine with the primary diagnosis of Cerebrovascular accident (CVA), unspecified mechanism     Today is hospital day 5d. This morning he is sitting on chair and reports no new issues or overnight events.     PAST MEDICAL & SURGICAL HISTORY  Pacemaker  Non Hodgkin's lymphoma  Chronic obstructive pulmonary disease, unspecified COPD type  DM (diabetes mellitus)  High cholesterol  HTN (hypertension)  S/P transesophageal echocardiogram (ANITA)  Artificial cardiac pacemaker    SOCIAL HISTORY:  Negative for smoking/alcohol/drug use.     ALLERGIES:  acyclovir (Anaphylaxis)  Bactrim (Anaphylaxis)    MEDICATIONS:  STANDING MEDICATIONS  amLODIPine   Tablet 5 milliGRAM(s) Oral daily  aspirin  chewable 81 milliGRAM(s) Oral daily  atorvastatin 40 milliGRAM(s) Oral at bedtime  budesonide 160 MICROgram(s)/formoterol 4.5 MICROgram(s) Inhaler 2 Puff(s) Inhalation two times a day  chlorhexidine 4% Liquid 1 Application(s) Topical <User Schedule>  dextrose 5%. 1000 milliLiter(s) IV Continuous <Continuous>  dextrose 50% Injectable 12.5 Gram(s) IV Push once  dextrose 50% Injectable 25 Gram(s) IV Push once  dextrose 50% Injectable 25 Gram(s) IV Push once  insulin lispro (HumaLOG) corrective regimen sliding scale   SubCutaneous three times a day before meals  pantoprazole    Tablet 40 milliGRAM(s) Oral before breakfast  polyethylene glycol 3350 17 Gram(s) Oral daily  tiotropium 18 MICROgram(s) Capsule 1 Capsule(s) Inhalation daily    PRN MEDICATIONS  acetaminophen   Tablet .. 650 milliGRAM(s) Oral every 6 hours PRN  albuterol/ipratropium for Nebulization. 3 milliLiter(s) Nebulizer every 6 hours PRN  bisacodyl Suppository 10 milliGRAM(s) Rectal daily PRN  dextrose 40% Gel 15 Gram(s) Oral once PRN  glucagon  Injectable 1 milliGRAM(s) IntraMuscular once PRN    VITALS:   T(F): 97.4  HR: 68  BP: 144/76  RR: 20  SpO2: 95%    LABS:                        14.9   3.21  )-----------( 158      ( 03 Aug 2020 06:03 )             44.6     08-03    142  |  106  |  15  ----------------------------<  140<H>  3.9   |  26  |  1.0    Ca    8.9      03 Aug 2020 06:03  Mg     1.9     08-03        RADIOLOGY:    PHYSICAL EXAM:  GEN: NAD, sitting in chair comfrotably  LUNGS: Clear to auscultation bilaterally. No rales, rhonchi, or wheezing.  HEART: S1/S2.   ABD: Soft, mild tenderness on RUQ, bowel sounds present.  EXT: 2+ peripheral pulses. No clubbing, cyanosis, or edema.   NEURO: AAOX3. Speech clear. Muscle strength 4/5 on R bilaterally , 5/5 on L bilaterally.

## 2020-08-04 NOTE — PROGRESS NOTE ADULT - ASSESSMENT
IMPRESSION:    CVA s/p TPA  HO lymphoma  Probable CARMEN refusing NIV       PLAN:    CNS: Avoid CNS depressants.  FU with Neuro.  Neuro note appreciated.  Agree with MRI     HEENT: Oral care    PULMONARY:  HOB @ 45 degrees.  Aspiration precautions. Target SpO2>94%. Encourage NIV during sleep     CARDIOVASCULAR: Continue BP control.  Avoid volume overload     GI: GI prophylaxis.  Feeding per speech and swallow      RENAL:  Follow up lytes.  Correct as needed.     INFECTIOUS DISEASE: No abx.     HEMATOLOGICAL:  DVT prophylaxis.    ENDOCRINE:  Follow up FS.      MUSCULOSKELETAL: OOB to chair with PT / OT     Transfer to floor

## 2020-08-04 NOTE — CHART NOTE - NSCHARTNOTESELECT_GEN_ALL_CORE
Transfer Note Pt presents to ER c/o b/l lower extremity numbness/weakness and b/l palm weakness, unstable gait. Pt reports onset of symptoms began 2 weeks ago and have been progressively getting worse. Pt reports inhaling nitrous oxide for the past few weeks. Denies headache/blurry vision, no change in speech, face symmetrical. AO x 3 oriented to baseline, normal breathing pattern with no difficulty.

## 2020-08-04 NOTE — PROGRESS NOTE ADULT - ASSESSMENT
59 year old male with a PMH of congenital heart disease s/p repair, DM, HTN, DLD, COPD, sinus bradycardia s/p PPM presents with expressive aphasia and right sided weakness. In ED stroke code was called, NIHSS was 11, his CTH was negative for acute pathology and was within the window for IV tpa which was given.     # CVA s/p tpa  - Presented with aphasia and R sided weakness  - Improvement from presentation   - Q4 neuro checks   - Continue ASA and statin   - F/u MRI brain w/o MALDONADO  - F/u lyme panel   - PT/OT  - Physiatry noted    # DM   - SSI regimen   - Fingersticks AC     #HTN  - continue home amlodipine    #COPD  - Continue duoneb, symbicort, spiriva    # Sinus bradycardia   - s/p PPM    # Aggressive nisreen marginal zone lymphoma  - maintenance on Rituxan, last dose 12/31  - Per family he has appt with Dr. Rider in August for PET scan    Diet: DASH/TLC  GI PPx: Protonix  DVT PPx: SCDs  Activity: as tolerated  Dispo: Downgrade to medical floor

## 2020-08-04 NOTE — CHART NOTE - NSCHARTNOTEFT_GEN_A_CORE
CEU Course: Downgraded from ICU on 8/1. Patient neurologically stable. Seen by speech and swallow, OT/PT, physiatry.     ICU Course: 60 year old male who presented with right sided weakness (31 Jul 2020 08:57). Post TPA protocol in ICU with repeat CTH which was negative. ASA restarted.    Assessment:   F/u Brain MRI w/o   F/u neuro recs

## 2020-08-05 ENCOUNTER — TRANSCRIPTION ENCOUNTER (OUTPATIENT)
Age: 60
End: 2020-08-05

## 2020-08-05 ENCOUNTER — INPATIENT (INPATIENT)
Facility: HOSPITAL | Age: 60
LOS: 6 days | Discharge: HOME HEALTH | End: 2020-08-12
Attending: PHYSICAL MEDICINE & REHABILITATION | Admitting: PHYSICAL MEDICINE & REHABILITATION
Payer: MEDICARE

## 2020-08-05 VITALS
TEMPERATURE: 98 F | RESPIRATION RATE: 18 BRPM | SYSTOLIC BLOOD PRESSURE: 159 MMHG | HEART RATE: 70 BPM | DIASTOLIC BLOOD PRESSURE: 79 MMHG

## 2020-08-05 VITALS
DIASTOLIC BLOOD PRESSURE: 82 MMHG | HEART RATE: 71 BPM | WEIGHT: 227.08 LBS | OXYGEN SATURATION: 98 % | RESPIRATION RATE: 18 BRPM | TEMPERATURE: 96 F | HEIGHT: 72 IN | SYSTOLIC BLOOD PRESSURE: 140 MMHG

## 2020-08-05 DIAGNOSIS — Z98.890 OTHER SPECIFIED POSTPROCEDURAL STATES: Chronic | ICD-10-CM

## 2020-08-05 DIAGNOSIS — Z95.0 PRESENCE OF CARDIAC PACEMAKER: Chronic | ICD-10-CM

## 2020-08-05 LAB
ANION GAP SERPL CALC-SCNC: 9 MMOL/L — SIGNIFICANT CHANGE UP (ref 7–14)
BUN SERPL-MCNC: 18 MG/DL — SIGNIFICANT CHANGE UP (ref 10–20)
CALCIUM SERPL-MCNC: 9.5 MG/DL — SIGNIFICANT CHANGE UP (ref 8.5–10.1)
CHLORIDE SERPL-SCNC: 104 MMOL/L — SIGNIFICANT CHANGE UP (ref 98–110)
CO2 SERPL-SCNC: 28 MMOL/L — SIGNIFICANT CHANGE UP (ref 17–32)
CREAT SERPL-MCNC: 1.2 MG/DL — SIGNIFICANT CHANGE UP (ref 0.7–1.5)
GLUCOSE BLDC GLUCOMTR-MCNC: 103 MG/DL — HIGH (ref 70–99)
GLUCOSE BLDC GLUCOMTR-MCNC: 118 MG/DL — HIGH (ref 70–99)
GLUCOSE BLDC GLUCOMTR-MCNC: 131 MG/DL — HIGH (ref 70–99)
GLUCOSE BLDC GLUCOMTR-MCNC: 161 MG/DL — HIGH (ref 70–99)
GLUCOSE SERPL-MCNC: 162 MG/DL — HIGH (ref 70–99)
HCT VFR BLD CALC: 45.2 % — SIGNIFICANT CHANGE UP (ref 42–52)
HGB BLD-MCNC: 14.8 G/DL — SIGNIFICANT CHANGE UP (ref 14–18)
MCHC RBC-ENTMCNC: 29.8 PG — SIGNIFICANT CHANGE UP (ref 27–31)
MCHC RBC-ENTMCNC: 32.7 G/DL — SIGNIFICANT CHANGE UP (ref 32–37)
MCV RBC AUTO: 90.9 FL — SIGNIFICANT CHANGE UP (ref 80–94)
NRBC # BLD: 0 /100 WBCS — SIGNIFICANT CHANGE UP (ref 0–0)
PLATELET # BLD AUTO: 166 K/UL — SIGNIFICANT CHANGE UP (ref 130–400)
POTASSIUM SERPL-MCNC: 4.5 MMOL/L — SIGNIFICANT CHANGE UP (ref 3.5–5)
POTASSIUM SERPL-SCNC: 4.5 MMOL/L — SIGNIFICANT CHANGE UP (ref 3.5–5)
RBC # BLD: 4.97 M/UL — SIGNIFICANT CHANGE UP (ref 4.7–6.1)
RBC # FLD: 12.8 % — SIGNIFICANT CHANGE UP (ref 11.5–14.5)
SODIUM SERPL-SCNC: 141 MMOL/L — SIGNIFICANT CHANGE UP (ref 135–146)
WBC # BLD: 4.25 K/UL — LOW (ref 4.8–10.8)
WBC # FLD AUTO: 4.25 K/UL — LOW (ref 4.8–10.8)

## 2020-08-05 PROCEDURE — 93010 ELECTROCARDIOGRAM REPORT: CPT

## 2020-08-05 RX ORDER — AMLODIPINE BESYLATE 2.5 MG/1
1 TABLET ORAL
Qty: 0 | Refills: 0 | DISCHARGE
Start: 2020-08-05

## 2020-08-05 RX ORDER — POLYETHYLENE GLYCOL 3350 17 G/17G
17 POWDER, FOR SOLUTION ORAL
Qty: 0 | Refills: 0 | DISCHARGE
Start: 2020-08-05

## 2020-08-05 RX ORDER — METFORMIN HYDROCHLORIDE 850 MG/1
500 TABLET ORAL
Refills: 0 | Status: DISCONTINUED | OUTPATIENT
Start: 2020-08-05 | End: 2020-08-12

## 2020-08-05 RX ORDER — POLYETHYLENE GLYCOL 3350 17 G/17G
17 POWDER, FOR SOLUTION ORAL
Refills: 0 | Status: DISCONTINUED | OUTPATIENT
Start: 2020-08-05 | End: 2020-08-12

## 2020-08-05 RX ORDER — ATORVASTATIN CALCIUM 80 MG/1
40 TABLET, FILM COATED ORAL AT BEDTIME
Refills: 0 | Status: DISCONTINUED | OUTPATIENT
Start: 2020-08-05 | End: 2020-08-12

## 2020-08-05 RX ORDER — ATORVASTATIN CALCIUM 80 MG/1
1 TABLET, FILM COATED ORAL
Qty: 0 | Refills: 0 | DISCHARGE
Start: 2020-08-05

## 2020-08-05 RX ORDER — ASPIRIN/CALCIUM CARB/MAGNESIUM 324 MG
1 TABLET ORAL
Qty: 0 | Refills: 0 | DISCHARGE
Start: 2020-08-05

## 2020-08-05 RX ORDER — TIOTROPIUM BROMIDE 18 UG/1
1 CAPSULE ORAL; RESPIRATORY (INHALATION) DAILY
Refills: 0 | Status: DISCONTINUED | OUTPATIENT
Start: 2020-08-06 | End: 2020-08-12

## 2020-08-05 RX ORDER — PANTOPRAZOLE SODIUM 20 MG/1
40 TABLET, DELAYED RELEASE ORAL
Refills: 0 | Status: DISCONTINUED | OUTPATIENT
Start: 2020-08-05 | End: 2020-08-12

## 2020-08-05 RX ORDER — METFORMIN HYDROCHLORIDE 850 MG/1
1000 TABLET ORAL
Refills: 0 | Status: DISCONTINUED | OUTPATIENT
Start: 2020-08-05 | End: 2020-08-05

## 2020-08-05 RX ORDER — LANOLIN ALCOHOL/MO/W.PET/CERES
5 CREAM (GRAM) TOPICAL AT BEDTIME
Refills: 0 | Status: DISCONTINUED | OUTPATIENT
Start: 2020-08-05 | End: 2020-08-12

## 2020-08-05 RX ORDER — ASPIRIN/CALCIUM CARB/MAGNESIUM 324 MG
81 TABLET ORAL DAILY
Refills: 0 | Status: DISCONTINUED | OUTPATIENT
Start: 2020-08-05 | End: 2020-08-12

## 2020-08-05 RX ORDER — HEPARIN SODIUM 5000 [USP'U]/ML
5000 INJECTION INTRAVENOUS; SUBCUTANEOUS EVERY 8 HOURS
Refills: 0 | Status: DISCONTINUED | OUTPATIENT
Start: 2020-08-05 | End: 2020-08-12

## 2020-08-05 RX ORDER — SENNA PLUS 8.6 MG/1
2 TABLET ORAL AT BEDTIME
Refills: 0 | Status: DISCONTINUED | OUTPATIENT
Start: 2020-08-05 | End: 2020-08-12

## 2020-08-05 RX ORDER — ASPIRIN/CALCIUM CARB/MAGNESIUM 324 MG
1 TABLET ORAL
Qty: 0 | Refills: 0 | DISCHARGE

## 2020-08-05 RX ORDER — ATORVASTATIN CALCIUM 80 MG/1
1 TABLET, FILM COATED ORAL
Qty: 0 | Refills: 0 | DISCHARGE

## 2020-08-05 RX ORDER — AMLODIPINE BESYLATE 2.5 MG/1
1 TABLET ORAL
Qty: 0 | Refills: 0 | DISCHARGE

## 2020-08-05 RX ORDER — SENNA PLUS 8.6 MG/1
2 TABLET ORAL AT BEDTIME
Refills: 0 | Status: DISCONTINUED | OUTPATIENT
Start: 2020-08-05 | End: 2020-08-05

## 2020-08-05 RX ORDER — PIOGLITAZONE HYDROCHLORIDE 15 MG/1
30 TABLET ORAL DAILY
Refills: 0 | Status: DISCONTINUED | OUTPATIENT
Start: 2020-08-05 | End: 2020-08-05

## 2020-08-05 RX ORDER — BUDESONIDE AND FORMOTEROL FUMARATE DIHYDRATE 160; 4.5 UG/1; UG/1
2 AEROSOL RESPIRATORY (INHALATION)
Refills: 0 | Status: DISCONTINUED | OUTPATIENT
Start: 2020-08-05 | End: 2020-08-12

## 2020-08-05 RX ORDER — AMLODIPINE BESYLATE 2.5 MG/1
5 TABLET ORAL
Refills: 0 | Status: DISCONTINUED | OUTPATIENT
Start: 2020-08-06 | End: 2020-08-12

## 2020-08-05 RX ORDER — ACETAMINOPHEN 500 MG
2 TABLET ORAL
Qty: 0 | Refills: 0 | DISCHARGE
Start: 2020-08-05

## 2020-08-05 RX ORDER — ACETAMINOPHEN 500 MG
650 TABLET ORAL EVERY 6 HOURS
Refills: 0 | Status: DISCONTINUED | OUTPATIENT
Start: 2020-08-05 | End: 2020-08-12

## 2020-08-05 RX ORDER — ALBUTEROL 90 UG/1
2 AEROSOL, METERED ORAL EVERY 6 HOURS
Refills: 0 | Status: DISCONTINUED | OUTPATIENT
Start: 2020-08-05 | End: 2020-08-12

## 2020-08-05 RX ADMIN — Medication 81 MILLIGRAM(S): at 11:35

## 2020-08-05 RX ADMIN — METFORMIN HYDROCHLORIDE 500 MILLIGRAM(S): 850 TABLET ORAL at 17:38

## 2020-08-05 RX ADMIN — BUDESONIDE AND FORMOTEROL FUMARATE DIHYDRATE 2 PUFF(S): 160; 4.5 AEROSOL RESPIRATORY (INHALATION) at 21:33

## 2020-08-05 RX ADMIN — TIOTROPIUM BROMIDE 1 CAPSULE(S): 18 CAPSULE ORAL; RESPIRATORY (INHALATION) at 07:45

## 2020-08-05 RX ADMIN — POLYETHYLENE GLYCOL 3350 17 GRAM(S): 17 POWDER, FOR SOLUTION ORAL at 17:38

## 2020-08-05 RX ADMIN — BUDESONIDE AND FORMOTEROL FUMARATE DIHYDRATE 2 PUFF(S): 160; 4.5 AEROSOL RESPIRATORY (INHALATION) at 08:30

## 2020-08-05 RX ADMIN — PANTOPRAZOLE SODIUM 40 MILLIGRAM(S): 20 TABLET, DELAYED RELEASE ORAL at 08:32

## 2020-08-05 RX ADMIN — ATORVASTATIN CALCIUM 40 MILLIGRAM(S): 80 TABLET, FILM COATED ORAL at 21:34

## 2020-08-05 RX ADMIN — POLYETHYLENE GLYCOL 3350 17 GRAM(S): 17 POWDER, FOR SOLUTION ORAL at 11:35

## 2020-08-05 RX ADMIN — Medication 10 MILLIGRAM(S): at 21:34

## 2020-08-05 RX ADMIN — AMLODIPINE BESYLATE 5 MILLIGRAM(S): 2.5 TABLET ORAL at 06:03

## 2020-08-05 RX ADMIN — HEPARIN SODIUM 5000 UNIT(S): 5000 INJECTION INTRAVENOUS; SUBCUTANEOUS at 21:33

## 2020-08-05 RX ADMIN — Medication 2: at 08:26

## 2020-08-05 NOTE — PROGRESS NOTE ADULT - SUBJECTIVE AND OBJECTIVE BOX
Patient is a 60y old  Male who presents with a chief complaint of Stroke (05 Aug 2020 10:11)        Over Night Events:  Overall doing well.  OOB to chair           ROS:     All ROS are negative except HPI         PHYSICAL EXAM    ICU Vital Signs Last 24 Hrs  T(C): 36.3 (04 Aug 2020 21:58), Max: 36.3 (04 Aug 2020 14:26)  T(F): 97.4 (04 Aug 2020 21:58), Max: 97.4 (04 Aug 2020 14:26)  HR: 68 (05 Aug 2020 06:00) (68 - 76)  BP: 126/79 (05 Aug 2020 06:00) (126/79 - 135/72)  BP(mean): 95 (04 Aug 2020 14:26) (95 - 95)  ABP: --  ABP(mean): --  RR: 18 (04 Aug 2020 21:58) (18 - 18)  SpO2: 96% (04 Aug 2020 21:58) (96% - 96%)      CONSTITUTIONAL:  Well nourished.  NAD    ENT:   Airway patent,   Mouth with normal mucosa.   No thrush    EYES:   Pupils equal,   Round and reactive to light.    CARDIAC:   Normal rate,   Regular rhythm.    No edema      Vascular:  Normal systolic impulse  No Carotid bruits    RESPIRATORY:   No wheezing  Bilateral BS  Normal chest expansion  Not tachypneic,  No use of accessory muscles    GASTROINTESTINAL:  Abdomen soft,   Non-tender,   No guarding,   + BS    MUSCULOSKELETAL:   Range of motion is not limited,  No clubbing, cyanosis    NEUROLOGICAL:   Alert and oriented       SKIN:   Skin normal color for race,   Warm and dry and intact.   No evidence of rash.    PSYCHIATRIC:   Normal mood and affect.   No apparent risk to self or others.    HEMATOLOGICAL:  No cervical  lymphadenopathy.  no inguinal lymphadenopathy      08-04-20 @ 07:01  -  08-05-20 @ 07:00  --------------------------------------------------------  IN:  Total IN: 0 mL    OUT:    Voided: 800 mL  Total OUT: 800 mL    Total NET: -800 mL          LABS:                            14.8   4.25  )-----------( 166      ( 05 Aug 2020 06:02 )             45.2                                               08-05    141  |  104  |  18  ----------------------------<  162<H>  4.5   |  28  |  1.2    Ca    9.5      05 Aug 2020 06:02  Mg     2.1     08-04    TPro  6.6  /  Alb  4.6  /  TBili  2.3<H>  /  DBili  x   /  AST  26  /  ALT  26  /  AlkPhos  50  08-04                                                                                           LIVER FUNCTIONS - ( 04 Aug 2020 08:19 )  Alb: 4.6 g/dL / Pro: 6.6 g/dL / ALK PHOS: 50 U/L / ALT: 26 U/L / AST: 26 U/L / GGT: x                                                                                                                                       MEDICATIONS  (STANDING):  amLODIPine   Tablet 5 milliGRAM(s) Oral daily  aspirin  chewable 81 milliGRAM(s) Oral daily  atorvastatin 40 milliGRAM(s) Oral at bedtime  budesonide 160 MICROgram(s)/formoterol 4.5 MICROgram(s) Inhaler 2 Puff(s) Inhalation two times a day  chlorhexidine 4% Liquid 1 Application(s) Topical <User Schedule>  dextrose 5%. 1000 milliLiter(s) (50 mL/Hr) IV Continuous <Continuous>  dextrose 50% Injectable 12.5 Gram(s) IV Push once  dextrose 50% Injectable 25 Gram(s) IV Push once  dextrose 50% Injectable 25 Gram(s) IV Push once  insulin lispro (HumaLOG) corrective regimen sliding scale   SubCutaneous three times a day before meals  pantoprazole    Tablet 40 milliGRAM(s) Oral before breakfast  polyethylene glycol 3350 17 Gram(s) Oral daily  tiotropium 18 MICROgram(s) Capsule 1 Capsule(s) Inhalation daily    MEDICATIONS  (PRN):  acetaminophen   Tablet .. 650 milliGRAM(s) Oral every 6 hours PRN Severe Pain (7 - 10)  albuterol/ipratropium for Nebulization. 3 milliLiter(s) Nebulizer every 6 hours PRN Shortness of Breath and/or Wheezing  bisacodyl Suppository 10 milliGRAM(s) Rectal daily PRN Constipation  dextrose 40% Gel 15 Gram(s) Oral once PRN Blood Glucose LESS THAN 70 milliGRAM(s)/deciliter  glucagon  Injectable 1 milliGRAM(s) IntraMuscular once PRN Glucose LESS THAN 70 milligrams/deciliter      New X-rays reviewed:                                                                                  ECHO    CXR interpreted by me:

## 2020-08-05 NOTE — DISCHARGE NOTE PROVIDER - NSDCFUSCHEDAPPT_GEN_ALL_CORE_FT
[facility] ; 08/18/2020 ; [facility] [facility] ; 08/19/2020 ; [facility] [address]

## 2020-08-05 NOTE — PROGRESS NOTE ADULT - ASSESSMENT
IMPRESSION:    CVA s/p TPA  HO lymphoma  Probable CARMEN refusing NIV       PLAN:    CNS: Avoid CNS depressants.  FU with Neuro.     HEENT: Oral care    PULMONARY:  HOB @ 45 degrees.  Aspiration precautions. Target SpO2>94%.     CARDIOVASCULAR: Continue BP control.  Avoid volume overload     GI: GI prophylaxis.  Feeding per speech and swallow      RENAL:  Follow up lytes.  Correct as needed.     INFECTIOUS DISEASE: No abx.     HEMATOLOGICAL:  DVT prophylaxis.    ENDOCRINE:  Follow up FS.      MUSCULOSKELETAL: OOB to chair with PT / OT     Transfer to rehab    DW neuro agree with transfer to rehab

## 2020-08-05 NOTE — DISCHARGE NOTE PROVIDER - NSDCMRMEDTOKEN_GEN_ALL_CORE_FT
acetaminophen 325 mg oral tablet: 2 tab(s) orally every 6 hours, As needed, Severe Pain (7 - 10)  amLODIPine 5 mg oral tablet: 1 tab(s) orally once a day  aspirin 81 mg oral tablet, chewable: 1 tab(s) orally once a day  atorvastatin 40 mg oral tablet: 1 tab(s) orally once a day (at bedtime)  bisacodyl 10 mg rectal suppository: 1 suppository(ies) rectal once a day, As needed, Constipation  budesonide-formoterol 160 mcg-4.5 mcg/inh inhalation aerosol: 2 puff(s) inhaled 2 times a day  ezetimibe 10 mg oral tablet: 1 tab(s) orally once a day  ipratropium-albuterol 0.5 mg-2.5 mg/3 mLinhalation solution: 3 milliliter(s) inhaled every 6 hours, As needed, Shortness of Breath and/or Wheezing  Jardiance 25 mg oral tablet: 1 tab(s) orally once a day (in the morning)  metFORMIN: 1000 milligram(s) orally 2 times a day  pioglitazone 30 mg oral tablet: 1 tab(s) orally once a day  polyethylene glycol 3350 oral powder for reconstitution: 17 gram(s) orally once a day  Spiriva Respimat 1.25 mcg/inh inhalation aerosol: 2 puff(s) inhaled once a day  Tresiba 100 units/mL subcutaneous solution: 30 unit(s) subcutaneous once a day  Trulicity Pen 1.5 mg/0.5 mL subcutaneous solution:

## 2020-08-05 NOTE — DISCHARGE NOTE PROVIDER - CARE PROVIDERS DIRECT ADDRESSES
,[email or web address]

## 2020-08-05 NOTE — DISCHARGE NOTE PROVIDER - NSDCHOSPICE_GEN_A_CORE
Pt is concerned with a minimal burning sensation in left upper arm.  Sat AM - Pt woke with this discomfort in arm - has had for past 3 mornings.  Constant burning sensation - in deltoid and down arm and some up into neck.  No Numbness tingling in hand/fingers.   Rates discomfort at 2.   Pt had BP checked this past Friday on left arm -same arm as discomfort.  Pt is not taking anything for discomfort.  Discomfort worse when she raises her arm.  Pt is concerned about this change - advise    No

## 2020-08-05 NOTE — H&P ADULT - NSHPLABSRESULTS_GEN_ALL_CORE
CTH: mild microvascular ischemic disease, no large territory CVA    CT H/N: 1.  No gross evidence of major vascular stenosis or occlusion (artifact is noted in the neck). Normal perfusion images.    2.  Mildly prominent left supraclavicular lymph nodes, nonspecific. Follow-up with a chest CT may be obtained.      CT abdomen/pelv:  No acute findings in the abdomen or pelvis.  Redemonstration of multiple subcentimeter mesenteric lymph nodes with associated mesenteric haziness, not significantly changed from prior CT - in this patient with history of lymphoma.

## 2020-08-05 NOTE — DISCHARGE NOTE PROVIDER - INSTRUCTIONS
Avoid excessive fat and carbohydrates. Increase consumption of fruits, vegetables and fibers. Avoid excessive alcohol intake.

## 2020-08-05 NOTE — H&P ADULT - NSHPREVIEWOFSYSTEMS_GEN_ALL_CORE
REVIEW OF SYSTEMS  Constitutional: No fever, No Chills, No fatigue  HEENT: No eye pain, No visual disturbances, No difficulty hearing  Pulm: No cough,  No shortness of breath  Cardio: No chest pain, No palpitations  GI:  Patient reports no BM in 6 days but recently passing gas. TTP in RLQ  : No dysuria, No frequency, No hematuria  Neuro: +Weakness and numbness in RUE and RLE  Skin: No itching, No rashes, No lesions   Endo: No temperature intolerance  MSK: No joint pain, No joint swelling, No muscle pain, No Neck or back pain  Psych:  No depression, No anxiety

## 2020-08-05 NOTE — PROGRESS NOTE ADULT - SUBJECTIVE AND OBJECTIVE BOX
SUBJECTIVE:  Patient is a 60y old Male who presents with a chief complaint of AMS (03 Aug 2020 16:14)    Currently admitted to medicine with the primary diagnosis of Cerebrovascular accident (CVA), unspecified mechanism     Today is hospital day 5d. This morning he is laying comfortable in bed and reports no overnight events.       PAST MEDICAL & SURGICAL HISTORY  Pacemaker  Non Hodgkin's lymphoma  Chronic obstructive pulmonary disease, unspecified COPD type  DM (diabetes mellitus)  High cholesterol  HTN (hypertension)  S/P transesophageal echocardiogram (ANITA)  Artificial cardiac pacemaker    SOCIAL HISTORY:  Negative for smoking/alcohol/drug use.     ALLERGIES:  acyclovir (Anaphylaxis)  Bactrim (Anaphylaxis)    MEDICATIONS:  STANDING MEDICATIONS  amLODIPine   Tablet 5 milliGRAM(s) Oral daily  aspirin  chewable 81 milliGRAM(s) Oral daily  atorvastatin 40 milliGRAM(s) Oral at bedtime  budesonide 160 MICROgram(s)/formoterol 4.5 MICROgram(s) Inhaler 2 Puff(s) Inhalation two times a day  chlorhexidine 4% Liquid 1 Application(s) Topical <User Schedule>  dextrose 5%. 1000 milliLiter(s) IV Continuous <Continuous>  dextrose 50% Injectable 12.5 Gram(s) IV Push once  dextrose 50% Injectable 25 Gram(s) IV Push once  dextrose 50% Injectable 25 Gram(s) IV Push once  insulin lispro (HumaLOG) corrective regimen sliding scale   SubCutaneous three times a day before meals  pantoprazole    Tablet 40 milliGRAM(s) Oral before breakfast  polyethylene glycol 3350 17 Gram(s) Oral daily  tiotropium 18 MICROgram(s) Capsule 1 Capsule(s) Inhalation daily    PRN MEDICATIONS  acetaminophen   Tablet .. 650 milliGRAM(s) Oral every 6 hours PRN  albuterol/ipratropium for Nebulization. 3 milliLiter(s) Nebulizer every 6 hours PRN  bisacodyl Suppository 10 milliGRAM(s) Rectal daily PRN  dextrose 40% Gel 15 Gram(s) Oral once PRN  glucagon  Injectable 1 milliGRAM(s) IntraMuscular once PRN    VITALS:   T(F): 97.4  HR: 68  BP: 126/79  RR: 18  SpO2: 96%    LABS:                        14.8   4.25  )-----------( 166      ( 05 Aug 2020 06:02 )             45.2     08-05    141  |  104  |  18  ----------------------------<  162<H>  4.5   |  28  |  1.2    Ca    9.5      05 Aug 2020 06:02  Mg     2.1     08-04    TPro  6.6  /  Alb  4.6  /  TBili  2.3<H>  /  DBili  x   /  AST  26  /  ALT  26  /  AlkPhos  50  08-04      PHYSICAL EXAM:    GEN: NAD, lying bed comfortably  LUNGS: Clear to auscultation bilaterally.   HEART: S1 and S2. RRR.   ABD: Soft, mild tenderness on RUQ, bowel sounds present.  EXT: 2+ peripheral pulses. No edema.   NEURO: AAOX3. Speech clear. Muscle strength 4/5 on R bilaterally , 5/5 on L bilaterally. SUBJECTIVE:  Patient is a 60y old Male who presents with a chief complaint of AMS (03 Aug 2020 16:14)    Currently admitted to medicine with the primary diagnosis of Cerebrovascular accident (CVA), unspecified mechanism     Today is hospital day 6. This morning he is laying comfortable in bed and reports no overnight events.       PAST MEDICAL & SURGICAL HISTORY  Pacemaker  Non Hodgkin's lymphoma  Chronic obstructive pulmonary disease, unspecified COPD type  DM (diabetes mellitus)  High cholesterol  HTN (hypertension)  S/P transesophageal echocardiogram (ANITA)  Artificial cardiac pacemaker    SOCIAL HISTORY:  Negative for smoking/alcohol/drug use.     ALLERGIES:  acyclovir (Anaphylaxis)  Bactrim (Anaphylaxis)    MEDICATIONS:  STANDING MEDICATIONS  amLODIPine   Tablet 5 milliGRAM(s) Oral daily  aspirin  chewable 81 milliGRAM(s) Oral daily  atorvastatin 40 milliGRAM(s) Oral at bedtime  budesonide 160 MICROgram(s)/formoterol 4.5 MICROgram(s) Inhaler 2 Puff(s) Inhalation two times a day  chlorhexidine 4% Liquid 1 Application(s) Topical <User Schedule>  dextrose 5%. 1000 milliLiter(s) IV Continuous <Continuous>  dextrose 50% Injectable 12.5 Gram(s) IV Push once  dextrose 50% Injectable 25 Gram(s) IV Push once  dextrose 50% Injectable 25 Gram(s) IV Push once  insulin lispro (HumaLOG) corrective regimen sliding scale   SubCutaneous three times a day before meals  pantoprazole    Tablet 40 milliGRAM(s) Oral before breakfast  polyethylene glycol 3350 17 Gram(s) Oral daily  tiotropium 18 MICROgram(s) Capsule 1 Capsule(s) Inhalation daily    PRN MEDICATIONS  acetaminophen   Tablet .. 650 milliGRAM(s) Oral every 6 hours PRN  albuterol/ipratropium for Nebulization. 3 milliLiter(s) Nebulizer every 6 hours PRN  bisacodyl Suppository 10 milliGRAM(s) Rectal daily PRN  dextrose 40% Gel 15 Gram(s) Oral once PRN  glucagon  Injectable 1 milliGRAM(s) IntraMuscular once PRN    VITALS:   T(F): 97.4  HR: 68  BP: 126/79  RR: 18  SpO2: 96%    LABS:                        14.8   4.25  )-----------( 166      ( 05 Aug 2020 06:02 )             45.2     08-05    141  |  104  |  18  ----------------------------<  162<H>  4.5   |  28  |  1.2    Ca    9.5      05 Aug 2020 06:02  Mg     2.1     08-04    TPro  6.6  /  Alb  4.6  /  TBili  2.3<H>  /  DBili  x   /  AST  26  /  ALT  26  /  AlkPhos  50  08-04      PHYSICAL EXAM:    GEN: NAD, lying bed comfortably  LUNGS: Clear to auscultation bilaterally.   HEART: S1 and S2. RRR.   ABD: Soft, mild tenderness on RUQ, bowel sounds present.  EXT: 2+ peripheral pulses. No edema.   NEURO: AAOX3. Speech clear. Muscle strength 4/5 on R bilaterally , 5/5 on L bilaterally.

## 2020-08-05 NOTE — DISCHARGE NOTE NURSING/CASE MANAGEMENT/SOCIAL WORK - PATIENT PORTAL LINK FT
You can access the FollowMyHealth Patient Portal offered by [facility] by registering at the following website: [email or web address]. By joining [facility]’s FollowMyHealth portal, you will also be able to view your health information using other applications (apps) compatible with our system.

## 2020-08-05 NOTE — DISCHARGE NOTE PROVIDER - PROVIDER TOKENS
PROVIDER:[TOKEN:[[id number]],FOLLOWUP:[2 weeks],ESTABLISHEDPATIENT:[T]],PROVIDER:[TOKEN:[[id number]],FOLLOWUP:[2 weeks]]

## 2020-08-05 NOTE — DISCHARGE NOTE PROVIDER - NSDCCPCAREPLAN_GEN_ALL_CORE_FT
PRINCIPAL DISCHARGE DIAGNOSIS  Diagnosis: Cerebrovascular accident (CVA), unspecified mechanism  Assessment and Plan of Treatment: You were admitted to the hospital because of a stroke and you were given a clot busting agent and you were monitored in the intensive care unit. A CT scan showed no significant blockage in the vessels of the head and neck. You were started on aspirin and the cholesterol medication lipitor and you will be discharged to inpatient rehab. Follow up with your primary care doctor and neurologist within 2 weeks of discharge from rehab. Continue taking your mediation as prescribed. Avoid smoking and fatty foods. Make sure that your diabetes and cholesterol levels are under control. Engage in regular physical exercise as tolerated. Call 911 if you experience confusion, weakness or paralysis, numbness or tingling, severe headache, vertigo or imbalance.

## 2020-08-05 NOTE — DISCHARGE NOTE PROVIDER - CARE PROVIDER_API CALL
Kelle Gasca  NEUROLOGY  [address] [place] [address]  Phone: [phone]  Fax: [phone]  Established Patient  Follow Up Time: 2 weeks    Demarcus Herron  FAMILY MEDICINE  [address] [place] [address]  Phone: [phone]  Fax: [phone]  Follow Up Time: 2 weeks

## 2020-08-05 NOTE — H&P ADULT - ASSESSMENT
59 M (R handed) with a pmh of CHD s/p repair, HTN, DLD, RAA thrombus (was on xarelto and resolved on last ANITA), COPD, DM, sinus bradycardia s/p PPM, marginal cell lymphoma on maintenance rituxan (last chemo was december 21) presents with expressive aphasia and right sided weakness found to have left CVA clinically with right hemiparesis and mild aphasia. Pending MRI for further evaluation.      #CVA with right hemiparesis (R hand dominant) and aphasia  -S/p TPA  -CTH appreciated  -Pending MRI brain  -Start PT/OT/SLP  -Continue ASA and statin  - F/u MRI brain w/o MALODNADO  - F/u lyme panel     #RLQ Abdominal tenderness/constipation  -Chronic   -Recent abdominal imaging appreciated  -Continue Miralax and dulcolax    # Aggressive nisreen marginal zone lymphoma  - maintenance on Rituxan, last dose 12/31  - Per family he has appt with Dr. Rider in August for PET scan    # DM   - SSI regimen   - Fingersticks AC   -Continue metformin    #HTN  - continue home amlodipine    #COPD  - Continue duoneb, symbicort, spiriva    # Sinus bradycardia   - s/p PPM    Diet: DASH/TLC  GI PPx: Protonix  DVT PPx: SCDs  Activity: as tolerated    Plan discussed with Dr. Vergara 59 M (R handed) with a pmh of CHD s/p repair, HTN, DLD, RAA thrombus (was on xarelto and resolved on last ANITA), COPD, DM, sinus bradycardia s/p PPM, marginal cell lymphoma on maintenance rituxan (last chemo was december 21) presents with expressive aphasia and right sided weakness found to have left CVA clinically with right hemiparesis and mild aphasia. Pending MRI for further evaluation.      #CVA with right hemiparesis (R hand dominant) and aphasia  -S/p TPA  -CTH appreciated  -Follow for clearance if MRI brain compatible with PPM  -Start PT/OT/SLP  -Continue ASA and statin  - F/u MRI brain w/o MALDONADO  - F/u lyme panel     #RLQ Abdominal tenderness/constipation  -Chronic   -Recent abdominal imaging appreciated  -Continue Miralax daily and add dulcolax 2 tabs today only    # Aggressive nisreen marginal zone lymphoma  - maintenance on Rituxan, last dose 12/31  - Per family he has appt with Dr. Rider in August for PET scan    # DM   - SSI regimen   - Fingersticks AC   -Continue metformin    #HTN  - continue home amlodipine    #COPD  - Continue duoneb, symbicort, spiriva    # Sinus bradycardia   - s/p PPM    Diet: DASH/TLC  GI PPx: Protonix  DVT PPx: SCDs  Activity: as tolerated    Plan discussed with Dr. Vergara 59 M (R handed) with a pmh of CHD s/p repair, HTN, DLD, RAA thrombus (was on xarelto and resolved on last ANITA), COPD, DM, sinus bradycardia s/p PPM, marginal cell lymphoma on maintenance rituxan (last chemo was december 21) presents with expressive aphasia and right sided weakness found to have left CVA clinically with right hemiparesis and mild aphasia. Pending MRI for further evaluation.      MEDICAL PROGNOSIS: GOOD            REHAB POTENTIAL: GOOD             ESTIMATED DISPOSITION: HOME WITH HOME CARE              ELOS:  [    ]  7-14 Days      [ x   ] 14 - 21 Days    [    ]  Other    THERAPY ORDERS and INITIAL INDIVIDUALIZED PLAN OF CARE:  This initial indivualized interdisciplinary plan of care, which was established by me (the attending physiatrist), is based on elements from the post admission evaluation. The interdisciplinary therapy program is to be at least 3 hrs a day, at least 5 days per week from from physical, occupational and/ or speech therapies as ordered by me below.      [ x  ] P.T. 90 mins /day at least 5 out of 7 days:  [  x ] superficial  modalities prn, [ x  ] A/AAROM, [ x  ] PREs, [ x  ] transfer training,            [ x  ] progressive ambulation, [x   ] stairs                                               [ x  ] O.T. 90 mins. /day at least 5 out of 7 days::  [ x  ] modalities prn, [ x  ]A/AAROM, [ x  ] PREs, functional transfer training, [ x  ] ADLs,              [   ]cognitive/ perceptual eval and training, [   ] splint eval                                                  [ x  ] S.L.P:  [  x ] speech eval for aphasia [   ] swallow eval     [ x  ] Neuropsychology      [  x ] Individualized rec. therapy    PRESCREEN COMPARISION:   I have reviewed the prescreen information and I have found no relevant changes between the preadmission screening and my post admission evaluation     RATIONALE FOR INPATIENT ADMISSION - Patient demonstrates the following: (check all that apply)  [X] Medically appropriate for rehabilitation admission  [X] Has attainable rehab goals with an appropriate initial discharge plan  [X] Has rehabilitation potential (expected to make a significant improvement within a reasonable period of time)  [X] Requires close medical management by a rehab physician, rehab nursing care,  and comprehensive interdisciplinary team (including PT, OT)          #CVA with right hemiparesis (R hand dominant) and aphasia  -S/p TPA  -CTH appreciated  -Follow for clearance if MRI brain compatible with PPM  -Start PT/OT/SLP  -Continue ASA and statin  - F/u MRI brain w/o MALDONADO  - F/u lyme panel     #RLQ Abdominal tenderness/constipation  -Chronic   -Recent abdominal imaging appreciated  -Continue Miralax daily and add dulcolax 2 tabs today only    # Aggressive nisreen marginal zone lymphoma  - maintenance on Rituxan, last dose 12/31  - Per family he has appt with Dr. Rider in August for PET scan    # DM   - SSI regimen   - Fingersticks AC   -Continue metformin    #HTN  - continue home amlodipine    #COPD  - Continue duoneb, symbicort, spiriva    # Sinus bradycardia   - s/p PPM    Diet: DASH/TLC  GI PPx: Protonix  DVT PPx: SCDs  Activity: as tolerated  Cardiac and fall precautions.    Plan discussed with Dr. Vergara 59 M (R handed) with a pmh of CHD s/p repair, HTN, DLD, RAA thrombus (was on xarelto and resolved on last ANITA), COPD, DM, sinus bradycardia s/p PPM, marginal cell lymphoma on maintenance rituxan (last chemo was december 21) presents with expressive aphasia and right sided weakness found to have left CVA clinically with right hemiparesis and mild aphasia. Pending MRI for further evaluation.      MEDICAL PROGNOSIS: GOOD            REHAB POTENTIAL: GOOD             ESTIMATED DISPOSITION: HOME WITH HOME CARE              ELOS:  [    ]  7-14 Days      [ x   ] 14 - 21 Days    [    ]  Other    THERAPY ORDERS and INITIAL INDIVIDUALIZED PLAN OF CARE:  This initial indivualized interdisciplinary plan of care, which was established by me (the attending physiatrist), is based on elements from the post admission evaluation. The interdisciplinary therapy program is to be at least 3 hrs a day, at least 5 days per week from from physical, occupational and/ or speech therapies as ordered by me below.      [ x  ] P.T. 90 mins /day at least 5 out of 7 days:  [  x ] superficial  modalities prn, [ x  ] A/AAROM, [ x  ] PREs, [ x  ] transfer training,            [ x  ] progressive ambulation, [x   ] stairs                                               [ x  ] O.T. 90 mins. /day at least 5 out of 7 days::  [ x  ] modalities prn, [ x  ]A/AAROM, [ x  ] PREs, functional transfer training, [ x  ] ADLs,              [   ]cognitive/ perceptual eval and training, [   ] splint eval                                                  [ x  ] S.L.P:  [  x ] speech eval for aphasia [   ] swallow eval     [ x  ] Neuropsychology      [  x ] Individualized rec. therapy    PRESCREEN COMPARISION:   I have reviewed the prescreen information and I have found no relevant changes between the preadmission screening and my post admission evaluation     RATIONALE FOR INPATIENT ADMISSION - Patient demonstrates the following: (check all that apply)  [X] Medically appropriate for rehabilitation admission  [X] Has attainable rehab goals with an appropriate initial discharge plan  [X] Has rehabilitation potential (expected to make a significant improvement within a reasonable period of time)  [X] Requires close medical management by a rehab physician, rehab nursing care,  and comprehensive interdisciplinary team (including PT, OT)          #CVA with right hemiparesis (R hand dominant) and aphasia  -S/p TPA  -CTH appreciated  -Follow for clearance if MRI brain compatible with PPM  -Start PT/OT/SLP  -Continue ASA and statin  - F/u MRI brain w/o MALDONADO  - F/u lyme panel     #RLQ Abdominal tenderness is chronic but now with constipation  -Chronic RLQ tenderness, unchanged.   -Recent abdominal imaging appreciated.  -Continue Miralax daily and add dulcolax 2 tabs today only.    # Aggressive nisreen marginal zone lymphoma  - maintenance on Rituxan, last dose 12/31  - Per family he has appt with Dr. Rider in August for PET scan    # DM   - SSI regimen   - Fingersticks AC   -Continue metformin    #HTN  - continue home amlodipine    #COPD  - Continue duoneb, symbicort, spiriva    # Sinus bradycardia   - s/p PPM      #Congenital hear Disease, S/P repair  Stable    #S/P RAA thrombus which was treated. Stable.      #S/P PPP for sinus bradycardia. Stable    Diet: DASH/TLC  GI PPx: Protonix  DVT PPx: SCDs  Activity: as tolerated  Cardiac and fall precautions.    Plan discussed with Dr. Vergara

## 2020-08-05 NOTE — PROGRESS NOTE ADULT - ASSESSMENT
59 year old male with a PMH of congenital heart disease s/p repair, DM, HTN, DLD, COPD, sinus bradycardia s/p PPM presents with expressive aphasia and right sided weakness. In ED stroke code was called, NIHSS was 11, his CTH was negative for acute pathology and was within the window for IV tpa which was given.     # CVA s/p tpa  - Presented with aphasia and R sided weakness  - Improvement from presentation   - Q4 neuro checks   - Continue ASA and statin   - F/u lyme panel   - Pending MRI brain w/o, ptnt with [employer] pacemaker MRI has copy of the card, called MRI scheduled for today  - PT/OT  - Physiatry noted    # DM   - SSI regimen   - Fingersticks AC     #HTN  - continue home amlodipine    #COPD  - Continue duoneb, symbicort, spiriva    # Sinus bradycardia   - s/p PPM    # Aggressive nisreen marginal zone lymphoma  - maintenance on Rituxan, last dose 12/31  - Per family he has appt with Dr. Rider in August for PET scan    Diet: DASH/TLC  GI PPx: Protonix  DVT PPx: SCDs  Activity: as tolerated  Dispo: Downgrade to medical floor

## 2020-08-05 NOTE — H&P ADULT - HISTORY OF PRESENT ILLNESS
59 M (R handed) with a pmh of CHD s/p repair, HTN, DLD, RAA thrombus (was on xarelto and resolved on last ANITA), COPD, DM, sinus bradycardia s/p PPM, marginal cell lymphoma on maintenance rituxan (last chemo was december 21) presents with expressive aphasia and right sided weakness. By the time he came to ED he was unable to articulate any words and was only moaning. As per family he was completely fine at 11AM, and had reported no new complaints.    Of note patient reports that for 1 year he has had one year of mild abdominal distention with constipation. Outpatient providers suspecting hernia with no acute interventions needed.     In the ED: T 96.4, , 185/124, 97% on RA. Labs were stable since last admission. Code stroke was called. CT head showed no acute changes, but was limited due to motion artifact. He received TPA and then CT angiogram; negative for obstruction. Speech and has progressively improved since tPA however right UE and LE weakness slower in improvement.  As per hospitalist, MRI for further stroke evaluation could be done on rehab. Patient was evaluated by physiatry and deemed good candidate for inpatient rehab.

## 2020-08-05 NOTE — H&P ADULT - NSHPPHYSICALEXAM_GEN_ALL_CORE
PHYSICAL EXAMINATION   VItals: T(C): 35.3 (08-05-20 @ 15:59), Max: 36.4 (08-05-20 @ 14:26)  HR: 71 (08-05-20 @ 15:59) (68 - 71)  BP: 140/82 (08-05-20 @ 15:59) (126/79 - 159/79)  RR: 18 (08-05-20 @ 15:59) (18 - 18)  SpO2: 98% (08-05-20 @ 15:59) (96% - 98%)    General: NAD, Resting Comfortable,                                  HEENT: NC/AT, EOMI, PERRLA, Normal Conjunctivae  Cardio: RRR, Normal S1-S2, No M/G/R                              Pulm: No Respiratory Distress,  Lungs CTAB                        Abdomen: tender to palpation in RLQ, no rebound or guarding, nontympanic, but with mild distention                                             MSK: No joint swelling, Full ROM                                         Ext: No C/C/E, Pulses 2+ throughout, No calf tenderness    Skin:  all skin intact                                                                 Wounds: none  Decubitus Ulcers: None Present     Neurological Examination    Cognitive: AAO x 3                                                                         Attention: Intact   Judgment: Good evidence of judgement                               Memory: Recall 3 objects immediate and 3 min later      Mood/Affect: wnl                                                                           Communication:  mild decreased fluency    Swallow: intact  CN II - XII  intact  Coordination: FTN/HTS intact                                                                              Sensory: Intact to light touch, PP and Vibration                                                                                             Tone: normal Throughout     Motor    LEFT    UE: SF [5/5], EF [5/5], EE [5/5], WE [5/5],  [wnl]  RIGHT UE: SF [4/5], EF [4/5], EE [4/5], WE [2/5],  weak  LEFT    LE:  HF [5/5], KE [5/5], DF [5/5], EHL [5/5],  PF [5/5]  RIGHT LE:  HF [3/5], KE [2/5], DF [5/5], EHL [1/5],  PF [1/5]      Reflex:  2 + throughout Babinski positive on R

## 2020-08-06 LAB
ALBUMIN SERPL ELPH-MCNC: 4.6 G/DL — SIGNIFICANT CHANGE UP (ref 3.5–5.2)
ALP SERPL-CCNC: 54 U/L — SIGNIFICANT CHANGE UP (ref 30–115)
ALT FLD-CCNC: 24 U/L — SIGNIFICANT CHANGE UP (ref 0–41)
ANION GAP SERPL CALC-SCNC: 14 MMOL/L — SIGNIFICANT CHANGE UP (ref 7–14)
AST SERPL-CCNC: 21 U/L — SIGNIFICANT CHANGE UP (ref 0–41)
BILIRUB SERPL-MCNC: 2.3 MG/DL — HIGH (ref 0.2–1.2)
BUN SERPL-MCNC: 16 MG/DL — SIGNIFICANT CHANGE UP (ref 10–20)
CALCIUM SERPL-MCNC: 9.5 MG/DL — SIGNIFICANT CHANGE UP (ref 8.5–10.1)
CHLORIDE SERPL-SCNC: 101 MMOL/L — SIGNIFICANT CHANGE UP (ref 98–110)
CO2 SERPL-SCNC: 26 MMOL/L — SIGNIFICANT CHANGE UP (ref 17–32)
CREAT SERPL-MCNC: 1 MG/DL — SIGNIFICANT CHANGE UP (ref 0.7–1.5)
GLUCOSE BLDC GLUCOMTR-MCNC: 136 MG/DL — HIGH (ref 70–99)
GLUCOSE BLDC GLUCOMTR-MCNC: 156 MG/DL — HIGH (ref 70–99)
GLUCOSE BLDC GLUCOMTR-MCNC: 157 MG/DL — HIGH (ref 70–99)
GLUCOSE SERPL-MCNC: 148 MG/DL — HIGH (ref 70–99)
HCT VFR BLD CALC: 46.1 % — SIGNIFICANT CHANGE UP (ref 42–52)
HGB BLD-MCNC: 15.2 G/DL — SIGNIFICANT CHANGE UP (ref 14–18)
MAGNESIUM SERPL-MCNC: 2 MG/DL — SIGNIFICANT CHANGE UP (ref 1.8–2.4)
MCHC RBC-ENTMCNC: 29.9 PG — SIGNIFICANT CHANGE UP (ref 27–31)
MCHC RBC-ENTMCNC: 33 G/DL — SIGNIFICANT CHANGE UP (ref 32–37)
MCV RBC AUTO: 90.7 FL — SIGNIFICANT CHANGE UP (ref 80–94)
NRBC # BLD: 0 /100 WBCS — SIGNIFICANT CHANGE UP (ref 0–0)
PLATELET # BLD AUTO: 171 K/UL — SIGNIFICANT CHANGE UP (ref 130–400)
POTASSIUM SERPL-MCNC: 4.2 MMOL/L — SIGNIFICANT CHANGE UP (ref 3.5–5)
POTASSIUM SERPL-SCNC: 4.2 MMOL/L — SIGNIFICANT CHANGE UP (ref 3.5–5)
PROT SERPL-MCNC: 6.6 G/DL — SIGNIFICANT CHANGE UP (ref 6–8)
RBC # BLD: 5.08 M/UL — SIGNIFICANT CHANGE UP (ref 4.7–6.1)
RBC # FLD: 12.5 % — SIGNIFICANT CHANGE UP (ref 11.5–14.5)
SARS-COV-2 IGG SERPL QL IA: NEGATIVE — SIGNIFICANT CHANGE UP
SARS-COV-2 IGM SERPL IA-ACNC: 0.09 INDEX — SIGNIFICANT CHANGE UP
SODIUM SERPL-SCNC: 141 MMOL/L — SIGNIFICANT CHANGE UP (ref 135–146)
WBC # BLD: 4.01 K/UL — LOW (ref 4.8–10.8)
WBC # FLD AUTO: 4.01 K/UL — LOW (ref 4.8–10.8)

## 2020-08-06 RX ORDER — SENNA PLUS 8.6 MG/1
2 TABLET ORAL AT BEDTIME
Refills: 0 | Status: COMPLETED | OUTPATIENT
Start: 2020-08-06 | End: 2020-08-08

## 2020-08-06 RX ORDER — MINERAL OIL
133 OIL (ML) MISCELLANEOUS EVERY 12 HOURS
Refills: 0 | Status: DISCONTINUED | OUTPATIENT
Start: 2020-08-06 | End: 2020-08-12

## 2020-08-06 RX ORDER — POLYETHYLENE GLYCOL 3350 17 G/17G
17 POWDER, FOR SOLUTION ORAL
Refills: 0 | Status: DISCONTINUED | OUTPATIENT
Start: 2020-08-06 | End: 2020-08-08

## 2020-08-06 RX ADMIN — HEPARIN SODIUM 5000 UNIT(S): 5000 INJECTION INTRAVENOUS; SUBCUTANEOUS at 21:18

## 2020-08-06 RX ADMIN — BUDESONIDE AND FORMOTEROL FUMARATE DIHYDRATE 2 PUFF(S): 160; 4.5 AEROSOL RESPIRATORY (INHALATION) at 20:12

## 2020-08-06 RX ADMIN — Medication 10 MILLIGRAM(S): at 21:17

## 2020-08-06 RX ADMIN — Medication 650 MILLIGRAM(S): at 11:04

## 2020-08-06 RX ADMIN — Medication 81 MILLIGRAM(S): at 12:40

## 2020-08-06 RX ADMIN — TIOTROPIUM BROMIDE 1 CAPSULE(S): 18 CAPSULE ORAL; RESPIRATORY (INHALATION) at 07:44

## 2020-08-06 RX ADMIN — AMLODIPINE BESYLATE 5 MILLIGRAM(S): 2.5 TABLET ORAL at 07:44

## 2020-08-06 RX ADMIN — METFORMIN HYDROCHLORIDE 500 MILLIGRAM(S): 850 TABLET ORAL at 07:44

## 2020-08-06 RX ADMIN — PANTOPRAZOLE SODIUM 40 MILLIGRAM(S): 20 TABLET, DELAYED RELEASE ORAL at 05:49

## 2020-08-06 RX ADMIN — HEPARIN SODIUM 5000 UNIT(S): 5000 INJECTION INTRAVENOUS; SUBCUTANEOUS at 12:40

## 2020-08-06 RX ADMIN — ATORVASTATIN CALCIUM 40 MILLIGRAM(S): 80 TABLET, FILM COATED ORAL at 21:17

## 2020-08-06 RX ADMIN — Medication 1 TABLET(S): at 12:40

## 2020-08-06 RX ADMIN — BUDESONIDE AND FORMOTEROL FUMARATE DIHYDRATE 2 PUFF(S): 160; 4.5 AEROSOL RESPIRATORY (INHALATION) at 07:44

## 2020-08-06 RX ADMIN — METFORMIN HYDROCHLORIDE 500 MILLIGRAM(S): 850 TABLET ORAL at 17:04

## 2020-08-06 RX ADMIN — Medication 650 MILLIGRAM(S): at 17:42

## 2020-08-06 RX ADMIN — HEPARIN SODIUM 5000 UNIT(S): 5000 INJECTION INTRAVENOUS; SUBCUTANEOUS at 05:50

## 2020-08-06 RX ADMIN — Medication 650 MILLIGRAM(S): at 11:05

## 2020-08-06 RX ADMIN — POLYETHYLENE GLYCOL 3350 17 GRAM(S): 17 POWDER, FOR SOLUTION ORAL at 17:04

## 2020-08-06 RX ADMIN — SENNA PLUS 2 TABLET(S): 8.6 TABLET ORAL at 21:17

## 2020-08-06 RX ADMIN — Medication 650 MILLIGRAM(S): at 17:44

## 2020-08-06 NOTE — CHART NOTE - NSCHARTNOTEFT_GEN_A_CORE
Hospital Course	  59 year old male with a PMH of congenital heart disease s/p repair, DM, HTN, DLD, COPD, sinus bradycardia s/p PPM presents with expressive aphasia and right sided weakness. In ED stroke code was called, NIHSS was 11, his CTH was negative for acute pathology. The patient was given tpa and was admitted to the ICU for close monitoring. In the ICU, there were no complications, neuro exam was done frequently and his mental status returned to baseline. The patient was started on Aspirin and Lipitor. His NIHSS improved to 2. He underwent a CTA that showed normal perfusion, a video EEG was also performed and was negative ruling out seizures as cause of his AMS. A request for MRI was placed (patient ahs biotronik pacemaker and MRI compatibility pending verification). The patient was downgraded to CEU where he was assessed by PT/OT and physiatry and he is medically cleared to be transferred to  rehab on 8/5/20 . His Mri was never done .   ' today pt had severe headache as per pt relived with tylenol < He has been having this headaches for few days , no new change in physical exam ,      he is aaox4 ,  when I examined him found to be in no distress , family was asking about MRI brain ,  since pt had PPM that was  absolute contra indication for MRI , however spoke with MRI personnel and info on PPM ( copy of his PPM records ( has Biotronik PPM with lead implanted at left side of chest  on4/14 2015 by dr Silverman/ Juan Ramon ..   and order placed as per guidance with MRI , hopefully exam can be done as of tomorrow if no contra indications , meanwhile since pt had headaches  will get ct Head  as case d/w dr Mishra.   Vital Signs Last 24 Hrs    T(C): 35.6 (06 Aug 2020 13:03), Max: 36 (06 Aug 2020 05:51)  T(F): 96.1 (06 Aug 2020 13:03), Max: 96.8 (06 Aug 2020 05:51)  HR: 80 (06 Aug 2020 13:03) (66 - 80)  BP: 142/74 (06 Aug 2020 13:03) (130/73 - 153/83)  BP(mean): --  RR: 18 (06 Aug 2020 13:03) (18 - 18)  SpO2: --                       15.2   4.01  )-----------( 171      ( 06 Aug 2020 05:29 )             46.1    08-06    141  |  101  |  16  ----------------------------<  148<H>  4.2   |  26  |  1.0    Ca    9.5      06 Aug 2020 05:29  Mg     2.0     08-06    TPro  6.6  /  Alb  4.6  /  TBili  2.3<H>  /  DBili  x   /  AST  21  /  ALT  24  /  AlkPhos  54  08-06          OCT Blood Glucose.: 156 mg/dL (06 Aug 2020 16:13)     Review of Systems:  Review of Systems: REVIEW OF SYSTEMS  	Constitutional: No fever, No Chills, No fatigue  	HEENT: No eye pain, No visual disturbances, No difficulty hearing  	Pulm: No cough,  No shortness of breath  	Cardio: No chest pain, No palpitations  	GI:  Patient reports no BM in 6 days but recently passing gas. TTP in RLQ  	: No dysuria, No frequency, No hematuria  	Neuro: +Weakness and numbness in RUE and RLE  	Skin: No itching, No rashes, No lesions   	Endo: No temperature intolerance  	MSK: No joint pain, No joint swelling, No muscle pain, No Neck or back pain  Psych:  No depression, No anxiety          Physical Exam: PHYSICAL EXAMINATION   	VItals: T(C): 35.3 (08-05-20 @ 15:59), Max: 36.4 (08-05-20 @ 14:26)  	HR: 71 (08-05-20 @ 15:59) (68 - 71)  	BP: 140/82 (08-05-20 @ 15:59) (126/79 - 159/79)  	RR: 18 (08-05-20 @ 15:59) (18 - 18)  	SpO2: 98% (08-05-20 @ 15:59) (96% - 98%)    	General: NAD, Resting Comfortable,                                  	HEENT: NC/AT, EOMI, PERRLA, Normal Conjunctivae  	Cardio: RRR, Normal S1-S2, No M/G/R                              	Pulm: No Respiratory Distress,  Lungs CTAB                        	Abdomen: tender to palpation in RLQ, no rebound or guarding, nontympanic, but with mild distention                                             	MSK: No joint swelling, Full ROM                                         	Ext: No C/C/E, Pulses 2+ throughout, No calf tenderness    	Skin:  all skin intact                                                                 	Wounds: none  	Decubitus Ulcers: None Present     	Neurological Examination    	Cognitive: AAO x 3                                                                         	Attention: Intact   	Judgment: Good evidence of judgement                               	Memory: Recall 3 objects immediate and 3 min later      	Mood/Affect: wnl                                                                           	Communication:  mild decreased fluency    	Swallow: intact  	CN II - XII  intact  	Coordination: FTN/HTS intact                                                                              	Sensory: Intact to light touch, PP and Vibration                                                                                             	Tone: normal Throughout     	Motor    	LEFT    UE: SF [5/5], EF [5/5], EE [5/5], WE [5/5],  [wnl]  	RIGHT UE: SF [4/5], EF [4/5], EE [4/5], WE [2/5],  weak  	LEFT    LE:  HF [5/5], KE [5/5], DF [5/5], EHL [5/5],  PF [5/5]  	RIGHT LE:  HF [3/5], KE [2/5], DF [5/5], EHL [1/5],  PF [1/5]      	Reflex:  2 + throughout Babinski positive on R      Patient Currently Takes Medications as of 05-Aug-2020 10:25 documented in Structured Notes  · 	polyethylene glycol 3350 oral powder for reconstitution: 17 gram(s) orally once a day  · 	bisacodyl 10 mg rectal suppository: 1 suppository(ies) rectal once a day, As needed, Constipation  · 	amLODIPine 5 mg oral tablet: 1 tab(s) orally once a day  · 	aspirin 81 mg oral tablet, chewable: 1 tab(s) orally once a day  · 	atorvastatin 40 mg oral tablet: 1 tab(s) orally once a day (at bedtime)  · 	acetaminophen 325 mg oral tablet: 2 tab(s) orally every 6 hours, As needed, Severe Pain (7 - 10)  · 	ipratropium-albuterol 0.5 mg-2.5 mg/3 mLinhalation solution: 3 milliliter(s) inhaled every 6 hours, As needed, Shortness of Breath and/or Wheezing  · 	metFORMIN: 500 milligram(s) orally 2 times a day( dose reduced )  · 	  · 	Spiriva Respimat 1.25 mcg/inh inhalation aerosol: 2 puff(s) inhaled once a day  · 	Jardiance 25 mg oral tablet: 1 tab(s) orally once a day (in the morning)  · 	budesonide-formoterol 160 mcg-4.5 mcg/inh inhalation aerosol: 2 puff(s) inhaled 2 times a day                  Following  Home meds are on hold due to low blood sugar levels                   Tresiba 100 units/mL subcutaneous solution: 30 unit(s) subcutaneous once a day  · 	pioglitazone 30 mg oral tablet: 1 tab(s) orally once a day  · 	Trulicity Pen 1.5 mg/0.5 mL subcutaneous solution:   · 	               ezetimibe 10 mg oral tablet: 1 tab(s) orally once a day on hold since non formulary  and pt on other statin       · Assessment	  59 M (R handed) with a pmh of CHD s/p repair, HTN, DLD, RAA thrombus (was on xarelto and resolved on last ANITA), COPD, DM, sinus bradycardia s/p PPM, marginal cell lymphoma on maintenance rituxan (last chemo was december 21) presents with expressive aphasia and right sided weakness found to have left CVA clinically with right hemiparesis and mild aphasia. Pending MRI  for PPM Compatibility ,  order placed after discussing with MRI personnel and info on PPM given to the department  , continue acute rehab , while awaiting ct head for follow up headache

## 2020-08-06 NOTE — PROGRESS NOTE ADULT - SUBJECTIVE AND OBJECTIVE BOX
Patient and family (pt's spouse) seen at bedside as a part of initial evaluation on 8.5.20; initial family contact and education completed. Pt's spouse reported that pt. was fully independent and driving until the CVA. He has been retired; quit smoking about 3 years ago and no other substance use history; pt. was reported to have had an episode similar in manifestation to the one that resulted in current admission and CVA diagnosis. This episode occurred about 3 weeks ago, pt. was in the hospital but quickly discharged or not admitted, and no apparent sequelae. Currently, no complaints of changes in mood, behavior, or cognition. Initially, upon admission, pt. was reported to be able to follow commands but not speak. Family is concerned with pt's recurrent headaches, especially in light of no prior history of headaches.

## 2020-08-06 NOTE — PROGRESS NOTE ADULT - SUBJECTIVE AND OBJECTIVE BOX
Patient is a 60y old  Male who presents with a chief complaint of CVA (05 Aug 2020 16:59)      HPI:  59 M (R handed) with a pmh of CHD s/p repair, HTN, DLD, RAA thrombus (was on xarelto and resolved on last ANITA), COPD, DM, sinus bradycardia s/p PPM, marginal cell lymphoma on maintenance rituxan (last chemo was december 21) presents with expressive aphasia and right sided weakness. By the time he came to ED he was unable to articulate any words and was only moaning. As per family he was completely fine at 11AM, and had reported no new complaints.    Of note patient reports that for 1 year he has had one year of mild abdominal distention with constipation. Outpatient providers suspecting hernia with no acute interventions needed.     In the ED: T 96.4, , 185/124, 97% on RA. Labs were stable since last admission. Code stroke was called. CT head showed no acute changes, but was limited due to motion artifact. He received TPA and then CT angiogram; negative for obstruction. Speech and has progressively improved since tPA however right UE and LE weakness slower in improvement.  As per hospitalist, MRI for further stroke evaluation could be done on rehab. Patient was evaluated by physiatry and deemed good candidate for inpatient rehab. (05 Aug 2020 16:59)      I examined the patient and reviewed the chart. There have been no significant changes since my history and physical except where documented     TODAY'S SUBJECTIVE & REVIEW OF SYMPTOMS C/O headache today other wise ROS unchaged       PHYSICAL EXAM    Vital Signs Last 24 Hrs=AVSS  T(C): 36 (06 Aug 2020 05:51), Max: 36.4 (05 Aug 2020 14:26)  T(F): 96.8 (06 Aug 2020 05:51), Max: 97.6 (05 Aug 2020 14:26)  HR: 76 (06 Aug 2020 05:51) (66 - 76)  BP: 130/73 (06 Aug 2020 05:51) (130/73 - 159/79)  BP(mean): 108 (05 Aug 2020 14:26) (108 - 108)  RR: 18 (06 Aug 2020 05:51) (18 - 18)  SpO2: 98% (05 Aug 2020 15:59) (98% - 98%)on room air    Constitutional - NAD, Comfortable OOB to W/C  Chest - CTAB  Cardiovascular - RRR  Abdomen - Soft, NTND  Extremities - No C/C/E, No calf tenderness   Neurologic Exam -                    Cognitive - Awake, Alert, AAO to self, place, date, year, situation     Communication - Fluent, No dysarthria     Motor - No focal deficits  	LEFT    UE: SF [5/5], EF [5/5], EE [5/5], WE [5/5],  [wnl]  	RIGHT UE: SF [4/5], EF [4/5], EE [4/5], WE [2/5],  weak  	LEFT    LE:  HF [5/5], KE [5/5], DF [5/5], EHL [5/5],  PF [5/5]  	RIGHT LE:  HF [3/5], KE [2/5], DF [5/5], EHL [1/5],  PF [1/5]                       Sensory - Intact to LT      -Reflex:  2 + throughout Babinski positive on R wnl/ symmetric  Psychiatric - Mood stable, Affect WNL      acetaminophen   Tablet .. 650 milliGRAM(s) Oral every 6 hours PRN  ALBUTerol    90 MICROgram(s) HFA Inhaler 2 Puff(s) Inhalation every 6 hours PRN  amLODIPine   Tablet 5 milliGRAM(s) Oral <User Schedule>  aspirin  chewable 81 milliGRAM(s) Oral daily  atorvastatin 40 milliGRAM(s) Oral at bedtime  bisacodyl Suppository 10 milliGRAM(s) Rectal daily PRN  budesonide 160 MICROgram(s)/formoterol 4.5 MICROgram(s) Inhaler 2 Puff(s) Inhalation two times a day  heparin   Injectable 5000 Unit(s) SubCutaneous every 8 hours  melatonin 5 milliGRAM(s) Oral at bedtime PRN  metFORMIN 500 milliGRAM(s) Oral two times a day with meals  multivitamin 1 Tablet(s) Oral daily  pantoprazole    Tablet 40 milliGRAM(s) Oral before breakfast  polyethylene glycol 3350 17 Gram(s) Oral <User Schedule>  senna 2 Tablet(s) Oral at bedtime PRN  tiotropium 18 MICROgram(s) Capsule 1 Capsule(s) Inhalation daily      RECENT LABS/IMAGING                        15.2   4.01  )-----------( 171      ( 06 Aug 2020 05:29 )             46.1     08-06    141  |  101  |  16  ----------------------------<  148<H>  4.2   |  26  |  1.0    Ca    9.5      06 Aug 2020 05:29  Mg     2.0     08-06    TPro  6.6  /  Alb  4.6  /  TBili  2.3<H>  /  DBili  x   /  AST  21  /  ALT  24  /  AlkPhos  54  08-06    labs acceptable

## 2020-08-07 LAB — GLUCOSE BLDC GLUCOMTR-MCNC: 141 MG/DL — HIGH (ref 70–99)

## 2020-08-07 PROCEDURE — 70551 MRI BRAIN STEM W/O DYE: CPT | Mod: 26

## 2020-08-07 PROCEDURE — 70450 CT HEAD/BRAIN W/O DYE: CPT | Mod: 26

## 2020-08-07 RX ADMIN — Medication 10 MILLIGRAM(S): at 21:43

## 2020-08-07 RX ADMIN — HEPARIN SODIUM 5000 UNIT(S): 5000 INJECTION INTRAVENOUS; SUBCUTANEOUS at 15:44

## 2020-08-07 RX ADMIN — ALBUTEROL 2 PUFF(S): 90 AEROSOL, METERED ORAL at 08:14

## 2020-08-07 RX ADMIN — HEPARIN SODIUM 5000 UNIT(S): 5000 INJECTION INTRAVENOUS; SUBCUTANEOUS at 05:36

## 2020-08-07 RX ADMIN — TIOTROPIUM BROMIDE 1 CAPSULE(S): 18 CAPSULE ORAL; RESPIRATORY (INHALATION) at 08:23

## 2020-08-07 RX ADMIN — HEPARIN SODIUM 5000 UNIT(S): 5000 INJECTION INTRAVENOUS; SUBCUTANEOUS at 21:43

## 2020-08-07 RX ADMIN — AMLODIPINE BESYLATE 5 MILLIGRAM(S): 2.5 TABLET ORAL at 08:13

## 2020-08-07 RX ADMIN — METFORMIN HYDROCHLORIDE 500 MILLIGRAM(S): 850 TABLET ORAL at 18:55

## 2020-08-07 RX ADMIN — POLYETHYLENE GLYCOL 3350 17 GRAM(S): 17 POWDER, FOR SOLUTION ORAL at 18:55

## 2020-08-07 RX ADMIN — METFORMIN HYDROCHLORIDE 500 MILLIGRAM(S): 850 TABLET ORAL at 08:13

## 2020-08-07 RX ADMIN — Medication 1 TABLET(S): at 12:20

## 2020-08-07 RX ADMIN — BUDESONIDE AND FORMOTEROL FUMARATE DIHYDRATE 2 PUFF(S): 160; 4.5 AEROSOL RESPIRATORY (INHALATION) at 08:14

## 2020-08-07 RX ADMIN — SENNA PLUS 2 TABLET(S): 8.6 TABLET ORAL at 21:43

## 2020-08-07 RX ADMIN — PANTOPRAZOLE SODIUM 40 MILLIGRAM(S): 20 TABLET, DELAYED RELEASE ORAL at 05:36

## 2020-08-07 RX ADMIN — ATORVASTATIN CALCIUM 40 MILLIGRAM(S): 80 TABLET, FILM COATED ORAL at 21:43

## 2020-08-07 RX ADMIN — Medication 81 MILLIGRAM(S): at 12:20

## 2020-08-07 RX ADMIN — POLYETHYLENE GLYCOL 3350 17 GRAM(S): 17 POWDER, FOR SOLUTION ORAL at 08:13

## 2020-08-07 RX ADMIN — BUDESONIDE AND FORMOTEROL FUMARATE DIHYDRATE 2 PUFF(S): 160; 4.5 AEROSOL RESPIRATORY (INHALATION) at 19:58

## 2020-08-07 NOTE — PROGRESS NOTE ADULT - ASSESSMENT
59 M (R handed) with a pmh of CHD s/p repair, HTN, DLD, RAA thrombus (was on xarelto)   #CVA with right hemiparesis (R hand dominant) and aphasia  -S/p TPA  -Follow for clearance if MRI brain compatible with PPM  -Start PT/OT/SLP  -Continue ASA and statin  - F/u MRI brain w/o MALDONADO  - F/u lyme panel   - Repeat CT Head for HA pending    #RLQ Abdominal tenderness is chronic but now with constipation  -Chronic RLQ tenderness, unchanged.   -Recent abdominal imaging appreciated.  -Continue Miralax daily and add dulcolax 2 tabs today only.    # Aggressive nisreen marginal zone lymphoma  - maintenance on Rituxan, last dose 12/31  - Per family he has appt with Dr. Rider in August for PET scan    # DM   - SSI regimen   - Fingersticks AC   -Continue metformin    #HTN  - continue home amlodipine    #COPD  - Continue duoneb, symbicort, spiriva    # Sinus bradycardia   - s/p PPM      #Congenital hear Disease, S/P repair  Stable    #S/P RAA thrombus which was treated. Stable.    #S/P PPP for sinus bradycardia. Stable    Diet: DASH/TLC  GI PPx: Protonix  DVT PPx: SCDs, heparin  Activity: as tolerated  Cardiac and fall precautions.  Ortho: WBAT    Patient seen and discussed with my attending, Dr. Mishra

## 2020-08-07 NOTE — PROGRESS NOTE ADULT - SUBJECTIVE AND OBJECTIVE BOX
Patient is a 60y old  Male who presents with a chief complaint of CVA (06 Aug 2020 17:04)      HPI:  59 M (R handed) with a pmh of CHD s/p repair, HTN, DLD, RAA thrombus (was on xarelto and resolved on last ANITA), COPD, DM, sinus bradycardia s/p PPM, marginal cell lymphoma on maintenance rituxan (last chemo was december 21) presents with expressive aphasia and right sided weakness. By the time he came to ED he was unable to articulate any words and was only moaning. As per family he was completely fine at 11AM, and had reported no new complaints.    Of note patient reports that for 1 year he has had one year of mild abdominal distention with constipation. Outpatient providers suspecting hernia with no acute interventions needed.     In the ED: T 96.4, , 185/124, 97% on RA. Labs were stable since last admission. Code stroke was called. CT head showed no acute changes, but was limited due to motion artifact. He received TPA and then CT angiogram; negative for obstruction. Speech and has progressively improved since tPA however right UE and LE weakness slower in improvement.  As per hospitalist, MRI for further stroke evaluation could be done on rehab. Patient was evaluated by physiatry and deemed good candidate for inpatient rehab. (05 Aug 2020 16:59)    SUBJECTIVE  ROS unchanged from prior    VSS. No acute overnight events    PHYSICAL EXAM    Vital Signs Last 24 Hrs  T(C): 35.6 (07 Aug 2020 05:58), Max: 36.3 (06 Aug 2020 20:59)  T(F): 96.1 (07 Aug 2020 05:58), Max: 97.3 (06 Aug 2020 20:59)  HR: 74 (07 Aug 2020 05:58) (74 - 80)  BP: 126/71 (07 Aug 2020 05:58) (126/71 - 142/74)  BP(mean): --  RR: 18 (07 Aug 2020 05:58) (18 - 18)  SpO2: --    Constitutional - NAD, Comfortable OOB to W/C  Chest - CTAB  Cardiovascular - RRR  Abdomen - Soft, NTND  Extremities - No C/C/E, No calf tenderness   Neurologic Exam -                    Cognitive - Awake, Alert, AAO to self, place, date, year, situation     Communication - Fluent, No dysarthria     Motor - No focal deficits  	 LEFT    UE: SF [5/5], EF [5/5], EE [5/5], WE [5/5],  [wnl]  	 RIGHT UE: SF [4/5], EF [4/5], EE [4/5], WE [2/5],  weak  	 LEFT    LE:  HF [5/5], KE [5/5], DF [5/5], EHL [5/5],  PF [5/5]     RIGHT LE:  HF [3/5], KE [2/5], DF [5/5], EHL [1/5],  PF [1/5]      Sensory - Intact to LT    Reflex:  2 + throughout Babinski positive on R wnl/ symmetric  Psychiatric - Mood stable, Affect WNL    acetaminophen   Tablet .. 650 milliGRAM(s) Oral every 6 hours PRN  ALBUTerol    90 MICROgram(s) HFA Inhaler 2 Puff(s) Inhalation every 6 hours PRN  amLODIPine   Tablet 5 milliGRAM(s) Oral <User Schedule>  aspirin  chewable 81 milliGRAM(s) Oral daily  atorvastatin 40 milliGRAM(s) Oral at bedtime  bisacodyl 10 milliGRAM(s) Oral at bedtime  bisacodyl Suppository 10 milliGRAM(s) Rectal daily PRN  budesonide 160 MICROgram(s)/formoterol 4.5 MICROgram(s) Inhaler 2 Puff(s) Inhalation two times a day  heparin   Injectable 5000 Unit(s) SubCutaneous every 8 hours  melatonin 5 milliGRAM(s) Oral at bedtime PRN  metFORMIN 500 milliGRAM(s) Oral two times a day with meals  mineral oil enema 133 milliLiter(s) Rectal every 12 hours PRN  multivitamin 1 Tablet(s) Oral daily  pantoprazole    Tablet 40 milliGRAM(s) Oral before breakfast  polyethylene glycol 3350 17 Gram(s) Oral <User Schedule>  polyethylene glycol 3350 17 Gram(s) Oral <User Schedule>  senna 2 Tablet(s) Oral at bedtime  senna 2 Tablet(s) Oral at bedtime PRN  tiotropium 18 MICROgram(s) Capsule 1 Capsule(s) Inhalation daily      RECENT LABS/IMAGING                        15.2   4.01  )-----------( 171      ( 06 Aug 2020 05:29 )             46.1     08-06    141  |  101  |  16  ----------------------------<  148<H>  4.2   |  26  |  1.0    Ca    9.5      06 Aug 2020 05:29  Mg     2.0     08-06    TPro  6.6  /  Alb  4.6  /  TBili  2.3<H>  /  DBili  x   /  AST  21  /  ALT  24  /  AlkPhos  54  08-06 Patient is a 60y old  Male who presents with a chief complaint of CVA (06 Aug 2020 17:04)      HPI:  59 M (R handed) with a pmh of CHD s/p repair, HTN, DLD, RAA thrombus (was on xarelto and resolved on last ANITA), COPD, DM, sinus bradycardia s/p PPM, marginal cell lymphoma on maintenance rituxan (last chemo was december 21) presents with expressive aphasia and right sided weakness. By the time he came to ED he was unable to articulate any words and was only moaning. As per family he was completely fine at 11AM, and had reported no new complaints.    Of note patient reports that for 1 year he has had one year of mild abdominal distention with constipation. Outpatient providers suspecting hernia with no acute interventions needed.     In the ED: T 96.4, , 185/124, 97% on RA. Labs were stable since last admission. Code stroke was called. CT head showed no acute changes, but was limited due to motion artifact. He received TPA and then CT angiogram; negative for obstruction. Speech and has progressively improved since tPA however right UE and LE weakness slower in improvement.  As per hospitalist, MRI for further stroke evaluation could be done on rehab. Patient was evaluated by physiatry and deemed good candidate for inpatient rehab. (05 Aug 2020 16:59)    SUBJECTIVE  ROS unchanged from prior    VSS. No acute overnight events. Repeat CT head for HA read pending    PHYSICAL EXAM    Vital Signs Last 24 Hrs  T(C): 35.6 (07 Aug 2020 05:58), Max: 36.3 (06 Aug 2020 20:59)  T(F): 96.1 (07 Aug 2020 05:58), Max: 97.3 (06 Aug 2020 20:59)  HR: 74 (07 Aug 2020 05:58) (74 - 80)  BP: 126/71 (07 Aug 2020 05:58) (126/71 - 142/74)  BP(mean): --  RR: 18 (07 Aug 2020 05:58) (18 - 18)  SpO2: --    Constitutional - NAD, Comfortable OOB to W/C  Chest - CTAB  Cardiovascular - RRR  Abdomen - Soft, NTND  Extremities - No C/C/E, No calf tenderness   Neurologic Exam -                    Cognitive - Awake, Alert, AAO to self, place, date, year, situation     Communication - Fluent, No dysarthria     Motor - No focal deficits  	 LEFT    UE: SF [5/5], EF [5/5], EE [5/5], WE [5/5],  [wnl]  	 RIGHT UE: SF [4/5], EF [4/5], EE [4/5], WE [2/5],  weak  	 LEFT    LE:  HF [5/5], KE [5/5], DF [5/5], EHL [5/5],  PF [5/5]     RIGHT LE:  HF [3/5], KE [2/5], DF [5/5], EHL [1/5],  PF [1/5]      Sensory - Intact to LT    Reflex:  2 + throughout Babinski positive on R wnl/ symmetric  Psychiatric - Mood stable, Affect WNL    acetaminophen   Tablet .. 650 milliGRAM(s) Oral every 6 hours PRN  ALBUTerol    90 MICROgram(s) HFA Inhaler 2 Puff(s) Inhalation every 6 hours PRN  amLODIPine   Tablet 5 milliGRAM(s) Oral <User Schedule>  aspirin  chewable 81 milliGRAM(s) Oral daily  atorvastatin 40 milliGRAM(s) Oral at bedtime  bisacodyl 10 milliGRAM(s) Oral at bedtime  bisacodyl Suppository 10 milliGRAM(s) Rectal daily PRN  budesonide 160 MICROgram(s)/formoterol 4.5 MICROgram(s) Inhaler 2 Puff(s) Inhalation two times a day  heparin   Injectable 5000 Unit(s) SubCutaneous every 8 hours  melatonin 5 milliGRAM(s) Oral at bedtime PRN  metFORMIN 500 milliGRAM(s) Oral two times a day with meals  mineral oil enema 133 milliLiter(s) Rectal every 12 hours PRN  multivitamin 1 Tablet(s) Oral daily  pantoprazole    Tablet 40 milliGRAM(s) Oral before breakfast  polyethylene glycol 3350 17 Gram(s) Oral <User Schedule>  polyethylene glycol 3350 17 Gram(s) Oral <User Schedule>  senna 2 Tablet(s) Oral at bedtime  senna 2 Tablet(s) Oral at bedtime PRN  tiotropium 18 MICROgram(s) Capsule 1 Capsule(s) Inhalation daily      RECENT LABS/IMAGING                        15.2   4.01  )-----------( 171      ( 06 Aug 2020 05:29 )             46.1     08-06    141  |  101  |  16  ----------------------------<  148<H>  4.2   |  26  |  1.0    Ca    9.5      06 Aug 2020 05:29  Mg     2.0     08-06    TPro  6.6  /  Alb  4.6  /  TBili  2.3<H>  /  DBili  x   /  AST  21  /  ALT  24  /  AlkPhos  54  08-06 Patient is a 60y old  Male who presents with a chief complaint of CVA (06 Aug 2020 17:04)      HPI:  59 M (R handed) with a pmh of CHD s/p repair, HTN, DLD, RAA thrombus (was on xarelto and resolved on last ANITA), COPD, DM, sinus bradycardia s/p PPM, marginal cell lymphoma on maintenance rituxan (last chemo was december 21) presents with expressive aphasia and right sided weakness. By the time he came to ED he was unable to articulate any words and was only moaning. As per family he was completely fine at 11AM, and had reported no new complaints.    Of note patient reports that for 1 year he has had one year of mild abdominal distention with constipation. Outpatient providers suspecting hernia with no acute interventions needed.     In the ED: T 96.4, , 185/124, 97% on RA. Labs were stable since last admission. Code stroke was called. CT head showed no acute changes, but was limited due to motion artifact. He received TPA and then CT angiogram; negative for obstruction. Speech and has progressively improved since tPA however right UE and LE weakness slower in improvement.  As per hospitalist, MRI for further stroke evaluation could be done on rehab. Patient was evaluated by physiatry and deemed good candidate for inpatient rehab. (05 Aug 2020 16:59)    SUBJECTIVE  ROS unchanged from prior    VSS. No acute overnight events. Repeat CT head for HA read pending    PHYSICAL EXAM    Vital Signs Last 24 Hrs=AVSS  T(C): 35.6 (07 Aug 2020 05:58), Max: 36.3 (06 Aug 2020 20:59)  T(F): 96.1 (07 Aug 2020 05:58), Max: 97.3 (06 Aug 2020 20:59)  HR: 74 (07 Aug 2020 05:58) (74 - 80)  BP: 126/71 (07 Aug 2020 05:58) (126/71 - 142/74)  BP(mean): --  RR: 18 (07 Aug 2020 05:58) (18 - 18)  SpO2: --on room air    Constitutional - NAD, Comfortable OOB to W/C in his room  Chest - CTAB  Cardiovascular - RRR  Abdomen - Soft, NTND  Extremities - No C/C/E, No calf tenderness   Neurologic Exam -                    Cognitive - Awake, Alert, AAO to self, place, date, year, situation     Communication - Fluent, No dysarthria     Motor - No focal deficits  	 LEFT    UE: SF [5/5], EF [5/5], EE [5/5], WE [5/5],  [wnl]  	 RIGHT UE: SF [4/5], EF [4/5], EE [4/5], WE [2/5],  weak  	 LEFT    LE:  HF [5/5], KE [5/5], DF [5/5], EHL [5/5],  PF [5/5]     RIGHT LE:  HF [3/5], KE [2/5], DF [5/5], EHL [1/5],  PF [1/5]      Sensory - Intact to LT    Reflex:  2 + throughout Babinski positive on R wnl/ symmetric  Psychiatric - Mood stable, Affect WNL    acetaminophen   Tablet .. 650 milliGRAM(s) Oral every 6 hours PRN  ALBUTerol    90 MICROgram(s) HFA Inhaler 2 Puff(s) Inhalation every 6 hours PRN  amLODIPine   Tablet 5 milliGRAM(s) Oral <User Schedule>  aspirin  chewable 81 milliGRAM(s) Oral daily  atorvastatin 40 milliGRAM(s) Oral at bedtime  bisacodyl 10 milliGRAM(s) Oral at bedtime  bisacodyl Suppository 10 milliGRAM(s) Rectal daily PRN  budesonide 160 MICROgram(s)/formoterol 4.5 MICROgram(s) Inhaler 2 Puff(s) Inhalation two times a day  heparin   Injectable 5000 Unit(s) SubCutaneous every 8 hours  melatonin 5 milliGRAM(s) Oral at bedtime PRN  metFORMIN 500 milliGRAM(s) Oral two times a day with meals  mineral oil enema 133 milliLiter(s) Rectal every 12 hours PRN  multivitamin 1 Tablet(s) Oral daily  pantoprazole    Tablet 40 milliGRAM(s) Oral before breakfast  polyethylene glycol 3350 17 Gram(s) Oral <User Schedule>  polyethylene glycol 3350 17 Gram(s) Oral <User Schedule>  senna 2 Tablet(s) Oral at bedtime  senna 2 Tablet(s) Oral at bedtime PRN  tiotropium 18 MICROgram(s) Capsule 1 Capsule(s) Inhalation daily      RECENT LABS/IMAGING                        15.2   4.01  )-----------( 171      ( 06 Aug 2020 05:29 )             46.1     08-06    141  |  101  |  16  ----------------------------<  148<H>  4.2   |  26  |  1.0    Ca    9.5      06 Aug 2020 05:29  Mg     2.0     08-06    TPro  6.6  /  Alb  4.6  /  TBili  2.3<H>  /  DBili  x   /  AST  21  /  ALT  24  /  AlkPhos  54  08-06  labs acceptable

## 2020-08-08 DIAGNOSIS — Z80.3 FAMILY HISTORY OF MALIGNANT NEOPLASM OF BREAST: ICD-10-CM

## 2020-08-08 DIAGNOSIS — G47.33 OBSTRUCTIVE SLEEP APNEA (ADULT) (PEDIATRIC): ICD-10-CM

## 2020-08-08 DIAGNOSIS — Z79.899 OTHER LONG TERM (CURRENT) DRUG THERAPY: ICD-10-CM

## 2020-08-08 DIAGNOSIS — I63.512 CEREBRAL INFARCTION DUE TO UNSPECIFIED OCCLUSION OR STENOSIS OF LEFT MIDDLE CEREBRAL ARTERY: ICD-10-CM

## 2020-08-08 DIAGNOSIS — Z79.84 LONG TERM (CURRENT) USE OF ORAL HYPOGLYCEMIC DRUGS: ICD-10-CM

## 2020-08-08 DIAGNOSIS — I10 ESSENTIAL (PRIMARY) HYPERTENSION: ICD-10-CM

## 2020-08-08 DIAGNOSIS — Z95.0 PRESENCE OF CARDIAC PACEMAKER: ICD-10-CM

## 2020-08-08 DIAGNOSIS — J44.9 CHRONIC OBSTRUCTIVE PULMONARY DISEASE, UNSPECIFIED: ICD-10-CM

## 2020-08-08 DIAGNOSIS — C85.90 NON-HODGKIN LYMPHOMA, UNSPECIFIED, UNSPECIFIED SITE: ICD-10-CM

## 2020-08-08 DIAGNOSIS — Z88.8 ALLERGY STATUS TO OTHER DRUGS, MEDICAMENTS AND BIOLOGICAL SUBSTANCES STATUS: ICD-10-CM

## 2020-08-08 DIAGNOSIS — R47.01 APHASIA: ICD-10-CM

## 2020-08-08 DIAGNOSIS — E78.5 HYPERLIPIDEMIA, UNSPECIFIED: ICD-10-CM

## 2020-08-08 DIAGNOSIS — E11.9 TYPE 2 DIABETES MELLITUS WITHOUT COMPLICATIONS: ICD-10-CM

## 2020-08-08 DIAGNOSIS — R47.1 DYSARTHRIA AND ANARTHRIA: ICD-10-CM

## 2020-08-08 DIAGNOSIS — Z79.82 LONG TERM (CURRENT) USE OF ASPIRIN: ICD-10-CM

## 2020-08-08 DIAGNOSIS — Z88.2 ALLERGY STATUS TO SULFONAMIDES: ICD-10-CM

## 2020-08-08 DIAGNOSIS — R29.717 NIHSS SCORE 17: ICD-10-CM

## 2020-08-08 DIAGNOSIS — G81.91 HEMIPLEGIA, UNSPECIFIED AFFECTING RIGHT DOMINANT SIDE: ICD-10-CM

## 2020-08-08 DIAGNOSIS — Z87.891 PERSONAL HISTORY OF NICOTINE DEPENDENCE: ICD-10-CM

## 2020-08-08 LAB
GLUCOSE BLDC GLUCOMTR-MCNC: 115 MG/DL — HIGH (ref 70–99)
GLUCOSE BLDC GLUCOMTR-MCNC: 125 MG/DL — HIGH (ref 70–99)

## 2020-08-08 PROCEDURE — 70450 CT HEAD/BRAIN W/O DYE: CPT | Mod: 26

## 2020-08-08 RX ADMIN — HEPARIN SODIUM 5000 UNIT(S): 5000 INJECTION INTRAVENOUS; SUBCUTANEOUS at 05:33

## 2020-08-08 RX ADMIN — POLYETHYLENE GLYCOL 3350 17 GRAM(S): 17 POWDER, FOR SOLUTION ORAL at 07:50

## 2020-08-08 RX ADMIN — HEPARIN SODIUM 5000 UNIT(S): 5000 INJECTION INTRAVENOUS; SUBCUTANEOUS at 13:01

## 2020-08-08 RX ADMIN — PANTOPRAZOLE SODIUM 40 MILLIGRAM(S): 20 TABLET, DELAYED RELEASE ORAL at 06:07

## 2020-08-08 RX ADMIN — METFORMIN HYDROCHLORIDE 500 MILLIGRAM(S): 850 TABLET ORAL at 16:52

## 2020-08-08 RX ADMIN — Medication 650 MILLIGRAM(S): at 14:01

## 2020-08-08 RX ADMIN — SENNA PLUS 2 TABLET(S): 8.6 TABLET ORAL at 21:09

## 2020-08-08 RX ADMIN — TIOTROPIUM BROMIDE 1 CAPSULE(S): 18 CAPSULE ORAL; RESPIRATORY (INHALATION) at 08:51

## 2020-08-08 RX ADMIN — Medication 1 TABLET(S): at 13:01

## 2020-08-08 RX ADMIN — BUDESONIDE AND FORMOTEROL FUMARATE DIHYDRATE 2 PUFF(S): 160; 4.5 AEROSOL RESPIRATORY (INHALATION) at 07:50

## 2020-08-08 RX ADMIN — ATORVASTATIN CALCIUM 40 MILLIGRAM(S): 80 TABLET, FILM COATED ORAL at 21:08

## 2020-08-08 RX ADMIN — METFORMIN HYDROCHLORIDE 500 MILLIGRAM(S): 850 TABLET ORAL at 08:40

## 2020-08-08 RX ADMIN — Medication 10 MILLIGRAM(S): at 21:08

## 2020-08-08 RX ADMIN — HEPARIN SODIUM 5000 UNIT(S): 5000 INJECTION INTRAVENOUS; SUBCUTANEOUS at 21:09

## 2020-08-08 RX ADMIN — Medication 650 MILLIGRAM(S): at 13:01

## 2020-08-08 RX ADMIN — Medication 81 MILLIGRAM(S): at 13:01

## 2020-08-08 RX ADMIN — AMLODIPINE BESYLATE 5 MILLIGRAM(S): 2.5 TABLET ORAL at 08:40

## 2020-08-08 RX ADMIN — BUDESONIDE AND FORMOTEROL FUMARATE DIHYDRATE 2 PUFF(S): 160; 4.5 AEROSOL RESPIRATORY (INHALATION) at 19:39

## 2020-08-08 NOTE — PROGRESS NOTE ADULT - SUBJECTIVE AND OBJECTIVE BOX
T(C): 36.2 (08-08-20 @ 06:14), Max: 36.2 (08-08-20 @ 06:14)  HR: 79 (08-08-20 @ 07:53) (73 - 81)  BP: 137/79 (08-08-20 @ 07:53) (133/73 - 153/78)  RR: 18 (08-08-20 @ 06:14) (18 - 18)  SpO2: 100% (08-07-20 @ 20:24) (100% - 100%)      Patient was stable overnight and expresses no new complaints     PE:    Alert   LUNGS- clear  COR- RRR  ABD- SOFT, NT  EXTR- w/o edema  NEURO- stable

## 2020-08-08 NOTE — CHART NOTE - NSCHARTNOTEFT_GEN_A_CORE
New increased right-sided weakness this morning s/p severe "right-sided headache" with reported increased weakness (on right Upper and lower extremities). Given reported increased HP, CT head was warranted for further evaluation. Of note, pending yesterday's MRI brain result.

## 2020-08-08 NOTE — CHART NOTE - NSCHARTNOTEFT_GEN_A_CORE
MRI head on 8/7/20 resulted on 8/8/20:  -No mass effect, parenchymal hemorrhage or diffusion evidence of acute infarction.

## 2020-08-09 LAB
GLUCOSE BLDC GLUCOMTR-MCNC: 141 MG/DL — HIGH (ref 70–99)
GLUCOSE BLDC GLUCOMTR-MCNC: 145 MG/DL — HIGH (ref 70–99)

## 2020-08-09 RX ADMIN — TIOTROPIUM BROMIDE 1 CAPSULE(S): 18 CAPSULE ORAL; RESPIRATORY (INHALATION) at 08:08

## 2020-08-09 RX ADMIN — HEPARIN SODIUM 5000 UNIT(S): 5000 INJECTION INTRAVENOUS; SUBCUTANEOUS at 21:20

## 2020-08-09 RX ADMIN — Medication 650 MILLIGRAM(S): at 07:37

## 2020-08-09 RX ADMIN — HEPARIN SODIUM 5000 UNIT(S): 5000 INJECTION INTRAVENOUS; SUBCUTANEOUS at 05:19

## 2020-08-09 RX ADMIN — Medication 1 TABLET(S): at 11:35

## 2020-08-09 RX ADMIN — METFORMIN HYDROCHLORIDE 500 MILLIGRAM(S): 850 TABLET ORAL at 07:37

## 2020-08-09 RX ADMIN — HEPARIN SODIUM 5000 UNIT(S): 5000 INJECTION INTRAVENOUS; SUBCUTANEOUS at 13:02

## 2020-08-09 RX ADMIN — PANTOPRAZOLE SODIUM 40 MILLIGRAM(S): 20 TABLET, DELAYED RELEASE ORAL at 05:19

## 2020-08-09 RX ADMIN — ATORVASTATIN CALCIUM 40 MILLIGRAM(S): 80 TABLET, FILM COATED ORAL at 21:20

## 2020-08-09 RX ADMIN — Medication 81 MILLIGRAM(S): at 11:35

## 2020-08-09 RX ADMIN — Medication 650 MILLIGRAM(S): at 08:50

## 2020-08-09 RX ADMIN — Medication 10 MILLIGRAM(S): at 21:20

## 2020-08-09 RX ADMIN — AMLODIPINE BESYLATE 5 MILLIGRAM(S): 2.5 TABLET ORAL at 07:37

## 2020-08-09 RX ADMIN — BUDESONIDE AND FORMOTEROL FUMARATE DIHYDRATE 2 PUFF(S): 160; 4.5 AEROSOL RESPIRATORY (INHALATION) at 07:38

## 2020-08-09 RX ADMIN — BUDESONIDE AND FORMOTEROL FUMARATE DIHYDRATE 2 PUFF(S): 160; 4.5 AEROSOL RESPIRATORY (INHALATION) at 20:50

## 2020-08-09 RX ADMIN — METFORMIN HYDROCHLORIDE 500 MILLIGRAM(S): 850 TABLET ORAL at 17:56

## 2020-08-09 NOTE — PROGRESS NOTE ADULT - SUBJECTIVE AND OBJECTIVE BOX
HPI:  59 M (R handed) with a pmh of CHD s/p repair, HTN, DLD, RAA thrombus (was on xarelto and resolved on last ANITA), COPD, DM, sinus bradycardia s/p PPM, marginal cell lymphoma on maintenance rituxan (last chemo was december 21) presents with expressive aphasia and right sided weakness. By the time he came to ED he was unable to articulate any words and was only moaning. As per family he was completely fine at 11AM, and had reported no new complaints.    Of note patient reports that for 1 year he has had one year of mild abdominal distention with constipation. Outpatient providers suspecting hernia with no acute interventions needed.     In the ED: T 96.4, , 185/124, 97% on RA. Labs were stable since last admission. Code stroke was called. CT head showed no acute changes, but was limited due to motion artifact. He received TPA and then CT angiogram; negative for obstruction. Speech and has progressively improved since tPA however right UE and LE weakness slower in improvement.  As per hospitalist, MRI for further stroke evaluation could be done on rehab. Patient was evaluated by physiatry and deemed good candidate for inpatient rehab. (05 Aug 2020 16:59)    AVSS  T(C): 36.8 (08-09-20 @ 07:31), Max: 36.8 (08-09-20 @ 07:31)  HR: 66 (08-09-20 @ 09:24) (66 - 79)  BP: 122/74 (08-09-20 @ 09:24) (122/74 - 144/78)  RR: 18 (08-09-20 @ 07:31) (18 - 18)  SpO2: --      Patient was stable overnight and expresses no new complaints , still with HA  mri and ct head no acute changes    PE: seen in bed, NAD    Alert   LUNGS- clear  COR- RRR  ABD- SOFT, NT  EXTR- w/o edema  NEURO- stable      Continue acute rehab program. CVA post tpa min RT hp

## 2020-08-10 LAB
ALBUMIN SERPL ELPH-MCNC: 4.6 G/DL — SIGNIFICANT CHANGE UP (ref 3.5–5.2)
ALP SERPL-CCNC: 63 U/L — SIGNIFICANT CHANGE UP (ref 30–115)
ALT FLD-CCNC: 27 U/L — SIGNIFICANT CHANGE UP (ref 0–41)
ANION GAP SERPL CALC-SCNC: 13 MMOL/L — SIGNIFICANT CHANGE UP (ref 7–14)
AST SERPL-CCNC: 26 U/L — SIGNIFICANT CHANGE UP (ref 0–41)
B BURGDOR DNA SPEC QL NAA+PROBE: NEGATIVE — SIGNIFICANT CHANGE UP
BILIRUB SERPL-MCNC: 1.7 MG/DL — HIGH (ref 0.2–1.2)
BUN SERPL-MCNC: 13 MG/DL — SIGNIFICANT CHANGE UP (ref 10–20)
CALCIUM SERPL-MCNC: 9.7 MG/DL — SIGNIFICANT CHANGE UP (ref 8.5–10.1)
CHLORIDE SERPL-SCNC: 103 MMOL/L — SIGNIFICANT CHANGE UP (ref 98–110)
CO2 SERPL-SCNC: 25 MMOL/L — SIGNIFICANT CHANGE UP (ref 17–32)
CREAT SERPL-MCNC: 0.9 MG/DL — SIGNIFICANT CHANGE UP (ref 0.7–1.5)
GLUCOSE BLDC GLUCOMTR-MCNC: 135 MG/DL — HIGH (ref 70–99)
GLUCOSE BLDC GLUCOMTR-MCNC: 149 MG/DL — HIGH (ref 70–99)
GLUCOSE BLDC GLUCOMTR-MCNC: 184 MG/DL — HIGH (ref 70–99)
GLUCOSE SERPL-MCNC: 149 MG/DL — HIGH (ref 70–99)
HCT VFR BLD CALC: 46.1 % — SIGNIFICANT CHANGE UP (ref 42–52)
HGB BLD-MCNC: 15.3 G/DL — SIGNIFICANT CHANGE UP (ref 14–18)
MAGNESIUM SERPL-MCNC: 2 MG/DL — SIGNIFICANT CHANGE UP (ref 1.8–2.4)
MCHC RBC-ENTMCNC: 29.5 PG — SIGNIFICANT CHANGE UP (ref 27–31)
MCHC RBC-ENTMCNC: 33.2 G/DL — SIGNIFICANT CHANGE UP (ref 32–37)
MCV RBC AUTO: 89 FL — SIGNIFICANT CHANGE UP (ref 80–94)
NRBC # BLD: 0 /100 WBCS — SIGNIFICANT CHANGE UP (ref 0–0)
PLATELET # BLD AUTO: 174 K/UL — SIGNIFICANT CHANGE UP (ref 130–400)
POTASSIUM SERPL-MCNC: 4.3 MMOL/L — SIGNIFICANT CHANGE UP (ref 3.5–5)
POTASSIUM SERPL-SCNC: 4.3 MMOL/L — SIGNIFICANT CHANGE UP (ref 3.5–5)
PROT SERPL-MCNC: 7.1 G/DL — SIGNIFICANT CHANGE UP (ref 6–8)
RBC # BLD: 5.18 M/UL — SIGNIFICANT CHANGE UP (ref 4.7–6.1)
RBC # FLD: 12.4 % — SIGNIFICANT CHANGE UP (ref 11.5–14.5)
SODIUM SERPL-SCNC: 141 MMOL/L — SIGNIFICANT CHANGE UP (ref 135–146)
WBC # BLD: 5.48 K/UL — SIGNIFICANT CHANGE UP (ref 4.8–10.8)
WBC # FLD AUTO: 5.48 K/UL — SIGNIFICANT CHANGE UP (ref 4.8–10.8)

## 2020-08-10 RX ADMIN — HEPARIN SODIUM 5000 UNIT(S): 5000 INJECTION INTRAVENOUS; SUBCUTANEOUS at 05:22

## 2020-08-10 RX ADMIN — TIOTROPIUM BROMIDE 1 CAPSULE(S): 18 CAPSULE ORAL; RESPIRATORY (INHALATION) at 07:43

## 2020-08-10 RX ADMIN — Medication 650 MILLIGRAM(S): at 07:43

## 2020-08-10 RX ADMIN — Medication 81 MILLIGRAM(S): at 12:29

## 2020-08-10 RX ADMIN — ALBUTEROL 2 PUFF(S): 90 AEROSOL, METERED ORAL at 21:28

## 2020-08-10 RX ADMIN — Medication 1 TABLET(S): at 12:29

## 2020-08-10 RX ADMIN — Medication 10 MILLIGRAM(S): at 21:27

## 2020-08-10 RX ADMIN — PANTOPRAZOLE SODIUM 40 MILLIGRAM(S): 20 TABLET, DELAYED RELEASE ORAL at 05:23

## 2020-08-10 RX ADMIN — HEPARIN SODIUM 5000 UNIT(S): 5000 INJECTION INTRAVENOUS; SUBCUTANEOUS at 12:29

## 2020-08-10 RX ADMIN — METFORMIN HYDROCHLORIDE 500 MILLIGRAM(S): 850 TABLET ORAL at 07:43

## 2020-08-10 RX ADMIN — AMLODIPINE BESYLATE 5 MILLIGRAM(S): 2.5 TABLET ORAL at 07:43

## 2020-08-10 RX ADMIN — BUDESONIDE AND FORMOTEROL FUMARATE DIHYDRATE 2 PUFF(S): 160; 4.5 AEROSOL RESPIRATORY (INHALATION) at 07:42

## 2020-08-10 RX ADMIN — Medication 650 MILLIGRAM(S): at 09:00

## 2020-08-10 RX ADMIN — ATORVASTATIN CALCIUM 40 MILLIGRAM(S): 80 TABLET, FILM COATED ORAL at 21:27

## 2020-08-10 RX ADMIN — METFORMIN HYDROCHLORIDE 500 MILLIGRAM(S): 850 TABLET ORAL at 17:08

## 2020-08-10 NOTE — PROGRESS NOTE ADULT - SUBJECTIVE AND OBJECTIVE BOX
HPI:  59 M (R handed) with a pmh of CHD s/p repair, HTN, DLD, RAA thrombus (was on xarelto and resolved on last ANITA), COPD, DM, sinus bradycardia s/p PPM, marginal cell lymphoma on maintenance rituxan (last chemo was december 21) presents with expressive aphasia and right sided weakness. By the time he came to ED he was unable to articulate any words and was only moaning. As per family he was completely fine at 11AM, and had reported no new complaints.    Of note patient reports that for 1 year he has had one year of mild abdominal distention with constipation. Outpatient providers suspecting hernia with no acute interventions needed.     In the ED: T 96.4, , 185/124, 97% on RA. Labs were stable since last admission. Code stroke was called. CT head showed no acute changes, but was limited due to motion artifact. He received TPA and then CT angiogram; negative for obstruction. Speech and has progressively improved since tPA however right UE and LE weakness slower in improvement.  As per hospitalist, MRI for further stroke evaluation could be done on rehab. Patient was evaluated by physiatry and deemed good candidate for inpatient rehab. (05 Aug 2020 16:59)    Patient seen and examined at bedside. NAD. Tolerating therapy well. MRI demonstrated No mass effect, parenchymal hemorrhage or diffusion evidence of acute infarction.      Vital Signs Last 24 Hrs  T(C): 37.1 (10 Aug 2020 05:22), Max: 37.1 (10 Aug 2020 05:22)  T(F): 98.7 (10 Aug 2020 05:22), Max: 98.7 (10 Aug 2020 05:22)  HR: 73 (10 Aug 2020 05:22) (72 - 80)  BP: 129/68 (10 Aug 2020 05:22) (129/68 - 148/74)  BP(mean): --  RR: 18 (10 Aug 2020 05:22) (18 - 20)  SpO2: --    Constitutional - NAD, Comfortable OOB to W/C in his room  Chest - CTAB  Cardiovascular - RRR  Abdomen - Soft, NTND  Extremities - No C/C/E, No calf tenderness   Neurologic Exam -                    Cognitive - Awake, Alert, AAO to self, place, date, year, situation     Communication - Fluent, No dysarthria     Motor - No focal deficits  	 LEFT    UE: SF [5/5], EF [5/5], EE [5/5], WE [5/5],  [wnl]  	 RIGHT UE: SF [4/5], EF [4/5], EE [4/5], WE [2/5],  weak  	 LEFT    LE:  HF [5/5], KE [5/5], DF [5/5], EHL [5/5],  PF [5/5]     RIGHT LE:  HF [3/5], KE [2/5], DF [5/5], EHL [1/5],  PF [1/5]      Sensory - Intact to LT    Reflex:  2 + throughout Babinski positive on R wnl/ symmetric  Psychiatric - Mood stable, Affect WNL HPI:  59 M (R handed) with a pmh of CHD s/p repair, HTN, DLD, RAA thrombus (was on xarelto and resolved on last ANITA), COPD, DM, sinus bradycardia s/p PPM, marginal cell lymphoma on maintenance rituxan (last chemo was december 21) presents with expressive aphasia and right sided weakness. By the time he came to ED he was unable to articulate any words and was only moaning. As per family he was completely fine at 11AM, and had reported no new complaints.    Of note patient reports that for 1 year he has had one year of mild abdominal distention with constipation. Outpatient providers suspecting hernia with no acute interventions needed.     In the ED: T 96.4, , 185/124, 97% on RA. Labs were stable since last admission. Code stroke was called. CT head showed no acute changes, but was limited due to motion artifact. He received TPA and then CT angiogram; negative for obstruction. Speech and has progressively improved since tPA however right UE and LE weakness slower in improvement.  As per hospitalist, MRI for further stroke evaluation could be done on rehab. Patient was evaluated by physiatry and deemed good candidate for inpatient rehab. (05 Aug 2020 16:59)    Patient seen and examined at bedside. NAD. Tolerating therapy well. MRI demonstrated No mass effect, parenchymal hemorrhage or diffusion evidence of acute infarction.      Vital Signs Last 24 Hrs  T(C): 37.1 (10 Aug 2020 05:22), Max: 37.1 (10 Aug 2020 05:22)  T(F): 98.7 (10 Aug 2020 05:22), Max: 98.7 (10 Aug 2020 05:22)  HR: 73 (10 Aug 2020 05:22) (72 - 80)  BP: 129/68 (10 Aug 2020 05:22) (129/68 - 148/74)  BP(mean): --  RR: 18 (10 Aug 2020 05:22) (18 - 20)  SpO2: --    Constitutional - NAD, Comfortable OOB to W/C in his room  Chest - CTAB  Cardiovascular - RRR  Abdomen - Soft, NTND  Extremities - No C/C/E, No calf tenderness   Neurologic Exam -                    Cognitive - Awake, Alert, AAO to self, place, date, year, situation     Communication - Fluent, No dysarthria     Motor - No focal deficits  	 LEFT    UE: SF [5/5], EF [5/5], EE [5/5], WE [5/5],  [wnl]  	 RIGHT UE: SF [4/5], EF [4/5], EE [4/5], WE [2/5],  weak  	 LEFT    LE:  HF [5/5], KE [5/5], DF [5/5], EHL [5/5],  PF [5/5]     RIGHT LE:  HF [3/5], KE [2/5], DF [5/5], EHL [1/5],  PF [1/5]      Sensory - Intact to LT    Reflex:  2 + throughout Babinski positive on R wnl/ symmetric  Psychiatric - Mood stable, Affect WNL    Functional Status: ModA in BM and transfers. Ambulates 15ft x2 RW. HPI:  59 M (R handed) with a pmh of CHD s/p repair, HTN, DLD, RAA thrombus (was on xarelto and resolved on last ANITA), COPD, DM, sinus bradycardia s/p PPM, marginal cell lymphoma on maintenance rituxan (last chemo was december 21) presents with expressive aphasia and right sided weakness. By the time he came to ED he was unable to articulate any words and was only moaning. As per family he was completely fine at 11AM, and had reported no new complaints.    Of note patient reports that for 1 year he has had one year of mild abdominal distention with constipation. Outpatient providers suspecting hernia with no acute interventions needed.     In the ED: T 96.4, , 185/124, 97% on RA. Labs were stable since last admission. Code stroke was called. CT head showed no acute changes, but was limited due to motion artifact. He received TPA and then CT angiogram; negative for obstruction. Speech and has progressively improved since tPA however right UE and LE weakness slower in improvement.  As per hospitalist, MRI for further stroke evaluation could be done on rehab. Patient was evaluated by physiatry and deemed good candidate for inpatient rehab. (05 Aug 2020 16:59)    Patient seen and examined at bedside. NAD. Tolerating therapy well.   MRI demonstrated No mass effect, parenchymal hemorrhage or diffusion evidence of acute infarction.    ROS no new changes                          15.3   5.48  )-----------( 174      ( 10 Aug 2020 07:29 )             46.1     08-10    141  |  103  |  13  ----------------------------<  149<H>  4.3   |  25  |  0.9    Ca    9.7      10 Aug 2020 07:29  Mg     2.0     08-10    TPro  7.1  /  Alb  4.6  /  TBili  1.7<H>  /  DBili  x   /  AST  26  /  ALT  27  /  AlkPhos  63  08-10    labs acceptable            Vital Signs Last 24 Hrs=AVSS  T(C): 37.1 (10 Aug 2020 05:22), Max: 37.1 (10 Aug 2020 05:22)  T(F): 98.7 (10 Aug 2020 05:22), Max: 98.7 (10 Aug 2020 05:22)  HR: 73 (10 Aug 2020 05:22) (72 - 80)  BP: 129/68 (10 Aug 2020 05:22) (129/68 - 148/74)  BP(mean): --  RR: 18 (10 Aug 2020 05:22) (18 - 20)  SpO2: --    Constitutional - NAD, Comfortable OOB to W/C in his room  Chest - CTAB  Cardiovascular - RRR  Abdomen - Soft, NTND  Extremities - No C/C/E, No calf tenderness   Neurologic Exam -                    Cognitive - Awake, Alert, AAO to self, place, date, year, situation     Communication - Fluent, No dysarthria     Motor - No focal deficits  	 LEFT    UE: SF [5/5], EF [5/5], EE [5/5], WE [5/5],  [wnl]  	 RIGHT UE: SF [4/5], EF [4/5], EE [4/5], WE [2/5],  weak  	 LEFT    LE:  HF [5/5], KE [5/5], DF [5/5], EHL [5/5],  PF [5/5]     RIGHT LE:  HF [3/5], KE [2/5], DF [5/5], EHL [1/5],  PF [1/5]      Sensory - Intact to LT    Reflex:  2 + throughout Babinski positive on R wnl/ symmetric  Psychiatric - Mood stable, Affect WNL    Functional Status: ModA in BM and transfers. Ambulates 15ft x2 RW.

## 2020-08-10 NOTE — PROGRESS NOTE ADULT - ASSESSMENT
59 M (R handed) with a pmh of CHD s/p repair, HTN, DLD, RAA thrombus (was on xarelto)   #CVA with right hemiparesis (R hand dominant) and aphasia  -S/p TPA  -Start PT/OT/SLP  -Continue ASA and statin  - MRI brain w/o MALDONADO:  No mass effect, parenchymal hemorrhage or diffusion evidence of acute infarction.  - F/u lyme panel   - Repeat CT Head for HA pending    #RLQ Abdominal tenderness is chronic but now with constipation  -Chronic RLQ tenderness, unchanged.   -Recent abdominal imaging appreciated.  -Continue Miralax daily and add dulcolax 2 tabs today only.    # Aggressive nisreen marginal zone lymphoma  - maintenance on Rituxan, last dose 12/31  - Per family he has appt with Dr. Rider in August for PET scan    # DM   - SSI regimen   - Fingersticks AC   -Continue metformin    #HTN  - continue home amlodipine    #COPD  - Continue duoneb, symbicort, spiriva    # Sinus bradycardia   - s/p PPM      #Congenital hear Disease, S/P repair  Stable    #S/P RAA thrombus which was treated. Stable.    #S/P PPP for sinus bradycardia. Stable    Diet: DASH/TLC  GI PPx: Protonix  DVT PPx: SCDs, heparin  Activity: as tolerated  Cardiac and fall precautions.  Ortho: WBAT 59 M (R handed) with a pmh of CHD s/p repair, HTN, DLD, RAA thrombus (was on xarelto)   #CVA with right hemiparesis (R hand dominant) and aphasia    continue acute 3 hrs of rehab  -S/p TPA  -Start PT/OT/SLP  -Continue ASA and statin  - MRI brain w/o MALDONADO:  No mass effect, parenchymal hemorrhage or diffusion evidence of acute infarction.  - F/u lyme panel   - Repeat CT Head for HA pending    #RLQ Abdominal tenderness is chronic but now with constipation  -Chronic RLQ tenderness, unchanged.   -Recent abdominal imaging appreciated.  -Continue Miralax daily and add dulcolax 2 tabs today only.    # Aggressive nisreen marginal zone lymphoma  - maintenance on Rituxan, last dose 12/31  - Per family he has appt with Dr. Rider in August for PET scan    # DM   - SSI regimen   - Fingersticks AC   -Continue metformin    #HTN  - continue home amlodipine    #COPD  - Continue duoneb, symbicort, spiriva    # Sinus bradycardia   - s/p PPM      #Congenital hear Disease, S/P repair  Stable    #S/P RAA thrombus which was treated. Stable.    #S/P PPP for sinus bradycardia. Stable    Diet: DASH/TLC  GI PPx: Protonix  DVT PPx: SCDs, heparin  Activity: as tolerated  Cardiac and fall precautions.  Ortho: WBAT

## 2020-08-11 LAB
GLUCOSE BLDC GLUCOMTR-MCNC: 123 MG/DL — HIGH (ref 70–99)
GLUCOSE BLDC GLUCOMTR-MCNC: 162 MG/DL — HIGH (ref 70–99)

## 2020-08-11 RX ADMIN — ATORVASTATIN CALCIUM 40 MILLIGRAM(S): 80 TABLET, FILM COATED ORAL at 21:54

## 2020-08-11 RX ADMIN — PANTOPRAZOLE SODIUM 40 MILLIGRAM(S): 20 TABLET, DELAYED RELEASE ORAL at 05:35

## 2020-08-11 RX ADMIN — Medication 81 MILLIGRAM(S): at 11:50

## 2020-08-11 RX ADMIN — METFORMIN HYDROCHLORIDE 500 MILLIGRAM(S): 850 TABLET ORAL at 07:33

## 2020-08-11 RX ADMIN — Medication 1 TABLET(S): at 11:50

## 2020-08-11 RX ADMIN — BUDESONIDE AND FORMOTEROL FUMARATE DIHYDRATE 2 PUFF(S): 160; 4.5 AEROSOL RESPIRATORY (INHALATION) at 20:27

## 2020-08-11 RX ADMIN — AMLODIPINE BESYLATE 5 MILLIGRAM(S): 2.5 TABLET ORAL at 07:33

## 2020-08-11 RX ADMIN — BUDESONIDE AND FORMOTEROL FUMARATE DIHYDRATE 2 PUFF(S): 160; 4.5 AEROSOL RESPIRATORY (INHALATION) at 07:34

## 2020-08-11 RX ADMIN — METFORMIN HYDROCHLORIDE 500 MILLIGRAM(S): 850 TABLET ORAL at 17:28

## 2020-08-11 NOTE — PROGRESS NOTE ADULT - SUBJECTIVE AND OBJECTIVE BOX
Pain: Denied  Orientation: Fully oriented to self, place, date and event  Arousal Level: Alert  Behavior: Pleasant and Cooperative  Affect Range: Full          Needed: No

## 2020-08-11 NOTE — PROGRESS NOTE ADULT - ASSESSMENT
59 M (R handed) with a pmh of CHD s/p repair, HTN, DLD, RAA thrombus (was on xarelto)   #CVA with right hemiparesis (R hand dominant) and aphasia    continue acute 3 hrs of rehab  -S/p TPA  -Start PT/OT/SLP  -Continue ASA and statin  - MRI brain w/o MALDONADO:  No mass effect, parenchymal hemorrhage or diffusion evidence of acute infarction.  - F/u lyme panel   - Repeat CT Head for HA pending    #RLQ Abdominal tenderness is chronic but now with constipation  -Chronic RLQ tenderness, unchanged.   -Recent abdominal imaging appreciated.  -Continue Miralax daily and add dulcolax 2 tabs today only.    # Aggressive nisreen marginal zone lymphoma  - maintenance on Rituxan, last dose 12/31  - Per family he has appt with Dr. Rider in August for PET scan    # DM   - SSI regimen   - Fingersticks AC   -Continue metformin    #HTN  - continue home amlodipine    #COPD  - Continue duoneb, symbicort, spiriva    # Sinus bradycardia   - s/p PPM      #Congenital hear Disease, S/P repair  Stable    #S/P RAA thrombus which was treated. Stable.    #S/P PPP for sinus bradycardia. Stable    Diet: DASH/TLC  GI PPx: Protonix  DVT PPx: SCDs, heparin  Activity: as tolerated  Cardiac and fall precautions.  Ortho: WBAT 59 M (R handed) with a pmh of CHD s/p repair, HTN, DLD, RAA thrombus (was on xarelto)   #CVA with right hemiparesis (R hand dominant) and aphasia  NIHSS 11 on admit  DC this week    continue acute 3 hrs of rehab    -S/p TPA  -Start PT/OT/SLP  -Continue ASA and statin  - MRI brain w/o MALDONADO:  No mass effect, parenchymal hemorrhage or diffusion evidence of acute infarction.  - F/u lyme panel   - Repeat CT Head for HA pending    #RLQ Abdominal tenderness is chronic but now with constipation  -Chronic RLQ tenderness, unchanged.   -Recent abdominal imaging appreciated.  -Continue Miralax daily and add dulcolax 2 tabs today only.    # Aggressive nisreen marginal zone lymphoma  - maintenance on Rituxan, last dose 12/31  - Per family he has appt with Dr. Rider in August for PET scan    # DM   - SSI regimen   - Fingersticks AC   -Continue metformin    #HTN  - continue home amlodipine    #COPD  - Continue duoneb, symbicort, spiriva    # Sinus bradycardia   - s/p PPM      #Congenital hear Disease, S/P repair  Stable    #S/P RAA thrombus which was treated. Stable.    #S/P PPP for sinus bradycardia. Stable    Diet: DASH/TLC  GI PPx: Protonix  DVT PPx: SCDs, heparin  Activity: as tolerated  Cardiac and fall precautions.  Ortho: WBAT 59 M (R handed) with a pmh of CHD s/p repair, HTN, DLD, RAA thrombus (was on xarelto)   #CVA with right hemiparesis (R hand dominant) and aphasia  NIHSS 11 on admit  DC tomorrow    continue acute 3 hrs of rehab    -S/p TPA  -Start PT/OT/SLP  -Continue ASA and statin  - MRI brain w/o MALDONADO:  No mass effect, parenchymal hemorrhage or diffusion evidence of acute infarction.  - F/u lyme panel   - Repeat CT Head for HA pending    #RLQ Abdominal tenderness is chronic but now with constipation  -Chronic RLQ tenderness, unchanged.   -Recent abdominal imaging appreciated.  -Continue Miralax daily and add dulcolax 2 tabs today only.    # Aggressive nisreen marginal zone lymphoma  - maintenance on Rituxan, last dose 12/31  - Per family he has appt with Dr. Rider in August for PET scan    # DM   - SSI regimen   - Fingersticks AC   -Continue metformin    #HTN  - continue home amlodipine    #COPD  - Continue duoneb, symbicort, spiriva    # Sinus bradycardia   - s/p PPM      #Congenital hear Disease, S/P repair  Stable    #S/P RAA thrombus which was treated. Stable.    #S/P PPP for sinus bradycardia. Stable    Diet: DASH/TLC  GI PPx: Protonix  DVT PPx: SCDs, heparin  Activity: as tolerated  Cardiac and fall precautions.  Ortho: WBAT

## 2020-08-11 NOTE — PROGRESS NOTE ADULT - SUBJECTIVE AND OBJECTIVE BOX
59 M (R handed) with a pmh of CHD s/p repair, HTN, DLD, RAA thrombus (was on xarelto and resolved on last ANITA), COPD, DM, sinus bradycardia s/p PPM, marginal cell lymphoma on maintenance rituxan (last chemo was december 21) presents with expressive aphasia and right sided weakness. By the time he came to ED he was unable to articulate any words and was only moaning. As per family he was completely fine at 11AM, and had reported no new complaints.    Of note patient reports that for 1 year he has had one year of mild abdominal distention with constipation. Outpatient providers suspecting hernia with no acute interventions needed.     In the ED: T 96.4, , 185/124, 97% on RA. Labs were stable since last admission. Code stroke was called. CT head showed no acute changes, but was limited due to motion artifact. He received TPA and then CT angiogram; negative for obstruction. Speech and has progressively improved since tPA however right UE and LE weakness slower in improvement.  As per hospitalist, MRI for further stroke evaluation could be done on rehab. Patient was evaluated by physiatry and deemed good candidate for inpatient rehab.       Patient seen and examined at bedside. NAD. Tolerating therapy well. ROS otherwise negative for n/v/f/chills and other symptoms.       Vital Signs Last 24 Hrs  T(C): 35.9 (11 Aug 2020 05:47), Max: 37.1 (10 Aug 2020 21:15)  T(F): 96.7 (11 Aug 2020 05:47), Max: 98.8 (10 Aug 2020 21:15)  HR: 85 (11 Aug 2020 07:32) (73 - 102)  BP: 156/90 (11 Aug 2020 07:32) (129/76 - 156/90)  BP(mean): --  RR: 18 (11 Aug 2020 05:47) (18 - 18)  SpO2: --      Constitutional - NAD, Comfortable OOB to W/C in his room  Chest - CTAB  Cardiovascular - RRR  Abdomen - Soft, NTND  Extremities - No C/C/E, No calf tenderness   Neurologic Exam -                    Cognitive - Awake, Alert, AAO to self, place, date, year, situation     Communication - Fluent, No dysarthria     Motor - No focal deficits  	 LEFT    UE: SF [5/5], EF [5/5], EE [5/5], WE [5/5],  [wnl]  	 RIGHT UE: SF [4/5], EF [4/5], EE [4/5], WE [2/5],  weak  	 LEFT    LE:  HF [5/5], KE [5/5], DF [5/5], EHL [5/5],  PF [5/5]     RIGHT LE:  HF [3/5], KE [2/5], DF [5/5], EHL [1/5],  PF [1/5]      Sensory - Intact to LT    Reflex:  2 + throughout Babinski positive on R wnl/ symmetric  Psychiatric - Mood stable, Affect WNL    Functional Status: ModA in BM and transfers. Ambulates 15ft x2 RW. 59 M (R handed) with a pmh of CHD s/p repair, HTN, DLD, RAA thrombus (was on xarelto and resolved on last ANITA), COPD, DM, sinus bradycardia s/p PPM, marginal cell lymphoma on maintenance rituxan (last chemo was december 21) presents with expressive aphasia and right sided weakness. By the time he came to ED he was unable to articulate any words and was only moaning. As per family he was completely fine at 11AM, and had reported no new complaints.    Of note patient reports that for 1 year he has had one year of mild abdominal distention with constipation. Outpatient providers suspecting hernia with no acute interventions needed.     In the ED: T 96.4, , 185/124, 97% on RA. Labs were stable since last admission. Code stroke was called. CT head showed no acute changes, but was limited due to motion artifact. He received TPA and then CT angiogram; negative for obstruction. Speech and has progressively improved since tPA however right UE and LE weakness slower in improvement.  As per hospitalist, MRI for further stroke evaluation could be done on rehab. Patient was evaluated by physiatry and deemed good candidate for inpatient rehab.       Patient seen and examined at bedside. NAD. Tolerating therapy well. ROS otherwise negative for n/v/f/chills and other symptoms.     ROS unchanged  Vital Signs Last 24 Hrs  T(C): 35.9 (11 Aug 2020 05:47), Max: 37.1 (10 Aug 2020 21:15)  T(F): 96.7 (11 Aug 2020 05:47), Max: 98.8 (10 Aug 2020 21:15)  HR: 85 (11 Aug 2020 07:32) (73 - 102)  BP: 156/90 (11 Aug 2020 07:32) (129/76 - 156/90)  BP(mean): --  RR: 18 (11 Aug 2020 05:47) (18 - 18)  SpO2: --      Constitutional - NAD, Comfortable OOB to W/C in his room  Chest - CTAB  Cardiovascular - RRR  Abdomen - Soft, NTND  Extremities - No C/C/E, No calf tenderness   Neurologic Exam -                    Cognitive - Awake, Alert, AAO to self, place, date, year, situation     Communication - Fluent, No dysarthria     Motor - No focal deficits  	 LEFT    UE: SF [5/5], EF [5/5], EE [5/5], WE [5/5],  [wnl]  	 RIGHT UE: SF [4/5], EF [4/5], EE [4/5], WE [2/5],  weak  	 LEFT    LE:  HF [5/5], KE [5/5], DF [5/5], EHL [5/5],  PF [5/5]     RIGHT LE:  HF [3/5], KE [2/5], DF [5/5], EHL [1/5],  PF [1/5]      Sensory - Intact to LT    Reflex:  2 + throughout Babinski positive on R wnl/ symmetric  Psychiatric - Mood stable, Affect WNL    Functional Status: Ambulates 150ft x2 RW. 59 M (R handed) with a pmh of CHD s/p repair, HTN, DLD, RAA thrombus (was on xarelto and resolved on last ANITA), COPD, DM, sinus bradycardia s/p PPM, marginal cell lymphoma on maintenance rituxan (last chemo was december 21) presents with expressive aphasia and right sided weakness. By the time he came to ED he was unable to articulate any words and was only moaning. As per family he was completely fine at 11AM, and had reported no new complaints.    Of note patient reports that for 1 year he has had one year of mild abdominal distention with constipation. Outpatient providers suspecting hernia with no acute interventions needed.     In the ED: T 96.4, , 185/124, 97% on RA. Labs were stable since last admission. Code stroke was called. CT head showed no acute changes, but was limited due to motion artifact. He received TPA and then CT angiogram; negative for obstruction. Speech and has progressively improved since tPA however right UE and LE weakness slower in improvement.  As per hospitalist, MRI for further stroke evaluation could be done on rehab. Patient was evaluated by physiatry and deemed good candidate for inpatient rehab.   NIHSS on admission 11    Patient seen and examined at bedside. NAD. Tolerating therapy well. ROS otherwise negative for n/v/f/chills and other symptoms.     ROS unchanged  Vital Signs Last 24 Hrs  T(C): 35.9 (11 Aug 2020 05:47), Max: 37.1 (10 Aug 2020 21:15)  T(F): 96.7 (11 Aug 2020 05:47), Max: 98.8 (10 Aug 2020 21:15)  HR: 85 (11 Aug 2020 07:32) (73 - 102)  BP: 156/90 (11 Aug 2020 07:32) (129/76 - 156/90)  BP(mean): --  RR: 18 (11 Aug 2020 05:47) (18 - 18)  SpO2: --      Constitutional - NAD, Comfortable OOB to W/C in his room  Chest - CTAB  Cardiovascular - RRR  Abdomen - Soft, NTND  Extremities - No C/C/E, No calf tenderness   Neurologic Exam -                    Cognitive - Awake, Alert, AAO to self, place, date, year, situation     Communication - Fluent, No dysarthria     Motor - No focal deficits  	 LEFT    UE: SF [5/5], EF [5/5], EE [5/5], WE [5/5],  [wnl]  	 RIGHT UE: SF [4/5], EF [4/5], EE [4/5], WE [2/5],  weak  	 LEFT    LE:  HF [5/5], KE [5/5], DF [5/5], EHL [5/5],  PF [5/5]     RIGHT LE:  HF [3/5], KE [2/5], DF [5/5], EHL [1/5],  PF [1/5]      Sensory - Intact to LT    Reflex:  2 + throughout Babinski positive on R wnl/ symmetric  Psychiatric - Mood stable, Affect WNL    Functional Status: Ambulates 150ft x2 RW. 59 M (R handed) with a pmh of CHD s/p repair, HTN, DLD, RAA thrombus (was on xarelto and resolved on last ANITA), COPD, DM, sinus bradycardia s/p PPM, marginal cell lymphoma on maintenance rituxan (last chemo was december 21) presents with expressive aphasia and right sided weakness. By the time he came to ED he was unable to articulate any words and was only moaning. As per family he was completely fine at 11AM, and had reported no new complaints.    Of note patient reports that for 1 year he has had one year of mild abdominal distention with constipation. Outpatient providers suspecting hernia with no acute interventions needed.     In the ED: T 96.4, , 185/124, 97% on RA. Labs were stable since last admission. Code stroke was called. CT head showed no acute changes, but was limited due to motion artifact. He received TPA and then CT angiogram; negative for obstruction. Speech and has progressively improved since tPA however right UE and LE weakness slower in improvement.  As per hospitalist, MRI for further stroke evaluation could be done on rehab. Patient was evaluated by physiatry and deemed good candidate for inpatient rehab.   NIHSS on admission 11    Patient seen and examined at bedside. NAD. Tolerating therapy well. ROS otherwise negative for n/v/f/chills and other symptoms.     ROS unchanged  Vital Signs Last 24 Hrs  T(C): 35.9 (11 Aug 2020 05:47), Max: 37.1 (10 Aug 2020 21:15)  T(F): 96.7 (11 Aug 2020 05:47), Max: 98.8 (10 Aug 2020 21:15)  HR: 85 (11 Aug 2020 07:32) (73 - 102)  BP: 156/90 (11 Aug 2020 07:32) (129/76 - 156/90)  BP(mean): --  RR: 18 (11 Aug 2020 05:47) (18 - 18)  SpO2: --      Constitutional - NAD, Comfortable OOB to W/C in his room  Chest - CTAB  Cardiovascular - RRR  Abdomen - Soft, NTND  Extremities - No C/C/E, No calf tenderness   Neurologic Exam -                    Cognitive - Awake, Alert, AAO to self, place, date, year, situation     Communication - Fluent, No dysarthria     Motor - No focal deficits  	 LEFT    UE: SF [5/5], EF [5/5], EE [5/5], WE [5/5],  [wnl]  	 RIGHT UE: SF [4/5], EF [4/5], EE [4/5], WE [2/5],  weak  	 LEFT    LE:  HF [5/5], KE [5/5], DF [5/5], EHL [5/5],  PF [5/5]     RIGHT LE:  HF [3/5], KE [2/5], DF [5/5], EHL [1/5],  PF [1/5]      Sensory - Intact to LT    Reflex:  2 + throughout Babinski positive on R wnl/ symmetric  Psychiatric - Mood stable, Affect WNL    Functional Status: Ambulates 150ft x2 RW.     NIHSS Current 1 59 M (R handed) with a pmh of CHD s/p repair, HTN, DLD, RAA thrombus (was on xarelto and resolved on last ANITA), COPD, DM, sinus bradycardia s/p PPM, marginal cell lymphoma on maintenance rituxan (last chemo was december 21) presents with expressive aphasia and right sided weakness. By the time he came to ED he was unable to articulate any words and was only moaning. As per family he was completely fine at 11AM, and had reported no new complaints.    Of note patient reports that for 1 year he has had one year of mild abdominal distention with constipation. Outpatient providers suspecting hernia with no acute interventions needed.     In the ED: T 96.4, , 185/124, 97% on RA. Labs were stable since last admission. Code stroke was called. CT head showed no acute changes, but was limited due to motion artifact. He received TPA and then CT angiogram; negative for obstruction. Speech and has progressively improved since tPA however right UE and LE weakness slower in improvement.  As per hospitalist, MRI for further stroke evaluation could be done on rehab. Patient was evaluated by physiatry and deemed good candidate for inpatient rehab.   NIHSS on admission 11    Patient seen and examined at bedside. NAD. Tolerating therapy well. ROS otherwise negative for n/v/f/chills and other symptoms.     ROS unchanged  Vital Signs Last 24 Hrs  T(C): 35.9 (11 Aug 2020 05:47), Max: 37.1 (10 Aug 2020 21:15)  T(F): 96.7 (11 Aug 2020 05:47), Max: 98.8 (10 Aug 2020 21:15)  HR: 85 (11 Aug 2020 07:32) (73 - 102)  BP: 156/90 (11 Aug 2020 07:32) (129/76 - 156/90)  BP(mean): --  RR: 18 (11 Aug 2020 05:47) (18 - 18)  SpO2: --      Constitutional - NAD, Comfortable OOB to W/C in his room  Chest - CTAB  Cardiovascular - RRR  Abdomen - Soft, NTND  Extremities - No C/C/E, No calf tenderness   Neurologic Exam -                    Cognitive - Awake, Alert, AAO to self, place, date, year, situation     Communication - Fluent, No dysarthria     Motor - No focal deficits  	 LEFT    UE: SF [5/5], EF [5/5], EE [5/5], WE [5/5],  [wnl]  	 RIGHT UE: SF [4/5], EF [4/5], EE [4/5], WE [2/5],  weak  	 LEFT    LE:  HF [5/5], KE [5/5], DF [5/5], EHL [5/5],  PF [5/5]     RIGHT LE:  HF [3/5], KE [2/5], DF [5/5], EHL [1/5],  PF [1/5]      Sensory - Intact to LT    Reflex:  2 + throughout Babinski positive on R wnl/ symmetric  Psychiatric - Mood stable, Affect WNL    Functional Status: Ambulates 150ft x2 RW.     NIHSS Current

## 2020-08-11 NOTE — PROGRESS NOTE ADULT - ASSESSMENT
Tests Administered:  ·	Repeatable Battery for the Assessment of Neuropsychological Status (RBANS; Form A)  ·	Self-Awareness of Deficit Interview (MANUEL)  Results:   Repeatable Battery for the Assessment of Neuropsychological Status (RBANS; Form A)  Domain	Index Score	Scaled   Score	Percentile Rank	Range	  Subtest					  Immediate Memory	81	-	10	Low Average	  List Learning	-	4	2	Borderline	  Story Memory	-	5	5	Borderline	  Visuospatial/Constructional	92	-	30	Average	  Figure Copy	-	6	9	Low Average	  Line Orientation	-	-	51-75	Average	  Language	79	-	8	Borderline	  Picture Naming	-	-	51-75	Average	  Semantic Fluency	-	3	1	Impaired	  Attention	72	-	3	Borderline	  Digit Span	-	5	5	Borderline	  Coding	-	6	9	Low Average	  Delayed Memory	81	-	10	Low Average	  List Recall	-	-	17-25	Low Average	  List Recognition	-	-	10-16	Low Average	  Story Recall	-	7	16	Low Average	  Figure Recall	-	7	16	Low Average	  Total Scale	76	-	5	Borderline	    Summary  and  Conclusions:   Met with Mr. Montoya to assess his cognitive functioning. Overall, Mr. Montoya’s performance across learning, memory, visuospatial/constructional, language, attention, and verbal reasoning abilities were variable. He showed relative strengths in visual spatial skills, receptive language and confrontational naming. His performance on an auditory attention task was in the Borderline range. Visual scanning and attention was in the Low Average range. In terms of learning, Mr. Montoya required repetition of verbal information, and his performance on immediate verbal memory tasks were within the borderline range. However, he was able to retain most of the information he learned and after a delay, his memory for verbal and visual information was within the low average range. Structure and recognition help with learning new information. Mr. Montoya’s semantic fluency was within the Impaired range. This is an expressive language task that taps into higher level executive functioning skills. He got stuck on trying to recall the name of a specific item he was thinking about.   Awareness of deficits was in the functional range. He was aware of his cognitive and physical deficits and their functional implications. He stated that he is going to be slower and is concerned about driving. He discussed medication management and he felt that he would be able to manage it on his own as he has a pill box which only opens for the specific day. His wife does the financial management. Overall safety awareness is good.     Goal:  ? Feedback of results to MD, Treatment Team, Patient and Family ? Follow-up with more comprehensive cognitive assessment of memory, language, and executive functioning abilities in an outpatient setting to monitor recovery and appropriate recommendations for treatment.  Plan:  1:1, 1 x week to give feedback of results, 30-60 minutes.

## 2020-08-12 ENCOUNTER — TRANSCRIPTION ENCOUNTER (OUTPATIENT)
Age: 60
End: 2020-08-12

## 2020-08-12 VITALS
HEART RATE: 76 BPM | RESPIRATION RATE: 20 BRPM | TEMPERATURE: 97 F | SYSTOLIC BLOOD PRESSURE: 137 MMHG | DIASTOLIC BLOOD PRESSURE: 79 MMHG

## 2020-08-12 LAB — GLUCOSE BLDC GLUCOMTR-MCNC: 173 MG/DL — HIGH (ref 70–99)

## 2020-08-12 RX ORDER — IPRATROPIUM/ALBUTEROL SULFATE 18-103MCG
3 AEROSOL WITH ADAPTER (GRAM) INHALATION
Qty: 360 | Refills: 0
Start: 2020-08-12 | End: 2020-09-10

## 2020-08-12 RX ORDER — PIOGLITAZONE HYDROCHLORIDE 15 MG/1
1 TABLET ORAL
Qty: 0 | Refills: 0 | DISCHARGE

## 2020-08-12 RX ORDER — INSULIN DEGLUDEC 100 U/ML
30 INJECTION, SOLUTION SUBCUTANEOUS
Qty: 0 | Refills: 0 | DISCHARGE

## 2020-08-12 RX ORDER — AMLODIPINE BESYLATE 2.5 MG/1
1 TABLET ORAL
Qty: 30 | Refills: 2
Start: 2020-08-12 | End: 2020-11-09

## 2020-08-12 RX ORDER — METFORMIN HYDROCHLORIDE 850 MG/1
1000 TABLET ORAL
Qty: 0 | Refills: 0 | DISCHARGE

## 2020-08-12 RX ORDER — EMPAGLIFLOZIN 10 MG/1
1 TABLET, FILM COATED ORAL
Qty: 0 | Refills: 0 | DISCHARGE

## 2020-08-12 RX ORDER — ASPIRIN/CALCIUM CARB/MAGNESIUM 324 MG
1 TABLET ORAL
Qty: 0 | Refills: 0 | DISCHARGE
Start: 2020-08-12

## 2020-08-12 RX ORDER — TIOTROPIUM BROMIDE 18 UG/1
1 CAPSULE ORAL; RESPIRATORY (INHALATION)
Qty: 2 | Refills: 1
Start: 2020-08-12 | End: 2020-10-10

## 2020-08-12 RX ORDER — PANTOPRAZOLE SODIUM 20 MG/1
1 TABLET, DELAYED RELEASE ORAL
Qty: 30 | Refills: 1
Start: 2020-08-12 | End: 2020-10-10

## 2020-08-12 RX ORDER — TIOTROPIUM BROMIDE 18 UG/1
2 CAPSULE ORAL; RESPIRATORY (INHALATION)
Qty: 0 | Refills: 0 | DISCHARGE

## 2020-08-12 RX ORDER — EMPAGLIFLOZIN 10 MG/1
1 TABLET, FILM COATED ORAL
Qty: 30 | Refills: 1
Start: 2020-08-12 | End: 2020-10-10

## 2020-08-12 RX ORDER — METFORMIN HYDROCHLORIDE 850 MG/1
1 TABLET ORAL
Qty: 60 | Refills: 1
Start: 2020-08-12 | End: 2020-10-10

## 2020-08-12 RX ORDER — BUDESONIDE AND FORMOTEROL FUMARATE DIHYDRATE 160; 4.5 UG/1; UG/1
2 AEROSOL RESPIRATORY (INHALATION)
Qty: 0 | Refills: 0 | DISCHARGE

## 2020-08-12 RX ORDER — ATORVASTATIN CALCIUM 80 MG/1
1 TABLET, FILM COATED ORAL
Qty: 30 | Refills: 2
Start: 2020-08-12 | End: 2020-11-09

## 2020-08-12 RX ORDER — DULAGLUTIDE 4.5 MG/.5ML
0 INJECTION, SOLUTION SUBCUTANEOUS
Qty: 0 | Refills: 0 | DISCHARGE

## 2020-08-12 RX ORDER — EZETIMIBE 10 MG/1
1 TABLET ORAL
Qty: 0 | Refills: 0 | DISCHARGE

## 2020-08-12 RX ADMIN — METFORMIN HYDROCHLORIDE 500 MILLIGRAM(S): 850 TABLET ORAL at 07:28

## 2020-08-12 RX ADMIN — Medication 1 TABLET(S): at 11:58

## 2020-08-12 RX ADMIN — AMLODIPINE BESYLATE 5 MILLIGRAM(S): 2.5 TABLET ORAL at 07:38

## 2020-08-12 RX ADMIN — BUDESONIDE AND FORMOTEROL FUMARATE DIHYDRATE 2 PUFF(S): 160; 4.5 AEROSOL RESPIRATORY (INHALATION) at 07:29

## 2020-08-12 RX ADMIN — Medication 81 MILLIGRAM(S): at 11:58

## 2020-08-12 RX ADMIN — PANTOPRAZOLE SODIUM 40 MILLIGRAM(S): 20 TABLET, DELAYED RELEASE ORAL at 05:32

## 2020-08-12 NOTE — DISCHARGE NOTE NURSING/CASE MANAGEMENT/SOCIAL WORK - [email or web address]_[email or web address]_CORE
[place] website --- [email or web address] for Tobacco Control website --- [email or web address]

## 2020-08-12 NOTE — PROGRESS NOTE ADULT - ASSESSMENT
Patient was given feedback on cognitive assessment done earlier and discussed functional impacts. Patient has good awareness of difficulties and safety awareness. Discussed organizing pillbox himself under supervision initially. Also discussed outpatient neuropsychology services and patient was agreeable. Patient discussed with team and wife is very supportive.

## 2020-08-12 NOTE — DISCHARGE NOTE PROVIDER - HOSPITAL COURSE
HPI: Patient is a 60y old  Male who presents with a chief complaint of CVA (11 Aug 2020 17:08)        59 M (R handed) with a pmh of CHD s/p repair, HTN, DLD, RAA thrombus (was on xarelto and resolved on last ANITA), COPD, DM, sinus bradycardia s/p PPM, marginal cell lymphoma on maintenance rituxan (last chemo was december 21) presents with expressive aphasia and right sided weakness. By the time he came to ED he was unable to articulate any words and was only moaning. As per family he was completely fine at 11AM, and had reported no new complaints.        Of note patient reports that for 1 year he has had one year of mild abdominal distention with constipation. Outpatient providers suspecting hernia with no acute interventions needed.         In the ED: T 96.4, , 185/124, 97% on RA. Labs were stable since last admission. Code stroke was called. CT head showed no acute changes, but was limited due to motion artifact. He received TPA and then CT angiogram; negative for obstruction. Speech and has progressively improved since tPA however right UE and LE weakness slower in improvement.  As per hospitalist, MRI for further stroke evaluation could be done on rehab. Patient was evaluated by physiatry and deemed good candidate for inpatient rehab.             PLOF: Ambulated without Assistive device. Independent.         Admission Physical Exam:    General: NAD, Resting Comfortable,                                    	HEENT: NC/AT, EOMI, PERRLA, Normal Conjunctivae    	Cardio: RRR, Normal S1-S2, No M/G/R                                	Pulm: No Respiratory Distress,  Lungs CTAB                          	Abdomen: tender to palpation in RLQ, no rebound or guarding, nontympanic, but with mild distention                                               	MSK: No joint swelling, Full ROM                                           	Ext: No C/C/E, Pulses 2+ throughout, No calf tenderness      	Skin:  all skin intact                                                                   	Wounds: none    	Decubitus Ulcers: None Present         	Neurological Examination      	Cognitive: AAO x 3                                                                           	Attention: Intact     	Judgment: Good evidence of judgement                                 	Memory: Recall 3 objects immediate and 3 min later        	Mood/Affect: wnl                                                                             	Communication:  mild decreased fluency      	Swallow: intact    	CN II - XII  intact    	Coordination: FTN/HTS intact                                                                                	Sensory: Intact to light touch, PP and Vibration                                                                                               	Tone: normal Throughout         	Motor      	LEFT    UE: SF [5/5], EF [5/5], EE [5/5], WE [5/5],  [wnl]    	RIGHT UE: SF [4/5], EF [4/5], EE [4/5], WE [2/5],  weak    	LEFT    LE:  HF [5/5], KE [5/5], DF [5/5], EHL [5/5],  PF [5/5]    	RIGHT LE:  HF [3/5], KE [2/5], DF [5/5], EHL [1/5],  PF [1/5]          	Reflex:  2 + throughout Babinski positive on R        Hospital Course: The patient was admitted to the acute inpatient rehab unit presenting with a decline in functional status. The patient participated in three hours of multidisciplinary therapy 5-6 days per week. The patient was continued on all home medications or equivalent alternatives as deemed appropriate. The patient received prophylactic anticoagulation medication and was monitored closely with no complications.         While in rehab patient was cleared for brain MRI w/o contrast which demonstrated No mass effect, parenchymal hemorrhage or diffusion evidence of acute infarction. On 8/8 patient reported headache again prompting CTH to r/o hemorrhage conversion which also was negative for acute pathology. Patient's post stroke headache was subsequently managed with standing tylenol and patient improved.          Discharge functional status:    PT:     OT:         Discharge disposition: home with home care HPI: Patient is a 60y old  Male who presents with a chief complaint of CVA (11 Aug 2020 17:08)        59 M (R handed) with a pmh of CHD s/p repair, HTN, DLD, RAA thrombus (was on xarelto and resolved on last ANITA), COPD, DM, sinus bradycardia s/p PPM, marginal cell lymphoma on maintenance rituxan (last chemo was december 21) presents with expressive aphasia and right sided weakness. By the time he came to ED he was unable to articulate any words and was only moaning. As per family he was completely fine at 11AM, and had reported no new complaints.        Of note patient reports that for 1 year he has had one year of mild abdominal distention with constipation. Outpatient providers suspecting hernia with no acute interventions needed.         In the ED: T 96.4, , 185/124, 97% on RA. Labs were stable since last admission. Code stroke was called. CT head showed no acute changes, but was limited due to motion artifact. He received TPA and then CT angiogram; negative for obstruction. Speech and has progressively improved since tPA however right UE and LE weakness slower in improvement.  As per hospitalist, MRI for further stroke evaluation could be done on rehab. Patient was evaluated by physiatry and deemed good candidate for inpatient rehab.             PLOF: Ambulated without Assistive device. Independent.         Admission Physical Exam:    General: NAD, Resting Comfortable,                                    	HEENT: NC/AT, EOMI, PERRLA, Normal Conjunctivae    	Cardio: RRR, Normal S1-S2, No M/G/R                                	Pulm: No Respiratory Distress,  Lungs CTAB                          	Abdomen: tender to palpation in RLQ, no rebound or guarding, nontympanic, but with mild distention                                               	MSK: No joint swelling, Full ROM                                           	Ext: No C/C/E, Pulses 2+ throughout, No calf tenderness      	Skin:  all skin intact                                                                   	Wounds: none    	Decubitus Ulcers: None Present         	Neurological Examination      	Cognitive: AAO x 3                                                                           	Attention: Intact     	Judgment: Good evidence of judgement                                 	Memory: Recall 3 objects immediate and 3 min later        	Mood/Affect: wnl                                                                             	Communication:  mild decreased fluency      	Swallow: intact    	CN II - XII  intact    	Coordination: FTN/HTS intact                                                                                	Sensory: Intact to light touch, PP and Vibration                                                                                               	Tone: normal Throughout         	Motor      	LEFT    UE: SF [5/5], EF [5/5], EE [5/5], WE [5/5],  [wnl]    	RIGHT UE: SF [4/5], EF [4/5], EE [4/5], WE [2/5],  weak    	LEFT    LE:  HF [5/5], KE [5/5], DF [5/5], EHL [5/5],  PF [5/5]    	RIGHT LE:  HF [3/5], KE [2/5], DF [5/5], EHL [1/5],  PF [1/5]          	Reflex:  2 + throughout Babinski positive on R        Hospital Course: The patient was admitted to the acute inpatient rehab unit presenting with a decline in functional status. The patient participated in three hours of multidisciplinary therapy 5-6 days per week. The patient was continued on all home medications or equivalent alternatives as deemed appropriate. The patient received prophylactic anticoagulation medication and was monitored closely with no complications.         While in rehab patient was cleared for brain MRI w/o contrast which demonstrated No mass effect, parenchymal hemorrhage or diffusion evidence of acute infarction. On 8/8 patient reported headache again prompting CTH to r/o hemorrhage conversion which also was negative for acute pathology. Patient's post stroke headache was subsequently managed with standing tylenol and patient improved.          Discharge functional status:    PT:  Ambulates 150 ft with straight cane CGA    OT: Supervision assist with most adls    Speech: occasional word finding difficulty, otherwise, WFL in speech and swallow        Discharge disposition: home with home care

## 2020-08-12 NOTE — DISCHARGE NOTE NURSING/CASE MANAGEMENT/SOCIAL WORK - PATIENT PORTAL LINK FT
You can access the FollowMyHealth Patient Portal offered by [facility] by registering at the following website: [email or web address]. By joining [email or web address] FollowMyHealth portal, you will also be able to view your health information using other applications (apps) compatible with our system.

## 2020-08-12 NOTE — PROGRESS NOTE ADULT - ASSESSMENT
ASSESSMENT/PLAN:    59 M (R handed) with a pmh of CHD s/p repair, HTN, DLD, RAA thrombus (was on xarelto)   #CVA with right hemiparesis (R hand dominant) and aphasia  NIHSS 11 on admit, NIHSS on DC 1  DC today    continue acute 3 hrs of rehab    -S/p TPA  -Start PT/OT/SLP  -Continue ASA and statin  - MRI brain w/o MALDONADO:  No mass effect, parenchymal hemorrhage or diffusion evidence of acute infarction.  - F/u lyme panel   - Repeat CT Head for HA pending    #RLQ Abdominal tenderness is chronic but now with constipation  -Chronic RLQ tenderness, unchanged.   -Recent abdominal imaging appreciated.  -Continue Miralax daily and add dulcolax 2 tabs today only.    # Aggressive nisreen marginal zone lymphoma  - maintenance on Rituxan, last dose 12/31  - Per family he has appt with Dr. iRder in August for PET scan    # DM   - SSI regimen   - Fingersticks AC   -Continue metformin    #HTN  - continue home amlodipine    #COPD  - Continue duoneb, symbicort, spiriva    # Sinus bradycardia   - s/p PPM      #Congenital hear Disease, S/P repair  Stable    #S/P RAA thrombus which was treated. Stable.    #S/P PPP for sinus bradycardia. Stable    Diet: DASH/TLC  GI PPx: Protonix  DVT PPx: SCDs, heparin  Activity: as tolerated  Cardiac and fall precautions.  Ortho: WBAT ASSESSMENT/PLAN:    59 M (R handed) with a pmh of CHD s/p repair, HTN, DLD, RAA thrombus (was on xarelto)   #CVA with right hemiparesis (R hand dominant) and aphasia  NIHSS 11 on admit, NIHSS on DC 1  DC today    continue acute 3 hrs of rehab    -S/p TPA  -Start PT/OT/SLP  -Continue ASA and statin  - MRI brain w/o MALDONADO:  No mass effect, parenchymal hemorrhage or diffusion evidence of acute infarction.  - lyme serology negative  - Repeat CT Head negative for acute pathology    #RLQ Abdominal tenderness is chronic but now with constipation  -Chronic RLQ tenderness, unchanged.   -Recent abdominal imaging appreciated.  -Continue Miralax daily and add dulcolax 2 tabs today only.    # Aggressive nisreen marginal zone lymphoma  - maintenance on Rituxan, last dose 12/31  - Per family he has appt with Dr. Rider in August for PET scan    # DM   - SSI regimen   - Fingersticks AC   -Continue metformin    #HTN  - continue home amlodipine    #COPD  - Continue duoneb, symbicort, spiriva    # Sinus bradycardia   - s/p PPM      #Congenital hear Disease, S/P repair  Stable    #S/P RAA thrombus which was treated. Stable.    #S/P PPP for sinus bradycardia. Stable    Diet: DASH/TLC  GI PPx: Protonix  DVT PPx: SCDs, heparin  Activity: as tolerated  Cardiac and fall precautions.  Ortho: WBAT ASSESSMENT/PLAN:    59 M (R handed) with a pmh of CHD s/p repair, HTN, DLD, RAA thrombus (was on xarelto)   #CVA with right hemiparesis (R hand dominant) and aphasia  NIHSS 11 on admit, NIHSS on DC 1  DC today    completed acute 3 hrs of rehab    -S/p TPA  -Start PT/OT/SLP  -Continue ASA and statin  - MRI brain w/o MALDONADO:  No mass effect, parenchymal hemorrhage or diffusion evidence of acute infarction.  - lyme serology negative  - Repeat CT Head negative for acute pathology    #RLQ Abdominal tenderness is chronic but now with constipation  -Chronic RLQ tenderness, unchanged.   -Recent abdominal imaging appreciated.  -Continue Miralax daily and add dulcolax 2 tabs today only.    # Aggressive nisreen marginal zone lymphoma  - maintenance on Rituxan, last dose 12/31  - Per family he has appt with Dr. Rider in August for PET scan    # DM   - SSI regimen   - Fingersticks AC   -Continue metformin  F/ with primary DR    #HTN  - continue home amlodipine    #COPD  - Continue duoneb, symbicort, spiriva    # Sinus bradycardia   - s/p PPM      #Congenital hear Disease, S/P repair  Stable    #S/P RAA thrombus which was treated. Stable.    #S/P PPP for sinus bradycardia. Stable    Diet: DASH/TLC  GI PPx: Protonix  DVT PPx: SCDs, heparin  Activity: as tolerated  Cardiac and fall precautions.  Ortho: WBAT

## 2020-08-12 NOTE — DISCHARGE NOTE NURSING/CASE MANAGEMENT/SOCIAL WORK - NSDCPEEMAIL_GEN_ALL_CORE
[facility] email [email or web address]

## 2020-08-12 NOTE — DISCHARGE NOTE PROVIDER - NSDCCPCAREPLAN_GEN_ALL_CORE_FT
PRINCIPAL DISCHARGE DIAGNOSIS  Diagnosis: Stroke  Assessment and Plan of Treatment: -Continue ASA and statin  -Follow up with neurology in outpatient   -Continue to take tylenol for post stroke headache      SECONDARY DISCHARGE DIAGNOSES  Diagnosis: Bradycardia  Assessment and Plan of Treatment: -s/p pacemaker placement  -Follow with PCP and cardiology    Diagnosis: COPD, mild  Assessment and Plan of Treatment: -continue home inhalers as needed  -Follow with PCP    Diagnosis: Hypertension  Assessment and Plan of Treatment: -Continue home amlodipine  -Follow with PCP    Diagnosis: Diabetes  Assessment and Plan of Treatment: -Continue metformin  -Continue to monitor fingers sticks daily  -Follow with PCP for monitoring    Diagnosis: Kimberly marginal zone B-cell lymphoma  Assessment and Plan of Treatment: -Maintenance on Rituxan, last dose on 12/31  -As per family, patient has appt with Dr. Rider in August for PET scan    Diagnosis: Constipation  Assessment and Plan of Treatment: -resolved during inpatient rehab  -Continue OTC laxatives as needed and follow with PCP

## 2020-08-12 NOTE — DISCHARGE NOTE PROVIDER - NSDCFUSCHEDAPPT_GEN_ALL_CORE_FT
[place] ; 08/18/2020 ; [facility] [address] [place] ; 08/19/2020 ; [facility] [address] [place] ; 08/18/2020 ; [facility] [address] [place] ; 08/19/2020 ; [facility] [address] [place] ; [facility]14/2020 ; [facility]  [place] ; 08/18/2020 ; [facility] [address] [place] ; 08/19/2020 ; [facility] [address] [place] ; 08/14/2020 ; [facility]  [place] ; 08/18/2020 ; [facility] [address] [place] ; 08/19/2020 ; [facility] [address] [place] ; 08[facility]14/2020 ; [facility]  [place] ; 08/18/2020 ; [facility] [address] [place] ; 08/19/2020 ; [facility] [address]

## 2020-08-12 NOTE — DISCHARGE NOTE PROVIDER - NSDCMRMEDTOKEN_GEN_ALL_CORE_FT
amLODIPine 5 mg oral tablet: 1 tab(s) orally once a day -for bladder spasms   atorvastatin 40 mg oral tablet: 1 tab(s) orally once a day (at bedtime)  ipratropium-albuterol 0.5 mg-2.5 mg/3 mLinhalation solution: 3 milliliter(s) inhaled every 6 hours, As needed, Shortness of Breath and/or Wheezing  Jardiance 25 mg oral tablet: 1 tab(s) orally once a day (in the morning)  metFORMIN 500 mg oral tablet: 1 tab(s) orally 2 times a day (with meals)  Multiple Vitamins oral tablet: 1 tab(s) orally once a day  Protonix 40 mg oral delayed release tablet: 1 tab(s) orally once a day (before a meal)  tiotropium 18 mcg inhalation capsule: 1 cap(s) inhaled once a day

## 2020-08-12 NOTE — DISCHARGE NOTE PROVIDER - PROVIDER TOKENS
PROVIDER:[TOKEN:[[id number]],FOLLOWUP:[1 week]],PROVIDER:[TOKEN:[[id number]],FOLLOWUP:[1 week]],PROVIDER:[TOKEN:[[id number]],FOLLOWUP:[2 weeks]],PROVIDER:[TOKEN:[[id number]],FOLLOWUP:[1 week]]

## 2020-08-12 NOTE — DISCHARGE NOTE PROVIDER - CARE PROVIDERS DIRECT ADDRESSES
[email or web address]

## 2020-08-12 NOTE — PROGRESS NOTE ADULT - SUBJECTIVE AND OBJECTIVE BOX
Pain: Denied  Alert and grossly oriented to self, date, place, and event.  Attention: L   needed: No

## 2020-08-12 NOTE — DISCHARGE NOTE NURSING/CASE MANAGEMENT/SOCIAL WORK - NSDCPEPTSTRK_GEN_ALL_CORE
Call 911 for stroke/Need for follow up after discharge/Prescribed medications/Risk factors for stroke/Stroke warning signs and symptoms/Signs and symptoms of stroke/Stroke support groups for patients, families, and friends/Stroke education booklet

## 2020-08-18 ENCOUNTER — APPOINTMENT (OUTPATIENT)
Dept: HEMATOLOGY ONCOLOGY | Facility: CLINIC | Age: 60
End: 2020-08-18
Payer: MEDICAID

## 2020-08-18 VITALS
HEART RATE: 86 BPM | RESPIRATION RATE: 14 BRPM | DIASTOLIC BLOOD PRESSURE: 100 MMHG | BODY MASS INDEX: 30.34 KG/M2 | SYSTOLIC BLOOD PRESSURE: 137 MMHG | WEIGHT: 224 LBS | TEMPERATURE: 97.4 F | HEIGHT: 72 IN

## 2020-08-18 PROCEDURE — 99214 OFFICE O/P EST MOD 30 MIN: CPT

## 2020-08-19 ENCOUNTER — APPOINTMENT (OUTPATIENT)
Dept: CARDIOLOGY | Facility: CLINIC | Age: 60
End: 2020-08-19

## 2020-08-19 DIAGNOSIS — R51 HEADACHE: ICD-10-CM

## 2020-08-19 DIAGNOSIS — R00.1 BRADYCARDIA, UNSPECIFIED: ICD-10-CM

## 2020-08-19 DIAGNOSIS — I69.398 OTHER SEQUELAE OF CEREBRAL INFARCTION: ICD-10-CM

## 2020-08-19 DIAGNOSIS — I69.320 APHASIA FOLLOWING CEREBRAL INFARCTION: ICD-10-CM

## 2020-08-19 DIAGNOSIS — Z95.0 PRESENCE OF CARDIAC PACEMAKER: ICD-10-CM

## 2020-08-19 DIAGNOSIS — I69.351 HEMIPLEGIA AND HEMIPARESIS FOLLOWING CEREBRAL INFARCTION AFFECTING RIGHT DOMINANT SIDE: ICD-10-CM

## 2020-08-19 DIAGNOSIS — J44.9 CHRONIC OBSTRUCTIVE PULMONARY DISEASE, UNSPECIFIED: ICD-10-CM

## 2020-08-19 DIAGNOSIS — I10 ESSENTIAL (PRIMARY) HYPERTENSION: ICD-10-CM

## 2020-08-19 DIAGNOSIS — Z88.1 ALLERGY STATUS TO OTHER ANTIBIOTIC AGENTS STATUS: ICD-10-CM

## 2020-08-19 DIAGNOSIS — C88.4 EXTRANODAL MARGINAL ZONE B-CELL LYMPHOMA OF MUCOSA-ASSOCIATED LYMPHOID TISSUE [MALT-LYMPHOMA]: ICD-10-CM

## 2020-08-19 DIAGNOSIS — Z79.84 LONG TERM (CURRENT) USE OF ORAL HYPOGLYCEMIC DRUGS: ICD-10-CM

## 2020-08-19 DIAGNOSIS — K59.00 CONSTIPATION, UNSPECIFIED: ICD-10-CM

## 2020-08-19 DIAGNOSIS — E78.5 HYPERLIPIDEMIA, UNSPECIFIED: ICD-10-CM

## 2020-08-19 DIAGNOSIS — Z87.891 PERSONAL HISTORY OF NICOTINE DEPENDENCE: ICD-10-CM

## 2020-08-19 DIAGNOSIS — Z87.74 PERSONAL HISTORY OF (CORRECTED) CONGENITAL MALFORMATIONS OF HEART AND CIRCULATORY SYSTEM: ICD-10-CM

## 2020-08-19 DIAGNOSIS — E11.9 TYPE 2 DIABETES MELLITUS WITHOUT COMPLICATIONS: ICD-10-CM

## 2020-08-19 NOTE — HISTORY OF PRESENT ILLNESS
[de-identified] : 57 year old obese male with PMH of COPD , diabetes, PPP for syncope. Patient went to the emergency room on February 4, 2018 with complaints of a swollen lymph node on right side of neck, which he had noticed several months prior to presentation. Lymph node was originally not painful but became painful about one week prior to presentation, which prompted him to go to the emergency room. No fevers, night sweats, anorexia or significant changes in weight. In the ED, patient underwent CT neck soft tissue, chest, abdomen and pelvis, which found multiple areas of LAD,Right-sided enlarged level 5 cervical chain lymph nodes measuring up to  1.9 x 1.7 cm (series 9 image 193). Enlarged right supraclavicular lymph  nodes measuring up to 1.6 x 1 cm(series 9 image 214).   in the R supraclavicular lymph node, R axillary lymph node,  Enlarged mesenteric lymph nodes measuring up to 1.3  x 1 cm (series 2 image 92)., There was no evidence of mediastinal or hilar LAD at that time. However, patient is not certain if he took prednisone prior to imaging. As per wife, patient takes short-course prednisone tapers every few months secondary to bronchitis. OTTONIEL plummer is a former 1 PPD smoker for 30-40 years who quit about five months prior to presentation and denies any previous history of asbestosis exposure. He worked as a . He has no pets at home. Biopsy of right cervical lymph node by ENT  Low grade B-cell lymphoma, consistent with nisreen marginal zone lymphoma.  the neoplastic cells are CD20+ CD79a+ PAX5+ B-cells that co-express BCL2. They are negative for CD5, CD10, BCL6, cyclinD1, and CD43. The proliferation index (Ki67 labeling) ranges from 5 to 20% in different areas excluding the germinal centers, flow cytometry studies performed at [facility] show monotypic B-cells (38% of cells), positive for kappa, CD19, CD20, FMC-7, CD23, minimal CD10; negative for CD5. Viability is 78%.   *** In summary, the findings are diagnostic of low grade B-cell lymphoma, the features are consistent with nisreen marginal zone lymphoma.     2 , peripheral blood flow showed - A MINUTE MONOCLONAL, CD10+ B-CELL POPULATION IS OBSERVED (0.14%). kappa and CD 20 bright . Lab work showed normal CBC , Hb A1c 9, negative HIV , Hep c and SPEP .    \par Since surgery 2 weeks ago he noted marked increase in painful  nisreen masses in the neck , supraclavicular and right axillae , he takes percocet to sleep with partial relief  , PET showed generalized adenopathy with maximum SUV 9 . No bone or marrow uptake. He complains of dyspnea on exertion , mild cough , improved with short course of prednisone, he lost few lbs and denies fever or night sweats. \par No history of EGD or colonoscopy . \par  [de-identified] : 08/27/2018 : Patient returns for follow up after BR X 3 with near complete response. Treatment was held due to worsening complaints of weakness, exertional dyspnea. RUQ pain . He followed with pulmonary and was placed on inhaled bronchodilators , He had MUGA scan and may require a second pacemaker lead. He denies any B symptoms or any new lumps or adenopathy . \par \par 10/23/2018 Patient returns for next dose of rituxan , He denies any new complaints . He was seen by GI for right sided abdominal tenderness, felt to be muscular in origin , CT scan apparently shows abdominal hernias probably unrelated to his complaint . He is scheduled for surveillance colonoscopy . He denies any B symptoms, he continues to have dyspnea on exertion and went on social security disability .\par \par 12/17/2018 Patient returns for next dose of rituxan maintenance . He denies any B symptoms , continues to complain of mild RUQ pain and tenderness. He is followed by cardiology and pulmonary and is undergoing work up to rule out cardiac sarcoidosis . \par \par 06/17/2019 Patient returns for follow up . he has been on maintenance rituxan for one year and was treated recently as outpatient for suspected pneumonia , he lost 9 lbs and is feeling better now. He had cardiac work up with revealed left atrial thrombus and started on xarelto , CT angio was negative , Cardiac CT showed LAD plaque without oclusion . \par \par 05/26/2020 Patient returns for last dose of rituxan , he feels well , denies B symptoms , infections , abnormal lumps . \par \par 08/18/2020 Patient returns for follow up after completion of maintenance rituxan . Since his last visit he developed left hemispheric stroke with weakness and aphasia . his speech has returned to normal and is left with mild residual right hemiparesis ( Lower > upper ) , he is using a cane to ambulate . CT angio was negative , he has permanent pacemaker without any documented atrial fibrillation , he remains on aspirin without anticoagulation . Of note he had an atrial thrombus ( right ? ) in the past on ANITA and was on anticoagulation , no repeat ANITA since then . in hospital he had negative CT abdomen except for non-specific lymph nodes.

## 2020-08-19 NOTE — ASSESSMENT
[FreeTextEntry1] : 1) 59 year old male with COPD , diabetes poorly controlled, hypertension , PPP with clinically aggressive nisreen marginal zone lymphoma stage 3/4 with minute B cell clone in peripheral blood s/p BR X 3 and maintenance rituxan. No evidence of disease on CT neck from Jun 2019 . CT abdomen from 7/2020 showed  no significant change in multiple subcentimeter lymph nodes. \par 2) S/P permanent pacemaker placement\par 3) S/P left hemispheric stroke ( etiology ? ) \par \par

## 2020-09-10 ENCOUNTER — APPOINTMENT (OUTPATIENT)
Dept: NEUROLOGY | Facility: CLINIC | Age: 60
End: 2020-09-10
Payer: SELF-PAY

## 2020-09-10 VITALS
DIASTOLIC BLOOD PRESSURE: 86 MMHG | SYSTOLIC BLOOD PRESSURE: 139 MMHG | OXYGEN SATURATION: 97 % | HEIGHT: 72 IN | BODY MASS INDEX: 30.2 KG/M2 | WEIGHT: 223 LBS | HEART RATE: 76 BPM | TEMPERATURE: 97.3 F

## 2020-09-10 PROCEDURE — 99214 OFFICE O/P EST MOD 30 MIN: CPT

## 2020-09-10 RX ORDER — AMLODIPINE BESYLATE 2.5 MG/1
2.5 TABLET ORAL DAILY
Refills: 0 | Status: DISCONTINUED | COMMUNITY
End: 2020-09-10

## 2020-09-10 RX ORDER — AMOXICILLIN AND CLAVULANATE POTASSIUM 875; 125 MG/1; MG/1
875-125 TABLET, COATED ORAL
Qty: 14 | Refills: 0 | Status: DISCONTINUED | COMMUNITY
Start: 2019-06-21 | End: 2020-09-10

## 2020-09-10 NOTE — PHYSICAL EXAM
[FreeTextEntry1] : a+Ox3 language and attention normal\par CN 2-12 normal including facial sensation\par No drift power 5/5\par Decreased sensation on right to temp, vib, LT\par DTR 1+ in UE and 1+ patellars and absent ankles\par FTN NL\par Gait mildly antalgic using a cane for support\par

## 2020-09-10 NOTE — REVIEW OF SYSTEMS
[Arthralgias] : arthralgias [As Noted in HPI] : as noted in HPI [Joint Pain] : joint pain [Negative] : Genitourinary [FreeTextEntry5] : PPM

## 2020-09-10 NOTE — DISCUSSION/SUMMARY
[FreeTextEntry1] : Mr. Montoya is a 61yo man with recent admission in august 2020 for confusion and was given TPA.  Work up was negative for neurological cause.  Best guess of the etiology was a viral meningitis which self resolved in few days.  This is supported by having headaches for a few weeks which have subsequently resolved.  As for why he has right sided numbness this maybe related to spine issues so will look at cervical spine\par 1. MRI cervical spine w/o MALDONADO\par 2. Continue PT\par 3. If MRI spine abnormal will have him follow up\par

## 2020-09-10 NOTE — HISTORY OF PRESENT ILLNESS
[FreeTextEntry1] : Mr. Montoya is a 59yo man with a PMH of congenital heart disease s/p repair, DM, HTN, \par DLD, COPD, sinus bradycardia s/p PPM, lymphoma presenting on July 30th 2020 with expressive aphasia and right sided weakness. In ED stroke code was called, NIHSS was 11, his CTH was negative for acute pathology. The patient was given tpa and was admitted to the ICU for close monitoring. In the ICU, there were no complications, neuro exam was done frequently and his mental status returned to baseline. \par He had an MRI brain which was unremarkable and CTA H+N which was unremarkable and a video EEG which was unremarkable.  He improved but had some residual right sided weakness and numbness for which he has been doing PT for,  He is still improving with his mobility and is using a cane for ambulation.  After the admission it is still not clear what this episode was that he was treated for.\par He does have some memory issues since starting his chemotherapy but can still function with his ADLs\par

## 2020-09-10 NOTE — DATA REVIEWED
[de-identified] : Reviewed MRI brain images, VEEG reports and labs form admission in August 2020\par Reviewed neurology and critical care notes from admission\par

## 2020-09-14 ENCOUNTER — EMERGENCY (EMERGENCY)
Facility: HOSPITAL | Age: 60
LOS: 0 days | Discharge: HOME | End: 2020-09-14
Attending: EMERGENCY MEDICINE | Admitting: EMERGENCY MEDICINE
Payer: MEDICARE

## 2020-09-14 VITALS
HEIGHT: 72 IN | SYSTOLIC BLOOD PRESSURE: 156 MMHG | RESPIRATION RATE: 16 BRPM | TEMPERATURE: 98 F | HEART RATE: 79 BPM | DIASTOLIC BLOOD PRESSURE: 87 MMHG | OXYGEN SATURATION: 97 %

## 2020-09-14 VITALS
RESPIRATION RATE: 18 BRPM | SYSTOLIC BLOOD PRESSURE: 138 MMHG | OXYGEN SATURATION: 95 % | DIASTOLIC BLOOD PRESSURE: 76 MMHG | HEART RATE: 67 BPM | TEMPERATURE: 98 F

## 2020-09-14 DIAGNOSIS — Z98.890 OTHER SPECIFIED POSTPROCEDURAL STATES: Chronic | ICD-10-CM

## 2020-09-14 DIAGNOSIS — Z88.8 ALLERGY STATUS TO OTHER DRUGS, MEDICAMENTS AND BIOLOGICAL SUBSTANCES STATUS: ICD-10-CM

## 2020-09-14 DIAGNOSIS — Z87.891 PERSONAL HISTORY OF NICOTINE DEPENDENCE: ICD-10-CM

## 2020-09-14 DIAGNOSIS — Z95.0 PRESENCE OF CARDIAC PACEMAKER: Chronic | ICD-10-CM

## 2020-09-14 DIAGNOSIS — Z95.0 PRESENCE OF CARDIAC PACEMAKER: ICD-10-CM

## 2020-09-14 DIAGNOSIS — R07.89 OTHER CHEST PAIN: ICD-10-CM

## 2020-09-14 DIAGNOSIS — R07.9 CHEST PAIN, UNSPECIFIED: ICD-10-CM

## 2020-09-14 DIAGNOSIS — Z88.8 ALLERGY STATUS TO OTHER DRUGS, MEDICAMENTS AND BIOLOGICAL SUBSTANCES: ICD-10-CM

## 2020-09-14 LAB
ALBUMIN SERPL ELPH-MCNC: 4.7 G/DL — SIGNIFICANT CHANGE UP (ref 3.5–5.2)
ALP SERPL-CCNC: 54 U/L — SIGNIFICANT CHANGE UP (ref 30–115)
ALT FLD-CCNC: 22 U/L — SIGNIFICANT CHANGE UP (ref 0–41)
ANION GAP SERPL CALC-SCNC: 9 MMOL/L — SIGNIFICANT CHANGE UP (ref 7–14)
AST SERPL-CCNC: 22 U/L — SIGNIFICANT CHANGE UP (ref 0–41)
BASOPHILS # BLD AUTO: 0.04 K/UL — SIGNIFICANT CHANGE UP (ref 0–0.2)
BASOPHILS NFR BLD AUTO: 1.1 % — HIGH (ref 0–1)
BILIRUB SERPL-MCNC: 1.6 MG/DL — HIGH (ref 0.2–1.2)
BUN SERPL-MCNC: 15 MG/DL — SIGNIFICANT CHANGE UP (ref 10–20)
CALCIUM SERPL-MCNC: 9.8 MG/DL — SIGNIFICANT CHANGE UP (ref 8.5–10.1)
CHLORIDE SERPL-SCNC: 105 MMOL/L — SIGNIFICANT CHANGE UP (ref 98–110)
CO2 SERPL-SCNC: 26 MMOL/L — SIGNIFICANT CHANGE UP (ref 17–32)
CREAT SERPL-MCNC: 1.2 MG/DL — SIGNIFICANT CHANGE UP (ref 0.7–1.5)
EOSINOPHIL # BLD AUTO: 0.15 K/UL — SIGNIFICANT CHANGE UP (ref 0–0.7)
EOSINOPHIL NFR BLD AUTO: 4.3 % — SIGNIFICANT CHANGE UP (ref 0–8)
GLUCOSE SERPL-MCNC: 113 MG/DL — HIGH (ref 70–99)
HCT VFR BLD CALC: 46.7 % — SIGNIFICANT CHANGE UP (ref 42–52)
HGB BLD-MCNC: 15.3 G/DL — SIGNIFICANT CHANGE UP (ref 14–18)
IMM GRANULOCYTES NFR BLD AUTO: 0.9 % — HIGH (ref 0.1–0.3)
LYMPHOCYTES # BLD AUTO: 0.51 K/UL — LOW (ref 1.2–3.4)
LYMPHOCYTES # BLD AUTO: 14.6 % — LOW (ref 20.5–51.1)
MAGNESIUM SERPL-MCNC: 2.2 MG/DL — SIGNIFICANT CHANGE UP (ref 1.8–2.4)
MCHC RBC-ENTMCNC: 29.9 PG — SIGNIFICANT CHANGE UP (ref 27–31)
MCHC RBC-ENTMCNC: 32.8 G/DL — SIGNIFICANT CHANGE UP (ref 32–37)
MCV RBC AUTO: 91.2 FL — SIGNIFICANT CHANGE UP (ref 80–94)
MONOCYTES # BLD AUTO: 0.44 K/UL — SIGNIFICANT CHANGE UP (ref 0.1–0.6)
MONOCYTES NFR BLD AUTO: 12.6 % — HIGH (ref 1.7–9.3)
NEUTROPHILS # BLD AUTO: 2.32 K/UL — SIGNIFICANT CHANGE UP (ref 1.4–6.5)
NEUTROPHILS NFR BLD AUTO: 66.5 % — SIGNIFICANT CHANGE UP (ref 42.2–75.2)
NRBC # BLD: 0 /100 WBCS — SIGNIFICANT CHANGE UP (ref 0–0)
NT-PROBNP SERPL-SCNC: 57 PG/ML — SIGNIFICANT CHANGE UP (ref 0–300)
PLATELET # BLD AUTO: 175 K/UL — SIGNIFICANT CHANGE UP (ref 130–400)
POTASSIUM SERPL-MCNC: 4.7 MMOL/L — SIGNIFICANT CHANGE UP (ref 3.5–5)
POTASSIUM SERPL-SCNC: 4.7 MMOL/L — SIGNIFICANT CHANGE UP (ref 3.5–5)
PROT SERPL-MCNC: 6.9 G/DL — SIGNIFICANT CHANGE UP (ref 6–8)
RBC # BLD: 5.12 M/UL — SIGNIFICANT CHANGE UP (ref 4.7–6.1)
RBC # FLD: 12.6 % — SIGNIFICANT CHANGE UP (ref 11.5–14.5)
SODIUM SERPL-SCNC: 140 MMOL/L — SIGNIFICANT CHANGE UP (ref 135–146)
TROPONIN T SERPL-MCNC: <0.01 NG/ML — SIGNIFICANT CHANGE UP
TROPONIN T SERPL-MCNC: <0.01 NG/ML — SIGNIFICANT CHANGE UP
WBC # BLD: 3.49 K/UL — LOW (ref 4.8–10.8)
WBC # FLD AUTO: 3.49 K/UL — LOW (ref 4.8–10.8)

## 2020-09-14 PROCEDURE — 99285 EMERGENCY DEPT VISIT HI MDM: CPT | Mod: GC

## 2020-09-14 PROCEDURE — 93010 ELECTROCARDIOGRAM REPORT: CPT

## 2020-09-14 PROCEDURE — 71045 X-RAY EXAM CHEST 1 VIEW: CPT | Mod: 26

## 2020-09-14 PROCEDURE — 93010 ELECTROCARDIOGRAM REPORT: CPT | Mod: 77

## 2020-09-14 NOTE — ED ADULT NURSE NOTE - NSIMPLEMENTINTERV_GEN_ALL_ED
Implemented All Fall Risk Interventions:  [place] to call system. Call bell, personal items and telephone within reach. Instruct patient to call for assistance. Room bathroom lighting operational. Non-slip footwear when patient is off stretcher. Physically safe environment: no spills, clutter or unnecessary equipment. Stretcher in lowest position, wheels locked, appropriate side rails in place. Provide visual cue, wrist band, yellow gown, etc. Monitor gait and stability. Monitor for mental status changes and reorient to person, place, and time. Review medications for side effects contributing to fall risk. Reinforce activity limits and safety measures with patient and family.

## 2020-09-14 NOTE — ED PROVIDER NOTE - CARE PROVIDERS DIRECT ADDRESSES
[email or web address]

## 2020-09-14 NOTE — ED PROVIDER NOTE - CLINICAL SUMMARY MEDICAL DECISION MAKING FREE TEXT BOX
60yoM with h/o HTN, HLD, pacemaker, COPD, presents with L sided nonradiating chest sharpness, onset while at physical therapy pushing up on weights, present x 2 minutes then resolved. Has had past CP like this that he did not present for. CTCA in 05/2019 that was CAD-RAD 1. States all symptoms have now resolved and does not want to stay. Denies recent travel, COVID exposure, LE edema or pain. On exam, afebrile, hemodynamically stable, saturating well on RA, NAD, well appearing, no WOB, laying comfortably in bed, head NCAT, pupils equal, EOMI, anicteric, MMM, no JVD, RRR, nml S1/S2, no m/r/g, lungs CTAB, no w/r/r, abd soft, NT, ND, nml BS, no rebound or guarding, AAO, CN's 3-12 grossly intact, PADRON spontaneously, no leg cyanosis or edema, 2+ symm radial pulses, skin warm, well perfused, no rashes or hives. Character low suspicion for dissection and no murmur or pulse asymmetry. Character low suspicion for PE and no e/o DVT or tachycardia/hypoxia. No e/o fluid overload, PNA, or PTX on exam or CXR. Character low suspicion for ACS and ECG/trop unchanged from prior, Sgarbossa's negative, and 1 CAD-RADs within 5 yrs, serial 4hr repeat trop/ECG negative. Patient is well appearing, NAD, afebrile, hemodynamically stable. Any available tests and studies were discussed with patient and wife. Discharged with instructions in further symptomatic care, return precautions, and need for PMD f/u.

## 2020-09-14 NOTE — ED PROVIDER NOTE - PHYSICAL EXAMINATION
CONSTITUTIONAL: Well-developed; well-nourished; in no acute distress.   SKIN: warm, dry  HEAD: Normocephalic; atraumatic.  EYES: no conjunctival injection. EOMI.   ENT: No nasal discharge; airway clear.  NECK: Supple; non tender.  CARD: S1, S2 normal; no murmurs, gallops, or rubs. Regular rate and rhythm.   RESP: No wheezes, rales or rhonchi.  ABD: soft ntnd. No CVA tenderness.   EXT: Normal ROM.  No clubbing, cyanosis or edema.   LYMPH: No acute cervical adenopathy.  NEURO: Alert, oriented, grossly unremarkable.  PSYCH: Cooperative, appropriate.

## 2020-09-14 NOTE — ED PROVIDER NOTE - NS ED ROS FT
Review of Systems:  CONSTITUTIONAL: No fever, No diaphoresis, No weight change  SKIN: No rash  HEMATOLOGIC: No abnormal bleeding or bruising  EYES: No eye pain, No blurred vision  ENT: No sore throat, No neck pain, No rhinorrhea, No ear pain  RESPIRATORY: No shortness of breath, No cough  CARDIAC: +chest pain, No palpitations  GI: No abdominal pain, No nausea, No vomiting, No diarrhea, No constipation, No bright red blood per rectum or melena. No flank pain  : No dysuria, frequency, hematuria.   MUSCULOSKELETAL: No joint paint, No swelling, No back pain  NEUROLOGIC: No numbness, No focal weakness, No headache, No dizziness  All other systems negative, unless specified in HPI

## 2020-09-14 NOTE — ED PROVIDER NOTE - OBJECTIVE STATEMENT
59 y/o M PMHx  presents to ED with L sided CP started 1 hour 1/2 ago at PT. non-radiating 7/10 no sob worse with movement  Cardiologist: Dr. Almaguer 61 y/o M PMHx HTN, HLD, COPD, PPM presents to ED with L sided CP started 1 hour 1/2 ago at physical therapy. Pain is non-radiating, worse with movement. No associated SOB. Cardiologist: Dr. Almaguer. Last CCTA in 2018.

## 2020-09-14 NOTE — ED PROVIDER NOTE - CARE PROVIDER_API CALL
Demarcus Herron  FAMILY MEDICINE  [address]  [place] [address]  Phone: [phone]  Fax: [phone]  Follow Up Time:

## 2020-09-14 NOTE — ED CLERICAL - NS ED CLERK NOTE PRE-ARRIVAL INFORMATION; ADDITIONAL PRE-ARRIVAL INFORMATION
This patient is enrolled in a readmission reduction program and has active care navigation.  This patient can be followed up by the care navigation team within 24 hours. To arrange close follow-up or to obtain additional clinical information about this patient, please call the contact number above.

## 2020-09-14 NOTE — ED PROVIDER NOTE - PATIENT PORTAL LINK FT
You can access the FollowMyHealth Patient Portal offered by [facility] by registering at the following website: [email or web address]. By joining [email or web address]’s FollowMyHealth portal, you will also be able to view your health information using other applications (apps) compatible with our system.

## 2020-09-14 NOTE — ED ADULT NURSE REASSESSMENT NOTE - NS ED NURSE REASSESS COMMENT FT1
Pt reassessed A/O times 4 Vs stable report filling slight  better is seen evaluate by ED attending clear to go home NAD noted ambulate steady .

## 2020-09-14 NOTE — ED PROVIDER NOTE - PROGRESS NOTE DETAILS
DL: Initial trop and EKG negative, performed again 4 hours later normal. will dc with strict return precautions. will fu with pmd and cardiologist

## 2020-09-14 NOTE — ED ADULT NURSE NOTE - OBJECTIVE STATEMENT
Pt with C/O left side chest pain headache, dizziness  while at  physical therapy ,pt demise nausea vomiting.

## 2020-09-16 ENCOUNTER — OUTPATIENT (OUTPATIENT)
Dept: OUTPATIENT SERVICES | Facility: HOSPITAL | Age: 60
LOS: 1 days | End: 2020-09-16

## 2020-09-16 DIAGNOSIS — K08.409 PARTIAL LOSS OF TEETH, UNSPECIFIED CAUSE, UNSPECIFIED CLASS: ICD-10-CM

## 2020-09-16 DIAGNOSIS — Z98.890 OTHER SPECIFIED POSTPROCEDURAL STATES: Chronic | ICD-10-CM

## 2020-09-16 DIAGNOSIS — Z95.0 PRESENCE OF CARDIAC PACEMAKER: Chronic | ICD-10-CM

## 2020-10-08 ENCOUNTER — APPOINTMENT (OUTPATIENT)
Dept: HEMATOLOGY ONCOLOGY | Facility: CLINIC | Age: 60
End: 2020-10-08
Payer: MEDICARE

## 2020-10-08 ENCOUNTER — OUTPATIENT (OUTPATIENT)
Dept: OUTPATIENT SERVICES | Facility: HOSPITAL | Age: 60
LOS: 1 days | Discharge: HOME | End: 2020-10-08

## 2020-10-08 ENCOUNTER — LABORATORY RESULT (OUTPATIENT)
Age: 60
End: 2020-10-08

## 2020-10-08 VITALS
HEIGHT: 72 IN | SYSTOLIC BLOOD PRESSURE: 127 MMHG | BODY MASS INDEX: 30.07 KG/M2 | HEART RATE: 80 BPM | WEIGHT: 222 LBS | DIASTOLIC BLOOD PRESSURE: 82 MMHG | RESPIRATION RATE: 14 BRPM | TEMPERATURE: 98 F

## 2020-10-08 DIAGNOSIS — C85.90 NON-HODGKIN LYMPHOMA, UNSPECIFIED, UNSPECIFIED SITE: ICD-10-CM

## 2020-10-08 DIAGNOSIS — Z98.890 OTHER SPECIFIED POSTPROCEDURAL STATES: Chronic | ICD-10-CM

## 2020-10-08 DIAGNOSIS — Z95.0 PRESENCE OF CARDIAC PACEMAKER: Chronic | ICD-10-CM

## 2020-10-08 LAB
HCT VFR BLD CALC: 45.6 %
HGB BLD-MCNC: 15.1 G/DL
MCHC RBC-ENTMCNC: 29.5 PG
MCHC RBC-ENTMCNC: 33.1 G/DL
MCV RBC AUTO: 89.2 FL
PLATELET # BLD AUTO: 148 K/UL
PMV BLD: 10.2 FL
RBC # BLD: 5.11 M/UL
RBC # FLD: 12.6 %
WBC # FLD AUTO: 2.67 K/UL

## 2020-10-08 PROCEDURE — 99214 OFFICE O/P EST MOD 30 MIN: CPT

## 2020-10-09 LAB
ALBUMIN SERPL ELPH-MCNC: 4.7 G/DL
ALP BLD-CCNC: 55 U/L
ALT SERPL-CCNC: 33 U/L
ANION GAP SERPL CALC-SCNC: 13 MMOL/L
AST SERPL-CCNC: 32 U/L
BILIRUB SERPL-MCNC: 1.3 MG/DL
BUN SERPL-MCNC: 17 MG/DL
CALCIUM SERPL-MCNC: 9.5 MG/DL
CHLORIDE SERPL-SCNC: 100 MMOL/L
CO2 SERPL-SCNC: 27 MMOL/L
CREAT SERPL-MCNC: 1.4 MG/DL
GLUCOSE SERPL-MCNC: 154 MG/DL
LDH SERPL-CCNC: 288 U/L
POTASSIUM SERPL-SCNC: 4.6 MMOL/L
PROT SERPL-MCNC: 6.8 G/DL
SODIUM SERPL-SCNC: 140 MMOL/L

## 2020-10-11 NOTE — ASSESSMENT
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 Last Rituxan May 2020  No evidence of disease on CT neck from Sept 2019. CT abdomen from 7/2020 showed no significant change in multiple subcentimeter lymph nodes. Patient now presents with new cervical tenderness and swelling (lymphadenopathy ?)\par \par PLAN:\par -- Follow up labs: CBC, CMP LDH\par -- Will refer for imaging study: PET scan ordered \par -- New ill defined lymphadenopathy and tenderness: will review PET scan and schedule for Rituxan if needed \par -- Follow up with neurology for right sided weakness: work up essentially negative thus far: MRI of cervical spine pending \par -- Follow up with cardiology: hs of a-fib not currently on anticoagulant: right sided weakness possibly secondary from s/e of a-fib\par -- RTC in 2 months \par \par Patient seen and examined with Dr. Rider who agrees with plan of care.\par \par \par

## 2020-10-11 NOTE — HISTORY OF PRESENT ILLNESS
[de-identified] : 57 year old obese male with PMH of COPD , diabetes, PPP for syncope. Patient went to the emergency room on February 4, 2018 with complaints of a swollen lymph node on right side of neck, which he had noticed several months prior to presentation. Lymph node was originally not painful but became painful about one week prior to presentation, which prompted him to go to the emergency room. No fevers, night sweats, anorexia or significant changes in weight. In the ED, patient underwent CT neck soft tissue, chest, abdomen and pelvis, which found multiple areas of LAD,Right-sided enlarged level 5 cervical chain lymph nodes measuring up to  1.9 x 1.7 cm (series 9 image 193). Enlarged right supraclavicular lymph  nodes measuring up to 1.6 x 1 cm(series 9 image 214).   in the R supraclavicular lymph node, R axillary lymph node,  Enlarged mesenteric lymph nodes measuring up to 1.3  x 1 cm (series 2 image 92)., There was no evidence of mediastinal or hilar LAD at that time. However, patient is not certain if he took prednisone prior to imaging. As per wife, patient takes short-course prednisone tapers every few months secondary to bronchitis. OTTONIEL plummer is a former 1 PPD smoker for 30-40 years who quit about five months prior to presentation and denies any previous history of asbestosis exposure. He worked as a . He has no pets at home. Biopsy of right cervical lymph node by ENT  Low grade B-cell lymphoma, consistent with nisreen marginal zone lymphoma.  the neoplastic cells are CD20+ CD79a+ PAX5+ B-cells that co-express BCL2. They are negative for CD5, CD10, BCL6, cyclinD1, and CD43. The proliferation index (Ki67 labeling) ranges from 5 to 20% in different areas excluding the germinal centers, flow cytometry studies performed at [facility] show monotypic B-cells (38% of cells), positive for kappa, CD19, CD20, FMC-7, CD23, minimal CD10; negative for CD5. Viability is 78%.   *** In summary, the findings are diagnostic of low grade B-cell lymphoma, the features are consistent with nisreen marginal zone lymphoma.     2 , peripheral blood flow showed - A MINUTE MONOCLONAL, CD10+ B-CELL POPULATION IS OBSERVED (0.14%). kappa and CD 20 bright . Lab work showed normal CBC , Hb A1c 9, negative HIV , Hep c and SPEP .    \par Since surgery 2 weeks ago he noted marked increase in painful  nisreen masses in the neck , supraclavicular and right axillae , he takes percocet to sleep with partial relief  , PET showed generalized adenopathy with maximum SUV 9 . No bone or marrow uptake. He complains of dyspnea on exertion , mild cough , improved with short course of prednisone, he lost few lbs and denies fever or night sweats. \par No history of EGD or colonoscopy . \par  [de-identified] : 08/27/2018 : Patient returns for follow up after BR X 3 with near complete response. Treatment was held due to worsening complaints of weakness, exertional dyspnea. RUQ pain . He followed with pulmonary and was placed on inhaled bronchodilators , He had MUGA scan and may require a second pacemaker lead. He denies any B symptoms or any new lumps or adenopathy . \par \par 10/23/2018 Patient returns for next dose of rituxan , He denies any new complaints . He was seen by GI for right sided abdominal tenderness, felt to be muscular in origin , CT scan apparently shows abdominal hernias probably unrelated to his complaint . He is scheduled for surveillance colonoscopy . He denies any B symptoms, he continues to have dyspnea on exertion and went on social security disability .\par \par 12/17/2018 Patient returns for next dose of rituxan maintenance . He denies any B symptoms , continues to complain of mild RUQ pain and tenderness. He is followed by cardiology and pulmonary and is undergoing work up to rule out cardiac sarcoidosis . \par \par 06/17/2019 Patient returns for follow up . he has been on maintenance rituxan for one year and was treated recently as outpatient for suspected pneumonia , he lost 9 lbs and is feeling better now. He had cardiac work up with revealed left atrial thrombus and started on xarelto , CT angio was negative , Cardiac CT showed LAD plaque without oclusion . \par \par 05/26/2020 Patient returns for last dose of rituxan , he feels well , denies B symptoms , infections , abnormal lumps . \par \par 08/18/2020 Patient returns for follow up after completion of maintenance rituxan . Since his last visit he developed left hemispheric stroke with weakness and aphasia . his speech has returned to normal and is left with mild residual right hemiparesis ( Lower > upper ) , he is using a cane to ambulate . CT angio was negative , he has permanent pacemaker without any documented atrial fibrillation , he remains on aspirin without anticoagulation . Of note he had an atrial thrombus ( right ? ) in the past on ANITA and was on anticoagulation , no repeat ANITA since then . in hospital he had negative CT abdomen except for non-specific lymph nodes. \par \par 10/08/2020: LONG is here for follow up visit for MZL on maintenance Rituxan; last dose May 2020. He has noted development of  bilateral cervical lymphadenopathy tenderness over the past week. He denies increased fatigue, abnormal bleeding, night sweats, new bone pain, weight loss, or abdominal pain. . In addition, he had a recent stroke; continues to follows with neurologist. Has yet to get MRI because he has PPM. He has no MRI of neck. right cervical adenopathy for the past week. \par He was admitted to the ED in July with expressive aphasia and right sided weakness. CT of the head and MRI of brain were unremarkable, EGG negative, and CTA of the head and neck which was unrevealing of any abnormalities. He continues to follow up with neurology who suggest MRI of cervical spine. In addition, he has a hx of A-fib no longer on anticoagulation; event possibly cardiac related?

## 2020-10-12 ENCOUNTER — RX RENEWAL (OUTPATIENT)
Age: 60
End: 2020-10-12

## 2020-10-13 ENCOUNTER — RX RENEWAL (OUTPATIENT)
Age: 60
End: 2020-10-13

## 2020-10-13 RX ORDER — LANCETS
EACH MISCELLANEOUS
Qty: 100 | Refills: 0 | Status: ACTIVE | COMMUNITY
Start: 2019-06-27 | End: 1900-01-01

## 2020-10-19 ENCOUNTER — OUTPATIENT (OUTPATIENT)
Dept: OUTPATIENT SERVICES | Facility: HOSPITAL | Age: 60
LOS: 1 days | Discharge: HOME | End: 2020-10-19
Payer: MEDICARE

## 2020-10-19 ENCOUNTER — RESULT REVIEW (OUTPATIENT)
Age: 60
End: 2020-10-19

## 2020-10-19 DIAGNOSIS — Z98.890 OTHER SPECIFIED POSTPROCEDURAL STATES: Chronic | ICD-10-CM

## 2020-10-19 DIAGNOSIS — Z95.0 PRESENCE OF CARDIAC PACEMAKER: Chronic | ICD-10-CM

## 2020-10-19 DIAGNOSIS — C85.90 NON-HODGKIN LYMPHOMA, UNSPECIFIED, UNSPECIFIED SITE: ICD-10-CM

## 2020-10-19 LAB — GLUCOSE BLDC GLUCOMTR-MCNC: 143 MG/DL — HIGH (ref 70–99)

## 2020-10-19 PROCEDURE — 78815 PET IMAGE W/CT SKULL-THIGH: CPT | Mod: 26,PS

## 2020-10-20 ENCOUNTER — OUTPATIENT (OUTPATIENT)
Dept: OUTPATIENT SERVICES | Facility: HOSPITAL | Age: 60
LOS: 1 days | Discharge: HOME | End: 2020-10-20
Payer: MEDICARE

## 2020-10-20 DIAGNOSIS — M54.12 RADICULOPATHY, CERVICAL REGION: ICD-10-CM

## 2020-10-20 DIAGNOSIS — Z95.0 PRESENCE OF CARDIAC PACEMAKER: Chronic | ICD-10-CM

## 2020-10-20 DIAGNOSIS — Z98.890 OTHER SPECIFIED POSTPROCEDURAL STATES: Chronic | ICD-10-CM

## 2020-10-20 PROCEDURE — 72141 MRI NECK SPINE W/O DYE: CPT | Mod: 26

## 2020-10-21 DIAGNOSIS — Z02.9 ENCOUNTER FOR ADMINISTRATIVE EXAMINATIONS, UNSPECIFIED: ICD-10-CM

## 2020-10-28 ENCOUNTER — OUTPATIENT (OUTPATIENT)
Dept: OUTPATIENT SERVICES | Facility: HOSPITAL | Age: 60
LOS: 1 days | Discharge: HOME | End: 2020-10-28

## 2020-10-28 DIAGNOSIS — Z98.890 OTHER SPECIFIED POSTPROCEDURAL STATES: Chronic | ICD-10-CM

## 2020-10-28 DIAGNOSIS — I69.00 UNSPECIFIED SEQUELAE OF NONTRAUMATIC SUBARACHNOID HEMORRHAGE: ICD-10-CM

## 2020-10-28 DIAGNOSIS — C85.90 NON-HODGKIN LYMPHOMA, UNSPECIFIED, UNSPECIFIED SITE: ICD-10-CM

## 2020-10-28 DIAGNOSIS — Z95.0 PRESENCE OF CARDIAC PACEMAKER: Chronic | ICD-10-CM

## 2020-11-01 ENCOUNTER — INPATIENT (INPATIENT)
Facility: HOSPITAL | Age: 60
LOS: 4 days | Discharge: HOME | End: 2020-11-06
Attending: INTERNAL MEDICINE | Admitting: INTERNAL MEDICINE
Payer: MEDICARE

## 2020-11-01 VITALS — WEIGHT: 238.1 LBS

## 2020-11-01 DIAGNOSIS — Z95.0 PRESENCE OF CARDIAC PACEMAKER: Chronic | ICD-10-CM

## 2020-11-01 DIAGNOSIS — Z98.890 OTHER SPECIFIED POSTPROCEDURAL STATES: Chronic | ICD-10-CM

## 2020-11-01 LAB
ALBUMIN SERPL ELPH-MCNC: 4.8 G/DL — SIGNIFICANT CHANGE UP (ref 3.5–5.2)
ALP SERPL-CCNC: 60 U/L — SIGNIFICANT CHANGE UP (ref 30–115)
ALT FLD-CCNC: 15 U/L — SIGNIFICANT CHANGE UP (ref 0–41)
ANION GAP SERPL CALC-SCNC: 15 MMOL/L — HIGH (ref 7–14)
APTT BLD: 28.8 SEC — SIGNIFICANT CHANGE UP (ref 27–39.2)
AST SERPL-CCNC: 21 U/L — SIGNIFICANT CHANGE UP (ref 0–41)
BASOPHILS # BLD AUTO: 0.06 K/UL — SIGNIFICANT CHANGE UP (ref 0–0.2)
BASOPHILS NFR BLD AUTO: 1.1 % — HIGH (ref 0–1)
BILIRUB SERPL-MCNC: 1.7 MG/DL — HIGH (ref 0.2–1.2)
BUN SERPL-MCNC: 20 MG/DL — SIGNIFICANT CHANGE UP (ref 10–20)
CALCIUM SERPL-MCNC: 9.8 MG/DL — SIGNIFICANT CHANGE UP (ref 8.5–10.1)
CHLORIDE SERPL-SCNC: 101 MMOL/L — SIGNIFICANT CHANGE UP (ref 98–110)
CO2 SERPL-SCNC: 23 MMOL/L — SIGNIFICANT CHANGE UP (ref 17–32)
CREAT SERPL-MCNC: 1.2 MG/DL — SIGNIFICANT CHANGE UP (ref 0.7–1.5)
EOSINOPHIL # BLD AUTO: 0.2 K/UL — SIGNIFICANT CHANGE UP (ref 0–0.7)
EOSINOPHIL NFR BLD AUTO: 3.5 % — SIGNIFICANT CHANGE UP (ref 0–8)
GLUCOSE SERPL-MCNC: 156 MG/DL — HIGH (ref 70–99)
HCT VFR BLD CALC: 49.5 % — SIGNIFICANT CHANGE UP (ref 42–52)
HGB BLD-MCNC: 16.2 G/DL — SIGNIFICANT CHANGE UP (ref 14–18)
IMM GRANULOCYTES NFR BLD AUTO: 0.7 % — HIGH (ref 0.1–0.3)
INR BLD: 1.03 RATIO — SIGNIFICANT CHANGE UP (ref 0.65–1.3)
LYMPHOCYTES # BLD AUTO: 1.84 K/UL — SIGNIFICANT CHANGE UP (ref 1.2–3.4)
LYMPHOCYTES # BLD AUTO: 32.3 % — SIGNIFICANT CHANGE UP (ref 20.5–51.1)
MCHC RBC-ENTMCNC: 29.2 PG — SIGNIFICANT CHANGE UP (ref 27–31)
MCHC RBC-ENTMCNC: 32.7 G/DL — SIGNIFICANT CHANGE UP (ref 32–37)
MCV RBC AUTO: 89.4 FL — SIGNIFICANT CHANGE UP (ref 80–94)
MONOCYTES # BLD AUTO: 0.71 K/UL — HIGH (ref 0.1–0.6)
MONOCYTES NFR BLD AUTO: 12.5 % — HIGH (ref 1.7–9.3)
NEUTROPHILS # BLD AUTO: 2.85 K/UL — SIGNIFICANT CHANGE UP (ref 1.4–6.5)
NEUTROPHILS NFR BLD AUTO: 49.9 % — SIGNIFICANT CHANGE UP (ref 42.2–75.2)
NRBC # BLD: 0 /100 WBCS — SIGNIFICANT CHANGE UP (ref 0–0)
PLATELET # BLD AUTO: 216 K/UL — SIGNIFICANT CHANGE UP (ref 130–400)
POTASSIUM SERPL-MCNC: 3.5 MMOL/L — SIGNIFICANT CHANGE UP (ref 3.5–5)
POTASSIUM SERPL-SCNC: 3.5 MMOL/L — SIGNIFICANT CHANGE UP (ref 3.5–5)
PROT SERPL-MCNC: 7.3 G/DL — SIGNIFICANT CHANGE UP (ref 6–8)
PROTHROM AB SERPL-ACNC: 11.9 SEC — SIGNIFICANT CHANGE UP (ref 9.95–12.87)
RAPID RVP RESULT: SIGNIFICANT CHANGE UP
RBC # BLD: 5.54 M/UL — SIGNIFICANT CHANGE UP (ref 4.7–6.1)
RBC # FLD: 12.3 % — SIGNIFICANT CHANGE UP (ref 11.5–14.5)
SARS-COV-2 RNA SPEC QL NAA+PROBE: SIGNIFICANT CHANGE UP
SODIUM SERPL-SCNC: 139 MMOL/L — SIGNIFICANT CHANGE UP (ref 135–146)
TROPONIN T SERPL-MCNC: <0.01 NG/ML — SIGNIFICANT CHANGE UP
WBC # BLD: 5.7 K/UL — SIGNIFICANT CHANGE UP (ref 4.8–10.8)
WBC # FLD AUTO: 5.7 K/UL — SIGNIFICANT CHANGE UP (ref 4.8–10.8)

## 2020-11-01 PROCEDURE — 99285 EMERGENCY DEPT VISIT HI MDM: CPT | Mod: CS,GC

## 2020-11-01 PROCEDURE — 99233 SBSQ HOSP IP/OBS HIGH 50: CPT

## 2020-11-01 PROCEDURE — 0042T: CPT

## 2020-11-01 PROCEDURE — 93010 ELECTROCARDIOGRAM REPORT: CPT

## 2020-11-01 PROCEDURE — 70450 CT HEAD/BRAIN W/O DYE: CPT | Mod: 26

## 2020-11-01 RX ORDER — AMLODIPINE BESYLATE 2.5 MG/1
5 TABLET ORAL DAILY
Refills: 0 | Status: DISCONTINUED | OUTPATIENT
Start: 2020-11-01 | End: 2020-11-02

## 2020-11-01 RX ORDER — SODIUM CHLORIDE 9 MG/ML
500 INJECTION INTRAMUSCULAR; INTRAVENOUS; SUBCUTANEOUS ONCE
Refills: 0 | Status: COMPLETED | OUTPATIENT
Start: 2020-11-01 | End: 2020-11-01

## 2020-11-01 RX ORDER — ATORVASTATIN CALCIUM 80 MG/1
80 TABLET, FILM COATED ORAL AT BEDTIME
Refills: 0 | Status: DISCONTINUED | OUTPATIENT
Start: 2020-11-01 | End: 2020-11-06

## 2020-11-01 RX ORDER — CHLORHEXIDINE GLUCONATE 213 G/1000ML
1 SOLUTION TOPICAL
Refills: 0 | Status: DISCONTINUED | OUTPATIENT
Start: 2020-11-01 | End: 2020-11-06

## 2020-11-01 RX ORDER — ALTEPLASE 100 MG
81 KIT INTRAVENOUS ONCE
Refills: 0 | Status: COMPLETED | OUTPATIENT
Start: 2020-11-01 | End: 2020-11-01

## 2020-11-01 RX ORDER — TIOTROPIUM BROMIDE 18 UG/1
1 CAPSULE ORAL; RESPIRATORY (INHALATION) DAILY
Refills: 0 | Status: DISCONTINUED | OUTPATIENT
Start: 2020-11-01 | End: 2020-11-06

## 2020-11-01 RX ORDER — ALBUTEROL 90 UG/1
2 AEROSOL, METERED ORAL EVERY 6 HOURS
Refills: 0 | Status: DISCONTINUED | OUTPATIENT
Start: 2020-11-01 | End: 2020-11-06

## 2020-11-01 RX ORDER — ALTEPLASE 100 MG
9 KIT INTRAVENOUS ONCE
Refills: 0 | Status: COMPLETED | OUTPATIENT
Start: 2020-11-01 | End: 2020-11-01

## 2020-11-01 RX ORDER — NOREPINEPHRINE BITARTRATE/D5W 8 MG/250ML
0.05 PLASTIC BAG, INJECTION (ML) INTRAVENOUS
Qty: 8 | Refills: 0 | Status: DISCONTINUED | OUTPATIENT
Start: 2020-11-01 | End: 2020-11-03

## 2020-11-01 RX ADMIN — SODIUM CHLORIDE 1000 MILLILITER(S): 9 INJECTION INTRAMUSCULAR; INTRAVENOUS; SUBCUTANEOUS at 19:36

## 2020-11-01 RX ADMIN — Medication 10.1 MICROGRAM(S)/KG/MIN: at 22:16

## 2020-11-01 RX ADMIN — ALTEPLASE 81 MILLIGRAM(S): KIT at 15:23

## 2020-11-01 RX ADMIN — ALTEPLASE 540 MILLIGRAM(S): KIT at 15:22

## 2020-11-01 RX ADMIN — ALBUTEROL 2 PUFF(S): 90 AEROSOL, METERED ORAL at 20:29

## 2020-11-01 RX ADMIN — Medication 1 MILLIGRAM(S): at 15:29

## 2020-11-01 RX ADMIN — SODIUM CHLORIDE 1000 MILLILITER(S): 9 INJECTION INTRAMUSCULAR; INTRAVENOUS; SUBCUTANEOUS at 21:07

## 2020-11-01 NOTE — ED PROVIDER NOTE - PHYSICAL EXAMINATION
General: Not in distress. lethargic, but follows command  Skin: no rash  Eyes: no nystagmus   HEENT: Atraumatic, normocephalic. L facial droop.   Cardio: Regular rate and rhythm. S1, S2 appreciated. no murmurs.  Pulm: Decreased BS bilaterally   Abdomen: Soft, non-tender, non-distended.   Extremities: No cyanosis or edema bilaterally. No calf tenderness to palpation.  Neuro: Motor: R 4/5 L5/5, NIHSS 8  Psych: Appropiate

## 2020-11-01 NOTE — ED PROVIDER NOTE - OBJECTIVE STATEMENT
60 M with PMH of recent stroke s/p tPA in 08/2020, congenital heart disease s/p repair, HTN, DLD, RAA thrombus (was on xarelto and resolved on last ANITA), COPD, ex- smoker, dm htn, hld, sinus bradycardia s/p PPM, marginal cell lymphoma on maintenance rituxan (last chemo was december 21) presenting for acute change in mental status. Wife at bedside who reports pt felt sudden onset bilateral upper extremity and lower extremity weakness R>>L associated with generalized warmth and numbness.

## 2020-11-01 NOTE — ED ADULT NURSE NOTE - NSIMPLEMENTINTERV_GEN_ALL_ED
Implemented All Fall with Harm Risk Interventions:  [place] to call system. Call bell, personal items and telephone within reach. Instruct patient to call for assistance. Room bathroom lighting operational. Non-slip footwear when patient is off stretcher. Physically safe environment: no spills, clutter or unnecessary equipment. Stretcher in lowest position, wheels locked, appropriate side rails in place. Provide visual cue, wrist band, yellow gown, etc. Monitor gait and stability. Monitor for mental status changes and reorient to person, place, and time. Review medications for side effects contributing to fall risk. Reinforce activity limits and safety measures with patient and family. Provide visual clues: red socks.

## 2020-11-01 NOTE — H&P ADULT - HISTORY OF PRESENT ILLNESS
60 M with aPMH of recent stroke s/p tPA in 08/2020, congenital heart disease s/p repair, HTN, DLD, RAA thrombus (was on xarelto and resolved on last ANITA), COPD, ex- smoker, dm htn, hld, sinus bradycardia s/p PPM, marginal cell lymphoma on maintenance rituxan (last chemo was december 21)    CTH is negative for acute pathology and his was within the window for IV tpa which was given at 14.35 pm. vEEG with no indication of seizure activity, CTP negative, and repeat CTH stable 60 M with aPMH of recent stroke s/p tPA in 08/2020, congenital heart disease s/p repair, HTN, DLD, RAA thrombus (was on xarelto and resolved on last ANITA), COPD, ex- smoker, dm htn, hld, sinus bradycardia s/p PPM, marginal cell lymphoma on maintenance rituxan (last chemo was december 21) presenting for acute change in mental status    History obtained from wife at bedside who reports pt felt sudden onset bilateral upper extremity and lower extremity weakness R>>L associated with generalized warmth and numbness. He collapsed on the couch, which prompted her to call EMS. She took his FS which was 170, but was unable to obtain his BP. She reports his rpesentations is similar to his presentation in August when he was admitted for a stroke requiring tPA. Imaging at the time was negative. He had some residula R-sided weakness that was improving, but remained with slight expressive aphasia/difficulty finding words.    In the ED, VS: 166/57 HR 90 RR18 satting 99% on 3L NC. Code stroke was called and pt received tPA around 3.25 as he was within therapeutic window.    CTH: without evidence of acute ischemic stroke or hemorrhage. Pt taken for CT perfusion during exam.    60 M with PMH of recent stroke s/p tPA in 08/2020, congenital heart disease s/p repair, HTN, DLD, RAA thrombus (was on xarelto and resolved on last ANITA), COPD, ex- smoker, dm htn, hld, sinus bradycardia s/p PPM, marginal cell lymphoma on maintenance rituxan (last chemo was december 21) presenting for acute change in mental status    History obtained from wife at bedside who reports pt felt sudden onset bilateral upper extremity and lower extremity weakness R>>L associated with generalized warmth and numbness. He collapsed on the couch, which prompted her to call EMS. She took his FS which was 170, but was unable to obtain his BP. She reports his rpesentations is similar to his presentation in August when he was admitted for a stroke requiring tPA. Imaging at the time was negative. He had some residula R-sided weakness that was improving, but remained with slight expressive aphasia/difficulty finding words.    In the ED, VS: 166/57 HR 90 RR18 satting 99% on 3L NC. Code stroke was called and pt received tPA around 3.25 as he was within therapeutic window.    CTH: without evidence of acute ischemic stroke or hemorrhage. Pt taken for CT perfusion during exam. admitted to MICU for monitoring, on levophed 0.03

## 2020-11-01 NOTE — CONSULT NOTE ADULT - ASSESSMENT
# Rule out TIA vs Seizure disorder  -S/P TPA, continue with frequent neuro checks.   -Recommend VEEG. Seizure and aspiration precautions.   -Admit to ICU. Get lipid profile, hemoglobin A1c.   -post tpa protocol -NO AC or Antiplatelets for 24 hrs  -CTH in 24 hour post tpa or sooner if worsening neuro status  -Keep syst <180 >120 and diast <105>60  -Speech and swallow eval and treat  -PT/OT eval 60 year old male with PMH of recent "stroke" s/p tPA in 07/2020, congenital heart disease s/p repair, HTN, DLD, RAA thrombus (was on xarelto and resolved on last ANITA), COPD, ex- smoker, dm htn, hld, sinus bradycardia s/p PPM, marginal cell lymphoma on maintenance rituxan currently p/w severe aphasia/AMS of unclear etiology possible branch L MCA stroke vs status partialis.  Pt is a candidate for thrombolysis with IV TPA.  Risks and benefits discussed with patient's wife at length who understands and agrees to thrombolysis with IV TPA.  TPA bolus administered at 15:34.  Not candidate for acute neurointervention with no LVO or mismatch on perfusion.  Possible status partialis also given sustained clonus w/ slight improvement after Ativan 1 mg IVP.      Recommendations:  - follow post-TPA protocol  - admit to ICU/CCU w/ serial neurochecks per post-TPA protocol  - Recommend VEEG. Seizure and aspiration precautions.   - Admit to ICU. Get lipid profile, hemoglobin A1c.    -post tpa protocol -NO AC or Antiplatelets for 24 hrs  - CTH in 24 hour post tpa or sooner if worsening neuro status  - Keep syst <180 >120 and diast <105>60  - Speech and swallow eval and treat  - PT/OT eval

## 2020-11-01 NOTE — H&P ADULT - ASSESSMENT
IMPRESSION:  #Acute ischemic stroke s/p tPA   # DM II  #Hypertension    PLAN:    CNS: Obtain Ct perfusion. Neurochecks q1hr. Avoid CNS depressants    HEENT: oral care. HOB @45 degrees    PULMONARY: Maintain SaO2>88-92%    CARDIOVASCULAR: Strict BP contorl <140/80.  Keep euvolemic.    GI: Speech and swallow eval. NPO.     RENAL: Monitor lytes. Replete as needed    INFECTIOUS DISEASE: Monitor off electrolytes.     HEMATOLOGICAL:  Avoid anti-platelets or anti-coagulants for 24 hrs.     ENDOCRINE:  Follow up FS.  Insulin protocol  to keep -180    MUSCULOSKELETAL: bedrest         IMPRESSION:    #Acute ischemic stroke s/p tPA ???? seizure   # DM II  #Hypertension    PLAN:    CNS: repeat head CT.  Neurochecks q1hr. Avoid CNS depressants, Neuro f/up    HEENT: oral care. HOB @45 degrees    PULMONARY: Maintain SaO2>88-92%, aspiration precaution, cxr    CARDIOVASCULAR: .  IVF, ECHO    GI: Speech and swallow eval.     RENAL: Monitor lytes. Replete as needed    INFECTIOUS DISEASE: Monitor off electrolytes.     HEMATOLOGICAL:  Avoid anti-platelets or anti-coagulants for 24 hrs.     ENDOCRINE:  Follow up FS.  Insulin protocol  to keep -180    MUSCULOSKELETAL: bedrest    MICU

## 2020-11-01 NOTE — H&P ADULT - ATTENDING COMMENTS
Patient seen and examined, agree with above, ? Stroke, ct perfusion nl, sp TPA, prior same presentation few month ago

## 2020-11-01 NOTE — ED ADULT NURSE REASSESSMENT NOTE - NS ED NURSE REASSESS COMMENT FT1
Dr. Garibay administered 9 cc bolus of TPA and flushed with 10 cc of normal saline flush  right after. IV infusion started at 1524 as ordered. will monitor.

## 2020-11-01 NOTE — ED PROVIDER NOTE - ATTENDING CONTRIBUTION TO CARE
I am unable to obtain a comprehensive history, review of systems, past medical history, and/or physical exam due to constraints imposed by the urgency of the patient's clinical condition and/or mental status.     Pt with stroke-like symptoms - Dr Garibay at bedside - tPA given - admitted.    Exam: profound aphasia, no hypoxia, not following commands

## 2020-11-01 NOTE — H&P ADULT - NSHPLABSRESULTS_GEN_ALL_CORE
16.2   5.70  )-----------( 216      ( 01 Nov 2020 14:59 )             49.5       11-01    139  |  101  |  20  ----------------------------<  156<H>  3.5   |  23  |  1.2    Ca    9.8      01 Nov 2020 14:59    TPro  7.3  /  Alb  4.8  /  TBili  1.7<H>  /  DBili  x   /  AST  21  /  ALT  15  /  AlkPhos  60  11-01         PT/INR - ( 01 Nov 2020 14:59 )   PT: 11.90 sec;   INR: 1.03 ratio         PTT - ( 01 Nov 2020 14:59 )  PTT:28.8 sec      CARDIAC MARKERS ( 01 Nov 2020 14:59 )  x     / <0.01 ng/mL / x     / x     / x          RADIOLOGY:    < from: CT Brain Stroke Protocol (11.01.20 @ 15:04) >    IMPRESSION:  No evidence of acute transcortical infarct, acute intracranial hemorrhage, or mass effect.    < end of copied text >

## 2020-11-01 NOTE — ED ADULT NURSE REASSESSMENT NOTE - NS ED NURSE REASSESS COMMENT FT1
pt. BIBA from home with 1 hour onset of left sided facial droop and dysarthria. stroke code activated pt. went to cts scan and brought back to Ed without difficulty. Dr. Garibay at bedside TPA administered as ordered. VS every 5 mins cycling. pt. starting to follow some commands and some speech starting to come back but not clearly. VSS. Spouse at bedside. sensation and motor intact. report endorsed to ICY rn. awaiting transport.

## 2020-11-01 NOTE — H&P ADULT - NSHPPHYSICALEXAM_GEN_ALL_CORE
Vital Signs Last 24 Hrs  T(C): 36.7 (01 Nov 2020 15:15), Max: 36.7 (01 Nov 2020 15:15)  T(F): 98 (01 Nov 2020 15:15), Max: 98 (01 Nov 2020 15:15)  HR: 99 (01 Nov 2020 16:15) (87 - 100)  BP: 124/88 (01 Nov 2020 16:15) (123/74 - 166/57)  BP(mean): --  RR: 20 (01 Nov 2020 16:15) (18 - 20)  SpO2: 97% (01 Nov 2020 16:15) (95% - 99%)    PHYSICAL EXAM:    General: Not in distress. lethargic, but follows command  HEENT: Atraumatic, normocephalic. L facial droop.   Cardio: Regular rate and rhythm. S1, S2 appreciated. no murmurs.  Pulm: Decreased BS bilaterally   Abdomen: Soft, non-tender, non-distended.   Extremities: No cyanosis or edema bilaterally. No calf tenderness to palpation.  Neuro: Motor: R 4/5 L5/5 Vital Signs Last 24 Hrs  T(C): 36.7 (01 Nov 2020 15:15), Max: 36.7 (01 Nov 2020 15:15)  T(F): 98 (01 Nov 2020 15:15), Max: 98 (01 Nov 2020 15:15)  HR: 99 (01 Nov 2020 16:15) (87 - 100)  BP: 124/88 (01 Nov 2020 16:15) (123/74 - 166/57)  RR: 20 (01 Nov 2020 16:15) (18 - 20)  SpO2: 97% (01 Nov 2020 16:15) (95% - 99%)    PHYSICAL EXAM:    General: Not in distress. lethargic, but follows command  HEENT: Atraumatic, normocephalic. L facial droop.   Cardio: Regular rate and rhythm. S1, S2 appreciated. no murmurs.  Pulm: Decreased BS bilaterally   Abdomen: Soft, non-tender, non-distended.   Extremities: No cyanosis or edema bilaterally. No calf tenderness to palpation.  Neuro: Motor: R 4/5 L5/5

## 2020-11-01 NOTE — STROKE CODE NOTE - NIH STROKE SCALE: 1A. LEVEL OF CONSCIOUSNESS, QM
(0) Alert; keenly responsive (2) Not alert; requires repeated stimulation to attend, or is obtunded and requires strong or painful stimulation to make movements (not stereotyped)

## 2020-11-01 NOTE — CONSULT NOTE ADULT - SUBJECTIVE AND OBJECTIVE BOX
Neurology Consult  60 year old male with PMH of recent stroke s/p tPA in 08/2020, congenital heart disease s/p repair, HTN, DLD, RAA thrombus (was on xarelto and resolved on last ANITA), COPD, ex- smoker, dm htn, hld, sinus bradycardia s/p PPM, marginal cell lymphoma on maintenance rituxan (last chemo was december 21)    CTH is negative for acute pathology and his was within the window for IV tpa which was given at 14.35 pm. vEEG with no indication of seizure activity, CTP negative, and repeat CTH stable    PAST MEDICAL & SURGICAL HISTORY:  Pacemaker    Non Hodgkin&#x27;s lymphoma    Chronic obstructive pulmonary disease, unspecified COPD type    DM (diabetes mellitus)    High cholesterol    HTN (hypertension)    S/P transesophageal echocardiogram ([facility])    Artificial cardiac pacemaker        FAMILY HISTORY:  Family history of cancer (Mother)    Family history of breast cancer (Sibling)  in sister at the age of 59        Social History: (-) x 3    Allergies    acyclovir (Anaphylaxis)  Bactrim (Anaphylaxis)    Intolerances        MEDICATIONS  (STANDING):    MEDICATIONS  (PRN):      Review of systems:    Constitutional: as per HPI  Eyes: No eye pain or discharge  ENMT:  No difficulty hearing; No sinus or throat pain  Neck: No pain or stiffness  Respiratory: No cough, wheezing, chills or hemoptysis  Cardiovascular: No chest pain, palpitations, shortness of breath, dyspnea on exertion  Gastrointestinal: No abdominal pain, nausea, vomiting or hematemesis; No diarrhea or constipation.   Genitourinary: No dysuria, frequency, hematuria or incontinence  Neurological: As per HPI  Skin: No rashes or lesions   Endocrine: No heat or cold intolerance; No hair loss  Musculoskeletal: No joint pain or swelling  Psychiatric: No depression, anxiety, mood swings  Heme/Lymph: No easy bruising or bleeding gums    Vital Signs Last 24 Hrs  T(C): 36.7 (01 Nov 2020 15:15), Max: 36.7 (01 Nov 2020 15:15)  T(F): 98 (01 Nov 2020 15:15), Max: 98 (01 Nov 2020 15:15)  HR: 99 (01 Nov 2020 16:15) (87 - 100)  BP: 124/88 (01 Nov 2020 16:15) (123/74 - 166/57)  BP(mean): --  RR: 20 (01 Nov 2020 16:15) (18 - 20)  SpO2: 97% (01 Nov 2020 16:15) (95% - 99%)    Examination:  General:  Appearance is consistent with chronologic age.  No abnormal facies.  Gross skin survey within normal limits.    Cognitive/Language:  The patient is oriented to person, place, time and date.  Recent and remote memory intact.  Fund of knowledge is intact and normal.  Language with normal repetition, comprehension and naming.  Nondysarthric.    Eyes: intact VA, VFF.  EOMI w/o nystagmus, skew or reported double vision.  PERRL.  No ptosis/weakness of eyelid closure.    Face:  Facial sensation normal V1 - 3, no facial asymmetry.    Ears/Nose/Throat:  Hearing grossly intact b/l.  Palate elevates midline.  Tongue and uvula midline.   Motor examination:   Normal tone, bulk and range of motion.  No tenderness, twitching, tremors or involuntary movements.  Formal Muscle Strength Testing: (MRC grade R/L) 5/5 UE; 5/5 LE.  No observable drift.  Reflexes:   2+ b/l pectoralis, biceps, triceps, brachioradialis, patella and Achilles.  Plantar response downgoing b/l.  Jaw jerk, Elizabeth, clonus absent.  Sensory examination:   Intact to light touch and pinprick, pain, temperature and proprioception and vibration in all extremities.  Cerebellum:   FTN/HKS intact with normal REINALDO in all limbs.  No dysmetria or dysdiadokinesia.  Gait narrow based and normal.    Respiratory:  no audible wheezing or inspiratory stridor.  no use of accessory muscles.   Cardiac: pulse palpable, no audible bruits  Abdomen: supple, no guarding, no TTP    Labs:   CBC Full  -  ( 01 Nov 2020 14:59 )  WBC Count : 5.70 K/uL  RBC Count : 5.54 M/uL  Hemoglobin : 16.2 g/dL  Hematocrit : 49.5 %  Platelet Count - Automated : 216 K/uL  Mean Cell Volume : 89.4 fL  Mean Cell Hemoglobin : 29.2 pg  Mean Cell Hemoglobin Concentration : 32.7 g/dL  Auto Neutrophil # : 2.85 K/uL  Auto Lymphocyte # : 1.84 K/uL  Auto Monocyte # : 0.71 K/uL  Auto Eosinophil # : 0.20 K/uL  Auto Basophil # : 0.06 K/uL  Auto Neutrophil % : 49.9 %  Auto Lymphocyte % : 32.3 %  Auto Monocyte % : 12.5 %  Auto Eosinophil % : 3.5 %  Auto Basophil % : 1.1 %    11-01    139  |  101  |  20  ----------------------------<  156<H>  3.5   |  23  |  1.2    Ca    9.8      01 Nov 2020 14:59    TPro  7.3  /  Alb  4.8  /  TBili  1.7<H>  /  DBili  x   /  AST  21  /  ALT  15  /  AlkPhos  60  11-01    LIVER FUNCTIONS - ( 01 Nov 2020 14:59 )  Alb: 4.8 g/dL / Pro: 7.3 g/dL / ALK PHOS: 60 U/L / ALT: 15 U/L / AST: 21 U/L / GGT: x           PT/INR - ( 01 Nov 2020 14:59 )   PT: 11.90 sec;   INR: 1.03 ratio         PTT - ( 01 Nov 2020 14:59 )  PTT:28.8 sec        Neuroimaging:  NCT:     11-01-20 @ 16:54       Neurology Consult  60 year old male with PMH of recent stroke s/p tPA in 08/2020, congenital heart disease s/p repair, HTN, DLD, RAA thrombus (was on xarelto and resolved on last ANITA), COPD, ex- smoker, dm htn, hld, sinus bradycardia s/p PPM, marginal cell lymphoma on maintenance rituxan (last chemo was december 21)    CTH is negative for acute pathology and his was within the window for IV tpa which was given at 14.35 pm. vEEG with no indication of seizure activity, CTP negative, and repeat CTH stable. Follow up with CT perfusion results.     PAST MEDICAL & SURGICAL HISTORY:  Pacemaker    Non Hodgkin&#x27;s lymphoma    Chronic obstructive pulmonary disease, unspecified COPD type    DM (diabetes mellitus)    High cholesterol    HTN (hypertension)    S/P transesophageal echocardiogram ([facility])    Artificial cardiac pacemaker        FAMILY HISTORY:  Family history of cancer (Mother)    Family history of breast cancer (Sibling)  in sister at the age of 59        Social History: (-) x 3    Allergies    acyclovir (Anaphylaxis)  Bactrim (Anaphylaxis)    Intolerances        MEDICATIONS  (STANDING):    MEDICATIONS  (PRN):      Review of systems:    Constitutional: as per HPI  Eyes: No eye pain or discharge  ENMT:  No difficulty hearing; No sinus or throat pain  Neck: No pain or stiffness  Respiratory: No cough, wheezing, chills or hemoptysis  Cardiovascular: No chest pain, palpitations, shortness of breath, dyspnea on exertion  Gastrointestinal: No abdominal pain, nausea, vomiting or hematemesis; No diarrhea or constipation.   Genitourinary: No dysuria, frequency, hematuria or incontinence  Neurological: As per HPI  Skin: No rashes or lesions   Endocrine: No heat or cold intolerance; No hair loss  Musculoskeletal: No joint pain or swelling  Psychiatric: No depression, anxiety, mood swings  Heme/Lymph: No easy bruising or bleeding gums    Vital Signs Last 24 Hrs  T(C): 36.7 (01 Nov 2020 15:15), Max: 36.7 (01 Nov 2020 15:15)  T(F): 98 (01 Nov 2020 15:15), Max: 98 (01 Nov 2020 15:15)  HR: 99 (01 Nov 2020 16:15) (87 - 100)  BP: 124/88 (01 Nov 2020 16:15) (123/74 - 166/57)  BP(mean): --  RR: 20 (01 Nov 2020 16:15) (18 - 20)  SpO2: 97% (01 Nov 2020 16:15) (95% - 99%)    Examination:  General:  Appearance is consistent with chronologic age.  No abnormal facies.  Gross skin survey within normal limits.    Cognitive/Language: Aphasic and moaning. Can follow simple commands  Eyes: intact EOMI w/o nystagmus PERRL.    Face:  Facial sensation normal V1 - 3, no facial asymmetry.    Motor examination:   Normal tone, bulk and range of motion.  No tenderness. 4/5 UE; 5/5 LE.  No observable drift.  Reflexes:  +clonus   Cerebellum: Unable to assess   Respiratory:  no audible wheezing or inspiratory stridor.  No use of accessory muscles.   Cardiac: pulse palpable, no audible bruits  Abdomen: supple, no guarding, no TTP    Labs:   CBC Full  -  ( 01 Nov 2020 14:59 )  WBC Count : 5.70 K/uL  RBC Count : 5.54 M/uL  Hemoglobin : 16.2 g/dL  Hematocrit : 49.5 %  Platelet Count - Automated : 216 K/uL  Mean Cell Volume : 89.4 fL  Mean Cell Hemoglobin : 29.2 pg  Mean Cell Hemoglobin Concentration : 32.7 g/dL  Auto Neutrophil # : 2.85 K/uL  Auto Lymphocyte # : 1.84 K/uL  Auto Monocyte # : 0.71 K/uL  Auto Eosinophil # : 0.20 K/uL  Auto Basophil # : 0.06 K/uL  Auto Neutrophil % : 49.9 %  Auto Lymphocyte % : 32.3 %  Auto Monocyte % : 12.5 %  Auto Eosinophil % : 3.5 %  Auto Basophil % : 1.1 %    11-01    139  |  101  |  20  ----------------------------<  156<H>  3.5   |  23  |  1.2    Ca    9.8      01 Nov 2020 14:59    TPro  7.3  /  Alb  4.8  /  TBili  1.7<H>  /  DBili  x   /  AST  21  /  ALT  15  /  AlkPhos  60  11-01    LIVER FUNCTIONS - ( 01 Nov 2020 14:59 )  Alb: 4.8 g/dL / Pro: 7.3 g/dL / ALK PHOS: 60 U/L / ALT: 15 U/L / AST: 21 U/L / GGT: x           PT/INR - ( 01 Nov 2020 14:59 )   PT: 11.90 sec;   INR: 1.03 ratio         PTT - ( 01 Nov 2020 14:59 )  PTT:28.8 sec        Neuroimaging:  NCHCT:   < from: CT Perfusion w/ Maps w/ IV Cont (11.01.20 @ 17:21) >      IMPRESSION:  Normal CT angiogram of the head and neck.    Normal perfusion images of the brain.    < end of copied text >  < from: CT Brain Stroke Protocol (11.01.20 @ 15:04) >  IMPRESSION:  No evidence of acute transcortical infarct, acute intracranial hemorrhage, or mass effect.    Dr. Rae Collins discussed findings with STEPAN DOMINGO on 11/1/2020 at 3:19 PM with readback.    < end of copied text >         Neurology Consult  60 year old male with PMH of recent "stroke" s/p tPA in 07/2020, congenital heart disease s/p repair, HTN, DLD, RAA thrombus (was on xarelto and resolved on last ANITA), COPD, ex- smoker, dm htn, hld, sinus bradycardia s/p PPM, marginal cell lymphoma on maintenance rituxan (last chemo was december 21).  Pt w/ initial feeling of not feeling "right" then proceeded to having generalized weakness w/ presyncope per wife.  En route to hospital pt developed aphasia with groaning and AMS with ? limb weakness noted.  Pt had similar episode in July 30, 2020 w/ aphasia and RHP s/p TPA with immediate improvement in speech but persistent hemiparesis with negative MRI and vEEG followed by Dr. Rubio.  No anticoagulation, recent surgery or trauma.  Last b/w in september with normal platelet function.      CTH is negative for acute pathology and his was within the window for IV tpa which was given at 14.35 pm. vEEG with no indication of seizure activity, CTP negative, and repeat CTH stable. Follow up with CT perfusion results.     PAST MEDICAL & SURGICAL HISTORY:  Pacemaker    Non Hodgkin&#x27;s lymphoma    Chronic obstructive pulmonary disease, unspecified COPD type    DM (diabetes mellitus)    High cholesterol    HTN (hypertension)    S/P transesophageal echocardiogram ([facility])    Artificial cardiac pacemaker    FAMILY HISTORY:  Family history of cancer (Mother)    Family history of breast cancer (Sibling)  in sister at the age of 59    Social History: (-) x 3    Allergies    acyclovir (Anaphylaxis)  Bactrim (Anaphylaxis)    Intolerances    MEDICATIONS  (STANDING):    MEDICATIONS  (PRN):    Review of systems:    Constitutional: as per HPI  Eyes: No eye pain or discharge  ENMT:  No difficulty hearing; No sinus or throat pain  Neck: No pain or stiffness  Respiratory: No cough, wheezing, chills or hemoptysis  Cardiovascular: No chest pain, palpitations, shortness of breath, dyspnea on exertion  Gastrointestinal: No abdominal pain, nausea, vomiting or hematemesis; No diarrhea or constipation.   Genitourinary: No dysuria, frequency, hematuria or incontinence  Neurological: As per HPI  Skin: No rashes or lesions   Endocrine: No heat or cold intolerance; No hair loss  Musculoskeletal: No joint pain or swelling  Psychiatric: No depression, anxiety, mood swings  Heme/Lymph: No easy bruising or bleeding gums    Vital Signs Last 24 Hrs  T(C): 36.7 (01 Nov 2020 15:15), Max: 36.7 (01 Nov 2020 15:15)  T(F): 98 (01 Nov 2020 15:15), Max: 98 (01 Nov 2020 15:15)  HR: 99 (01 Nov 2020 16:15) (87 - 100)  BP: 124/88 (01 Nov 2020 16:15) (123/74 - 166/57)  BP(mean): --  RR: 20 (01 Nov 2020 16:15) (18 - 20)  SpO2: 97% (01 Nov 2020 16:15) (95% - 99%)    Examination:  General:  Appearance is consistent with chronologic age.  No abnormal facies.  Gross skin survey within normal limits.    Cognitive/Language: Aphasic and moaning. Can follow occasional simple commands  Eyes: intact EOMI w/o nystagmus PERRL.    Face:  Facial sensation normal V1 - 3, no facial asymmetry.    Motor examination:   Normal tone, bulk and range of motion.  No tenderness. 4/5 UE; 5/5 LE.  No observable drift.  Reflexes:  +clonus sustained R side, 3 beats L side.  Cerebellum: Unable to assess   Respiratory:  no audible wheezing or inspiratory stridor.  No use of accessory muscles.   Cardiac: pulse palpable, no audible bruits  Abdomen: supple, no guarding, no TTP    NIHSS: 8    Labs:   CBC Full  -  ( 01 Nov 2020 14:59 )  WBC Count : 5.70 K/uL  RBC Count : 5.54 M/uL  Hemoglobin : 16.2 g/dL  Hematocrit : 49.5 %  Platelet Count - Automated : 216 K/uL  Mean Cell Volume : 89.4 fL  Mean Cell Hemoglobin : 29.2 pg  Mean Cell Hemoglobin Concentration : 32.7 g/dL  Auto Neutrophil # : 2.85 K/uL  Auto Lymphocyte # : 1.84 K/uL  Auto Monocyte # : 0.71 K/uL  Auto Eosinophil # : 0.20 K/uL  Auto Basophil # : 0.06 K/uL  Auto Neutrophil % : 49.9 %  Auto Lymphocyte % : 32.3 %  Auto Monocyte % : 12.5 %  Auto Eosinophil % : 3.5 %  Auto Basophil % : 1.1 %    11-01    139  |  101  |  20  ----------------------------<  156<H>  3.5   |  23  |  1.2    Ca    9.8      01 Nov 2020 14:59    TPro  7.3  /  Alb  4.8  /  TBili  1.7<H>  /  DBili  x   /  AST  21  /  ALT  15  /  AlkPhos  60  11-01    LIVER FUNCTIONS - ( 01 Nov 2020 14:59 )  Alb: 4.8 g/dL / Pro: 7.3 g/dL / ALK PHOS: 60 U/L / ALT: 15 U/L / AST: 21 U/L / GGT: x           PT/INR - ( 01 Nov 2020 14:59 )   PT: 11.90 sec;   INR: 1.03 ratio         PTT - ( 01 Nov 2020 14:59 )  PTT:28.8 sec    Neuroimaging:  NCHCT:   < from: CT Perfusion w/ Maps w/ IV Cont (11.01.20 @ 17:21) >      IMPRESSION:  Normal CT angiogram of the head and neck.    Normal perfusion images of the brain.    < end of copied text >  < from: CT Brain Stroke Protocol (11.01.20 @ 15:04) >  IMPRESSION:  No evidence of acute transcortical infarct, acute intracranial hemorrhage, or mass effect.    Dr. Rae Collins discussed findings with STEPAN DOMINGO on 11/1/2020 at 3:19 PM with readback.    < end of copied text >

## 2020-11-01 NOTE — H&P ADULT - NSICDXPASTSURGICALHX_GEN_ALL_CORE_FT
PAST SURGICAL HISTORY:  Artificial cardiac pacemaker     S/P transesophageal echocardiogram ([facility])

## 2020-11-02 LAB
A1C WITH ESTIMATED AVERAGE GLUCOSE RESULT: 7 % — HIGH (ref 4–5.6)
ALBUMIN SERPL ELPH-MCNC: 4.2 G/DL — SIGNIFICANT CHANGE UP (ref 3.5–5.2)
ALP SERPL-CCNC: 52 U/L — SIGNIFICANT CHANGE UP (ref 30–115)
ALT FLD-CCNC: 12 U/L — SIGNIFICANT CHANGE UP (ref 0–41)
ANION GAP SERPL CALC-SCNC: 10 MMOL/L — SIGNIFICANT CHANGE UP (ref 7–14)
AST SERPL-CCNC: 18 U/L — SIGNIFICANT CHANGE UP (ref 0–41)
BILIRUB SERPL-MCNC: 1.4 MG/DL — HIGH (ref 0.2–1.2)
BUN SERPL-MCNC: 17 MG/DL — SIGNIFICANT CHANGE UP (ref 10–20)
CALCIUM SERPL-MCNC: 9 MG/DL — SIGNIFICANT CHANGE UP (ref 8.5–10.1)
CHLORIDE SERPL-SCNC: 103 MMOL/L — SIGNIFICANT CHANGE UP (ref 98–110)
CHOLEST SERPL-MCNC: 130 MG/DL — SIGNIFICANT CHANGE UP
CO2 SERPL-SCNC: 22 MMOL/L — SIGNIFICANT CHANGE UP (ref 17–32)
CREAT SERPL-MCNC: 0.9 MG/DL — SIGNIFICANT CHANGE UP (ref 0.7–1.5)
ESTIMATED AVERAGE GLUCOSE: 154 MG/DL — HIGH (ref 68–114)
GLUCOSE BLDC GLUCOMTR-MCNC: 102 MG/DL — HIGH (ref 70–99)
GLUCOSE BLDC GLUCOMTR-MCNC: 120 MG/DL — HIGH (ref 70–99)
GLUCOSE BLDC GLUCOMTR-MCNC: 157 MG/DL — HIGH (ref 70–99)
GLUCOSE SERPL-MCNC: 131 MG/DL — HIGH (ref 70–99)
HCT VFR BLD CALC: 45.3 % — SIGNIFICANT CHANGE UP (ref 42–52)
HDLC SERPL-MCNC: 47 MG/DL — SIGNIFICANT CHANGE UP
HGB BLD-MCNC: 14.7 G/DL — SIGNIFICANT CHANGE UP (ref 14–18)
LIPID PNL WITH DIRECT LDL SERPL: 83 MG/DL — SIGNIFICANT CHANGE UP
MCHC RBC-ENTMCNC: 28.7 PG — SIGNIFICANT CHANGE UP (ref 27–31)
MCHC RBC-ENTMCNC: 32.5 G/DL — SIGNIFICANT CHANGE UP (ref 32–37)
MCV RBC AUTO: 88.5 FL — SIGNIFICANT CHANGE UP (ref 80–94)
NON HDL CHOLESTEROL: 83 MG/DL — SIGNIFICANT CHANGE UP
NRBC # BLD: 0 /100 WBCS — SIGNIFICANT CHANGE UP (ref 0–0)
PLATELET # BLD AUTO: 197 K/UL — SIGNIFICANT CHANGE UP (ref 130–400)
POTASSIUM SERPL-MCNC: 4 MMOL/L — SIGNIFICANT CHANGE UP (ref 3.5–5)
POTASSIUM SERPL-SCNC: 4 MMOL/L — SIGNIFICANT CHANGE UP (ref 3.5–5)
PROT SERPL-MCNC: 6.4 G/DL — SIGNIFICANT CHANGE UP (ref 6–8)
RBC # BLD: 5.12 M/UL — SIGNIFICANT CHANGE UP (ref 4.7–6.1)
RBC # FLD: 12.2 % — SIGNIFICANT CHANGE UP (ref 11.5–14.5)
SODIUM SERPL-SCNC: 135 MMOL/L — SIGNIFICANT CHANGE UP (ref 135–146)
TRIGL SERPL-MCNC: 62 MG/DL — SIGNIFICANT CHANGE UP
WBC # BLD: 5.23 K/UL — SIGNIFICANT CHANGE UP (ref 4.8–10.8)
WBC # FLD AUTO: 5.23 K/UL — SIGNIFICANT CHANGE UP (ref 4.8–10.8)

## 2020-11-02 PROCEDURE — 70450 CT HEAD/BRAIN W/O DYE: CPT | Mod: 26

## 2020-11-02 PROCEDURE — 99232 SBSQ HOSP IP/OBS MODERATE 35: CPT

## 2020-11-02 PROCEDURE — 71045 X-RAY EXAM CHEST 1 VIEW: CPT | Mod: 26

## 2020-11-02 RX ORDER — PANTOPRAZOLE SODIUM 20 MG/1
40 TABLET, DELAYED RELEASE ORAL ONCE
Refills: 0 | Status: COMPLETED | OUTPATIENT
Start: 2020-11-02 | End: 2020-11-02

## 2020-11-02 RX ORDER — SODIUM CHLORIDE 9 MG/ML
1000 INJECTION INTRAMUSCULAR; INTRAVENOUS; SUBCUTANEOUS
Refills: 0 | Status: DISCONTINUED | OUTPATIENT
Start: 2020-11-02 | End: 2020-11-03

## 2020-11-02 RX ADMIN — TIOTROPIUM BROMIDE 1 CAPSULE(S): 18 CAPSULE ORAL; RESPIRATORY (INHALATION) at 09:41

## 2020-11-02 RX ADMIN — ALBUTEROL 2 PUFF(S): 90 AEROSOL, METERED ORAL at 14:39

## 2020-11-02 RX ADMIN — PANTOPRAZOLE SODIUM 40 MILLIGRAM(S): 20 TABLET, DELAYED RELEASE ORAL at 05:30

## 2020-11-02 RX ADMIN — SODIUM CHLORIDE 75 MILLILITER(S): 9 INJECTION INTRAMUSCULAR; INTRAVENOUS; SUBCUTANEOUS at 10:38

## 2020-11-02 RX ADMIN — ALBUTEROL 2 PUFF(S): 90 AEROSOL, METERED ORAL at 09:18

## 2020-11-02 RX ADMIN — SODIUM CHLORIDE 75 MILLILITER(S): 9 INJECTION INTRAMUSCULAR; INTRAVENOUS; SUBCUTANEOUS at 23:02

## 2020-11-02 RX ADMIN — ATORVASTATIN CALCIUM 80 MILLIGRAM(S): 80 TABLET, FILM COATED ORAL at 22:00

## 2020-11-02 NOTE — PROGRESS NOTE ADULT - ASSESSMENT
Impression:  61 yo M with PMH of recent "stroke" s/p tPA in 07/2020, congenital heart disease s/p repair, HTN, DLD, RAA thrombus (was on xarelto and resolved on last ANITA), COPD, ex- smoker, dm htn, hld, sinus bradycardia s/p PPM, marginal cell lymphoma on maintenance rituxan  p/w severe aphasia/AMS. cth AND CTA H/N was negative for acute intracranial findings. Pt was a candidate for IV thrombolysis and is s/p tpa. TPA bolus administered at 15:34.  Was not a candidate for acute neurointervention with no LVO or mismatch on perfusion.  Nihss 9--->5            Acute stroke Vs Seizure      Recommendations:  -Continue q 1 neuro checks  -Recommend VEEG.   -Seizure and aspiration precautions.  -Continue Lipitor 80 MG Q 24   -Get lipid profile, hemoglobin A1c.   -NO AC or Antiplatelets until repeat HCT is negative for bleed  - CTH in 24 hour post tpa or sooner if worsening neuro status  - Keep syst <185 >120 and diast <105>60  - Speech and swallow eval   - PT/OT eval    Disposition: Continue ICU monitoring     Impression:  59 yo M with PMH of recent "stroke" s/p tPA in 07/2020, congenital heart disease s/p repair, HTN, DLD, RAA thrombus (was on xarelto and resolved on last ANITA), COPD, ex- smoker, dm htn, hld, sinus bradycardia s/p PPM, marginal cell lymphoma on maintenance rituxan  p/w severe aphasia/AMS. cth AND CTA H/N was negative for acute intracranial findings. Pt was a candidate for IV thrombolysis and is s/p tpa. TPA bolus administered at 15:34.  Was not a candidate for acute neurointervention with no LVO or mismatch on perfusion.  Nihss 9--->5            Acute stroke Vs Seizure      Recommendations:  -Continue q 1 neuro checks  -VEEG.   -Seizure and aspiration precautions.  -Continue Lipitor 80 MG Q 24   -Get lipid profile, hemoglobin A1c.   -NO AC or Antiplatelets until repeat HCT is negative for bleed  - CTH in 24 hour post tpa or sooner if worsening neuro status  - Keep syst <185 >120 and diast <105>60  - Speech and swallow eval   - PT/OT eval    Disposition: Continue ICU monitoring

## 2020-11-02 NOTE — PROGRESS NOTE ADULT - ASSESSMENT
,, 60 M with PMH of recent stroke s/p tPA in 08/2020, congenital heart disease s/p repair, HTN, DLD, RAA thrombus (was on xarelto and resolved on last ANITA), COPD, ex- smoker, dm htn, hld, sinus bradycardia s/p PPM, marginal cell lymphoma on maintenance rituxan (last chemo was december 21) presenting for acute change in mental status and bilateral weakness more prominent over the right side.    #Right sided weakness +dysarthria  -CTH is negative for acute pathology   -CTP negative, and repeat CTH stable.  -S/pr IV tpa which was given at 14.35 pm  -vEEG with no indication of seizure activity  -Repeat CT scan 24 h post TPA    #   60 M with PMH of recent stroke s/p tPA in 08/2020, congenital heart disease s/p repair, HTN, DLD, RAA thrombus (was on xarelto and resolved on last ANITA), COPD, ex- smoker, dm htn, hld, sinus bradycardia s/p PPM, marginal cell lymphoma on maintenance rituxan (last chemo was december 21) presenting for acute change in mental status and bilateral weakness more prominent over the right side.    #Right sided weakness +dysarthria  -CTH is negative for acute pathology   -CTP negative, and repeat CTH stable.  -S/pr IV tpa which was given at 14.35 pm  -vEEG with no indication of seizure activity  -Repeat CT scan 24 h post TPA  -Speech and swallow c/s-->Regular diet  -neurology on board-->Keep syst <180 >120 and diast <105>60 / post-TPA protocol/ post tpa protocol -NO AC or Antiplatelets for 24 hrs  -C/w statin 80 mg daily    #HTN  -on amlodipine 5 mg daily-->hold amlodipine for now  -Keep bp 120-180  -on low dose pressors / MAP maintained  -no evidence of infection    #DLD  -C/W statin  -lipid panel    #DM  -A1c  -Keep -180  -if BS>200 will start patient on insulin     # marginal cell lymphoma on maintenance rituxan  -out patient follow up with oncologist    #sinus bradycardia s/p PPM    # DVT PPx: SCD  GI PPX: No indication  Diet: DASH/Carb consistent  Activity: PT/OT  Dispo: from home  Code Status: full code

## 2020-11-02 NOTE — PROGRESS NOTE ADULT - SUBJECTIVE AND OBJECTIVE BOX
1.Presentation: Aphasia    2. Today's Acute Problems:  Stroke S/P IV tpa      3.History:  60 year old male with PMH of recent "stroke" s/p tPA  07/2020, congenital heart disease s/p repair, HTN, DLD, RAA thrombus (was on Xarelto and resolved on last ANITA), COPD, ex- smoker, dm htn, hld, sinus bradycardia s/p PPM, marginal cell lymphoma on maintenance Rituxan (last chemo was december 21).  Pt w/ initial feeling of not feeling "right" then proceeded to having generalized weakness w/ presyncope per wife.  En route to hospital pt developed aphasia with groaning and AMS with ? limb weakness noted.  Pt had similar episode in July 30, 2020 w/ aphasia and RHP s/p TPA with immediate improvement in speech with negative MRI and vEEG followed by Dr. Rubio.  No anticoagulation, recent surgery or trauma.   CTH is negative for acute pathology and his was within the window for IV tpa which was given at 14.35 pm. vEEG with no indication of seizure activity, CTP negative, and repeat CTH stable. Follow up with CT perfusion results.       Yesterday's Plan:  - follow post-TPA protocol  - admit to ICU/CCU w/ serial neurochecks per post-TPA protocol  - Recommend VEEG. Seizure and aspiration precautions.   - Admit to ICU. Get lipid profile, hemoglobin A1c.    -post tpa protocol -NO AC or Antiplatelets for 24 hrs  - CTH in 24 hour post tpa or sooner if worsening neuro status  - Keep syst <180 >120 and diast <105>60  - Speech and swallow eval and treat  - PT/OT eval    4.Last 24 hour updates:  Patient currently being monitored in ICU, noted some improvement in speech. No focal weakness , reports decreased pain sensation on the right. Received NS 500ml bolus x 2 overnight for hypotension sbp in the 90s and inability to maintain target bp, but was minimally responsive. Was then started on Low dose levophed. No acute complaints currently.      5.Medications:  ALBUTerol    90 MICROgram(s) HFA Inhaler 2 Puff(s) Inhalation every 6 hours  amLODIPine   Tablet 5 milliGRAM(s) Oral daily  atorvastatin 80 milliGRAM(s) Oral at bedtime  chlorhexidine 4% Liquid 1 Application(s) Topical <User Schedule>  norepinephrine Infusion 0.05 MICROgram(s)/kG/Min IV Continuous <Continuous>  tiotropium 18 MICROgram(s) Capsule 1 Capsule(s) Inhalation daily      6.Ancillary Mangement:  Chest PT[]  Head of bed >35 [x]  Out of bed to chair []  PT/OT/ST []  Spirometry[]  DVT prophylaxis [x]      7.Neuro Exam:  Awake []no[]spontaneously[]occasionally[x]to stimuli  AI [x]y[]n   Following commands:[x]y[]n  Oriented:[]0[]1[x]2[]3    Tracking:[x]y[]n  Language: Aphasic and dysarthric  Time off sedation for exam:n/a  Pupils:  Right 3 > 2      Left 3   >2        EOMI: Eyes midline at primary position  Power: 5/5 throughout/ no drift  FTN: No dysmetria  HKS: No limb ataxia  Gait: deferred  No neglect        NIHSS 5  LOC: 1     Questions: 0      Commands:  0  VF:  0       Gaze:  0     Facial:  0      RUE:  0 RLE:  0    LUE: 0    LLE:0  Ataxia:  0              Sensory: 1 R  Language:  1          Dysarthria: 2  Extinction: 0      NIHSS on arrival 9      NIHSS today 5          mRS (Baseline mrs 0)  0 No symptoms at all  1 No significant disability despite symptoms; able to carry out all usual duties and activities without assistance  2 Slight disability; unable to carry out all previous activities, but able to look after own affairs  3 Moderate disability; requiring some help, but able to walk without assistance  4 Moderately severe disability; unable to walk without assistance and unable to attend to own bodily needs without assistance  5 Severe disability; bedridden, incontinent and requiring constant nursing care and attention  6 Dead      Last CTH:  < from: CT Brain Stroke Protocol (11.01.20 @ 15:04) >  FINDINGS:  There is no CT evidence of acute transcortical infarct.    There is no hydrocephalus, mass effect, or acute intracranial hemorrhage. No extra-axial collection. Basal cisterns are patent.    The visualized paranasal sinuses and mastoid air cells are clear.    The calvarium is intact.    IMPRESSION:  No evidence of acute transcortical infarct, acute intracranial hemorrhage, or mass effect.    Dr. Rae Collins discussed findings with STEPAN DOMINGO on 11/1/2020 at 3:19 PM with readback.    RAE COLLINS M.D., RESIDENT RADIOLOGIST  This document has been electronically signed.  SANTA FOLEY MD; Attending Radiologist  This document has been electronically signed. No    < end of copied text >        Last CTA  < from: CT Perfusion w/ Maps w/ IV Cont (11.01.20 @ 17:21) >  Comparison:No comparison studies are available.    Findings:    CTA neck:  The aortic arch, origin of the great vessels and subclavian arteries are unremarkable.  The bilateral common carotid arteries and internal carotid arteries are unremarkable without significant stenosis seen.  Peripheral calcification within the mid-distal bilateral vertebral arteries without significant stenosis.The vertebral arteries are codominant. No significant vertebral artery stenosis is noted.    CTA head:  The distal internal carotid arteries, middle cerebral arteries and anterior cerebral arteries are unremarkable without significant stenosis.  The basilar artery, superior cerebellar arteries and posterior cerebral arteries are unremarkable without significant stenosis.  No aneurysm or vascular malformation is identified.  Soft tissues are unremarkable. Osseous structures are unremarkable.      IMPRESSION:  Normal CT angiogram of the head and neck.  ******PRELIMINARY REPORT******    ******PRELIMINARY REPORT******          RAE COLLINS M.D., RESIDENT RADIOLOGIST    < end of copied text >          Last CTP:  CTperfusion:  Base on Tmax > 6 seconds the cerebral blood volume and time to peak images demonstrate no significant evidence of fixed or mismatched focal perfusion deficit or surrounding ischemic penumbra to suggest acute cerebral ischemia or infarct.  Normal perfusion images of the brain.        8.CVS:  Vital Signs Last 24 Hrs  T(C): 35.8 (02 Nov 2020 00:00), Max: 36.9 (01 Nov 2020 16:30)  T(F): 96.5 (02 Nov 2020 00:00), Max: 98.4 (01 Nov 2020 16:30)  HR: 84 (02 Nov 2020 00:15) (68 - 100)  BP: 128/81 (02 Nov 2020 00:15) (91/68 - 166/57)  BP(mean): 103 (02 Nov 2020 00:15) (78 - 135)  RR: 17 (02 Nov 2020 00:15) (13 - 20)  SpO2: 98% (02 Nov 2020 00:15) (95% - 99%)          Last Echo:  < from: Transthoracic Echocardiogram (07.31.20 @ 09:49) >  Summary:   1. Normal global left ventricular systolic function.   2. LV Ejection Fraction by Chávez's Method with a biplane EF of 56 %.   3. Mild concentric left ventricular hypertrophy.   4. Mildly increased LV wall thickness.   5. Normal left ventricular internal cavity size.   6. LA volume Index is 14.9 ml/m² ml/m2.   7. There is mild aortic root calcification.    PHYSICIAN INTERPRETATION:  Left Ventricle: The left ventricular internal cavity size is normal. Leftventricular wall thickness is m    < end of copied text >        Last EKG:  < from: 12 Lead ECG (11.01.20 @ 16:07) >    Ventricular Rate 94 BPM    Atrial Rate 94 BPM    P-R Interval 158 ms    QRS Duration 184 ms    Q-T Interval 428 ms    QTC Calculation(Bazett) 535 ms    R Axis 137 degrees    T Axis -45 degrees    Diagnosis Line Atrial-sensed ventricular-paced rhythm  Abnormal ECG    Confirmed by Haritha Rivera MD ([id number]) on 11/1/2020 3:44:52 PM    < end of copied text >      10.GI:  Prophylaxis    GI:  Protonix 40 mg q 24  LIVER FUNCTIONS - ( 01 Nov 2020 14:59 )  Alb: 4.8 g/dL / Pro: 7.3 g/dL / ALK PHOS: 60 U/L / ALT: 15 U/L / AST: 21 U/L / GGT: x             11.Renal/Fluids/Electrolytes:    11-01    139  |  101  |  20  ----------------------------<  156<H>  3.5   |  23  |  1.2    Ca    9.8      01 Nov 2020 14:59    TPro  7.3  /  Alb  4.8  /  TBili  1.7<H>  /  DBili  x   /  AST  21  /  ALT  15  /  AlkPhos  60  11-01          I&O's Detail    01 Nov 2020 07:01  -  02 Nov 2020 01:10  --------------------------------------------------------  IN:    Norepinephrine: 9.7 mL    Sodium Chloride 0.9% Bolus: 1000 mL  Total IN: 1009.7 mL    OUT:    Voided (mL): 650 mL  Total OUT: 650 mL    Total NET: 359.7 mL          12.ID:  TMax:  Vital Signs Last 24 Hrs  T(C): 35.8 (02 Nov 2020 00:00), Max: 36.9 (01 Nov 2020 16:30)  T(F): 96.5 (02 Nov 2020 00:00), Max: 98.4 (01 Nov 2020 16:30)  HR: 84 (02 Nov 2020 00:15) (68 - 100)  BP: 128/81 (02 Nov 2020 00:15) (91/68 - 166/57)  BP(mean): 103 (02 Nov 2020 00:15) (78 - 135)  RR: 17 (02 Nov 2020 00:15) (13 - 20)  SpO2: 98% (02 Nov 2020 00:15) (95% - 99%)          13.Hematology:                        16.2   5.70  )-----------( 216      ( 01 Nov 2020 14:59 )             49.5       DVT Prophylaxis  Lovenox[]   Heparin[]   Venodyne []   SCD's[x]

## 2020-11-02 NOTE — SWALLOW BEDSIDE ASSESSMENT ADULT - SLP PERTINENT HISTORY OF CURRENT PROBLEM
61 y/o M presented w/ AMS. stroke code called, TPA adminstered 15:34 on 11/1. CTH (-). vEEG pending. Pt known to acute service 8/2020 w/ recs for regular w/ thin liquids. Similar episode 7/2020, s/p TPA and negative MRI/vEEG.

## 2020-11-02 NOTE — SWALLOW BEDSIDE ASSESSMENT ADULT - SLP GENERAL OBSERVATIONS
Pt received sitting upright in bed, alert and oriented x3, no c/o pain. Pt communicative, reporting speech/language at baseline. Vocal quality WFL

## 2020-11-02 NOTE — PROGRESS NOTE ADULT - SUBJECTIVE AND OBJECTIVE BOX
24H events:    Patient is a 60y old Male who presents with a chief complaint of   Primary diagnosis of Stroke-like symptoms       Today is hospital day 1d. This morning patient was seen and examined at bedside, resting comfortably in bed.      PAST MEDICAL & SURGICAL HISTORY  Pacemaker    Non Hodgkin&#x27;s lymphoma    Chronic obstructive pulmonary disease, unspecified COPD type    DM (diabetes mellitus)    High cholesterol    HTN (hypertension)    S/P transesophageal echocardiogram (ANITA)    Artificial cardiac pacemaker      SOCIAL HISTORY:  Social History:      ALLERGIES:  acyclovir (Anaphylaxis)  Bactrim (Anaphylaxis)    MEDICATIONS:  STANDING MEDICATIONS  ALBUTerol    90 MICROgram(s) HFA Inhaler 2 Puff(s) Inhalation every 6 hours  amLODIPine   Tablet 5 milliGRAM(s) Oral daily  atorvastatin 80 milliGRAM(s) Oral at bedtime  chlorhexidine 4% Liquid 1 Application(s) Topical <User Schedule>  norepinephrine Infusion 0.05 MICROgram(s)/kG/Min IV Continuous <Continuous>  sodium chloride 0.9%. 1000 milliLiter(s) IV Continuous <Continuous>  tiotropium 18 MICROgram(s) Capsule 1 Capsule(s) Inhalation daily    PRN MEDICATIONS    VITALS:   T(F): 97.1  HR: 68  BP: 135/80  RR: 22  SpO2: 98%    PHYSICAL EXAM:  GENERAL: NAD, well-groomed, well-developed  HEAD:  Atraumatic, Normocephalic  EYES: EOMI  NECK: Supple  NERVOUS SYSTEM:  Alert & Oriented X3, non focal   CHEST/LUNG: Clear to auscultation bilaterally; No rales, rhonchi, wheezing, or rubs  HEART: Regular rate and rhythm; No murmurs, rubs, or gallops  ABDOMEN: Soft, Nontender, Nondistended; Bowel sounds present  EXTREMITIES:  2+ Peripheral Pulses, No clubbing, cyanosis, or edema  LYMPH: No lymphadenopathy noted  SKIN: No rashes or lesions  LABS:                        14.7   5.23  )-----------( 197      ( 02 Nov 2020 05:30 )             45.3     11-02    135  |  103  |  17  ----------------------------<  131<H>  4.0   |  22  |  0.9    Ca    9.0      02 Nov 2020 05:30    TPro  6.4  /  Alb  4.2  /  TBili  1.4<H>  /  DBili  x   /  AST  18  /  ALT  12  /  AlkPhos  52  11-02    PT/INR - ( 01 Nov 2020 14:59 )   PT: 11.90 sec;   INR: 1.03 ratio         PTT - ( 01 Nov 2020 14:59 )  PTT:28.8 sec      Troponin T, Serum: <0.01 ng/mL (11-01-20 @ 14:59)      CARDIAC MARKERS ( 01 Nov 2020 14:59 )  x     / <0.01 ng/mL / x     / x     / x          RADIOLOGY:           24H events:    Patient is a 60y old Male who presents with a chief complaint of right sided weekness  Primary diagnosis of Stroke-like symptoms    Today is hospital day 1d. This morning patient was seen and examined at bedside.  No significant event overnight, patient complains that he is still having difficulty to speak and right sided weekness.    PAST MEDICAL & SURGICAL HISTORY  Pacemaker    Non Hodgkin&#x27;s lymphoma    Chronic obstructive pulmonary disease, unspecified COPD type    DM (diabetes mellitus)    High cholesterol    HTN (hypertension)    S/P transesophageal echocardiogram (ANITA)    Artificial cardiac pacemaker      SOCIAL HISTORY:  Social History:      ALLERGIES:  acyclovir (Anaphylaxis)  Bactrim (Anaphylaxis)    MEDICATIONS:  STANDING MEDICATIONS  ALBUTerol    90 MICROgram(s) HFA Inhaler 2 Puff(s) Inhalation every 6 hours  amLODIPine   Tablet 5 milliGRAM(s) Oral daily  atorvastatin 80 milliGRAM(s) Oral at bedtime  chlorhexidine 4% Liquid 1 Application(s) Topical <User Schedule>  norepinephrine Infusion 0.05 MICROgram(s)/kG/Min IV Continuous <Continuous>  sodium chloride 0.9%. 1000 milliLiter(s) IV Continuous <Continuous>  tiotropium 18 MICROgram(s) Capsule 1 Capsule(s) Inhalation daily    PRN MEDICATIONS    VITALS:   T(F): 97.1  HR: 68  BP: 135/80  RR: 22  SpO2: 98%    PHYSICAL EXAM:  GENERAL:AAO*3  NERVOUS SYSTEM:  Right sided upper extremity weakness 3/5  CHEST/LUNG: Clear to auscultation bilaterally; No rales, rhonchi, wheezing, or rubs  HEART: Regular rate and rhythm; No murmurs, rubs, or gallops  ABDOMEN: Soft, Nontender, Nondistended; Bowel sounds present  EXTREMITIES:  2+ Peripheral Pulses, No clubbing, cyanosis, or edema  LYMPH: No lymphadenopathy noted  SKIN: No rashes or lesions  LABS:                        14.7   5.23  )-----------( 197      ( 02 Nov 2020 05:30 )             45.3     11-02    135  |  103  |  17  ----------------------------<  131<H>  4.0   |  22  |  0.9    Ca    9.0      02 Nov 2020 05:30    TPro  6.4  /  Alb  4.2  /  TBili  1.4<H>  /  DBili  x   /  AST  18  /  ALT  12  /  AlkPhos  52  11-02    PT/INR - ( 01 Nov 2020 14:59 )   PT: 11.90 sec;   INR: 1.03 ratio         PTT - ( 01 Nov 2020 14:59 )  PTT:28.8 sec      Troponin T, Serum: <0.01 ng/mL (11-01-20 @ 14:59)      CARDIAC MARKERS ( 01 Nov 2020 14:59 )  x     / <0.01 ng/mL / x     / x     / x          RADIOLOGY:           24H events:    Patient is a 60y old Male who presents with a chief complaint of right sided weekness  Primary diagnosis of Stroke-like symptoms    Today is hospital day 1d. This morning patient was seen and examined at bedside.  No significant event overnight, patient complains that he is still having difficulty to speak and right sided weekness.    PAST MEDICAL & SURGICAL HISTORY  Pacemaker    Non Hodgkin lymphoma    Chronic obstructive pulmonary disease, unspecified COPD type    DM (diabetes mellitus)    High cholesterol    HTN (hypertension)    S/P transesophageal echocardiogram (ANITA)    Artificial cardiac pacemaker      SOCIAL HISTORY:  Social History:      ALLERGIES:  acyclovir (Anaphylaxis)  Bactrim (Anaphylaxis)    MEDICATIONS:  STANDING MEDICATIONS  ALBUTerol    90 MICROgram(s) HFA Inhaler 2 Puff(s) Inhalation every 6 hours  amLODIPine   Tablet 5 milliGRAM(s) Oral daily  atorvastatin 80 milliGRAM(s) Oral at bedtime  chlorhexidine 4% Liquid 1 Application(s) Topical <User Schedule>  norepinephrine Infusion 0.05 MICROgram(s)/kG/Min IV Continuous <Continuous>  sodium chloride 0.9%. 1000 milliLiter(s) IV Continuous <Continuous>  tiotropium 18 MICROgram(s) Capsule 1 Capsule(s) Inhalation daily    PRN MEDICATIONS    VITALS:   T(F): 97.1  HR: 68  BP: 135/80  RR: 22  SpO2: 98%    PHYSICAL EXAM:  GENERAL:AAO*3  NERVOUS SYSTEM:  Right sided upper extremity weakness 3/5  CHEST/LUNG: Clear to auscultation bilaterally; No rales, rhonchi, wheezing, or rubs  HEART: Regular rate and rhythm; No murmurs, rubs, or gallops  ABDOMEN: Soft, Nontender, Nondistended; Bowel sounds present  EXTREMITIES:  2+ Peripheral Pulses, No clubbing, cyanosis, or edema  LYMPH: No lymphadenopathy noted  SKIN: No rashes or lesions  LABS:                        14.7   5.23  )-----------( 197      ( 02 Nov 2020 05:30 )             45.3     11-02    135  |  103  |  17  ----------------------------<  131<H>  4.0   |  22  |  0.9    Ca    9.0      02 Nov 2020 05:30    TPro  6.4  /  Alb  4.2  /  TBili  1.4<H>  /  DBili  x   /  AST  18  /  ALT  12  /  AlkPhos  52  11-02    PT/INR - ( 01 Nov 2020 14:59 )   PT: 11.90 sec;   INR: 1.03 ratio         PTT - ( 01 Nov 2020 14:59 )  PTT:28.8 sec      Troponin T, Serum: <0.01 ng/mL (11-01-20 @ 14:59)      CARDIAC MARKERS ( 01 Nov 2020 14:59 )  x     / <0.01 ng/mL / x     / x     / x          RADIOLOGY:    CT Perfusion w/ Maps w/ IV Cont (11.01.20 @ 17:21) >  No evidence of large vessel occlusion, significant stenosis or aneurysm in the head and neck.  Normal perfusion images of the brain.    -CT Brain Stroke Protocol (11.01.20 @ 15:04) >  No evidence of acute transcortical infarct, acute intracranial hemorrhage, or mass effect.

## 2020-11-03 LAB
A1C WITH ESTIMATED AVERAGE GLUCOSE RESULT: 7.1 % — HIGH (ref 4–5.6)
ALBUMIN SERPL ELPH-MCNC: 4.4 G/DL — SIGNIFICANT CHANGE UP (ref 3.5–5.2)
ALP SERPL-CCNC: 54 U/L — SIGNIFICANT CHANGE UP (ref 30–115)
ALT FLD-CCNC: 14 U/L — SIGNIFICANT CHANGE UP (ref 0–41)
ANION GAP SERPL CALC-SCNC: 12 MMOL/L — SIGNIFICANT CHANGE UP (ref 7–14)
AST SERPL-CCNC: 18 U/L — SIGNIFICANT CHANGE UP (ref 0–41)
BILIRUB SERPL-MCNC: 1 MG/DL — SIGNIFICANT CHANGE UP (ref 0.2–1.2)
BUN SERPL-MCNC: 14 MG/DL — SIGNIFICANT CHANGE UP (ref 10–20)
CALCIUM SERPL-MCNC: 9 MG/DL — SIGNIFICANT CHANGE UP (ref 8.5–10.1)
CHLORIDE SERPL-SCNC: 109 MMOL/L — SIGNIFICANT CHANGE UP (ref 98–110)
CHOLEST SERPL-MCNC: 128 MG/DL — SIGNIFICANT CHANGE UP
CO2 SERPL-SCNC: 24 MMOL/L — SIGNIFICANT CHANGE UP (ref 17–32)
CREAT SERPL-MCNC: 1.1 MG/DL — SIGNIFICANT CHANGE UP (ref 0.7–1.5)
ESTIMATED AVERAGE GLUCOSE: 157 MG/DL — HIGH (ref 68–114)
GLUCOSE BLDC GLUCOMTR-MCNC: 100 MG/DL — HIGH (ref 70–99)
GLUCOSE BLDC GLUCOMTR-MCNC: 108 MG/DL — HIGH (ref 70–99)
GLUCOSE SERPL-MCNC: 120 MG/DL — HIGH (ref 70–99)
HCT VFR BLD CALC: 45.6 % — SIGNIFICANT CHANGE UP (ref 42–52)
HDLC SERPL-MCNC: 47 MG/DL — SIGNIFICANT CHANGE UP
HGB BLD-MCNC: 14.7 G/DL — SIGNIFICANT CHANGE UP (ref 14–18)
LIPID PNL WITH DIRECT LDL SERPL: 69 MG/DL — SIGNIFICANT CHANGE UP
MAGNESIUM SERPL-MCNC: 2.3 MG/DL — SIGNIFICANT CHANGE UP (ref 1.8–2.4)
MCHC RBC-ENTMCNC: 29.3 PG — SIGNIFICANT CHANGE UP (ref 27–31)
MCHC RBC-ENTMCNC: 32.2 G/DL — SIGNIFICANT CHANGE UP (ref 32–37)
MCV RBC AUTO: 90.8 FL — SIGNIFICANT CHANGE UP (ref 80–94)
NON HDL CHOLESTEROL: 81 MG/DL — SIGNIFICANT CHANGE UP
NRBC # BLD: 0 /100 WBCS — SIGNIFICANT CHANGE UP (ref 0–0)
PHOSPHATE SERPL-MCNC: 3.4 MG/DL — SIGNIFICANT CHANGE UP (ref 2.1–4.9)
PLATELET # BLD AUTO: 167 K/UL — SIGNIFICANT CHANGE UP (ref 130–400)
POTASSIUM SERPL-MCNC: 4.1 MMOL/L — SIGNIFICANT CHANGE UP (ref 3.5–5)
POTASSIUM SERPL-SCNC: 4.1 MMOL/L — SIGNIFICANT CHANGE UP (ref 3.5–5)
PROT SERPL-MCNC: 6.6 G/DL — SIGNIFICANT CHANGE UP (ref 6–8)
RBC # BLD: 5.02 M/UL — SIGNIFICANT CHANGE UP (ref 4.7–6.1)
RBC # FLD: 12.6 % — SIGNIFICANT CHANGE UP (ref 11.5–14.5)
SODIUM SERPL-SCNC: 145 MMOL/L — SIGNIFICANT CHANGE UP (ref 135–146)
TRIGL SERPL-MCNC: 117 MG/DL — SIGNIFICANT CHANGE UP
WBC # BLD: 3.91 K/UL — LOW (ref 4.8–10.8)
WBC # FLD AUTO: 3.91 K/UL — LOW (ref 4.8–10.8)

## 2020-11-03 PROCEDURE — 99232 SBSQ HOSP IP/OBS MODERATE 35: CPT

## 2020-11-03 RX ORDER — AMLODIPINE BESYLATE 2.5 MG/1
5 TABLET ORAL DAILY
Refills: 0 | Status: DISCONTINUED | OUTPATIENT
Start: 2020-11-03 | End: 2020-11-04

## 2020-11-03 RX ORDER — ASPIRIN/CALCIUM CARB/MAGNESIUM 324 MG
325 TABLET ORAL ONCE
Refills: 0 | Status: DISCONTINUED | OUTPATIENT
Start: 2020-11-03 | End: 2020-11-03

## 2020-11-03 RX ORDER — ASPIRIN/CALCIUM CARB/MAGNESIUM 324 MG
325 TABLET ORAL DAILY
Refills: 0 | Status: DISCONTINUED | OUTPATIENT
Start: 2020-11-03 | End: 2020-11-06

## 2020-11-03 RX ADMIN — ATORVASTATIN CALCIUM 80 MILLIGRAM(S): 80 TABLET, FILM COATED ORAL at 21:36

## 2020-11-03 RX ADMIN — Medication 325 MILLIGRAM(S): at 14:05

## 2020-11-03 RX ADMIN — AMLODIPINE BESYLATE 5 MILLIGRAM(S): 2.5 TABLET ORAL at 16:51

## 2020-11-03 NOTE — PROGRESS NOTE ADULT - ASSESSMENT
IMPRESSION:    #Acute ischemic stroke s/p tPA better  # DM II  #Hypertension    PLAN:    CNS: Neuro f/up    HEENT: oral care. HOB @45 degrees    PULMONARY: Maintain SaO2>88-92%, aspiration precaution    CARDIOVASCULAR: . dc IVF, ECHO    GI: Speech and swallow eval.     RENAL: Monitor lytes. Replete as needed    INFECTIOUS DISEASE: Monitor off electrolytes.     HEMATOLOGICAL:  DVT prophylaxis  ENDOCRINE:  Follow up FS.  Insulin protocol  to keep -180    MUSCULOSKELETAL: bedrest    floor

## 2020-11-03 NOTE — CHART NOTE - NSCHARTNOTEFT_GEN_A_CORE
60 M with PMH of recent stroke s/p tPA in 08/2020, congenital heart disease s/p repair, HTN, DLD, RAA thrombus (was on xarelto and resolved on last ANITA 1 year ago), COPD, ex- smoker, dm htn, hld, sinus bradycardia s/p PPM, marginal cell lymphoma on maintenance rituxan (last chemo was december 21) presenting for acute change in mental status, associated with difficulty speaking and Right sided weakness.    History obtained from wife at bedside who reports pt felt sudden onset bilateral upper extremity and lower extremity weakness R>>L associated with generalized warmth and numbness. He collapsed on the couch, which prompted her to call EMS. She reports his presentations is similar to his presentation in August when he was admitted for a stroke requiring tPA. Imaging at the time was negative. He had some residual R-sided weakness that was improving, but remained with slight expressive aphasia/difficulty finding words.  In the ED, VS: 166/57 HR 90 RR18 satting 99% on 3L NC. Code stroke was called and pt received tPA around 3.25 PM as he was within therapeutic window.  CTH: without evidence of acute ischemic stroke or hemorrhage. CTH/NECK were negative. Patient was admitted to the ICU.    ICU events: Patient was admitted to ICU. Stay was uneventful. Ct scan repeated after 24h was unremarkable. Patient's symptoms resolved. He will be trasnfered to stroke unit. As per neurology complete workup with MRI, VEEG, and ANITA.    Vital Signs Last 24 Hrs  T(C): 36 (03 Nov 2020 08:00), Max: 36.1 (02 Nov 2020 17:45)  T(F): 96.8 (03 Nov 2020 08:00), Max: 96.9 (02 Nov 2020 17:45)  HR: 74 (03 Nov 2020 08:00) (62 - 88)  BP: 160/99 (03 Nov 2020 08:00) (110/69 - 187/84)  BP(mean): 131 (03 Nov 2020 08:00) (73 - 131)  RR: 21 (03 Nov 2020 08:00) (13 - 50)  SpO2: 98% (03 Nov 2020 08:00) (93% - 99%)    PHYSICAL EXAMINATION:    GENERAL: The patient is awake and alert in no apparent distress.     HEENT: Head is normocephalic and atraumatic. Extraocular muscles are intact. Mucous membranes are moist.    NECK: Supple.    LUNGS: Clear to auscultation without wheezing, rales or rhonchi; respirations unlabored    HEART: Regular rate and rhythm without murmur.    ABDOMEN: Soft, nontender, and nondistended.      EXTREMITIES: Without any cyanosis, clubbing, rash, lesions or edema.    NEUROLOGIC: improved    SKIN: No ulceration or induration present.      LABS:                        14.7   3.91  )-----------( 167      ( 03 Nov 2020 04:41 )             45.6     11-03    145  |  109  |  14  ----------------------------<  120<H>  4.1   |  24  |  1.1    Ca    9.0      03 Nov 2020 04:41  Phos  3.4     11-03    RADIOLOGY:  CT Perfusion w/ Maps w/ IV Cont (11.01.20 @ 17:21) >  No evidence of large vessel occlusion, significant stenosis or aneurysm in the head and neck.  Normal perfusion images of the brain.    -CT Brain Stroke Protocol (11.01.20 @ 15:04) >  No evidence of acute transcortical infarct, acute intracranial hemorrhage, or mass effect.    ASSESSMENT AND PLAN:    60 M with PMH of recent stroke s/p tPA in 08/2020, congenital heart disease s/p repair, HTN, DLD, RAA thrombus (was on xarelto and resolved on last ANITA), COPD, ex- smoker, dm htn, hld, sinus bradycardia s/p PPM, marginal cell lymphoma on maintenance rituxan (last chemo was december 21) presenting for acute change in mental status and bilateral weakness more prominent over the right side.    #Right sided weakness +dysarthria  -CTH is negative for acute pathology   -CTP negative, and repeat CTH stable.  -S/pr IV tpa which was given at 14.35 pm  -vEEG with no indication of seizure activity  -Repeat CT scan 24 h post TPA  -Speech and swallow c/s-->Regular diet  -neurology on board-->Keep syst <180 >120 and diast <105>60 / post-TPA protocol/ post tpa protocol -NO AC or Antiplatelets for 24 hrs  -C/w statin 80 mg daily.  -Start  MG DAILY  -MRI , VEEG, ANITA    #HTN  -on amlodipine 5 mg daily-->hold amlodipine for now  -Keep bp 120-180  -on low dose pressors / MAP maintained  -no evidence of infection    #DLD  -C/W statin  -lipid panel noted    #DM  -A1c 7.1  -Keep -180  -if BS>200 will start patient on insulin   -Out patient follow up with endocrinology    # marginal cell lymphoma on maintenance rituxan  -out patient follow up with oncologist    #sinus bradycardia s/p PPM    # DVT PPx: SCD  GI PPX: No indication  Diet: DASH/Carb consistent  Activity: PT/OT  Dispo: from home  Code Status: full code  Pending :MRI. VEEG, ANITA  Transfer to stroke unit

## 2020-11-03 NOTE — PROGRESS NOTE ADULT - ATTENDING COMMENTS
More awake and with it  History suggest a prolonged period of amnesia suggestive of partial seizures. Suggest MRI/VEEG/ANITA

## 2020-11-03 NOTE — PROGRESS NOTE ADULT - SUBJECTIVE AND OBJECTIVE BOX
OVERNIGHT EVENTS: events noted, off levophed,  CC/H, repeat ct head reviewed    Vital Signs Last 24 Hrs  T(C): 36 (03 Nov 2020 08:00), Max: 36.1 (02 Nov 2020 17:45)  T(F): 96.8 (03 Nov 2020 08:00), Max: 96.9 (02 Nov 2020 17:45)  HR: 74 (03 Nov 2020 08:00) (62 - 88)  BP: 160/99 (03 Nov 2020 08:00) (110/69 - 187/84)  BP(mean): 131 (03 Nov 2020 08:00) (73 - 131)  RR: 21 (03 Nov 2020 08:00) (13 - 50)  SpO2: 98% (03 Nov 2020 08:00) (93% - 99%)    PHYSICAL EXAMINATION:    GENERAL: The patient is awake and alert in no apparent distress.     HEENT: Head is normocephalic and atraumatic. Extraocular muscles are intact. Mucous membranes are moist.    NECK: Supple.    LUNGS: Clear to auscultation without wheezing, rales or rhonchi; respirations unlabored    HEART: Regular rate and rhythm without murmur.    ABDOMEN: Soft, nontender, and nondistended.      EXTREMITIES: Without any cyanosis, clubbing, rash, lesions or edema.    NEUROLOGIC: improved    SKIN: No ulceration or induration present.      LABS:                        14.7   3.91  )-----------( 167      ( 03 Nov 2020 04:41 )             45.6     11-03    145  |  109  |  14  ----------------------------<  120<H>  4.1   |  24  |  1.1    Ca    9.0      03 Nov 2020 04:41  Phos  3.4     11-03  Mg     2.3     11-03    TPro  6.6  /  Alb  4.4  /  TBili  1.0  /  DBili  x   /  AST  18  /  ALT  14  /  AlkPhos  54  11-03    PT/INR - ( 01 Nov 2020 14:59 )   PT: 11.90 sec;   INR: 1.03 ratio         PTT - ( 01 Nov 2020 14:59 )  PTT:28.8 sec      CARDIAC MARKERS ( 01 Nov 2020 14:59 )  x     / <0.01 ng/mL / x     / x     / x                      11-02-20 @ 07:01  -  11-03-20 @ 07:00  --------------------------------------------------------  IN: 1803 mL / OUT: 2175 mL / NET: -372 mL        MICROBIOLOGY:      MEDICATIONS  (STANDING):  ALBUTerol    90 MICROgram(s) HFA Inhaler 2 Puff(s) Inhalation every 6 hours  atorvastatin 80 milliGRAM(s) Oral at bedtime  chlorhexidine 4% Liquid 1 Application(s) Topical <User Schedule>  norepinephrine Infusion 0.05 MICROgram(s)/kG/Min (10.1 mL/Hr) IV Continuous <Continuous>  sodium chloride 0.9%. 1000 milliLiter(s) (75 mL/Hr) IV Continuous <Continuous>  tiotropium 18 MICROgram(s) Capsule 1 Capsule(s) Inhalation daily    MEDICATIONS  (PRN):      RADIOLOGY & ADDITIONAL STUDIES:

## 2020-11-03 NOTE — PROGRESS NOTE ADULT - SUBJECTIVE AND OBJECTIVE BOX
Neurocritical Care Progress Note:    1. Brief Presentation: Generalized weakness a/w aphasia    2. Today's Acute Problems:   -Stroke code s/p tPA  -Pacemaker     3. Relevant brief History: 60 year old male with PMH of recent "stroke" s/p tPA  07/2020, congenital heart disease s/p repair, HTN, DLD, RAA thrombus (was on Xarelto and resolved on last ANITA), COPD, ex- smoker, dm htn, hld, sinus bradycardia s/p PPM, marginal cell lymphoma on maintenance Rituxan (last chemo was december 21).  Pt w/ initial feeling of not feeling "right" then proceeded to having generalized weakness w/ presyncope per wife.  En route to hospital pt developed aphasia with groaning and AMS with ? limb weakness noted.  Pt had similar episode in July 30, 2020 w/ aphasia and RHP s/p TPA with immediate improvement in speech with negative MRI and vEEG followed by Dr. Rubio.  No anticoagulation, recent surgery or trauma.   CTH is negative for acute pathology and his was within the window for IV tpa which was given at 14.35 pm. vEEG with no indication of seizure activity, CTP negative, and repeat CTH stable. Follow up with CT perfusion results.     4-Yesterday's Plan:  -Continue q 1 neuro checks  -VEEG.   -Seizure and aspiration precautions.  -Continue Lipitor 80 MG Q 24   -Get lipid profile, hemoglobin A1c.   -NO AC or Antiplatelets until repeat HCT is negative for bleed  - CTH in 24 hour post tpa or sooner if worsening neuro status  - Keep syst <185 >120 and diast <105>60  - Speech and swallow eval   - PT/OT eval    5. Last 24 hour updates: Patient's symptoms improved. NIHSS 0 (back to baseline). Sitting up in chair eating breakfast. 24 hr post tPA CTH negative for intracranial pathology. OK for downgrade to the stroke unit    6. Medications:   ALBUTerol    90 MICROgram(s) HFA Inhaler 2 Puff(s) Inhalation every 6 hours  aspirin 325 milliGRAM(s) Oral daily  atorvastatin 80 milliGRAM(s) Oral at bedtime  chlorhexidine 4% Liquid 1 Application(s) Topical <User Schedule>  tiotropium 18 MICROgram(s) Capsule 1 Capsule(s) Inhalation daily    7. Ancillary Management:   Chest PT[ ]   Head of bed >35 [x ]   Out of bed to chair [x ]   PT/OT/SP Eval [x ]   Spirometry[ ]   DVT prophalaxis[ x]    8.Neuro:   Neurologic Exam:  Mental status: Awake, alert and oriented x 3. Attention and concentration intact. Fund of knowledge appropriate.  Language: Naming, repetition, fluency, and comprehension intact. Mild dysarthria, no aphasia noted  Cranial nerves: Pupils equally round and reactive to light, visual fields full, no nystagmus, extraocular muscles intact, V1 through V3 intact bilaterally and symmetric, left eye is chronically "lazy" (horizontally dysconjugate), face symmetric, hearing intact    Motor:  Normal bulk and tone, strength 5/5 in bilateral upper and lower extremities.   strength 5/5. No tremors or tics noted  Sensation: Intact to light touch (right side less sensation vs left) , proprioception, and noxious stimuli. No neglect.   Coordination: No dysmetria on finger-to-nose. No clumsiness noted  Reflexes: 2+ generally     NIH STROKE SCALE  Item	                                                        Score  1 a.	Level of Consciousness	               	0  1 b. LOC Questions	                                0  1 c.	LOC Commands	                               	0  2.	Best Gaze	                                        0  3.	Visual	                                                0  4.	Facial Palsy	                                        0  5 a.	Motor Arm - Left	                                0  5 b.	Motor Arm - Right	                        0  6 a.	Motor Leg - Left	                                0  6 b.	Motor Leg - Right	                                0  7.	Limb Ataxia	                                        0  8.	Sensory	                                                0  9.	Language	                                        0  10.	Dysarthria	                                        0  11.	Extinction and Inattention  	        0  ______________________________________  TOTAL	                                                        0    mRS:  0 No symptoms at all  1 No significant disability despite symptoms; able to carry out all usual duties and activities without assistance  2 Slight disability; unable to carry out all previous activities, but able to look after own affairs  3 Moderate disability; requiring some help, but able to walk without assistance  4 Moderately severe disability; unable to walk without assistance and unable to attend to own bodily needs without assistance  5 Severe disability; bedridden, incontinent and requiring constant nursing care and attention  6 Dead    Last CTH: < from: CT Head No Cont (11.02.20 @ 18:06) >  IMPRESSION:  No acute intracranial pathology. No evidence of midline shift, mass effect or intracranial hemorrhage.    < end of copied text >    Last CTA/MRA:    Last CTP: < from: CT Perfusion w/ Maps w/ IV Cont (11.01.20 @ 17:21) >  IMPRESSION:  No evidence of large vessel occlusion, significant stenosis or aneurysm in the head and neck.    Normal perfusion images of the brain.    < end of copied text >    Last MRI: < from: MR Head No Cont (08.07.20 @ 17:29) >  IMPRESSION: No mass effect, parenchymal hemorrhage or diffusion evidence of acute infarction.    < end of copied text >    Last TCD:  n/a    Last EEG: < from: EEG w/ Video Each 12-26 Hours, Unmonitored (08.01.20 @ 12:00) >  Focal Slowing:  None      Generalized Slowing:  None    Interictal Activity  No epileptiform activity    Activation and Provocation Procedures:  Not performed    Events:  No events captured    Impression  This is a normal Video EEG study with no epileptiform or other abnormalities. None of the patient's typical events were captured precluding direct electro-clinical correlation.    < end of copied text >    9. Cardiovascular:   Vital Signs Last 24 Hrs  T(C): 36 (03 Nov 2020 12:00), Max: 36.1 (02 Nov 2020 17:45)  T(F): 96.8 (03 Nov 2020 12:00), Max: 96.9 (02 Nov 2020 17:45)  HR: 80 (03 Nov 2020 12:00) (62 - 88)  BP: 146/80 (03 Nov 2020 12:00) (110/69 - 187/84)  BP(mean): 103 (03 Nov 2020 12:00) (73 - 131)  RR: 30 (03 Nov 2020 12:00) (13 - 50)  SpO2: 99% (03 Nov 2020 12:00) (96% - 99%)     Last Echo: < from: Transthoracic Echocardiogram (07.31.20 @ 09:49) >  Summary:   1. Normal global left ventricular systolic function.   2. LV Ejection Fraction by Chávez's Method with a biplane EF of 56 %.   3. Mild concentric left ventricular hypertrophy.   4. Mildly increased LV wall thickness.   5. Normal left ventricular internal cavity size.   6. LA volume Index is 14.9 ml/m² ml/m2.   7. There is mild aortic root calcification.    PHYSICIAN INTERPRETATION:  Left Ventricle: The left ventricular internal cavity size is normal. Leftventricular wall thickness is mildly increased. There is mild concentric left ventricular hypertrophy. Global LV systolic function was normal.    < end of copied text >    Last EKG: < from: 12 Lead ECG (11.01.20 @ 16:07) >  Diagnosis Line Atrial-sensed ventricular-paced rhythm  Abnormal ECG    < end of copied text >    CVP   MAP/CPP/SBP target:   CO:      CI:       Enzymes/Trop:  Troponin T, Serum (11.01.20 @ 14:59)   Troponin T, Serum: <0.01 ng/mL    10. Respiratory:   ABG: n/a    VBG: n/a    Chest Xray: < from: Xray Chest 1 View-PORTABLE IMMEDIATE (Xray Chest 1 View-PORTABLE IMMEDIATE .) (11.02.20 @ 09:51) >  Impression:    No radiographic evidence of acute cardiopulmonary disease.    < end of copied text >    Peak Pressure/Baring Pressure: n/a    11.GI:  Prophalaxis: n/a    Bowel mvt:     Abd distension:   LIVER FUNCTIONS - ( 03 Nov 2020 04:41 )  Alb: 4.4 g/dL / Pro: 6.6 g/dL / ALK PHOS: 54 U/L / ALT: 14 U/L / AST: 18 U/L / GGT: x           12.Renal/Fluids/Electrolytes:    11-03    145  |  109  |  14  ----------------------------<  120<H>  4.1   |  24  |  1.1    Ca    9.0      03 Nov 2020 04:41  Phos  3.4     11-03  Mg     2.3     11-03    TPro  6.6  /  Alb  4.4  /  TBili  1.0  /  DBili  x   /  AST  18  /  ALT  14  /  AlkPhos  54  11-03      I&O's Detail    02 Nov 2020 07:01  -  03 Nov 2020 07:00  --------------------------------------------------------  IN:    Norepinephrine: 33 mL    Oral Fluid: 120 mL    sodium chloride 0.9%: 1650 mL  Total IN: 1803 mL    OUT:    Voided (mL): 2175 mL  Total OUT: 2175 mL    Total NET: -372 mL      03 Nov 2020 07:01  -  03 Nov 2020 13:20  --------------------------------------------------------  IN:    sodium chloride 0.9%: 200 mL  Total IN: 200 mL    OUT:    Voided (mL): 400 mL  Total OUT: 400 mL    Total NET: -200 mL    13.ID: n/a    Lines: Central[] Date inserted: Peripheral[x]    14. Hematology:                         14.7   3.91  )-----------( 167      ( 03 Nov 2020 04:41 )             45.6      11-03    145  |  109  |  14  ----------------------------<  120<H>  4.1   |  24  |  1.1    Ca    9.0      03 Nov 2020 04:41  Phos  3.4     11-03  Mg     2.3     11-03    TPro  6.6  /  Alb  4.4  /  TBili  1.0  /  DBili  x   /  AST  18  /  ALT  14  /  AlkPhos  54  11-03     PT/INR - ( 01 Nov 2020 14:59 )   PT: 11.90 sec;   INR: 1.03 ratio         PTT - ( 01 Nov 2020 14:59 )  PTT:28.8 sec    DVT Prophylaxis Lovenox[ ] Heparin[ ] Venodynes[ ] SCD's[x ]    15. Impression:  -Stroke code s/p tPA  -Pacemaker     16. Suggestions:  -Patient neurologically stable and OK for downgrade to the Stroke Unit  -Obtain ANITA  -Obtain 24-48 hr video EEG for short period of amnesia en route to hospital; seizure r/o (OK to connect when patient is in Stroke Unit)  -MRI brain w/w/o (hx of lymphoma, MALDONADO to r/o mass/lesions) if pacemaker is compatible w/ machine   - mg daily   -Q4 neuro checks  -Seizure and aspiration precautions  -Continue Lipitor 80 MG Q 24   -Keep syst <185 >120 and diast <105>60  -Speech and swallow eval   -PT/OT eval  -OOB--->chair     17. Disposition:  -Patient neurologically stable and OK for downgrade to the Stroke Unit    Patient seen and case discussed with attending; please see attending attestation  Please call w/ questions  Bruna August NP  [phone]

## 2020-11-03 NOTE — CHART NOTE - NSCHARTNOTEFT_GEN_A_CORE
Patient has PMM. Contacted company [employer] at this number: [phone] . They said PM in MRI compatible but limited to 1.5 Patito. Needs to call the company again once MRI is scheduled so the representative can be there at time of MRI.

## 2020-11-04 LAB
GLUCOSE BLDC GLUCOMTR-MCNC: 104 MG/DL — HIGH (ref 70–99)
GLUCOSE BLDC GLUCOMTR-MCNC: 115 MG/DL — HIGH (ref 70–99)
GLUCOSE BLDC GLUCOMTR-MCNC: 115 MG/DL — HIGH (ref 70–99)
GLUCOSE BLDC GLUCOMTR-MCNC: 117 MG/DL — HIGH (ref 70–99)

## 2020-11-04 PROCEDURE — 99233 SBSQ HOSP IP/OBS HIGH 50: CPT

## 2020-11-04 PROCEDURE — 99232 SBSQ HOSP IP/OBS MODERATE 35: CPT

## 2020-11-04 RX ORDER — PANTOPRAZOLE SODIUM 20 MG/1
40 TABLET, DELAYED RELEASE ORAL ONCE
Refills: 0 | Status: DISCONTINUED | OUTPATIENT
Start: 2020-11-04 | End: 2020-11-06

## 2020-11-04 RX ADMIN — ATORVASTATIN CALCIUM 80 MILLIGRAM(S): 80 TABLET, FILM COATED ORAL at 21:17

## 2020-11-04 RX ADMIN — TIOTROPIUM BROMIDE 1 CAPSULE(S): 18 CAPSULE ORAL; RESPIRATORY (INHALATION) at 08:48

## 2020-11-04 RX ADMIN — Medication 325 MILLIGRAM(S): at 11:26

## 2020-11-04 RX ADMIN — AMLODIPINE BESYLATE 5 MILLIGRAM(S): 2.5 TABLET ORAL at 05:20

## 2020-11-04 RX ADMIN — CHLORHEXIDINE GLUCONATE 1 APPLICATION(S): 213 SOLUTION TOPICAL at 05:20

## 2020-11-04 RX ADMIN — ALBUTEROL 2 PUFF(S): 90 AEROSOL, METERED ORAL at 07:44

## 2020-11-04 RX ADMIN — ALBUTEROL 2 PUFF(S): 90 AEROSOL, METERED ORAL at 14:19

## 2020-11-04 NOTE — PROGRESS NOTE ADULT - SUBJECTIVE AND OBJECTIVE BOX
Stroke Progress Note:    LONG GEE    1. Chief Complaint:  AMS    HPI:  60 M with PMH of recent stroke s/p tPA in 2020, congenital heart disease s/p repair, HTN, DLD, RAA thrombus (was on xarelto and resolved on last ANITA), COPD, ex- smoker, dm htn, hld, sinus bradycardia s/p PPM, marginal cell lymphoma on maintenance rituxan (last chemo was ) presenting for acute change in mental status    History obtained from wife at bedside who reports pt felt sudden onset bilateral upper extremity and lower extremity weakness R>>L associated with generalized warmth and numbness. He collapsed on the couch, which prompted her to call EMS. She took his FS which was 170, but was unable to obtain his BP. She reports his rpesentations is similar to his presentation in August when he was admitted for a stroke requiring tPA. Imaging at the time was negative. He had some residula R-sided weakness that was improving, but remained with slight expressive aphasia/difficulty finding words.    In the ED, VS: 166/57 HR 90 RR18 satting 99% on 3L NC. Code stroke was called and pt received tPA around 3.25 as he was within therapeutic window.    CTH: without evidence of acute ischemic stroke or hemorrhage. Pt taken for CT perfusion during exam. admitted to MICU for monitoring, on levophed 0.03   (2020 16:32)      2. Relevant PMH:   Prior ischemic stroke/TIA[ ], Afib [ ], CAD [ ], HTN [ ], DLD [ ], DM [ ], PVD [ ], Obesity [ ],   Sedentary lifestyle [ ], CHF [ ], CARMEN [ ], Cancer Hx [ ].    3. Social History: Smoking [ ], Drug Use [ ], Alcohol Use [ ], Other [ ]    4. Possible Location of Stroke:    5. Relevant Brain Tissue Imaging: < from: CT Head No Cont (20 @ 18:06) >  EXAM:  CT BRAIN            PROCEDURE DATE:  2020            INTERPRETATION:  CLINICAL INDICATION: Status post TPA. Aphasia and altered mental status. Right upper extremity weakness.    Technique: CT of the head was performed without contrast.    Multiple contiguous axial images were acquired from the skullbase to the vertex without the administration of intravenous contrast.  Coronal and sagittal reformations were made.    COMPARISON:  prior head CT  2020    FINDINGS:    The ventricles and sulci are unremarkable in appearance.     There is no intraparenchymal hematoma, mass effect or midline shift. No abnormal extra-axial fluid collections are present.    The calvarium is intact. The visualized intraorbital compartments, paranasal sinuses and mastoid complexes appear free of acute disease.    IMPRESSION:  No acute intracranial pathology. No evidence of midline shift, mass effect or intracranial hemorrhage.          < end of copied text >      6. Relevant Cerebrovascular Imaging:         CT Perfusion w/ Maps w/ IV Cont:   EXAM:  CT PERFUSION W MAPS IC            PROCEDURE DATE:  2020            INTERPRETATION:  Clinical History / Reason for exam: Stroke code.    Technique: CT perfusion of the brain was performed along with CTA of the head and neck after the intravenous administration of 120 cc Optiray 350 contrast. Sagittal, coronal and axial reformatted images were obtained as well as 3-D volume rendered images.    RAPID PERFUSION imaging was obtained. Color maps were reviewed.    Comparison: CTA perfusion dated 2020.    Findings:    CTA neck:  The aortic arch, origin of the great vessels and subclavian arteries are unremarkable.  The bilateral common carotid arteries and internal carotid arteries are unremarkable without significant stenosis seen.   The vertebral arteries are codominant. No significant vertebral artery stenosis is noted.    CTA head:  The distal internal carotid arteries, middle cerebral arteries and anterior cerebral arteries are unremarkable without significant stenosis.  The basilar artery, superior cerebellar arteries and posterior cerebral arteries are unremarkable without significant stenosis.  No aneurysm or vascular malformation is identified.  Soft tissues are unremarkable. Osseous structures are unremarkable.    CT perfusion:  Based on Tmax > 6 seconds the cerebral blood volume and time to peak images demonstrate no significant evidence of fixed or mismatched focal perfusion deficit or surrounding ischemic penumbra to suggest acute cerebral ischemia or infarct.      IMPRESSION:  No evidence of large vessel occlusion, significant stenosis or aneurysm in the head and neck.    Normal perfusion images of the brain.              CE SOTO M.D., RESIDENT RADIOLOGIST  This document has been electronically signed.  QUINTIN GRULLON M.D., ATTENDING RADIOLOGIST  This document has been electronically signed. 2020  8:19AM (20 @ 17:21)         7. Relevant blood tests:      8. Relevant cardiac rhythm monitorin. Relevant Cardiac Structure: (TTE/ANITA +/-):[ ]No intracardiac thrombus/[ ] no vegetation/[ ]no [place]/EF:    Home Medications:  Aspirin Low Dose 81 mg oral tablet, chewable: 1 tab(s) orally once a day (2020 16:56)  ezetimibe 10 mg oral tablet: 1 tab(s) orally once a day (2020 16:56)  pioglitazone 30 mg oral tablet: 1 tab(s) orally once a day (2020 16:56)      MEDICATIONS  (STANDING):  ALBUTerol    90 MICROgram(s) HFA Inhaler 2 Puff(s) Inhalation every 6 hours  aspirin 325 milliGRAM(s) Oral daily  atorvastatin 80 milliGRAM(s) Oral at bedtime  chlorhexidine 4% Liquid 1 Application(s) Topical <User Schedule>  pantoprazole   Suspension 40 milliGRAM(s) Oral once  tiotropium 18 MICROgram(s) Capsule 1 Capsule(s) Inhalation daily      10. PT/OT/Speech/Rehab/S&Sw/ Cognitive eval results and recommendations:    11. Exam:    Vital Signs Last 24 Hrs  T(C): 35.5 (2020 04:51), Max: 35.8 (2020 20:00)  T(F): 95.9 (2020 04:51), Max: 96.5 (2020 00:00)  HR: 70 (2020 08:51) (56 - 86)  BP: 124/81 (2020 08:51) (124/81 - 194/84)  BP(mean): 95 (2020 08:51) (81 - 130)  RR: 21 (2020 08:51) (13 - 46)  SpO2: 98% (2020 08:51) (90% - 99%)    12.   NIH STROKE SCALE  Item	                                                        Score  1 a.	Level of Consciousness	               	0  1 b. LOC Questions	                                0  1 c.	LOC Commands	                               	0  2.	Best Gaze	                                        0  3.	Visual	                                                0  4.	Facial Palsy	                                        0  5 a.	Motor Arm - Left	                                0  5 b.	Motor Arm - Right	                        0  6 a.	Motor Leg - Left	                                0  6 b.	Motor Leg - Right	                                0  7.	Limb Ataxia	                                        0  8.	Sensory	                                                0  9.	Language	                                        0  10.	Dysarthria	                                        0  11.	Extinction and Inattention  	        0  ______________________________________  TOTAL	                                                        0    Total NIHSS on admission:      NIHSS yesterday:      NIHSS today: 0    mRS:  0 No symptoms at all  1 No significant disability despite symptoms; able to carry out all usual duties and activities without assistance  2 Slight disability; unable to carry out all previous activities, but able to look after own affairs  3 Moderate disability; requiring some help, but able to walk without assistance  4 Moderately severe disability; unable to walk without assistance and unable to attend to own bodily needs without assistance  5 Severe disability; bedridden, incontinent and requiring constant nursing care and attention  6 Dead      13. Impression: 60 M with PMH of recent stroke s/p tPA in 2020, congenital heart disease s/p repair, HTN, DLD, RAA thrombus (was on xarelto and resolved on last ANITA), COPD, ex- smoker, dm htn, hld, sinus bradycardia s/p PPM, marginal cell lymphoma on maintenance rituxan (last chemo was ) presenting for acute change in mental status s/ tpa, currently back to his baseline.      -Obtain ANITA  -Obtain 24-48 hr video EEG for short period of amnesia en route to hospital; seizure r/o   -MRI brain w/w/o (hx of lymphoma, MALDONADO to r/o mass/lesions) if pacemaker is compatible w/ machine   - mg daily   -Q4 neuro checks  -Seizure and aspiration precautions  -Continue Lipitor 80 MG Q 24   -Keep syst <185 >120 and diast <105>60          Plan discussed with neuroattending and medical team

## 2020-11-04 NOTE — PROGRESS NOTE ADULT - ASSESSMENT
60 M with PMH of recent stroke s/p tPA in 08/2020, congenital heart disease s/p repair, HTN, DLD, RAA thrombus (was on xarelto and resolved on last ANITA), COPD, ex- smoker, dm htn, hld, sinus bradycardia s/p PPM, marginal cell lymphoma on maintenance rituxan (last chemo was december 21) presenting for acute change in mental status s/ tpa

## 2020-11-04 NOTE — PROGRESS NOTE ADULT - ATTENDING COMMENTS
Patient seen and examined     #History of CVA, suspected recurrent CVA s/p tpa, Prolonged period of amnesia likely secondary to suspected Partial Seizures,: CT imaging reviewed, Neurology recommendations noted, f/u 24 hour VEEG, MRI Brain, Cardiology evaluation for ANITA-history of RA thrombus, speech and swallow seen regular diet with thin liquids, PT/OT, continue ASA 325mg and high intensity statin, HgbA1C% 7.1, f/u lipid panel, seizure, aspiration and fall precautions     #HTN allowing for permissive hypertension, amlodipine on hold     #Marginal cell lymphoma on maintenance Rituxan: defer to outpatient Oncology follow up     #history of Sinus bradycardia s/p PPM: EPS for interrogation     Disposition: Pending cardiac workup, PT/Rehab assessment, MRI Brain, VEEG Patient seen and examined, denies any complaints, seen ambulating without device.     #History of CVA, suspected recurrent CVA s/p tpa, Prolonged period of amnesia likely secondary to suspected Partial Seizures,: CT imaging reviewed, Neurology recommendations noted, f/u 24 hour VEEG, MRI Brain, Cardiology evaluation for ANITA-history of RA thrombus, speech and swallow seen regular diet with thin liquids, PT/OT, continue ASA 325mg and high intensity statin, HgbA1C% 7.1, f/u lipid panel, seizure, aspiration and fall precautions     #HTN allowing for permissive hypertension, amlodipine on hold     #Marginal cell lymphoma on maintenance Rituxan: defer to outpatient Oncology follow up     #history of Sinus bradycardia s/p PPM: EPS for interrogation     Disposition: Pending cardiac workup, PT/Rehab assessment, MRI Brain, VEEG, Downgrade to Telemetry today

## 2020-11-04 NOTE — PROGRESS NOTE ADULT - SUBJECTIVE AND OBJECTIVE BOX
HPI:  60 M with PMH of recent stroke s/p tPA in 08/2020, congenital heart disease s/p repair, HTN, DLD, RAA thrombus (was on xarelto and resolved on last ANITA), COPD, ex- smoker, dm htn, hld, sinus bradycardia s/p PPM, marginal cell lymphoma on maintenance rituxan (last chemo was december 21) presenting for acute change in mental status    History obtained from wife at bedside who reports pt felt sudden onset bilateral upper extremity and lower extremity weakness R>>L associated with generalized warmth and numbness. He collapsed on the couch, which prompted her to call EMS. She took his FS which was 170, but was unable to obtain his BP. She reports his rpesentations is similar to his presentation in August when he was admitted for a stroke requiring tPA. Imaging at the time was negative. He had some residula R-sided weakness that was improving, but remained with slight expressive aphasia/difficulty finding words.    In the ED, VS: 166/57 HR 90 RR18 satting 99% on 3L NC. Code stroke was called and pt received tPA around 3.25 as he was within therapeutic window.    CTH: without evidence of acute ischemic stroke or hemorrhage. Pt taken for CT perfusion during exam. admitted to MICU for monitoring, on levophed 0.03   (01 Nov 2020 16:32)    Currently admitted to medicine with the primary diagnosis of Stroke-like symptoms       Today is hospital day 3d.     INTERVAL HPI / OVERNIGHT EVENTS:  Patient was examined and seen at bedside. This morning he is resting comfortably in bed and reports no new issues or overnight events. Denies any new weakness to the right side. Has short lasting headaches, that self resolve, declined need for medication.    ROS: Otherwise unremarkable     PAST MEDICAL & SURGICAL HISTORY  Pacemaker    Non Hodgkin&#x27;s lymphoma    Chronic obstructive pulmonary disease, unspecified COPD type    DM (diabetes mellitus)    High cholesterol    HTN (hypertension)    S/P transesophageal echocardiogram (ANITA)    Artificial cardiac pacemaker      ALLERGIES  acyclovir (Anaphylaxis)  Bactrim (Anaphylaxis)    MEDICATIONS  STANDING MEDICATIONS  ALBUTerol    90 MICROgram(s) HFA Inhaler 2 Puff(s) Inhalation every 6 hours  amLODIPine   Tablet 5 milliGRAM(s) Oral daily  aspirin 325 milliGRAM(s) Oral daily  atorvastatin 80 milliGRAM(s) Oral at bedtime  chlorhexidine 4% Liquid 1 Application(s) Topical <User Schedule>  pantoprazole   Suspension 40 milliGRAM(s) Oral once  tiotropium 18 MICROgram(s) Capsule 1 Capsule(s) Inhalation daily    PRN MEDICATIONS    VITALS:  T(F): 95.9  HR: 69  BP: 145/85  RR: 18  SpO2: 98%    PHYSICAL EXAM  GEN: NAD, Resting comfortably in bed  PULM: Clear to auscultation bilaterally, No wheezes  CVS: Regular rate and rhythm, S1-S2, no murmurs  ABD: Soft, non-tender, non-distended, no guarding  EXT: No edema  NEURO: A&Ox3, decreased sensation to light touch right side of face compared to left. Decreased sensation to R arm and leg. Decreased strength R finger  compared to left. Decreased strength R leg compared to L leg. No aphasia observed.     LABS                        14.7   3.91  )-----------( 167      ( 03 Nov 2020 04:41 )             45.6     11-03    145  |  109  |  14  ----------------------------<  120<H>  4.1   |  24  |  1.1    Ca    9.0      03 Nov 2020 04:41  Phos  3.4     11-03  Mg     2.3     11-03    TPro  6.6  /  Alb  4.4  /  TBili  1.0  /  DBili  x   /  AST  18  /  ALT  14  /  AlkPhos  54  11-03                  RADIOLOGY     HPI:  60 M with PMH of recent stroke s/p tPA in 08/2020, congenital heart disease s/p repair, HTN, DLD, RAA thrombus (was on xarelto and resolved on last ANITA), COPD, ex- smoker, dm htn, hld, sinus bradycardia s/p PPM, marginal cell lymphoma on maintenance rituxan (last chemo was december 21) presenting for acute change in mental status    History obtained from wife at bedside who reports pt felt sudden onset bilateral upper extremity and lower extremity weakness R>>L associated with generalized warmth and numbness. He collapsed on the couch, which prompted her to call EMS. She took his FS which was 170, but was unable to obtain his BP. She reports his rpesentations is similar to his presentation in August when he was admitted for a stroke requiring tPA. Imaging at the time was negative. He had some residula R-sided weakness that was improving, but remained with slight expressive aphasia/difficulty finding words.    In the ED, VS: 166/57 HR 90 RR18 satting 99% on 3L NC. Code stroke was called and pt received tPA around 3.25 as he was within therapeutic window.    CTH: without evidence of acute ischemic stroke or hemorrhage. Pt taken for CT perfusion during exam. admitted to MICU for monitoring, on levophed 0.03   (01 Nov 2020 16:32)    Currently admitted to medicine with the primary diagnosis of Stroke-like symptoms       Today is hospital day 3d.     INTERVAL HPI / OVERNIGHT EVENTS:  Patient was examined and seen at bedside. This morning he is resting comfortably in bed and reports no new issues or overnight events. Denies any new weakness to the right side. Has short lasting headaches, that self resolve, declined need for pain control.    ROS: Otherwise unremarkable     PAST MEDICAL & SURGICAL HISTORY  Pacemaker    Non Hodgkin&#x27;s lymphoma    Chronic obstructive pulmonary disease, unspecified COPD type    DM (diabetes mellitus)    High cholesterol    HTN (hypertension)    S/P transesophageal echocardiogram (ANITA)    Artificial cardiac pacemaker      ALLERGIES  acyclovir (Anaphylaxis)  Bactrim (Anaphylaxis)    MEDICATIONS  STANDING MEDICATIONS  ALBUTerol    90 MICROgram(s) HFA Inhaler 2 Puff(s) Inhalation every 6 hours  amLODIPine   Tablet 5 milliGRAM(s) Oral daily  aspirin 325 milliGRAM(s) Oral daily  atorvastatin 80 milliGRAM(s) Oral at bedtime  chlorhexidine 4% Liquid 1 Application(s) Topical <User Schedule>  pantoprazole   Suspension 40 milliGRAM(s) Oral once  tiotropium 18 MICROgram(s) Capsule 1 Capsule(s) Inhalation daily    PRN MEDICATIONS    VITALS:  T(F): 95.9  HR: 69  BP: 145/85  RR: 18  SpO2: 98%    PHYSICAL EXAM  GEN: NAD, Resting comfortably in bed  PULM: Clear to auscultation bilaterally, No wheezes  CVS: Regular rate and rhythm, S1-S2, no murmurs  ABD: Soft, non-tender, non-distended, no guarding  EXT: No edema  NEURO: A&Ox3. Patient states decreased sensation to light touch right side of face compared to left, decreased sensation to R arm and leg, decreased strength R finger  compared to left, and decreased strength R leg compared to L leg all noted on physical exam are the same as after 8/2020 stroke. No aphasia observed. slight R arm drift.    LABS                        14.7   3.91  )-----------( 167      ( 03 Nov 2020 04:41 )             45.6     11-03    145  |  109  |  14  ----------------------------<  120<H>  4.1   |  24  |  1.1    Ca    9.0      03 Nov 2020 04:41  Phos  3.4     11-03  Mg     2.3     11-03    TPro  6.6  /  Alb  4.4  /  TBili  1.0  /  DBili  x   /  AST  18  /  ALT  14  /  AlkPhos  54  11-03                  RADIOLOGY

## 2020-11-04 NOTE — PHYSICAL THERAPY INITIAL EVALUATION ADULT - GENERAL OBSERVATIONS, REHAB EVAL
[id number] am. 61 y/o M rec'd/left in bed, nad. pt is A+Ox4, pleasant, motivated, wishes to return home; states he is mostly back to the baseline. pt had CVA in July'2020. 124/81 77 97% on RA.

## 2020-11-04 NOTE — PROGRESS NOTE ADULT - ASSESSMENT
60 M with PMH of recent stroke s/p tPA in 08/2020, congenital heart disease s/p repair, HTN, DLD, RAA thrombus (was on xarelto and resolved on last ANITA), COPD, ex- smoker, dm htn, hld, sinus bradycardia s/p PPM, marginal cell lymphoma on maintenance rituxan (last chemo was december 21) presenting for acute change in mental status and bilateral weakness more prominent over the right side.    #Right sided weakness +dysarthria  -CTH is negative for acute pathology   -CTP negative, and repeat CTH stable.  -S/pr IV tpa which was given at 14.35 pm  -vEEG with no indication of seizure activity  -Repeat CT scan 24 h post TPA  -Speech and swallow c/s-->Regular diet  -neurology on board-->Keep syst <180 >120 and diast <105>60 / post-TPA protocol/ post tpa protocol -NO AC or Antiplatelets for 24 hrs  -C/w statin 80 mg daily.  -Start  MG DAILY  -MRI head w, w/o contrast will need scheduling for representative , VEEG, ANITA > ordered    #HTN  -on amlodipine 5 mg daily-->hold amlodipine for now  -Keep bp 120-180  -on low dose pressors / MAP maintained  -no evidence of infection    #DLD  -C/W statin  -lipid panel noted    #DM  -A1c 7.1  -Keep -180  -if BS>200 will start patient on insulin   -Out patient follow up with endocrinology    # marginal cell lymphoma on maintenance rituxan  -out patient follow up with oncologist    #sinus bradycardia s/p PPM    # DVT PPx: SCD  GI PPX: No indication  Diet: DASH/Carb consistent  Activity: PT/OT  Dispo: from home  Code Status: full code  Pending :MRI. VEEG, ANITA   60 M with PMH of recent stroke s/p tPA in 08/2020, congenital heart disease s/p repair, HTN, DLD, RAA thrombus (was on xarelto and resolved on last ANITA), COPD, ex- smoker, dm htn, hld, sinus bradycardia s/p PPM, marginal cell lymphoma on maintenance rituxan (last chemo was december 21) presenting for acute change in mental status and bilateral weakness more prominent over the right side.    #Right sided weakness +dysarthria  -CTH is negative for acute pathology   -CTP negative, and repeat CTH stable.  -S/pr IV tpa which was given at 14.35 pm  -vEEG with no indication of seizure activity  -Repeat CT scan 24 h post TPA  -Speech and swallow c/s-->Regular diet  -neurology on board-->Keep syst <180 >120 and diast <105>60 / post-TPA protocol/ post tpa protocol -NO AC or Antiplatelets for 24 hrs  -C/w statin 80 mg daily.  -C/w  MG DAILY  -MRI head w, w/o contrast will need scheduling for representative , VEEG, ANITA > ordered    #HTN  -on amlodipine 5 mg daily-->hold amlodipine for now  -Keep bp 120-180  -on low dose pressors / MAP maintained  -no evidence of infection    #DLD  -C/W statin  -lipid panel noted    #DM  -A1c 7.1  -Keep -180  -if BS>200 will start patient on insulin   -Out patient follow up with endocrinology    # marginal cell lymphoma on maintenance rituxan  -out patient follow up with oncologist    #sinus bradycardia s/p PPM    # DVT PPx: SCD  GI PPX: No indication  Diet: DASH/Carb consistent  Activity: PT/OT  Dispo: from home  Code Status: full code  Pending :MRI. VEEG, ANITA   60 M with PMH of recent stroke s/p tPA in 08/2020, congenital heart disease s/p repair, HTN, DLD, RAA thrombus (was on xarelto and resolved on last ANITA), COPD, ex- smoker, dm htn, hld, sinus bradycardia s/p PPM, marginal cell lymphoma on maintenance rituxan (last chemo was december 21) presenting for acute change in mental status and bilateral weakness more prominent over the right side.    #Right sided weakness +dysarthria  -CTH is negative for acute pathology   -CTP negative, and repeat CTH stable.  -S/pr IV tpa which was given at 14.35 pm  -vEEG with no indication of seizure activity  -Repeat CT scan 24 h post TPA  -Speech and swallow c/s-->Regular diet  -neurology on board-->Keep syst <180 >120 and diast <105>60 / post-TPA protocol/ post tpa protocol -NO AC or Antiplatelets for 24 hrs  -C/w statin 80 mg daily.  -C/w  MG DAILY  -MRI head w, w/o contrast will need scheduling for representative , VEEG, ANITA > ordered  - f/u physiatry, PT    #HTN  -on amlodipine 5 mg daily-->hold amlodipine for now  -Keep bp 120-180  -on low dose pressors / MAP maintained  -no evidence of infection    #DLD  -C/W statin  -lipid panel noted    #DM  -A1c 7.1  -Keep -180  -if BS>200 will start patient on insulin   -Out patient follow up with endocrinology    # marginal cell lymphoma on maintenance rituxan  -out patient follow up with oncologist    #sinus bradycardia s/p PPM    # DVT PPx: SCD  GI PPX: No indication  Diet: DASH/Carb consistent  Activity: PT/OT  Dispo: from home  Code Status: full code  Pending :MRI. VEEG, ANITA   60 M with PMH of recent stroke s/p tPA in 08/2020, congenital heart disease s/p repair, HTN, DLD, RAA thrombus (was on xarelto and resolved on last ANITA), COPD, ex- smoker, dm htn, hld, sinus bradycardia s/p PPM, marginal cell lymphoma on maintenance rituxan (last chemo was december 21) presenting for acute change in mental status and bilateral weakness more prominent over the right side.    #Right sided weakness +dysarthria  -CTH is negative for acute pathology   -CTP negative, and repeat CTH stable.  -S/pr IV tpa which was given at 14.35 pm  -vEEG with no indication of seizure activity  -Repeat CT scan 24 h post TPA  -Speech and swallow c/s-->Regular diet  -neurology on board-->Keep syst <180 >120 and diast <105>60 / post-TPA protocol/ post tpa protocol -NO AC or Antiplatelets for 24 hrs  -C/w statin 80 mg daily.  -C/w  MG DAILY  -MRI head w, w/o contrast will need scheduling for representative , 24 hr VEEG, ANITA > ordered  -EPS for PPM interrogation  -downgrade from Stroke to tele    #HTN  -on amlodipine 5 mg daily-->hold amlodipine for now  -Keep bp 120-180  -on low dose pressors / MAP maintained  -no evidence of infection    #DLD  -C/W statin  -lipid panel noted    #DM  -A1c 7.1  -Keep -180  -if BS>200 will start patient on insulin   -Out patient follow up with endocrinology    # marginal cell lymphoma on maintenance rituxan  -out patient follow up with oncologist    #sinus bradycardia s/p PPM    # DVT PPx: SCD  GI PPX: No indication  Diet: DASH/Carb consistent  Activity: PT/OT  Dispo: from home  Code Status: full code  Pending :MRI. VEEG, ANITA

## 2020-11-05 LAB
ALBUMIN SERPL ELPH-MCNC: 4.3 G/DL — SIGNIFICANT CHANGE UP (ref 3.5–5.2)
ALP SERPL-CCNC: 54 U/L — SIGNIFICANT CHANGE UP (ref 30–115)
ALT FLD-CCNC: 17 U/L — SIGNIFICANT CHANGE UP (ref 0–41)
ANION GAP SERPL CALC-SCNC: 9 MMOL/L — SIGNIFICANT CHANGE UP (ref 7–14)
AST SERPL-CCNC: 18 U/L — SIGNIFICANT CHANGE UP (ref 0–41)
BILIRUB SERPL-MCNC: 1.8 MG/DL — HIGH (ref 0.2–1.2)
BUN SERPL-MCNC: 15 MG/DL — SIGNIFICANT CHANGE UP (ref 10–20)
CALCIUM SERPL-MCNC: 9.7 MG/DL — SIGNIFICANT CHANGE UP (ref 8.5–10.1)
CHLORIDE SERPL-SCNC: 103 MMOL/L — SIGNIFICANT CHANGE UP (ref 98–110)
CO2 SERPL-SCNC: 28 MMOL/L — SIGNIFICANT CHANGE UP (ref 17–32)
CREAT SERPL-MCNC: 1.1 MG/DL — SIGNIFICANT CHANGE UP (ref 0.7–1.5)
GLUCOSE BLDC GLUCOMTR-MCNC: 116 MG/DL — HIGH (ref 70–99)
GLUCOSE BLDC GLUCOMTR-MCNC: 155 MG/DL — HIGH (ref 70–99)
GLUCOSE BLDC GLUCOMTR-MCNC: 188 MG/DL — HIGH (ref 70–99)
GLUCOSE BLDC GLUCOMTR-MCNC: 93 MG/DL — SIGNIFICANT CHANGE UP (ref 70–99)
GLUCOSE SERPL-MCNC: 117 MG/DL — HIGH (ref 70–99)
HCT VFR BLD CALC: 48.7 % — SIGNIFICANT CHANGE UP (ref 42–52)
HGB BLD-MCNC: 15.8 G/DL — SIGNIFICANT CHANGE UP (ref 14–18)
MAGNESIUM SERPL-MCNC: 2.2 MG/DL — SIGNIFICANT CHANGE UP (ref 1.8–2.4)
MCHC RBC-ENTMCNC: 29.2 PG — SIGNIFICANT CHANGE UP (ref 27–31)
MCHC RBC-ENTMCNC: 32.4 G/DL — SIGNIFICANT CHANGE UP (ref 32–37)
MCV RBC AUTO: 90 FL — SIGNIFICANT CHANGE UP (ref 80–94)
NRBC # BLD: 0 /100 WBCS — SIGNIFICANT CHANGE UP (ref 0–0)
PHOSPHATE SERPL-MCNC: 3.9 MG/DL — SIGNIFICANT CHANGE UP (ref 2.1–4.9)
PLATELET # BLD AUTO: 182 K/UL — SIGNIFICANT CHANGE UP (ref 130–400)
POTASSIUM SERPL-MCNC: 4.1 MMOL/L — SIGNIFICANT CHANGE UP (ref 3.5–5)
POTASSIUM SERPL-SCNC: 4.1 MMOL/L — SIGNIFICANT CHANGE UP (ref 3.5–5)
PROT SERPL-MCNC: 6.6 G/DL — SIGNIFICANT CHANGE UP (ref 6–8)
RBC # BLD: 5.41 M/UL — SIGNIFICANT CHANGE UP (ref 4.7–6.1)
RBC # FLD: 12.5 % — SIGNIFICANT CHANGE UP (ref 11.5–14.5)
SODIUM SERPL-SCNC: 140 MMOL/L — SIGNIFICANT CHANGE UP (ref 135–146)
WBC # BLD: 3.86 K/UL — LOW (ref 4.8–10.8)
WBC # FLD AUTO: 3.86 K/UL — LOW (ref 4.8–10.8)

## 2020-11-05 PROCEDURE — 99233 SBSQ HOSP IP/OBS HIGH 50: CPT

## 2020-11-05 PROCEDURE — 70553 MRI BRAIN STEM W/O & W/DYE: CPT | Mod: 26

## 2020-11-05 PROCEDURE — 99232 SBSQ HOSP IP/OBS MODERATE 35: CPT

## 2020-11-05 PROCEDURE — 95720 EEG PHY/QHP EA INCR W/VEEG: CPT

## 2020-11-05 RX ORDER — POLYETHYLENE GLYCOL 3350 17 G/17G
17 POWDER, FOR SOLUTION ORAL ONCE
Refills: 0 | Status: COMPLETED | OUTPATIENT
Start: 2020-11-05 | End: 2020-11-05

## 2020-11-05 RX ADMIN — CHLORHEXIDINE GLUCONATE 1 APPLICATION(S): 213 SOLUTION TOPICAL at 05:35

## 2020-11-05 RX ADMIN — ATORVASTATIN CALCIUM 80 MILLIGRAM(S): 80 TABLET, FILM COATED ORAL at 21:26

## 2020-11-05 RX ADMIN — POLYETHYLENE GLYCOL 3350 17 GRAM(S): 17 POWDER, FOR SOLUTION ORAL at 18:47

## 2020-11-05 RX ADMIN — ALBUTEROL 2 PUFF(S): 90 AEROSOL, METERED ORAL at 20:16

## 2020-11-05 RX ADMIN — ALBUTEROL 2 PUFF(S): 90 AEROSOL, METERED ORAL at 07:44

## 2020-11-05 RX ADMIN — TIOTROPIUM BROMIDE 1 CAPSULE(S): 18 CAPSULE ORAL; RESPIRATORY (INHALATION) at 09:09

## 2020-11-05 RX ADMIN — Medication 325 MILLIGRAM(S): at 11:47

## 2020-11-05 NOTE — CONSULT NOTE ADULT - ASSESSMENT
Acute CVA, responded to Rx  prior Hx of RA thrombus  SSS, S/P  PPM  Some CHD, type of the defect and repair is not known to me, but is stable  transverse aortic atheroma as per prior ANITA    spoke to patient about ANITA risk benefit indication, he already had one in the past, agreed  current Rx, keep NPO after midnight   Advised him as out patient to f/b dr Power

## 2020-11-05 NOTE — PROGRESS NOTE ADULT - SUBJECTIVE AND OBJECTIVE BOX
Admit Date: 11-01-20 (4d)    Chief Complaint:  Patient is a 60y old  Male who presents with a chief complaint of R>L weakness (05 Nov 2020 09:57)      Past Medical and Surgical History:  PAST MEDICAL & SURGICAL HISTORY:  Pacemaker    Non Hodgkin&#x27;s lymphoma    Chronic obstructive pulmonary disease, unspecified COPD type    DM (diabetes mellitus)    High cholesterol    HTN (hypertension)    S/P transesophageal echocardiogram (ANITA)    Artificial cardiac pacemaker        Current Medications:  MEDICATIONS  (STANDING):  ALBUTerol    90 MICROgram(s) HFA Inhaler 2 Puff(s) Inhalation every 6 hours  aspirin 325 milliGRAM(s) Oral daily  atorvastatin 80 milliGRAM(s) Oral at bedtime  chlorhexidine 4% Liquid 1 Application(s) Topical <User Schedule>  pantoprazole   Suspension 40 milliGRAM(s) Oral once  tiotropium 18 MICROgram(s) Capsule 1 Capsule(s) Inhalation daily    MEDICATIONS  (PRN):      Interval History:  No acute events overnight. No complaints at this time.    Vital Signs:  T(F): 96.7 (11-05-20 @ 04:27), Max: 96.9 (11-04-20 @ 20:41)  HR: 73 (11-05-20 @ 08:41) (56 - 86)  BP: 141/78 (11-05-20 @ 08:41) (111/79 - 194/84)  RR: 18 (11-05-20 @ 08:41) (13 - 46)  SpO2: 97% (11-05-20 @ 08:41) (90% - 99%)  CAPILLARY BLOOD GLUCOSE      POCT Blood Glucose.: 188 mg/dL (05 Nov 2020 07:19)  POCT Blood Glucose.: 115 mg/dL (04 Nov 2020 20:53)  POCT Blood Glucose.: 115 mg/dL (04 Nov 2020 16:38)  POCT Blood Glucose.: 117 mg/dL (04 Nov 2020 11:31)      Physical Exam:  General: Not in distress.   HEENT: Moist mucus membranes. PERRLA.  Cardio: Regular rate and rhythm, S1, S2, no murmur, rub, or gallop.  Pulm: Clear to auscultation bilaterally. No wheezing, rales, or rhonchi.  Abdomen: Soft, non-tender, non-distended. Normoactive bowel sounds.  Extremities: No cyanosis or edema bilaterally. No calf tenderness to palpation.  Neuro: A&O x3.    Labs and Imaging:  CBC Full  -  ( 05 Nov 2020 05:23 )  WBC Count : 3.86 K/uL  RBC Count : 5.41 M/uL  Hemoglobin : 15.8 g/dL  Hematocrit : 48.7 %  Platelet Count - Automated : 182 K/uL  Mean Cell Volume : 90.0 fL  Mean Cell Hemoglobin : 29.2 pg  Mean Cell Hemoglobin Concentration : 32.4 g/dL  Auto Neutrophil # : x  Auto Lymphocyte # : x  Auto Monocyte # : x  Auto Eosinophil # : x  Auto Basophil # : x  Auto Neutrophil % : x  Auto Lymphocyte % : x  Auto Monocyte % : x  Auto Eosinophil % : x  Auto Basophil % : x    RDW: 12.5      BMP: 11-05-20 @ 05:23  140 | 103 | 15   -----------------< 117  4.1  | 28 | 1.1  eGFR(AA): 85, eGFR (non-AA): 73  Ca 9.7, Mg 2.2, P 3.9    LFTs: 11-05-20 @ 05:23  TP  6.6  | 4.3 Alb   ---------------  TB  1.8  | --  DB   ---------------  ALT 17  | 18  AST            ^          54  ALK      LFTs: 11-05-20 @ 05:23  Ca  9.7  | 18 AST   -----------------  TP  6.6  | 17 ALT  -----------------  Alb 4.3  | 54 ALK          ^        1.8         TB      Cardiac Enzymes:    Urinalysis:    Cultures:      Home Medications:  Home Medications:  Aspirin Low Dose 81 mg oral tablet, chewable: 1 tab(s) orally once a day (01 Nov 2020 16:56)  ezetimibe 10 mg oral tablet: 1 tab(s) orally once a day (01 Nov 2020 16:56)  pioglitazone 30 mg oral tablet: 1 tab(s) orally once a day (01 Nov 2020 16:56)

## 2020-11-05 NOTE — PROGRESS NOTE ADULT - SUBJECTIVE AND OBJECTIVE BOX
Stroke Progress Note:    LONG GEE    1. Chief Complaint: R>L weakness    HPI:  60 M with PMH of recent stroke s/p tPA in 08/2020, congenital heart disease s/p repair, HTN, DLD, RAA thrombus (was on xarelto and resolved on last ANITA), COPD, ex- smoker, dm htn, hld, sinus bradycardia s/p PPM, marginal cell lymphoma on maintenance rituxan (last chemo was december 21) presenting for acute change in mental status    History obtained from wife at bedside who reports pt felt sudden onset bilateral upper extremity and lower extremity weakness R>>L associated with generalized warmth and numbness. He collapsed on the couch, which prompted her to call EMS. She took his FS which was 170, but was unable to obtain his BP. She reports his rpesentations is similar to his presentation in August when he was admitted for a stroke requiring tPA. Imaging at the time was negative. He had some residula R-sided weakness that was improving, but remained with slight expressive aphasia/difficulty finding words.    In the ED, VS: 166/57 HR 90 RR18 satting 99% on 3L NC. Code stroke was called and pt received tPA around 3.25 as he was within therapeutic window.    CTH: without evidence of acute ischemic stroke or hemorrhage. Pt taken for CT perfusion during exam. admitted to MICU for monitoring, on levophed 0.03   (01 Nov 2020 16:32)      2. Relevant PMH:   Prior ischemic stroke/TIA[ x], Afib [ ], CAD [ ], HTN [x ], DLD [x ], DM [ ], PVD [ ], Obesity [ ],   Sedentary lifestyle [ ], CHF [ ], CARMEN [ ], Cancer Hx [ ].    3. Social History: Smoking [ ], Drug Use [ ], Alcohol Use [ ], Other [ ]    4. Possible Location of Stroke:  see below  5. Relevant Brain Tissue Imaging:  < from: CT Head No Cont (11.02.20 @ 18:06) >    IMPRESSION:  No acute intracranial pathology. No evidence of midline shift, mass effect or intracranial hemorrhage.            < end of copied text >    6. Relevant Cerebrovascular Imaging:         CT Perfusion w/ Maps w/ IV Cont:   EXAM:  CT PERFUSION W MAPS IC            IMPRESSION:  No evidence of large vessel occlusion, significant stenosis or aneurysm in the head and neck.    Normal perfusion images of the brain.      7. Relevant blood tests:    pending  8. Relevant cardiac rhythm monitoring:  no reported events  9. Relevant Cardiac Structure: (TTE/ANITA +/-):[ ]No intracardiac thrombus/[ ] no vegetation/[ ]no [place]/EF:  pending  Home Medications:  Aspirin Low Dose 81 mg oral tablet, chewable: 1 tab(s) orally once a day (01 Nov 2020 16:56)  ezetimibe 10 mg oral tablet: 1 tab(s) orally once a day (01 Nov 2020 16:56)  pioglitazone 30 mg oral tablet: 1 tab(s) orally once a day (01 Nov 2020 16:56)      MEDICATIONS  (STANDING):  ALBUTerol    90 MICROgram(s) HFA Inhaler 2 Puff(s) Inhalation every 6 hours  aspirin 325 milliGRAM(s) Oral daily  atorvastatin 80 milliGRAM(s) Oral at bedtime  chlorhexidine 4% Liquid 1 Application(s) Topical <User Schedule>  pantoprazole   Suspension 40 milliGRAM(s) Oral once  tiotropium 18 MICROgram(s) Capsule 1 Capsule(s) Inhalation daily      10. PT/OT/Speech/Rehab/S&Sw/ Cognitive eval results and recommendations:  pending  11. Exam:    Vital Signs Last 24 Hrs  T(C): 35.9 (05 Nov 2020 04:27), Max: 36.1 (04 Nov 2020 20:41)  T(F): 96.7 (05 Nov 2020 04:27), Max: 96.9 (04 Nov 2020 20:41)  HR: 73 (05 Nov 2020 08:41) (61 - 74)  BP: 141/78 (05 Nov 2020 08:41) (111/79 - 148/89)  BP(mean): 107 (05 Nov 2020 08:41) (87 - 108)  RR: 18 (05 Nov 2020 08:41) (17 - 20)  SpO2: 97% (05 Nov 2020 08:41) (95% - 98%)    12.   NIH STROKE SCALE  Item	                                                        Score  1 a.	Level of Consciousness	               	0  1 b. LOC Questions	                                0  1 c.	LOC Commands	                               	0  2.	Best Gaze	                                        0  3.	Visual	                                                0  4.	Facial Palsy	                                        0  5 a.	Motor Arm - Left	                                0  5 b.	Motor Arm - Right	                        0  6 a.	Motor Leg - Left	                                0  6 b.	Motor Leg - Right	                                0  7.	Limb Ataxia	                                        0  8.	Sensory	                                                0  9.	Language	                                        0  10.	Dysarthria	                                        0  11.	Extinction and Inattention  	        0  ______________________________________  TOTAL	                                                        0      mRS:0  0 No symptoms at all  1 No significant disability despite symptoms; able to carry out all usual duties and activities without assistance  2 Slight disability; unable to carry out all previous activities, but able to look after own affairs  3 Moderate disability; requiring some help, but able to walk without assistance  4 Moderately severe disability; unable to walk without assistance and unable to attend to own bodily needs without assistance  5 Severe disability; bedridden, incontinent and requiring constant nursing care and attention  6 Dead

## 2020-11-05 NOTE — PROGRESS NOTE ADULT - ASSESSMENT
60 M with PMH of recent stroke s/p tPA in 08/2020, congenital heart disease s/p repair, HTN, DLD, RAA thrombus (was on xarelto and resolved on last ANITA), COPD, ex- smoker, dm htn, hld, sinus bradycardia s/p PPM, marginal cell lymphoma on maintenance rituxan (last chemo was december 21) presenting for acute change in mental status and bilateral weakness more prominent over the right side.    #Right sided weakness +dysarthria  -CTH is negative for acute pathology   -CTP negative, and repeat CTH stable.  -S/pr IV tpa which was given at 14.35 pm  -vEEG with no indication of seizure activity  -Repeat CT scan 24 h post TPA  -Speech and swallow c/s-->Regular diet  -neurology on board-->Keep syst <180 >120 and diast <105>60 / post-TPA protocol/ post tpa protocol -NO AC or Antiplatelets for 24 hrs  -C/w statin 80 mg daily.  -C/w  MG DAILY  -MRI head w, w/o contrast will need scheduling for representative > ordered  -f/u video EEG  -f/u cards for ANITA   -EPS for PPM interrogation  -downgrade from Stroke to tele    #HTN  -on amlodipine 5 mg daily-->hold amlodipine for now  -Keep bp 120-180  -on low dose pressors / MAP maintained  -no evidence of infection    #DLD  -C/W statin  -lipid panel noted    #DM  -A1c 7.1  -Keep -180  -if BS>200 will start patient on insulin   -Out patient follow up with endocrinology    # marginal cell lymphoma on maintenance rituxan  -out patient follow up with oncologist    #sinus bradycardia s/p PPM    # DVT PPx: SCD  GI PPX: No indication  Diet: DASH/Carb consistent  Activity: PT/OT  Dispo: from home  Code Status: full code  Pending :MRI. VEEG, ANITA   60 M with PMH of recent stroke s/p tPA in 08/2020, congenital heart disease s/p repair, HTN, DLD, RAA thrombus (was on xarelto and resolved on last ANITA), COPD, ex- smoker, dm htn, hld, sinus bradycardia s/p PPM, marginal cell lymphoma on maintenance rituxan (last chemo was december 21) presenting for acute change in mental status and bilateral weakness more prominent over the right side.    #Right sided weakness +dysarthria  -CTH is negative for acute pathology   -CTP negative, and repeat CTH stable.  -S/pr IV tpa which was given at 14.35 pm  -vEEG with no indication of seizure activity  -Repeat CT scan 24 h post TPA  -Speech and swallow c/s-->Regular diet  -neurology on board-->Keep syst <180 >120 and diast <105>60 / post-TPA protocol/ post tpa protocol -NO AC or Antiplatelets for 24 hrs  -C/w statin 80 mg daily.  -C/w  MG DAILY  -MRI head w, w/o contrast will need scheduling for representative > ordered  -f/u video EEG  -ANITA tomorrow scheduled, NPO after midnight - ordered  -EPS for PPM interrogation  -downgrade from Stroke to tele    #HTN  -on amlodipine 5 mg daily-->hold amlodipine for now  -Keep bp 120-180  -on low dose pressors / MAP maintained  -no evidence of infection    #DLD  -C/W statin  -lipid panel noted    #DM  -A1c 7.1  -Keep -180  -if BS>200 will start patient on insulin   -Out patient follow up with endocrinology    # marginal cell lymphoma on maintenance rituxan  -out patient follow up with oncologist    #sinus bradycardia s/p PPM    # DVT PPx: SCD  GI PPX: No indication  Diet: DASH/Carb consistent  Activity: PT/OT  Dispo: from home  Code Status: full code  Pending :MRI. VEEG, ANITA   60 M with PMH of recent stroke s/p tPA in 08/2020, congenital heart disease s/p repair, HTN, DLD, RAA thrombus (was on xarelto and resolved on last ANITA), COPD, ex- smoker, dm htn, hld, sinus bradycardia s/p PPM, marginal cell lymphoma on maintenance rituxan (last chemo was december 21) presenting for acute change in mental status and bilateral weakness more prominent over the right side.    #Right sided weakness +dysarthria  -CTH is negative for acute pathology   -CTP negative, and repeat CTH stable.  -S/pr IV tpa which was given at 14.35 pm  -vEEG with no indication of seizure activity  -Repeat CT scan 24 h post TPA  -Speech and swallow c/s-->Regular diet  -neurology on board-->Keep syst <180 >120 and diast <105>60 / post-TPA protocol/ post tpa protocol -NO AC or Antiplatelets for 24 hrs  -C/w statin 80 mg daily.  -C/w  MG DAILY  -MRI head w, w/o contrast will need scheduling for representative > ordered  -24 hr video EEG > normal  -ANITA tomorrow scheduled, NPO after midnight - ordered  -EPS for PPM interrogation > [employer] contacted for interrogation (EPS requested [employer] be contacted due to busy schedule)  -downgrade from Stroke to tele    #HTN  -on amlodipine 5 mg daily-->hold amlodipine for now  -Keep bp 120-180  -on low dose pressors / MAP maintained  -no evidence of infection    #DLD  -C/W statin  -lipid panel noted    #DM  -A1c 7.1  -Keep -180  -if BS>200 will start patient on insulin   -Out patient follow up with endocrinology    # marginal cell lymphoma on maintenance rituxan  -out patient follow up with oncologist    #sinus bradycardia s/p PPM    # DVT PPx: SCD  GI PPX: No indication  Diet: DASH/Carb consistent  Activity: PT/OT  Dispo: from home  Code Status: full code  Pending :MRI. VEEG, ANITA   60 M with PMH of recent stroke s/p tPA in 08/2020, congenital heart disease s/p repair, HTN, DLD, RAA thrombus (was on xarelto and resolved on last ANTIA), COPD, ex- smoker, dm htn, hld, sinus bradycardia s/p PPM, marginal cell lymphoma on maintenance rituxan (last chemo was december 21) presenting for acute change in mental status and bilateral weakness more prominent over the right side.    #Right sided weakness +dysarthria  -CTH is negative for acute pathology   -CTP negative, and repeat CTH stable.  -S/pr IV tpa which was given at 14.35 pm  -vEEG with no indication of seizure activity  -Repeat CT scan 24 h post TPA  -Speech and swallow c/s-->Regular diet  -neurology on board-->Keep syst <180 >120 and diast <105>60 / post-TPA protocol/ post tpa protocol -NO AC or Antiplatelets for 24 hrs  -C/w statin 80 mg daily.  -C/w  MG DAILY  -MRI head w, w/o contrast will need scheduling for representative > ordered  -24 hr video EEG > normal  -ANITA tomorrow scheduled, NPO after midnight - ordered  -EPS for PPM interrogation > [employer] contacted for interrogation (EPS requested [employer] be contacted due to busy schedule) > Representative states there were no events, normal read, sending interrogation documents  -downgrade from Stroke to tele    #HTN  -on amlodipine 5 mg daily-->hold amlodipine for now  -Keep bp 120-180  -on low dose pressors / MAP maintained  -no evidence of infection    #DLD  -C/W statin  -lipid panel noted    #DM  -A1c 7.1  -Keep -180  -if BS>200 will start patient on insulin   -Out patient follow up with endocrinology    # marginal cell lymphoma on maintenance rituxan  -out patient follow up with oncologist    #sinus bradycardia s/p PPM    # DVT PPx: SCD  GI PPX: No indication  Diet: DASH/Carb consistent  Activity: PT/OT  Dispo: from home  Code Status: full code  Pending :MRI. VEEG, ANITA

## 2020-11-05 NOTE — EEG REPORT - NS EEG TEXT BOX
Epilepsy Attending Note:     LONG GEE    60y Male  MRN [id number]    Vital Signs Last 24 Hrs  T(C): 35.9 (05 Nov 2020 04:27), Max: 36.1 (04 Nov 2020 20:41)  T(F): 96.7 (05 Nov 2020 04:27), Max: 96.9 (04 Nov 2020 20:41)  HR: 73 (05 Nov 2020 08:41) (61 - 74)  BP: 141/78 (05 Nov 2020 08:41) (111/79 - 148/89)  BP(mean): 107 (05 Nov 2020 08:41) (87 - 108)  RR: 18 (05 Nov 2020 08:41) (17 - 20)  SpO2: 97% (05 Nov 2020 08:41) (95% - 98%)                          15.8   3.86  )-----------( 182      ( 05 Nov 2020 05:23 )             48.7       11-05    140  |  103  |  15  ----------------------------<  117<H>  4.1   |  28  |  1.1    Ca    9.7      05 Nov 2020 05:23  Phos  3.9     11-05  Mg     2.2     11-05    TPro  6.6  /  Alb  4.3  /  TBili  1.8<H>  /  DBili  x   /  AST  18  /  ALT  17  /  AlkPhos  54  11-05      MEDICATIONS  (STANDING):  ALBUTerol    90 MICROgram(s) HFA Inhaler 2 Puff(s) Inhalation every 6 hours  aspirin 325 milliGRAM(s) Oral daily  atorvastatin 80 milliGRAM(s) Oral at bedtime  chlorhexidine 4% Liquid 1 Application(s) Topical <User Schedule>  pantoprazole   Suspension 40 milliGRAM(s) Oral once  tiotropium 18 MICROgram(s) Capsule 1 Capsule(s) Inhalation daily    MEDICATIONS  (PRN):            VEEG in the last 24 hours:    Background------------ 8-9 hz, reactive. organized    Focal and generalized slowing------------- none    Interictal activity------------- none    Events---------------- normal    Seizures------------- none    Impression: normal V-EEG X 24 hours    Plan - discussed with the neurology

## 2020-11-05 NOTE — PROGRESS NOTE ADULT - REASON FOR ADMISSION
generalized weakness; code stroke w/ tPA
R>L weakness
Stroke-like symptoms
right sided weakness
stroke
stroke

## 2020-11-05 NOTE — CONSULT NOTE ADULT - SUBJECTIVE AND OBJECTIVE BOX
Patient is a 60y old  Male who presents with a chief complaint of stroke (05 Nov 2020 10:38)      HPI:  Cardiology service was called to do ANITA on the patient after the recent acute CVA and receiving tPA.  he is stable now, does not have cardiac complaint  he has had ANITA, stress nuclear stress and cardiac Cath in 2019, hx of CHD ( type of the repaired congenital defect is not clear to me).    60 M with PMH of recent stroke s/p tPA in 08/2020, congenital heart disease s/p repair, HTN, DLD, RAA thrombus (was on xarelto and resolved on last ANITA), COPD, ex- smoker, dm htn, hld, sinus bradycardia s/p PPM, marginal cell lymphoma on maintenance rituxan (last chemo was december 21) presenting for acute change in mental status    History obtained from wife at bedside who reports pt felt sudden onset bilateral upper extremity and lower extremity weakness R>>L associated with generalized warmth and numbness. He collapsed on the couch, which prompted her to call EMS. She took his FS which was 170, but was unable to obtain his BP. She reports his rpesentations is similar to his presentation in August when he was admitted for a stroke requiring tPA. Imaging at the time was negative. He had some residula R-sided weakness that was improving, but remained with slight expressive aphasia/difficulty finding words.    In the ED, VS: 166/57 HR 90 RR18 satting 99% on 3L NC. Code stroke was called and pt received tPA around 3.25 as he was within therapeutic window.    CTH: without evidence of acute ischemic stroke or hemorrhage. Pt taken for CT perfusion during exam. admitted to MICU for monitoring, on levophed 0.03   (01 Nov 2020 16:32)      PAST MEDICAL & SURGICAL HISTORY:  Pacemaker    Non Hodgkin&#x27;s lymphoma    Chronic obstructive pulmonary disease, unspecified COPD type    DM (diabetes mellitus)    High cholesterol    HTN (hypertension)    S/P transesophageal echocardiogram (ANITA)    Artificial cardiac pacemaker        PREVIOUS DIAGNOSTIC TESTING:      ECHO  FINDINGS: < from: Transthoracic Echocardiogram (07.31.20 @ 09:49) >  Summary:   1. Normal global left ventricular systolic function.   2. LV Ejection Fraction by Chávez's Method with a biplane EF of 56 %.   3. Mild concentric left ventricular hypertrophy.   4. Mildly increased LV wall thickness.   5. Normal left ventricular internal cavity size.   6. LA volume Index is 14.9 ml/m² ml/m2.   7. There is mild aortic root calcification.    < end of copied text >  < from: ANITA w/Probe Placement (07.23.19 @ 08:17) >   1. Left ventricular ejection fraction, by visual estimation, is 45 to   50%.   2. Mildly decreased global left ventricular systolic function.   3. LV Ejection Fraction by Chávez's Method.   4. Increased LV wall thickness.   5. Normal left ventricular internal cavity size.   6. Mildly enlarged right atrium.   7. There is no evidence of pericardial effusion.   8. Color flow doppler and intravenous injection of agitated saline   demonstrates the presence of an intact intra atrial septum.   9. Complex atheroma seen in the aortic arch.  10. Mobile intra-atrial septum.  11. No evidence of aortic stenosis.  12. No left atrial appendage thrombus.      < end of copied text >      STRESS TEST  FINDINGS: < from: NM Nuclear Stress Multiple (03.28.19 @ 11:47) >  1.   Small to moderate size reversible defect in the septum and apex of   the left ventricle consistent with ischemia.  2.  Normal left ventricular wall motion and wall thickening.  3.  Left ventricular fraction calculated as 67% which is within the range   of normal     < end of copied text >  < from: NM MUGA Scan (08.22.18 @ 11:49) >  1. Left ventricular ejection fraction of  54% which is within the range   of normal  2. Left ventricle with normal right and left ventricular wall motion       < end of copied text >      CATHETERIZATION  FINDINGS:  < from: Cardiac Cath Lab - Adult (09.18.19 @ 15:51) >    CORONARY CIRCULATION: There was no angiographic evidence for occlusive    coronary artery disease.    < end of copied text >      MEDICATIONS  (STANDING):  ALBUTerol    90 MICROgram(s) HFA Inhaler 2 Puff(s) Inhalation every 6 hours  aspirin 325 milliGRAM(s) Oral daily  atorvastatin 80 milliGRAM(s) Oral at bedtime  chlorhexidine 4% Liquid 1 Application(s) Topical <User Schedule>  pantoprazole   Suspension 40 milliGRAM(s) Oral once  tiotropium 18 MICROgram(s) Capsule 1 Capsule(s) Inhalation daily    MEDICATIONS  (PRN):      FAMILY HISTORY:  Family history of cancer (Mother)    Family history of breast cancer (Sibling)  in sister at the age of 59        SOCIAL HISTORY:  CIGARETTES: ex smoker  ALCOHOL: social   DRUGS: no                      REVIEW OF SYSTEMS:  CONSTITUTIONAL: No distress, Looks stable  NECK: No pain or stiffness  RESPIRATORY: No cough, wheezing, shortness of breath  CARDIOVASCULAR: No chest pain, SOB, palpitations, leg swelling  GASTROINTESTINAL: No abdominal or epigastric pain. No nausea, vomiting, or hematemesis;  No melena.  NEUROLOGICAL: No dizziness, headaches, memory loss, no new loss of strength  SKIN: No itching, burning, rashes, or lesions   ENDOCRINE: No heat or cold intolerance  MUSCULOSKELETAL: No joint pain, No  swelling; No muscle pain  PSYCHIATRIC: anxiety  ALLERGY: No hives, itching, rash          Vital Signs Last 24 Hrs  T(C): 35.9 (05 Nov 2020 04:27), Max: 36.1 (04 Nov 2020 20:41)  T(F): 96.7 (05 Nov 2020 04:27), Max: 96.9 (04 Nov 2020 20:41)  HR: 73 (05 Nov 2020 08:41) (61 - 74)  BP: 149/90 (05 Nov 2020 11:16) (111/79 - 149/90)  BP(mean): 111 (05 Nov 2020 11:16) (87 - 111)  RR: 18 (05 Nov 2020 08:41) (17 - 20)  SpO2: 97% (05 Nov 2020 08:41) (95% - 98%)                      PHYSICAL EXAM:  GENERAL: No distress, well developed  HEAD:  Atraumatic, Normocephalic  NECK: Supple, No JVD, No Bruit   NERVOUS SYSTEM:  Alert, Awake, Oriented to time, place, person; Normal memory and speech; Normal motor Strength 5/5 B/L upper and lower extremities  CHEST/LUNG: Normal air entry to lung base bilaterally; No wheeze, crackle, rales, rhonchi  HEART: Regular heart beat, S1, A2, P2, No S3, No gallop, No murmur  ABDOMEN: Soft, Non tender, Non distended; Bowel sounds present  EXTREMITIES:  2+ Peripheral Pulses, No clubbing, No edema  SKIN: No rashes or lesions      ECG: < from: 12 Lead ECG (11.01.20 @ 16:07) >    Diagnosis Line Atrial-sensed ventricular-paced rhythm  Abnormal ECG    < end of copied text >      I&O's Detail    04 Nov 2020 07:01  -  05 Nov 2020 07:00  --------------------------------------------------------  IN:  Total IN: 0 mL    OUT:    Voided (mL): 1120 mL  Total OUT: 1120 mL    Total NET: -1120 mL          LABS:                        15.8   3.86  )-----------( 182      ( 05 Nov 2020 05:23 )             48.7     11-05    140  |  103  |  15  ----------------------------<  117<H>  4.1   |  28  |  1.1    Ca    9.7      05 Nov 2020 05:23  Phos  3.9     11-05  Mg     2.2     11-05    TPro  6.6  /  Alb  4.3  /  TBili  1.8<H>  /  DBili  x   /  AST  18  /  ALT  17  /  AlkPhos  54  11-05            I&O's Summary    04 Nov 2020 07:01  -  05 Nov 2020 07:00  --------------------------------------------------------  IN: 0 mL / OUT: 1120 mL / NET: -1120 mL        RADIOLOGY & ADDITIONAL STUDIES: < from: Xray Chest 1 View-PORTABLE IMMEDIATE (Xray Chest 1 View-PORTABLE IMMEDIATE .) (11.02.20 @ 09:51) >    No radiographic evidence of acute cardiopulmonary disease.    < end of copied text >  < from: CT Head No Cont (11.02.20 @ 18:06) >  No acute intracranial pathology. No evidence of midline shift, mass effect or intracranial hemorrhage.    < end of copied text >  < from: CT Perfusion w/ Maps w/ IV Cont (11.01.20 @ 17:21) >  No evidence of large vessel occlusion, significant stenosis or aneurysm in the head and neck.    Normal perfusion images of the brain.    < end of copied text >

## 2020-11-05 NOTE — PROGRESS NOTE ADULT - ATTENDING COMMENTS
Patient seen and examined, denies any complaints.    #History of CVA, suspected recurrent CVA s/p tpa, Prolonged period of amnesia likely secondary to suspected Partial Seizures,: CT imaging reviewed, Neurology recommendations noted, f/u 24 hour VEEG, MRI Brain, Cardiology evaluation for ANITA-history of RA thrombus, speech and swallow seen regular diet with thin liquids, PT/OT, continue ASA 325mg and high intensity statin, HgbA1C% 7.1, f/u lipid panel, seizure, aspiration and fall precautions     #HTN allowing for permissive hypertension, amlodipine on hold     #Marginal cell lymphoma on maintenance Rituxan: defer to outpatient Oncology follow up     #history of Sinus bradycardia s/p PPM: EPS for interrogation     Disposition: Pending cardiac workup, MRI Brain, VEEG, Downgrade to Telemetry     Plan discussed with Resident Physician. Patient seen and examined, denies any complaints, requests to shower/bathe himself.    #History of CVA, suspected recurrent CVA s/p tpa, Prolonged period of amnesia likely secondary to suspected Partial Seizures,: CT imaging reviewed, Neurology recommendations noted, f/u 24 hour VEEG, MRI Brain (MRI was being serviced on yesterday), Cardiology evaluation for ANITA-history of RA thrombus, speech and swallow seen regular diet with thin liquids, PT/OT, continue ASA 325mg and high intensity statin, HgbA1C% 7.1, f/u lipid panel, seizure, aspiration and fall precautions     #HTN allowing for permissive hypertension, amlodipine on hold     #Marginal cell lymphoma on maintenance Rituxan: defer to outpatient Oncology follow up     #history of Sinus bradycardia s/p PPM: EPS for interrogation     Disposition: Pending cardiac workup, MRI Brain, VEEG, Downgrade to Telemetry     Plan discussed with Resident Physician.

## 2020-11-05 NOTE — PROGRESS NOTE ADULT - ASSESSMENT
13. Impression: 60 M with PMH of recent stroke s/p tPA in 08/2020, congenital heart disease s/p repair, HTN, DLD, RAA thrombus (was on xarelto and resolved on last ANITA), COPD, ex- smoker, dm htn, hld, sinus bradycardia s/p PPM, marginal cell lymphoma on maintenance rituxan (last chemo was december 21) presenting for acute change in mental status s/ tpa, currently back to his baseline. VEEG failed to report seizure or significant abnormality as per epilepsy team.     Suggestion:  -Obtain ANITA  -MRI brain w/w/o (hx of lymphoma, MALDONADO to r/o mass/lesions) if pacemaker is compatible w/ machine   -Continue ASA and statin.   -Keep systolic blood pressure 120-185  -Can come out of the stroke unit.         Ravi Viera NP  [phone]

## 2020-11-05 NOTE — PROGRESS NOTE ADULT - SUBJECTIVE AND OBJECTIVE BOX
HPI:  60 M with PMH of recent stroke s/p tPA in 08/2020, congenital heart disease s/p repair, HTN, DLD, RAA thrombus (was on xarelto and resolved on last ANITA), COPD, ex- smoker, dm htn, hld, sinus bradycardia s/p PPM, marginal cell lymphoma on maintenance rituxan (last chemo was december 21) presenting for acute change in mental status    History obtained from wife at bedside who reports pt felt sudden onset bilateral upper extremity and lower extremity weakness R>>L associated with generalized warmth and numbness. He collapsed on the couch, which prompted her to call EMS. She took his FS which was 170, but was unable to obtain his BP. She reports his rpesentations is similar to his presentation in August when he was admitted for a stroke requiring tPA. Imaging at the time was negative. He had some residula R-sided weakness that was improving, but remained with slight expressive aphasia/difficulty finding words.    In the ED, VS: 166/57 HR 90 RR18 satting 99% on 3L NC. Code stroke was called and pt received tPA around 3.25 as he was within therapeutic window.    CTH: without evidence of acute ischemic stroke or hemorrhage. Pt taken for CT perfusion during exam. admitted to MICU for monitoring, on levophed 0.03   (01 Nov 2020 16:32)    Currently admitted to medicine with the primary diagnosis of Stroke-like symptoms       Today is hospital day 4d.     INTERVAL HPI / OVERNIGHT EVENTS:  Patient was examined and seen at bedside. This morning he is resting comfortably in bed and reports no new issues or overnight events.     ROS: Otherwise unremarkable     PAST MEDICAL & SURGICAL HISTORY  Pacemaker    Non Hodgkin&#x27;s lymphoma    Chronic obstructive pulmonary disease, unspecified COPD type    DM (diabetes mellitus)    High cholesterol    HTN (hypertension)    S/P transesophageal echocardiogram (ANITA)    Artificial cardiac pacemaker      ALLERGIES  acyclovir (Anaphylaxis)  Bactrim (Anaphylaxis)    MEDICATIONS  STANDING MEDICATIONS  ALBUTerol    90 MICROgram(s) HFA Inhaler 2 Puff(s) Inhalation every 6 hours  aspirin 325 milliGRAM(s) Oral daily  atorvastatin 80 milliGRAM(s) Oral at bedtime  chlorhexidine 4% Liquid 1 Application(s) Topical <User Schedule>  pantoprazole   Suspension 40 milliGRAM(s) Oral once  tiotropium 18 MICROgram(s) Capsule 1 Capsule(s) Inhalation daily    PRN MEDICATIONS    VITALS:  T(F): 96.7  HR: 73  BP: 141/78  RR: 18  SpO2: 97%    PHYSICAL EXAM  GEN: NAD, Resting comfortably in bed, EEG leads still in place  PULM: Clear to auscultation bilaterally, No wheezes  CVS: Regular rate and rhythm, S1-S2, no murmurs  ABD: Soft, non-tender, non-distended, no guarding  EXT: No edema  NEURO: A&Ox3, R sided weakness unchanged since stroke in August 2020    LABS                        15.8   3.86  )-----------( 182      ( 05 Nov 2020 05:23 )             48.7     11-05    140  |  103  |  15  ----------------------------<  117<H>  4.1   |  28  |  1.1    Ca    9.7      05 Nov 2020 05:23  Phos  3.9     11-05  Mg     2.2     11-05    TPro  6.6  /  Alb  4.3  /  TBili  1.8<H>  /  DBili  x   /  AST  18  /  ALT  17  /  AlkPhos  54  11-05                  RADIOLOGY

## 2020-11-06 ENCOUNTER — TRANSCRIPTION ENCOUNTER (OUTPATIENT)
Age: 60
End: 2020-11-06

## 2020-11-06 VITALS
TEMPERATURE: 98 F | DIASTOLIC BLOOD PRESSURE: 70 MMHG | SYSTOLIC BLOOD PRESSURE: 135 MMHG | WEIGHT: 238.1 LBS | HEART RATE: 73 BPM | HEIGHT: 72 IN

## 2020-11-06 LAB
ALBUMIN SERPL ELPH-MCNC: 4.2 G/DL — SIGNIFICANT CHANGE UP (ref 3.5–5.2)
ALP SERPL-CCNC: 54 U/L — SIGNIFICANT CHANGE UP (ref 30–115)
ALT FLD-CCNC: 18 U/L — SIGNIFICANT CHANGE UP (ref 0–41)
ANION GAP SERPL CALC-SCNC: 13 MMOL/L — SIGNIFICANT CHANGE UP (ref 7–14)
AST SERPL-CCNC: 19 U/L — SIGNIFICANT CHANGE UP (ref 0–41)
BASOPHILS # BLD AUTO: 0.03 K/UL — SIGNIFICANT CHANGE UP (ref 0–0.2)
BASOPHILS NFR BLD AUTO: 0.8 % — SIGNIFICANT CHANGE UP (ref 0–1)
BILIRUB SERPL-MCNC: 1.7 MG/DL — HIGH (ref 0.2–1.2)
BUN SERPL-MCNC: 17 MG/DL — SIGNIFICANT CHANGE UP (ref 10–20)
CALCIUM SERPL-MCNC: 9.1 MG/DL — SIGNIFICANT CHANGE UP (ref 8.5–10.1)
CHLORIDE SERPL-SCNC: 104 MMOL/L — SIGNIFICANT CHANGE UP (ref 98–110)
CO2 SERPL-SCNC: 24 MMOL/L — SIGNIFICANT CHANGE UP (ref 17–32)
CREAT SERPL-MCNC: 1 MG/DL — SIGNIFICANT CHANGE UP (ref 0.7–1.5)
EOSINOPHIL # BLD AUTO: 0.21 K/UL — SIGNIFICANT CHANGE UP (ref 0–0.7)
EOSINOPHIL NFR BLD AUTO: 5.8 % — SIGNIFICANT CHANGE UP (ref 0–8)
GLUCOSE SERPL-MCNC: 121 MG/DL — HIGH (ref 70–99)
HCT VFR BLD CALC: 47.8 % — SIGNIFICANT CHANGE UP (ref 42–52)
HGB BLD-MCNC: 15.6 G/DL — SIGNIFICANT CHANGE UP (ref 14–18)
IMM GRANULOCYTES NFR BLD AUTO: 1.1 % — HIGH (ref 0.1–0.3)
LYMPHOCYTES # BLD AUTO: 0.6 K/UL — LOW (ref 1.2–3.4)
LYMPHOCYTES # BLD AUTO: 16.5 % — LOW (ref 20.5–51.1)
MAGNESIUM SERPL-MCNC: 2.1 MG/DL — SIGNIFICANT CHANGE UP (ref 1.8–2.4)
MCHC RBC-ENTMCNC: 29.1 PG — SIGNIFICANT CHANGE UP (ref 27–31)
MCHC RBC-ENTMCNC: 32.6 G/DL — SIGNIFICANT CHANGE UP (ref 32–37)
MCV RBC AUTO: 89.2 FL — SIGNIFICANT CHANGE UP (ref 80–94)
MONOCYTES # BLD AUTO: 0.52 K/UL — SIGNIFICANT CHANGE UP (ref 0.1–0.6)
MONOCYTES NFR BLD AUTO: 14.3 % — HIGH (ref 1.7–9.3)
NEUTROPHILS # BLD AUTO: 2.23 K/UL — SIGNIFICANT CHANGE UP (ref 1.4–6.5)
NEUTROPHILS NFR BLD AUTO: 61.5 % — SIGNIFICANT CHANGE UP (ref 42.2–75.2)
NRBC # BLD: 0 /100 WBCS — SIGNIFICANT CHANGE UP (ref 0–0)
PHOSPHATE SERPL-MCNC: 3.6 MG/DL — SIGNIFICANT CHANGE UP (ref 2.1–4.9)
PLATELET # BLD AUTO: 196 K/UL — SIGNIFICANT CHANGE UP (ref 130–400)
POTASSIUM SERPL-MCNC: 4.3 MMOL/L — SIGNIFICANT CHANGE UP (ref 3.5–5)
POTASSIUM SERPL-SCNC: 4.3 MMOL/L — SIGNIFICANT CHANGE UP (ref 3.5–5)
PROT SERPL-MCNC: 6.1 G/DL — SIGNIFICANT CHANGE UP (ref 6–8)
RBC # BLD: 5.36 M/UL — SIGNIFICANT CHANGE UP (ref 4.7–6.1)
RBC # FLD: 12.5 % — SIGNIFICANT CHANGE UP (ref 11.5–14.5)
SODIUM SERPL-SCNC: 141 MMOL/L — SIGNIFICANT CHANGE UP (ref 135–146)
WBC # BLD: 3.63 K/UL — LOW (ref 4.8–10.8)
WBC # FLD AUTO: 3.63 K/UL — LOW (ref 4.8–10.8)

## 2020-11-06 PROCEDURE — 99238 HOSP IP/OBS DSCHRG MGMT 30/<: CPT

## 2020-11-06 RX ORDER — ATORVASTATIN CALCIUM 80 MG/1
1 TABLET, FILM COATED ORAL
Qty: 60 | Refills: 0
Start: 2020-11-06 | End: 2021-01-04

## 2020-11-06 RX ORDER — AMLODIPINE BESYLATE 2.5 MG/1
5 TABLET ORAL DAILY
Refills: 0 | Status: DISCONTINUED | OUTPATIENT
Start: 2020-11-06 | End: 2020-11-06

## 2020-11-06 RX ORDER — INFLUENZA VIRUS VACCINE 15; 15; 15; 15 UG/.5ML; UG/.5ML; UG/.5ML; UG/.5ML
0.5 SUSPENSION INTRAMUSCULAR ONCE
Refills: 0 | Status: COMPLETED | OUTPATIENT
Start: 2020-11-06 | End: 2020-11-06

## 2020-11-06 RX ORDER — EZETIMIBE 10 MG/1
1 TABLET ORAL
Qty: 0 | Refills: 0 | DISCHARGE

## 2020-11-06 RX ORDER — ASPIRIN/CALCIUM CARB/MAGNESIUM 324 MG
1 TABLET ORAL
Qty: 30 | Refills: 0
Start: 2020-11-06 | End: 2020-12-05

## 2020-11-06 RX ADMIN — AMLODIPINE BESYLATE 5 MILLIGRAM(S): 2.5 TABLET ORAL at 12:36

## 2020-11-06 RX ADMIN — Medication 325 MILLIGRAM(S): at 12:08

## 2020-11-06 RX ADMIN — TIOTROPIUM BROMIDE 1 CAPSULE(S): 18 CAPSULE ORAL; RESPIRATORY (INHALATION) at 07:55

## 2020-11-06 RX ADMIN — ALBUTEROL 2 PUFF(S): 90 AEROSOL, METERED ORAL at 13:20

## 2020-11-06 RX ADMIN — INFLUENZA VIRUS VACCINE 0.5 MILLILITER(S): 15; 15; 15; 15 SUSPENSION INTRAMUSCULAR at 12:36

## 2020-11-06 NOTE — DISCHARGE NOTE PROVIDER - NSDCFUADDINST_GEN_ALL_CORE_FT
1) Please take your medications as prescribed for an optimal care     2) Please make the above suggested appointments for a better care.

## 2020-11-06 NOTE — DISCHARGE NOTE PROVIDER - NSDCFUSCHEDAPPT_GEN_ALL_CORE_FT
LONG GEE ; 11/16/2020 ; [facility] [address] ; 12/07/2020 ; [facility] [address] ; 12/10/2020 ; [facility] [address]

## 2020-11-06 NOTE — PRE-ANESTHESIA EVALUATION ADULT - NSANTHTOBACCOSD_GEN_ALL_CORE
Chief Complaint   Patient presents with     Annual Eye Exam      Accompanied by self  Last Eye Exam: 2 years ago  Dilated Previously: Yes    What are you currently using to see?  Readers +1.25       Distance Vision Acuity: Satisfied with vision    Near Vision Acuity: Not satisfied     Eye Comfort: good  Do you use eye drops? 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       OBJECTIVE: See Ophthalmology exam    ASSESSMENT:    ICD-10-CM    1. Encounter for examination of eyes and vision without abnormal findings Z01.00    2. Myopia of both eyes H52.13    3. Regular astigmatism of both eyes H52.223    4. Presbyopia H52.4       PLAN:     Patient Instructions   Rhonda was advised of today's exam findings.  Optional to fill new glasses prescription, mild glasses prescription   Okay to use over the counter reading glasses as needed- Recommend +1.25 power.   Copy of glasses Rx provided today.  Return in 1-2 years for eye exam, or sooner if needed.    The affects of the dilating drops last for 4- 6 hours.  You will be more sensitive to light and vision will be blurry up close.  Mydriatic sunglasses were given if needed.      Major Beasley O.D.  [facility]  [place]  [address]    [phone]       
No

## 2020-11-06 NOTE — DISCHARGE NOTE PROVIDER - CARE PROVIDERS DIRECT ADDRESSES
[email or web address]

## 2020-11-06 NOTE — PROGRESS NOTE ADULT - ASSESSMENT
60 M with PMH of recent stroke s/p tPA in 08/2020, congenital heart disease s/p repair, HTN, DLD, RAA thrombus (was on xarelto and resolved on last NAITA), COPD, ex- smoker, dm htn, hld, sinus bradycardia s/p PPM, marginal cell lymphoma on maintenance rituxan (last chemo was december 21) presenting for acute change in mental status and bilateral weakness more prominent over the right side.    #Right sided weakness +dysarthria  -CTH is negative for acute pathology   -CTP negative, and repeat CTH stable ( No signs of ischemia or bleed post-tPA administration).  -S/pr IV tpa which was given at 14.35 pm on 11/1/2020  - Continue with Aspirin 81mg po once daily   - Continue with Statin 80mg po once daily   - EP Consulted for device interrogation --> Check if stroke mechanism is cardioembolic (Check for Afib/flutter)   - MRI done: Negative, check report above   - rEEG done: Negative   - Restart Amlodipine ( permissive hypertension is only 1st 24 hours and the patient is not hypotensive and is off pressors)  - ANITA pending will be done now.       #HTN  -on amlodipine 5 mg daily, will be restarted    #DLD  -Continue with atorvastatin 80mg po once daily   -lipid panel noted    #DM  -A1c 7.1  -if BS>200 will start patient on insulin   -Out patient follow up with endocrinology    # marginal cell lymphoma on maintenance rituxan  -out patient follow up with oncologist    #sinus bradycardia s/p PPM  - Will interrogate pacemaker for any events especially right before the stroke symptom onset on 11/1/2020    # DVT PPx: SCD  GI PPX: No indication  Diet: DASH/Carb consistent  Activity: PT/OT  Dispo: from home  Code Status: full code     60 M with PMH of recent stroke s/p tPA in 08/2020, congenital heart disease s/p repair, HTN, DLD, RAA thrombus (was on xarelto and resolved on last ANITA), COPD, ex- smoker, dm htn, hld, sinus bradycardia s/p PPM, marginal cell lymphoma on maintenance rituxan (last chemo was december 21) presenting for acute change in mental status and bilateral weakness more prominent over the right side.    #Right sided weakness +dysarthria  -CTH is negative for acute pathology   -CTP negative, and repeat CTH stable ( No signs of ischemia or bleed post-tPA administration).  -S/pr IV tpa which was given at 14.35 pm on 11/1/2020  - Continue with Aspirin 81mg po once daily   - Continue with Statin 80mg po once daily   - EP Consulted for device interrogation --> Check if stroke mechanism is cardioembolic (Check for Afib/flutter)   - MRI done: Negative, check report above   - rEEG done: Negative   - Restart Amlodipine ( permissive hypertension is only 1st 24 hours and the patient is not hypotensive and is off pressors)  - ANITA prelim results, check above.       #HTN  -on amlodipine 5 mg daily, will be restarted    #DLD  -Continue with atorvastatin 80mg po once daily   -lipid panel noted    #DM  -A1c 7.1  -if BS>200 will start patient on insulin   -Out patient follow up with endocrinology    # marginal cell lymphoma on maintenance rituxan  -out patient follow up with oncologist    #sinus bradycardia s/p PPM  - Will interrogate pacemaker for any events especially right before the stroke symptom onset on 11/1/2020    # DVT PPx: SCD  GI PPX: No indication  Diet: DASH/Carb consistent  Activity: PT/OT  Dispo: DC today after device interrogation  Code Status: full code

## 2020-11-06 NOTE — DISCHARGE NOTE PROVIDER - HOSPITAL COURSE
60 M with PMH of recent stroke s/p tPA in 08/2020, congenital heart disease s/p repair, DM, HTN, DLD, RAA thrombus (was on xarelto and resolved on last ANITA), COPD, ex- smoker, sinus bradycardia s/p PPM, marginal cell lymphoma on maintenance rituxan (last chemo was december 21) presenting for acute change in mental status and bilateral weakness more prominent over the right side.    #Right sided weakness +dysarthria  -CTH is negative for acute pathology   -CTP negative, and repeat CTH stable ( No signs of ischemia or bleed post tPA administration).  -S/pr IV tpa which was given at 14.35 pm on 11/1/2020  - Continue with Aspirin 81mg po once daily   - Continue with Statin 80mg po once daily   - EP Consulted for device interrogation --> Check if stroke mechanism is cardioembolic (Check for Afib/flutter)   - MRI done: Negative, check report above   - rEEG done: Negative   - Restart Amlodipine ( permissive hypertension is only 1st 24 hours and the patient is not hypotensive and is off pressors)  - AINTA prelim results normal.     #HTN  -on amlodipine 5 mg po once daily    #DLD  -Continue with atorvastatin 80mg po once daily   -lipid panel noted    #DM  -A1c 7.1  -if BS>200 will start patient on insulin   -Out patient follow up with endocrinology    # marginal cell lymphoma on maintenance rituxan  -out patient follow up with oncologist    #sinus bradycardia s/p PPM  - Will interrogate pacemaker for any events especially right before the stroke symptom onset on 11/1/2020   60 M with PMH of recent stroke s/p tPA in 08/2020, congenital heart disease s/p repair, DM, HTN, DLD, RAA thrombus (was on xarelto and resolved on last ANITA), COPD, ex- smoker, sinus bradycardia s/p PPM, marginal cell lymphoma on maintenance rituxan (last chemo was december 21) presenting for acute change in mental status and bilateral weakness more prominent over the right side.    #Right sided weakness +dysarthria  -CTH is negative for acute pathology   -CTP negative, and repeat CTH stable ( No signs of ischemia or bleed post tPA administration).  -S/pr IV tpa which was given at 14.35 pm on 11/1/2020  - Continue with Aspirin 81mg po once daily   - Continue with Statin 80mg po once daily   - EP Consulted for device interrogation --> Check if stroke mechanism is cardioembolic (Check for Afib/flutter)   - MRI done: Negative, check report above   - rEEG done: Negative   - Restart Amlodipine ( permissive hypertension is only 1st 24 hours and the patient is not hypotensive and is off pressors)  - ANITA results normal.     #HTN  -on amlodipine 5 mg po once daily    #DLD  -Continue with atorvastatin 80mg po once daily   -lipid panel noted    #DM  -A1c 7.1  -if BS>200 will start patient on insulin   -Out patient follow up with endocrinology    # marginal cell lymphoma on maintenance rituxan  -out patient follow up with oncologist    #sinus bradycardia s/p PPM  - interrogated no events

## 2020-11-06 NOTE — DISCHARGE NOTE PROVIDER - INSTRUCTIONS
1) To better control the risk factors of TIA/Strokes we should better control Diabetes, Hypertension and lipids, please enrich your diet with vegetables.     2) Avoid Pasta, rice, potatoes, corn to better control the diabetes    3) Try to restrict your salt intake to less than 3g per day

## 2020-11-06 NOTE — CHART NOTE - NSCHARTNOTEFT_GEN_A_CORE
POST OPERATIVE PROCEDURAL DOCUMENTATION  PRE-OP DIAGNOSIS:  CVA (recurrent)      POST-OP DIAGNOSIS:    PROCEDURE: Transesophageal echocardiogram    Primary Physician:  Dr. Chavez  Assistant: Dr. Castaneda    ANESTHESIA TYPE  [  ] General Anesthesia  [ x ] Conscious Sedation  [  ] Local/Regional    CONDITION  [  ] Critical  [  ] Serious  [  ] Fair  [ x ] Good    SPECIMENS REMOVED (IF APPLICABLE): N/A    IMPLANTS (IF APPLICABLE): None    ESTIMATED BLOOD LOSS: None    COMPLICATIONS: None      FINDINGS:    After risks and benefits of procedures were explained, informed consent was obtained and placed in chart. Refer to Anesthesia note for sedation details.  The ANITA probe was passed into the esophagus without difficulty.  Transesophageal and transgastric images were obtained.  The ANITA probe was removed without difficulty and examined.  There was no evidence for bleeding.  The patient tolerated the procedure well without any immediate ANITA-related complications.      Preliminary Findings:  LA:   no thrombus  ZAIRA: Left atrial appendage was clear of clot and smoke.  LV: LVEF 55-60  MV: trace MR, no evidence of MS.   AV: No evidence of AI, no evidence of AS. mildly sclerotic.   RA: no thrombus.   TV: trace-mild TR.   PV: no PI.   IAS: no PFO. No R-> L shunt.       DIAGNOSIS/IMPRESSION:    PLAN OF CARE:  return to floor  f/u with cardiologist POST OPERATIVE PROCEDURAL DOCUMENTATION  PRE-OP DIAGNOSIS:  CVA (recurrent)    PRE-OP DIAGNOSIS: CVA, assess for PFO or cardiac thrombus  POST-OP DIAGNOSIS: Normal ANITA      PROCEDURE: Transesophageal echocardiogram    Primary Physician:  Dr. Chavez  Assistant: Dr. Castaneda    ANESTHESIA TYPE  [  ] General Anesthesia  [ x ] Conscious Sedation  [  ] Local/Regional    CONDITION  [  ] Critical  [  ] Serious  [ x ] Fair  [ ] Good    SPECIMENS REMOVED (IF APPLICABLE): N/A    IMPLANTS (IF APPLICABLE): None    ESTIMATED BLOOD LOSS: None    COMPLICATIONS: None      FINDINGS:    After risks and benefits of procedures were explained, informed consent was obtained and placed in chart. Refer to Anesthesia note for sedation details.  The ANITA probe was passed into the esophagus without difficulty.  Transesophageal and transgastric images were obtained.  The ANITA probe was removed without difficulty and examined.  There was no evidence for bleeding.  The patient tolerated the procedure well without any immediate ANITA-related complications.      Preliminary Findings:  LA:   no thrombus  ZAIRA: Left atrial appendage was clear of clot and smoke.  LV: LVEF 55-60  MV: trace MR, no evidence of MS.   AV: No evidence of AI, no evidence of AS. mildly sclerotic.   RA: no thrombus.   TV: trace-mild TR.   PV: no PI.   IAS: no PFO. No R-> L shunt.       DIAGNOSIS/IMPRESSION:    PLAN OF CARE:  return to floor  f/u with cardiologist

## 2020-11-06 NOTE — PROGRESS NOTE ADULT - SUBJECTIVE AND OBJECTIVE BOX
SUBJECTIVE:    Patient is a 60y old Male who presents with a chief complaint of stroke (05 Nov 2020 10:38). Currently admitted to medicine with the primary diagnosis of Stroke CVA.   Today is hospital day 5d. This morning he is resting comfortably in bed and reports no new issues or overnight events with reversal of his symptoms.     PAST MEDICAL & SURGICAL HISTORY  Pacemaker    Non Hodgkin&#x27;s lymphoma    Chronic obstructive pulmonary disease, unspecified COPD type    DM (diabetes mellitus)    High cholesterol    HTN (hypertension)    S/P transesophageal echocardiogram (ANITA)    Artificial cardiac pacemaker      SOCIAL HISTORY:  Negative for smoking/alcohol/drug use.     ALLERGIES:  acyclovir (Anaphylaxis)  Bactrim (Anaphylaxis)    MEDICATIONS:  STANDING MEDICATIONS  ALBUTerol    90 MICROgram(s) HFA Inhaler 2 Puff(s) Inhalation every 6 hours  aspirin 325 milliGRAM(s) Oral daily  atorvastatin 80 milliGRAM(s) Oral at bedtime  chlorhexidine 4% Liquid 1 Application(s) Topical <User Schedule>  pantoprazole   Suspension 40 milliGRAM(s) Oral once  tiotropium 18 MICROgram(s) Capsule 1 Capsule(s) Inhalation daily    PRN MEDICATIONS    VITALS:   T(F): 97.6  HR: 73  BP: 135/70  RR: 18  SpO2: --    LABS:                        15.6   3.63  )-----------( 196      ( 06 Nov 2020 05:31 )             47.8     11-06    141  |  104  |  17  ----------------------------<  121<H>  4.3   |  24  |  1.0    Ca    9.1      06 Nov 2020 05:31  Phos  3.6     11-06  Mg     2.1     11-06    TPro  6.1  /  Alb  4.2  /  TBili  1.7<H>  /  DBili  x   /  AST  19  /  ALT  18  /  AlkPhos  54  11-06      RADIOLOGY:  A) MRI Brain:  < from: MR Head w/wo IV Cont (11.05.20 @ 16:32) >  IMPRESSION:    1.  No evidence of recent infarct or acute intracranial hemorrhage.    2.  No definite abnormal enhancement however there is significant motion artifact on the postcontrast sequences. If there is continued clinical concern a repeat postcontrast study may be obtained with better control of patient motion.  < end of copied text >    B) EEG   Impression: normal V-EEG X 24 hours    C) ANITA will be done today, pending.     PHYSICAL EXAM:  GEN: No acute distress  LUNGS: Clear to auscultation bilaterally   HEART: S1/S2 present. RRR.   ABD: Soft, non-tender, non-distended. Bowel sounds present  EXT: NC/NC/NE/2+PP/PADRON/Skin Intact.   NEURO: AAOX3.   Motor power: 4/5 in right extremity, 5/5 in the rest.   Intact sensation of the face and extremities   CN intact ( Very very minimal facial droop on the right ??)  No Dysdiadochokinesia or problem with rapid alternating movements.              SUBJECTIVE:    Patient is a 60y old Male who presents with a chief complaint of stroke (05 Nov 2020 10:38). Currently admitted to medicine with the primary diagnosis of Stroke CVA.   Today is hospital day 5d. This morning he is resting comfortably in bed and reports no new issues or overnight events with reversal of his symptoms.     PAST MEDICAL & SURGICAL HISTORY  Pacemaker    Non Hodgkin&#x27;s lymphoma    Chronic obstructive pulmonary disease, unspecified COPD type    DM (diabetes mellitus)    High cholesterol    HTN (hypertension)    S/P transesophageal echocardiogram (ANITA)    Artificial cardiac pacemaker      SOCIAL HISTORY:  Negative for smoking/alcohol/drug use.     ALLERGIES:  acyclovir (Anaphylaxis)  Bactrim (Anaphylaxis)    MEDICATIONS:  STANDING MEDICATIONS  ALBUTerol    90 MICROgram(s) HFA Inhaler 2 Puff(s) Inhalation every 6 hours  aspirin 325 milliGRAM(s) Oral daily  atorvastatin 80 milliGRAM(s) Oral at bedtime  chlorhexidine 4% Liquid 1 Application(s) Topical <User Schedule>  pantoprazole   Suspension 40 milliGRAM(s) Oral once  tiotropium 18 MICROgram(s) Capsule 1 Capsule(s) Inhalation daily    PRN MEDICATIONS    VITALS:   T(F): 97.6  HR: 73  BP: 135/70  RR: 18  SpO2: --    LABS:                        15.6   3.63  )-----------( 196      ( 06 Nov 2020 05:31 )             47.8     11-06    141  |  104  |  17  ----------------------------<  121<H>  4.3   |  24  |  1.0    Ca    9.1      06 Nov 2020 05:31  Phos  3.6     11-06  Mg     2.1     11-06    TPro  6.1  /  Alb  4.2  /  TBili  1.7<H>  /  DBili  x   /  AST  19  /  ALT  18  /  AlkPhos  54  11-06      RADIOLOGY:  A) MRI Brain:  < from: MR Head w/wo IV Cont (11.05.20 @ 16:32) >  IMPRESSION:    1.  No evidence of recent infarct or acute intracranial hemorrhage.    2.  No definite abnormal enhancement however there is significant motion artifact on the postcontrast sequences. If there is continued clinical concern a repeat postcontrast study may be obtained with better control of patient motion.  < end of copied text >    B) EEG   Impression: normal V-EEG X 24 hours    C) TTE Prelim results:     Preliminary Findings:  LA:   no thrombus  ZAIRA: Left atrial appendage was clear of clot and smoke.  LV: LVEF 55-60  MV: trace MR, no evidence of MS.   AV: No evidence of AI, no evidence of AS. mildly sclerotic.   RA: no thrombus.   TV: trace-mild TR.   PV: no PI.   IAS: no PFO. No R-> L shunt.     PHYSICAL EXAM:  GEN: No acute distress  LUNGS: Clear to auscultation bilaterally   HEART: S1/S2 present. RRR.   ABD: Soft, non-tender, non-distended. Bowel sounds present  EXT: NC/NC/NE/2+PP/PADRON/Skin Intact.   NEURO: AAOX3.   Motor power: 4/5 in right extremity, 5/5 in the rest.   Intact sensation of the face and extremities   CN intact ( Very very minimal facial droop on the right ??)  No Dysdiadochokinesia or problem with rapid alternating movements.

## 2020-11-06 NOTE — DISCHARGE NOTE NURSING/CASE MANAGEMENT/SOCIAL WORK - NSDCPEPTSTRK_GEN_ALL_CORE
Stroke education booklet/Need for follow up after discharge/Stroke warning signs and symptoms/Signs and symptoms of stroke/Call 911 for stroke/Prescribed medications/Risk factors for stroke/Stroke support groups for patients, families, and friends

## 2020-11-06 NOTE — DISCHARGE NOTE PROVIDER - NSDCMRMEDTOKEN_GEN_ALL_CORE_FT
amLODIPine 5 mg oral tablet: 1 tab(s) orally once a day -for blood pressure   Aspirin Low Dose 81 mg oral tablet, chewable: 1 tab(s) orally once a day  ezetimibe 10 mg oral tablet: 1 tab(s) orally once a day  ipratropium-albuterol 0.5 mg-2.5 mg/3 mLinhalation solution: 3 milliliter(s) inhaled every 6 hours, As needed, Shortness of Breath and/or Wheezing  Jardiance 25 mg oral tablet: 1 tab(s) orally once a day (in the morning)  pioglitazone 30 mg oral tablet: 1 tab(s) orally once a day  tiotropium 18 mcg inhalation capsule: 1 cap(s) inhaled once a day   amLODIPine 5 mg oral tablet: 1 tab(s) orally once a day -for blood pressure   aspirin 325 mg oral tablet: 1 tab(s) orally once a day  atorvastatin 80 mg oral tablet: 1 tab(s) orally once a day (at bedtime)  ipratropium-albuterol 0.5 mg-2.5 mg/3 mLinhalation solution: 3 milliliter(s) inhaled every 6 hours, As needed, Shortness of Breath and/or Wheezing  Jardiance 25 mg oral tablet: 1 tab(s) orally once a day (in the morning)  pioglitazone 30 mg oral tablet: 1 tab(s) orally once a day  tiotropium 18 mcg inhalation capsule: 1 cap(s) inhaled once a day

## 2020-11-06 NOTE — CHART NOTE - NSCHARTNOTEFT_GEN_A_CORE
PREOPERATIVE DAY OF PROCEDURE EVALUATION:  I have personally seen and examined the patient.  I agree with the history and physical which I have reviewed and noted any changes below.  (Signed electronically by Dr [email or web address])  11-06-20 @ 08:13  I saw examined and reviewed the chart from yesterday, patient is stable to have theb ANITA done, I explained to himn again.

## 2020-11-06 NOTE — PROGRESS NOTE ADULT - ATTENDING COMMENTS
Patient seen and examined, denies any complaints.    #History of CVA, suspected recurrent CVA s/p tpa, Prolonged period of amnesia likely secondary to suspected Partial Seizures,: CT imaging reviewed, Neurology recommendations noted, 24 hour VEEG normal, MRI Brain reviewed, ANITA today, speech and swallow seen regular diet with thin liquids, PT/OT, continue ASA 325mg and high intensity statin, HgbA1C% 7.1, seizure, aspiration and fall precautions     #HTN: resume amlodipine     #Marginal cell lymphoma on maintenance Rituxan: defer to outpatient Oncology follow up     #history of Sinus bradycardia s/p PPM: EPS for interrogation     Disposition: ANITA today, EPS for PPM interrogation, if negative stable for discharge home--independent has no needs Patient seen and examined, denies any complaints.    #History of CVA, suspected recurrent CVA s/p tpa, Prolonged period of amnesia likely secondary to suspected Partial Seizures,: CT imaging reviewed, Neurology recommendations noted, 24 hour VEEG normal, MRI Brain reviewed, ANITA today, speech and swallow seen regular diet with thin liquids, PT/OT, continue ASA 325mg and high intensity statin, HgbA1C% 7.1, seizure, aspiration and fall precautions     #HTN: resume amlodipine     #Marginal cell lymphoma on maintenance Rituxan: defer to outpatient Oncology follow up     #history of Sinus bradycardia s/p PPM: device interrogated by [employer] on 11/5 no events    Disposition: Discharge home today

## 2020-11-06 NOTE — DISCHARGE NOTE PROVIDER - CARE PROVIDER_API CALL
Demarcus Herron  FAMILY MEDICINE  [address], [address]  [place] [address]  Phone: [phone]  Fax: [phone]  Follow Up Time: 1 week    Kelle Gasca)  Neurology; Vascular Neurology  [address] [place] [address]  Phone: [phone]  Fax: [phone]  Follow Up Time: 1 week   Demarcus Herron  FAMILY MEDICINE  [address], [address]  [place] [address]  Phone: [phone]  Fax: [phone]  Follow Up Time: 1 week    Kelle Gasca)  Neurology; Vascular Neurology  [address] [place] [address]  Phone: [phone]  Fax: [phone]  Follow Up Time: 1 week    Gretchen River)  Cardiology; Internal Medicine  [address] [place] [address], [place] [address]  Phone: [phone]  Fax: [phone]  Follow Up Time: 1 week

## 2020-11-06 NOTE — DISCHARGE NOTE NURSING/CASE MANAGEMENT/SOCIAL WORK - PATIENT PORTAL LINK FT
You can access the FollowMyHealth Patient Portal offered by [facility] by registering at the following website: [email or web address]. By joining [employer]’s FollowMyHealth portal, you will also be able to view your health information using other applications (apps) compatible with our system.

## 2020-11-06 NOTE — PROGRESS NOTE ADULT - SUBJECTIVE AND OBJECTIVE BOX
Admit Date: 11-01-20 (5d)    Chief Complaint:  Patient is a 60y old  Male who presents with a chief complaint of stroke (05 Nov 2020 10:38)      Past Medical and Surgical History:  PAST MEDICAL & SURGICAL HISTORY:  Pacemaker    Non Hodgkin&#x27;s lymphoma    Chronic obstructive pulmonary disease, unspecified COPD type    DM (diabetes mellitus)    High cholesterol    HTN (hypertension)    S/P transesophageal echocardiogram (ANITA)    Artificial cardiac pacemaker        Current Medications:  MEDICATIONS  (STANDING):  ALBUTerol    90 MICROgram(s) HFA Inhaler 2 Puff(s) Inhalation every 6 hours  aspirin 325 milliGRAM(s) Oral daily  atorvastatin 80 milliGRAM(s) Oral at bedtime  chlorhexidine 4% Liquid 1 Application(s) Topical <User Schedule>  pantoprazole   Suspension 40 milliGRAM(s) Oral once  tiotropium 18 MICROgram(s) Capsule 1 Capsule(s) Inhalation daily    MEDICATIONS  (PRN):      Interval History:  No acute events overnight. No complaints at this time.    Vital Signs:  T(F): 97.6 (11-06-20 @ 05:07), Max: 97.6 (11-06-20 @ 05:07)  HR: 73 (11-06-20 @ 08:10) (59 - 74)  BP: 135/70 (11-06-20 @ 08:10) (111/79 - 152/86)  RR: 18 (11-05-20 @ 20:24) (17 - 20)  SpO2: 97% (11-05-20 @ 08:41) (95% - 98%)  CAPILLARY BLOOD GLUCOSE      POCT Blood Glucose.: 93 mg/dL (05 Nov 2020 21:14)  POCT Blood Glucose.: 116 mg/dL (05 Nov 2020 11:33)      Physical Exam:  General: Not in distress.   HEENT: Moist mucus membranes. PERRLA.  Cardio: Regular rate and rhythm, S1, S2, no murmur, rub, or gallop.  Pulm: Clear to auscultation bilaterally. No wheezing, rales, or rhonchi.  Abdomen: Soft, non-tender, non-distended. Normoactive bowel sounds.  Extremities: No cyanosis or edema bilaterally. No calf tenderness to palpation.  Neuro: A&O x3.     Labs and Imaging:  CBC Full  -  ( 06 Nov 2020 05:31 )  WBC Count : 3.63 K/uL  RBC Count : 5.36 M/uL  Hemoglobin : 15.6 g/dL  Hematocrit : 47.8 %  Platelet Count - Automated : 196 K/uL  Mean Cell Volume : 89.2 fL  Mean Cell Hemoglobin : 29.1 pg  Mean Cell Hemoglobin Concentration : 32.6 g/dL  Auto Neutrophil # : 2.23 K/uL  Auto Lymphocyte # : 0.60 K/uL  Auto Monocyte # : 0.52 K/uL  Auto Eosinophil # : 0.21 K/uL  Auto Basophil # : 0.03 K/uL  Auto Neutrophil % : 61.5 %  Auto Lymphocyte % : 16.5 %  Auto Monocyte % : 14.3 %  Auto Eosinophil % : 5.8 %  Auto Basophil % : 0.8 %    RDW: 12.5      BMP: 11-06-20 @ 05:31  141 | 104 | 17   -----------------< 121  4.3  | 24 | 1.0  eGFR(AA): 95, eGFR (non-AA): 82  Ca 9.1, Mg 2.1, P 3.6    LFTs: 11-06-20 @ 05:31  TP  6.1  | 4.2 Alb   ---------------  TB  1.7  | --  DB   ---------------  ALT 18  | 19  AST            ^          54  ALK  LFTs: 11-05-20 @ 05:23  TP  6.6  | 4.3 Alb   ---------------  TB  1.8  | --  DB   ---------------  ALT 17  | 18  AST            ^          54  ALK      LFTs: 11-06-20 @ 05:31  Ca  9.1  | 19 AST   -----------------  TP  6.1  | 18 ALT  -----------------  Alb 4.2  | 54 ALK          ^        1.7         TB      Cardiac Enzymes:    Urinalysis:    Cultures:      Home Medications:  Home Medications:  Aspirin Low Dose 81 mg oral tablet, chewable: 1 tab(s) orally once a day (01 Nov 2020 16:56)  ezetimibe 10 mg oral tablet: 1 tab(s) orally once a day (01 Nov 2020 16:56)  pioglitazone 30 mg oral tablet: 1 tab(s) orally once a day (01 Nov 2020 16:56)

## 2020-11-06 NOTE — DISCHARGE NOTE PROVIDER - PROVIDER TOKENS
PROVIDER:[TOKEN:[[id number]],FOLLOWUP:[1 week]],PROVIDER:[TOKEN:[[id number]],FOLLOWUP:[1 week]] PROVIDER:[TOKEN:[[id number]],FOLLOWUP:[1 week]],PROVIDER:[TOKEN:[[id number]],FOLLOWUP:[1 week]],PROVIDER:[TOKEN:[[id number]],FOLLOWUP:[1 week]]

## 2020-11-06 NOTE — DISCHARGE NOTE PROVIDER - NSDCCPTREATMENT_GEN_ALL_CORE_FT
PRINCIPAL PROCEDURE  Procedure: Transesophageal echocardiogram  Findings and Treatment: ANITA is the procedure done for you to get a better look at the heart especially the part of your heart called the left atrium to check if there was any embolus in the heart especially in this area that may have caused the TIA.

## 2020-11-06 NOTE — DISCHARGE NOTE PROVIDER - NSDCCPCAREPLAN_GEN_ALL_CORE_FT
PRINCIPAL DISCHARGE DIAGNOSIS  Diagnosis: Transient ischemic attack (TIA)  Assessment and Plan of Treatment: Your symptoms are most likely consistent with a TIA ( mini-stroke), since they all resolved and the MRI came back positive. The TIA and your prior history of stroke puts you on an increased risk of a stroke later in. You should take your medications as prescribed and follow up with your appointments as suggested. The mechanism of the TIA is most likely secondary to atherosclerotic disease and therefore we should try to control the risk factors very well ( Diabetes, Hypertension and Dyslipidemia). We will interrogate your pacemaker and make sure that the TIA was not from an embolus coming from the heart.       PRINCIPAL DISCHARGE DIAGNOSIS  Diagnosis: Transient ischemic attack (TIA)  Assessment and Plan of Treatment: Your symptoms are most likely consistent with a TIA ( mini-stroke), since they all resolved and the MRI came back negative. The TIA and your prior history of stroke puts you on an increased risk of a stroke later in. You should take your medications as prescribed and follow up with your appointments as suggested. The mechanism of the TIA is most likely secondary to atherosclerotic disease and therefore we should try to control the risk factors very well ( Diabetes, Hypertension and Dyslipidemia). We will interrogate your pacemaker and make sure that the TIA was not from an embolus coming from the heart.

## 2020-11-12 DIAGNOSIS — R47.01 APHASIA: ICD-10-CM

## 2020-11-12 DIAGNOSIS — G45.9 TRANSIENT CEREBRAL ISCHEMIC ATTACK, UNSPECIFIED: ICD-10-CM

## 2020-11-12 DIAGNOSIS — J44.9 CHRONIC OBSTRUCTIVE PULMONARY DISEASE, UNSPECIFIED: ICD-10-CM

## 2020-11-12 DIAGNOSIS — R47.1 DYSARTHRIA AND ANARTHRIA: ICD-10-CM

## 2020-11-12 DIAGNOSIS — C85.90 NON-HODGKIN LYMPHOMA, UNSPECIFIED, UNSPECIFIED SITE: ICD-10-CM

## 2020-11-12 DIAGNOSIS — Z87.74 PERSONAL HISTORY OF (CORRECTED) CONGENITAL MALFORMATIONS OF HEART AND CIRCULATORY SYSTEM: ICD-10-CM

## 2020-11-12 DIAGNOSIS — I10 ESSENTIAL (PRIMARY) HYPERTENSION: ICD-10-CM

## 2020-11-12 DIAGNOSIS — Z95.0 PRESENCE OF CARDIAC PACEMAKER: ICD-10-CM

## 2020-11-12 DIAGNOSIS — Z88.9 ALLERGY STATUS TO UNSPECIFIED DRUGS, MEDICAMENTS AND BIOLOGICAL SUBSTANCES: ICD-10-CM

## 2020-11-12 DIAGNOSIS — I48.92 UNSPECIFIED ATRIAL FLUTTER: ICD-10-CM

## 2020-11-12 DIAGNOSIS — I48.91 UNSPECIFIED ATRIAL FIBRILLATION: ICD-10-CM

## 2020-11-12 DIAGNOSIS — R41.82 ALTERED MENTAL STATUS, UNSPECIFIED: ICD-10-CM

## 2020-11-12 DIAGNOSIS — E78.5 HYPERLIPIDEMIA, UNSPECIFIED: ICD-10-CM

## 2020-11-12 DIAGNOSIS — Z79.84 LONG TERM (CURRENT) USE OF ORAL HYPOGLYCEMIC DRUGS: ICD-10-CM

## 2020-11-12 DIAGNOSIS — R00.1 BRADYCARDIA, UNSPECIFIED: ICD-10-CM

## 2020-11-12 DIAGNOSIS — G40.89 OTHER SEIZURES: ICD-10-CM

## 2020-11-12 DIAGNOSIS — Z88.1 ALLERGY STATUS TO OTHER ANTIBIOTIC AGENTS STATUS: ICD-10-CM

## 2020-11-12 DIAGNOSIS — E11.9 TYPE 2 DIABETES MELLITUS WITHOUT COMPLICATIONS: ICD-10-CM

## 2020-11-12 DIAGNOSIS — Z92.21 PERSONAL HISTORY OF ANTINEOPLASTIC CHEMOTHERAPY: ICD-10-CM

## 2020-11-12 DIAGNOSIS — G81.91 HEMIPLEGIA, UNSPECIFIED AFFECTING RIGHT DOMINANT SIDE: ICD-10-CM

## 2020-11-12 DIAGNOSIS — I25.10 ATHEROSCLEROTIC HEART DISEASE OF NATIVE CORONARY ARTERY WITHOUT ANGINA PECTORIS: ICD-10-CM

## 2020-11-12 DIAGNOSIS — R29.810 FACIAL WEAKNESS: ICD-10-CM

## 2020-11-12 DIAGNOSIS — Z79.01 LONG TERM (CURRENT) USE OF ANTICOAGULANTS: ICD-10-CM

## 2020-11-12 DIAGNOSIS — Z79.82 LONG TERM (CURRENT) USE OF ASPIRIN: ICD-10-CM

## 2020-11-13 ENCOUNTER — APPOINTMENT (OUTPATIENT)
Dept: NEUROLOGY | Facility: CLINIC | Age: 60
End: 2020-11-13
Payer: MEDICARE

## 2020-11-13 PROCEDURE — 99214 OFFICE O/P EST MOD 30 MIN: CPT | Mod: 95

## 2020-11-13 NOTE — HISTORY OF PRESENT ILLNESS
[FreeTextEntry1] : Mr. Montoya is a 59yo man here for follow up of recent hospitalization.  He presented poorly repsponsive with b/l weakness and difficult to obtain blood pressure.  He eventually improved and BP was slightly elevated.  he underwent MRI brain w/w/o MALDONADO and VEEG and EEG was normal and MRI brain showed no new infarcts and no enhancing lesions.  He has been good since his discharge on 11/6/2020 but is scared of this happening again\par \par Hospital Course: \par Discharge Date	06-Nov-2020 \par Admission Date	01-Nov-2020 15:30 \par Reason for Admission	Stroke/ TIA \par Hospital Course	 \par 60 M with PMH of recent stroke s/p tPA in 08/2020, congenital heart disease s/p \par repair, DM, HTN, DLD, RAA thrombus (was on xarelto and resolved on last ANITA), \par COPD, ex- smoker, sinus bradycardia s/p PPM, marginal cell lymphoma on \par maintenance rituxan (last chemo was december 21) presenting for acute change in \par mental status and bilateral weakness more prominent over the right side. \par \par #Right sided weakness +dysarthria \par -CTH is negative for acute pathology \par -CTP negative, and repeat CTH stable ( No signs of ischemia or bleed post tPA \par administration). \par -S/pr IV tpa which was given at 14.35 pm on 11/1/2020 \par - Continue with Aspirin 81mg po once daily \par - Continue with Statin 80mg po once daily \par - EP Consulted for device interrogation --> Check if stroke mechanism is \par cardioembolic (Check for Afib/flutter) \par - MRI done: Negative, check report above \par - rEEG done: Negative \par - Restart Amlodipine ( permissive hypertension is only 1st 24 hours and the \par patient is not hypotensive and is off pressors) \par - ANITA results normal. \par \par #HTN \par -on amlodipine 5 mg po once daily \par \par #DLD \par -Continue with atorvastatin 80mg po once daily \par -lipid panel noted \par \par #DM \par -A1c 7.1 \par -if BS>200 will start patient on insulin \par -Out patient follow up with endocrinology \par \par # marginal cell lymphoma on maintenance rituxan \par -out patient follow up with oncologist \par \par #sinus bradycardia s/p PPM \par - interrogated no events \par \par Med Reconciliation: \par Medication Reconciliation Status	Admission Reconciliation is Completed \par Discharge Reconciliation is Completed \par Discharge Medications	amLODIPine 5 mg oral tablet: 1 tab(s) orally once a day \par -for blood pressure \par aspirin 325 mg oral tablet: 1 tab(s) orally once a day \par atorvastatin 80 mg oral tablet: 1 tab(s) orally once a day (at bedtime) \par ipratropium-albuterol 0.5 mg-2.5 mg/3 mLinhalation solution: 3 milliliter(s) \par inhaled every 6 hours, As needed, Shortness of Breath and/or Wheezing \par Jardiance 25 mg oral tablet: 1 tab(s) orally once a day (in the morning) \par pioglitazone 30 mg oral tablet: 1 tab(s) orally once a day \par tiotropium 18 mcg inhalation capsule: 1 cap(s) inhaled once a day \par , \par , \par

## 2020-11-13 NOTE — DISCUSSION/SUMMARY
[Antithrombotic therapy with ___] : antithrombotic therapy with  [unfilled] [Lipid Lowering Therapy] : lipid lowering therapy [Patient encouraged to discuss with Primary MD] : I encouraged the patient to discuss these important issues with ~his/her~ primary care doctor [Goals and Counseling] : I have reviewed the goals of stroke risk factor modification. I counseled the patient on measures to reduce stroke risk, including the importance of medication compliance, risk factor control, exercise, healthy diet and avoidance of smoking. I reviewed stroke warning signs and symptoms and appropriate actions to take if such occur. [FreeTextEntry1] : Mr. Montoya is a 59yo man with stroke earlier this year with a recent admission for encephalopathy of unknown etiology possibly related to hypotension.  Provided eduation on stroke risk factors, hydration, diet, exercise.\par 1. Continue current medications\par 2. 24 hr aEEG in next month\par 3. f/u in 3-4 months\par 4. Check BP daily

## 2020-11-13 NOTE — DATA REVIEWED
[de-identified] : Notes, results and MRI brain images reviewed from recent hospitalization from 11/1/2020-11/6/2020\par

## 2020-11-13 NOTE — REVIEW OF SYSTEMS
[Feeling Tired] : feeling tired [Anxiety] : anxiety [As Noted in HPI] : as noted in HPI [Negative] : Respiratory

## 2020-11-16 ENCOUNTER — APPOINTMENT (OUTPATIENT)
Dept: HEMATOLOGY ONCOLOGY | Facility: CLINIC | Age: 60
End: 2020-11-16

## 2020-11-20 DIAGNOSIS — C85.90 NON-HODGKIN LYMPHOMA, UNSPECIFIED, UNSPECIFIED SITE: ICD-10-CM

## 2020-11-20 DIAGNOSIS — M25.512 PAIN IN LEFT SHOULDER: ICD-10-CM

## 2020-11-20 DIAGNOSIS — I69.351 HEMIPLEGIA AND HEMIPARESIS FOLLOWING CEREBRAL INFARCTION AFFECTING RIGHT DOMINANT SIDE: ICD-10-CM

## 2020-12-02 ENCOUNTER — APPOINTMENT (OUTPATIENT)
Dept: NEUROLOGY | Facility: CLINIC | Age: 60
End: 2020-12-02
Payer: MEDICARE

## 2020-12-02 PROCEDURE — 95816 EEG AWAKE AND DROWSY: CPT

## 2020-12-03 ENCOUNTER — APPOINTMENT (OUTPATIENT)
Dept: NEUROLOGY | Facility: CLINIC | Age: 60
End: 2020-12-03
Payer: MEDICARE

## 2020-12-03 PROCEDURE — 95708 EEG WO VID EA 12-26HR UNMNTR: CPT

## 2020-12-03 PROCEDURE — 95719 EEG PHYS/QHP EA INCR W/O VID: CPT

## 2020-12-04 NOTE — PATIENT PROFILE ADULT. - VISION (WITH CORRECTIVE LENSES IF THE PATIENT USUALLY WEARS THEM):
Partially impaired: cannot see medication labels or newsprint, but can see obstacles in path, and the surrounding layout; can count fingers at arm's length
 used

## 2020-12-07 ENCOUNTER — APPOINTMENT (OUTPATIENT)
Dept: HEMATOLOGY ONCOLOGY | Facility: CLINIC | Age: 60
End: 2020-12-07

## 2020-12-08 NOTE — SWALLOW BEDSIDE ASSESSMENT ADULT - CONSISTENCIES ADMINISTERED
hard solid/4oz/thin liquid Unna Boot Text: An Unna boot was placed to help immobilize the limb and facilitate more rapid healing.

## 2020-12-10 ENCOUNTER — APPOINTMENT (OUTPATIENT)
Dept: ENDOCRINOLOGY | Facility: CLINIC | Age: 60
End: 2020-12-10

## 2020-12-10 ENCOUNTER — OUTPATIENT (OUTPATIENT)
Dept: OUTPATIENT SERVICES | Facility: HOSPITAL | Age: 60
LOS: 1 days | Discharge: HOME | End: 2020-12-10

## 2020-12-10 VITALS
HEART RATE: 78 BPM | DIASTOLIC BLOOD PRESSURE: 80 MMHG | HEIGHT: 72 IN | OXYGEN SATURATION: 95 % | WEIGHT: 225 LBS | SYSTOLIC BLOOD PRESSURE: 127 MMHG | TEMPERATURE: 97.3 F | BODY MASS INDEX: 30.48 KG/M2 | RESPIRATION RATE: 20 BRPM

## 2020-12-10 DIAGNOSIS — Z98.890 OTHER SPECIFIED POSTPROCEDURAL STATES: Chronic | ICD-10-CM

## 2020-12-10 DIAGNOSIS — Z95.0 PRESENCE OF CARDIAC PACEMAKER: Chronic | ICD-10-CM

## 2020-12-10 RX ORDER — BLOOD SUGAR DIAGNOSTIC
STRIP MISCELLANEOUS 3 TIMES DAILY
Qty: 100 | Refills: 5 | Status: DISCONTINUED | COMMUNITY
Start: 2017-04-05 | End: 2020-12-10

## 2020-12-10 RX ORDER — ENALAPRIL MALEATE 20 MG/1
20 TABLET ORAL DAILY
Qty: 30 | Refills: 4 | Status: DISCONTINUED | COMMUNITY
Start: 2017-03-16 | End: 2020-12-10

## 2020-12-10 RX ORDER — DOXYCYCLINE HYCLATE 100 MG/1
100 CAPSULE ORAL
Qty: 20 | Refills: 5 | Status: DISCONTINUED | COMMUNITY
Start: 2018-08-14 | End: 2020-12-10

## 2020-12-10 RX ORDER — METFORMIN ER 500 MG 500 MG/1
500 TABLET ORAL TWICE DAILY
Refills: 0 | Status: DISCONTINUED | COMMUNITY
End: 2020-12-10

## 2020-12-10 RX ORDER — PREDNISONE 20 MG/1
20 TABLET ORAL DAILY
Qty: 10 | Refills: 5 | Status: DISCONTINUED | COMMUNITY
Start: 2018-08-14 | End: 2020-12-10

## 2020-12-10 RX ORDER — VALSARTAN 320 MG/1
320 TABLET, COATED ORAL DAILY
Refills: 0 | Status: DISCONTINUED | COMMUNITY
End: 2020-12-10

## 2020-12-10 RX ORDER — EZETIMIBE 10 MG/1
10 TABLET ORAL
Qty: 30 | Refills: 4 | Status: DISCONTINUED | COMMUNITY
Start: 2019-06-27 | End: 2020-12-10

## 2020-12-10 RX ORDER — INSULIN DEGLUDEC INJECTION 100 U/ML
100 INJECTION, SOLUTION SUBCUTANEOUS
Qty: 9 | Refills: 4 | Status: DISCONTINUED | COMMUNITY
Start: 2020-07-24 | End: 2020-12-10

## 2020-12-10 RX ORDER — RITUXIMAB 10 MG/ML
10 INJECTION, SOLUTION INTRAVENOUS
Refills: 0 | Status: DISCONTINUED | COMMUNITY
End: 2020-12-10

## 2020-12-10 RX ORDER — ASPIRIN 81 MG
81 TABLET, DELAYED RELEASE (ENTERIC COATED) ORAL DAILY
Refills: 0 | Status: DISCONTINUED | COMMUNITY
End: 2020-12-10

## 2020-12-10 RX ORDER — ATORVASTATIN CALCIUM 40 MG/1
40 TABLET, FILM COATED ORAL DAILY
Qty: 30 | Refills: 4 | Status: DISCONTINUED | COMMUNITY
Start: 2017-09-01 | End: 2020-12-10

## 2020-12-10 RX ORDER — SIMVASTATIN 40 MG/1
40 TABLET, FILM COATED ORAL DAILY
Refills: 0 | Status: DISCONTINUED | COMMUNITY
End: 2020-12-10

## 2020-12-10 RX ORDER — BENZONATATE 100 MG/1
100 CAPSULE ORAL 3 TIMES DAILY
Qty: 90 | Refills: 2 | Status: DISCONTINUED | COMMUNITY
Start: 2019-11-07 | End: 2020-12-10

## 2020-12-10 NOTE — PHYSICAL EXAM
[Alert] : alert [Well Nourished] : well nourished [Healthy Appearance] : healthy appearance [No Acute Distress] : no acute distress [Well Developed] : well developed [No Neck Mass] : no neck mass was observed [No LAD] : no lymphadenopathy [Supple] : the neck was supple [Thyroid Not Enlarged] : the thyroid was not enlarged [No Thyroid Nodules] : no palpable thyroid nodules [No Stigmata of Cushings Syndrome] : no stigmata of Cushings Syndrome [Normal Gait] : normal gait [No Clubbing, Cyanosis] : no clubbing  or cyanosis of the fingernails [No Involuntary Movements] : no involuntary movements were seen [No Joint Swelling] : no joint swelling seen [Normal Strength/Tone] : muscle strength and tone were normal [No Rash] : no rash [No Skin Lesions] : no skin lesions [Oriented x3] : oriented to person, place, and time [Normal Affect] : the affect was normal [Recent Memory Normal] : recent memory was not impaired [Normal Insight/Judgement] : insight and judgment were intact [Normal Mood] : the mood was normal [Abdominal Striae] : no abdominal striae [Acanthosis Nigricans] : no acanthosis nigricans [Foot Ulcers] : no foot ulcers [Acne] : no acne [Hirsutism] : no hirsutism [de-identified] : Obesity

## 2020-12-10 NOTE — ASSESSMENT
[FreeTextEntry1] : This is a 60 year old male with a significant PMHx of DM presenting to the clinic for follow up examination.\par \par Type 2 Diabetes Mellitus\par - Recent A1 11/2020 inpatient was 7.1; Previously 7.9\par -\par - Supplies sent \par \par Dyslipidemia\par - Recently increased Atorvastatin to 80mg QHS s/p CVA\par - Lipid panel inpatient was within normal range and at goal (LDL 69)\par - Continue with Atorvastatin 80mg QHS [Diabetes Foot Care] : diabetes foot care [Long Term Vascular Complications] : long term vascular complications of diabetes [Carbohydrate Consistent Diet] : carbohydrate consistent diet [Importance of Diet and Exercise] : importance of diet and exercise to improve glycemic control, achieve weight loss and improve cardiovascular health [Exercise/Effect on Glucose] : exercise/effect on glucose [Retinopathy Screening] : Patient was referred to ophthalmology for retinopathy screening

## 2020-12-10 NOTE — HISTORY OF PRESENT ILLNESS
[FreeTextEntry1] : This is a 60 year old male with a significant PMHx of T2DM with a previous A1c 7.9 who presents to the clinic for follow up. Since his last visit he reports 2 strokes, both with no residual weakness. While inpatient, his A1c was 7.1 and lipid panel was unremarkable. He is compliant with his medications daily and his diet. He also reports some weight loss as well

## 2020-12-15 DIAGNOSIS — E66.01 MORBID (SEVERE) OBESITY DUE TO EXCESS CALORIES: ICD-10-CM

## 2020-12-15 DIAGNOSIS — E11.65 TYPE 2 DIABETES MELLITUS WITH HYPERGLYCEMIA: ICD-10-CM

## 2020-12-15 DIAGNOSIS — E78.01 FAMILIAL HYPERCHOLESTEROLEMIA: ICD-10-CM

## 2021-01-15 NOTE — ED ADULT NURSE NOTE - NSWEIGHTCALCTOOLDRUG_GEN_A_CORE
Daily Note     Today's date: 1/15/2021  Patient name: Dania Camp  : 1967  MRN: [id number]  Referring provider: Velma Agee MD  Dx:   Encounter Diagnosis     ICD-10-CM    1  Acute pain of left shoulder  M25 512                   Subjective: Tiredness noted  Objective: See treatment diary below      Assessment: Tolerated treatment well  Patient would benefit from continued PT      Plan: Continue per plan of care        Precautions: Per Protocol      Manuals 12/3 12/8 12/15 12/22 12/31 1/4 1/8 1/15 11/16 11/30   PROM per protocol 30 minutes 30 minutes 30 minutes 30 minutes 30 minutes 30 minutes 15 minuntes 15 minutes 30 minutes 30 minutes     MRE's IR/ER gentle - 2x12 2x12 2x12 2x12 2x12 2x12 2x12                               Neuro Re-Ed                                                                                                        Ther Ex                                       Sidelying Er 1# - 2x10 nt 2# 2x15 3x10 2#, 2x10 1# - 3x10 1# - 2x10 2x10 1#/2#   Sidelying flexion and abd 1# - 2x10 2# 2# 2x15 3x10 2#, 2x10 2# - 3x10 2# 2x10 1#/2#   Standing scaption       3# - 2x10 3#     Supine shld press       8# 8# - 2x10                               Fortunato rows and shld ext 2x10 2x10 2x15 2x15 2x15 3x10 3x10 3x10  3x10   Fortunato IR  nt 2x10 2x15 2x15 3x10 3x10 3x10  3x10                Sled push/pull       x7 x7                               Ther Activity                                       Gait Training                                       Modalities
 used

## 2021-01-25 ENCOUNTER — APPOINTMENT (OUTPATIENT)
Dept: NEUROLOGY | Facility: CLINIC | Age: 61
End: 2021-01-25
Payer: MEDICAID

## 2021-01-25 PROCEDURE — 99212 OFFICE O/P EST SF 10 MIN: CPT | Mod: 95

## 2021-01-25 NOTE — PHYSICAL EXAM
[FreeTextEntry1] : a+Ox3 language and attention normal\par No facial speech clear Movements symmetric\par EOMI

## 2021-01-25 NOTE — REASON FOR VISIT
[Home] : at home, [unfilled] , at the time of the visit. [Medical Office: ([facility])___] : at the medical office located in  [Spouse] : spouse [Verbal consent obtained from patient] : the patient, [unfilled]

## 2021-01-25 NOTE — PROGRESS NOTE ADULT - SUBJECTIVE AND OBJECTIVE BOX
Patient is a 60y old  Male who presents with a chief complaint of CVA (11 Aug 2020 17:08)    HPI:  59 M (R handed) with a pmh of CHD s/p repair, HTN, DLD, RAA thrombus (was on xarelto and resolved on last ANITA), COPD, DM, sinus bradycardia s/p PPM, marginal cell lymphoma on maintenance rituxan (last chemo was december 21) presents with expressive aphasia and right sided weakness. By the time he came to ED he was unable to articulate any words and was only moaning. As per family he was completely fine at 11AM, and had reported no new complaints.    Of note patient reports that for 1 year he has had one year of mild abdominal distention with constipation. Outpatient providers suspecting hernia with no acute interventions needed.     In the ED: T 96.4, , 185/124, 97% on RA. Labs were stable since last admission. Code stroke was called. CT head showed no acute changes, but was limited due to motion artifact. He received TPA and then CT angiogram; negative for obstruction. Speech and has progressively improved since tPA however right UE and LE weakness slower in improvement.  As per hospitalist, MRI for further stroke evaluation could be done on rehab. Patient was evaluated by physiatry and deemed good candidate for inpatient rehab. (05 Aug 2020 16:59)      TODAY'S SUBJECTIVE & REVIEW OF SYMPTOMS  Patient seen and examined at bedside. NAD. No events overnight. Patient reports that headaches are well controlled and that he has no new neuro deficits. Excited to go home.     ROS otherwise negative for n/v/f/chills and other symptoms.     PHYSICAL EXAM  Constitutional - NAD, Comfortable OOB to W/C in his room  Chest - CTAB  Cardiovascular - RRR  Abdomen - Soft, NTND  Extremities - No C/C/E, No calf tenderness   Neurologic Exam -                    Cognitive - Awake, Alert, AAO to self, place, date, year, situation     Communication - Fluent, No dysarthria     Motor - No focal deficits  	 LEFT    UE: SF [5/5], EF [5/5], EE [5/5], WE [5/5],  [wnl]  	 RIGHT UE: SF [4/5], EF [4/5], EE [4/5], WE [2/5],  weak  	 LEFT    LE:  HF [5/5], KE [5/5], DF [5/5], EHL [5/5],  PF [5/5]     RIGHT LE:  HF [3/5], KE [2/5], DF [5/5], EHL [1/5],  PF [1/5]      Sensory - Intact to LT    Reflex:  2 + throughout Babinski positive on R wnl/ symmetric  Psychiatric - Mood stable, Affect WNL    Functional Status: Ambulates 150ft x2 straight cane       Vital Signs Last 24 Hrs  T(C): 36.4 (12 Aug 2020 05:35), Max: 36.6 (11 Aug 2020 20:50)  T(F): 97.6 (12 Aug 2020 05:35), Max: 97.8 (11 Aug 2020 20:50)  HR: 70 (12 Aug 2020 05:35) (69 - 88)  BP: 130/90 (12 Aug 2020 07:37) (130/90 - 145/79)  BP(mean): --  RR: 18 (12 Aug 2020 05:35) (18 - 20)  SpO2: --        acetaminophen   Tablet .. 650 milliGRAM(s) Oral every 6 hours PRN  ALBUTerol    90 MICROgram(s) HFA Inhaler 2 Puff(s) Inhalation every 6 hours PRN  amLODIPine   Tablet 5 milliGRAM(s) Oral <User Schedule>  aspirin  chewable 81 milliGRAM(s) Oral daily  atorvastatin 40 milliGRAM(s) Oral at bedtime  bisacodyl 10 milliGRAM(s) Oral at bedtime  bisacodyl Suppository 10 milliGRAM(s) Rectal daily PRN  budesonide 160 MICROgram(s)/formoterol 4.5 MICROgram(s) Inhaler 2 Puff(s) Inhalation two times a day  melatonin 5 milliGRAM(s) Oral at bedtime PRN  metFORMIN 500 milliGRAM(s) Oral two times a day with meals  mineral oil enema 133 milliLiter(s) Rectal every 12 hours PRN  multivitamin 1 Tablet(s) Oral daily  pantoprazole    Tablet 40 milliGRAM(s) Oral before breakfast  polyethylene glycol 3350 17 Gram(s) Oral <User Schedule>  senna 2 Tablet(s) Oral at bedtime PRN  tiotropium 18 MICROgram(s) Capsule 1 Capsule(s) Inhalation daily      RECENT LABS/IMAGING  appreciated Patient is a 60y old  Male who presents with a chief complaint of CVA (11 Aug 2020 17:08)    HPI:  59 M (R handed) with a pmh of CHD s/p repair, HTN, DLD, RAA thrombus (was on xarelto and resolved on last ANITA), COPD, DM, sinus bradycardia s/p PPM, marginal cell lymphoma on maintenance rituxan (last chemo was december 21) presents with expressive aphasia and right sided weakness. By the time he came to ED he was unable to articulate any words and was only moaning. As per family he was completely fine at 11AM, and had reported no new complaints.    Of note patient reports that for 1 year he has had one year of mild abdominal distention with constipation. Outpatient providers suspecting hernia with no acute interventions needed.     In the ED: T 96.4, , 185/124, 97% on RA. Labs were stable since last admission. Code stroke was called. CT head showed no acute changes, but was limited due to motion artifact. He received TPA and then CT angiogram; negative for obstruction. Speech and has progressively improved since tPA however right UE and LE weakness slower in improvement.  As per hospitalist, MRI for further stroke evaluation could be done on rehab. Patient was evaluated by physiatry and deemed good candidate for inpatient rehab. (05 Aug 2020 16:59)      TODAY'S SUBJECTIVE & REVIEW OF SYMPTOMS   Patient seen and examined at bedside. NAD. No events overnight. Patient reports that headaches are well controlled and that he has no new neuro deficits. Excited to go home.     ROS otherwise negative for n/v/f/chills and other symptoms.     PHYSICAL EXAM  Constitutional - NAD, Comfortable OOB to W/C in his room  Chest - CTAB  Cardiovascular - RRR  Abdomen - Soft, NTND  Extremities - No C/C/E, No calf tenderness   Neurologic Exam -                    Cognitive - Awake, Alert, AAO to self, place, date, year, situation     Communication - Fluent, No dysarthria     Motor - No focal deficits  	 LEFT    UE: SF [5/5], EF [5/5], EE [5/5], WE [5/5],  [wnl]  	 RIGHT UE: SF [4/5], EF [4/5], EE [4/5], WE [2/5],  weak  	 LEFT    LE:  HF [5/5], KE [5/5], DF [5/5], EHL [5/5],  PF [5/5]     RIGHT LE:  4/5     Sensory - Intact to LT    Reflex:  2 + throughout Babinski positive on R wnl/ symmetric  Psychiatric - Mood stable, Affect WNL    Functional Status: Ambulates 150ft x2 straight cane       Vital Signs Last 24 Hrs  T(C): 36.4 (12 Aug 2020 05:35), Max: 36.6 (11 Aug 2020 20:50)  T(F): 97.6 (12 Aug 2020 05:35), Max: 97.8 (11 Aug 2020 20:50)  HR: 70 (12 Aug 2020 05:35) (69 - 88)  BP: 130/90 (12 Aug 2020 07:37) (130/90 - 145/79)  BP(mean): --  RR: 18 (12 Aug 2020 05:35) (18 - 20)  SpO2: --        acetaminophen   Tablet .. 650 milliGRAM(s) Oral every 6 hours PRN  ALBUTerol    90 MICROgram(s) HFA Inhaler 2 Puff(s) Inhalation every 6 hours PRN  amLODIPine   Tablet 5 milliGRAM(s) Oral <User Schedule>  aspirin  chewable 81 milliGRAM(s) Oral daily  atorvastatin 40 milliGRAM(s) Oral at bedtime  bisacodyl 10 milliGRAM(s) Oral at bedtime  bisacodyl Suppository 10 milliGRAM(s) Rectal daily PRN  budesonide 160 MICROgram(s)/formoterol 4.5 MICROgram(s) Inhaler 2 Puff(s) Inhalation two times a day  melatonin 5 milliGRAM(s) Oral at bedtime PRN  metFORMIN 500 milliGRAM(s) Oral two times a day with meals  mineral oil enema 133 milliLiter(s) Rectal every 12 hours PRN  multivitamin 1 Tablet(s) Oral daily  pantoprazole    Tablet 40 milliGRAM(s) Oral before breakfast  polyethylene glycol 3350 17 Gram(s) Oral <User Schedule>  senna 2 Tablet(s) Oral at bedtime PRN  tiotropium 18 MICROgram(s) Capsule 1 Capsule(s) Inhalation daily      RECENT LABS/IMAGING  appreciated #1:

## 2021-01-25 NOTE — HISTORY OF PRESENT ILLNESS
[FreeTextEntry1] : Mr. Montoya is a 59yo man here for follow up of recent hospitalization. He presented poorly repsponsive with b/l weakness and difficult to obtain blood pressure. He eventually improved and BP was slightly elevated. he underwent MRI brain w/w/o MALDONADO and VEEG and EEG was normal and MRI brain showed no new infarcts and no enhancing lesions. \par Since his last visit on 11/13/2020 he has been doing well and has no new complaints.  I went over his MRI cervical spine which showed mild-mod spinal stenosis and mild-moderate radiculopathy. No events worrriesome for seizures or sudden weakness.  He says last hgba1c was around 7\par He is not exercising and is asking about adding chinese food to his diet which I recommended against\par 
Yes

## 2021-01-25 NOTE — DISCUSSION/SUMMARY
[FreeTextEntry1] : Mr. Montoya is a 61yo man with stroke last year with a recent admission for encephalopathy of unknown etiology possibly related to hypotension.\par 1. Continue vascular risk factor management\par 2. Continue aspirin 81mg QD and statin\par 3. Exercise and diet\par 4. Monitor BP at home\par 5. f/u in 1 year and obtain carotid dopplers yearly with PMD\par

## 2021-04-07 ENCOUNTER — APPOINTMENT (OUTPATIENT)
Dept: CARDIOLOGY | Facility: CLINIC | Age: 61
End: 2021-04-07
Payer: MEDICARE

## 2021-04-07 VITALS
RESPIRATION RATE: 16 BRPM | SYSTOLIC BLOOD PRESSURE: 126 MMHG | DIASTOLIC BLOOD PRESSURE: 80 MMHG | HEIGHT: 72 IN | WEIGHT: 213 LBS | BODY MASS INDEX: 28.85 KG/M2 | OXYGEN SATURATION: 98 % | HEART RATE: 76 BPM | TEMPERATURE: 97.9 F

## 2021-04-07 PROCEDURE — 99072 ADDL SUPL MATRL&STAF TM PHE: CPT

## 2021-04-07 PROCEDURE — 93000 ELECTROCARDIOGRAM COMPLETE: CPT | Mod: 59

## 2021-04-07 PROCEDURE — 99213 OFFICE O/P EST LOW 20 MIN: CPT

## 2021-04-07 PROCEDURE — 93280 PM DEVICE PROGR EVAL DUAL: CPT

## 2021-04-07 RX ORDER — DULAGLUTIDE 1.5 MG/.5ML
1.5 INJECTION, SOLUTION SUBCUTANEOUS
Qty: 3 | Refills: 3 | Status: DISCONTINUED | COMMUNITY
Start: 2019-06-27 | End: 2021-04-07

## 2021-04-07 RX ORDER — OXYCODONE AND ACETAMINOPHEN 10; 325 MG/1; MG/1
10-325 TABLET ORAL
Qty: 60 | Refills: 0 | Status: DISCONTINUED | COMMUNITY
Start: 2018-04-12 | End: 2021-04-07

## 2021-04-07 RX ORDER — ONDANSETRON 8 MG/1
8 TABLET ORAL
Qty: 30 | Refills: 0 | Status: DISCONTINUED | COMMUNITY
Start: 2018-04-20 | End: 2021-04-07

## 2021-04-16 ENCOUNTER — APPOINTMENT (OUTPATIENT)
Dept: CARDIOLOGY | Facility: CLINIC | Age: 61
End: 2021-04-16
Payer: MEDICARE

## 2021-04-16 ENCOUNTER — RESULT CHARGE (OUTPATIENT)
Age: 61
End: 2021-04-16

## 2021-04-16 VITALS
DIASTOLIC BLOOD PRESSURE: 90 MMHG | TEMPERATURE: 98 F | SYSTOLIC BLOOD PRESSURE: 130 MMHG | WEIGHT: 213 LBS | HEIGHT: 72 IN | BODY MASS INDEX: 28.85 KG/M2 | HEART RATE: 72 BPM

## 2021-04-16 PROCEDURE — 99215 OFFICE O/P EST HI 40 MIN: CPT

## 2021-04-16 PROCEDURE — 99072 ADDL SUPL MATRL&STAF TM PHE: CPT

## 2021-04-16 PROCEDURE — 93000 ELECTROCARDIOGRAM COMPLETE: CPT

## 2021-04-16 NOTE — PHYSICAL EXAM
[General Appearance - Well Developed] : well developed [Normal Appearance] : normal appearance [General Appearance - Well Nourished] : well nourished [General Appearance - In No Acute Distress] : no acute distress [Normal Conjunctiva] : the conjunctiva exhibited no abnormalities [Eyelids - No Xanthelasma] : the eyelids demonstrated no xanthelasmas [Normal Oropharynx] : normal oropharynx [Normal Jugular Venous A Waves Present] : normal jugular venous A waves present [] : no respiratory distress [Respiration, Rhythm And Depth] : normal respiratory rhythm and effort [Exaggerated Use Of Accessory Muscles For Inspiration] : no accessory muscle use [Auscultation Breath Sounds / Voice Sounds] : lungs were clear to auscultation bilaterally [Heart Rate And Rhythm] : heart rate and rhythm were normal [Heart Sounds] : normal S1 and S2 [Arterial Pulses Normal] : the arterial pulses were normal [Abdomen Soft] : soft [Abdomen Tenderness] : non-tender [Abnormal Walk] : normal gait [Nail Clubbing] : no clubbing of the fingernails [Petechial Hemorrhages (___cm)] : no petechial hemorrhages [Skin Color & Pigmentation] : normal skin color and pigmentation [Skin Turgor] : normal skin turgor [No Anxiety] : not feeling anxious [FreeTextEntry1] : Median sternotomy

## 2021-04-16 NOTE — HISTORY OF PRESENT ILLNESS
[FreeTextEntry1] : Congenital heart disease unknown by the patient and his wife, s/p open heart surgery as an infant at [facility], no records are available.\par Patent coronaries on cardiac catheterization in 2019  LVEF=60%.\par PPM for complete heart block\par Poorly visualized RV and TV, on 2D echo Doppler performed in 11/18,\par Former cigarette smoker for 40 years\par \par Severe emphysema\par Pt. is disabled. He was diagnosed with stage IV CLL, on chemotherapy, followed by Dr. Rider\par Hypertension\par Hyperlipidemia\par Prior cardiac catheterization performed 10 years ago by elena reported as no coronary artery disease.\par Patient worked as a  for [employer] prior to becoming disabled.\par

## 2021-04-16 NOTE — ASSESSMENT
[FreeTextEntry1] : Congenital heart disease\par Patent coronaries documented on cardiac catheterization.\par Severe emphysema\par PPM\par Hypertension \par CLL stage 4 on chemotherapy\par Hyperlipidemia \par TIA\par Encephalopathy

## 2021-04-20 ENCOUNTER — LABORATORY RESULT (OUTPATIENT)
Age: 61
End: 2021-04-20

## 2021-04-20 NOTE — CONSULT NOTE ADULT - NSHPATTENDINGPLANDISCUSS_GEN_ALL_CORE
Everything that is needed will be printed out for patient or will be in the after visit summary which is in mychart.    team

## 2021-04-21 RX ORDER — PEN NEEDLE, DIABETIC 29 G X1/2"
31G X 5 MM NEEDLE, DISPOSABLE MISCELLANEOUS
Qty: 100 | Refills: 2 | Status: ACTIVE | COMMUNITY
Start: 2019-06-27 | End: 1900-01-01

## 2021-04-21 NOTE — PROCEDURE
[DDD] : DDD [Complete Heart Block] : complete heart block [Pacemaker] : pacemaker [Longevity: ___ months] : The estimated remaining battery life is [unfilled] months [Threshold Testing Performed] : Threshold testing was performed [Sensing Amplitude ___mv] : sensing amplitude was [unfilled] mv [Lead Imp:  ___ohms] : lead impedance was [unfilled] ohms [___V @] : [unfilled] V [___ ms] : [unfilled] ms [None] : none [Counters Reset] : the counters were reset [Apace-Vpace ___ %] : Apace-Vpace [unfilled]% [de-identified] : [employer] [de-identified] : Jumana [employer]OCK [de-identified] : [id number] [de-identified] : 04/14/2015 [de-identified] : DDD-CLS [de-identified] : 50

## 2021-04-21 NOTE — HISTORY OF PRESENT ILLNESS
[de-identified] : I had a pleasure of seeing Mr. GEE for follow-up consultation for pacemaker care. He was previously being followed by Dr. Muller. He is accompanied by his wife, who works at [employer].\par \par Mr. GEE is a 60 year-year old male with history of HTN, DM, Morbid obesity, CHD s/p corrective surgery at age of 3, CHB s/p DC-PPM (Bio, 15, dep), CLL, DL, COPD, ex-smoker, recent encephalopathy (01/21) is here for routine f-up.\par \par Denies chest pain, shortness of breath, palpitation, dizziness or LOC except noted above.\par \par EKG: SR@ 76/min, LBBB, QRSd 172 ms, UT: 176 ms\par TTE (07/19): EF 45-50%, mild LAE, no defect on septum\par LHC (09/19): No occlusive CAD\par Cardio: Dr. Power\par \par

## 2021-04-21 NOTE — PROCEDURE
[DDD] : DDD [Complete Heart Block] : complete heart block [Pacemaker] : pacemaker [Longevity: ___ months] : The estimated remaining battery life is [unfilled] months [Threshold Testing Performed] : Threshold testing was performed [Sensing Amplitude ___mv] : sensing amplitude was [unfilled] mv [Lead Imp:  ___ohms] : lead impedance was [unfilled] ohms [___V @] : [unfilled] V [___ ms] : [unfilled] ms [None] : none [Counters Reset] : the counters were reset [Apace-Vpace ___ %] : Apace-Vpace [unfilled]% [de-identified] : [employer] [de-identified] : Jumana BABCOCK [de-identified] : [id number] [de-identified] : [birth year]14/2014 [de-identified] : DDD-CLS [de-identified] : 50

## 2021-04-21 NOTE — PROCEDURE
[DDD] : DDD [Complete Heart Block] : complete heart block [Pacemaker] : pacemaker [Longevity: ___ months] : The estimated remaining battery life is [unfilled] months [Threshold Testing Performed] : Threshold testing was performed [Sensing Amplitude ___mv] : sensing amplitude was [unfilled] mv [Lead Imp:  ___ohms] : lead impedance was [unfilled] ohms [___V @] : [unfilled] V [___ ms] : [unfilled] ms [None] : none [Counters Reset] : the counters were reset [Apace-Vpace ___ %] : Apace-Vpace [unfilled]% [de-identified] : [employer] [de-identified] : Jumana BABCOCK [de-identified] : [id number] [de-identified] : [birth year]14/2014 [de-identified] : DDD-CLS [de-identified] : 51

## 2021-04-21 NOTE — ASSESSMENT
Acute Care - Physical Therapy Treatment Note  [facility]     Patient Name: Damaris Sánchez  : 1944  MRN: [id number]  Today's Date: 2017  Onset of Illness/Injury or Date of Surgery Date: 17  Date of Referral to PT: 17  Referring Physician: Dr. Barrera    Admit Date: 2017    Visit Dx:    ICD-10-CM ICD-9-CM   1. Lower abdominal pain R10.30 789.09   2. Leukocytosis, unspecified type D72.829 288.60   3. Hypokalemia E87.6 276.8   4. C. difficile colitis A04.7 008.45   5. Impaired physical mobility Z74.09 781.99   6. Impaired mobility and ADLs Z74.09 799.89     Patient Active Problem List   Diagnosis   • Lower abdominal pain   • C. difficile colitis   • Inflammatory bowel disease   • Hypokalemia               Adult Rehabilitation Note       17 1556 17 1412 09/15/17 1418    Rehab Assessment/Intervention    Discipline physical therapy assistant  -TA occupational therapy assistant  -CS occupational therapy assistant  -CS    Document Type therapy note (daily note)  -TA therapy note (daily note)  -CS therapy note (daily note)  -CS    Subjective Information agree to therapy  -TA agree to therapy  -CS agree to therapy  -CS    Patient Effort, Rehab Treatment good  -TA      Precautions/Limitations fall precautions  -TA fall precautions  -CS fall precautions  -CS    Recorded by [TA] Sera Bowens, PTA [CS] HILARY HareA/L [CS] Madhavi Watters, LINDA/L    Vital Signs    Post Systolic BP Rehab   161  -CS    Post Treatment Diastolic BP   76  -CS    Pretreatment Heart Rate (beats/min) 84  -TA      Posttreatment Heart Rate (beats/min) 87  -TA  100  -CS    Pre SpO2 (%) 96  -TA      O2 Delivery Pre Treatment room air  -TA      Post SpO2 (%) 97  -TA  95  -CS    O2 Delivery Post Treatment   room air  -CS    Pre Patient Position Supine  -TA Sitting  -CS Supine  -CS    Intra Patient Position  Standing  -CS Sitting  -CS    Post Patient Position Supine  -TA Sitting  -CS Sitting  -CS     [FreeTextEntry1] : ## CHB s/p DC-PPM (Bio, dep, 15)\par \par - PPM interrogation shows normally functioning DC-PPM. Battery life ok. No new events. No evidence of AF\par - TTE to assess LVEF\par - Remote Monitoring\par - Return in 6 months\par   Recorded by [TA] Sera Bowens PTA [CS] HILARY HareA/JARAD [CS] Madhavi Watters, LINDA/JARAD    Pain Assessment    Pain Assessment No/denies pain  -TA 0-10  -CS 0-10  -CS    Pain Score  3  -CS 2  -CS    Post Pain Score  4  -CS 2  -CS    Pain Location  Back  -CS Abdomen  -CS    Recorded by [TA] Sera Bowens PTA [CS] HILARY HareA/JARAD [CS] Madhavi Watters, LINDA/L    Cognitive Assessment/Intervention    Current Cognitive/Communication Assessment functional  -TA functional  -CS functional  -CS    Orientation Status oriented x 4  -TA oriented x 4  -CS oriented x 4  -CS    Follows Commands/Answers Questions 100% of the time  -% of the time  -% of the time  -CS    Personal Safety WNL/WFL  -TA      Personal Safety Interventions gait belt;nonskid shoes/slippers when out of bed  -TA      Recorded by [TA] Sera Bowens PTA [CS] HILARY HareA/JARAD [CS] Madhavi Watters, LINDA/L    Bed Mobility, Assessment/Treatment    Bed Mobility, Roll Right, [place] independent  -TA      Bed Mobility, Scoot/Bridge, [place] independent  -TA      Bed Mob, Supine to Sit, [place] independent  -TA  conditional independence  -CS    Bed Mob, Sit to Supine, [place] independent  -TA      Recorded by [TA] Sera Bowens PTA  [CS] Madhavi Watters LINDA/L    Transfer Assessment/Treatment    Transfers, Sit-Stand [place] independent  -TA independent  -CS     Transfers, Stand-Sit [place] independent  -TA independent  -CS     Transfers, Sit-Stand-Sit, Assist Device other (see comments)   no AD  -TA      Toilet Transfer, [place] independent  -TA independent   x2  -CS     Recorded by [TA] Sera Bowens PTA [CS] Madhavi Watters LINDA/L     Gait Assessment/Treatment    Gait, [place] Level independent  -TA      Gait, Distance (Feet) 350  -TA      Recorded by [TA] Sera Bowens PTA      Functional Mobility    Functional Mobility- Ind. Level  independent  -CS     Functional  Mobility-Distance (Feet)  15  -CS     Recorded by  [CS] JAM Hare     Toileting Assessment/Training    Toileting Assess/Train, Assistive Device  raised toilet seat  -CS     Toileting Assess/Train, Position  sitting;standing  -CS     Toileting Assess/Train, Indepen Level  independent   x2  -CS     Recorded by  [CS] JAM Hare     Balance Skills Training    Standing-Balance Activities  Reaching for objects;Reaching across midline  -CS     Standing Balance # of Minutes  5  -CS     Recorded by  [CS] JAM Hare     Therapy Exercises    Bilateral Upper Extremity  AROM:;20 reps;sitting;elbow flexion/extension;hand pumps;pronation/supination;shoulder extension/flexion;shoulder ER/IR  -CS AROM:;20 reps;sitting;elbow flexion/extension;hand pumps;pronation/supination;shoulder extension/flexion;shoulder ER/IR  -CS    BUE Resistance  manual resistance- minimal  -CS theraband  -CS    Recorded by  [CS] JAM Hare [CS] JAM Hare    Positioning and Restraints    Pre-Treatment Position in bed  -TA in bed  -CS in bed  -CS    Post Treatment Position bed  -TA bed  -CS bed  -CS    In Bed supine;call light within reach  -TA sitting EOB;with family/caregiver  -CS sitting EOB;call light within reach;with family/caregiver  -CS    Recorded by [TA] Sera Bowens PTA [CS] JAM Hare [CS] JAM Hare      09/15/17 1046 09/14/17 1309 09/14/17 0740    Rehab Assessment/Intervention    Discipline physical therapy assistant  -SEJAL physical therapy assistant  -SEJAL occupational therapy assistant  -KD    Document Type therapy note (daily note)  -SEJAL therapy note (daily note)  -SEJAL therapy note (daily note)  -KD    Subjective Information agree to therapy  -SEJAL agree to therapy  -SEJAL agree to therapy  -KD    Patient Effort, Rehab Treatment good  -SEJAL good  -SEJAL     Precautions/Limitations fall precautions  -SEJAL fall precautions  -SEJAL fall precautions  -KD    Recorded  by [SEJAL] Shalom Ross PTA [SEJAL] Shalom Ross PTA [KD] Swetha Kitchen, LINDA/L    Vital Signs    Pre Systolic BP Rehab 159  -SEJAL 140  -SEJAL 117  -KD    Pre Treatment Diastolic BP 79  -SEJAL 76  -SEJAL 64  -KD    Intra Systolic BP Rehab   142  -KD    Intra Treatment Diastolic BP  156  -SEJAL 73  -KD    Post Systolic BP Rehab 144  -SEJAL 73  -SEJAL 173  -KD    Post Treatment Diastolic BP 72  -SEJAL  77  -KD    Pretreatment Heart Rate (beats/min) 92  -SEJLA 100  -SEJAL 93  -KD    Intratreatment Heart Rate (beats/min)  104  -SEJAL 94  -KD    Posttreatment Heart Rate (beats/min) 96  -SEJAL 111  -SEJAL 101  -KD    Pre SpO2 (%) 97  -SEJAL 99  -SEJAL 95  -KD    O2 Delivery Pre Treatment room air  -SEJAL room air  -SEJAL room air  -KD    Intra SpO2 (%)  99  -SEJAL 97  -KD    O2 Delivery Intra Treatment  room air  -SEJAL room air  -KD    Post SpO2 (%) 98  -SEJAL 99  -SEJAL 97  -KD    O2 Delivery Post Treatment room air  -SEJAL room air  -SEJAL room air  -KD    Pre Patient Position Supine  -SEJAL Supine  -SEJAL Supine  -KD    Intra Patient Position   Standing  -KD    Post Patient Position Sitting  -SEJAL Sitting  -SEJAL Sitting  -KD    Recorded by [SEJAL] Shalom Ross PTA [SEJAL] Shalom Ross PTA [KD] Swetha Kitchen, LINDA/L    Pain Assessment    Pain Assessment No/denies pain  -SEJAL No/denies pain  -SEJAL No/denies pain  -KD    Recorded by [SEJAL] Shalom Ross PTA [SEJAL] Shalom Ross PTA [KD] Swetha Kitchen, LINDA/L    Vision Assessment/Intervention    Visual Impairment WFL with corrective lenses  -SEJAL WFL with corrective lenses  -SEJAL     Recorded by [SEJAL] Shalom Ross PTA [SEJAL] Shalom Ross PTA     Cognitive Assessment/Intervention    Current Cognitive/Communication Assessment functional  -SEJAL functional  -SEJAL functional  -KD    Orientation Status oriented x 4  -SEJAL oriented x 4  -SEJAL oriented x 4  -KD    Follows Commands/Answers Questions 100% of the time  -SEJAL 100% of the time  -SEJAL 100% of the time  -KD    Personal Safety Interventions gait belt;muscle strengthening facilitated;nonskid  shoes/slippers when out of bed;supervised activity  -SEJAL gait belt;muscle strengthening facilitated;nonskid shoes/slippers when out of bed;supervised activity  -SEJAL     Recorded by [SEJAL] Shalom Ross PTA [SEJAL] Shalom Ross PTA [KD] Swetha Kitchen, LINDA/L    Bed Mobility, Assessment/Treatment    Bed Mobility, Assistive Device bed rails;head of bed elevated  -SEJAL bed rails;head of bed elevated  -SEJAL head of bed elevated  -KD    Bed Mobility, Roll Right, [place]   independent  -KD    Bed Mobility, Scoot/Bridge, [place]   independent  -KD    Bed Mob, Supine to Sit, [place] conditional independence  -SEJAL conditional independence  -SEJAL     Bed Mob, Sidelying to Sit, [place]   independent  -KD    Bed Mob, Sit to Sidelying, [place]   independent  -KD    Recorded by [SEJAL] Shalom Ross PTA [SEJAL] Shalom Ross PTA [KD] Swetha Kitchen, LNIDA/L    Transfer Assessment/Treatment    Transfers, Sit-Stand [place] independent  -SEJAL independent  -SEJAL independent  -KD    Transfers, Stand-Sit [place] independent  -SEJAL independent  -SEJAL independent  -KD    Transfers, Sit-Stand-Sit, Assist Device --   no AD  -SEJAL --   no AD  -SEJAL --   none  -KD    Toilet Transfer, [place] independent  -SEJAL  independent  -KD    Toilet Transfer, Assistive Device other (see comments)   no AD  -SEJAL  --   none  -KD    Transfer, Comment pt completed 2 BR t/fs this tx. pt completed her own pericare. indep w/ t/fs.   -SEJAL multiple sit-stands.   -SEJAL Pt completed 1 set x 5 reps sit<>stands  -KD    Recorded by [SEJAL] Shalom Ross PTA [SEJAL] Shalom Ross PTA [KD] Swetha Kitchen, LINDA/L    Gait Assessment/Treatment    Gait, [place] Level independent;supervision required  -SEJAL supervision required  -SEJAL     Gait, Assistive Device other (see comments)   no AD  -SEJAL --   no AD  -SEJAL     Gait, Distance (Feet) 570   + multiple short distances in room  -SEJAL 200  -SEJAL     Gait, Gait Deviations --   fast irving  -SEJAL      Gait, Comment 1  self corrected, minor LOB while turning  -SEJAL fast irving and slightly unsteady. somewhat impulsive.   -SEJAL     Recorded by [SEJAL] Shalom Ross PTA [SEJAL] Shalom Ross PTA     Stairs Assessment/Treatment    Number of Stairs 5  -SEJAL 5  -SEJAL     Stairs, Handrail Location both sides  -SEJAL none  -SEJAL     Stairs, [place] Level supervision required  -SEJAL minimum assist (75% patient effort)  -SEJAL     Stairs, Technique Used step to step (ascending);step to step (descending)  -SEJAL step to step (ascending);step to step (descending)   edu on step-to for safety.   -SEJAL     Stairs, Comment 2 attempts  -SEJAL 3 trails w/o handrail min A. 1 trial w/ dianna hr, SBA. pt states her son will build her hand rails.   -SEJAL     Recorded by [SEJAL] Shalom Ross PTA [SEJAL] Shalom Ross PTA     Functional Mobility    Functional Mobility- Ind. Level   independent  -KD    Recorded by   [KD] HILARY KelleyA/L    Upper Body Bathing Assessment/Training    UB Bathing Assess/Train, [place] Level   independent  -KD    Recorded by   [KD] HILARY KelleyA/L    Lower Body Bathing Assessment/Training    LB Bathing Assess/Train, [place] Level   independent  -KD    Recorded by   [KD] HILARY KelleyA/L    Upper Body Dressing Assessment/Training    UB Dressing Assess/Train, Clothing Type   donning:;hospital gown  -KD    UB Dressing Assess/Train, Position   edge of bed;sitting  -KD    UB Dressing Assess/Train, [place]   independent  -KD    Recorded by   [KD] HILARY KelleyA/L    Lower Body Dressing Assessment/Training    LB Dressing Assess/Train, Clothing Type   donning:;slipper socks  -KD    LB Dressing Assess/Train, Position   edge of bed;sitting  -KD    LB Dressing Assess/Train, [place]   independent  -KD    Recorded by   [KD] HILARY KelleyA/L    Toileting Assessment/Training    Toileting Assess/Train, Assistive Device   grab bars  -KD    Toileting Assess/Train, Position   sitting  -KD    Toileting Assess/Train,  [place] Level   independent  -KD    Recorded by   [KD] MADDI Kelley/L    Grooming Assessment/Training    Grooming Assess/Train, Position   sink side;standing  -KD    Grooming Assess/Train, Indepen Level   independent  -KD    Recorded by   [KD] MADDI Kelley/L    Balance Skills Training    Standing-Balance Activities   --   ADL activity  -KD    Standing Balance # of Minutes   5  -KD    Recorded by   [KD] MADDI Kelley/L    Therapy Exercises    Bilateral Lower Extremities AROM:;20 reps;standing;heel raises;hip abduction/adduction;mini squats  -SEJAL AROM:;20 reps;supine;ankle pumps/circles;hip abduction/adduction;heel slides;SAQ;SLR  -SEJAL     Bilateral Upper Extremity   AROM:;20 reps;sitting;elbow flexion/extension;hand pumps;pronation/supination;shoulder abduction/adduction;shoulder extension/flexion;shoulder ER/IR  -KD    BUE Resistance   manual resistance- minimal   2lb HW  -KD    Recorded by [SEJAL] Shalom Ross PTA [SEJAL] Shalom Ross PTA [KD] HILARY KelleyA/L    Positioning and Restraints    Pre-Treatment Position in bed  -SEJAL in bed  -SEJAL in bed  -KD    Post Treatment Position bed  - bed  - bed  -KD    In Bed sitting;call light within reach;encouraged to call for assist   all needs met  - sitting EOB;call light within reach;encouraged to call for assist;with family/caregiver   all needs met.   - sitting;call light within reach;encouraged to call for assist;exit alarm on  -KD    Recorded by [SEJAL] Shalom Ross PTA [SEJAL] Shalom Ross PTA [KD] HILARY KelleyA/L      User Key  (r) = Recorded By, (t) = Taken By, (c) = Cosigned By    Initials Name Effective Dates    TA Sera Bowens, BRUNO 10/17/16 -     SEJAL Shalom Ross PTA 10/17/16 -     KD MADDI Kelley/L 10/17/16 -     CS HILARY HareA/JARAD 10/17/16 -                 IP PT Goals       09/16/17 1635 09/16/17 1556 09/15/17 1046    Transfer Training PT STG    Transfer Training PT STG, Date Goal Reviewed    09/15/17  -SEJAL    Transfer Training PT STG, Outcome goal met  -TA  goal ongoing  -    Gait Training PT STG    Gait Training Goal PT STG, Date Goal Reviewed   09/15/17  -    Gait Training Goal PT STG, Outcome  goal met  -TA goal ongoing  -    Stair Training PT LTG    Stair Training Goal PT LTG, Date Goal Reviewed   09/15/17  -    Stair Training Goal PT LTG, Outcome   goal ongoing  -    Physical Therapy PT LTG    Physical Therapy PT LTG, Date Goal Reviewed   09/15/17  -    Physical Therapy PT LTG, Outcome   goal ongoing  -      09/14/17 1309 09/13/17 0955       Transfer Training PT STG    Transfer Training PT STG, Date Established  09/13/17  -CZ     Transfer Training PT STG, Time to Achieve  2 days  -CZ     Transfer Training PT STG, Activity Type  bed to chair /chair to bed;sit to stand/stand to sit  -CZ     Transfer Training PT STG, [place] Level  independent  -CZ     Transfer Training PT STG, Date Goal Reviewed 09/14/17  -SEJAL 09/13/17  -CZ     Transfer Training PT STG, Outcome goal ongoing  - goal ongoing  -CZ     Gait Training PT STG    Gait Training Goal PT STG, Date Established  09/13/17  -CZ     Gait Training Goal PT STG, Time to Achieve  2 days  -CZ     Gait Training Goal PT STG, [place] Level  independent  -CZ     Gait Training Goal PT STG, Distance to Achieve  200'x1.  -CZ     Gait Training Goal PT STG, Additional Goal  No AD.   -CZ     Gait Training Goal PT STG, Date Goal Reviewed 09/14/17  -SEJAL 09/13/17  -CZ     Gait Training Goal PT STG, Outcome goal ongoing  - goal ongoing  -CZ     Stair Training PT LTG    Stair Training Goal PT LTG, Date Established  09/13/17  -CZ     Stair Training Goal PT LTG, Time to Achieve  by discharge  -CZ     Stair Training Goal PT LTG, Number of Steps  2  -CZ     Stair Training Goal PT LTG, [place] Level  conditional independence  -CZ     Stair Training Goal PT LTG, Assist Device  2 handrails  -CZ     Stair Training Goal PT LTG, Date Goal Reviewed  09/14/17  - 09/13/17  -     Stair Training Goal PT LTG, Outcome goal ongoing  - goal ongoing  -     Physical Therapy PT LTG    Physical Therapy PT LTG, Time to Achieve  by discharge  -     Physical Therapy PT LTG, Activity Type  Tinetti fall risk assessment.   -     Physical Therapy PT LTG, Addisional Goal  Score >/= 24/28 or low fall risk.   -     Physical Therapy PT LTG, Date Goal Reviewed 09/14/17  - 09/13/17  -     Physical Therapy PT LTG, Outcome goal ongoing  - goal ongoing  -       User Key  (r) = Recorded By, (t) = Taken By, (c) = Cosigned By    Initials Name Provider Type    TAMARA Bowens, PTA Physical Therapy Assistant    SEJAL Ross, PTA Physical Therapy Assistant    JESENIA Shahid, PT Physical Therapist          Physical Therapy Education     Title: PT OT SLP Therapies (Active)     Topic: Physical Therapy (Active)     Point: Mobility training (Active)    Learning Progress Summary    Learner Readiness Method Response Comment Documented by Status   Patient Acceptance E NR   09/16/17 1635 Active    Acceptance E NR   09/15/17 1309 Active    Acceptance E NR stair taining as well as gait and t/f training.  09/14/17 1410 Active               Point: Precautions (Active)    Learning Progress Summary    Learner Readiness Method Response Comment Documented by Status   Patient Acceptance E NR   09/16/17 1635 Active    Acceptance E NR Tinetti assessed: 22/28 or moderate fall risk.  Discussed falls, risk and stepping response with patient and her son.  09/13/17 1148 Active   Family Acceptance E NR [place] assessed: 22/28 or moderate fall risk.  Discussed falls, risk and stepping response with patient and her son.  09/13/17 1148 Active                      User Key     Initials Effective Dates Name Provider Type Discipline     10/17/16 -  Sera Bowens, BRUNO Physical Therapy Assistant PT     10/17/16 -  Shalom Ross, PTA Physical Therapy Assistant PT      02/17/17 -  Jesus Shahid, PT Physical Therapist PT                    PT Recommendation and Plan  Anticipated Discharge Disposition: home with home health  Planned Therapy Interventions: balance training, bed mobility training, gait training, patient/family education, stair training, strengthening, stretching, transfer training  PT Frequency: other (see comments) (5-14x/week)  Plan of Care Review  Outcome Summary/Follow up Plan: pt independent with transfers, pt ambulated 350` without AD Independently. pt met 2 goals this Tx,  Pt  would benefit from HHPT  @ D/C          Outcome Measures       09/16/17 1600 09/16/17 1500 09/15/17 1500    How much help from another person do you currently need...    Turning from your back to your side while in flat bed without using bedrails? 4  -TA      Moving from lying on back to sitting on the side of a flat bed without bedrails? 4  -TA      Moving to and from a bed to a chair (including a wheelchair)? 4  -TA      Standing up from a chair using your arms (e.g., wheelchair, bedside chair)? 4  -TA      Climbing 3-5 steps with a railing? 3  -TA      To walk in hospital room? 4  -TA      AM-PAC 6 Clicks Score 23  -TA      How much help from another is currently needed...    Putting on and taking off regular lower body clothing?  4  -CS 4  -CS    Bathing (including washing, rinsing, and drying)  4  -CS 4  -CS    Toileting (which includes using toilet bed pan or urinal)  4  -CS 4  -CS    Putting on and taking off regular upper body clothing  4  -CS 4  -CS    Taking care of personal grooming (such as brushing teeth)  4  -CS 4  -CS    Eating meals  4  -CS 4  -CS    Score  24  -CS 24  -CS    Functional Assessment    Outcome Measure Options AM-PAC 6 Clicks Basic Mobility (PT)  -TA AM-PAC 6 Clicks Daily Activity (OT)  -CS AM-PAC 6 Clicks Daily Activity (OT)  -CS      09/15/17 1046 09/14/17 1309 09/14/17 0740    How much help from another person do you currently need...    Turning from  your back to your side while in flat bed without using bedrails? 4  -SEJAL 4  -SEJAL     Moving from lying on back to sitting on the side of a flat bed without bedrails? 4  -SEJAL 4  -SEJAL     Moving to and from a bed to a chair (including a wheelchair)? 4  -SEJAL 3  -SEJAL     Standing up from a chair using your arms (e.g., wheelchair, bedside chair)? 4  -SEJAL 4  -SEJAL     Climbing 3-5 steps with a railing? 3  -SEJAL 3  -SEJAL     To walk in hospital room? 4  -SEJAL 3  -SEJAL     AM-PAC 6 Clicks Score 23  -SEJAL 21  -SEJAL     How much help from another is currently needed...    Putting on and taking off regular lower body clothing?   4  -KD    Bathing (including washing, rinsing, and drying)   4  -KD    Toileting (which includes using toilet bed pan or urinal)   4  -KD    Putting on and taking off regular upper body clothing   4  -KD    Taking care of personal grooming (such as brushing teeth)   4  -KD    Eating meals   4  -KD    Score   24  -KD    Functional Assessment    Outcome Measure Options AM-PAC 6 Clicks Basic Mobility (PT)  -SEJAL AM-PAC 6 Clicks Basic Mobility (PT)  -SEJAL       User Key  (r) = Recorded By, (t) = Taken By, (c) = Cosigned By    Initials Name Provider Type    TAMARA Bowens PTA Physical Therapy Assistant    SEJAL Ross PTA Physical Therapy Assistant    SANDRA Kitchen, LINDA/L Occupational Therapy Assistant    LILIANA Watters, LINDA/L Occupational Therapy Assistant           Time Calculation:         PT Charges       09/16/17 1641          Time Calculation    Start Time 1556  -TA      Stop Time 1623  -TA      Time Calculation (min) 27 min  -TA      Time Calculation- PT    Total Timed Code Minutes- PT 27 minute(s)  -TA        User Key  (r) = Recorded By, (t) = Taken By, (c) = Cosigned By    Initials Name Provider Type    TAMARA Bowens PTA Physical Therapy Assistant          Therapy Charges for Today     Code Description Service Date Service Provider Modifiers Qty    70513996403 HC GAIT TRAINING EA 15 MIN 9/16/2017  Sera Bowens PTA GP 1    [id number] HC PT THERAPEUTIC ACT EA 15 MIN 9/16/2017 Sera Bowens PTA GP 1          PT G-Codes  PT Professional Judgement Used?: Yes  Outcome Measure Options: AM-PAC 6 Clicks Basic Mobility (PT)  Score: 22  Functional Limitation: Mobility: Walking and moving around  Mobility: Walking and Moving Around Current Status (): At least 20 percent but less than 40 percent impaired, limited or restricted  Mobility: Walking and Moving Around Goal Status (): At least 1 percent but less than 20 percent impaired, limited or restricted    Sera Bowens PTA  9/16/2017

## 2021-04-21 NOTE — HISTORY OF PRESENT ILLNESS
[de-identified] : I had a pleasure of seeing Mr. GEE for follow-up consultation for pacemaker care. He was previously being followed by Dr. Muller. He is accompanied by his wife, who works at [employer] [place].\par \par Mr. GEE is a 60 year-year old male with history of HTN, DM, Morbid obesity, CHD s/p corrective surgery at age of 3, CHB s/p DC-PPM (Bio, 15, dep), CLL, DL, COPD, ex-smoker, recent encephalopathy (01/21) is here for routine f-up.\par \par Denies chest pain, shortness of breath, palpitation, dizziness or LOC except noted above.\par \par EKG: SR@ 76/min, LBBB, QRSd 172 ms, RI: 176 ms\par TTE (07/19): EF 45-50%, mild LAE, no defect on septum\par LHC (09/19): No occlusive CAD\par Cardio: Dr. Power\par \par

## 2021-04-21 NOTE — HISTORY OF PRESENT ILLNESS
[de-identified] : I had a pleasure of seeing Mr. GEE for follow-up consultation for pacemaker care. He was previously being followed by Dr. Muller. He is accompanied by his wife, who works at [employer].\par \par Mr. GEE is a 60 year-year old male with history of HTN, DM, Morbid obesity, CHD s/p corrective surgery at age of 3, CHB s/p DC-PPM (Bio, 15, dep), CLL, DL, COPD, ex-smoker, recent encephalopathy (01/21) is here for routine f-up.\par \par Denies chest pain, shortness of breath, palpitation, dizziness or LOC except noted above.\par \par EKG: SR@ 76/min, LBBB, QRSd 172 ms, MT: 176 ms\par TTE (07/19): EF 45-50%, mild LAE, no defect on septum\par LHC (09/19): No occlusive CAD\par Cardio: Dr. Power\par \par

## 2021-04-21 NOTE — ASSESSMENT
[FreeTextEntry1] : ## CHB s/p DC-PPM (Bio, dep, 15)\par \par - PPM interrogation shows normally functioning DC-PPM. Battery life ok. No new events. No evidence of AF\par - TTE to assess LVEF\par - Remote Monitoring\par - Return in 6 months\par

## 2021-04-22 ENCOUNTER — APPOINTMENT (OUTPATIENT)
Dept: CARDIOLOGY | Facility: CLINIC | Age: 61
End: 2021-04-22
Payer: MEDICARE

## 2021-04-22 PROCEDURE — 93306 TTE W/DOPPLER COMPLETE: CPT

## 2021-04-22 PROCEDURE — 99072 ADDL SUPL MATRL&STAF TM PHE: CPT

## 2021-04-28 ENCOUNTER — APPOINTMENT (OUTPATIENT)
Dept: CARDIOLOGY | Facility: CLINIC | Age: 61
End: 2021-04-28
Payer: MEDICARE

## 2021-04-28 VITALS
BODY MASS INDEX: 29.12 KG/M2 | SYSTOLIC BLOOD PRESSURE: 120 MMHG | TEMPERATURE: 98 F | HEIGHT: 72 IN | WEIGHT: 215 LBS | DIASTOLIC BLOOD PRESSURE: 85 MMHG

## 2021-04-28 PROCEDURE — 99214 OFFICE O/P EST MOD 30 MIN: CPT

## 2021-04-28 PROCEDURE — 99072 ADDL SUPL MATRL&STAF TM PHE: CPT

## 2021-04-28 NOTE — PHYSICAL EXAM
[General Appearance - Well Developed] : well developed [Normal Appearance] : normal appearance [General Appearance - Well Nourished] : well nourished [General Appearance - In No Acute Distress] : no acute distress [Normal Conjunctiva] : the conjunctiva exhibited no abnormalities [Eyelids - No Xanthelasma] : the eyelids demonstrated no xanthelasmas [Normal Oropharynx] : normal oropharynx [Normal Jugular Venous A Waves Present] : normal jugular venous A waves present [] : no respiratory distress [Respiration, Rhythm And Depth] : normal respiratory rhythm and effort [Exaggerated Use Of Accessory Muscles For Inspiration] : no accessory muscle use [Auscultation Breath Sounds / Voice Sounds] : lungs were clear to auscultation bilaterally [Heart Sounds] : normal S1 and S2 [Heart Rate And Rhythm] : heart rate and rhythm were normal [Arterial Pulses Normal] : the arterial pulses were normal [Abdomen Soft] : soft [Abdomen Tenderness] : non-tender [Abnormal Walk] : normal gait [Nail Clubbing] : no clubbing of the fingernails [Petechial Hemorrhages (___cm)] : no petechial hemorrhages [Skin Color & Pigmentation] : normal skin color and pigmentation [Skin Turgor] : normal skin turgor [No Anxiety] : not feeling anxious [FreeTextEntry1] : Median sternotomy

## 2021-04-28 NOTE — HISTORY OF PRESENT ILLNESS
[FreeTextEntry1] : Congenital heart disease unknown by the patient and his wife, s/p open heart surgery as an infant at [facility], no records are available.\par Patent coronaries on cardiac catheterization in 2019  LVEF=60%.\par PPM for complete heart block\par Poorly visualized RV and TV, on 2D echo Doppler performed in 11/18,\par Former cigarette smoker for 40 years\par \par Severe emphysema\par Pt. is disabled. He was diagnosed with stage IV CLL, on chemotherapy, followed by Dr. Rider\par Hypertension\par Hyperlipidemia\par Prior cardiac catheterization performed 10 years ago by elena reported as no coronary artery disease.\par Patient worked as a  for [employer] prior to becoming disabled.\par

## 2021-04-28 NOTE — DISCUSSION/SUMMARY
[FreeTextEntry1] : Patient was instructed to target their T. Cholesterol to less than 200 mg/dl and LDL cholesterol to less than 100 mg/dl.\par Exercise and weight loss was advised.\par Maintain cardiac medications. \par Patient was advised to repeat a BMP, CBC , fasting lipid profile and hepatic panel which was reviewed with him.\par 2D echo doppler was also reviewed with him in detail and a copy of the results provided.\par Patient was taken off OAC by EP.\par RV in 6 months

## 2021-04-28 NOTE — REASON FOR VISIT
[FreeTextEntry1] : Patient is here for follow up with his spouse to review his echo doppler and lab test results.

## 2021-05-13 NOTE — SWALLOW BEDSIDE ASSESSMENT ADULT - SLP GENERAL OBSERVATIONS
Received a referral from Dr. Chin Guardian office for this patient. Called and spoke with patient to let her know when I have her scheduled with Dr. Mattie Chopra. Patient is aware of the appointment time/date.
lethargic
Pt received sitting upright in bed, alert and oriented x4, no c/o pain. +verbally responsive, delayed responses. +room air.

## 2021-05-20 ENCOUNTER — OUTPATIENT (OUTPATIENT)
Dept: OUTPATIENT SERVICES | Facility: HOSPITAL | Age: 61
LOS: 1 days | Discharge: HOME | End: 2021-05-20

## 2021-05-20 ENCOUNTER — RESULT REVIEW (OUTPATIENT)
Age: 61
End: 2021-05-20

## 2021-05-20 ENCOUNTER — APPOINTMENT (OUTPATIENT)
Dept: HEMATOLOGY ONCOLOGY | Facility: CLINIC | Age: 61
End: 2021-05-20
Payer: MEDICARE

## 2021-05-20 ENCOUNTER — LABORATORY RESULT (OUTPATIENT)
Age: 61
End: 2021-05-20

## 2021-05-20 VITALS
RESPIRATION RATE: 16 BRPM | BODY MASS INDEX: 28.44 KG/M2 | HEART RATE: 74 BPM | HEIGHT: 72 IN | TEMPERATURE: 98.7 F | WEIGHT: 210 LBS | SYSTOLIC BLOOD PRESSURE: 147 MMHG | DIASTOLIC BLOOD PRESSURE: 92 MMHG

## 2021-05-20 DIAGNOSIS — Z98.890 OTHER SPECIFIED POSTPROCEDURAL STATES: Chronic | ICD-10-CM

## 2021-05-20 DIAGNOSIS — Z95.0 PRESENCE OF CARDIAC PACEMAKER: Chronic | ICD-10-CM

## 2021-05-20 PROCEDURE — 99214 OFFICE O/P EST MOD 30 MIN: CPT

## 2021-05-21 LAB
ALBUMIN SERPL ELPH-MCNC: 4.9 G/DL
ALP BLD-CCNC: 55 U/L
ALT SERPL-CCNC: 18 U/L
ANION GAP SERPL CALC-SCNC: 11 MMOL/L
AST SERPL-CCNC: 19 U/L
BILIRUB SERPL-MCNC: 1.4 MG/DL
BUN SERPL-MCNC: 16 MG/DL
CALCIUM SERPL-MCNC: 9.5 MG/DL
CHLORIDE SERPL-SCNC: 104 MMOL/L
CO2 SERPL-SCNC: 25 MMOL/L
CREAT SERPL-MCNC: 1.2 MG/DL
ERYTHROCYTE [SEDIMENTATION RATE] IN BLOOD BY WESTERGREN METHOD: 4 MM/HR
GLUCOSE SERPL-MCNC: 87 MG/DL
HCT VFR BLD CALC: 46.5 %
HGB BLD-MCNC: 15.5 G/DL
LDH SERPL-CCNC: 172 U/L
MCHC RBC-ENTMCNC: 30.2 PG
MCHC RBC-ENTMCNC: 33.3 G/DL
MCV RBC AUTO: 90.5 FL
PLATELET # BLD AUTO: 182 K/UL
PMV BLD: 10.5 FL
POTASSIUM SERPL-SCNC: 4.3 MMOL/L
PROT SERPL-MCNC: 6.8 G/DL
RBC # BLD: 5.14 M/UL
RBC # FLD: 12.6 %
SODIUM SERPL-SCNC: 140 MMOL/L
WBC # FLD AUTO: 6.84 K/UL

## 2021-05-22 NOTE — HISTORY OF PRESENT ILLNESS
[de-identified] : 57 year old obese male with PMH of COPD , diabetes, PPP for syncope. Patient went to the emergency room on February 4, 2018 with complaints of a swollen lymph node on right side of neck, which he had noticed several months prior to presentation. Lymph node was originally not painful but became painful about one week prior to presentation, which prompted him to go to the emergency room. No fevers, night sweats, anorexia or significant changes in weight. In the ED, patient underwent CT neck soft tissue, chest, abdomen and pelvis, which found multiple areas of LAD,Right-sided enlarged level 5 cervical chain lymph nodes measuring up to  1.9 x 1.7 cm (series 9 image 193). Enlarged right supraclavicular lymph  nodes measuring up to 1.6 x 1 cm(series 9 image 214).   in the R supraclavicular lymph node, R axillary lymph node,  Enlarged mesenteric lymph nodes measuring up to 1.3  x 1 cm (series 2 image 92)., There was no evidence of mediastinal or hilar LAD at that time. However, patient is not certain if he took prednisone prior to imaging. As per wife, patient takes short-course prednisone tapers every few months secondary to bronchitis. OTTONIEL plummer is a former 1 PPD smoker for 30-40 years who quit about five months prior to presentation and denies any previous history of asbestosis exposure. He worked as a . He has no pets at home. Biopsy of right cervical lymph node by ENT  Low grade B-cell lymphoma, consistent with nisreen marginal zone lymphoma.  the neoplastic cells are CD20+ CD79a+ PAX5+ B-cells that co-express BCL2. They are negative for CD5, CD10, BCL6, cyclinD1, and CD43. The proliferation index (Ki67 labeling) ranges from 5 to 20% in different areas excluding the germinal centers, flow cytometry studies performed at [facility] show monotypic B-cells (38% of cells), positive for kappa, CD19, CD20, FMC-7, CD23, minimal CD10; negative for CD5. Viability is 78%.   *** In summary, the findings are diagnostic of low grade B-cell lymphoma, the features are consistent with nisreen marginal zone lymphoma.     2 , peripheral blood flow showed - A MINUTE MONOCLONAL, CD10+ B-CELL POPULATION IS OBSERVED (0.14%). kappa and CD 20 bright . Lab work showed normal CBC , Hb A1c 9, negative HIV , Hep c and SPEP .    \par Since surgery 2 weeks ago he noted marked increase in painful  nisreen masses in the neck , supraclavicular and right axillae , he takes percocet to sleep with partial relief  , PET showed generalized adenopathy with maximum SUV 9 . No bone or marrow uptake. He complains of dyspnea on exertion , mild cough , improved with short course of prednisone, he lost few lbs and denies fever or night sweats. \par No history of EGD or colonoscopy . \par  [de-identified] : 08/27/2018 : Patient returns for follow up after BR X 3 with near complete response. Treatment was held due to worsening complaints of weakness, exertional dyspnea. RUQ pain . He followed with pulmonary and was placed on inhaled bronchodilators , He had MUGA scan and may require a second pacemaker lead. He denies any B symptoms or any new lumps or adenopathy . \par \par 10/23/2018 Patient returns for next dose of rituxan , He denies any new complaints . He was seen by GI for right sided abdominal tenderness, felt to be muscular in origin , CT scan apparently shows abdominal hernias probably unrelated to his complaint . He is scheduled for surveillance colonoscopy . He denies any B symptoms, he continues to have dyspnea on exertion and went on social security disability .\par \par 12/17/2018 Patient returns for next dose of rituxan maintenance . He denies any B symptoms , continues to complain of mild RUQ pain and tenderness. He is followed by cardiology and pulmonary and is undergoing work up to rule out cardiac sarcoidosis . \par \par 06/17/2019 Patient returns for follow up . he has been on maintenance rituxan for one year and was treated recently as outpatient for suspected pneumonia , he lost 9 lbs and is feeling better now. He had cardiac work up with revealed left atrial thrombus and started on xarelto , CT angio was negative , Cardiac CT showed LAD plaque without oclusion . \par \par 05/26/2020 Patient returns for last dose of rituxan , he feels well , denies B symptoms , infections , abnormal lumps . \par \par 08/18/2020 Patient returns for follow up after completion of maintenance rituxan . Since his last visit he developed left hemispheric stroke with weakness and aphasia . his speech has returned to normal and is left with mild residual right hemiparesis ( Lower > upper ) , he is using a cane to ambulate . CT angio was negative , he has permanent pacemaker without any documented atrial fibrillation , he remains on aspirin without anticoagulation . Of note he had an atrial thrombus ( right ? ) in the past on ANITA and was on anticoagulation , no repeat ANITA since then . in hospital he had negative CT abdomen except for non-specific lymph nodes. \par \par 05/20/2021 Patient returns with chief complaint of painful swelling of left upper neck , very sensitive to touch for 3 weeks , slightly improved and relieved with tylenol . \par also complains of decreased appetite and weight loss . no fever or night sweats . He lost 15 lbs since Dec 2020 .

## 2021-05-22 NOTE — ASSESSMENT
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 Last Rituxan May 2020  No evidence of disease on CT neck from Sept 2019. CT abdomen from 7/2020 showed no significant change in multiple subcentimeter lymph nodes. Patient now presents with new cervical tenderness and swelling (lymphadenopathy ?) on left side ( had similar episode in 2020 on right side , resolved spontaneously ) \par likely inflammatory . \par \par PLAN:\par  cbc , cmp , LDH \par PET scan \par ENT follow up .

## 2021-05-22 NOTE — PHYSICAL EXAM
[No Acute Distress] : no acute distress [Normal] : no peripheral adenopathy appreciated [Well Developed] : well developed [de-identified] : mild swelling of left upper neck , extremely tender , no discrete mass or adenopathy .  [Well Nourished] : well nourished [de-identified] : no splenomegaly . [PERRL] : pupils equal round and reactive to light [Normal Sclera/Conjunctiva] : normal sclera/conjunctiva [Well-Appearing] : well-appearing [Normal Outer Ear/Nose] : the outer ears and nose were normal in appearance [Normal Oropharynx] : the oropharynx was normal [EOMI] : extraocular movements intact [No JVD] : no jugular venous distention [Supple] : supple [No Lymphadenopathy] : no lymphadenopathy [Clear to Auscultation] : lungs were clear to auscultation bilaterally [Thyroid Normal, No Nodules] : the thyroid was normal and there were no nodules present [No Respiratory Distress] : no respiratory distress  [Normal Rate] : normal rate  [No Accessory Muscle Use] : no accessory muscle use [Regular Rhythm] : with a regular rhythm [Normal S1, S2] : normal S1 and S2 [No Murmur] : no murmur heard [No Carotid Bruits] : no carotid bruits [No Varicosities] : no varicosities [No Abdominal Bruit] : a ~M bruit was not heard ~T in the abdomen [Pedal Pulses Present] : the pedal pulses are present [No Edema] : there was no peripheral edema [No Extremity Clubbing/Cyanosis] : no extremity clubbing/cyanosis [No Palpable Aorta] : no palpable aorta [Non Tender] : non-tender [Soft] : abdomen soft [Non-distended] : non-distended [No Masses] : no abdominal mass palpated [Normal Bowel Sounds] : normal bowel sounds [No HSM] : no HSM [Normal Posterior Cervical Nodes] : no posterior cervical lymphadenopathy [Normal Anterior Cervical Nodes] : no anterior cervical lymphadenopathy [No CVA Tenderness] : no CVA  tenderness [No Spinal Tenderness] : no spinal tenderness [No Joint Swelling] : no joint swelling [Grossly Normal Strength/Tone] : grossly normal strength/tone [No Rash] : no rash [Coordination Grossly Intact] : coordination grossly intact [Normal Gait] : normal gait [No Focal Deficits] : no focal deficits [Deep Tendon Reflexes (DTR)] : deep tendon reflexes were 2+ and symmetric [Normal Affect] : the affect was normal [Normal Insight/Judgement] : insight and judgment were intact

## 2021-05-27 ENCOUNTER — APPOINTMENT (OUTPATIENT)
Dept: OTOLARYNGOLOGY | Facility: CLINIC | Age: 61
End: 2021-05-27
Payer: MEDICARE

## 2021-05-27 VITALS — BODY MASS INDEX: 28.71 KG/M2 | HEIGHT: 72 IN | WEIGHT: 212 LBS

## 2021-05-27 PROCEDURE — 99213 OFFICE O/P EST LOW 20 MIN: CPT

## 2021-05-27 NOTE — ASSESSMENT
[FreeTextEntry1] : - discussed patient's care with Dr Rider, he had COVID vaccine x 2 doses within the past 6 weeks, it is possible that the left neck  pain and swelling is related to COVID vaccine. Will defer PET tomorrow as this may show false positives\par - f/up in 2 weeks, if persistent pain, will obtain CT neck w contrast as well as PET\par - OTC analgesia

## 2021-05-27 NOTE — PHYSICAL EXAM
[Midline] : trachea located in midline position [Normal] : no rashes [de-identified] : left sided neck exquisite tenderness upon palpation, unable to appreciate any masses or lesions

## 2021-05-27 NOTE — HISTORY OF PRESENT ILLNESS
[de-identified] : 57 Year old M who was recently evaluated in the hospital for diffuse cervical LAD x several months. They recently became painful. He was evaluated by the ENT service in the hospital who performed excisional lymph node biopsy of right neck on 4/6/18. Patient denies weight loss, fevers, night sweats. Here for followup.\par \par Of note he has a remote history of a tracheostomy and chest (airway?) surgery done when he was an infant. He doesn't know why. \par \par \par 6/21/19 Patient is here today c/o clogged right ear. Patient admits his ear has been clogged for a while. Patient uses qtips. \par Patient also c/o neck mass. Patients wife states he woke up Thursday morning with it. He has a h/o lymphoma, seen by Heme-onc on 6/17. he has been on maintenance Rituxan for one year.  New right sided neck mass appeared suddenly the day before yesterday, has become bigger. Mildly painful to touch. \par Patient also c/o hoarseness for about 1 month, worse over the past 3 days. Wife states he has been sick for about 1 month with bronchitis and pneumonia. On abx for this - Levaquin x 1 week, completed 1 week ago. Patient has been having cough which finally sub sided about 3-4 days ago. He denies dysphagia. Symptoms began after her was recovering from pneumonia, becoming worse over last 3-4 days. He denies reflux, no PND. CBC May 27, 2019: WBC 7.25. [FreeTextEntry1] : \par 5/27/21: Patient presents today with c/o left sided neck pain. He has a hx of low grade B-cell lymphoma, consistent with nisreen marginal zone lymphoma, Stage IV, diagnosed via excisional LN biopsy right neck 4/4/18, completed chemotherapy July 2020. Since that time he has been well, with exception of 2 strokes, right LE weakness, that has resolved. Patient states he started having neck pain and swelling about 1 month ago. "lumps were coming and going," on his neck. He was seen by Dr Rider who ordered PET.   He admits loosing about 20 lbs in 4 weeks. He feels weak and fatigued at times. He admits two strokes since his last visit in 2019. Some dysphagia and cough. No appetite recently. Patient had PET scan scheduled for tomorrow morning. Of note he received his 2nd COVID vaccine 5/6/21, left arm, first dose around April 2021, also left arm. Left neck pain started after first dose, before second.

## 2021-06-09 ENCOUNTER — APPOINTMENT (OUTPATIENT)
Dept: ENDOCRINOLOGY | Facility: CLINIC | Age: 61
End: 2021-06-09

## 2021-06-09 ENCOUNTER — OUTPATIENT (OUTPATIENT)
Dept: OUTPATIENT SERVICES | Facility: HOSPITAL | Age: 61
LOS: 1 days | Discharge: HOME | End: 2021-06-09

## 2021-06-09 DIAGNOSIS — Z95.0 PRESENCE OF CARDIAC PACEMAKER: Chronic | ICD-10-CM

## 2021-06-09 DIAGNOSIS — Z98.890 OTHER SPECIFIED POSTPROCEDURAL STATES: Chronic | ICD-10-CM

## 2021-06-09 NOTE — REASON FOR VISIT
[Follow - Up] : a follow-up visit [DM Type 2] : DM Type 2 [Weight Management/Obesity] : weight management/obesity [FreeTextEntry2] : new to me patient of Dr Gonzalez

## 2021-06-09 NOTE — PHYSICAL EXAM
[Alert] : alert [Well Nourished] : well nourished [Healthy Appearance] : healthy appearance [No Acute Distress] : no acute distress [Well Developed] : well developed [No Proptosis] : no proptosis [No Lid Lag] : no lid lag [No Accessory Muscle Use] : no accessory muscle use [Clear to Auscultation] : lungs were clear to auscultation bilaterally [Normal S1, S2] : normal S1 and S2 [No Murmurs] : no murmurs [No Edema] : no peripheral edema [Not Tender] : non-tender [Not Distended] : not distended [Soft] : abdomen soft [No Stigmata of Cushings Syndrome] : no stigmata of Cushings Syndrome [Normal Gait] : normal gait [No Clubbing, Cyanosis] : no clubbing  or cyanosis of the fingernails [No Involuntary Movements] : no involuntary movements were seen [No Joint Swelling] : no joint swelling seen [Normal Strength/Tone] : muscle strength and tone were normal [No Rash] : no rash [No Skin Lesions] : no skin lesions [Abdominal Striae] : no abdominal striae [Acanthosis Nigricans] : no acanthosis nigricans [Foot Ulcers] : no foot ulcers [Acne] : no acne [Hirsutism] : no hirsutism [Oriented x3] : oriented to person, place, and time [Normal Affect] : the affect was normal [Recent Memory Normal] : recent memory was not impaired [Normal Insight/Judgement] : insight and judgment were intact [Normal Mood] : the mood was normal [de-identified] : Obesity [de-identified] : left side painful neck mass, unable to exam thyroid as patient in pain

## 2021-06-09 NOTE — REVIEW OF SYSTEMS
[Nausea] : no nausea [As Noted in HPI] : as noted in HPI [Acanthosis] : acanthosis [Dry Skin] : no dry skin [Headaches] : no headaches [Tremors] : no tremors [Cold Intolerance] : no cold intolerance [Heat Intolerance] : no heat intolerance

## 2021-06-09 NOTE — ASSESSMENT
[Diabetes Foot Care] : diabetes foot care [FreeTextEntry1] : This is a 60 year old male with a significant PMHx of DM presenting to the clinic for follow up examination.\par \par Type 2 Diabetes Mellitus\par - no recent hba1c \par stopped Tresiba by himself since 1/2021 but as by patient SMBG seems in range \par on Trulicity 1.5 mg Q week , Segluromet 7.5/1000 BID and pioglitazone 30 mg daily \par for now Continue same , do labs soon and will discuss if need to resume Tresiba or no \par work up for left side neck mass per onc and ENT  \par f/u podiatry and opthalmology \par \par Dyslipidemia\par \par - Continue with Atorvastatin 80mg QHS [Long Term Vascular Complications] : long term vascular complications of diabetes [Carbohydrate Consistent Diet] : carbohydrate consistent diet [Importance of Diet and Exercise] : importance of diet and exercise to improve glycemic control, achieve weight loss and improve cardiovascular health [Exercise/Effect on Glucose] : exercise/effect on glucose [Self Monitoring of Blood Glucose] : self monitoring of blood glucose [Retinopathy Screening] : Patient was referred to ophthalmology for retinopathy screening [Weight Loss] : weight loss

## 2021-06-09 NOTE — HISTORY OF PRESENT ILLNESS
[FreeTextEntry1] : This is a 60 year old male with a significant PMHx of T2DM, CVA< lymphoma s/p chemo  who presents to the clinic for follow up. \par \par patient states that since last visit with DR HYLTON , his tresiba was not refilled so he stopped taking it 1/2021 , currently taking only Trulicity 1.5 mg Q week , segluromet BID and pioglitazone 30 mg daily . he check FS in am and the highest is 120 . no blurry vision, has a new left side neck mass that is painful and undergoing work up now to r/o recurrent lymphoma following with Dr Rider and ENT. \par no recent HBa1c , glucose on recent CMP normal \par \par

## 2021-06-10 ENCOUNTER — APPOINTMENT (OUTPATIENT)
Dept: OTOLARYNGOLOGY | Facility: CLINIC | Age: 61
End: 2021-06-10
Payer: MEDICARE

## 2021-06-10 DIAGNOSIS — C85.90 NON-HODGKIN LYMPHOMA, UNSPECIFIED, UNSPECIFIED SITE: ICD-10-CM

## 2021-06-10 DIAGNOSIS — R22.1 LOCALIZED SWELLING, MASS AND LUMP, NECK: ICD-10-CM

## 2021-06-10 PROCEDURE — 99213 OFFICE O/P EST LOW 20 MIN: CPT

## 2021-06-10 NOTE — HISTORY OF PRESENT ILLNESS
[de-identified] : 57 Year old M who was recently evaluated in the hospital for diffuse cervical LAD x several months. They recently became painful. He was evaluated by the ENT service in the hospital who performed excisional lymph node biopsy of right neck on 4/6/18 and was diagnosed with lymphoma. Patient denies weight loss, fevers, night sweats. Here for followup.\par \par Of note he has a remote history of a tracheostomy and chest (airway?) surgery done when he was an infant. He doesn't know why. \par \par \par 6/21/19 Patient is here today c/o clogged right ear. Patient admits his ear has been clogged for a while. Patient uses qtips. \par Patient also c/o neck mass. Patients wife states he woke up Thursday morning with it. He has a h/o lymphoma, seen by Heme-onc on 6/17. he has been on maintenance Rituxan for one year.  New right sided neck mass appeared suddenly the day before yesterday, has become bigger. Mildly painful to touch. \par Patient also c/o hoarseness for about 1 month, worse over the past 3 days. Wife states he has been sick for about 1 month with bronchitis and pneumonia. On abx for this - Levaquin x 1 week, completed 1 week ago. Patient has been having cough which finally sub sided about 3-4 days ago. He denies dysphagia. Symptoms began after her was recovering from pneumonia, becoming worse over last 3-4 days. He denies reflux, no PND. CBC May 27, 2019: WBC 7.25.\par \par \par 5/27/21: Patient presents today with c/o left sided neck pain. He has a hx of low grade B-cell lymphoma, consistent with nisreen marginal zone lymphoma, Stage IV, diagnosed via excisional LN biopsy right neck 4/4/18, completed chemotherapy July 2020. Since that time he has been well, with exception of 2 strokes, right LE weakness, that has resolved. Patient states he started having neck pain and swelling about 1 month ago. "lumps were coming and going," on his neck. He was seen by Dr Rider who ordered PET.   He admits loosing about 20 lbs in 4 weeks. He feels weak and fatigued at times. He admits two strokes since his last visit in 2019. Some dysphagia and cough. No appetite recently. Patient had PET scan scheduled for tomorrow morning. Of note he received his 2nd COVID vaccine 5/6/21, left arm, first dose around April 2021, also left arm. Left neck pain started after first dose, before second.  \par \par  [FreeTextEntry1] : \par 6/10/21: Patient presents today following up on neck pain and neck mass. Patient admits neck pain still present. Still feels lumps. He has PET and CT scheduled.

## 2021-06-10 NOTE — PHYSICAL EXAM
[de-identified] : left neck selling and tenderness  [Normal] : mucosa is normal [Midline] : trachea located in midline position

## 2021-06-13 ENCOUNTER — TRANSCRIPTION ENCOUNTER (OUTPATIENT)
Age: 61
End: 2021-06-13

## 2021-06-14 ENCOUNTER — OUTPATIENT (OUTPATIENT)
Dept: OUTPATIENT SERVICES | Facility: HOSPITAL | Age: 61
LOS: 1 days | Discharge: HOME | End: 2021-06-14
Payer: MEDICARE

## 2021-06-14 DIAGNOSIS — Z98.890 OTHER SPECIFIED POSTPROCEDURAL STATES: Chronic | ICD-10-CM

## 2021-06-14 DIAGNOSIS — R22.1 LOCALIZED SWELLING, MASS AND LUMP, NECK: ICD-10-CM

## 2021-06-14 DIAGNOSIS — Z95.0 PRESENCE OF CARDIAC PACEMAKER: Chronic | ICD-10-CM

## 2021-06-14 PROCEDURE — 70491 CT SOFT TISSUE NECK W/DYE: CPT | Mod: 26

## 2021-06-16 ENCOUNTER — RESULT REVIEW (OUTPATIENT)
Age: 61
End: 2021-06-16

## 2021-06-16 ENCOUNTER — OUTPATIENT (OUTPATIENT)
Dept: OUTPATIENT SERVICES | Facility: HOSPITAL | Age: 61
LOS: 1 days | Discharge: HOME | End: 2021-06-16
Payer: MEDICARE

## 2021-06-16 DIAGNOSIS — C85.90 NON-HODGKIN LYMPHOMA, UNSPECIFIED, UNSPECIFIED SITE: ICD-10-CM

## 2021-06-16 DIAGNOSIS — C85.97 NON-HODGKIN LYMPHOMA, UNSPECIFIED, SPLEEN: ICD-10-CM

## 2021-06-16 DIAGNOSIS — Z98.890 OTHER SPECIFIED POSTPROCEDURAL STATES: Chronic | ICD-10-CM

## 2021-06-16 DIAGNOSIS — Z95.0 PRESENCE OF CARDIAC PACEMAKER: Chronic | ICD-10-CM

## 2021-06-16 LAB — GLUCOSE BLDC GLUCOMTR-MCNC: 100 MG/DL — HIGH (ref 70–99)

## 2021-06-16 PROCEDURE — 78815 PET IMAGE W/CT SKULL-THIGH: CPT | Mod: 26,PS

## 2021-06-22 ENCOUNTER — APPOINTMENT (OUTPATIENT)
Dept: CARDIOLOGY | Facility: CLINIC | Age: 61
End: 2021-06-22
Payer: MEDICARE

## 2021-06-22 ENCOUNTER — NON-APPOINTMENT (OUTPATIENT)
Age: 61
End: 2021-06-22

## 2021-06-22 PROCEDURE — 93294 REM INTERROG EVL PM/LDLS PM: CPT

## 2021-06-22 PROCEDURE — 93296 REM INTERROG EVL PM/IDS: CPT

## 2021-06-24 ENCOUNTER — APPOINTMENT (OUTPATIENT)
Dept: OTOLARYNGOLOGY | Facility: CLINIC | Age: 61
End: 2021-06-24
Payer: MEDICARE

## 2021-06-24 VITALS — WEIGHT: 209 LBS | BODY MASS INDEX: 28.35 KG/M2

## 2021-06-24 PROCEDURE — 99213 OFFICE O/P EST LOW 20 MIN: CPT

## 2021-06-24 NOTE — ASSESSMENT
[FreeTextEntry1] : - will proceed with MRI neck, reassured patient of CT and PET findings, no concerning lesions\par - f/up after MRI

## 2021-06-24 NOTE — HISTORY OF PRESENT ILLNESS
[de-identified] : 57 Year old M who was recently evaluated in the hospital for diffuse cervical LAD x several months. They recently became painful. He was evaluated by the ENT service in the hospital who performed excisional lymph node biopsy of right neck on 4/6/18 and was diagnosed with lymphoma. Patient denies weight loss, fevers, night sweats. Here for followup.\par \par Of note he has a remote history of a tracheostomy and chest (airway?) surgery done when he was an infant. He doesn't know why. \par \par \par 6/21/19 Patient is here today c/o clogged right ear. Patient admits his ear has been clogged for a while. Patient uses qtips. \par Patient also c/o neck mass. Patients wife states he woke up Thursday morning with it. He has a h/o lymphoma, seen by Heme-onc on 6/17. he has been on maintenance Rituxan for one year.  New right sided neck mass appeared suddenly the day before yesterday, has become bigger. Mildly painful to touch. \par Patient also c/o hoarseness for about 1 month, worse over the past 3 days. Wife states he has been sick for about 1 month with bronchitis and pneumonia. On abx for this - Levaquin x 1 week, completed 1 week ago. Patient has been having cough which finally sub sided about 3-4 days ago. He denies dysphagia. Symptoms began after her was recovering from pneumonia, becoming worse over last 3-4 days. He denies reflux, no PND. CBC May 27, 2019: WBC 7.25.\par \par \par 5/27/21: Patient presents today with c/o left sided neck pain. He has a hx of low grade B-cell lymphoma, consistent with nisreen marginal zone lymphoma, Stage IV, diagnosed via excisional LN biopsy right neck 4/4/18, completed chemotherapy July 2020. Since that time he has been well, with exception of 2 strokes, right LE weakness, that has resolved. Patient states he started having neck pain and swelling about 1 month ago. "lumps were coming and going," on his neck. He was seen by Dr Rider who ordered PET.   He admits loosing about 20 lbs in 4 weeks. He feels weak and fatigued at times. He admits two strokes since his last visit in 2019. Some dysphagia and cough. No appetite recently. Patient had PET scan scheduled for tomorrow morning. Of note he received his 2nd COVID vaccine 5/6/21, left arm, first dose around April 2021, also left arm. Left neck pain started after first dose, before second.  \par \par 6/10/21: Patient presents today following up on neck pain and neck mass. Patient admits neck pain still present. Still feels lumps. He has PET and CT scheduled.  [FreeTextEntry1] : \par 6/24/21: Patient presents today following up on neck mass and neck pain. Patient had PET and CT scan here to discuss results. Patient continues to have left neck pain constantly. Tylenol helps the pain.

## 2021-06-24 NOTE — PHYSICAL EXAM
[Midline] : trachea located in midline position [Normal] : no rashes [de-identified] : left sided neck exquisite tenderness upon palpation in area of parotid tail, unable to appreciate any masses or lesions

## 2021-06-24 NOTE — DATA REVIEWED
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 Patient here for further evaluation of the palpable abnormality/mass in the left neck with associated pain.\par \par TECHNIQUE:  Blood glucose pre injection 100 mg/dL.  Approximately 45 minutes after the intravenous administration of 10.6 mCi 18-Fluorine FDG, whole body PET images were acquired from base of skull to mid - thigh. In addition, non-contrast, low dose, non - diagnostic CT was acquired for attenuation correction and anatomic correlation purposes only.\par \par COMPARISON: PET/CT dated October 19, 2020 demonstrated no sites of pathologic FDG uptake. CT soft tissue neck performed June 14, 2021 demonstrated no neck mass or pathologic lymphadenopathy..\par \par FINDINGS:\par \par HEAD/FACE:  Physiologic FDG uptake is seen in the visualized regions of the brain, large salivary glands and oropharynx.\par \par NECK: Few subcentimeter, nonenlarged and morphologically normal mildly FDG avid bilateral cervical lymph nodes. For example, a 1.2 x 0.7 cm right level 1B lymph node (max SUV 5.3) and a subcentimeter left level 1B lymph node (max SUV 3.6). Physiologic FDG uptake is seen in neck muscles.\par \par CHEST: Physiologic FDG avidity is seen in mediastinal blood pool, myocardium.\par \par LUNGS: No abnormal uptake.\par \par PLEURA/PERICARDIUM: No abnormal uptake.\par \par THORACIC NODES: No abnormal uptake.\par \par HEPATOBILIARY: No abnormal uptake.\par \par SPLEEN: No abnormal uptake.\par \par PANCREAS: No abnormal uptake.\par \par ADRENAL GLANDS: No abnormal uptake.\par \par KIDNEYS/URETERS/BLADDER: Excreted activity is seen.\par \par ABDOMINOPELVIC NODES: No abnormal uptake.\par \par BOWEL/PERITONEUM/MESENTERY: No abnormal uptake.\par \par PELVIC ORGANS: No abnormal uptake.\par \par BONES/SOFT TISSUES: No abnormal uptake.\par \par OTHER: Left chest wall dual-lead pacemaker. FDG uptake within the left glenohumeral joint, likely degenerative. Stable focal FDG uptake at the left hip joint, also likely degenerative. Small at-containing bilateral inguinal hernias.\par \par IMPRESSION:\par 1.  Few subcentimeter nonenlarged morphologically normal mildly FDG avid bilateral cervical lymph nodes, nonspecific (max SUV 5.3 in a 1.2 x 0.7 cm right level 1B lymph node).\par \par 2.  Otherwise, no sites of abnormal FDG uptake.\par \par \par \par \par ABRAM EDEN MD; Attending Radiologist\par This document has been electronically signed. Jun 16 2021 10:53AM\par \par  \par EXAM:  CT NECK SOFT TISSUE IC\par \par \par PROCEDURE DATE:  06/14/2021\par \par \par \par \par INTERPRETATION:  Clinical History / Reason for exam: Neck mass. History of lymphoma.\par \par TECHNIQUE:  CT neck with contrast. Multiple CT axial images of the neck obtained following administration of 100 cc Optiray-320 intravenous contrast with coronal and sagittal re-formations.\par \par Comparison: CT neck 6/25/2019. Correlation also made with CTA neck dated 1/1/2020, cervical spine MRI 10/20/2020, PET/CT 10/19/2020\par \par FINDINGS:\par \par The marker overlies the inferior margin of the left parotid gland. There is no evidence of a carotid or mass or lymphadenopathy.\par \par The visualized intracranial and intraorbital contents are unremarkable.\par \par The paranasal sinuses and mastoid air cells are clear.\par \par The parotid and submandibular glands are unremarkable.\par \par The visualized upper aerodigestive structures are unremarkable.\par \par There is no CT evidence of pathologic cervical lymphadenopathy.\par \par There are subcentimeter bilateral thyroid nodules.\par \par The visualized lung apices demonstrate no focal consolidation.\par \par The visualized osseous structures are unremarkable.\par \par Left chest wall pacemaker is noted.\par \par IMPRESSION:\par \par 1.  No CT evidence of neck mass or pathologic lymphadenopathy.\par \par 2.  The marker overlies the inferior margin of the left parotid gland, with no corresponding mass or lymphadenopathy demonstrated.\par \par \par \par \par MATY ROBBINS MD; Attending Radiologist\par This document has been electronically signed. Mike 15 2021  1:57PM\par \par  \par

## 2021-06-25 NOTE — H&P ADULT - NSHPPOASURGSITEINCISION_GEN_ALL_CORE
Patient states she has breast fed once so far and baby latched well. Baby under phototherapy lights at bedside. Set up [employer] breast pump with instructions and supplies. Encouraged patient to pump Q 3 hrs x 15-20 min. Reviewed importance of frequent pumping sessions to stimulate milk supply. Needs electric breast pump for home to maintain milk supply. Call with concerns or questions. no

## 2021-08-17 ENCOUNTER — RESULT REVIEW (OUTPATIENT)
Age: 61
End: 2021-08-17

## 2021-08-17 ENCOUNTER — OUTPATIENT (OUTPATIENT)
Dept: OUTPATIENT SERVICES | Facility: HOSPITAL | Age: 61
LOS: 1 days | Discharge: HOME | End: 2021-08-17
Payer: MEDICARE

## 2021-08-17 DIAGNOSIS — Z95.0 PRESENCE OF CARDIAC PACEMAKER: Chronic | ICD-10-CM

## 2021-08-17 DIAGNOSIS — Z98.890 OTHER SPECIFIED POSTPROCEDURAL STATES: Chronic | ICD-10-CM

## 2021-08-17 DIAGNOSIS — C85.90 NON-HODGKIN LYMPHOMA, UNSPECIFIED, UNSPECIFIED SITE: ICD-10-CM

## 2021-08-17 PROCEDURE — 70543 MRI ORBT/FAC/NCK W/O &W/DYE: CPT | Mod: 26

## 2021-09-15 ENCOUNTER — OUTPATIENT (OUTPATIENT)
Dept: OUTPATIENT SERVICES | Facility: HOSPITAL | Age: 61
LOS: 1 days | Discharge: HOME | End: 2021-09-15

## 2021-09-15 ENCOUNTER — NON-APPOINTMENT (OUTPATIENT)
Age: 61
End: 2021-09-15

## 2021-09-15 ENCOUNTER — APPOINTMENT (OUTPATIENT)
Dept: ENDOCRINOLOGY | Facility: CLINIC | Age: 61
End: 2021-09-15

## 2021-09-15 VITALS
BODY MASS INDEX: 37.17 KG/M2 | SYSTOLIC BLOOD PRESSURE: 137 MMHG | HEIGHT: 62 IN | WEIGHT: 202 LBS | DIASTOLIC BLOOD PRESSURE: 87 MMHG

## 2021-09-15 DIAGNOSIS — Z98.890 OTHER SPECIFIED POSTPROCEDURAL STATES: Chronic | ICD-10-CM

## 2021-09-15 DIAGNOSIS — Z95.0 PRESENCE OF CARDIAC PACEMAKER: Chronic | ICD-10-CM

## 2021-09-15 RX ORDER — INSULIN DEGLUDEC INJECTION 200 U/ML
200 INJECTION, SOLUTION SUBCUTANEOUS DAILY
Qty: 3 | Refills: 1 | Status: DISCONTINUED | COMMUNITY
Start: 2019-06-27 | End: 2021-09-15

## 2021-09-15 RX ORDER — ERTUGLIFLOZIN AND METFORMIN HYDROCHLORIDE 7.5; 1 MG/1; MG/1
7.5-1 TABLET, FILM COATED ORAL
Qty: 60 | Refills: 5 | Status: DISCONTINUED | COMMUNITY
Start: 2021-06-09 | End: 2021-09-15

## 2021-09-15 NOTE — REVIEW OF SYSTEMS
[Fatigue] : no fatigue [Decreased Appetite] : appetite not decreased [Recent Weight Gain (___ Lbs)] : no recent weight gain [Recent Weight Loss (___ Lbs)] : no recent weight loss [Visual Field Defect] : no visual field defect [Dysphagia] : no dysphagia [Nausea] : no nausea [Constipation] : no constipation [Abdominal Pain] : no abdominal pain [Vomiting] : no vomiting [Diarrhea] : no diarrhea [Joint Pain] : no joint pain [Muscle Weakness] : no muscle weakness [Myalgia] : no myalgia  [Polydipsia] : no polydipsia [Cold Intolerance] : no cold intolerance [Heat Intolerance] : no heat intolerance [Hot Flashes] : no hot flashes

## 2021-09-15 NOTE — PHYSICAL EXAM
[Alert] : alert [Well Nourished] : well nourished [No Respiratory Distress] : no respiratory distress [No Accessory Muscle Use] : no accessory muscle use [Normal Rate and Effort] : normal respiratory rate and effort [Clear to Auscultation] : lungs were clear to auscultation bilaterally [Normal to Percussion] : lungs were normal to percussion [Normal PMI] : the apical impulse was normal [Normal S1, S2] : normal S1 and S2 [No Murmurs] : no murmurs [Normal Rate] : heart rate was normal [Regular Rhythm] : with a regular rhythm [Normal Bowel Sounds] : normal bowel sounds [Not Tender] : non-tender [Not Distended] : not distended [Soft] : abdomen soft

## 2021-09-15 NOTE — ASSESSMENT
[FreeTextEntry1] : This is a 61 year old male with a significant PMHx of DM presenting to the clinic for follow up examination.\par \par Type 2 Diabetes Mellitus\par - no recent hba1c, ordering HbA1c today \par - c/w Trulicity 1.5 mg Q week and Synjardy \par - f/u Podiatry and opthomalogy \par \par Dyslipidemia\par - Continue with Atorvastatin 80mg QHS. \par \par \par Diabetes Counseling: The patient was counseled on diabetes foot care, long term vascular complications of diabetes, carbohydrate consistent diet, importance of diet and exercise to improve glycemic control, achieve weight loss and improve cardiovascular health, exercise/effect on glucose and self monitoring of blood glucose. Patient was referred to ophthalmology for retinopathy screening. \par Other Counseling: The patient was counseled on weight loss.  [Diabetes Foot Care] : diabetes foot care [Long Term Vascular Complications] : long term vascular complications of diabetes [Carbohydrate Consistent Diet] : carbohydrate consistent diet [Importance of Diet and Exercise] : importance of diet and exercise to improve glycemic control, achieve weight loss and improve cardiovascular health [Exercise/Effect on Glucose] : exercise/effect on glucose [Retinopathy Screening] : Patient was referred to ophthalmology for retinopathy screening

## 2021-09-15 NOTE — HISTORY OF PRESENT ILLNESS
[FreeTextEntry1] : This is a 61 year old male with a significant PMHx of T2DM, CVA, lymphoma s/p chemo who presents to the clinic for follow up. \par \par

## 2021-09-23 ENCOUNTER — NON-APPOINTMENT (OUTPATIENT)
Age: 61
End: 2021-09-23

## 2021-09-23 ENCOUNTER — APPOINTMENT (OUTPATIENT)
Dept: CARDIOLOGY | Facility: CLINIC | Age: 61
End: 2021-09-23
Payer: MEDICARE

## 2021-09-23 PROCEDURE — 93294 REM INTERROG EVL PM/LDLS PM: CPT

## 2021-09-23 PROCEDURE — 93296 REM INTERROG EVL PM/IDS: CPT

## 2021-09-30 ENCOUNTER — APPOINTMENT (OUTPATIENT)
Dept: NEUROLOGY | Facility: CLINIC | Age: 61
End: 2021-09-30
Payer: MEDICARE

## 2021-09-30 VITALS
BODY MASS INDEX: 27.77 KG/M2 | TEMPERATURE: 98.1 F | SYSTOLIC BLOOD PRESSURE: 146 MMHG | WEIGHT: 205 LBS | OXYGEN SATURATION: 98 % | HEIGHT: 72 IN | HEART RATE: 80 BPM | DIASTOLIC BLOOD PRESSURE: 76 MMHG

## 2021-09-30 PROCEDURE — 99213 OFFICE O/P EST LOW 20 MIN: CPT

## 2021-09-30 NOTE — PHYSICAL EXAM
[FreeTextEntry1] : a+Ox3 language and attention normal\par No facial speech clear Movements symmetric\par EOMI with left strabismus\par NO drift\par Power 5/5 except in RUE 4+/5 and RLE 4+/5\par temp decreased on right and LT decreased on right\par DTR 2+ in UE and 1+ in LE\par FTN normal\par Gait appears normal although weakness on exam\par \par NIHSS 1\par mrankin 0

## 2021-09-30 NOTE — HISTORY OF PRESENT ILLNESS
[FreeTextEntry1] : Mr. Montoya is a 62yo man here for follow up of stroke and cervical stenosis/radiculopathy. He had another admission where he presented poorly responsive with b/l weakness and difficult to obtain blood pressure. He eventually improved and BP was slightly elevated. he underwent MRI brain w/w/o MALDONADO and VEEG and EEG was normal and MRI brain showed no new infarcts and no enhancing lesions. \par Since his last visit  he has been doing well and only complains of headaches a few times per month.  He also notices he gets lightheaded when he leans forward and then gets up.\par

## 2021-09-30 NOTE — DISCUSSION/SUMMARY
[FreeTextEntry1] : Mr. Montoya is a 60yo man with stroke with a admission for encephalopathy of unknown etiology possibly related to hypotension.  HE also has cervical spinal stenosis and radiculopathy.  His headaches are likely related to his cervical spine and dizziness appears to be more lightheadedness.\par 1. Continue vascular risk factor management\par 2. Continue aspirin 81mg QD and statin\par 3. Exercise and diet\par 4. Monitor BP at home\par 5. PT for cervical spine

## 2021-10-27 ENCOUNTER — APPOINTMENT (OUTPATIENT)
Dept: CARDIOLOGY | Facility: CLINIC | Age: 61
End: 2021-10-27
Payer: MEDICARE

## 2021-10-27 VITALS
SYSTOLIC BLOOD PRESSURE: 122 MMHG | WEIGHT: 203 LBS | HEIGHT: 72 IN | TEMPERATURE: 97.6 F | HEART RATE: 82 BPM | BODY MASS INDEX: 27.5 KG/M2 | DIASTOLIC BLOOD PRESSURE: 80 MMHG | OXYGEN SATURATION: 95 %

## 2021-10-27 PROCEDURE — 93000 ELECTROCARDIOGRAM COMPLETE: CPT

## 2021-10-27 PROCEDURE — 99214 OFFICE O/P EST MOD 30 MIN: CPT

## 2021-10-27 NOTE — REASON FOR VISIT
[FreeTextEntry1] : Patient presents for a  follow up visit. he denies any complaints aside for an intermittent headache. he is scheduled to see dr. Herron next week and undergo evaluation for these intermittent headaches. His BP is well controlled.

## 2021-10-27 NOTE — PHYSICAL EXAM
[Normal Appearance] : normal appearance [General Appearance - Well Developed] : well developed [General Appearance - Well Nourished] : well nourished [General Appearance - In No Acute Distress] : no acute distress [Normal Conjunctiva] : the conjunctiva exhibited no abnormalities [Eyelids - No Xanthelasma] : the eyelids demonstrated no xanthelasmas [Normal Oropharynx] : normal oropharynx [Normal Jugular Venous A Waves Present] : normal jugular venous A waves present [] : no respiratory distress [Respiration, Rhythm And Depth] : normal respiratory rhythm and effort [Exaggerated Use Of Accessory Muscles For Inspiration] : no accessory muscle use [Auscultation Breath Sounds / Voice Sounds] : lungs were clear to auscultation bilaterally [Heart Rate And Rhythm] : heart rate and rhythm were normal [Heart Sounds] : normal S1 and S2 [Arterial Pulses Normal] : the arterial pulses were normal [Abdomen Soft] : soft [Abdomen Tenderness] : non-tender [Abnormal Walk] : normal gait [Nail Clubbing] : no clubbing of the fingernails [Petechial Hemorrhages (___cm)] : no petechial hemorrhages [Skin Color & Pigmentation] : normal skin color and pigmentation [Skin Turgor] : normal skin turgor [No Anxiety] : not feeling anxious [FreeTextEntry1] : Median sternotomy

## 2021-10-27 NOTE — DISCUSSION/SUMMARY
[FreeTextEntry1] : Patient was instructed to target their T. Cholesterol to less than 200 mg/dl and LDL cholesterol to less than 100 mg/dl.\par Exercise and weight loss was advised.\par Maintain cardiac medications. \par Patient was advised to repeat a BMP, CBC , fasting lipid profile and hepatic panel.\par Patient was taken off OAC by EP.\par RV in 6 months

## 2021-10-27 NOTE — HISTORY OF PRESENT ILLNESS
[FreeTextEntry1] : Congenital heart disease unknown by the patient and his wife, s/p open heart surgery as an infant at [facility], no records are available.\par Patent coronaries on cardiac catheterization in 2019  LVEF=60%.\par PPM for complete heart block\par Poorly visualized RV and TV, on 2D echo Doppler performed in 11/18,\par Former cigarette smoker for 40 years\par \par Severe emphysema\par Pt. is disabled. He was diagnosed with stage IV CLL, on chemotherapy, followed by Dr. Rider\par Hypertension\par Hyperlipidemia\par Prior cardiac catheterization performed 10 years ago by elena reported as no coronary artery disease.\par Patient worked as a  for [employer] prior to becoming disabled.\par

## 2021-11-03 ENCOUNTER — EMERGENCY (EMERGENCY)
Facility: HOSPITAL | Age: 61
LOS: 0 days | Discharge: HOME | End: 2021-11-03
Attending: EMERGENCY MEDICINE | Admitting: EMERGENCY MEDICINE
Payer: MEDICARE

## 2021-11-03 VITALS
HEIGHT: 72 IN | HEART RATE: 86 BPM | SYSTOLIC BLOOD PRESSURE: 169 MMHG | WEIGHT: 205.03 LBS | RESPIRATION RATE: 18 BRPM | OXYGEN SATURATION: 96 % | TEMPERATURE: 96 F | DIASTOLIC BLOOD PRESSURE: 88 MMHG

## 2021-11-03 DIAGNOSIS — K08.89 OTHER SPECIFIED DISORDERS OF TEETH AND SUPPORTING STRUCTURES: ICD-10-CM

## 2021-11-03 DIAGNOSIS — Z87.891 PERSONAL HISTORY OF NICOTINE DEPENDENCE: ICD-10-CM

## 2021-11-03 DIAGNOSIS — E78.00 PURE HYPERCHOLESTEROLEMIA, UNSPECIFIED: ICD-10-CM

## 2021-11-03 DIAGNOSIS — Z95.0 PRESENCE OF CARDIAC PACEMAKER: ICD-10-CM

## 2021-11-03 DIAGNOSIS — Z79.82 LONG TERM (CURRENT) USE OF ASPIRIN: ICD-10-CM

## 2021-11-03 DIAGNOSIS — Z79.84 LONG TERM (CURRENT) USE OF ORAL HYPOGLYCEMIC DRUGS: ICD-10-CM

## 2021-11-03 DIAGNOSIS — Z98.890 OTHER SPECIFIED POSTPROCEDURAL STATES: Chronic | ICD-10-CM

## 2021-11-03 DIAGNOSIS — J44.9 CHRONIC OBSTRUCTIVE PULMONARY DISEASE, UNSPECIFIED: ICD-10-CM

## 2021-11-03 DIAGNOSIS — Z95.0 PRESENCE OF CARDIAC PACEMAKER: Chronic | ICD-10-CM

## 2021-11-03 DIAGNOSIS — E11.9 TYPE 2 DIABETES MELLITUS WITHOUT COMPLICATIONS: ICD-10-CM

## 2021-11-03 DIAGNOSIS — I10 ESSENTIAL (PRIMARY) HYPERTENSION: ICD-10-CM

## 2021-11-03 DIAGNOSIS — Z86.73 PERSONAL HISTORY OF TRANSIENT ISCHEMIC ATTACK (TIA), AND CEREBRAL INFARCTION WITHOUT RESIDUAL DEFICITS: ICD-10-CM

## 2021-11-03 DIAGNOSIS — K02.9 DENTAL CARIES, UNSPECIFIED: ICD-10-CM

## 2021-11-03 DIAGNOSIS — C85.90 NON-HODGKIN LYMPHOMA, UNSPECIFIED, UNSPECIFIED SITE: ICD-10-CM

## 2021-11-03 PROCEDURE — 99282 EMERGENCY DEPT VISIT SF MDM: CPT

## 2021-11-03 NOTE — ED PROVIDER NOTE - ENMT, MLM
Airway patent, Nasal mucosa clear. Mouth with normal mucosa. Throat has no vesicles, no oropharyngeal exudates and uvula is midline.  Poor dentition.  Multiple caries and teeth missing.  (+) tenderness to percussion tooth #29

## 2021-11-03 NOTE — ED PROVIDER NOTE - OBJECTIVE STATEMENT
61 y.o. male with a PMH of CVA, DM, HTN, and Stage IV lymphoma (in remission) presented to the ER c/o dental pain.  Pt states that he lost a cap 2 weeks ago and that they started "work."  Pt denies fever, chills, dysphagia, SOB. No other complaints.

## 2021-11-10 ENCOUNTER — APPOINTMENT (OUTPATIENT)
Dept: CARDIOLOGY | Facility: CLINIC | Age: 61
End: 2021-11-10
Payer: MEDICARE

## 2021-11-10 VITALS
SYSTOLIC BLOOD PRESSURE: 124 MMHG | HEIGHT: 72 IN | OXYGEN SATURATION: 94 % | TEMPERATURE: 97.3 F | HEART RATE: 91 BPM | WEIGHT: 200 LBS | BODY MASS INDEX: 27.09 KG/M2 | DIASTOLIC BLOOD PRESSURE: 80 MMHG | RESPIRATION RATE: 16 BRPM

## 2021-11-10 PROCEDURE — 93280 PM DEVICE PROGR EVAL DUAL: CPT

## 2021-11-10 PROCEDURE — 93000 ELECTROCARDIOGRAM COMPLETE: CPT | Mod: 59

## 2021-11-10 PROCEDURE — 99213 OFFICE O/P EST LOW 20 MIN: CPT | Mod: 25

## 2021-11-12 NOTE — DISCHARGE NOTE ADULT - MEDICATION SUMMARY - MEDICATIONS TO TAKE
PRE-SEDATION ASSESSMENT    CONSENT  Risks, benefits, and alternatives have been discussed with the patient/patient representative, and patient/patient representative agrees to proceed: Yes    MEDICAL HISTORY  Significant medical/surgical history: Yes (persistent afib)  Past Complications with Sedation/Anesthesia: No  Significant Family History: No  Smoking History: No  Alcohol/Drug abuse: No  Possible Pregnancy (LMP): No  Cardiac History: Yes (afib, on anticoagulation and multaq)  Respiratory History: No    PHYSICAL EXAM  History and Physical Reviewed: H&P completed today  Airway Risk History: No previous complications  Airway Anatomy : Class II  Heart : Normal  Lungs : Normal  LOC/Mental Status : Normal    OTHER FINDINGS  Reviewed current medications and allergies: Yes  Pertinent lab/diagnostic test reviewed: Yes    SEDATION RISK ASSESSMENT  Risk Status ASA: Class II - Normal patient with mild systemic disease  Plan for Sedation: Moderate Sedation  Indications for Procedure/Pre-Procedure Diagnosis and Planned Procedure: persistent afib, here for electrical cardioversion  EKG Monitoring: Yes    NARRATIVE FINDINGS  Narrative Findings: electrical CV today   I will START or STAY ON the medications listed below when I get home from the hospital:    acetaminophen 325 mg oral capsule  -- 2 cap(s) by mouth every 6 hours, As Needed   -- Indication: For Chest pain    Roxicodone 5 mg oral tablet  -- 1 tab(s) by mouth every 6 hours, As Needed -for severe pain MDD:20 mg  -- Caution federal law prohibits the transfer of this drug to any person other  than the person for whom it was prescribed.  It is very important that you take or use this exactly as directed.  Do not skip doses or discontinue unless directed by your doctor.  May cause drowsiness.  Alcohol may intensify this effect.  Use care when operating dangerous machinery.  This prescription cannot be refilled.  Using more of this medication than prescribed may cause serious breathing problems.    -- Indication: For Severe pain    enalapril  -- 20 milligram(s) by mouth once a day  -- Indication: For HTN    pioglitazone  -- 45 milligram(s) by mouth once a day  -- Indication: For DM    metFORMIN  -- 1000 milligram(s) by mouth 2 times a day  -- Indication: For DM    Jardiance 25 mg oral tablet  -- 1 tab(s) by mouth once a day (in the morning)  -- Indication: For DM    simvastatin  -- 40 milligram(s) by mouth once a day (at bedtime)  -- Indication: For DLD    ProAir HFA 90 mcg/inh inhalation aerosol  -- 2 puff(s) inhaled 4 times a day, As Needed  -- Indication: For COPD    EpiPen 2-Emmett 0.3 mg injectable kit  -- 0.3 milligram(s) intramuscularly prn as instructed for anaphylactic rxn  -- Obtain medical advice before taking any non-prescription drugs as some may affect the action of this medication.    -- Indication: For anaphylaxis

## 2021-12-04 NOTE — PATIENT PROFILE ADULT. - SUPPORT PERSON NAME
NST reactive x1. Other twin borderline reactive. Due to blood pressure in the severe range patient advised to go to labor and delivery and will deliver today. shahid moses

## 2021-12-11 ENCOUNTER — EMERGENCY (EMERGENCY)
Facility: HOSPITAL | Age: 61
LOS: 0 days | Discharge: HOME | End: 2021-12-12
Attending: EMERGENCY MEDICINE | Admitting: STUDENT IN AN ORGANIZED HEALTH CARE EDUCATION/TRAINING PROGRAM
Payer: MEDICARE

## 2021-12-11 VITALS
HEART RATE: 82 BPM | WEIGHT: 210.1 LBS | RESPIRATION RATE: 18 BRPM | TEMPERATURE: 98 F | OXYGEN SATURATION: 95 % | SYSTOLIC BLOOD PRESSURE: 152 MMHG | DIASTOLIC BLOOD PRESSURE: 83 MMHG | HEIGHT: 72 IN

## 2021-12-11 DIAGNOSIS — Z95.0 PRESENCE OF CARDIAC PACEMAKER: Chronic | ICD-10-CM

## 2021-12-11 DIAGNOSIS — Z20.822 CONTACT WITH AND (SUSPECTED) EXPOSURE TO COVID-19: ICD-10-CM

## 2021-12-11 DIAGNOSIS — I49.3 VENTRICULAR PREMATURE DEPOLARIZATION: ICD-10-CM

## 2021-12-11 DIAGNOSIS — Z88.1 ALLERGY STATUS TO OTHER ANTIBIOTIC AGENTS STATUS: ICD-10-CM

## 2021-12-11 DIAGNOSIS — R51.9 HEADACHE, UNSPECIFIED: ICD-10-CM

## 2021-12-11 DIAGNOSIS — E78.5 HYPERLIPIDEMIA, UNSPECIFIED: ICD-10-CM

## 2021-12-11 DIAGNOSIS — Z87.891 PERSONAL HISTORY OF NICOTINE DEPENDENCE: ICD-10-CM

## 2021-12-11 DIAGNOSIS — C85.90 NON-HODGKIN LYMPHOMA, UNSPECIFIED, UNSPECIFIED SITE: ICD-10-CM

## 2021-12-11 DIAGNOSIS — J44.9 CHRONIC OBSTRUCTIVE PULMONARY DISEASE, UNSPECIFIED: ICD-10-CM

## 2021-12-11 DIAGNOSIS — Z79.84 LONG TERM (CURRENT) USE OF ORAL HYPOGLYCEMIC DRUGS: ICD-10-CM

## 2021-12-11 DIAGNOSIS — E11.9 TYPE 2 DIABETES MELLITUS WITHOUT COMPLICATIONS: ICD-10-CM

## 2021-12-11 DIAGNOSIS — Z86.73 PERSONAL HISTORY OF TRANSIENT ISCHEMIC ATTACK (TIA), AND CEREBRAL INFARCTION WITHOUT RESIDUAL DEFICITS: ICD-10-CM

## 2021-12-11 DIAGNOSIS — Z98.890 OTHER SPECIFIED POSTPROCEDURAL STATES: Chronic | ICD-10-CM

## 2021-12-11 DIAGNOSIS — Z80.3 FAMILY HISTORY OF MALIGNANT NEOPLASM OF BREAST: ICD-10-CM

## 2021-12-11 DIAGNOSIS — I10 ESSENTIAL (PRIMARY) HYPERTENSION: ICD-10-CM

## 2021-12-11 DIAGNOSIS — R53.1 WEAKNESS: ICD-10-CM

## 2021-12-11 DIAGNOSIS — Z95.0 PRESENCE OF CARDIAC PACEMAKER: ICD-10-CM

## 2021-12-11 LAB
ALBUMIN SERPL ELPH-MCNC: 4.8 G/DL — SIGNIFICANT CHANGE UP (ref 3.5–5.2)
ALP SERPL-CCNC: 56 U/L — SIGNIFICANT CHANGE UP (ref 30–115)
ALT FLD-CCNC: 17 U/L — SIGNIFICANT CHANGE UP (ref 0–41)
ANION GAP SERPL CALC-SCNC: 18 MMOL/L — HIGH (ref 7–14)
APTT BLD: 31.7 SEC — SIGNIFICANT CHANGE UP (ref 27–39.2)
AST SERPL-CCNC: 21 U/L — SIGNIFICANT CHANGE UP (ref 0–41)
BASOPHILS # BLD AUTO: 0.05 K/UL — SIGNIFICANT CHANGE UP (ref 0–0.2)
BASOPHILS NFR BLD AUTO: 1 % — SIGNIFICANT CHANGE UP (ref 0–1)
BILIRUB SERPL-MCNC: 1.2 MG/DL — SIGNIFICANT CHANGE UP (ref 0.2–1.2)
BUN SERPL-MCNC: 18 MG/DL — SIGNIFICANT CHANGE UP (ref 10–20)
CALCIUM SERPL-MCNC: 9.7 MG/DL — SIGNIFICANT CHANGE UP (ref 8.5–10.1)
CHLORIDE SERPL-SCNC: 103 MMOL/L — SIGNIFICANT CHANGE UP (ref 98–110)
CO2 SERPL-SCNC: 19 MMOL/L — SIGNIFICANT CHANGE UP (ref 17–32)
CREAT SERPL-MCNC: 1 MG/DL — SIGNIFICANT CHANGE UP (ref 0.7–1.5)
EOSINOPHIL # BLD AUTO: 0.18 K/UL — SIGNIFICANT CHANGE UP (ref 0–0.7)
EOSINOPHIL NFR BLD AUTO: 3.8 % — SIGNIFICANT CHANGE UP (ref 0–8)
GLUCOSE SERPL-MCNC: 83 MG/DL — SIGNIFICANT CHANGE UP (ref 70–99)
HCT VFR BLD CALC: 47.7 % — SIGNIFICANT CHANGE UP (ref 42–52)
HGB BLD-MCNC: 16 G/DL — SIGNIFICANT CHANGE UP (ref 14–18)
IMM GRANULOCYTES NFR BLD AUTO: 0.4 % — HIGH (ref 0.1–0.3)
INR BLD: 1.03 RATIO — SIGNIFICANT CHANGE UP (ref 0.65–1.3)
LYMPHOCYTES # BLD AUTO: 1.29 K/UL — SIGNIFICANT CHANGE UP (ref 1.2–3.4)
LYMPHOCYTES # BLD AUTO: 27 % — SIGNIFICANT CHANGE UP (ref 20.5–51.1)
MCHC RBC-ENTMCNC: 29.1 PG — SIGNIFICANT CHANGE UP (ref 27–31)
MCHC RBC-ENTMCNC: 33.5 G/DL — SIGNIFICANT CHANGE UP (ref 32–37)
MCV RBC AUTO: 86.7 FL — SIGNIFICANT CHANGE UP (ref 80–94)
MONOCYTES # BLD AUTO: 0.6 K/UL — SIGNIFICANT CHANGE UP (ref 0.1–0.6)
MONOCYTES NFR BLD AUTO: 12.6 % — HIGH (ref 1.7–9.3)
NEUTROPHILS # BLD AUTO: 2.63 K/UL — SIGNIFICANT CHANGE UP (ref 1.4–6.5)
NEUTROPHILS NFR BLD AUTO: 55.2 % — SIGNIFICANT CHANGE UP (ref 42.2–75.2)
NRBC # BLD: 0 /100 WBCS — SIGNIFICANT CHANGE UP (ref 0–0)
PLATELET # BLD AUTO: 198 K/UL — SIGNIFICANT CHANGE UP (ref 130–400)
POTASSIUM SERPL-MCNC: 4.6 MMOL/L — SIGNIFICANT CHANGE UP (ref 3.5–5)
POTASSIUM SERPL-SCNC: 4.6 MMOL/L — SIGNIFICANT CHANGE UP (ref 3.5–5)
PROT SERPL-MCNC: 7.7 G/DL — SIGNIFICANT CHANGE UP (ref 6–8)
PROTHROM AB SERPL-ACNC: 11.8 SEC — SIGNIFICANT CHANGE UP (ref 9.95–12.87)
RBC # BLD: 5.5 M/UL — SIGNIFICANT CHANGE UP (ref 4.7–6.1)
RBC # FLD: 13.3 % — SIGNIFICANT CHANGE UP (ref 11.5–14.5)
SARS-COV-2 RNA SPEC QL NAA+PROBE: SIGNIFICANT CHANGE UP
SODIUM SERPL-SCNC: 140 MMOL/L — SIGNIFICANT CHANGE UP (ref 135–146)
TROPONIN T SERPL-MCNC: <0.01 NG/ML — SIGNIFICANT CHANGE UP
WBC # BLD: 4.77 K/UL — LOW (ref 4.8–10.8)
WBC # FLD AUTO: 4.77 K/UL — LOW (ref 4.8–10.8)

## 2021-12-11 PROCEDURE — 0042T: CPT

## 2021-12-11 PROCEDURE — 70450 CT HEAD/BRAIN W/O DYE: CPT | Mod: 26,MA,59

## 2021-12-11 PROCEDURE — 93010 ELECTROCARDIOGRAM REPORT: CPT

## 2021-12-11 PROCEDURE — 99220: CPT

## 2021-12-11 PROCEDURE — 70496 CT ANGIOGRAPHY HEAD: CPT | Mod: 26,MA

## 2021-12-11 PROCEDURE — 70498 CT ANGIOGRAPHY NECK: CPT | Mod: 26,MA

## 2021-12-11 RX ORDER — AMLODIPINE BESYLATE 2.5 MG/1
5 TABLET ORAL DAILY
Refills: 0 | Status: DISCONTINUED | OUTPATIENT
Start: 2021-12-11 | End: 2021-12-12

## 2021-12-11 RX ORDER — ASPIRIN/CALCIUM CARB/MAGNESIUM 324 MG
324 TABLET ORAL ONCE
Refills: 0 | Status: COMPLETED | OUTPATIENT
Start: 2021-12-11 | End: 2021-12-11

## 2021-12-11 RX ORDER — BUDESONIDE, GLYCOPYRROLATE, AND FORMOTEROL FUMARATE 160; 9; 4.8 UG/1; UG/1; UG/1
2 AEROSOL, METERED RESPIRATORY (INHALATION)
Qty: 0 | Refills: 0 | DISCHARGE

## 2021-12-11 RX ORDER — ACETAMINOPHEN 500 MG
650 TABLET ORAL ONCE
Refills: 0 | Status: COMPLETED | OUTPATIENT
Start: 2021-12-11 | End: 2021-12-11

## 2021-12-11 RX ORDER — EMPAGLIFLOZIN, METFORMIN HYDROCHLORIDE 10; 1000 MG/1; MG/1
1 TABLET, EXTENDED RELEASE ORAL
Qty: 0 | Refills: 0 | DISCHARGE

## 2021-12-11 RX ORDER — PIOGLITAZONE HYDROCHLORIDE 15 MG/1
1 TABLET ORAL
Qty: 0 | Refills: 0 | DISCHARGE

## 2021-12-11 RX ORDER — MAGNESIUM SULFATE 500 MG/ML
1 VIAL (ML) INJECTION ONCE
Refills: 0 | Status: COMPLETED | OUTPATIENT
Start: 2021-12-11 | End: 2021-12-11

## 2021-12-11 RX ORDER — DULAGLUTIDE 4.5 MG/.5ML
0 INJECTION, SOLUTION SUBCUTANEOUS
Qty: 0 | Refills: 0 | DISCHARGE

## 2021-12-11 RX ORDER — IPRATROPIUM/ALBUTEROL SULFATE 18-103MCG
3 AEROSOL WITH ADAPTER (GRAM) INHALATION ONCE
Refills: 0 | Status: COMPLETED | OUTPATIENT
Start: 2021-12-11 | End: 2021-12-11

## 2021-12-11 RX ORDER — KETOROLAC TROMETHAMINE 30 MG/ML
30 SYRINGE (ML) INJECTION ONCE
Refills: 0 | Status: DISCONTINUED | OUTPATIENT
Start: 2021-12-11 | End: 2021-12-11

## 2021-12-11 RX ORDER — PIOGLITAZONE HYDROCHLORIDE 15 MG/1
30 TABLET ORAL DAILY
Refills: 0 | Status: DISCONTINUED | OUTPATIENT
Start: 2021-12-11 | End: 2021-12-12

## 2021-12-11 RX ORDER — METOCLOPRAMIDE HCL 10 MG
10 TABLET ORAL ONCE
Refills: 0 | Status: COMPLETED | OUTPATIENT
Start: 2021-12-11 | End: 2021-12-11

## 2021-12-11 RX ORDER — SODIUM CHLORIDE 9 MG/ML
1000 INJECTION INTRAMUSCULAR; INTRAVENOUS; SUBCUTANEOUS ONCE
Refills: 0 | Status: COMPLETED | OUTPATIENT
Start: 2021-12-11 | End: 2021-12-11

## 2021-12-11 RX ORDER — ATORVASTATIN CALCIUM 80 MG/1
80 TABLET, FILM COATED ORAL AT BEDTIME
Refills: 0 | Status: DISCONTINUED | OUTPATIENT
Start: 2021-12-11 | End: 2021-12-12

## 2021-12-11 RX ADMIN — ATORVASTATIN CALCIUM 80 MILLIGRAM(S): 80 TABLET, FILM COATED ORAL at 21:21

## 2021-12-11 RX ADMIN — Medication 650 MILLIGRAM(S): at 18:24

## 2021-12-11 RX ADMIN — Medication 30 MILLIGRAM(S): at 18:26

## 2021-12-11 RX ADMIN — Medication 100 GRAM(S): at 18:24

## 2021-12-11 RX ADMIN — Medication 30 MILLIGRAM(S): at 18:44

## 2021-12-11 RX ADMIN — Medication 104 MILLIGRAM(S): at 18:24

## 2021-12-11 RX ADMIN — SODIUM CHLORIDE 1000 MILLILITER(S): 9 INJECTION INTRAMUSCULAR; INTRAVENOUS; SUBCUTANEOUS at 18:25

## 2021-12-11 RX ADMIN — Medication 324 MILLIGRAM(S): at 21:20

## 2021-12-11 RX ADMIN — Medication 3 MILLILITER(S): at 21:21

## 2021-12-11 RX ADMIN — Medication 650 MILLIGRAM(S): at 18:44

## 2021-12-11 NOTE — CONSULT NOTE ADULT - ATTENDING COMMENTS
Patient seen and examined and agree with above except as noted.  Patients history, notes, labs, imaging, vitals and meds reviewed personally.  Patient known to me and presented with severe headaches.  Has been having mild headaches daily and severe headaches usually 2-3 times per month.  Had imaging of cervical spine which was negative and was not able to try migraine meds due too his lymphoma at the time.  Currently his exam shows baseline Right hemiparesis worse in RUE and decreased sensation on right side (no imaging correlate in the brain or spine to explain weakness found).    Plan as above (If MRI brain negative ok to dc and will start on topamax 25mg QHS and will see for followup in next 2-3 weeks)

## 2021-12-11 NOTE — ED PROVIDER NOTE - PHYSICAL EXAMINATION
CONST: well appearing for age  HEAD:  normocephalic, atraumatic  EYES: pupils 4mm equal and reactive, conjunctivae without injection, drainage or discharge  ENMT:  tympanic membranes pearly gray with normal landmarks; nasal mucosa moist; mouth moist without ulcerations or lesions; throat moist without erythema, exudate, ulcerations or lesions  NECK:  supple, no masses, no significant lymphadenopathy  CARDIAC:  regular rate and rhythm, normal S1 and S2, no murmurs, rubs or gallops  RESP:  respiratory rate and effort appear normal for age; lungs are clear to auscultation bilaterally; no rales or wheezes  ABDOMEN:  soft, nontender, nondistended, no masses, no organomegaly  LYMPHATICS:  no significant lymphadenopathy  MUSCULOSKELETAL/NEURO:  CN II-XII grossly intact, sensation intact throughout, residual ight normal movement, normal tone  SKIN:  normal skin color for age and race, well-perfused; warm and dry CONST: well appearing for age  HEAD:  normocephalic, atraumatic  EYES: pupils 4mm equal and reactive, conjunctivae without injection, drainage or discharge  ENMT:  tympanic membranes pearly gray with normal landmarks; nasal mucosa moist; mouth moist without ulcerations or lesions; throat moist without erythema, exudate, ulcerations or lesions  NECK:  supple, no masses, no significant lymphadenopathy  CARDIAC:  regular rate and rhythm, normal S1 and S2, no murmurs, rubs or gallops  RESP:  respiratory rate and effort appear normal for age; lungs are clear to auscultation bilaterally; no rales or wheezes  ABDOMEN:  soft, nontender, nondistended, no masses, no organomegaly  LYMPHATICS:  no significant lymphadenopathy  MUSCULOSKELETAL/NEURO:  CN II-XII grossly intact, sensation intact throughout, residual right sided weakness, PADRON, no dysmetria, normal movement, normal tone  SKIN:  normal skin color for age and race, well-perfused; warm and dry CONST: well appearing for age  HEAD:  normocephalic, atraumatic  EYES: pupils 4mm equal and reactive, conjunctivae without injection, drainage or discharge  ENMT:  tympanic membranes pearly gray with normal landmarks; nasal mucosa moist; mouth moist without ulcerations or lesions; throat moist without erythema, exudate, ulcerations or lesions  NECK:  supple, no masses, no significant lymphadenopathy  CARDIAC:  regular rate and rhythm, normal S1 and S2, no murmurs, rubs or gallops  RESP:  respiratory rate and effort appear normal for age; lungs are clear to auscultation bilaterally; no rales or wheezes  ABDOMEN:  soft, nontender, nondistended, no masses, no organomegaly  LYMPHATICS:  no significant lymphadenopathy  MUSCULOSKELETAL/NEURO:  + drift of RUE and RLE, CN II-XII grossly intact, sensation intact throughout, no dysmetria, normal movement, normal tone  SKIN:  normal skin color for age and race, well-perfused; warm and dry

## 2021-12-11 NOTE — ED CDU PROVIDER INITIAL DAY NOTE - OBJECTIVE STATEMENT
61 yold male to Ed Pmhx Htn, Copd not on steroid/home O2, HLD, Dm, Cva with right side hemiparesis c/o intermittently chronic headache x 6 months worsened today with mild increased right sided weakness from baseline; pt took otc meds without relief; pt denies n/v, neck pain, photophobia, blurry vision;

## 2021-12-11 NOTE — ED PROVIDER NOTE - CLINICAL SUMMARY MEDICAL DECISION MAKING FREE TEXT BOX
.    60 y/o M pmh HTN, DM, DLD, [place] lymphoma in remission, CVA w/ R sided weakness, chronic HA, p/w severe holocrania HA onset 1645. Has had intermittent similar HA x 6mo, but not this severe. also noted increased R sided weakness since onset of HA.    FS wnl. NIHSS = 3 for Rsided weakness. No other neuro deficit. Stroke code called.  CTH neg. No LVO on CT imaging. Case discussed w/ Dr. Sheth tele neuro and NP Ravi in-house neuro. Pt repeat exam shows still some weakness in RUE, RLE, but at baseline as per pt.  Pt not candidate for tPA or IA intervention.    HA improved 10/10 --> 4/10. pt felt much better.   Pt placed in OBS for TIA protocol in setting diagnostic uncertainty.    .

## 2021-12-11 NOTE — ED PROVIDER NOTE - OBJECTIVE STATEMENT
Pt is a 62 y/o male with PMH of CVA x 2 with residual right sided weakness, HTN, HLD and DM presenting for headache. Pt says headache started ~6 months ago, was 5/10 in severity but worsened to 10/10 1hr PTA. Pain starts at the back of the head and goes to the front. Has tried taking tylenol but with no relief. Pain usually lasts a few minutes and goes away on its own. No fever, blurry vision, neck pain, numbness, weakness, light/noise sensitivity. Pt is a 60 y/o male with PMH of CVA x 2 with residual right sided weakness, HTN, HLD and DM presenting for headache. Pt says headache started ~6 months ago, was 5/10 in severity but worsened to 10/10 1hr PTA. Pain starts at the back of the head and goes to the front. Has tried taking tylenol but with no relief. Pain usually lasts a few minutes and goes away on its own. No fever, blurry vision, neck pain, numbness, weakness, light/noise sensitivity. Follows with neurologist Dr. Myles Fernando Pt is a 62 y/o male with PMH of CVA x 2 with residual right sided weakness, COPD, HTN, HLD and DM presenting for headache. Pt says headache started ~6 months ago, was 5/10 in severity but worsened to 10/10 1hr PTA. Pain starts at the back of the head and goes to the front. Has tried taking tylenol but with no relief. Pain usually lasts a few minutes and goes away on its own. No fever, blurry vision, neck pain, numbness, weakness, light/noise sensitivity. Follows with neurologist Dr. Myles Fernando Pt is a 62 y/o male with PMH of CVA x 2 with residual right sided weakness, COPD, HTN, HLD and DM presenting for headache. Pt says headache started ~6 months ago, was 5/10 in severity but worsened to 10/10 1hr PTA. Pain starts at the back of the head and goes to the front. Has tried taking tylenol but with no relief. Pain usually lasts a few minutes and goes away on its own. Endorses residual right sided weakness but reports more than usual since headache began. No fever, blurry vision, neck pain, numbness or light/noise sensitivity. Follows with neurologist Dr. Myles Fernando Pt is a 62 y/o male with PMH of CVA x 2 with residual right sided weakness, Non Hodgkin lymphoma in remission, COPD, HTN, HLD and DM presenting for headache. Pt says headache started ~6 months ago, was 5/10 in severity but worsened to 10/10 1hr PTA. Pain starts at the back of the head and goes to the front. Has tried taking tylenol but with no relief. Pain usually lasts a few minutes and goes away on its own. Endorses residual right sided weakness but reports more than usual since headache began. No fever, blurry vision, neck pain, numbness or light/noise sensitivity. Follows with neurologist Dr. Myles Fernando

## 2021-12-11 NOTE — CONSULT NOTE ADULT - SUBJECTIVE AND OBJECTIVE BOX
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 Patient reports headache started 6 months ago, was 5/10 in severity but worsened to 10/10 1hr PTA. Pain starts at the occipital region and radiates to the front. Has tried taking tylenol but with no relief. Pain usually lasts a few minutes and goes away on its own.  Follows with neurologist Dr. Myles Fernando. Patient with right sided weakness at baseline, subtle worsening of RHP. Stroke code initiated.     PAST MEDICAL & SURGICAL HISTORY:  HTN (hypertension)    High cholesterol    DM (diabetes mellitus)    Chronic obstructive pulmonary disease, unspecified COPD type    Non Hodgkin&#x27;s lymphoma    Pacemaker    Artificial cardiac pacemaker    S/P transesophageal echocardiogram (ANITA)        FAMILY HISTORY:  Family history of breast cancer (Sibling)  in sister at the age of 59    Family history of cancer (Mother)        Social History: (-) x 3    Allergies    acyclovir (Anaphylaxis)  Bactrim (Anaphylaxis)    Intolerances        MEDICATIONS  (STANDING):    MEDICATIONS  (PRN):    Vital Signs Last 24 Hrs  T(C): 36.6 (11 Dec 2021 17:28), Max: 36.6 (11 Dec 2021 17:28)  T(F): 97.8 (11 Dec 2021 17:28), Max: 97.8 (11 Dec 2021 17:28)  HR: 75 (11 Dec 2021 17:58) (75 - 82)  BP: 161/70 (11 Dec 2021 17:58) (152/83 - 161/70)  BP(mean): --  RR: 18 (11 Dec 2021 17:58) (18 - 18)  SpO2: 96% (11 Dec 2021 17:58) (95% - 96%)    Examination:  Inconsistent strength exam  Patient holds RUE five seconds and gently lowers it to the   bed with no effort to hold it up. RLE 3 seconds and lowers it effortlessly.   +Carpenter sign.   Right eye adduction patient reports as chronic.     NIH Stroke Scale:   · NIH Stroke Scale: LOC	(0) Alert; keenly responsive  · NIH Stroke Scale: LOC Question	(0) Answers both questions correctly  · NIH Stroke Scale: LOC Command	(0) Performs both tasks correctly  · NIH Stroke Scale: Gaze	(0) Normal  · NIH Stroke Scale: Visual	(0) No visual loss  · NIH Stroke Scale: Facial	(0) Normal symmetrical movements  · NIH Stroke Scale: Arm Left	(0) No drift; limb holds 90 (or 45) degrees for full 10 secs  · NIH Stroke Scale: Arm Right	(1) Drift; limb holds 90 (or 45) degrees, but drifts down before full 10 secs; does not hit bed or other support  · NIH Stroke Scale: Leg Left	(0) No drift; leg holds 30 degree position for full 5 secs  · NIH Stroke Scale: Leg Right	(1) Drift; leg falls by the end of the 5-sec period but does not hit bed  · NIH Stroke Scale: Ataxia	(0) Absent  · NIH Stroke Scale: Sensory	(1) Mild-to-moderate sensory loss; patient feels pinprick is less sharp or is dull on the affected side; or there is a loss of superficial pain with pinprick, but patient is aware of being touched  · NIH Stroke Scale: Language	(0) No aphasia; normal  · NIH Stroke Scale: Dysarthria	(0) Normal  · NIH Stroke Scale: Extinct Inattention	(0) No abnormality  · NIH Stroke Scale: Total	3     Pre-stroke mRS Score:  mRS Score	(1) No significant disability. Able to carry out all usual activities, despite some symptoms.      Labs:   CBC Full  -  ( 11 Dec 2021 17:58 )  WBC Count : 4.77 K/uL  RBC Count : 5.50 M/uL  Hemoglobin : 16.0 g/dL  Hematocrit : 47.7 %  Platelet Count - Automated : 198 K/uL  Mean Cell Volume : 86.7 fL  Mean Cell Hemoglobin : 29.1 pg  Mean Cell Hemoglobin Concentration : 33.5 g/dL  Auto Neutrophil # : 2.63 K/uL  Auto Lymphocyte # : 1.29 K/uL  Auto Monocyte # : 0.60 K/uL  Auto Eosinophil # : 0.18 K/uL  Auto Basophil # : 0.05 K/uL  Auto Neutrophil % : 55.2 %  Auto Lymphocyte % : 27.0 %  Auto Monocyte % : 12.6 %  Auto Eosinophil % : 3.8 %  Auto Basophil % : 1.0 %            PT/INR - ( 11 Dec 2021 17:58 )   PT: 11.80 sec;   INR: 1.03 ratio         PTT - ( 11 Dec 2021 17:58 )  PTT:31.7 sec        Neuroimaging:  NCHCT:   < from: CT Brain Stroke Protocol (12.11.21 @ 17:59) >  IMPRESSION:    There is no evidence for intracranial hemorrhage, large territorial   infarct, midline shift, or mass effect.    < end of copied text >    12-11-21 @ 18:32

## 2021-12-11 NOTE — CONSULT NOTE ADULT - ASSESSMENT
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  Patient with right sided weakness at baseline, subtle worsening of RHP. Stroke code initiated. CTH without acute intracranial pathology. Right sided weakness inconsistent. Patient not a candidate for IV thrombolytics due to minimal change from baseline and inconsistent exam. No LVo on CTA therefore not a candidate for IA intervention.      Suggestion:  Discussed with Dr Myles giles from a neuro perspective.   Discussed with Dr. Sheth from an acute stroke perspective.   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  Patient with right sided weakness at baseline, subtle worsening of RHP. Stroke code initiated. CTH without acute intracranial pathology. Right sided weakness inconsistent. Patient not a candidate for IV thrombolytics due to minimal change from baseline and inconsistent exam. No LVo on CTA therefore not a candidate for IA intervention.      Suggestion:  Discussed with Dr Myles giles from a neuro perspective.   Discussed with Dr. Sheth from an acute stroke perspective.   Patient with PPM, if compatible can get MRI brain without ludmila  Medical management of headache  Can go to ED observational unit  Follow up official CTA results

## 2021-12-11 NOTE — STROKE CODE NOTE - NSSTROKETPAEXCLREL_OTHER_FT
low suspicion for new stroke, likely worsening of chronic R sided weakness in setting of severe headache

## 2021-12-11 NOTE — ED ADULT TRIAGE NOTE - CHIEF COMPLAINT QUOTE
came to ED c/o headaches. pt states he has been having headaches "on and off for months". states this is "one of the worst ones". c/o left sided head pain. denies nausea, vomiting, denies photophobia

## 2021-12-11 NOTE — ED ADULT NURSE NOTE - OBJECTIVE STATEMENT
Pt presents to ED c/o HA x 1 month worsening this week and no improvement with tylenol. Pt has hx of stroke with RUE residual weakness. Upon assessment pt states pt has blurry vision at times. Pt also states R arm is more weak. Stroke code called. Pt denies N?V, or dizziness. Pt is awake alert and not in any distress. Pt presents to ED c/o HA x 1 month worsening this week and no improvement with tylenol. Pt has hx of stroke with RUE residual weakness. Upon assessment pt states pt has blurry vision at times. Pt also states R arm is more weak. Stroke code called. Pt denies N/V, or dizziness. Pt has lazy eye baseline/ Pt is awake alert and not in any distress.

## 2021-12-11 NOTE — ED CDU PROVIDER INITIAL DAY NOTE - PHYSICAL EXAMINATION
Constitutional: Well developed, well nourished. NAD  Head: Normocephalic, atraumatic.  Eyes: PERRL, EOMI.  ENT: No nasal discharge. Mucous membranes dry.  Neck: Supple. Painless ROM.  Cardiovascular:  Regular rate and rhythm.    Pulmonary: Normal respiratory rate and effort. Lungs clear to auscultation bilaterally. No wheezing, rales, or rhonchi.  Abdominal: Soft. Nondistended. No rebound, guarding, rigidity.  Extremities. Pelvis stable. No lower extremity edema, symmetric calves.  Skin: No rashes, cyanosis.  Neuro: AAOx3. + motor weakness right arm(1), right leg(1) and sensory rigth side(1)  Psych: Normal mood. Normal affect.

## 2021-12-11 NOTE — ED PROVIDER NOTE - NS ED ROS FT
Review of Systems:  CONSTITUTIONAL: No fever, No diaphoresis, No weight change  SKIN: No rash  HEMATOLOGIC: No abnormal bleeding or bruising  EYES: No eye pain, No blurred vision  ENT: No change in hearing, No sore throat, No neck pain, No rhinorrhea, No ear pain  RESPIRATORY: No shortness of breath, No cough  CARDIAC: No chest pain, No palpitations  GI: No abdominal pain, No nausea, No vomiting, No diarrhea, No constipation, No bright red blood per rectum or melena. No flank pain  : No dysuria, frequency, hematuria.   ENDO: No polydypsia, No polyuria, No heat/cold intolerance  MUSCULOSKELETAL: No joint pain, No swelling, No back pain  NEUROLOGIC: + headache, No numbness, No focal weakness, No dizziness  All other systems negative, unless specified in HPI

## 2021-12-11 NOTE — ED CDU PROVIDER INITIAL DAY NOTE - PROGRESS NOTE DETAILS
received signout from Dr. Vidal - pt in obs for evaluation of tia; neuro note reviewed; pt with ppm - mri placed and ppm information relayed to MRI tech;

## 2021-12-11 NOTE — ED PROVIDER NOTE - PROGRESS NOTE DETAILS
Spoke with neurology. Recommends placing patient in obs for MRI. EP needs to make sure pacemaker is compatible for MRI in the morning. Currently pt's headache went down from 8/10 to 4/10. -Young

## 2021-12-11 NOTE — ED ADULT NURSE REASSESSMENT NOTE - NS ED NURSE REASSESS COMMENT FT1
Pt assessed. Pt is OBS. Pt is awake and alert. Denies any pain or discomfort at this time. Neuro checks done. NIH 3. Cardiac and 02 monitoring maintained. Pt is not in any distress. Safety and comfort maintained

## 2021-12-11 NOTE — STROKE CODE NOTE - IV ALTEPLASE EXCLUSION ABS OTHER
Patient with mild worsening of baseline symptoms, also exam inconsistent. Not a candidate for IV thrombolytics. Patient not a candidate for IA intervention due to NIHSS close to baseline symptoms. No cortical signs.

## 2021-12-11 NOTE — ED CDU PROVIDER INITIAL DAY NOTE - ATTENDING CONTRIBUTION TO CARE
Pt has a history of lymphoma in remission, PPM place 2015, CVA about one year ago and pt has mild persistent weakness to the right arm and leg. Completed rehab and does not use any assistive devices. Pt  presents now with headaches x 6 month and yesterday felt some weakness to the right side of the body which is now baseline. On exam AAO3, S1S2 rrr, lungs clear, right sided weakness upper and lower extremity 4\5 , speech clear. Placed into observation for further evaluation.

## 2021-12-12 VITALS
DIASTOLIC BLOOD PRESSURE: 77 MMHG | RESPIRATION RATE: 16 BRPM | OXYGEN SATURATION: 95 % | SYSTOLIC BLOOD PRESSURE: 142 MMHG | HEART RATE: 72 BPM

## 2021-12-12 PROCEDURE — 70551 MRI BRAIN STEM W/O DYE: CPT | Mod: 26,MA

## 2021-12-12 PROCEDURE — 99283 EMERGENCY DEPT VISIT LOW MDM: CPT

## 2021-12-12 PROCEDURE — 99217: CPT

## 2021-12-12 RX ORDER — TOPIRAMATE 25 MG
1 TABLET ORAL
Qty: 30 | Refills: 0
Start: 2021-12-12 | End: 2022-01-10

## 2021-12-12 RX ADMIN — PIOGLITAZONE HYDROCHLORIDE 30 MILLIGRAM(S): 15 TABLET ORAL at 11:03

## 2021-12-12 RX ADMIN — AMLODIPINE BESYLATE 5 MILLIGRAM(S): 2.5 TABLET ORAL at 06:14

## 2021-12-12 NOTE — ED CDU PROVIDER SUBSEQUENT DAY NOTE - PROGRESS NOTE DETAILS
Received sign out from SAMI Segal. Patient states he is feeling improved from yesterday. States that he presented to the ED as he has been with a right sided headache for the past six months. He is a patient of Dr. Diaz. An MRI is ordered however EP is needed as patient has [employer] device. MRI tech aware and will attempt to reach EP again in an effort to coordinate. Spoke with on call EP NP ; states that she will get in touch with someone at [employer] company to facilitate MRI. Updated MRI tech on current status. MRI tentatively scheduled for 1PM today. EP and [employer] rep aware.

## 2021-12-12 NOTE — ED ADULT NURSE REASSESSMENT NOTE - NS ED NURSE REASSESS COMMENT FT1
pt resting comfortably in hospital bed, watching TV. A&0x4. denies c/o headache or discomfort. cardiac monitoring in place. pt is requesting breakfast and coffee. made aware he is waiting for an MRA and EP evaluation. will continue to monitor

## 2021-12-12 NOTE — ED CDU PROVIDER DISPOSITION NOTE - PROVIDER TOKENS
PROVIDER:[TOKEN:[[id number]],FOLLOWUP:[Routine]],FREE:[LAST:[YOUR PRIMARY CARE PHYSICIAN],PHONE:[(   )    -],FAX:[(   )    -],FOLLOWUP:[1-3 Days]]

## 2021-12-12 NOTE — ED CDU PROVIDER DISPOSITION NOTE - CLINICAL COURSE
Pt placed into observation for headache and possible more weakness to the right upper and lower extremity. Hx of CVA in the past. MRI neg for new CVA. PT seen by neurology who advised topomax and follow up in the office.

## 2021-12-12 NOTE — ED CDU PROVIDER DISPOSITION NOTE - PATIENT PORTAL LINK FT
You can access the FollowMyHealth Patient Portal offered by [facility] by registering at the following website: [email or web address]. By joining [facility]’s FollowMyHealth portal, you will also be able to view your health information using other applications (apps) compatible with our system.

## 2021-12-12 NOTE — ED CDU PROVIDER DISPOSITION NOTE - CARE PROVIDER_API CALL
Haroldo Simon)  EEGEpilepsy; Neurology  [address] [place] [address], [address]  [place] [address]  Phone: [phone]  Fax: [phone]  Follow Up Time: Routine    YOUR PRIMARY CARE PHYSICIAN,   Phone: (   )    -  Fax: (   )    -  Follow Up Time: 1-3 Days

## 2021-12-12 NOTE — ED CDU PROVIDER DISPOSITION NOTE - CARE PROVIDERS DIRECT ADDRESSES
[email or web address],DirectAddress_Unknown

## 2021-12-12 NOTE — ED CDU PROVIDER SUBSEQUENT DAY NOTE - HISTORY
pt resting in obs without compliants; mri ordered; EP consult placed to turn off ppm pre-mri in am; home meds ordered;

## 2021-12-29 NOTE — HISTORY OF PRESENT ILLNESS
[de-identified] : I had a pleasure of seeing Mr. GEE for follow-up consultation for pacemaker care. He was previously being followed by Dr. Muller. He is accompanied by his wife, who works at [employer].\par \par Mr. GEE is a 60 year-year old male with history of HTN, DM, Morbid obesity, CHD s/p corrective surgery at age of 3, CHB s/p DC-PPM (Bio, 15, dep), CLL, DL, COPD, ex-smoker, recent encephalopathy (01/21) is here for routine f-up.\par \par 11/10: Feels fine. No new c/o\par Denies chest pain, shortness of breath, palpitation, dizziness or LOC except noted above.\par \par EKG (11/10): SR w/\par EKG: SR@ 76/min, LBBB, QRSd 172 ms, KS: 176 ms\par TTE (04/21): EF 50%\par TTE (07/19): EF 45-50%, mild LAE, no defect on septum\par LHC (09/19): No occlusive CAD\par Cardio: Dr. Power\par \par

## 2021-12-29 NOTE — ASSESSMENT
[FreeTextEntry1] : ## CHB s/p DC-PPM (Bio, dep, 15)\par ## Nonischemic cardiomyopathy \par \par - PPM interrogation shows normally functioning DC-PPM. Battery life 4 years. No new events. No evidence of AF\par - TTE showed stable EF 50%. We will consider CRT upgrade if further reduction in EF or symptoms of HF develop. \par - Remote Monitoring\par - Return in 1 year\par

## 2022-01-10 ENCOUNTER — NON-APPOINTMENT (OUTPATIENT)
Age: 62
End: 2022-01-10

## 2022-01-10 ENCOUNTER — APPOINTMENT (OUTPATIENT)
Dept: CARDIOLOGY | Facility: CLINIC | Age: 62
End: 2022-01-10
Payer: MEDICAID

## 2022-01-10 PROCEDURE — 93295 DEV INTERROG REMOTE 1/2/MLT: CPT | Mod: NC

## 2022-01-10 PROCEDURE — 93296 REM INTERROG EVL PM/IDS: CPT | Mod: NC

## 2022-01-13 NOTE — PHYSICAL EXAM
Chief Complaint   Patient presents with    Results     MRI eye dr said needed appt to discuss   1. \"Have you been to the ER, urgent care clinic since your last visit? Hospitalized since your last visit? \" No    2. \"Have you seen or consulted any other health care providers outside of the [address] [place] [address] since your last visit? \" Yes Dr Paulino eye      3. For patients over 45: Has the patient had a colonoscopy? Yes, HM satisfied with blue hyperlink     If the patient is female:    4. For patients over 36: Has the patient had a mammogram? NA based on age or sex    11. For patients over 21: Has the patient had a pap smear?  NA based on age or sex [General Appearance - Well Developed] : well developed [Normal Appearance] : normal appearance [Well Groomed] : well groomed [General Appearance - Well Nourished] : well nourished [No Deformities] : no deformities [General Appearance - In No Acute Distress] : no acute distress [Heart Rate And Rhythm] : heart rate and rhythm were normal [Heart Sounds] : normal S1 and S2 [Murmurs] : no murmurs present [Respiration, Rhythm And Depth] : normal respiratory rhythm and effort [Exaggerated Use Of Accessory Muscles For Inspiration] : no accessory muscle use [Auscultation Breath Sounds / Voice Sounds] : lungs were clear to auscultation bilaterally [Clean] : clean [Dry] : dry [Well-Healed] : well-healed [Abdomen Soft] : soft [Abdomen Tenderness] : non-tender [Abdomen Mass (___ Cm)] : no abdominal mass palpated [Nail Clubbing] : no clubbing of the fingernails [Cyanosis, Localized] : no localized cyanosis [Petechial Hemorrhages (___cm)] : no petechial hemorrhages [] : no ischemic changes

## 2022-02-02 ENCOUNTER — APPOINTMENT (OUTPATIENT)
Dept: NEUROLOGY | Facility: CLINIC | Age: 62
End: 2022-02-02
Payer: MEDICARE

## 2022-02-02 VITALS
SYSTOLIC BLOOD PRESSURE: 153 MMHG | TEMPERATURE: 97 F | BODY MASS INDEX: 27.09 KG/M2 | DIASTOLIC BLOOD PRESSURE: 94 MMHG | OXYGEN SATURATION: 98 % | HEART RATE: 95 BPM | HEIGHT: 72 IN | WEIGHT: 200 LBS

## 2022-02-02 PROCEDURE — 99214 OFFICE O/P EST MOD 30 MIN: CPT

## 2022-02-02 NOTE — REASON FOR VISIT
[Follow-Up: _____] : a [unfilled] follow-up visit [FreeTextEntry1] : cryptogenic ?stroke, cervical stenosis/radiculopathy, and headache.

## 2022-02-02 NOTE — PHYSICAL EXAM
[FreeTextEntry1] : Focal neurological exam:\par \par MS: Awake, alert, oriented to person, place, situation and time. Normal affect. Follows commands. \par \par Language: Speech is clear, fluent with good repetition & comprehension. No dysarthria. \par \par CNs 2 - 12 intact. EOMI no nystagmus, no diplopia. VFF. No facial asymmetry b/l, full eye closure strength b/l. Hearing grossly normal. Head turning & shoulder shrug intact b/l. Tongue midline, normal movements, no atrophy.\par \par Motor: Normal muscle bulk & tone. No noticeable tremor or seizure. RUE and RLE drift. Muscle strength of RUE/RLE 4+/5, and LUE/LLE 5/5  \par \par Reflexes: DTR of biceps 1+, kneeS 1+  \par \par Sensation: Decreased to LT, vibration and temp on RUE/RLE \par \par Cortical: No extinction\par \par Coordination: No dysmetria to FTN.\par \par Gait: No postural instability. Normal stance and tandem gait.\par \par NIHSS 3\par \par \par \par

## 2022-02-02 NOTE — HISTORY OF PRESENT ILLNESS
[FreeTextEntry1] : Patient is 60 yo man, born premature at 1lb 7oz, with PMHx of ?stroke aborted by TPA in 7/2020 when he p/w expressive aphasia and R hemiparesis, was given TPA and had negative MRI/CTA H/N and vEEG afterwards, and another episode of unresponsiveness in 1/2021 (also w/ R sided weakness/hypotension) and stroke/seizure work up was negative again. 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 He had been following with Dr. 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  \par \par He presents today for HFU from 12/11/21 of HA that has been presents for about 6 months that was 5/10 in severity, but was 10/10 PTA. Pain starts at the back of the head and radiates to the front, lasting a few minutes. He also c/o of worsened residual R hemiparesis since headache began. Stroke code was called, NIHSS 3 for RUE/RLE drift and mild to mod sensory defect. He was seen by Dr. Myles Fernando inpatient who recommended Topamax 25 QHS if MRI H negative, which it was. CTA H/N and CTP all negative.  \par \par MR C-spine from 2020, the latest one reported Mild-moderate spinal stenosis at C5-6 and C6-7 without cord compression. Additional mild spinal stenosis C4-5. Moderate foraminal stenosis at C6-7. \par \par Today, he still c/o of the HAs and says Topamax 25 QD does not work. Each HA episode lasts about 1 minute, occurs about 2-3 times a week. Says these HAs are the same since after his stroke. Denies association with change in neck position, no photo or phonophobia, eye tearing. HA is throbbing, associated with restlessness. Had neck PT before, but was not helpful. Endorse chronic Tylenol use for about a year. Sometimes, sees white specks prior to HA. Denies side effects on Topamax or kidney issues. Denies numbness/tingling down arms. In general, says his sx have not changed since stroke. \par \par

## 2022-02-02 NOTE — ASSESSMENT
[FreeTextEntry1] : Patient is 60 yo man, born premature at 1lb 7oz, with PMHx of ?stroke aborted by TPA in 7/2020 with residual R hemiparesis as well as hypoesthesia (all work up negative) and episode of unresponsiveness in 1/2021 for which all work up was also negative. He had been following with Dr. 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 He presents today as HFU from 12/11/21 for CAMARENA. Dr. Myles Fernando had thought CAMARENA was related to neck issues (Mild-moderate spinal stenosis at C5-6 and C6-7, mild spinal stenosis C4-5. moderate foraminal stenosis at C6-7) but PT was not helpful. This admission, he was started on Topamax 25 QHS, which he says has not been helpful and we will increase to BID. The HAs are not classic for migraines with exception of ?aura w/ white specks prior to HA sometimes. (ANITA neg for PFO and Pacemaker shows no events). Exam today shows NIHSS 3 for RUE/RLE drift (w/pain) and R-sided hypoesthesia.\par \par PLAN: \par -C/w ASA 81 and Atorvastatin 80\par -Increase to Topamax 25 BID \par -Mg Oxide 400 QD and B2 400 QD \par -Check A1c and lipid panel  \par -Follow up with Dr. Myles Fernando in 6 months

## 2022-02-10 ENCOUNTER — NON-APPOINTMENT (OUTPATIENT)
Age: 62
End: 2022-02-10

## 2022-02-10 LAB
CHOLEST SERPL-MCNC: 188 MG/DL
ESTIMATED AVERAGE GLUCOSE: 143 MG/DL
HBA1C MFR BLD HPLC: 6.6 %
HDLC SERPL-MCNC: 49 MG/DL
LDLC SERPL CALC-MCNC: 125 MG/DL
NONHDLC SERPL-MCNC: 139 MG/DL
TRIGL SERPL-MCNC: 115 MG/DL

## 2022-02-24 NOTE — PROGRESS NOTE ADULT - ASSESSMENT
Left a message on patient voicemail to return call for results.   59 M  with a pmh of congenital heart disease s/p repair, HTN, DLD, RAA thrombus (was on xarelto and resolved on last ANITA), COPD, exsmoker, dm htn, hld, sinus bradycardia s/p PPM, marginal cell lymphoma on maintenance rituxan (last chemo was december 21) presents with expressive aphasia and right sided weakness. LWK is 11.30 am then developed acute generalized weakness and expressive aphasia and dysarthria. In ED stroke code was called, NIHSS is 11, his CTH is negative for acute pathology and his was within the window for IV tpa which was given at 14.35 pm.    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 LWK is 11.30 am then developed acute generalized weakness and expressive aphasia and dysarthria. In ED stroke code was called, NIHSS is 11, his CTH is negative for acute pathology and his was within the window for IV tpa which was given at 14.35 pm.    - Continue post-tPA protocol  - f/u 24 hr CT head  - Can start ASA if CT negative  - High intensity statin  - MRI Brain if no contraindication  - Keep BP < 180/110  - Check TTE  - PT/OT/SLP eval and tx  -VEEG monitoring

## 2022-03-02 NOTE — SPEECH LANGUAGE PATHOLOGY EVALUATION - SPELLING
well developed, well nourished , in no acute distress , ambulating without difficulty , normal communication ability
intact

## 2022-03-16 ENCOUNTER — APPOINTMENT (OUTPATIENT)
Dept: ENDOCRINOLOGY | Facility: CLINIC | Age: 62
End: 2022-03-16

## 2022-03-16 ENCOUNTER — OUTPATIENT (OUTPATIENT)
Dept: OUTPATIENT SERVICES | Facility: HOSPITAL | Age: 62
LOS: 1 days | Discharge: HOME | End: 2022-03-16

## 2022-03-16 VITALS
DIASTOLIC BLOOD PRESSURE: 90 MMHG | HEIGHT: 72 IN | TEMPERATURE: 97.6 F | BODY MASS INDEX: 26.95 KG/M2 | WEIGHT: 199 LBS | HEART RATE: 72 BPM | SYSTOLIC BLOOD PRESSURE: 143 MMHG | OXYGEN SATURATION: 97 %

## 2022-03-16 DIAGNOSIS — Z95.0 PRESENCE OF CARDIAC PACEMAKER: Chronic | ICD-10-CM

## 2022-03-16 DIAGNOSIS — Z98.890 OTHER SPECIFIED POSTPROCEDURAL STATES: Chronic | ICD-10-CM

## 2022-03-16 NOTE — HISTORY OF PRESENT ILLNESS
[FreeTextEntry1] : 61 year old male who present for follow up type 2 DM \par \par Current regimen :\par synjardy 12.5/1000mg bid \par pioglitazone 30 mg \par Trulicity 3 mg Q week \par atorvastatin 80 mg daily , recently increased per patient \par \par following with opthalmology and podiatry , no medications SE \par \par A1c 6.6% in 2/2022 going for labs with PCP \par \par

## 2022-03-16 NOTE — ASSESSMENT
[Long Term Vascular Complications] : long term vascular complications of diabetes [Carbohydrate Consistent Diet] : carbohydrate consistent diet [Importance of Diet and Exercise] : importance of diet and exercise to improve glycemic control, achieve weight loss and improve cardiovascular health [Exercise/Effect on Glucose] : exercise/effect on glucose [FreeTextEntry1] : 61 year old male who present for follow up type 2 DM \par \par #controlled type 2 DM \par -A1c 6.6% currently on synjardy 12.5/1000mg bid , pioglitazone 30 mg , Trulicity 3 mg Q week \par -atorvastatin 80 mg daily , recently increased per patient LDL high \par - following with opthalmology and podiatry , no medications SE\par - continue same regimen for now and he states he will do labs with PCP soon

## 2022-03-16 NOTE — PHYSICAL EXAM
[Alert] : alert [Well Nourished] : well nourished [No Acute Distress] : no acute distress [No Proptosis] : no proptosis [No Lid Lag] : no lid lag [Thyroid Not Enlarged] : the thyroid was not enlarged [No Thyroid Nodules] : no palpable thyroid nodules [No Respiratory Distress] : no respiratory distress [No Accessory Muscle Use] : no accessory muscle use [Clear to Auscultation] : lungs were clear to auscultation bilaterally [No Edema] : no peripheral edema [Soft] : abdomen soft [No Stigmata of Cushings Syndrome] : no stigmata of Cushings Syndrome

## 2022-03-16 NOTE — REVIEW OF SYSTEMS
[Fatigue] : no fatigue [Recent Weight Gain (___ Lbs)] : no recent weight gain [Recent Weight Loss (___ Lbs)] : no recent weight loss [Blurred Vision] : no blurred vision [Dysphagia] : no dysphagia [Neck Pain] : no neck pain [Dysphonia] : no dysphonia [Chest Pain] : no chest pain [Palpitations] : no palpitations [Fast Heart Rate] : heart rate is not fast [Lower Ext Edema] : no lower extremity edema [Shortness Of Breath] : no shortness of breath [Orthopnea] : no orthopnea [Wheezing] : no wheezing [Nausea] : no nausea [Constipation] : no constipation [Vomiting] : no vomiting [Diarrhea] : no diarrhea [Polyuria] : no polyuria [Dizziness] : no dizziness [Tremors] : no tremors [Pain/Numbness of Digits] : no pain/numbness of digits [Cold Intolerance] : no cold intolerance [Heat Intolerance] : no heat intolerance

## 2022-03-29 NOTE — ED ADULT NURSE NOTE - SENSATION LIGHT TOUCH UPPER EXTREMITY RIGHT
TRANSFER - OUT REPORT:    Verbal report given to Anton Wallis RN on Sunshine Ortiz  being transferred to [facility] for routine progression of care       Report consisted of patients Situation, Background, Assessment and   Recommendations(SBAR). Information from the following report(s) SBAR was reviewed with the receiving nurse. Lines:       Opportunity for questions and clarification was provided.       Patient transported with:   [facility] increased sensitivity

## 2022-03-30 DIAGNOSIS — E11.65 TYPE 2 DIABETES MELLITUS WITH HYPERGLYCEMIA: ICD-10-CM

## 2022-03-30 DIAGNOSIS — E78.5 HYPERLIPIDEMIA, UNSPECIFIED: ICD-10-CM

## 2022-03-30 DIAGNOSIS — E66.01 MORBID (SEVERE) OBESITY DUE TO EXCESS CALORIES: ICD-10-CM

## 2022-04-12 ENCOUNTER — EMERGENCY (EMERGENCY)
Facility: HOSPITAL | Age: 62
LOS: 0 days | Discharge: HOME | End: 2022-04-12
Attending: EMERGENCY MEDICINE | Admitting: EMERGENCY MEDICINE
Payer: MEDICARE

## 2022-04-12 VITALS
TEMPERATURE: 96 F | OXYGEN SATURATION: 99 % | RESPIRATION RATE: 18 BRPM | DIASTOLIC BLOOD PRESSURE: 83 MMHG | HEIGHT: 72 IN | SYSTOLIC BLOOD PRESSURE: 136 MMHG | HEART RATE: 88 BPM | WEIGHT: 199.96 LBS

## 2022-04-12 DIAGNOSIS — C85.90 NON-HODGKIN LYMPHOMA, UNSPECIFIED, UNSPECIFIED SITE: ICD-10-CM

## 2022-04-12 DIAGNOSIS — Z95.0 PRESENCE OF CARDIAC PACEMAKER: Chronic | ICD-10-CM

## 2022-04-12 DIAGNOSIS — R51.9 HEADACHE, UNSPECIFIED: ICD-10-CM

## 2022-04-12 DIAGNOSIS — Z88.2 ALLERGY STATUS TO SULFONAMIDES: ICD-10-CM

## 2022-04-12 DIAGNOSIS — Z95.0 PRESENCE OF CARDIAC PACEMAKER: ICD-10-CM

## 2022-04-12 DIAGNOSIS — G89.29 OTHER CHRONIC PAIN: ICD-10-CM

## 2022-04-12 DIAGNOSIS — I10 ESSENTIAL (PRIMARY) HYPERTENSION: ICD-10-CM

## 2022-04-12 DIAGNOSIS — I69.351 HEMIPLEGIA AND HEMIPARESIS FOLLOWING CEREBRAL INFARCTION AFFECTING RIGHT DOMINANT SIDE: ICD-10-CM

## 2022-04-12 DIAGNOSIS — E11.9 TYPE 2 DIABETES MELLITUS WITHOUT COMPLICATIONS: ICD-10-CM

## 2022-04-12 DIAGNOSIS — Z79.82 LONG TERM (CURRENT) USE OF ASPIRIN: ICD-10-CM

## 2022-04-12 DIAGNOSIS — J44.9 CHRONIC OBSTRUCTIVE PULMONARY DISEASE, UNSPECIFIED: ICD-10-CM

## 2022-04-12 DIAGNOSIS — Z98.890 OTHER SPECIFIED POSTPROCEDURAL STATES: Chronic | ICD-10-CM

## 2022-04-12 DIAGNOSIS — Z88.1 ALLERGY STATUS TO OTHER ANTIBIOTIC AGENTS STATUS: ICD-10-CM

## 2022-04-12 DIAGNOSIS — E78.5 HYPERLIPIDEMIA, UNSPECIFIED: ICD-10-CM

## 2022-04-12 LAB
ANION GAP SERPL CALC-SCNC: 14 MMOL/L — SIGNIFICANT CHANGE UP (ref 7–14)
BASOPHILS # BLD AUTO: 0.07 K/UL — SIGNIFICANT CHANGE UP (ref 0–0.2)
BASOPHILS NFR BLD AUTO: 1.4 % — HIGH (ref 0–1)
BUN SERPL-MCNC: 22 MG/DL — HIGH (ref 10–20)
CALCIUM SERPL-MCNC: 9.6 MG/DL — SIGNIFICANT CHANGE UP (ref 8.5–10.1)
CHLORIDE SERPL-SCNC: 103 MMOL/L — SIGNIFICANT CHANGE UP (ref 98–110)
CO2 SERPL-SCNC: 23 MMOL/L — SIGNIFICANT CHANGE UP (ref 17–32)
CREAT SERPL-MCNC: 1.1 MG/DL — SIGNIFICANT CHANGE UP (ref 0.7–1.5)
EGFR: 77 ML/MIN/1.73M2 — SIGNIFICANT CHANGE UP
EOSINOPHIL # BLD AUTO: 0.17 K/UL — SIGNIFICANT CHANGE UP (ref 0–0.7)
EOSINOPHIL NFR BLD AUTO: 3.4 % — SIGNIFICANT CHANGE UP (ref 0–8)
GLUCOSE SERPL-MCNC: 158 MG/DL — HIGH (ref 70–99)
HCT VFR BLD CALC: 46.2 % — SIGNIFICANT CHANGE UP (ref 42–52)
HGB BLD-MCNC: 15.8 G/DL — SIGNIFICANT CHANGE UP (ref 14–18)
IMM GRANULOCYTES NFR BLD AUTO: 0.2 % — SIGNIFICANT CHANGE UP (ref 0.1–0.3)
LYMPHOCYTES # BLD AUTO: 1.28 K/UL — SIGNIFICANT CHANGE UP (ref 1.2–3.4)
LYMPHOCYTES # BLD AUTO: 25.7 % — SIGNIFICANT CHANGE UP (ref 20.5–51.1)
MAGNESIUM SERPL-MCNC: 2.2 MG/DL — SIGNIFICANT CHANGE UP (ref 1.8–2.4)
MCHC RBC-ENTMCNC: 29.4 PG — SIGNIFICANT CHANGE UP (ref 27–31)
MCHC RBC-ENTMCNC: 34.2 G/DL — SIGNIFICANT CHANGE UP (ref 32–37)
MCV RBC AUTO: 85.9 FL — SIGNIFICANT CHANGE UP (ref 80–94)
MONOCYTES # BLD AUTO: 0.48 K/UL — SIGNIFICANT CHANGE UP (ref 0.1–0.6)
MONOCYTES NFR BLD AUTO: 9.6 % — HIGH (ref 1.7–9.3)
NEUTROPHILS # BLD AUTO: 2.97 K/UL — SIGNIFICANT CHANGE UP (ref 1.4–6.5)
NEUTROPHILS NFR BLD AUTO: 59.7 % — SIGNIFICANT CHANGE UP (ref 42.2–75.2)
NRBC # BLD: 0 /100 WBCS — SIGNIFICANT CHANGE UP (ref 0–0)
PLATELET # BLD AUTO: 190 K/UL — SIGNIFICANT CHANGE UP (ref 130–400)
POTASSIUM SERPL-MCNC: 4 MMOL/L — SIGNIFICANT CHANGE UP (ref 3.5–5)
POTASSIUM SERPL-SCNC: 4 MMOL/L — SIGNIFICANT CHANGE UP (ref 3.5–5)
RBC # BLD: 5.38 M/UL — SIGNIFICANT CHANGE UP (ref 4.7–6.1)
RBC # FLD: 13.1 % — SIGNIFICANT CHANGE UP (ref 11.5–14.5)
SODIUM SERPL-SCNC: 140 MMOL/L — SIGNIFICANT CHANGE UP (ref 135–146)
WBC # BLD: 4.98 K/UL — SIGNIFICANT CHANGE UP (ref 4.8–10.8)
WBC # FLD AUTO: 4.98 K/UL — SIGNIFICANT CHANGE UP (ref 4.8–10.8)

## 2022-04-12 PROCEDURE — 70450 CT HEAD/BRAIN W/O DYE: CPT | Mod: 26,MA

## 2022-04-12 PROCEDURE — 99285 EMERGENCY DEPT VISIT HI MDM: CPT

## 2022-04-12 RX ORDER — METOCLOPRAMIDE HCL 10 MG
10 TABLET ORAL ONCE
Refills: 0 | Status: COMPLETED | OUTPATIENT
Start: 2022-04-12 | End: 2022-04-12

## 2022-04-12 RX ORDER — SODIUM CHLORIDE 9 MG/ML
1000 INJECTION, SOLUTION INTRAVENOUS ONCE
Refills: 0 | Status: COMPLETED | OUTPATIENT
Start: 2022-04-12 | End: 2022-04-12

## 2022-04-12 RX ADMIN — SODIUM CHLORIDE 1000 MILLILITER(S): 9 INJECTION, SOLUTION INTRAVENOUS at 19:45

## 2022-04-12 RX ADMIN — Medication 10 MILLIGRAM(S): at 19:45

## 2022-04-12 NOTE — ED PROVIDER NOTE - NS ED ROS FT
Constitutional: No fever, chills, unintended weight loss.  Eyes:  No visual changes, eye pain or discharge.  ENMT:  No hearing changes, pain, no sore throat or runny nose, no difficulty swallowing  Cardiac:  No chest pain, SOB or edema. No chest pain with exertion.  Respiratory:  No cough or respiratory distress. No hemoptysis. No history of asthma or RAD.  GI:  No nausea, vomiting, diarrhea or abdominal pain.  :  No dysuria, frequency or burning.  MS:  No myalgia, muscle weakness, joint pain or back pain.  Neuro: see hpi  Skin:  No skin rash.   Endocrine: No history of thyroid disease +diabetes.

## 2022-04-12 NOTE — ED PROVIDER NOTE - CARE PROVIDER_API CALL
NANCY VELEZ  Internal Medicine  [address]  [place] [id number]  Phone: [phone]  Fax: [phone]  Established Patient  Follow Up Time: 7-10 Days   Haroldo Simon)  EEGEpilepsy; Neurology  [address] [place] [address], [address]  [place] [address]  Phone: [phone]  Fax: [phone]  Established Patient  Follow Up Time: 7-10 Days

## 2022-04-12 NOTE — ED PROVIDER NOTE - PHYSICAL EXAMINATION
nad  skin warm, dry  ncat  neck supple  rrr nl s1s2 no mrg  ctab no wrr  abd soft ntnd no palpable masses no rgr  back non-tender no cvat  ext no cce dpi  neuro aaox3, L eye weak lateral gaze (baseline per pt, h/o "lazy eye"), cn 2-12 otherwise intact bl no nystagmus or facial droop, 4/5 strength RUE/RLE (baseline per pt), 5/5 strength LUE/LLE no drift, gross sensation intact, finger-nose nl, gait nl, romberg neg

## 2022-04-12 NOTE — ED PROVIDER NOTE - CLINICAL SUMMARY MEDICAL DECISION MAKING FREE TEXT BOX
ha, h/o same on topamax followed by neuro - neuro exam w/baseline RHP, no new focalities - cth no acute findings, sx improved w/reglan, all results d/w pt & copies given, strict return precautions discussed, rec outpt neuro f/u

## 2022-04-12 NOTE — ED PROVIDER NOTE - NS ED ATTENDING STATEMENT MOD
This was a shared visit with the NORM. I reviewed and verified the documentation and independently performed the documented:

## 2022-04-12 NOTE — ED ADULT NURSE NOTE - CAS DISCH TRANSFER METHOD
RX denied.  Request already responded by other means.  GARY Landrum   Outpatient Medication Detail     Disp Refills Start End    dextroamphetamine (DEXEDRINE SPANSULE) 10 MG 24 hr capsule 28 capsule 0 4/12/2021     Sig - Route: Take 1 capsule by mouth every morning. - Oral    Sent to pharmacy as: Dextroamphetamine Sulfate ER 10 MG Oral Capsule Extended Release 24 Hour (DEXEDRINE SPANSULE)    Class: Eprescribe    Earliest Fill Date: 4/12/2021    Notes to Pharmacy: DX F90.0         Private car

## 2022-04-12 NOTE — ED PROVIDER NOTE - PATIENT PORTAL LINK FT
You can access the FollowMyHealth Patient Portal offered by [facility] by registering at the following website: [email or web address]. By joining [employer]’s FollowMyHealth portal, you will also be able to view your health information using other applications (apps) compatible with our system.

## 2022-04-12 NOTE — ED PROVIDER NOTE - PROVIDER TOKENS
PROVIDER:[TOKEN:[[id number]],FOLLOWUP:[7-10 Days],ESTABLISHEDPATIENT:[T]] PROVIDER:[TOKEN:[[id number]],FOLLOWUP:[7-10 Days],ESTABLISHEDPATIENT:[T]]

## 2022-04-12 NOTE — ED PROVIDER NOTE - OBJECTIVE STATEMENT
61M pmh CVA w/resid RHP, NHL in remission, copd, htn, hl, dm, chronic HAs on topamax p/w HA x 1d. Started 2pm, gradual-onset, non-exertional, R sided aching/throbbing, no assoc sx. No vision changes, f/c, uri sx, cp/sob, nv, abd pain, new focal numbness or weakness. No falls or head trauma. Took tylenol 1 gm <4h ago w/min relief. Similar presentation to ED in Dec 2021, with possible incr RHP, stroke work-up was neg for acute CVA, was started on topamax @ that time. PCP Gopal / Neuro Myles Fernando.

## 2022-04-12 NOTE — ED PROVIDER NOTE - CARE PROVIDERS DIRECT ADDRESSES
,DirectAddress_Unknown ,[email or web address]

## 2022-04-12 NOTE — ED PROVIDER NOTE - ATTENDING CONTRIBUTION TO CARE
61M pmh CVA w/resid RHP, NHL in remission, copd, htn, hl, dm, chronic HAs on topamax p/w HA x 1d. Started 2pm, gradual-onset, non-exertional, R sided aching/throbbing, no assoc sx. No vision changes, f/c, uri sx, cp/sob, nv, abd pain, new focal numbness or weakness. No falls or head trauma. Took tylenol 1 gm <4h ago w/min relief. Similar presentation to ED in Dec 2021, with possible incr RHP, stroke work-up was neg for acute CVA, was started on topamax @ that time. PCP Gopal / Neuro El Abdullahi.    PE:  nad  skin warm, dry  ncat  neck supple  rrr nl s1s2 no mrg  ctab no wrr  abd soft ntnd no palpable masses no rgr  back non-tender no cvat  ext no cce dpi  neuro aaox3, L eye weak lateral gaze (baseline per pt, h/o "lazy eye"), cn 2-12 otherwise intact bl no nystagmus or facial droop, 4/5 strength RUE/RLE (baseline per pt), 5/5 strength LUE/LLE no drift, gross sensation intact, finger-nose nl, gait nl, romberg neg

## 2022-04-13 ENCOUNTER — APPOINTMENT (OUTPATIENT)
Dept: CARDIOLOGY | Facility: CLINIC | Age: 62
End: 2022-04-13
Payer: MEDICARE

## 2022-04-13 ENCOUNTER — NON-APPOINTMENT (OUTPATIENT)
Age: 62
End: 2022-04-13

## 2022-04-13 PROCEDURE — 93296 REM INTERROG EVL PM/IDS: CPT

## 2022-04-13 PROCEDURE — 93294 REM INTERROG EVL PM/LDLS PM: CPT

## 2022-04-27 ENCOUNTER — APPOINTMENT (OUTPATIENT)
Dept: CARDIOLOGY | Facility: CLINIC | Age: 62
End: 2022-04-27
Payer: MEDICARE

## 2022-04-27 VITALS
TEMPERATURE: 98.2 F | SYSTOLIC BLOOD PRESSURE: 128 MMHG | HEART RATE: 78 BPM | BODY MASS INDEX: 27.09 KG/M2 | HEIGHT: 72 IN | WEIGHT: 200 LBS | DIASTOLIC BLOOD PRESSURE: 80 MMHG

## 2022-04-27 DIAGNOSIS — J43.2 CENTRILOBULAR EMPHYSEMA: ICD-10-CM

## 2022-04-27 PROCEDURE — 99214 OFFICE O/P EST MOD 30 MIN: CPT

## 2022-04-27 PROCEDURE — 93000 ELECTROCARDIOGRAM COMPLETE: CPT

## 2022-04-27 RX ORDER — BUDESONIDE AND FORMOTEROL FUMARATE DIHYDRATE 160; 4.5 UG/1; UG/1
160-4.5 AEROSOL RESPIRATORY (INHALATION) TWICE DAILY
Qty: 1 | Refills: 11 | Status: DISCONTINUED | COMMUNITY
Start: 2018-04-24 | End: 2022-04-27

## 2022-04-27 RX ORDER — AMLODIPINE BESYLATE 5 MG/1
5 TABLET ORAL DAILY
Refills: 0 | Status: DISCONTINUED | COMMUNITY
End: 2022-04-27

## 2022-04-27 NOTE — HISTORY OF PRESENT ILLNESS
[FreeTextEntry1] : Congenital heart disease unknown by the patient and his wife, s/p open heart surgery as an infant at [facility], no records are available.\par Patent coronaries on cardiac catheterization in 2019  LVEF=60%.\par PPM for complete heart block\par Poorly visualized RV and TV, on 2D echo Doppler performed in 11/18,\par Former cigarette smoker for 40 years\par \par Severe emphysema\par Pt. is disabled. He was diagnosed with stage IV CLL, on chemotherapy, followed by Dr. Rider\par Hypertension\par Hyperlipidemia\par Prior cardiac catheterization performed 10 years ago by elena reported as no coronary artery disease.\par Patient worked as a  for [employer] prior to becoming disabled.\par  [de-identified] : I had a pleasure of seeing Mr. GEE for follow-up consultation for pacemaker care. He was previously being followed by Dr. Muller. He is accompanied by his wife, who works at [employer].\par \par Mr. GEE is a 60 year-year old male with history of HTN, DM, Morbid obesity, CHD s/p corrective surgery at age of 3, CHB s/p DC-PPM (Bio, 15, dep), CLL, DL, COPD, ex-smoker, recent encephalopathy (01/21) is here for routine f-up.\par \par 11/10: Feels fine. No new c/o\par Denies chest pain, shortness of breath, palpitation, dizziness or LOC except noted above.\par \par EKG (11/10): SR w/\par EKG: SR@ 76/min, LBBB, QRSd 172 ms, WY: 176 ms\par TTE (04/21): EF 50%\par TTE (07/19): EF 45-50%, mild LAE, no defect on septum\par LHC (09/19): No occlusive CAD\par Cardio: Dr. Power\par \par

## 2022-04-27 NOTE — REASON FOR VISIT
[FreeTextEntry1] : Patient presents for a  follow up visit. He denies any complaints. His BP is well controlled.

## 2022-04-27 NOTE — PHYSICAL EXAM
[General Appearance - Well Developed] : well developed [Normal Appearance] : normal appearance [Well Groomed] : well groomed [General Appearance - Well Nourished] : well nourished [No Deformities] : no deformities [General Appearance - In No Acute Distress] : no acute distress [Heart Rate And Rhythm] : heart rate and rhythm were normal [Heart Sounds] : normal S1 and S2 [Murmurs] : no murmurs present [Arterial Pulses Normal] : the arterial pulses were normal [Respiration, Rhythm And Depth] : normal respiratory rhythm and effort [Exaggerated Use Of Accessory Muscles For Inspiration] : no accessory muscle use [Auscultation Breath Sounds / Voice Sounds] : lungs were clear to auscultation bilaterally [Clean] : clean [Dry] : dry [Well-Healed] : well-healed [Abdomen Soft] : soft [Abdomen Tenderness] : non-tender [Abdomen Mass (___ Cm)] : no abdominal mass palpated [Nail Clubbing] : no clubbing of the fingernails [Cyanosis, Localized] : no localized cyanosis [Petechial Hemorrhages (___cm)] : no petechial hemorrhages [] : no ischemic changes [Normal Conjunctiva] : the conjunctiva exhibited no abnormalities [Eyelids - No Xanthelasma] : the eyelids demonstrated no xanthelasmas [Normal Oropharynx] : normal oropharynx [Normal Jugular Venous A Waves Present] : normal jugular venous A waves present [Abnormal Walk] : normal gait [Skin Color & Pigmentation] : normal skin color and pigmentation [Skin Turgor] : normal skin turgor [No Anxiety] : not feeling anxious [FreeTextEntry1] : Median sternotomy

## 2022-04-27 NOTE — DISCUSSION/SUMMARY
[FreeTextEntry1] : Patient was instructed to target his T. Cholesterol to less than 200 mg/dl and LDL cholesterol to less than 100 mg/dl.\par he was advised to improve his LDL and non HDL parameters through exercise and a better diet.\par Exercise and weight loss was advised.\par Maintain cardiac medications. \par Patient was advised to repeat a BMP, CBC , fasting lipid profile and hepatic panel.\par Patient was taken off OAC by EP.\par RV in 6 months

## 2022-05-04 NOTE — OCCUPATIONAL THERAPY INITIAL EVALUATION ADULT - UPPER BODY DRESSING, PREVIOUS LEVEL OF FUNCTION, OT EVAL
[Normal] : soft, non-tender, non-distended, no masses palpated, no HSM and normal bowel sounds independent

## 2022-05-14 NOTE — ED ADULT NURSE NOTE - EXTENSIONS OF SELF_ADULT
AM off  Care Companion order discontinued  RTW   Exposure team aware of positive status  
Eyeglasses

## 2022-06-06 ENCOUNTER — EMERGENCY (EMERGENCY)
Facility: HOSPITAL | Age: 62
LOS: 0 days | Discharge: HOME | End: 2022-06-06
Attending: EMERGENCY MEDICINE | Admitting: EMERGENCY MEDICINE
Payer: MEDICARE

## 2022-06-06 ENCOUNTER — RX RENEWAL (OUTPATIENT)
Age: 62
End: 2022-06-06

## 2022-06-06 VITALS
SYSTOLIC BLOOD PRESSURE: 164 MMHG | DIASTOLIC BLOOD PRESSURE: 84 MMHG | RESPIRATION RATE: 18 BRPM | HEART RATE: 75 BPM | OXYGEN SATURATION: 97 % | TEMPERATURE: 98 F | HEIGHT: 72 IN | WEIGHT: 199.96 LBS

## 2022-06-06 DIAGNOSIS — Z79.82 LONG TERM (CURRENT) USE OF ASPIRIN: ICD-10-CM

## 2022-06-06 DIAGNOSIS — Z95.0 PRESENCE OF CARDIAC PACEMAKER: Chronic | ICD-10-CM

## 2022-06-06 DIAGNOSIS — K03.81 CRACKED TOOTH: ICD-10-CM

## 2022-06-06 DIAGNOSIS — K08.89 OTHER SPECIFIED DISORDERS OF TEETH AND SUPPORTING STRUCTURES: ICD-10-CM

## 2022-06-06 DIAGNOSIS — Z85.72 PERSONAL HISTORY OF NON-HODGKIN LYMPHOMAS: ICD-10-CM

## 2022-06-06 DIAGNOSIS — E78.00 PURE HYPERCHOLESTEROLEMIA, UNSPECIFIED: ICD-10-CM

## 2022-06-06 DIAGNOSIS — Z95.0 PRESENCE OF CARDIAC PACEMAKER: ICD-10-CM

## 2022-06-06 DIAGNOSIS — E78.5 HYPERLIPIDEMIA, UNSPECIFIED: ICD-10-CM

## 2022-06-06 DIAGNOSIS — I10 ESSENTIAL (PRIMARY) HYPERTENSION: ICD-10-CM

## 2022-06-06 DIAGNOSIS — Z79.84 LONG TERM (CURRENT) USE OF ORAL HYPOGLYCEMIC DRUGS: ICD-10-CM

## 2022-06-06 DIAGNOSIS — E11.9 TYPE 2 DIABETES MELLITUS WITHOUT COMPLICATIONS: ICD-10-CM

## 2022-06-06 DIAGNOSIS — Z98.890 OTHER SPECIFIED POSTPROCEDURAL STATES: Chronic | ICD-10-CM

## 2022-06-06 DIAGNOSIS — J44.9 CHRONIC OBSTRUCTIVE PULMONARY DISEASE, UNSPECIFIED: ICD-10-CM

## 2022-06-06 PROCEDURE — 99284 EMERGENCY DEPT VISIT MOD MDM: CPT

## 2022-06-06 NOTE — ED PROVIDER NOTE - NSCAREINITIATED _GEN_ER
Bertin Granados(Resident) The patient is a 69y Female complaining of  The patient is a 69-year-old woman presenting with concern for symptomatic anemia, to CDU for therapeutic intensity.

## 2022-06-06 NOTE — ED PROVIDER NOTE - PHYSICAL EXAMINATION
CONSTITUTIONAL: Well-developed; well-nourished; in no acute distress.   SKIN: warm, dry  HEAD: Normocephalic; atraumatic.  EYES: no conjunctival injection.   ENT: No nasal discharge; airway clear. + chipped tooth #9.  no muffled or hot potatoe voice, no rash, no facial swelling, mmm, pain to palpation to tooth #  with no surrounding fluctuance/induration, no halitosis, no trismus, no drooling/secretions, no pain to floor of mouth, no elevation to floor of mouth, no oropharygeal edema, uvula midline, no PTA, no neck swelling- supple, non-tender, no atnerior neck pain, RRR, radial pulses 2/4 b/l, ctabl w/ breath sounds present b/l, no wheezing or crackles, good air exchange, good respiratory effort, no accessory muscle use, no tachypnea, no stridor,  AAOx3. CN II-XII intact, no facial droop or slurring of speech. No focal deficits.

## 2022-06-06 NOTE — CONSULT NOTE ADULT - SUBJECTIVE AND OBJECTIVE BOX
Patient is a 61y old  Male who presents with a chief complaint of broken tooth and toothache.    HPI: Patient stated that the tooth broke last night while he was eating and he started having pain in that area.       PAST MEDICAL & SURGICAL HISTORY:  HTN (hypertension)  High cholesterol  DM (diabetes mellitus)   Chronic obstructive pulmonary disease, unspecified COPD type  Non Hodgkin lymphoma (in remission)  Pacemaker  Artificial cardiac pacemaker  S/P transesophageal echocardiogram (ANITA)    ( -  ) heart valve replacement  ( -  ) joint replacement  ( -  ) pregnancy    MEDICATIONS  (STANDING):  Aspirin 325 mg twice a day  Atorvastatin 40 mg once a day  Trulicity 0.25 mg / 0.5 ml once a day  Topiramate 25 mg twice a day  Synjardy 12.5-1000 mg twice a day  Pioglizone 30 mg once a day  Breztri 2 puffs    MEDICATIONS  (PRN):      Allergies  acyclovir (Anaphylaxis)  Bactrim (Anaphylaxis)      FAMILY HISTORY:  Family history of breast cancer (Sibling)  in sister at the age of 59    Family history of cancer (Mother)    *SOCIAL HISTORY: ( -  ) Tobacco; ( -  ) ETOH    *Last Dental Visit: 2020    Vital Signs Last 24 Hrs  T(C): 36.4 (06 Jun 2022 08:38), Max: 36.4 (06 Jun 2022 08:38)  T(F): 97.6 (06 Jun 2022 08:38), Max: 97.6 (06 Jun 2022 08:38)  HR: 75 (06 Jun 2022 08:38) (75 - 75)  BP: 164/84 (06 Jun 2022 08:38) (164/84 - 164/84)  BP(mean): --  RR: 18 (06 Jun 2022 08:38) (18 - 18)  SpO2: 97% (06 Jun 2022 08:38) (97% - 97%)    EOE:  TMJ ( -  ) clicks                     ( -  ) pops                     ( -  ) crepitus             Mandible <<FROM>>             Facial bones and MOM <<grossly intact>>             ( -  ) trismus             ( -  ) lymphadenopathy             ( -  ) swelling             ( -  ) asymmetry             ( -  ) palpation             ( -  ) dyspnea             ( -  ) dysphagia             ( -  ) loss of consciousness    IOE:  <<permanent>> dentition:  <<multiple missing teeth>>           hard/soft palate:  ( -  ) palatal torus, <<No pathology noted>>           tongue/FOM <<No pathology noted>>           labial/buccal mucosa <<No pathology noted>>           ( +  ) percussion: #10           ( +  ) palpation: buccal area of #10           ( -  ) swelling            ( -  ) abscess           ( -  ) sinus tract    *DENTAL RADIOGRAPHS: Periapical of #10. Decayed into pulp, non-restorable. Periodontal bone loss    *ASSESSMENT: Patient is a 61y old  Male who presents with a chief complaint of broken tooth and toothache. Patient stated that the tooth broke last night while he was eating and he started having pain in that area. Patient was seen in 2020 but missed multiple appointments due to stroke. Patient's physician provided documentation proving patient's medical condition at the time. Unable to find patient's lab case for immediate maxillary flipper made in 2020. Informed patient that he will need to call clinic to make exam appointment for comprehensive exam and cleaning. Patient understands and agrees. Recommend extraction of #10 today, patient agrees.    *PLAN: Extraction of #10 under local anesthesia. Patient to call and make exam appointment for comprehensive exam.    PROCEDURE: Extraction of #10 under local anesthesia  Verbal and written consent given.  Anesthesia: 4% septocaine with 1:100,000 epinephrine  Treatment: Risk and benefit discussed with patient as per OS sheet dated 7/13/2000. Consent signed. Side/site verified. Topical applied. Local infiltration with 1.5 carpules of 4% septocaine with 1:100,000 epinephrine. Throat screen placed. #10 extracted with elevator and forceps. Socket curetted and irrigated. Post-op radiograph taken. Hemostasis achieved. Post-op instruction given.    RECOMMENDATIONS:  1) Recommended over the counter pain medication as needed for pain.  2) Dental F/U with [facility] dental clinic for comprehensive exam.   3) If any difficulty swallowing/breathing, fever occur, return to ER.     Kacey Lee DDS, pager #[phone]

## 2022-06-06 NOTE — ED PROVIDER NOTE - NS ED ROS FT
Eyes:  No visual changes, eye pain or discharge.  ENMT:  No hearing changes, pain, discharge or infections. +dental pain.   Skin:  No skin rash.   Endocrine: No history of thyroid disease or diabetes.

## 2022-07-14 ENCOUNTER — APPOINTMENT (OUTPATIENT)
Dept: CARDIOLOGY | Facility: CLINIC | Age: 62
End: 2022-07-14

## 2022-07-14 ENCOUNTER — NON-APPOINTMENT (OUTPATIENT)
Age: 62
End: 2022-07-14

## 2022-07-14 PROCEDURE — 93296 REM INTERROG EVL PM/IDS: CPT

## 2022-07-14 PROCEDURE — 93294 REM INTERROG EVL PM/LDLS PM: CPT

## 2022-07-14 NOTE — ED PROVIDER NOTE - NSFOLLOWUPINSTRUCTIONS_ED_ALL_ED_FT

## 2022-07-14 NOTE — PATIENT PROFILE ADULT - DOES PATIENT HAVE ADVANCE DIRECTIVE
What Type Of Note Output Would You Prefer (Optional)?: Standard Output How Severe Is Your Rash?: moderate Is This A New Presentation, Or A Follow-Up?: Rash No

## 2022-08-10 ENCOUNTER — APPOINTMENT (OUTPATIENT)
Dept: NEUROLOGY | Facility: CLINIC | Age: 62
End: 2022-08-10

## 2022-08-10 VITALS
OXYGEN SATURATION: 98 % | WEIGHT: 200 LBS | DIASTOLIC BLOOD PRESSURE: 84 MMHG | SYSTOLIC BLOOD PRESSURE: 129 MMHG | HEART RATE: 79 BPM | BODY MASS INDEX: 27.09 KG/M2 | HEIGHT: 72 IN

## 2022-08-10 PROCEDURE — 99214 OFFICE O/P EST MOD 30 MIN: CPT

## 2022-08-10 RX ORDER — IPRATROPIUM BROMIDE AND ALBUTEROL SULFATE 2.5; .5 MG/3ML; MG/3ML
0.5-2.5 (3) SOLUTION RESPIRATORY (INHALATION)
Qty: 1 | Refills: 5 | Status: DISCONTINUED | COMMUNITY
Start: 2018-08-14 | End: 2022-08-10

## 2022-08-10 NOTE — ASSESSMENT
[FreeTextEntry1] : Patient is 61 yo man, born premature at 1lb 7oz, with PMHx of ?stroke aborted by TPA in 7/2020 with residual R hemiparesis as well as hypoesthesia (all work up negative) and episode of unresponsiveness in 1/2021 for which all work up was also negative. He had been following with Dr. 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 He presents today as HFU from 12/11/21 for CAMARENA. Dr. Myles Fernando had thought CAMARENA was related to neck issues (Mild-moderate spinal stenosis at C5-6 and C6-7, mild spinal stenosis C4-5. moderate foraminal stenosis at C6-7) but PT was not helpful. This admission, he was started on Topamax 25 QHS, which he says has not been helpful and we will increase to BID. The HAs are not classic for migraines with exception of ?aura w/ white specks prior to HA sometimes. (ANITA neg for PFO and Pacemaker shows no events). Exam today shows NIHSS 3 for RUE/RLE drift (w/pain) and R-sided hypoesthesia.\par \par PLAN: \par -C/w ASA 81 and Atorvastatin 80\par -Continue with Topamax 25 BID \par -Mg Oxide 400 QD and B2 400 QD \par -Check A1c and lipid panel  \par -Follow up in 6 months

## 2022-08-10 NOTE — HISTORY OF PRESENT ILLNESS
[FreeTextEntry1] : Patient is 60 yo man, born premature at 1lb 7oz, with PMHx of ?stroke aborted by TPA in 7/2020 when he p/w expressive aphasia and R hemiparesis, was given TPA and had negative MRI/CTA H/N and vEEG afterwards, and another episode of unresponsiveness in 1/2021 (also w/ R sided weakness/hypotension) and stroke/seizure work up was negative again. 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 He had been following with Dr. 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  \par \par He presents today for f/u HA that has been presents for about 6 months that was 5/10 in severity, but was 10/10 PTA. Since the last visit he had no headaches up until last week.  HE has lost weight and has been exercising regularly.  He has been taking topamax 25mg BID with no side effects\par \par MR C-spine from 2020, the latest one reported Mild-moderate spinal stenosis at C5-6 and C6-7 without cord compression. Additional mild spinal stenosis C4-5. Moderate foraminal stenosis at C6-7. \par

## 2022-08-17 ENCOUNTER — OUTPATIENT (OUTPATIENT)
Dept: OUTPATIENT SERVICES | Facility: HOSPITAL | Age: 62
LOS: 1 days | Discharge: HOME | End: 2022-08-17

## 2022-08-17 DIAGNOSIS — Z95.0 PRESENCE OF CARDIAC PACEMAKER: Chronic | ICD-10-CM

## 2022-08-17 DIAGNOSIS — Z98.890 OTHER SPECIFIED POSTPROCEDURAL STATES: Chronic | ICD-10-CM

## 2022-08-21 ENCOUNTER — NON-APPOINTMENT (OUTPATIENT)
Age: 62
End: 2022-08-21

## 2022-09-21 ENCOUNTER — APPOINTMENT (OUTPATIENT)
Dept: ENDOCRINOLOGY | Facility: CLINIC | Age: 62
End: 2022-09-21

## 2022-09-21 ENCOUNTER — OUTPATIENT (OUTPATIENT)
Dept: OUTPATIENT SERVICES | Facility: HOSPITAL | Age: 62
LOS: 1 days | Discharge: HOME | End: 2022-09-21

## 2022-09-21 VITALS
HEART RATE: 80 BPM | OXYGEN SATURATION: 98 % | SYSTOLIC BLOOD PRESSURE: 138 MMHG | BODY MASS INDEX: 26.41 KG/M2 | HEIGHT: 72 IN | WEIGHT: 195 LBS | DIASTOLIC BLOOD PRESSURE: 85 MMHG

## 2022-09-21 DIAGNOSIS — Z98.890 OTHER SPECIFIED POSTPROCEDURAL STATES: Chronic | ICD-10-CM

## 2022-09-21 DIAGNOSIS — E66.01 MORBID (SEVERE) OBESITY DUE TO EXCESS CALORIES: ICD-10-CM

## 2022-09-21 DIAGNOSIS — Z95.0 PRESENCE OF CARDIAC PACEMAKER: Chronic | ICD-10-CM

## 2022-09-23 NOTE — ASSESSMENT
[FreeTextEntry1] : 62M p/w f/u for type 2 DM \par \par #controlled type 2 DM \par - A1c 6.5% currently on synjardy 12.5/1000mg bid , pioglitazone 30 mg , Trulicity 3 mg Q week \par - atorvastatin 80 mg daily (LDL 52 in July 2022)\par - following with opthalmology and podiatry, no medications SE\par - continue same regimen for now

## 2022-09-23 NOTE — HISTORY OF PRESENT ILLNESS
[FreeTextEntry1] : 62 year old male who present for follow up type 2 DM \par \par Current regimen :\par synjardy 12.5/1000mg bid \par pioglitazone 30 mg \par Trulicity 3 mg Q week \par atorvastatin 80 mg daily \par \par A1c 6.5% and LDL 52 in July 2022 per HIE\par \par following with opthalmology and podiatry, no medications SE

## 2022-10-13 ENCOUNTER — APPOINTMENT (OUTPATIENT)
Dept: CARDIOLOGY | Facility: CLINIC | Age: 62
End: 2022-10-13

## 2022-10-13 ENCOUNTER — NON-APPOINTMENT (OUTPATIENT)
Age: 62
End: 2022-10-13

## 2022-10-13 PROCEDURE — 93294 REM INTERROG EVL PM/LDLS PM: CPT

## 2022-10-13 PROCEDURE — 93296 REM INTERROG EVL PM/IDS: CPT

## 2022-10-26 ENCOUNTER — APPOINTMENT (OUTPATIENT)
Dept: CARDIOLOGY | Facility: CLINIC | Age: 62
End: 2022-10-26

## 2022-10-26 VITALS
HEART RATE: 72 BPM | DIASTOLIC BLOOD PRESSURE: 82 MMHG | SYSTOLIC BLOOD PRESSURE: 120 MMHG | WEIGHT: 200 LBS | HEIGHT: 72 IN | TEMPERATURE: 98.4 F | BODY MASS INDEX: 27.09 KG/M2

## 2022-10-26 PROCEDURE — 93000 ELECTROCARDIOGRAM COMPLETE: CPT

## 2022-10-26 PROCEDURE — 99214 OFFICE O/P EST MOD 30 MIN: CPT | Mod: 25

## 2022-10-26 RX ORDER — RIBOFLAVIN (VITAMIN B2) 400 MG
400 TABLET ORAL
Qty: 90 | Refills: 2 | Status: DISCONTINUED | COMMUNITY
Start: 2022-02-02 | End: 2022-10-26

## 2022-10-26 RX ORDER — MAGNESIUM OXIDE 241.3 MG/1000MG
400 TABLET ORAL DAILY
Qty: 90 | Refills: 2 | Status: DISCONTINUED | COMMUNITY
Start: 2022-02-02 | End: 2022-10-26

## 2022-10-26 NOTE — ASSESSMENT
[FreeTextEntry1] : Congenital heart disease\par Patent coronaries documented on cardiac catheterization.\par Severe emphysema\par PPM\par Hypertension \par CLL stage 4 on chemotherapy\par Hyperlipidemia \par TIA\par Encephalopathy No

## 2022-10-26 NOTE — HISTORY OF PRESENT ILLNESS
[FreeTextEntry1] : Congenital heart disease unknown by the patient and his wife, s/p open heart surgery as an infant at [facility], no records are available.\par Patent coronaries on cardiac catheterization in 2019  LVEF=60%.\par PPM for complete heart block\par Poorly visualized RV and TV, on 2D echo Doppler performed in 11/18,\par Former cigarette smoker for 40 years\par \par Severe emphysema\par Pt. is disabled. He was diagnosed with stage IV CLL, on chemotherapy, followed by Dr. Rider\par Hypertension\par Hyperlipidemia\par Prior cardiac catheterization performed 10 years ago by elena reported as no coronary artery disease.\par Patient worked as a  for [employer] prior to becoming disabled.\par  [de-identified] : I had a pleasure of seeing Mr. GEE for follow-up consultation for pacemaker care. He was previously being followed by Dr. Muller. He is accompanied by his wife, who works at [employer].\par \par Mr. GEE is a 60 year-year old male with history of HTN, DM, Morbid obesity, CHD s/p corrective surgery at age of 3, CHB s/p DC-PPM (Bio, 15, dep), CLL, DL, COPD, ex-smoker, recent encephalopathy (01/21) is here for routine f-up.\par \par 11/10: Feels fine. No new c/o\par Denies chest pain, shortness of breath, palpitation, dizziness or LOC except noted above.\par \par EKG (11/10): SR w/\par EKG: SR@ 76/min, LBBB, QRSd 172 ms, WV: 176 ms\par TTE (04/21): EF 50%\par TTE (07/19): EF 45-50%, mild LAE, no defect on septum\par LHC (09/19): No occlusive CAD\par Cardio: Dr. Power\par \par

## 2022-10-26 NOTE — DISCUSSION/SUMMARY
[FreeTextEntry1] : Patient was instructed to target his T. Cholesterol to less than 200 mg/dl and LDL cholesterol to less than 100 mg/dl.\par he was advised on his recent lab results. He is at target goal with respect to his lipid levels.\par Exercise and weight loss was advised.\par Maintain cardiac medications. \par Patient was advised to repeat a BMP, CBC , fasting lipid profile and hepatic panel.\par Patient was taken off OAC by EP.\par RV in 6 months

## 2022-10-26 NOTE — REASON FOR VISIT
[FreeTextEntry1] : Patient presents for a  follow up visit. He denies any complaints. His BP is well controlled. He is here to review his lab results as well.

## 2022-10-26 NOTE — ED ADULT NURSE NOTE - NSFALLRSKOUTCOME_ED_ALL_ED
Nutrition Assessment   Assessment Type: Initial assessment  Reason for Visit: MST  Referral Requested By:Nurse  Chart Medications Lab Results Reviewed: yes    Nutritional Risk Factors: Unintentional weight loss and Poor PO prior to admission    Current Diet Order: Regular  Diet Tolerance: Tolerating   Food Allergies: no  Priority Points: Status 2    Demographic/Anthropometrics Information  Gender: male   Patient Age: 46 year old  Height: Height: 6' (182.9 cm)  Weight: Weight: 67 kg  BMI: Body mass index is 20.03 kg/m².    Usual Body Weight: 75kg  % Weight change: -10.6 % x 1 year  Reason for Weight Change: Decreased intake    Physical Appearance: Muscle wasting  Weight Classification: Normal weight (BMI 18.5-24.9)    Nutrition Diagnosis (PES)  Inadequate oral intake related to Alcohol/substance abuse as evidenced by Documented/reported poor oral intake    Nutrition Plan  Recommended Nutrition Intervention: Coordination of nutrition care by a nutrition professional, Meals and snacks  and nutrition supplemental therapy  Monitor: Biochemical data, medical tests, procedures, Food and beverage intake and Weight    Discharge Needs: Supplements  Care Plan Discussed With: Patient  Goals: Maintain or improve weight and Tolerate oral diet  Goal Progress: Initiated  Timeframe to Achieve Goal: Ongoing    Dietitian Notes/Impressions/Recommendations:  10/26/22: Pt admitted due to ETOH detox. Hx of ETOH abuse, depression, GERD. Pt reports inconsistent intake, depending on the day. Noted drinking ~12 beers daily, likely displacing food intake. Agreeable to Ensure, strawberry flavor. Receiving NS at 125 ml/hr.   Weight Hx: weight loss not significant within timeframe  NFPE: mild muscle wasting       TREATMENT PLAN: Monitoring & Interventions   1. Regular   2. Start Ensure Plus HP BID       Malnutrition Status: Pt with Chronic Moderate Protein Calorie Malnutrition as evidenced by <75% of estimated requirements for >/= to 1 month,  mild muscle mass depletion.         Universal Safety Interventions

## 2022-11-16 ENCOUNTER — APPOINTMENT (OUTPATIENT)
Dept: CARDIOLOGY | Facility: CLINIC | Age: 62
End: 2022-11-16

## 2022-12-12 ENCOUNTER — OUTPATIENT (OUTPATIENT)
Dept: OUTPATIENT SERVICES | Facility: HOSPITAL | Age: 62
LOS: 1 days | Discharge: HOME | End: 2022-12-12

## 2022-12-12 DIAGNOSIS — Z95.0 PRESENCE OF CARDIAC PACEMAKER: Chronic | ICD-10-CM

## 2022-12-12 DIAGNOSIS — Z98.890 OTHER SPECIFIED POSTPROCEDURAL STATES: Chronic | ICD-10-CM

## 2022-12-12 DIAGNOSIS — M25.512 PAIN IN LEFT SHOULDER: ICD-10-CM

## 2022-12-12 PROCEDURE — 73030 X-RAY EXAM OF SHOULDER: CPT | Mod: 26,LT

## 2022-12-13 ENCOUNTER — OUTPATIENT (OUTPATIENT)
Dept: OUTPATIENT SERVICES | Facility: HOSPITAL | Age: 62
LOS: 1 days | Discharge: HOME | End: 2022-12-13

## 2022-12-13 DIAGNOSIS — R06.02 SHORTNESS OF BREATH: ICD-10-CM

## 2022-12-13 DIAGNOSIS — Z98.890 OTHER SPECIFIED POSTPROCEDURAL STATES: Chronic | ICD-10-CM

## 2022-12-13 DIAGNOSIS — Z95.0 PRESENCE OF CARDIAC PACEMAKER: Chronic | ICD-10-CM

## 2022-12-13 PROCEDURE — 71250 CT THORAX DX C-: CPT | Mod: 26

## 2022-12-24 NOTE — ED ADULT NURSE NOTE - NSSEPSISNEWALTERMENTAL_ED_A_ED
Problem: Plan of Care - These are the overarching goals to be used throughout the patient stay.    Goal: Optimal Comfort and Wellbeing  Outcome: Progressing     Problem: Pain Acute  Goal: Optimal Pain Control and Function  12/24/2022 1619 by Felicia Freeman RN  Outcome: Progressing  12/24/2022 1424 by Felicia Freeman RN  Outcome: Progressing  Intervention: Prevent or Manage Pain  Recent Flowsheet Documentation  Taken 12/24/2022 0927 by Felicia Freeman RN  Sensory Stimulation Regulation: lighting decreased  Medication Review/Management: medications reviewed     Problem: Risk for Delirium  Goal: Improved Attention and Thought Clarity  12/24/2022 1619 by Felicia Freeman RN  Outcome: Progressing  12/24/2022 1424 by Felicia Freeman RN  Outcome: Progressing  Intervention: Maximize Cognitive Function  Recent Flowsheet Documentation  Taken 12/24/2022 1531 by Felicia Freeman RN  Reorientation Measures: clock in view  Taken 12/24/2022 0927 by Felicia Freeman RN  Sensory Stimulation Regulation: lighting decreased  Reorientation Measures: clock in view   Goal Outcome Evaluation:         Patient alert and orientated, no complaints of pain, down at 1545 for surgery.                 No

## 2023-02-01 ENCOUNTER — NON-APPOINTMENT (OUTPATIENT)
Age: 63
End: 2023-02-01

## 2023-02-15 ENCOUNTER — APPOINTMENT (OUTPATIENT)
Dept: ELECTROPHYSIOLOGY | Facility: CLINIC | Age: 63
End: 2023-02-15
Payer: MEDICARE

## 2023-02-15 VITALS
SYSTOLIC BLOOD PRESSURE: 120 MMHG | BODY MASS INDEX: 26.82 KG/M2 | HEIGHT: 72 IN | DIASTOLIC BLOOD PRESSURE: 78 MMHG | HEART RATE: 76 BPM | WEIGHT: 198 LBS | OXYGEN SATURATION: 98 %

## 2023-02-15 PROCEDURE — 99214 OFFICE O/P EST MOD 30 MIN: CPT | Mod: 25

## 2023-02-15 PROCEDURE — 93283 PRGRMG EVAL IMPLANTABLE DFB: CPT

## 2023-02-15 PROCEDURE — 93000 ELECTROCARDIOGRAM COMPLETE: CPT | Mod: 59

## 2023-02-15 PROCEDURE — 93290 INTERROG DEV EVAL ICPMS IP: CPT | Mod: 26

## 2023-02-15 RX ORDER — AZITHROMYCIN 250 MG/1
250 TABLET, FILM COATED ORAL
Qty: 6 | Refills: 0 | Status: DISCONTINUED | COMMUNITY
Start: 2022-08-22 | End: 2023-02-15

## 2023-02-16 NOTE — HISTORY OF PRESENT ILLNESS
[SOB] : dyspnea [de-identified] : The patient noted the following symptom(s):. I had a pleasure of seeing Mr. GEE for follow-up consultation for pacemaker care. He was previously being followed by Dr. Muller. He is accompanied by his wife, who works at [employer] [place].\par \par Mr. GEE is a 60 year-year old male with history of HTN, DM, Morbid obesity, CHD s/p corrective surgery at age of 3, CHB s/p DC-PPM (Bio, 15, dep), CLL, DL, COPD, ex-smoker, recent encephalopathy (01/21) is here for routine f-up.\par \par 11/10: Feels fine. No new c/o\par 2/15/23:  Feels fine. Complains of shortness of breath on certain activities like bending. Overall feels tired.\par Denies chest pain, shortness of breath, palpitation, dizziness or LOC except noted above.\par \par EKG (2/15/23) SRVP.\par EKG (11/10): SR w/\par EKG: SR@ 76/min, LBBB, QRSd 172 ms, WA: 176 ms\par TTE (04/21): EF 50%\par TTE (07/19): EF 45-50%, mild LAE, no defect on septum\par LHC (09/19): No occlusive CAD\par Cardio: Dr. Power\par

## 2023-02-16 NOTE — ASSESSMENT
[FreeTextEntry1] : ## CHB s/p DC-PPM (Bio, dep, 15)\par ## Nonischemic cardiomyopathy \par ##Dyspnea\par \par - PPM interrogation shows normally functioning DC-PPM. Battery life 4 years. No new events. No evidence of AF\par - TTE showed stable EF 50%. We will consider CRT upgrade if further reduction in EF or symptoms of HF develop.\par - Echo\par - Cardiology follow-up \par - Remote Monitoring\par - Return in 6 months\par

## 2023-02-16 NOTE — PROCEDURE
[Complete Heart Block] : complete heart block [Pacemaker] : pacemaker [Magnet Rate: ___ Ppm] : magnet rate was [unfilled] Ppm [Longevity: ___ months] : The estimated remaining battery life is [unfilled] months [Normal] : The battery status is normal. [Atrial] : Atrial [Ventricular] : Ventricular [Lead Imp:  ___ohms] : lead impedance was [unfilled] ohms [Sensing Amplitude ___mv] : sensing amplitude was [unfilled] mv [___V @] : [unfilled] V [___ ms] : [unfilled] ms [Counters Reset] : the counters were reset [Asense-Vsense ___ %] : Asense-Vsense [unfilled]% [Asense-Vpace ___ %] : Asense-Vpace [unfilled]% [Apace-Vsense ___ %] : Apace-Vsense [unfilled]% [Apace-Vpace ___ %] : Apace-Vpace [unfilled]% [de-identified] : [employer] [de-identified] : Jumana BABCOCK [de-identified] : [id number] [de-identified] : 04/14/2015 [de-identified] : DDD-CLS [de-identified] : 35% Remaining [de-identified] : Pt. is SOB on exertion, appropriate HR histogram, LR set to 50 bpm, LR to 60 bpm.  [de-identified] : Normal Device Function.\par Thoracic Impedance 76 ohms, stable.

## 2023-02-16 NOTE — ADDENDUM
[FreeTextEntry1] : I, Suma Ventura assisted in documentation on 02/15/2023 acting as a scribe for Dr. Charlie Monique.\par

## 2023-02-17 ENCOUNTER — NON-APPOINTMENT (OUTPATIENT)
Age: 63
End: 2023-02-17

## 2023-02-27 ENCOUNTER — APPOINTMENT (OUTPATIENT)
Dept: NEUROLOGY | Facility: CLINIC | Age: 63
End: 2023-02-27

## 2023-04-13 ENCOUNTER — OUTPATIENT (OUTPATIENT)
Dept: OUTPATIENT SERVICES | Facility: HOSPITAL | Age: 63
LOS: 1 days | End: 2023-04-13
Payer: MEDICARE

## 2023-04-13 ENCOUNTER — LABORATORY RESULT (OUTPATIENT)
Age: 63
End: 2023-04-13

## 2023-04-13 ENCOUNTER — APPOINTMENT (OUTPATIENT)
Dept: HEMATOLOGY ONCOLOGY | Facility: CLINIC | Age: 63
End: 2023-04-13
Payer: MEDICARE

## 2023-04-13 VITALS
HEIGHT: 72 IN | HEART RATE: 82 BPM | BODY MASS INDEX: 26.68 KG/M2 | SYSTOLIC BLOOD PRESSURE: 145 MMHG | OXYGEN SATURATION: 99 % | TEMPERATURE: 97.2 F | DIASTOLIC BLOOD PRESSURE: 65 MMHG | WEIGHT: 197 LBS | RESPIRATION RATE: 16 BRPM

## 2023-04-13 DIAGNOSIS — C85.90 NON-HODGKIN LYMPHOMA, UNSPECIFIED, UNSPECIFIED SITE: ICD-10-CM

## 2023-04-13 DIAGNOSIS — Z95.0 PRESENCE OF CARDIAC PACEMAKER: Chronic | ICD-10-CM

## 2023-04-13 DIAGNOSIS — Z98.890 OTHER SPECIFIED POSTPROCEDURAL STATES: Chronic | ICD-10-CM

## 2023-04-13 PROCEDURE — 85027 COMPLETE CBC AUTOMATED: CPT

## 2023-04-13 PROCEDURE — 99213 OFFICE O/P EST LOW 20 MIN: CPT

## 2023-04-13 PROCEDURE — 36415 COLL VENOUS BLD VENIPUNCTURE: CPT

## 2023-04-13 PROCEDURE — 83615 LACTATE (LD) (LDH) ENZYME: CPT

## 2023-04-13 PROCEDURE — 80053 COMPREHEN METABOLIC PANEL: CPT

## 2023-04-14 DIAGNOSIS — C85.90 NON-HODGKIN LYMPHOMA, UNSPECIFIED, UNSPECIFIED SITE: ICD-10-CM

## 2023-04-14 LAB
ALBUMIN SERPL ELPH-MCNC: 4.6 G/DL
ALP BLD-CCNC: 70 U/L
ALT SERPL-CCNC: 11 U/L
ANION GAP SERPL CALC-SCNC: 11 MMOL/L
AST SERPL-CCNC: 15 U/L
BILIRUB SERPL-MCNC: 1.3 MG/DL
BUN SERPL-MCNC: 26 MG/DL
CALCIUM SERPL-MCNC: 9.5 MG/DL
CHLORIDE SERPL-SCNC: 108 MMOL/L
CO2 SERPL-SCNC: 20 MMOL/L
CREAT SERPL-MCNC: 1.4 MG/DL
EGFR: 57 ML/MIN/1.73M2
GLUCOSE SERPL-MCNC: 198 MG/DL
HCT VFR BLD CALC: 42.5 %
HGB BLD-MCNC: 13.7 G/DL
LDH SERPL-CCNC: 169 U/L
MCHC RBC-ENTMCNC: 28 PG
MCHC RBC-ENTMCNC: 32.2 G/DL
MCV RBC AUTO: 86.7 FL
PLATELET # BLD AUTO: 221 K/UL
PMV BLD: 10.8 FL
POTASSIUM SERPL-SCNC: 4.3 MMOL/L
PROT SERPL-MCNC: 7.2 G/DL
RBC # BLD: 4.9 M/UL
RBC # FLD: 12.9 %
SODIUM SERPL-SCNC: 139 MMOL/L
WBC # FLD AUTO: 6.76 K/UL

## 2023-04-16 NOTE — ASSESSMENT
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 Last Rituxan May 2020  \par \par S/P stroke , mild right sided weakness. \par right neck pain , rule out recurrent lymphoma .\par \par PLAN:\par cbc , cmp , LDH\par PET scan .

## 2023-04-16 NOTE — HISTORY OF PRESENT ILLNESS
[de-identified] : 57 year old obese male with PMH of COPD , diabetes, PPP for syncope. Patient went to the emergency room on February 4, 2018 with complaints of a swollen lymph node on right side of neck, which he had noticed several months prior to presentation. Lymph node was originally not painful but became painful about one week prior to presentation, which prompted him to go to the emergency room. No fevers, night sweats, anorexia or significant changes in weight. In the ED, patient underwent CT neck soft tissue, chest, abdomen and pelvis, which found multiple areas of LAD,Right-sided enlarged level 5 cervical chain lymph nodes measuring up to  1.9 x 1.7 cm (series 9 image 193). Enlarged right supraclavicular lymph  nodes measuring up to 1.6 x 1 cm(series 9 image 214).   in the R supraclavicular lymph node, R axillary lymph node,  Enlarged mesenteric lymph nodes measuring up to 1.3  x 1 cm (series 2 image 92)., There was no evidence of mediastinal or hilar LAD at that time. However, patient is not certain if he took prednisone prior to imaging. As per wife, patient takes short-course prednisone tapers every few months secondary to bronchitis. OTTONIEL plummer is a former 1 PPD smoker for 30-40 years who quit about five months prior to presentation and denies any previous history of asbestosis exposure. He worked as a . He has no pets at home. Biopsy of right cervical lymph node by ENT  Low grade B-cell lymphoma, consistent with nisreen marginal zone lymphoma.  the neoplastic cells are CD20+ CD79a+ PAX5+ B-cells that co-express BCL2. They are negative for CD5, CD10, BCL6, cyclinD1, and CD43. The proliferation index (Ki67 labeling) ranges from 5 to 20% in different areas excluding the germinal centers, flow cytometry studies performed at [facility] show monotypic B-cells (38% of cells), positive for kappa, CD19, CD20, FMC-7, CD23, minimal CD10; negative for CD5. Viability is 78%.   *** In summary, the findings are diagnostic of low grade B-cell lymphoma, the features are consistent with nisreen marginal zone lymphoma.     2 , peripheral blood flow showed - A MINUTE MONOCLONAL, CD10+ B-CELL POPULATION IS OBSERVED (0.14%). kappa and CD 20 bright . Lab work showed normal CBC , Hb A1c 9, negative HIV , Hep c and SPEP .    \par Since surgery 2 weeks ago he noted marked increase in painful  nisreen masses in the neck , supraclavicular and right axillae , he takes percocet to sleep with partial relief  , PET showed generalized adenopathy with maximum SUV 9 . No bone or marrow uptake. He complains of dyspnea on exertion , mild cough , improved with short course of prednisone, he lost few lbs and denies fever or night sweats. \par No history of EGD or colonoscopy . \par  [de-identified] : 08/27/2018 : Patient returns for follow up after BR X 3 with near complete response. Treatment was held due to worsening complaints of weakness, exertional dyspnea. RUQ pain . He followed with pulmonary and was placed on inhaled bronchodilators , He had MUGA scan and may require a second pacemaker lead. He denies any B symptoms or any new lumps or adenopathy . \par \par 10/23/2018 Patient returns for next dose of rituxan , He denies any new complaints . He was seen by GI for right sided abdominal tenderness, felt to be muscular in origin , CT scan apparently shows abdominal hernias probably unrelated to his complaint . He is scheduled for surveillance colonoscopy . He denies any B symptoms, he continues to have dyspnea on exertion and went on social security disability .\par \par 12/17/2018 Patient returns for next dose of rituxan maintenance . He denies any B symptoms , continues to complain of mild RUQ pain and tenderness. He is followed by cardiology and pulmonary and is undergoing work up to rule out cardiac sarcoidosis . \par \par 06/17/2019 Patient returns for follow up . he has been on maintenance rituxan for one year and was treated recently as outpatient for suspected pneumonia , he lost 9 lbs and is feeling better now. He had cardiac work up with revealed left atrial thrombus and started on xarelto , CT angio was negative , Cardiac CT showed LAD plaque without oclusion . \par \par 05/26/2020 Patient returns for last dose of rituxan , he feels well , denies B symptoms , infections , abnormal lumps . \par \par 08/18/2020 Patient returns for follow up after completion of maintenance rituxan . Since his last visit he developed left hemispheric stroke with weakness and aphasia . his speech has returned to normal and is left with mild residual right hemiparesis ( Lower > upper ) , he is using a cane to ambulate . CT angio was negative , he has permanent pacemaker without any documented atrial fibrillation , he remains on aspirin without anticoagulation . Of note he had an atrial thrombus ( right ? ) in the past on ANITA and was on anticoagulation , no repeat ANITA since then . in hospital he had negative CT abdomen except for non-specific lymph nodes. \par \par 05/20/2021 Patient returns with chief complaint of painful swelling of left upper neck , very sensitive to touch for 3 weeks , slightly improved and relieved with tylenol . \par also complains of decreased appetite and weight loss . no fever or night sweats . He lost 15 lbs since Dec 2020 . \par \par 4/13/2023 Patient returns for follow after a long absence referred by Neurology after a stroke with mild residual right hemiparesis ( arm > leg ? ) , he was aslo told he has a pinched nerve ?\par He complains of mild right neck stiffness.

## 2023-04-16 NOTE — PHYSICAL EXAM
[Normal] : no peripheral adenopathy appreciated [de-identified] : no neck swelling , mild tenderness.  upper neck , , no discrete mass or adenopathy .  [de-identified] : no splenomegaly . [de-identified] : mild RUE weakness distal > proximal .

## 2023-04-28 ENCOUNTER — OUTPATIENT (OUTPATIENT)
Dept: OUTPATIENT SERVICES | Facility: HOSPITAL | Age: 63
LOS: 1 days | End: 2023-04-28
Payer: MEDICARE

## 2023-04-28 DIAGNOSIS — R91.8 OTHER NONSPECIFIC ABNORMAL FINDING OF LUNG FIELD: ICD-10-CM

## 2023-04-28 DIAGNOSIS — C85.90 NON-HODGKIN LYMPHOMA, UNSPECIFIED, UNSPECIFIED SITE: ICD-10-CM

## 2023-04-28 DIAGNOSIS — Z95.0 PRESENCE OF CARDIAC PACEMAKER: Chronic | ICD-10-CM

## 2023-04-28 LAB — GLUCOSE BLDC GLUCOMTR-MCNC: 96 MG/DL — SIGNIFICANT CHANGE UP (ref 70–99)

## 2023-04-28 PROCEDURE — A9552: CPT

## 2023-04-28 PROCEDURE — 78815 PET IMAGE W/CT SKULL-THIGH: CPT | Mod: 26,PS

## 2023-04-28 PROCEDURE — 78815 PET IMAGE W/CT SKULL-THIGH: CPT

## 2023-04-28 PROCEDURE — 82962 GLUCOSE BLOOD TEST: CPT

## 2023-04-29 DIAGNOSIS — C85.90 NON-HODGKIN LYMPHOMA, UNSPECIFIED, UNSPECIFIED SITE: ICD-10-CM

## 2023-04-30 ENCOUNTER — RX RENEWAL (OUTPATIENT)
Age: 63
End: 2023-04-30

## 2023-05-09 PROBLEM — C85.90 LYMPHOMA: Status: ACTIVE | Noted: 2018-04-12

## 2023-05-17 ENCOUNTER — NON-APPOINTMENT (OUTPATIENT)
Age: 63
End: 2023-05-17

## 2023-05-17 ENCOUNTER — APPOINTMENT (OUTPATIENT)
Dept: CARDIOLOGY | Facility: CLINIC | Age: 63
End: 2023-05-17
Payer: MEDICARE

## 2023-05-17 VITALS
WEIGHT: 197 LBS | SYSTOLIC BLOOD PRESSURE: 130 MMHG | BODY MASS INDEX: 26.68 KG/M2 | HEART RATE: 79 BPM | DIASTOLIC BLOOD PRESSURE: 70 MMHG | HEIGHT: 72 IN

## 2023-05-17 PROCEDURE — 93000 ELECTROCARDIOGRAM COMPLETE: CPT

## 2023-05-17 PROCEDURE — 99214 OFFICE O/P EST MOD 30 MIN: CPT | Mod: 25

## 2023-05-17 PROCEDURE — 93296 REM INTERROG EVL PM/IDS: CPT

## 2023-05-17 PROCEDURE — 93294 REM INTERROG EVL PM/LDLS PM: CPT

## 2023-05-17 NOTE — HISTORY OF PRESENT ILLNESS
[FreeTextEntry1] : Congenital heart disease unknown by the patient and his wife, s/p open heart surgery as an infant at [facility], no records are available.\par Patent coronaries on cardiac catheterization in 2019  LVEF=60%.\par PPM for complete heart block\par Poorly visualized RV and TV, on 2D echo Doppler performed in 11/18,\par Former cigarette smoker for 40 years\par \par Severe emphysema\par Pt. is disabled. He was diagnosed with stage IV CLL, on chemotherapy, followed by Dr. Rider\par Hypertension\par Hyperlipidemia\par Prior cardiac catheterization performed 10 years ago by elena reported as no coronary artery disease.\par Patient worked as a  for [employer] prior to becoming disabled.\par

## 2023-05-17 NOTE — PHYSICAL EXAM
[General Appearance - Well Developed] : well developed [Normal Appearance] : normal appearance [Well Groomed] : well groomed [General Appearance - Well Nourished] : well nourished [No Deformities] : no deformities [General Appearance - In No Acute Distress] : no acute distress [Heart Rate And Rhythm] : heart rate and rhythm were normal [Heart Sounds] : normal S1 and S2 [Murmurs] : no murmurs present [Arterial Pulses Normal] : the arterial pulses were normal [FreeTextEntry1] : Median sternotomy [Respiration, Rhythm And Depth] : normal respiratory rhythm and effort [Exaggerated Use Of Accessory Muscles For Inspiration] : no accessory muscle use [Auscultation Breath Sounds / Voice Sounds] : lungs were clear to auscultation bilaterally [Clean] : clean [Dry] : dry [Well-Healed] : well-healed [Abdomen Soft] : soft [Abdomen Tenderness] : non-tender [Abdomen Mass (___ Cm)] : no abdominal mass palpated [Nail Clubbing] : no clubbing of the fingernails [Cyanosis, Localized] : no localized cyanosis [Petechial Hemorrhages (___cm)] : no petechial hemorrhages [] : no ischemic changes [Normal Conjunctiva] : the conjunctiva exhibited no abnormalities [Eyelids - No Xanthelasma] : the eyelids demonstrated no xanthelasmas [Normal Oropharynx] : normal oropharynx [Normal Jugular Venous A Waves Present] : normal jugular venous A waves present [Abnormal Walk] : normal gait [Skin Color & Pigmentation] : normal skin color and pigmentation [Skin Turgor] : normal skin turgor [No Anxiety] : not feeling anxious

## 2023-05-17 NOTE — DISCUSSION/SUMMARY
[FreeTextEntry1] : Patient was instructed to target his T. Cholesterol to less than 200 mg/dl and LDL cholesterol to less than 100 mg/dl.\par He was advised on his recent lab results. He had blood work with Hematology and it did not include a lipid panel.\par Exercise and weight loss was advised.\par EKG: Paced beats,rate of 79 bpm.\par Maintain cardiac medications. \par Patient was advised to repeat a BMP, CBC , fasting lipid profile and hepatic panel.\par Patient was taken off OAC by EP.\par RV in 6 months

## 2023-06-02 ENCOUNTER — NON-APPOINTMENT (OUTPATIENT)
Age: 63
End: 2023-06-02

## 2023-06-12 NOTE — DISCHARGE NOTE ADULT - PATIENT PORTAL LINK FT
Improved You can access the [facility] Patient Portal, offered by [facility], by registering with the following website: [email or web address]

## 2023-06-25 NOTE — H&P ADULT - FAMILY HISTORY
Sibling  Still living? Unknown  Family history of breast cancer, Age at diagnosis: Age Unknown     Mother  Still living? Unknown  Family history of cancer, Age at diagnosis: Age Unknown
none
no

## 2023-07-02 ENCOUNTER — RX RENEWAL (OUTPATIENT)
Age: 63
End: 2023-07-02

## 2023-07-14 NOTE — STROKE CODE NOTE - NIH STROKE SCALE: TOTAL
Apixaban/Eliquis - Compliance/Apixaban/Eliquis - Dietary Advice/Apixaban/Eliquis - Follow up monitoring/Apixaban/Eliquis - Potential for adverse drug reactions and interactions
11

## 2023-08-16 ENCOUNTER — APPOINTMENT (OUTPATIENT)
Dept: ELECTROPHYSIOLOGY | Facility: CLINIC | Age: 63
End: 2023-08-16
Payer: MEDICARE

## 2023-08-16 VITALS
WEIGHT: 197 LBS | SYSTOLIC BLOOD PRESSURE: 126 MMHG | HEIGHT: 72 IN | DIASTOLIC BLOOD PRESSURE: 80 MMHG | HEART RATE: 82 BPM | BODY MASS INDEX: 26.68 KG/M2

## 2023-08-16 PROCEDURE — 93280 PM DEVICE PROGR EVAL DUAL: CPT

## 2023-08-16 PROCEDURE — 93000 ELECTROCARDIOGRAM COMPLETE: CPT | Mod: 59

## 2023-08-16 PROCEDURE — 99214 OFFICE O/P EST MOD 30 MIN: CPT | Mod: 25

## 2023-08-16 RX ORDER — ATORVASTATIN CALCIUM 80 MG/1
80 TABLET, FILM COATED ORAL
Qty: 90 | Refills: 3 | Status: DISCONTINUED | COMMUNITY
Start: 2022-04-27 | End: 2023-08-16

## 2023-08-16 RX ORDER — BUDESONIDE, GLYCOPYRROLATE, AND FORMOTEROL FUMARATE 160; 9; 4.8 UG/1; UG/1; UG/1
160-9-4.8 AEROSOL, METERED RESPIRATORY (INHALATION)
Qty: 11 | Refills: 0 | Status: DISCONTINUED | COMMUNITY
Start: 2022-05-01 | End: 2023-08-16

## 2023-08-16 RX ORDER — ALBUTEROL SULFATE 90 UG/1
108 (90 BASE) AEROSOL, METERED RESPIRATORY (INHALATION)
Qty: 1 | Refills: 11 | Status: DISCONTINUED | COMMUNITY
Start: 2018-03-20 | End: 2023-08-16

## 2023-08-16 NOTE — ADDENDUM
[FreeTextEntry1] : Tiffanie CHRISTIANSEN assisted in documentation on 08/16/2023 acting as a scribe for Dr. Charlie Monique.

## 2023-08-16 NOTE — ASSESSMENT
[FreeTextEntry1] : ## CHB s/p DC-PPM (Bio, dep, 15) ## Nonischemic cardiomyopathy  ## Dyslipidemia  - PPM interrogation shows normally functioning DC-PPM. Battery life 4 years. No new events. No evidence of AF. - No evidence of AF. Will continue to be off Eliquis.   - TTE showed stable EF 50%. We will consider CRT upgrade if further reduction in EF or symptoms of HF develop. - Echo - On Atorvastatin 80. Continue. - Cardiology follow-up  - Remote Monitoring - Return in 6 months

## 2023-08-16 NOTE — PROCEDURE
[Complete Heart Block] : complete heart block [Pacemaker] : pacemaker [Magnet Rate: ___ Ppm] : magnet rate was [unfilled] Ppm [Longevity: ___ months] : The estimated remaining battery life is [unfilled] months [Normal] : The battery status is normal. [Threshold Testing Performed] : Threshold testing was performed [Ventricular] : Ventricular [Lead Imp:  ___ohms] : lead impedance was [unfilled] ohms [Sensing Amplitude ___mv] : sensing amplitude was [unfilled] mv [___V @] : [unfilled] V [___ ms] : [unfilled] ms [None] : none [Counters Reset] : the counters were reset [Asense-Vpace ___ %] : Asense-Vpace [unfilled]% [Apace-Vpace ___ %] : Apace-Vpace [unfilled]% [de-identified] : [employer] [de-identified] : Jumana BABCOCK [de-identified] : [id number] [de-identified] : 04/14/2014 [de-identified] : DDD-CLS [de-identified] : 60 [de-identified] : 35%

## 2023-08-16 NOTE — HISTORY OF PRESENT ILLNESS
[SOB] : dyspnea [de-identified] : The patient noted the following symptom(s):. I had a pleasure of seeing Mr. GEE for follow-up consultation for pacemaker care. He was previously being followed by Dr. Muller. He is accompanied by his wife, who works at [employer].  Mr. GEE is a 60 year-year old male with history of HTN, DM, Morbid obesity, CHD s/p corrective surgery at age of 3, CHB s/p DC-PPM (Bio, 15, dep), CLL, DL, COPD, ex-smoker, recent encephalopathy (01/21) is here for routine f-up.  11/10: Feels fine. No new c/o 2/15/23:  Feels fine. Complains of shortness of breath on certain activities like bending. Overall feels tired. 08/16/2023: Feels fine.   Denies chest pain, shortness of breath, palpitation, dizziness or LOC except noted above.  EKG (08/16/2023): SR-  EKG (2/15/23) SRVP. EKG (11/10): SR w/ EKG: SR@ 76/min, LBBB, QRSd 172 ms, NC: 176 ms TTE (04/21): EF 50% TTE (07/19): EF 45-50%, mild LAE, no defect on septum LHC (09/19): No occlusive CAD Cardio: Dr. Power

## 2023-08-18 ENCOUNTER — RX RENEWAL (OUTPATIENT)
Age: 63
End: 2023-08-18

## 2023-08-18 RX ORDER — DULAGLUTIDE 3 MG/.5ML
3 INJECTION, SOLUTION SUBCUTANEOUS
Qty: 3 | Refills: 2 | Status: ACTIVE | COMMUNITY
Start: 2021-06-09 | End: 1900-01-01

## 2023-08-21 ENCOUNTER — NON-APPOINTMENT (OUTPATIENT)
Age: 63
End: 2023-08-21

## 2023-08-21 DIAGNOSIS — E66.3 OVERWEIGHT: ICD-10-CM

## 2023-08-21 DIAGNOSIS — J44.9 CHRONIC OBSTRUCTIVE PULMONARY DISEASE, UNSPECIFIED: ICD-10-CM

## 2023-08-21 DIAGNOSIS — I49.9 CARDIAC ARRHYTHMIA, UNSPECIFIED: ICD-10-CM

## 2023-08-21 DIAGNOSIS — Z86.73 PERSONAL HISTORY OF TRANSIENT ISCHEMIC ATTACK (TIA), AND CEREBRAL INFARCTION W/OUT RESIDUAL DEFICITS: ICD-10-CM

## 2023-08-21 RX ORDER — ASPIRIN 325 MG
325 TABLET ORAL
Refills: 0 | Status: ACTIVE | COMMUNITY

## 2023-08-21 RX ORDER — BUDESONIDE, GLYCOPYRROLATE, AND FORMOTEROL FUMARATE 160; 9; 4.8 UG/1; UG/1; UG/1
160-9-4.8 AEROSOL, METERED RESPIRATORY (INHALATION)
Refills: 0 | Status: ACTIVE | COMMUNITY

## 2023-08-22 NOTE — PROGRESS NOTE ADULT - NSTELEHEALTH_GEN_ALL_CORE
"SUBJECTIVE:  Mamadou Van is a 4 y.o. male here accompanied by mother for new patient (New patient for initial autism evaluation.  Mom states lines toys up according to color, likes to be alone, not potty trained, speech delayed.  Is in [place].)    GÉNESIS Campos is here today with his mother for evaluation of possible autism .  He was referred by their PCP, Dr. Whitney Osborn  The caregivers main concern is that he lines toys, he spins, he can be aggressive if kids approach him. He pulls his hair when he is upset. He  does not respond to his name all the time. His eye contact is not good.  Mom was worried since he was 3 years old.    ASQ at 36 months: failed fine motor, communication and social emotional areas  MCHAT: 2 (2022), Repeat MCHAT : 7 ( today)    Previous medical history: asthma, eczema, allergies. He was admitted to the hospital 4 times for asthma  Previous surgical history: circumcision  Birth history: 37 weeks, C section for prolonged labor, Birth weight was  8 pounds 12 ounces.  Any  exposures to drugs, alcohol, or cigarette smoking? no  Consultations/ Genetic testing:  sees Dr Guajardo for allergies  Current medications: Albuterol, budesonide, triamcinolone  Significant past medications include: prednisone with above meds    Hearing test: passed (by a doctor in [place], Dr Mims)  Vision test: not done    Current educational setting/ grade placement:  Early Steps : no  Pre-K early: in a day care/  [employer], first year. Started 2022 ( attends one day a week)  School grade: , does well until kids try to play with him  (he bites and scratches them). He is mostly with the teacher  Acommodations: he stays  with the teacher, not potty trained  504 plan IEP: no  Rehabilitation services : [facility]    Development:  Started walking at  14 months  Started taking at  [age] years of age " cup" was his first " "word  Is speech appropriate for developmental age now: no, he says  about 50 words ( not all clear) or more. he recognizes colors and numbers. Makes 2-3 word phrases, poorly understandable, less than half is clear    Motor skills/ Self help  Gross motor skills    General coordination:  Throwing ball: not overhead  Catching ball: no  Kicking ball: no  Pedals a tricycle: no  Rides two-wheel bike without trainers: no  He can climb    Fine motor skills  Dresses undresses: he can take off shoes, socks, shorts but not his shirt. He needs help to be dressed  Good with zippers: no  Good with buttons: no  Can tie shoes: no  Good with spoon, fork, knife: no. Finger feeds self has to line up everything eats  Can color in the lines: no  Quality of handwriting: n/a    History of Toilet training: in pull ups . Does not respond to the potty , may cry but occasionally sits on the toilet ( does not use it)    Does your child has any atypical behaviors? He eats hair, he spins, he eats dirt and water, he lines toys  Is this behavior present across multiple contexts? yes    Social Communications( not explained by developmental delays):    1- Abnormal social Approach/initiation and response:  Failure of normal back and fourth conversation: no back and fourth conversation  One side conversation: sometimes  Does not answer to name: not all the time  Does not initiate conversations: no  Does not share emotions/ events: no  Joint attention:  no , may look at the moon and say" moon"  Showing, bringing, pointing to objects: may throw something and say "patch"= catch  Compassion: may say " what's wrong"  Responsive social smile: yes  Does not enjoy social interactions: no, he is aggressive to children when they approach him    2- Non Verbal Communicaton:   Eye contact: poor  Understands body language, gestures ( pointing, nodding, shaking head): no  Voice tone is appropriate: tone, volume, pitch, rate of of speech: he whispers sometime, has a " No ""mad voice"  Unable to understand people' saffect: no  Unable to understand facial expressions: no  Unable to understand emotions: no  Can coordinate eye contact with gestures, body language or words: no    3- Social Awareness and Relationships:  Can make friends: no, he is aggressive to kids. Does not like to share  Can take another person's perspective ( theory of mind): n/a ( has to be > 4 years)  Can understand social cues ( when friends show lack of interest): no  Laughs inappropriately/ out of context: yes  Does not understand when being teased:no  Does not understand how behavior affects others emotionally: no  Imaginative, social play with others ( > 4 years): he calls an imaginary friend " tim", may say see you later Tim  Plays with children of same age: no  Plays interactively or parallel: neither, may play with kids for few min before he makes them cry  Prefers to play alone: yes    Restrictive Repetitive Behaviors, interests or Activities:     1- Atypical speech movements and play:    Pedantic, formal language ( speaks like a professor or an adult): no  Echolalia, immediate or delayed ( may repeat lines from a movie: yes both immediate and delayed  Jargon, Gibberish if > 2 years : yes  Pronoun reversal: uses you instead of I : yes may say " you hungry?" When he is hungry  Refers to self by name only: no  Talks about same topic: no  Humming, squealing, repetitive vocalization: he hums sometimes. He is noisy at times " he grunts"    Repetitive hand movement: clapping,flapping, twisting: flaps and claps  Spinning, rocking, foot to foot rocking, swaying: he spins, has foot to foot rocking,  Grimacing, teeth grinding: he grinds his teeth    Repetitive use of objects, non functional:   Turns light switch: yes  Plays with sticks: yes  Opens and closes doors : he opens and closes the oven door  Lines up toys: yes    2-Rituals and Resistance to Change:  Likes same routine (exclude bed time routine unless " "exceptional): has to have socks, does not like pants  Likes to say things in a specific way: no  Likes to question about same topic: n/a  Compulsion ( turns 3 times before entering a room): no  Resistant to or hates change in routine (way you drive to school): not sure  Can not understand humor, irony, or double meaning ( Rigid thoughts): no    3- Preoccupation with objects or topics:  Preoccupation with numbers, letters, or symbols: yes he can read  letters and numbers  Interests are abnormal in focus, intensity: he loves his cars, has to have 2 specific cars for a year  Attachment to samll objects like a rubber band: 2 small toy cars  Unusual feras (people with earrings): Maco scared him once  Has to carry an unusual object (excludes blanket, stuffed animal): 2 small toy cars    4-Atypical sensory Behavior: Hypo or Hyperreactivity to sensory output:  High pain tolerance: yes when he falls he does not cry  Reaction to hair cuts:  he is better now  Tooth brushing: not a problem  Likes hugs: yes  Likes to watch wheels and turning objects: yes  looks from the angle of eyes: squints eyes, looks at things from the side of his eyes  Sensitive to sounds: loud music, big crowds of people  Licks or sniffs objects: yes, he licks and sniffs new food items. He eats dirt, he also eats hair    Do all these symptoms together impair everyday functioning? yes  Were these symptoms present before 8 years of age (flexible)? yes      Problem solving:  Good at "figuring things out": yes  Good with puzzles : yes  Good at electronics, IPads, music, etc: yes  Reading / Reading comprehension:not interested in books  What is the child really good at? "Not sure"    Behavior problems:  Tantrums: yes  Triggers:  when things do not go his way  Frequency: about 3 times a week, also at school  Severity: can last 25 min  Parental management: distract him, hold him.  Do tantrums affect family life/ school, etc? yes  Activity level: " hyperactive    Mood: happy    Anxiety:  Is child fearful of many things? Bugs,bad weather , the dark  Does child separate easily from mother? no  Fear of the dark? yes  Fear of being alone? no  Can child play alone in a room ? yes  Can child go to the bathroom alone? no  Object to going to school or not want to get out of car when arriving at school? yes  Does child avoid strangers or groups of people? Yes if loud. No stranger danger  What does child worry about? Bugs, the dark      OCD:  Does child have habits or ritual such as doing things a certain number of times? no  Does child frequently count things, number things, put things in a certain order ? no  Does child have to dress a certain way or eat their food a certain way? no  Is child obsessed with certain activities? no    Aggression:  With Children? yes  With Adults?  occasionally  With Animals? yes    Self injurious behavior:  Does child bang head, hit self, bite self? Slaps his head, pulls his hair    Elopement:  Do you have to take special precautions for fear of child leaving the house ? He can't open the door. He wanders away if given a chance    Safety: dangerous behavior:  Plays with fire, knives, runs in street, etc? no  Does child recognize danger? yes    Sleep problems  Where does child sleep? In the bed with mom  How long does it typically take to fall asleep? In bed at 8:30 pm , stays up till 1 am  watching TV  Is sleep interrupted during the night? no  Does child sleep at least 8 hours? Yes , wakes up at 7:30 am  What time does child usually awaken in the morning? 7:30 am  Snoring? no  Pauses in breathing? no  Nightmares? no  Night terrors? no  Sleep walking? no  Does childs sleep pattern disturb the family?  yes      Diet: Is child on a special diet?  No  Does child eat the same food as the rest of the family? Eats Popeyes chicken , fruits, fries, 2 cups milk, 2 pediasure a day  Are there particular issue with diet pattern such as no wet,  crunchy, cold, hot foods? Likes crunchy textures  Does child have adequate protein, energy, vitamin D source and vitamin C source in the diet? yes  Vitamins? Pediasure   Appetite: good    Gastrointestinal problems:   Vomiting: no  Diarrhea: no  Constipation: no  Stomach aches: no    Any history of seizures:no  Current Discipline strategies:  Time-out: no  Selective ignoring: sometimes  Positive reinforcement: yes  Spanking: yes    Impact on the family:  Restricts what family can do : yes  Family homebound: sometimes    Family history:  Are there any other family members with mental retardation or autism? His half brother has cerebral palsy (11years)    Mother  Age: 31  Involvement: in home: yes  Highest grade level achieved: 11th grade  Problems in School or with reading: no  Mental health problems: no  Other health problems: no  Substance use history: cigarettes  Current/ past employment: a care giver    Father  Age: 38 ( 2 other kids: 12yo boy and 8 yo girl)  Involvement: yes, he sees him when he can , lives in [place]. He visits 3 weeks every 2 months  Highest grade level achieved: graduated HS with some college  Problems in School or with reading: no  Mental health problems: not sure  Other health problems: Asthma  Substance use history: no  Current/ past employment: a     Current household: lives with shakeel Ji allergies, medications, history, and problem list were updated as appropriate.    Review of Systems   Constitutional: Negative.  Negative for activity change, appetite change, fever and irritability.   HENT:  Negative for congestion, ear pain, rhinorrhea and sore throat.    Respiratory:  Negative for cough and wheezing.    Gastrointestinal:  Negative for diarrhea and vomiting.   Genitourinary:  Negative for decreased urine volume.   Skin:  Negative for rash.      A comprehensive review of symptoms was completed and negative except as noted above.    OBJECTIVE:  Vital signs  Vitals:     "08/22/23 1326   BP: 101/64   Pulse: 84   Resp: 22   Temp: 97.7 °F (36.5 °C)   SpO2: 99%   Weight: 20.1 kg (44 lb 5 oz)   Height: 3' 4.95" (1.04 m)   HC: 51.5 cm (20.28")        Physical Exam  Vitals reviewed.   Constitutional:       Comments: Active, poor eye contact, speech is less than half understandable, speaks in words, occasional phrases. No back and fourth conversations. No imaginary play. He sorted blocks  in shapes and colors, lined them up. No imaginary play noticed. Has trouble following pointing and gaze. Has trouble with joint attention. He was flickering the light switch. Spins, has foot to foot rocking. Flaps. Twists toy cars (one in each hand). Showed a lot of repetitive play. He can be aggressive when told " no". He can read letters, numbers. He can copy a line and a [place] but can not grasp the pencil/ crayon correctly   HENT:      Right Ear: Tympanic membrane normal.      Left Ear: Tympanic membrane normal.      Mouth/Throat:      Mouth: Mucous membranes are moist.      Pharynx: Oropharynx is clear.   Eyes:      General:         Right eye: No discharge.         Left eye: No discharge.      Conjunctiva/sclera: Conjunctivae normal.      Pupils: Pupils are equal, round, and reactive to light.   Cardiovascular:      Rate and Rhythm: Normal rate and regular rhythm.      Pulses: Normal pulses.      Heart sounds: S1 normal and S2 normal. No murmur heard.  Pulmonary:      Effort: Pulmonary effort is normal. No respiratory distress.      Breath sounds: Normal breath sounds.   Abdominal:      General: Bowel sounds are normal. There is no distension.      Palpations: Abdomen is soft.      Tenderness: There is no abdominal tenderness.   Musculoskeletal:         General: Normal range of motion.      Cervical back: Neck supple.   Skin:     General: Skin is warm.      Findings: No rash.   Neurological:      Mental Status: He is alert.          ASSESSMENT/PLAN:  Mamadou was seen today for new patient.    Diagnoses " and all orders for this visit:    Autism  According to DSM-5 criteria, Mamadou  exhibits many features of autism especially some deficits in social emotional reciprocity and in social communication. He also has restrictive and repetitive behaviors.    -     Ambulatory referral/consult to Speech Therapy; Future  -     Ambulatory referral/consult to Physical/Occupational Therapy; Future  -     Ambulatory referral/consult to Applied Behavior Analysis (JUSTICE) Therapy; Future    Advised mom to seek services from school/ IEP while waiting for [facility]    Speech and language developmental delay  -     Ambulatory referral/consult to Speech Therapy; Future    Developmental coordination disorder  -     Ambulatory referral/consult to Physical/Occupational Therapy; Future    Wandering behavior    Poor sleep hygiene  Go to bed and get up at the same time every day  Avoid playing and reading devices that give off light  and computer, phone and TV exposure 2 hours before bed time  If your child's bed time is 8 , turn off the TV and all electronics at 6 pm  No TV's in bedrooms  Go to bed only when ready to sleep, not too early  Avoid napping during the day especially in the afternoon  Avoid caffeinated beverages       No results found for this or any previous visit (from the past 24 hour(s)).    Follow Up:  Follow up in about 4 weeks (around 9/19/2023). Genetic testing if approved by his insurance

## 2023-09-12 ENCOUNTER — NON-APPOINTMENT (OUTPATIENT)
Age: 63
End: 2023-09-12

## 2023-09-14 ENCOUNTER — OUTPATIENT (OUTPATIENT)
Dept: OUTPATIENT SERVICES | Facility: HOSPITAL | Age: 63
LOS: 1 days | End: 2023-09-14

## 2023-09-14 ENCOUNTER — APPOINTMENT (OUTPATIENT)
Dept: ENDOCRINOLOGY | Facility: CLINIC | Age: 63
End: 2023-09-14

## 2023-09-14 VITALS
HEART RATE: 87 BPM | WEIGHT: 197 LBS | DIASTOLIC BLOOD PRESSURE: 94 MMHG | BODY MASS INDEX: 26.72 KG/M2 | SYSTOLIC BLOOD PRESSURE: 147 MMHG

## 2023-09-14 DIAGNOSIS — Z00.00 ENCOUNTER FOR GENERAL ADULT MEDICAL EXAMINATION WITHOUT ABNORMAL FINDINGS: ICD-10-CM

## 2023-09-14 DIAGNOSIS — E11.65 TYPE 2 DIABETES MELLITUS WITH HYPERGLYCEMIA: ICD-10-CM

## 2023-09-14 DIAGNOSIS — Z95.0 PRESENCE OF CARDIAC PACEMAKER: Chronic | ICD-10-CM

## 2023-09-14 DIAGNOSIS — Z98.890 OTHER SPECIFIED POSTPROCEDURAL STATES: Chronic | ICD-10-CM

## 2023-09-14 DIAGNOSIS — E78.01 FAMILIAL HYPERCHOLESTEROLEMIA: ICD-10-CM

## 2023-09-14 PROCEDURE — 99214 OFFICE O/P EST MOD 30 MIN: CPT

## 2023-09-14 RX ORDER — PIOGLITAZONE HYDROCHLORIDE 30 MG/1
30 TABLET ORAL
Qty: 90 | Refills: 2 | Status: ACTIVE | COMMUNITY
Start: 2019-06-27 | End: 1900-01-01

## 2023-09-14 RX ORDER — EMPAGLIFLOZIN AND METFORMIN HYDROCHLORIDE 12.5; 1 MG/1; MG/1
12.5-1 TABLET ORAL
Qty: 180 | Refills: 3 | Status: ACTIVE | COMMUNITY
Start: 2020-12-10 | End: 1900-01-01

## 2023-09-14 RX ORDER — ATORVASTATIN CALCIUM 80 MG/1
80 TABLET, FILM COATED ORAL
Qty: 90 | Refills: 3 | Status: ACTIVE | COMMUNITY
Start: 2020-12-10 | End: 1900-01-01

## 2023-10-04 ENCOUNTER — APPOINTMENT (OUTPATIENT)
Dept: NEUROLOGY | Facility: CLINIC | Age: 63
End: 2023-10-04
Payer: MEDICARE

## 2023-10-04 PROCEDURE — 99214 OFFICE O/P EST MOD 30 MIN: CPT

## 2023-10-04 RX ORDER — TOPIRAMATE 25 MG/1
25 TABLET, FILM COATED ORAL TWICE DAILY
Qty: 60 | Refills: 5 | Status: DISCONTINUED | COMMUNITY
Start: 2022-02-02 | End: 2023-10-04

## 2023-10-13 ENCOUNTER — NON-APPOINTMENT (OUTPATIENT)
Age: 63
End: 2023-10-13

## 2023-10-16 ENCOUNTER — APPOINTMENT (OUTPATIENT)
Dept: NEUROLOGY | Facility: CLINIC | Age: 63
End: 2023-10-16
Payer: MEDICARE

## 2023-10-16 PROCEDURE — 95816 EEG AWAKE AND DROWSY: CPT

## 2023-10-17 LAB
ANION GAP SERPL CALC-SCNC: 7 MMOL/L
BUN SERPL-MCNC: 11 MG/DL
CALCIUM SERPL-MCNC: 9.3 MG/DL
CHLORIDE SERPL-SCNC: 105 MMOL/L
CO2 SERPL-SCNC: 30 MMOL/L
CREAT SERPL-MCNC: 1 MG/DL
EGFR: 85 ML/MIN/1.73M2
GLUCOSE SERPL-MCNC: 113 MG/DL
POTASSIUM SERPL-SCNC: 4.4 MMOL/L
SODIUM SERPL-SCNC: 142 MMOL/L
TOPIRAMATE SERPL-MCNC: <1 MCG/ML

## 2023-10-18 ENCOUNTER — APPOINTMENT (OUTPATIENT)
Dept: CARDIOLOGY | Facility: CLINIC | Age: 63
End: 2023-10-18
Payer: MEDICARE

## 2023-10-18 ENCOUNTER — NON-APPOINTMENT (OUTPATIENT)
Age: 63
End: 2023-10-18

## 2023-10-18 VITALS
HEART RATE: 83 BPM | WEIGHT: 197 LBS | DIASTOLIC BLOOD PRESSURE: 80 MMHG | HEIGHT: 72 IN | SYSTOLIC BLOOD PRESSURE: 140 MMHG | BODY MASS INDEX: 26.68 KG/M2

## 2023-10-18 DIAGNOSIS — E11.65 TYPE 2 DIABETES MELLITUS WITH HYPERGLYCEMIA: ICD-10-CM

## 2023-10-18 DIAGNOSIS — Z95.0 PRESENCE OF CARDIAC PACEMAKER: ICD-10-CM

## 2023-10-18 PROCEDURE — 93000 ELECTROCARDIOGRAM COMPLETE: CPT

## 2023-10-18 PROCEDURE — 99214 OFFICE O/P EST MOD 30 MIN: CPT | Mod: 25

## 2023-11-18 ENCOUNTER — OUTPATIENT (OUTPATIENT)
Dept: OUTPATIENT SERVICES | Facility: HOSPITAL | Age: 63
LOS: 1 days | End: 2023-11-18
Payer: MEDICARE

## 2023-11-18 DIAGNOSIS — Z95.0 PRESENCE OF CARDIAC PACEMAKER: Chronic | ICD-10-CM

## 2023-11-18 DIAGNOSIS — Z98.890 OTHER SPECIFIED POSTPROCEDURAL STATES: Chronic | ICD-10-CM

## 2023-11-18 DIAGNOSIS — N40.1 BENIGN PROSTATIC HYPERPLASIA WITH LOWER URINARY TRACT SYMPTOMS: ICD-10-CM

## 2023-11-18 DIAGNOSIS — Z00.8 ENCOUNTER FOR OTHER GENERAL EXAMINATION: ICD-10-CM

## 2023-11-18 PROCEDURE — 76770 US EXAM ABDO BACK WALL COMP: CPT

## 2023-11-18 PROCEDURE — 76770 US EXAM ABDO BACK WALL COMP: CPT | Mod: 26

## 2023-11-19 DIAGNOSIS — N40.1 BENIGN PROSTATIC HYPERPLASIA WITH LOWER URINARY TRACT SYMPTOMS: ICD-10-CM

## 2023-11-21 ENCOUNTER — NON-APPOINTMENT (OUTPATIENT)
Age: 63
End: 2023-11-21

## 2023-11-21 NOTE — PROGRESS NOTE ADULT - ASSESSMENT
59 M (R handed) with a pmh of CHD s/p repair, HTN, DLD, RAA thrombus (was on xarelto   #CVA with right hemiparesis (R hand dominant) and aphasia  -S/p TPA  -CTH appreciated  -Follow for clearance if MRI brain compatible with PPM  -Start PT/OT/SLP  -Continue ASA and statin  - F/u MRI brain w/o MALDONADO  - F/u lyme panel     #RLQ Abdominal tenderness is chronic but now with constipation  -Chronic RLQ tenderness, unchanged.   -Recent abdominal imaging appreciated.  -Continue Miralax daily and add dulcolax 2 tabs today only.    # Aggressive nisreen marginal zone lymphoma  - maintenance on Rituxan, last dose 12/31  - Per family he has appt with Dr. Rider in August for PET scan    # DM   - SSI regimen   - Fingersticks AC   -Continue metformin    #HTN  - continue home amlodipine    #COPD  - Continue duoneb, symbicort, spiriva    # Sinus bradycardia   - s/p PPM      #Congenital hear Disease, S/P repair  Stable    #S/P RAA thrombus which was treated. Stable.    #S/P PPP for sinus bradycardia. Stable    Diet: DASH/TLC  GI PPx: Protonix  DVT PPx: SCDs  Activity: as tolerated  Cardiac and fall precautions. Mastoid Interpolation Flap Text: A decision was made to reconstruct the defect utilizing an interpolation axial flap and a staged reconstruction.  A telfa template was made of the defect.  This telfa template was then used to outline the mastoid interpolation flap.  The donor area for the pedicle flap was then injected with anesthesia.  The flap was excised through the skin and subcutaneous tissue down to the layer of the underlying musculature.  The pedicle flap was carefully excised within this deep plane to maintain its blood supply.  The edges of the donor site were undermined.   The donor site was closed in a primary fashion.  The pedicle was then rotated into position and sutured.  Once the tube was sutured into place, adequate blood supply was confirmed with blanching and refill.  The pedicle was then wrapped with xeroform gauze and dressed appropriately with a telfa and gauze bandage to ensure continued blood supply and protect the attached pedicle.

## 2023-12-01 ENCOUNTER — NON-APPOINTMENT (OUTPATIENT)
Age: 63
End: 2023-12-01

## 2023-12-01 ENCOUNTER — APPOINTMENT (OUTPATIENT)
Dept: CARDIOLOGY | Facility: CLINIC | Age: 63
End: 2023-12-01
Payer: MEDICARE

## 2023-12-01 PROCEDURE — 93296 REM INTERROG EVL PM/IDS: CPT

## 2023-12-01 PROCEDURE — 93294 REM INTERROG EVL PM/LDLS PM: CPT

## 2024-01-03 NOTE — ASU PATIENT PROFILE, ADULT - NSSUBSTANCEUSE_GEN_ALL_CORE_SD
Your Diagnosis is:  intermittent confusion       Return to the Emergency Department for headache, vision changes, numbness or weakness in your face or extremities, vomiting, speech changes, if symptoms worsen or for any other concerns. Medications: These are your new prescriptions: none  These medicines that you take now have been changed: none  Please refer to the medication section for instructions on how to take them. Additional instructions: Follow-up with Dr. Herrera Barcenas in clinic. never used

## 2024-01-30 ENCOUNTER — APPOINTMENT (OUTPATIENT)
Dept: NEUROLOGY | Facility: CLINIC | Age: 64
End: 2024-01-30

## 2024-01-30 ENCOUNTER — OUTPATIENT (OUTPATIENT)
Dept: OUTPATIENT SERVICES | Facility: HOSPITAL | Age: 64
LOS: 1 days | End: 2024-01-30
Payer: MEDICARE

## 2024-01-30 ENCOUNTER — RESULT REVIEW (OUTPATIENT)
Age: 64
End: 2024-01-30

## 2024-01-30 DIAGNOSIS — R51.9 HEADACHE, UNSPECIFIED: ICD-10-CM

## 2024-01-30 DIAGNOSIS — Z95.0 PRESENCE OF CARDIAC PACEMAKER: Chronic | ICD-10-CM

## 2024-01-30 DIAGNOSIS — Z98.890 OTHER SPECIFIED POSTPROCEDURAL STATES: Chronic | ICD-10-CM

## 2024-01-30 PROCEDURE — 72197 MRI PELVIS W/O & W/DYE: CPT

## 2024-01-30 PROCEDURE — 72197 MRI PELVIS W/O & W/DYE: CPT | Mod: 26

## 2024-01-30 PROCEDURE — A9579: CPT

## 2024-01-30 PROCEDURE — 70553 MRI BRAIN STEM W/O & W/DYE: CPT

## 2024-01-30 PROCEDURE — 70553 MRI BRAIN STEM W/O & W/DYE: CPT | Mod: 26

## 2024-01-31 ENCOUNTER — APPOINTMENT (OUTPATIENT)
Dept: NEUROLOGY | Facility: CLINIC | Age: 64
End: 2024-01-31
Payer: MEDICARE

## 2024-01-31 VITALS
HEIGHT: 72 IN | SYSTOLIC BLOOD PRESSURE: 133 MMHG | BODY MASS INDEX: 27.09 KG/M2 | WEIGHT: 200 LBS | HEART RATE: 70 BPM | RESPIRATION RATE: 16 BRPM | DIASTOLIC BLOOD PRESSURE: 76 MMHG | OXYGEN SATURATION: 93 %

## 2024-01-31 DIAGNOSIS — R51.9 HEADACHE, UNSPECIFIED: ICD-10-CM

## 2024-01-31 PROCEDURE — 99214 OFFICE O/P EST MOD 30 MIN: CPT

## 2024-01-31 PROCEDURE — G2211 COMPLEX E/M VISIT ADD ON: CPT

## 2024-01-31 RX ORDER — TOPIRAMATE 100 MG/1
100 TABLET, FILM COATED ORAL
Qty: 270 | Refills: 3 | Status: ACTIVE | COMMUNITY
Start: 2022-10-18 | End: 1900-01-01

## 2024-01-31 NOTE — PHYSICAL EXAM
[FreeTextEntry1] : General Appearance - Well groomed, Not in acute distress. Build & Nutrition - Well nourished.  Head and Neck Head - normocephalic, atraumatic with no lesions or palpable masses. Neck Global Assessment - no bruit auscultated on the right, no bruit auscultated on the left.  Neurologic Mental Status Affect - normal. Speech - Normal. Thought content/perception - Normal. Cognitive function - Normal. Cranial Nerves I Olfactory - Normal. II Optic - Visual fields - Normal. III Oculomotor - Normal Bilaterally. IV Trochlear - Bilateral - Normal - Bilateral. V Trigeminal - Temporal - Bilateral - Normal - Bilateral. Masseter - Bilateral - Normal - Bilateral. Ophthalmic - Left - Normal - Left. Ophthalmic - Right - Hypalgesic - Right and Temperature sense impaired - Right. Maxillary - Left - Normal - Left. Maxillary - Right - Hypalgesic - Right and Temperature sense impaired - Right. Mandibular - Left - Normal - Left. Mandibular - Right - Hypalgesic - Right and Temperature sense impaired - Right. VI Abducens - Bilateral - Normal - Bilateral. VII Facial - Normal Bilaterally. VIII Acoustic - Bilateral - Hearing normal - Bilateral. IX Glossopharyngeal / X Vagus - Normal. XI Accessory - Normal Bilaterally. XII Hypoglossal - Bilateral - Normal - Bilateral. Sensory Light Touch - Decreased: Note: right upper and lower extremities. Pain: Decreased: Note: right upper and lower extremities. Temperature: Decreased: Note: right upper and lower extremities. Vibration: Vibration - Decreased - Note: right upper and lower extremities. Proprioception - Right - Upper extremity impaired and Lower extremity impaired - Right. Number Identification - Impaired (right). Motor Bulk and Contour - Normal. Tone - Spastic - Right Upper Extremity and Right Lower Extremity. Strength - 5/5 normal muscle strength - Left Upper Extremity and Left Lower Extremity. 4+/5 reduced muscle strength - Right Upper Extremity (not consistent) and Right Lower Extremity (not consistent). Reflexes (Dermatomes) 2/2 Normal - Right Bicep (C5-6), Left Bicep (C5-6), Right Brachioradialis (C5-6), Left Brachioradialis (C5-6), Right Triceps (C7-8), Left Triceps (C7-8) and Right Achilles (L5-S2). 3/2 Brisk - Right Knee (L2-4), Left Knee (L2-4) and Left Achilles (L5-S2). Plantar Reflexes (L4-S2) - Bilateral - Flexion - Bilateral. Coordination - Romberg sign positive. Gait - Small steps and Medium-based. Frontal Release - Meyerson Sign.  Results of Diagnostic Studies  Labs: Note: 2/16/23 topiramate 3.1 (200mg/day). 8/29/23 normal CBC, CO2 16, low ferritin 10/13/23 topiramate not detectable.  Imaging: Imaging - MRI - Note: 12/12/21 brain: reported normal. Images reviewed. 1/30/24 brain MRI: reported no acute intracranial pathology. Images reviewed. No encephalomalacia or other lesions to correspond to his history of stroke.  Neurophysiological Testing - Note: 9/20/22 EEG: normal. 12/14/22 NCS/EMG: mild right ulnar nerve entrapment at the wrist, 3rd interdigital branch of right median nerve, mild left median nerve, all branches.10/16/23 REEG: reported normal.  Other pertinent findings - Note: degree of sensory and motor deficits varies in different testing.   Neuropsychiatric Mental status exam performed with findings of - no evidence of hallucinations, delusions, obsessions or homicidal/suicidal ideation.  Musculoskeletal Spine/Ribs/Pelvis Cervical Spine - Examination of the cervical spine reveals - normal cervical spine movements (tenderness of left paracervical spinal muscles, worse by extension and left lateral movement) and normal posture. Thoracic (Dorsal) Spine - Examination of the thoracic spine reveals - no tenderness over thoracic vertebrae, no pain, no paraspinous muscle spasm and normal thoracic spine movements. Lumbosacral Spine - Evaluation of related systems reveals - Straight leg raising negative. Examination of the lumbosacral spine reveals - no tenderness to palpation, no pain, no paraspinous muscle spasm and normal lumbosacral spine movements. Upper Extremity  Ulna: Inspection and Palpation - Tenderness - Tinel sign at cubital tunnel not present. Hand/Wrist: Wrist: Inspection and Palpation - Tenderness - Tinel sign of wrist negative and Phalan sign of wrist negative.
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
## 2024-01-31 NOTE — DISCUSSION/SUMMARY
[FreeTextEntry1] : He will take prescribed dosage of topiramate 300mg, nightly and repeat level within one week. Wife will monitor the process. Due to the subjective motor and sensory deficits have no impact to his daily living. Hold further work up for now.

## 2024-02-01 ENCOUNTER — EMERGENCY (EMERGENCY)
Facility: HOSPITAL | Age: 64
LOS: 0 days | Discharge: HOME | End: 2024-02-01
Attending: EMERGENCY MEDICINE
Payer: MEDICARE

## 2024-02-01 VITALS
OXYGEN SATURATION: 98 % | RESPIRATION RATE: 18 BRPM | SYSTOLIC BLOOD PRESSURE: 136 MMHG | TEMPERATURE: 97 F | DIASTOLIC BLOOD PRESSURE: 74 MMHG | HEART RATE: 68 BPM | WEIGHT: 210.1 LBS

## 2024-02-01 VITALS
DIASTOLIC BLOOD PRESSURE: 76 MMHG | OXYGEN SATURATION: 97 % | SYSTOLIC BLOOD PRESSURE: 133 MMHG | RESPIRATION RATE: 18 BRPM | HEART RATE: 69 BPM

## 2024-02-01 DIAGNOSIS — Z88.1 ALLERGY STATUS TO OTHER ANTIBIOTIC AGENTS STATUS: ICD-10-CM

## 2024-02-01 DIAGNOSIS — I69.331 MONOPLEGIA OF UPPER LIMB FOLLOWING CEREBRAL INFARCTION AFFECTING RIGHT DOMINANT SIDE: ICD-10-CM

## 2024-02-01 DIAGNOSIS — I69.351 HEMIPLEGIA AND HEMIPARESIS FOLLOWING CEREBRAL INFARCTION AFFECTING RIGHT DOMINANT SIDE: ICD-10-CM

## 2024-02-01 DIAGNOSIS — R29.898 OTHER SYMPTOMS AND SIGNS INVOLVING THE MUSCULOSKELETAL SYSTEM: ICD-10-CM

## 2024-02-01 DIAGNOSIS — E78.5 HYPERLIPIDEMIA, UNSPECIFIED: ICD-10-CM

## 2024-02-01 DIAGNOSIS — R41.0 DISORIENTATION, UNSPECIFIED: ICD-10-CM

## 2024-02-01 DIAGNOSIS — Z85.72 PERSONAL HISTORY OF NON-HODGKIN LYMPHOMAS: ICD-10-CM

## 2024-02-01 DIAGNOSIS — J44.9 CHRONIC OBSTRUCTIVE PULMONARY DISEASE, UNSPECIFIED: ICD-10-CM

## 2024-02-01 DIAGNOSIS — I10 ESSENTIAL (PRIMARY) HYPERTENSION: ICD-10-CM

## 2024-02-01 DIAGNOSIS — R47.9 UNSPECIFIED SPEECH DISTURBANCES: ICD-10-CM

## 2024-02-01 DIAGNOSIS — Z95.0 PRESENCE OF CARDIAC PACEMAKER: Chronic | ICD-10-CM

## 2024-02-01 DIAGNOSIS — E11.9 TYPE 2 DIABETES MELLITUS WITHOUT COMPLICATIONS: ICD-10-CM

## 2024-02-01 DIAGNOSIS — Z98.890 OTHER SPECIFIED POSTPROCEDURAL STATES: Chronic | ICD-10-CM

## 2024-02-01 DIAGNOSIS — R51.9 HEADACHE, UNSPECIFIED: ICD-10-CM

## 2024-02-01 DIAGNOSIS — G44.209 TENSION-TYPE HEADACHE, UNSPECIFIED, NOT INTRACTABLE: ICD-10-CM

## 2024-02-01 LAB
ALBUMIN SERPL ELPH-MCNC: 4.3 G/DL — SIGNIFICANT CHANGE UP (ref 3.5–5.2)
ALP SERPL-CCNC: 56 U/L — SIGNIFICANT CHANGE UP (ref 30–115)
ALT FLD-CCNC: 26 U/L — SIGNIFICANT CHANGE UP (ref 0–41)
ANION GAP SERPL CALC-SCNC: 11 MMOL/L — SIGNIFICANT CHANGE UP (ref 7–14)
APTT BLD: 29.7 SEC — SIGNIFICANT CHANGE UP (ref 27–39.2)
AST SERPL-CCNC: 33 U/L — SIGNIFICANT CHANGE UP (ref 0–41)
BASOPHILS # BLD AUTO: 0.03 K/UL — SIGNIFICANT CHANGE UP (ref 0–0.2)
BASOPHILS NFR BLD AUTO: 0.8 % — SIGNIFICANT CHANGE UP (ref 0–1)
BILIRUB SERPL-MCNC: 1.2 MG/DL — SIGNIFICANT CHANGE UP (ref 0.2–1.2)
BUN SERPL-MCNC: 15 MG/DL — SIGNIFICANT CHANGE UP (ref 10–20)
CALCIUM SERPL-MCNC: 8.9 MG/DL — SIGNIFICANT CHANGE UP (ref 8.4–10.5)
CHLORIDE SERPL-SCNC: 106 MMOL/L — SIGNIFICANT CHANGE UP (ref 98–110)
CK MB CFR SERPL CALC: 1.8 NG/ML — SIGNIFICANT CHANGE UP (ref 0.6–6.3)
CK SERPL-CCNC: 66 U/L — SIGNIFICANT CHANGE UP (ref 0–225)
CO2 SERPL-SCNC: 23 MMOL/L — SIGNIFICANT CHANGE UP (ref 17–32)
CREAT SERPL-MCNC: 1.1 MG/DL — SIGNIFICANT CHANGE UP (ref 0.7–1.5)
EGFR: 76 ML/MIN/1.73M2 — SIGNIFICANT CHANGE UP
EOSINOPHIL # BLD AUTO: 0.15 K/UL — SIGNIFICANT CHANGE UP (ref 0–0.7)
EOSINOPHIL NFR BLD AUTO: 3.8 % — SIGNIFICANT CHANGE UP (ref 0–8)
GLUCOSE SERPL-MCNC: 122 MG/DL — HIGH (ref 70–99)
HCT VFR BLD CALC: 45.2 % — SIGNIFICANT CHANGE UP (ref 42–52)
HGB BLD-MCNC: 15.2 G/DL — SIGNIFICANT CHANGE UP (ref 14–18)
IMM GRANULOCYTES NFR BLD AUTO: 0 % — LOW (ref 0.1–0.3)
INR BLD: 1 RATIO — SIGNIFICANT CHANGE UP (ref 0.65–1.3)
LYMPHOCYTES # BLD AUTO: 1.28 K/UL — SIGNIFICANT CHANGE UP (ref 1.2–3.4)
LYMPHOCYTES # BLD AUTO: 32.5 % — SIGNIFICANT CHANGE UP (ref 20.5–51.1)
MCHC RBC-ENTMCNC: 28.6 PG — SIGNIFICANT CHANGE UP (ref 27–31)
MCHC RBC-ENTMCNC: 33.6 G/DL — SIGNIFICANT CHANGE UP (ref 32–37)
MCV RBC AUTO: 85.1 FL — SIGNIFICANT CHANGE UP (ref 80–94)
MONOCYTES # BLD AUTO: 0.43 K/UL — SIGNIFICANT CHANGE UP (ref 0.1–0.6)
MONOCYTES NFR BLD AUTO: 10.9 % — HIGH (ref 1.7–9.3)
NEUTROPHILS # BLD AUTO: 2.05 K/UL — SIGNIFICANT CHANGE UP (ref 1.4–6.5)
NEUTROPHILS NFR BLD AUTO: 52 % — SIGNIFICANT CHANGE UP (ref 42.2–75.2)
NRBC # BLD: 0 /100 WBCS — SIGNIFICANT CHANGE UP (ref 0–0)
PLATELET # BLD AUTO: 156 K/UL — SIGNIFICANT CHANGE UP (ref 130–400)
PMV BLD: 10.8 FL — HIGH (ref 7.4–10.4)
POTASSIUM SERPL-MCNC: 4.6 MMOL/L — SIGNIFICANT CHANGE UP (ref 3.5–5)
POTASSIUM SERPL-SCNC: 4.6 MMOL/L — SIGNIFICANT CHANGE UP (ref 3.5–5)
PROT SERPL-MCNC: 7.2 G/DL — SIGNIFICANT CHANGE UP (ref 6–8)
PROTHROM AB SERPL-ACNC: 11.4 SEC — SIGNIFICANT CHANGE UP (ref 9.95–12.87)
RBC # BLD: 5.31 M/UL — SIGNIFICANT CHANGE UP (ref 4.7–6.1)
RBC # FLD: 13.4 % — SIGNIFICANT CHANGE UP (ref 11.5–14.5)
SODIUM SERPL-SCNC: 140 MMOL/L — SIGNIFICANT CHANGE UP (ref 135–146)
TROPONIN T, HIGH SENSITIVITY RESULT: 12 NG/L — SIGNIFICANT CHANGE UP (ref 6–21)
WBC # BLD: 3.94 K/UL — LOW (ref 4.8–10.8)
WBC # FLD AUTO: 3.94 K/UL — LOW (ref 4.8–10.8)

## 2024-02-01 PROCEDURE — 80053 COMPREHEN METABOLIC PANEL: CPT

## 2024-02-01 PROCEDURE — 0042T: CPT | Mod: MA

## 2024-02-01 PROCEDURE — 82962 GLUCOSE BLOOD TEST: CPT

## 2024-02-01 PROCEDURE — 85610 PROTHROMBIN TIME: CPT

## 2024-02-01 PROCEDURE — 70496 CT ANGIOGRAPHY HEAD: CPT | Mod: 26,MA

## 2024-02-01 PROCEDURE — 70498 CT ANGIOGRAPHY NECK: CPT | Mod: 26,MA

## 2024-02-01 PROCEDURE — 70450 CT HEAD/BRAIN W/O DYE: CPT | Mod: 26,MA,59

## 2024-02-01 PROCEDURE — 99285 EMERGENCY DEPT VISIT HI MDM: CPT | Mod: 25

## 2024-02-01 PROCEDURE — 96374 THER/PROPH/DIAG INJ IV PUSH: CPT | Mod: XU

## 2024-02-01 PROCEDURE — 82550 ASSAY OF CK (CPK): CPT

## 2024-02-01 PROCEDURE — 95819 EEG AWAKE AND ASLEEP: CPT | Mod: 26

## 2024-02-01 PROCEDURE — 99283 EMERGENCY DEPT VISIT LOW MDM: CPT

## 2024-02-01 PROCEDURE — 36415 COLL VENOUS BLD VENIPUNCTURE: CPT

## 2024-02-01 PROCEDURE — 70496 CT ANGIOGRAPHY HEAD: CPT | Mod: MA

## 2024-02-01 PROCEDURE — 70498 CT ANGIOGRAPHY NECK: CPT | Mod: MA

## 2024-02-01 PROCEDURE — 82553 CREATINE MB FRACTION: CPT

## 2024-02-01 PROCEDURE — 93010 ELECTROCARDIOGRAM REPORT: CPT

## 2024-02-01 PROCEDURE — 85730 THROMBOPLASTIN TIME PARTIAL: CPT

## 2024-02-01 PROCEDURE — 84484 ASSAY OF TROPONIN QUANT: CPT

## 2024-02-01 PROCEDURE — 70450 CT HEAD/BRAIN W/O DYE: CPT | Mod: MA

## 2024-02-01 PROCEDURE — 85025 COMPLETE CBC W/AUTO DIFF WBC: CPT

## 2024-02-01 PROCEDURE — 93005 ELECTROCARDIOGRAM TRACING: CPT

## 2024-02-01 PROCEDURE — 95819 EEG AWAKE AND ASLEEP: CPT

## 2024-02-01 PROCEDURE — 99285 EMERGENCY DEPT VISIT HI MDM: CPT

## 2024-02-01 RX ORDER — METOCLOPRAMIDE HCL 10 MG
10 TABLET ORAL ONCE
Refills: 0 | Status: COMPLETED | OUTPATIENT
Start: 2024-02-01 | End: 2024-02-01

## 2024-02-01 RX ORDER — SODIUM CHLORIDE 9 MG/ML
1000 INJECTION, SOLUTION INTRAVENOUS ONCE
Refills: 0 | Status: COMPLETED | OUTPATIENT
Start: 2024-02-01 | End: 2024-02-01

## 2024-02-01 RX ADMIN — Medication 104 MILLIGRAM(S): at 11:24

## 2024-02-01 RX ADMIN — SODIUM CHLORIDE 1000 MILLILITER(S): 9 INJECTION, SOLUTION INTRAVENOUS at 11:24

## 2024-02-01 NOTE — ED PROVIDER NOTE - ATTENDING CONTRIBUTION TO CARE
I personally evaluated patient. I agree with the findings and plan with all documentation on chart except as documented  in my note.    63-year-old male past medical history of hypertension, dyslipidemia, non-Hodgkin's lymphoma, COPD presents to the emergency department with confusion that started this morning.  Patient was in normal state of health right after 8 AM and additional history obtained from patient's wife.  Patient called wife prior to ED arrival and sounded confused and ambulance was called.  Stroke code was activated in triage for new onset acute confusion.  Patient said history of multiple stroke workups in the past and presented to the ED with right-sided weakness and received thrombolytics on 2 separate occasions few years ago.  All stroke workups have been negative for acute stroke.  Patient has residual right-sided weakness to arm and leg.  Patient had an MRI performed a couple days ago that was negative.  Patient was on Topamax as prescribed by his neurologist Dr. Wells.    Patient rushed into critical care emergency department and fingerstick taken and was 128.  Initial NIH score was 6 and patient rushed for emergent head CT and CTA of head and neck.  Patient evaluated bedside by neurology immediately.  Emergent CT imaging and CT angio of head and neck negative for any acute changes.  Labs were unremarkable.  Clinical picture not thought to be stroke related after reassessment and discussion with neurology and will not give TNK for alternate diagnosis.  Patient's exam also appears to be inconsistent.  His exam improved after CT imaging.  Patient may have a complicated migraine and patient given fluids and Reglan.  EEG ordered and performed in the emergency department and was normal.  Patient remains back at baseline and neurology recommendations appreciated and will discharge with close follow-up with Dr. Wells and strict return precautions discussed with family.    Full DC instructions discussed and patient knows when to seek immediate medical attention.  Patient has proper follow up.  All results discussed and patient aware they may require further work up.  Proper follow up ensured. Limitations of ED work up discussed.  Medications administered and prescribed/OTC home meds discussed.  All questions and concerns from patient or family addressed. Understanding of instructions verbalized.

## 2024-02-01 NOTE — CONSULT NOTE ADULT - ASSESSMENT
ASSESSMENT/PLAN:    63y Male with PMHx of HTN, HLD, COPD, NonHodgkin's Lymphoma pacemaker who presented to the ED due to confusion which started this morning, last known well was at 8:15am. The patient's wife states that his son was with him earlier around 8:15 and he was fine, however, he called and sounded confused, therefore, was brought to hospital. As per patient's wife and chart review, the patient has had multiple episodes of right sided weakness in the past, recieved tnk twice in November 2020 and august 2020. He also had 4 MRIs at the time of symptoms which have all been negative for stroke, also do not see chronic stroke on scan.   HCT negative. CTA with no LVO. Given that patient's exam was rapidly improving and lower suspicion for acute stroke, tenecteplase was not administered. NI alert was initially activated due to speech and right sided weakness, however, no LVO and negative CTP, therefore NI STOP was activated and was not a thrombectomy candidate.   Due to rapidly improving exam, inconsistent speech/right sided weakness, it is more likely that patient's symptoms are secondary to hemiplegic migraine vs. functional vs. increase dose of topamax from 100 to 300mg. Lower suspicion for stroke at this time.     Recommendations:  - routine eeg   - symptomatic treatment for migraine  - titrate topamax, would recommend taking 200mg for a week before increasing to 300mg. advised patient to consult with outpatient neurologist Dr. Wells  - outpatient follow up with Dr. Wells    Case discussed with attending, Dr. reynolds      ASSESSMENT/PLAN:    63y Male with PMHx of HTN, HLD, COPD, NonHodgkin's Lymphoma pacemaker who presented to the ED due to confusion which started this morning, last known well was at 8:15am. The patient's wife states that his son was with him earlier around 8:15 and he was fine, however, he called and sounded confused, therefore, was brought to hospital. As per patient's wife and chart review, the patient has had multiple episodes of right sided weakness in the past, recieved IV thrombolytic twice in November 2020 and august 2020. He also had 4 MRIs at the time of symptoms which have all been negative for stroke, also do not see chronic stroke on scan.   HCT negative. CTA with no LVO. Given that patient's exam was rapidly improving and lower suspicion for acute stroke, tenecteplase was not administered. NI alert was initially activated due to speech and right sided weakness, however, no LVO and negative CTP, therefore NI STOP was activated and was not a thrombectomy candidate.   Due to rapidly improving exam, inconsistent speech/right sided weakness, it is more likely that patient's symptoms are secondary to hemiplegic migraine vs. functional vs. increased dose of topamax from 100 to 300mg. Lower suspicion for stroke at this time.     Recommendations:  - routine eeg   - symptomatic treatment for migraine  - titrate topamax, would recommend taking 200mg for a week before increasing to 300mg. advised patient to consult with outpatient neurologist Dr. Wells  - outpatient follow up with Dr. Wells    Case discussed with attending, Dr. reynolds

## 2024-02-01 NOTE — ED PROVIDER NOTE - PROGRESS NOTE DETAILS
SG: Patient assessed at bedside. patient feeling much better.  Amenable to plan of getting EEG, will follow-up results outpatient.  Feels much better, declining any medication for outpatient.  Will follow-up outpatient. Will continue to monitor and reassess.

## 2024-02-01 NOTE — ED PROVIDER NOTE - DIFFERENTIAL DIAGNOSIS
The differential diagnosis for patients clinical presentation includes but is not limited to:  complicated migraine, CVA, TIA, electrolyte abnormality Differential Diagnosis

## 2024-02-01 NOTE — ED PROVIDER NOTE - CLINICAL SUMMARY MEDICAL DECISION MAKING FREE TEXT BOX
63-year-old male past medical history of hypertension, dyslipidemia, non-Hodgkin's lymphoma, COPD presents to the emergency department with confusion that started this morning.  Patient was in normal state of health right after 8 AM and additional history obtained from patient's wife.  Patient called wife prior to ED arrival and sounded confused and ambulance was called.  Stroke code was activated in triage for new onset acute confusion.  Patient said history of multiple stroke workups in the past and presented to the ED with right-sided weakness and received thrombolytics on 2 separate occasions few years ago.  All stroke workups have been negative for acute stroke.  Patient has residual right-sided weakness to arm and leg.  Patient had an MRI performed a couple days ago that was negative.  Patient was on Topamax as prescribed by his neurologist Dr. Wells.    Patient rushed into critical care emergency department and fingerstick taken and was 128.  Initial NIH score was 6 and patient rushed for emergent head CT and CTA of head and neck.  Patient evaluated bedside by neurology immediately.  Emergent CT imaging and CT angio of head and neck negative for any acute changes.  Labs were unremarkable.  Clinical picture not thought to be stroke related after reassessment and discussion with neurology and will not give TNK for alternate diagnosis.  Patient's exam also appears to be inconsistent.  His exam improved after CT imaging.  Patient may have a complicated migraine and patient given fluids and Reglan.  EEG ordered and performed in the emergency department and was normal.  Patient remains back at baseline and neurology recommendations appreciated and will discharge with close follow-up with Dr. Wells and strict return precautions discussed with family.    Full DC instructions discussed and patient knows when to seek immediate medical attention.  Patient has proper follow up.  All results discussed and patient aware they may require further work up.  Proper follow up ensured. Limitations of ED work up discussed.  Medications administered and prescribed/OTC home meds discussed.  All questions and concerns from patient or family addressed. Understanding of instructions verbalized.

## 2024-02-01 NOTE — ED ADULT NURSE NOTE - OBJECTIVE STATEMENT
Patient is a 63 year old male coming in for AMS and periods of confusion since 8AM. Patient has history of strokes

## 2024-02-01 NOTE — CONSULT NOTE ADULT - NS ATTEND AMEND GEN_ALL_CORE FT
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 Pt had 3 similar episodes in the past, in 08/2020, 11/2020 and 12/2021. Received IV thrombolytic twice in 2020. Had residual right UE/LE paresis. Negative MRI brain for acute ischemia in each case. Video EEG was negative for epileptiform activity. Pt also had recent MRI brain done at Dr. Wells's office for migraines on 1/30/24 which was also without acute process. Pt's symptoms quickly improved, some fluctuating weakness in RUE/RLE which is baseline. CT head without acute process. CTP negative for perfusion defect. CTA head/neck without significant flow limiting stenosis. Etiology unlikely stroke/TIA given multiple similar episodes without evidence of stroke in the past. Possible hemiplegia migraine vs conversion disorder vs medication effect (TPX recently increased from 100mg to 300mg, first dose was yesterday evening) vs less likely seizure.   Recommend rEEG  Decreased TPX to 200mg x 1 week, then increase to 300mg QHS for slower up titration  F/u with his Neurologist, Dr. Wells outpt

## 2024-02-01 NOTE — ED ADULT TRIAGE NOTE - CHIEF COMPLAINT QUOTE
pt presents with right sided weakness and confusion per wife, was unable to answer questions around 8 am, hx of strokes

## 2024-02-01 NOTE — CONSULT NOTE ADULT - SUBJECTIVE AND OBJECTIVE BOX
**STROKE CODE CONSULT NOTE**    Last known well time/Time of onset of symptoms: 8:15am when talking to son     HPI: 63y Male with PMHx of HTN, HLD, COPD, NonHodgkin's Lymphoma pacemaker who presented to the ED due to confusion which started this morning, last known well was at 8:15am. The patient's wife states that his son was with him earlier around 8:15 and he was fine, however, he called and sounded confused, therefore, was brought to hospital. As per patient's wife and chart review, the patient has had multiple episodes of right sided weakness in the past, recieved tnk twice in November 2020 and august 2020. He also had 4 MRIs at the time of symptoms which have all been negative for stroke, also do not see chronic stroke on scan. As per patient, has baseline right arm and right leg weakness from "prior stroke". As per patient, states that he also currently has a severe migraine which he does suffer from. Currently sees neurologist, Dr. Wells. States that he is prescribed topamax 100 mg 3 pills (so a total of 300mg) to be taken at night. has only been taking 100mg, however, last night he took the full dose of 300mg. additionally, had an MRI +/- on 1/30/24 which was negative. States that he does have a pacemaker so it is difficult to get MRI because does not have the pacemaker info and the company needs to be called each time. The wife does state that she does believe may be psychogenic as he does not "do much all day" and thinks that he may be depressed.   The patient was able to answer questions, and starting following commands. Endorses baseline right sided weakness, headache.     T(C): 36.1 (02-01-24 @ 10:24), Max: 36.1 (02-01-24 @ 10:24)  HR: 68 (02-01-24 @ 10:24) (68 - 68)  BP: 136/74 (02-01-24 @ 10:24) (136/74 - 136/74)  RR: 18 (02-01-24 @ 10:24) (18 - 18)  SpO2: 98% (02-01-24 @ 10:24) (98% - 98%)    PAST MEDICAL & SURGICAL HISTORY:  HTN (hypertension)      High cholesterol      DM (diabetes mellitus)      Chronic obstructive pulmonary disease, unspecified COPD type      Non Hodgkin's lymphoma      Pacemaker      Artificial cardiac pacemaker      S/P transesophageal echocardiogram ([facility]ITA)          FAMILY HISTORY:  Family history of breast cancer (Sibling)  in sister at the age of 59    Family history of cancer (Mother)        SOCIAL HISTORY:  former smoker (quit in 2015)  denies alcohol/drug use     ROS:   as per HPI    MEDICATIONS  (STANDING):    MEDICATIONS  (PRN):    Home Medications:  Breztri Aerosphere inhalation aerosol: 2 puff(s) inhaled 2 times a day (11 Dec 2021 19:52)  pioglitazone 30 mg oral tablet: 1 tab(s) orally once a day (11 Dec 2021 19:52)  Synjardy 12.5 mg-1000 mg oral tablet: 1 tab(s) orally 2 times a day (11 Dec 2021 19:52)  Trulicity Pen 3 mg/0.5 mL subcutaneous solution:  (11 Dec 2021 19:52)      Allergies    acyclovir (Anaphylaxis)  Bactrim (Anaphylaxis)    Intolerances      Vital Signs Last 24 Hrs  T(C): 36.1 (01 Feb 2024 10:24), Max: 36.1 (01 Feb 2024 10:24)  T(F): 97 (01 Feb 2024 10:24), Max: 97 (01 Feb 2024 10:24)  HR: 68 (01 Feb 2024 10:24) (68 - 68)  BP: 136/74 (01 Feb 2024 10:24) (136/74 - 136/74)  BP(mean): --  RR: 18 (01 Feb 2024 10:24) (18 - 18)  SpO2: 98% (01 Feb 2024 10:24) (98% - 98%)    Parameters below as of 01 Feb 2024 10:24  Patient On (Oxygen Delivery Method): room air        Physical exam:  General: No acute distress, awake and alert    Neurologic:  -Mental status: Awake, alert, oriented to person, hospital year, disoriented to month. not following commands. speech initially slowed with frequent pauses, however, was able to name all objects including pen, ring, watch.   -Cranial nerves:   II: Visual fields are full to confrontation.  III, IV, VI: Extraocular movements are intact without nystagmus.   V:  Facial sensation V1-V3 equal and intact   VII: Face is symmetric with normal eye closure and smile  Motor: Normal bulk and tone. No pronator drift. right upper extremity 4/5, right lower extremity 4/5, left upper extremity 5/5, left lower extremity 5/5.   Sensation: Intact to light touch bilaterally. No neglect or extinction on double simultaneous testing.  Coordination: No dysmetria on finger-to-nose bilaterally  Reflexes: Downgoing toes bilaterally   Gait: Narrow gait and steady    NIHSS: 6    patient's exam appeared to be inconsistent with following commands and speech. right upper and lower extremity weakness possibly effort limited.   Exam rapidly improved after scans completed.     Fingerstick Blood Glucose: CAPILLARY BLOOD GLUCOSE  128 (01 Feb 2024 11:00)      POCT Blood Glucose.: 128 mg/dL (01 Feb 2024 10:21)    LABS:                        15.2   3.94  )-----------( 156      ( 01 Feb 2024 10:30 )             45.2     02-01    140  |  106  |  15  ----------------------------<  122<H>  4.6   |  23  |  1.1    Ca    8.9      01 Feb 2024 10:30    TPro  7.2  /  Alb  4.3  /  TBili  1.2  /  DBili  x   /  AST  33  /  ALT  26  /  AlkPhos  56  02-01    PT/INR - ( 01 Feb 2024 10:30 )   PT: 11.40 sec;   INR: 1.00 ratio         PTT - ( 01 Feb 2024 10:30 )  PTT:29.7 sec  CARDIAC MARKERS ( 01 Feb 2024 10:30 )  x     / x     / 66 U/L / x     / 1.8 ng/mL      Urinalysis Basic - ( 01 Feb 2024 10:30 )    Color: x / Appearance: x / SG: x / pH: x  Gluc: 122 mg/dL / Ketone: x  / Bili: x / Urobili: x   Blood: x / Protein: x / Nitrite: x   Leuk Esterase: x / RBC: x / WBC x   Sq Epi: x / Non Sq Epi: x / Bacteria: x        RADIOLOGY & ADDITIONAL STUDIES:    HCT:  < from: CT Brain Stroke Protocol (02.01.24 @ 10:33) >    IMPRESSION:    No evidence of acute intracranial hemorrhage or large territory infarct.   Stable exam.    CTA:  < from: CT Angio Neck Stroke Protocol w/ IV Cont (02.01.24 @ 10:54) >    IMPRESSION:    No evidence of major vascular stenosis or occlusion. Normal perfusion   images.      -----------------------------------------------------------------------------------------------------------------  IV-tenecetplase(Y/N):  no                      Reason IV-tenecetplase not given: patient rapidly improving back to baseline, low suspicion for stroke, more likely secondary to migraine              **STROKE CODE CONSULT NOTE**    Last known well time/Time of onset of symptoms: 8:15am when talking to son     HPI: 63y Male with PMHx of HTN, HLD, COPD, NonHodgkin's Lymphoma pacemaker who presented to the ED due to confusion which started this morning, last known well was at 8:15am. The patient's wife states that his son was with him earlier around 8:15 and he was fine, however, he called and sounded confused, therefore, was brought to hospital. As per patient's wife and chart review, the patient has had multiple episodes of right sided weakness in the past, received IV thrombolytic twice, in November 2020 and in august 2020. He also had 4 MRIs at the time of symptoms which have all been negative for stroke, also do not see chronic stroke on scan. As per patient, has baseline right arm and right leg weakness from "prior stroke". As per patient, states that he also currently has a severe migraine which he does suffer from. Currently sees neurologist, Dr. Wells. States that he is prescribed topamax 100 mg 3 pills (so a total of 300mg) to be taken at night. has only been taking 100mg, however, last night he took the full dose of 300mg. additionally, had an MRI +/- on 1/30/24 which was negative. States that he does have a pacemaker so it is difficult to get MRI because does not have the pacemaker info and the company needs to be called each time. The wife does state that she does believe may be psychogenic as he does not "do much all day" and thinks that he may be depressed.   The patient was able to answer questions, and starting following commands. Endorses baseline right sided weakness, headache.     T(C): 36.1 (02-01-24 @ 10:24), Max: 36.1 (02-01-24 @ 10:24)  HR: 68 (02-01-24 @ 10:24) (68 - 68)  BP: 136/74 (02-01-24 @ 10:24) (136/74 - 136/74)  RR: 18 (02-01-24 @ 10:24) (18 - 18)  SpO2: 98% (02-01-24 @ 10:24) (98% - 98%)    PAST MEDICAL & SURGICAL HISTORY:  HTN (hypertension)      High cholesterol      DM (diabetes mellitus)      Chronic obstructive pulmonary disease, unspecified COPD type      Non Hodgkin's lymphoma      Pacemaker      Artificial cardiac pacemaker      S/P transesophageal echocardiogram ([facility])          FAMILY HISTORY:  Family history of breast cancer (Sibling)  in sister at the age of 59    Family history of cancer (Mother)        SOCIAL HISTORY:  former smoker (quit in 2015)  denies alcohol/drug use     ROS:   as per HPI    MEDICATIONS  (STANDING):    MEDICATIONS  (PRN):    Home Medications:  Breztri Aerosphere inhalation aerosol: 2 puff(s) inhaled 2 times a day (11 Dec 2021 19:52)  pioglitazone 30 mg oral tablet: 1 tab(s) orally once a day (11 Dec 2021 19:52)  Synjardy 12.5 mg-1000 mg oral tablet: 1 tab(s) orally 2 times a day (11 Dec 2021 19:52)  Trulicity Pen 3 mg/0.5 mL subcutaneous solution:  (11 Dec 2021 19:52)      Allergies    acyclovir (Anaphylaxis)  Bactrim (Anaphylaxis)    Intolerances      Vital Signs Last 24 Hrs  T(C): 36.1 (01 Feb 2024 10:24), Max: 36.1 (01 Feb 2024 10:24)  T(F): 97 (01 Feb 2024 10:24), Max: 97 (01 Feb 2024 10:24)  HR: 68 (01 Feb 2024 10:24) (68 - 68)  BP: 136/74 (01 Feb 2024 10:24) (136/74 - 136/74)  BP(mean): --  RR: 18 (01 Feb 2024 10:24) (18 - 18)  SpO2: 98% (01 Feb 2024 10:24) (98% - 98%)    Parameters below as of 01 Feb 2024 10:24  Patient On (Oxygen Delivery Method): room air        Physical exam:  General: No acute distress, awake and alert    Neurologic:  -Mental status: Awake, alert, oriented to person, hospital year, disoriented to month. not following commands. speech initially slowed with frequent pauses, however, was able to name all objects including pen, ring, watch.   -Cranial nerves:   II: Visual fields are full to confrontation.  III, IV, VI: Extraocular movements are intact without nystagmus.   V:  Facial sensation V1-V3 equal and intact   VII: Face is symmetric with normal eye closure and smile  Motor: Normal bulk and tone. No pronator drift. right upper extremity 4/5, right lower extremity 4/5, left upper extremity 5/5, left lower extremity 5/5.   Sensation: Intact to light touch bilaterally. No neglect or extinction on double simultaneous testing.  Coordination: No dysmetria on finger-to-nose bilaterally  Reflexes: Downgoing toes bilaterally   Gait: Narrow gait and steady    NIHSS: 6    patient's exam appeared to be inconsistent with following commands and speech. right upper and lower extremity weakness possibly effort limited.   Exam rapidly improved after scans completed.     Fingerstick Blood Glucose: CAPILLARY BLOOD GLUCOSE  128 (01 Feb 2024 11:00)      POCT Blood Glucose.: 128 mg/dL (01 Feb 2024 10:21)    LABS:                        15.2   3.94  )-----------( 156      ( 01 Feb 2024 10:30 )             45.2     02-01    140  |  106  |  15  ----------------------------<  122<H>  4.6   |  23  |  1.1    Ca    8.9      01 Feb 2024 10:30    TPro  7.2  /  Alb  4.3  /  TBili  1.2  /  DBili  x   /  AST  33  /  ALT  26  /  AlkPhos  56  02-01    PT/INR - ( 01 Feb 2024 10:30 )   PT: 11.40 sec;   INR: 1.00 ratio         PTT - ( 01 Feb 2024 10:30 )  PTT:29.7 sec  CARDIAC MARKERS ( 01 Feb 2024 10:30 )  x     / x     / 66 U/L / x     / 1.8 ng/mL      Urinalysis Basic - ( 01 Feb 2024 10:30 )    Color: x / Appearance: x / SG: x / pH: x  Gluc: 122 mg/dL / Ketone: x  / Bili: x / Urobili: x   Blood: x / Protein: x / Nitrite: x   Leuk Esterase: x / RBC: x / WBC x   Sq Epi: x / Non Sq Epi: x / Bacteria: x        RADIOLOGY & ADDITIONAL STUDIES:    HCT:  < from: CT Brain Stroke Protocol (02.01.24 @ 10:33) >    IMPRESSION:    No evidence of acute intracranial hemorrhage or large territory infarct.   Stable exam.    CTA:  < from: CT Angio Neck Stroke Protocol w/ IV Cont (02.01.24 @ 10:54) >    IMPRESSION:    No evidence of major vascular stenosis or occlusion. Normal perfusion   images.      -----------------------------------------------------------------------------------------------------------------  IV-tenecetplase(Y/N):  no                      Reason IV-tenecetplase not given: patient rapidly improving back to baseline, low suspicion for stroke, more likely secondary to migraine

## 2024-02-01 NOTE — ED PROVIDER NOTE - CONSIDERATION OF ADMISSION OBSERVATION
Consideration of Admission/Observation Patient improved back to baseline and will attempt outpatient management. Patient is a good candidate to attempt outpatient management. Supportive care and home care discussed in detail. Patient aware they may have to return for re-evaluation and possible admission if outpatient treatment fails. Strict return precautions discussed.

## 2024-02-01 NOTE — ED PROVIDER NOTE - PATIENT PORTAL LINK FT
You can access the FollowMyHealth Patient Portal offered by [facility] by registering at the following website: [email or web address]. By joining [employer] [facility]’s FollowMyHealth portal, you will also be able to view your health information using other applications (apps) compatible with our system.

## 2024-02-01 NOTE — STROKE CODE NOTE - PATIENT LAST KNOWN
Infusion Nursing Note:  Keerthi Sloan presents today for venofer.    Patient seen by provider today: No   present during visit today: Not Applicable.    Note: N/A.    Intravenous Access:  Peripheral IV placed.    Treatment Conditions:  Not Applicable.      Post Infusion Assessment:  Patient tolerated infusion without incident.  Blood return noted pre and post infusion.  Site patent and intact, free from redness, edema or discomfort.  No evidence of extravasations.  Access discontinued per protocol.       Discharge Plan:   Discharge instructions reviewed with: Patient.  Patient and/or family verbalized understanding of discharge instructions and all questions answered.  Patient discharged in stable condition accompanied by: self.  Departure Mode: Ambulatory.    Kylie Becerril RN                         Known

## 2024-02-01 NOTE — ED PROVIDER NOTE - OBJECTIVE STATEMENT
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  Patient is presenting with worsening right-sided upper extremity weakness, confusion, decreased ability to answer questions since around 8 AM.  Currently complaining of associated headache as well.  Denies any fever, chills, nausea, vomiting, CP, SOB, changes in urination, or changes in bowel movements.

## 2024-02-01 NOTE — STROKE CODE NOTE - NS AS ED ARRIVAL MODE
Private transport/walk-in
Patient referred for elevated A1c.  Patient found in bed and reports to feel pregnant and to have gained " 20 pounds in poop."    Patient admits to diabetes not being well controlled.  Does not have a glucometer x 2 weeks due to insurance resons.  Admits fingersticks are in 200's and checks 2x/day.  Food recall reveals a diet high in CHO and high in salt, large amount of vegetables and fruits and lacking in lean protein.  Admits that she does not cook since mother .  Patient also uncomfortable due to constipation.  Reports very hard, pretzel like stools, takes miralax and senna at home and may not be drinking enough fluids.  Eats shredded wheat dry for snack!

## 2024-02-01 NOTE — ED PROVIDER NOTE - PHYSICAL EXAMINATION
CONSTITUTIONAL: NAD  SKIN: Warm dry  HEAD: NCAT  EYES: NL inspection  ENT: MMM  CARD: RRR  RESP: CTAB  ABD: Soft, non tender, no rebound or guarding   EXT: no pedal edema  NEURO: see below  PSYCH: Cooperative, appropriate.

## 2024-02-02 NOTE — EEG REPORT - NS EEG TEXT BOX
Brief Clinical History:  LONG GEE is a 63 year old Male; study performed to investigate for seizures or markers of epilepsy.   Diagnosis Code: R40.4 Transient alteration of awareness  CPT:  58694 (awake/drowsy)     Patient Medication:  No Data.    Acquisition Details:  Electroencephalography was acquired using a minimum of 21 channels on an C2C [employer] Software Neurology system v 9.3.1 with electrode placement according to the standard International 10-20 system following ACNS (American Clinical Neurophysiology Society) guidelines.  Anterior temporal T1 and T2 electrodes were utilized whenever possible.   The XLTEK automated spike & seizure detections were all reviewed in detail, in addition to the entire raw EEG.    Findings:  Background:  continuous. .  Voltage:  Normal (20uV)  Organization:  Appropriate anterior-posterior gradient  Posterior Dominant Rhythm:  8.5 Hz symmetric, well-organized, and well-modulated  Variability:  Yes	  Reactivity:  Yes  Sleep:  Absent.  Focal abnormalities:  No persistent asymmetries of voltage or frequency.  Interictal Activity:  None  Location:    Focal Slowing:  None  Generalized Slowing:  No  Events:  1)	No electrographic seizures or significant clinical events.  Provocations:  1)	Hyperventilation: was not performed.  2)	Photic stimulation: was not performed.    Impression:  Normal    Clinical Correlation:  Normal study does not exclude diagnosis of seizure disorder    Francesco Coombs MD  Attending Neurologist, Division of Epilepsy

## 2024-02-13 NOTE — DISCHARGE NOTE PROVIDER - CARE PROVIDER_API CALL
.
Tarun Rider  INTERNAL MEDICINE  [address] [place] [address]  Phone: [phone]  Fax: [phone]  Follow Up Time: 1 week    Gretchen River)  Cardiology; Internal Medicine  [address] [place] [address]  Phone: [phone]  Fax: [phone]  Follow Up Time: 1 week    Kelle Gasca  NEUROLOGY  [address] [place] [address]  Phone: [phone]  Fax: [phone]  Follow Up Time: 2 weeks    Demarcus Herron  FAMILY MEDICINE  [address] [place] [id number]  Phone: [phone]  Fax: [phone]  Follow Up Time: 1 week

## 2024-02-14 ENCOUNTER — APPOINTMENT (OUTPATIENT)
Dept: ELECTROPHYSIOLOGY | Facility: CLINIC | Age: 64
End: 2024-02-14
Payer: MEDICARE

## 2024-02-14 VITALS
WEIGHT: 198 LBS | DIASTOLIC BLOOD PRESSURE: 60 MMHG | HEIGHT: 72 IN | BODY MASS INDEX: 26.82 KG/M2 | SYSTOLIC BLOOD PRESSURE: 112 MMHG | HEART RATE: 69 BPM

## 2024-02-14 PROCEDURE — 93000 ELECTROCARDIOGRAM COMPLETE: CPT | Mod: 59

## 2024-02-14 PROCEDURE — 93280 PM DEVICE PROGR EVAL DUAL: CPT

## 2024-02-14 PROCEDURE — 99214 OFFICE O/P EST MOD 30 MIN: CPT | Mod: 25

## 2024-02-14 NOTE — ASSESSMENT
[FreeTextEntry1] : ## CHB s/p DC-PPM (Bio, dep, 15) ## Nonischemic cardiomyopathy  ## Pre-op Assessment   - PPM interrogation shows normally functioning DC-PPM. Battery life 2.8 years. No new events. No evidence of AF. - No evidence of AF. Will continue to be off Eliquis.   - TTE showed stable EF 50%. We will consider CRT upgrade if further reduction in EF or symptoms of HF develop. - Echo - Patient okay to proceed with cystoscopy from EP standpoint. No routine pacemaker check needed. Patient is pacemaker dependent. Pacemaker should be checked based on institutional policy.  - Cardiology follow-up  - Remote Monitoring - RTC in 6 months

## 2024-02-14 NOTE — PROCEDURE
[Complete Heart Block] : complete heart block [Pacemaker] : pacemaker [Threshold Testing Performed] : Threshold testing was performed [Programmed for Longevity] : output reprogrammed for improved battery longevity [Magnet Rate: ___ Ppm] : magnet rate was [unfilled] Ppm [Longevity: ___ months] : The estimated remaining battery life is [unfilled] months [Normal] : The battery status is normal. [Ventricular] : Ventricular [Lead Imp:  ___ohms] : lead impedance was [unfilled] ohms [Sensing Amplitude ___mv] : sensing amplitude was [unfilled] mv [___V @] : [unfilled] V [___ ms] : [unfilled] ms [None] : none [Auto Capture "On"] : auto capture was switched "On" [Counters Reset] : the counters were reset [Asense-Vsense ___ %] : Asense-Vsense [unfilled]% [Asense-Vpace ___ %] : Asense-Vpace [unfilled]% [Apace-Vsense ___ %] : Apace-Vsense [unfilled]% [Apace-Vpace ___ %] : Apace-Vpace [unfilled]% [de-identified] : Oddcastk [de-identified] : Jumana BABCOCK [de-identified] : [id number] [de-identified] : 4/14/2015 [de-identified] : DDD-CLS [de-identified] : 60/130 [de-identified] : No edits made. Capture control on in ventricle. MARYCARMEN on in atrium.  [de-identified] : Normal device function.

## 2024-02-14 NOTE — ADDENDUM
[FreeTextEntry1] : Tiffanie CHRISTIANSEN assisted in documentation on 02/14/2024 acting as a scribe for Dr. Charlie Monique.

## 2024-02-14 NOTE — HISTORY OF PRESENT ILLNESS
[SOB] : dyspnea [de-identified] : The patient noted the following symptom(s):. I had a pleasure of seeing Mr. GEE for follow-up consultation for pacemaker care. He was previously being followed by Dr. Muller. He is accompanied by his wife, who works at [employer].  Mr. GEE is a 60 year-year old male with history of HTN, DM, Morbid obesity, CHD s/p corrective surgery at age of 3, CHB s/p DC-PPM (Bio, 15, dep), CLL, DL, COPD, ex-smoker, recent encephalopathy (01/21) is here for routine f-up.  11/10: Feels fine. No new c/o 2/15/23:  Feels fine. Complains of shortness of breath on certain activities like bending. Overall feels tired. 08/16/2023: Feels fine.  02/14/2024: Feels fine. Denies any complaints. Recently admitted at [place] for medication side effect prescribed by neurologist. Plans to undergo what appears to be cystoscopy.      Denies chest pain, shortness of breath, palpitation, dizziness or LOC except noted above.  EKG (02/14/2024): SR- @ 70, , , QTc 479  EKG (08/16/2023): SR-  EKG (2/15/23) SRVP. EKG (11/10): SR w/ EKG: SR@ 76/min, LBBB, QRSd 172 ms, VA: 176 ms TTE (04/21): EF 50% TTE (07/19): EF 45-50%, mild LAE, no defect on septum LHC (09/19): No occlusive CAD Cardio: Dr. Power

## 2024-03-19 LAB — TOPIRAMATE SERPL-MCNC: 10.9 MCG/ML

## 2024-03-27 ENCOUNTER — APPOINTMENT (OUTPATIENT)
Dept: CARDIOLOGY | Facility: CLINIC | Age: 64
End: 2024-03-27
Payer: MEDICARE

## 2024-03-27 PROCEDURE — 93306 TTE W/DOPPLER COMPLETE: CPT

## 2024-03-28 ENCOUNTER — APPOINTMENT (OUTPATIENT)
Dept: NEUROLOGY | Facility: CLINIC | Age: 64
End: 2024-03-28
Payer: MEDICARE

## 2024-03-28 VITALS
RESPIRATION RATE: 16 BRPM | OXYGEN SATURATION: 98 % | HEART RATE: 68 BPM | WEIGHT: 195 LBS | HEIGHT: 72 IN | BODY MASS INDEX: 26.41 KG/M2 | DIASTOLIC BLOOD PRESSURE: 85 MMHG | SYSTOLIC BLOOD PRESSURE: 144 MMHG

## 2024-03-28 DIAGNOSIS — R42 DIZZINESS AND GIDDINESS: ICD-10-CM

## 2024-03-28 DIAGNOSIS — Z86.73 PERSONAL HISTORY OF TRANSIENT ISCHEMIC ATTACK (TIA), AND CEREBRAL INFARCTION W/OUT RESIDUAL DEFICITS: ICD-10-CM

## 2024-03-28 DIAGNOSIS — Z87.898 PERSONAL HISTORY OF OTHER SPECIFIED CONDITIONS: ICD-10-CM

## 2024-03-28 DIAGNOSIS — R51.9 HEADACHE, UNSPECIFIED: ICD-10-CM

## 2024-03-28 DIAGNOSIS — G56.00 CARPAL TUNNEL SYNDROME, UNSPECIFIED UPPER LIMB: ICD-10-CM

## 2024-03-28 DIAGNOSIS — G56.21 LESION OF ULNAR NERVE, RIGHT UPPER LIMB: ICD-10-CM

## 2024-03-28 DIAGNOSIS — R41.3 OTHER AMNESIA: ICD-10-CM

## 2024-03-28 DIAGNOSIS — M54.12 RADICULOPATHY, CERVICAL REGION: ICD-10-CM

## 2024-03-28 DIAGNOSIS — R29.898 OTHER SYMPTOMS AND SIGNS INVOLVING THE MUSCULOSKELETAL SYSTEM: ICD-10-CM

## 2024-03-28 DIAGNOSIS — Z86.69 PERSONAL HISTORY OF OTHER DISEASES OF THE NERVOUS SYSTEM AND SENSE ORGANS: ICD-10-CM

## 2024-03-28 DIAGNOSIS — Z87.39 PERSONAL HISTORY OF OTHER DISEASES OF THE MUSCULOSKELETAL SYSTEM AND CONNECTIVE TISSUE: ICD-10-CM

## 2024-03-28 PROCEDURE — G2211 COMPLEX E/M VISIT ADD ON: CPT

## 2024-03-28 PROCEDURE — 99214 OFFICE O/P EST MOD 30 MIN: CPT

## 2024-03-28 RX ORDER — EMPAGLIFLOZIN AND METFORMIN HYDROCHLORIDE 12.5; 1 MG/1; MG/1
12.5-1 TABLET ORAL
Refills: 0 | Status: DISCONTINUED | COMMUNITY
End: 2024-03-28
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
## 2024-03-28 NOTE — PHYSICAL EXAM
[FreeTextEntry1] : General Appearance - Well groomed, Not in acute distress. Build & Nutrition - Well nourished.  Head and Neck Head - normocephalic, atraumatic with no lesions or palpable masses. Neck Global Assessment - no bruit auscultated on the right, no bruit auscultated on the left.  Neurologic Mental Status Affect - normal. Speech - Normal. Thought content/perception - Normal. Cognitive function - knew year, month, day of week. Repeated 3/3 well. In 5 minutes, recalled 2/3. Substituted word of bed by couch. Cranial Nerves I Olfactory - Normal. II Optic - Visual fields - Normal. III Oculomotor - Normal Bilaterally. IV Trochlear - Bilateral - Normal - Bilateral. V Trigeminal - Temporal - Bilateral - Normal - Bilateral. Masseter - Bilateral - Normal - Bilateral. Ophthalmic - Left - Normal - Left. Ophthalmic - Right - Hypalgesic - Right and Temperature sense impaired - Right. Maxillary - Left - Normal - Left. Maxillary - Right - Hypalgesic - Right and Temperature sense impaired - Right. Mandibular - Left - Normal - Left. Mandibular - Right - Hypalgesic - Right and Temperature sense impaired - Right. VI Abducens - Bilateral - Normal - Bilateral. VII Facial - Normal Bilaterally. VIII Acoustic - Bilateral - Hearing normal - Bilateral. IX Glossopharyngeal / X Vagus - Normal. XI Accessory - Normal Bilaterally. XII Hypoglossal - Bilateral - Normal - Bilateral. Sensory Light Touch - Decreased: Note: right upper and lower extremities. Pain: Decreased: Note: right upper and lower extremities. Temperature: Decreased: Note: right upper and lower extremities. Vibration: Vibration - Decreased - Note: right upper and lower extremities. Proprioception - Right - Upper extremity impaired and Lower extremity impaired - Right. Number Identification - Impaired (right). Motor Bulk and Contour - Normal. Tone - Spastic - Right Upper Extremity and Right Lower Extremity. Strength - 5/5 normal muscle strength - Left Upper Extremity and Left Lower Extremity. 4+/5 reduced muscle strength - Right Upper Extremity (not consistent) and Right Lower Extremity (not consistent). Reflexes (Dermatomes) 2/2 Normal - Right Bicep (C5-6), Left Bicep (C5-6), Right Brachioradialis (C5-6), Left Brachioradialis (C5-6), Right Triceps (C7-8), Left Triceps (C7-8) and Right Achilles (L5-S2). 3/2 Brisk - Right Knee (L2-4), Left Knee (L2-4) and Left Achilles (L5-S2). Plantar Reflexes (L4-S2) - Bilateral - Flexion - Bilateral. Coordination - Romberg sign positive. Gait - Small steps and Medium-based. Frontal Release - Meyerson Sign.  Results of Diagnostic Studies  Labs: Note: 2/16/23 topiramate 3.1 (200mg/day). 8/29/23 normal CBC, CO2 16, low ferritin 10/13/23 topiramate not detectable. 3/13/24 topiramate 10.9 (300mg/day)  Imaging: Imaging - MRI - Note: 12/12/21 brain: reported normal. Images reviewed. 1/30/24 brain MRI: reported no acute intracranial pathology. Images reviewed. 1/30/2024 brain MRI: reported no acute changes. Images reviewed. There was a small area of left lateral frontal region with evidence of small encephalomalacia.  Neurophysiological Testing - Note: 9/20/22 EEG: normal. 12/14/22 NCS/EMG: mild right ulnar nerve entrapment at the wrist, 3rd interdigital branch of right median nerve, mild left median nerve, all branches.10/16/23 REEG: reported normal.  Other pertinent findings - Note: degree of sensory and motor deficits varies in different testing.  Neuropsychiatric Mental status exam performed with findings of - no evidence of hallucinations, delusions, obsessions or homicidal/suicidal ideation.  Musculoskeletal Spine/Ribs/Pelvis Cervical Spine - Examination of the cervical spine reveals - normal cervical spine movements (tenderness of left paracervical spinal muscles, worse by extension and left lateral movement) and normal posture. Thoracic (Dorsal) Spine - Examination of the thoracic spine reveals - no tenderness over thoracic vertebrae, no pain, no paraspinous muscle spasm and normal thoracic spine movements. Lumbosacral Spine - Evaluation of related systems reveals - Straight leg raising negative. Examination of the lumbosacral spine reveals - no tenderness to palpation, no pain, no paraspinous muscle spasm and normal lumbosacral spine movements. Upper Extremity  Ulna: Inspection and Palpation - Tenderness - Tinel sign at cubital tunnel not present. Hand/Wrist: Wrist: Inspection and Palpation - Tenderness - Tinel sign of wrist negative and Phalan sign of wrist negative.

## 2024-03-28 NOTE — DISCUSSION/SUMMARY
[FreeTextEntry1] : Neurologically remarkably improved with compliance of topiramate. He also has no side effects from the medication. Will continue same dosage. Advised to discuss with endocrinologist regarding any need to work up for the small thyroid cyst reported by imaging study of head.

## 2024-04-02 ENCOUNTER — LABORATORY RESULT (OUTPATIENT)
Age: 64
End: 2024-04-02

## 2024-04-10 ENCOUNTER — APPOINTMENT (OUTPATIENT)
Dept: CARDIOLOGY | Facility: CLINIC | Age: 64
End: 2024-04-10
Payer: MEDICARE

## 2024-04-10 VITALS
SYSTOLIC BLOOD PRESSURE: 110 MMHG | WEIGHT: 193 LBS | HEART RATE: 77 BPM | DIASTOLIC BLOOD PRESSURE: 70 MMHG | BODY MASS INDEX: 26.14 KG/M2 | HEIGHT: 72 IN

## 2024-04-10 DIAGNOSIS — I44.2 ATRIOVENTRICULAR BLOCK, COMPLETE: ICD-10-CM

## 2024-04-10 DIAGNOSIS — I10 ESSENTIAL (PRIMARY) HYPERTENSION: ICD-10-CM

## 2024-04-10 DIAGNOSIS — E78.5 HYPERLIPIDEMIA, UNSPECIFIED: ICD-10-CM

## 2024-04-10 PROCEDURE — 93000 ELECTROCARDIOGRAM COMPLETE: CPT

## 2024-04-10 PROCEDURE — 99214 OFFICE O/P EST MOD 30 MIN: CPT | Mod: 25

## 2024-04-10 NOTE — DISCUSSION/SUMMARY
[FreeTextEntry1] : Patient was instructed to target his T. Cholesterol to less than 200 mg/dl and LDL cholesterol to less than 100 mg/dl. He was advised on his recent lab results. He had blood work with Hematology and it did not include a lipid panel. Exercise and weight loss was advised. TTE results were reviewed with the patient and a copy of the results was provided to him. EKG: Paced beats,rate of 77 bpm. Maintain cardiac medications.  Patient was advised to repeat a BMP, CBC , fasting lipid profile and hepatic panel. Patient was taken off OAC by BRIGIDO SANCHEZ in 6 months [EKG obtained to assist in diagnosis and management of assessed problem(s)] : EKG obtained to assist in diagnosis and management of assessed problem(s)

## 2024-04-10 NOTE — HISTORY OF PRESENT ILLNESS
[FreeTextEntry1] : Congenital heart disease unknown by the patient and his wife, s/p open heart surgery as an infant at [facility], no records are available.\par  Patent coronaries on cardiac catheterization in 2019  LVEF=60%.\par  PPM for complete heart block\par  Poorly visualized RV and TV, on 2D echo Doppler performed in 11/18,\par  Former cigarette smoker for 40 years\par  \par  Severe emphysema\par  Pt. is disabled. He was diagnosed with stage IV CLL, on chemotherapy, followed by Dr. Rider\par  Hypertension\par  Hyperlipidemia\par  Prior cardiac catheterization performed 10 years ago by elena reported as no coronary artery disease.\par  Patient worked as a  for [employer] prior to becoming disabled.\par

## 2024-04-10 NOTE — REASON FOR VISIT
[FreeTextEntry1] : Patient presents for a  follow up visit. He denies any complaints. His BP is well controlled. He is here to review his TTE results and lab results as well.

## 2024-05-07 NOTE — ED ADULT NURSE NOTE - IN THE PAST 12 MONTHS HAVE YOU USED DRUGS OTHER THAN THOSE REQUIRED FOR MEDICAL REASON?
Care Due:                  Date            Visit Type   Department     Provider  --------------------------------------------------------------------------------                                [facility] [place] [facility]  Last Visit: 01-      FOLLOW UP    MEDICINE       Gareme Mena                               -                              [facility]  Next Visit: 05-      CARE ([facility])   MEDICINE       Graeme Mena                                                            Last  Test          Frequency    Reason                     Performed    Due Date  --------------------------------------------------------------------------------    HBA1C.......  6 months...  insulin, metFORMIN.......  01-   07-    [facility] Embedded Care Due Messages. Reference number: [id number].   5/07/2024 9:22:24 AM CDT   No

## 2024-05-13 ENCOUNTER — NON-APPOINTMENT (OUTPATIENT)
Age: 64
End: 2024-05-13

## 2024-05-14 ENCOUNTER — APPOINTMENT (OUTPATIENT)
Dept: CARDIOLOGY | Facility: CLINIC | Age: 64
End: 2024-05-14
Payer: MEDICARE

## 2024-05-14 PROCEDURE — 93296 REM INTERROG EVL PM/IDS: CPT

## 2024-05-14 PROCEDURE — 93294 REM INTERROG EVL PM/LDLS PM: CPT

## 2024-05-17 NOTE — DISCHARGE NOTE NURSING/CASE MANAGEMENT/SOCIAL WORK - NSCORESITESY/N_GEN_A_CORE_RD
65YF c/c of allergic rx Pt state started having redness and swelling to her face after starting on  Tuesday Augmentin   
No

## 2024-05-27 NOTE — ED ADULT NURSE NOTE - CAS TRG GENERAL NORM CIRC DET
Pat cont to fatigue easily with ther ex. She needs freq rest and rerouting to stay on task as she is distracted easily. She has decrease heel strike and at times has short stride. Educated her on foot clearance Will cont with POC to increase her functional mob to prevent further decline in IND. S/B Jazz Mantilla PT 
Strong peripheral pulses

## 2024-05-28 NOTE — PATIENT PROFILE ADULT. - NS TRANSFER RESPONSE BELONGING
yes 14-year-old female with known psychosis and bipolar disorder presents after feelings of suicide that was voiced to counselor.  Patient has plan to either drown herself or stab herself with knives in the house

## 2024-06-07 ENCOUNTER — RX RENEWAL (OUTPATIENT)
Age: 64
End: 2024-06-07

## 2024-06-07 RX ORDER — IRBESARTAN 300 MG/1
300 TABLET ORAL DAILY
Qty: 90 | Refills: 3 | Status: ACTIVE | COMMUNITY
Start: 2023-09-14 | End: 1900-01-01

## 2024-08-14 ENCOUNTER — APPOINTMENT (OUTPATIENT)
Dept: ELECTROPHYSIOLOGY | Facility: CLINIC | Age: 64
End: 2024-08-14

## 2024-08-14 VITALS
BODY MASS INDEX: 26.82 KG/M2 | SYSTOLIC BLOOD PRESSURE: 140 MMHG | HEART RATE: 65 BPM | WEIGHT: 198 LBS | DIASTOLIC BLOOD PRESSURE: 80 MMHG | HEIGHT: 72 IN

## 2024-08-14 PROCEDURE — 93280 PM DEVICE PROGR EVAL DUAL: CPT

## 2024-08-14 PROCEDURE — G2211 COMPLEX E/M VISIT ADD ON: CPT | Mod: NC

## 2024-08-14 PROCEDURE — 93000 ELECTROCARDIOGRAM COMPLETE: CPT | Mod: 59

## 2024-08-14 PROCEDURE — 99214 OFFICE O/P EST MOD 30 MIN: CPT | Mod: 25

## 2024-08-14 NOTE — PROCEDURE
[Complete Heart Block] : complete heart block [Pacemaker] : pacemaker [Magnet Rate: ___ Ppm] : magnet rate was [unfilled] Ppm [Longevity: ___ months] : The estimated remaining battery life is [unfilled] months [Normal] : The battery status is normal. [Threshold Testing Performed] : Threshold testing was performed [Atrial] : Atrial [Ventricular] : Ventricular [Lead Imp:  ___ohms] : lead impedance was [unfilled] ohms [Sensing Amplitude ___mv] : sensing amplitude was [unfilled] mv [___V @] : [unfilled] V [___ ms] : [unfilled] ms [None] : none [Counters Reset] : the counters were reset [Asense-Vpace ___ %] : Asense-Vpace [unfilled]% [Apace-Vpace ___ %] : Apace-Vpace [unfilled]% [de-identified] : [id number] [de-identified] : Jumana BABCOCK [de-identified] : [id number] [de-identified] : 04/14/2015 [de-identified] : DDD-CLS [de-identified] : 60/130 [de-identified] : 30% [de-identified] : normal device function

## 2024-09-19 RX ORDER — ATORVASTATIN CALCIUM 80 MG/1
80 TABLET, FILM COATED ORAL
Qty: 90 | Refills: 3 | Status: ACTIVE | COMMUNITY
Start: 2024-09-19 | End: 1900-01-01

## 2024-09-24 ENCOUNTER — OUTPATIENT (OUTPATIENT)
Dept: OUTPATIENT SERVICES | Facility: HOSPITAL | Age: 64
LOS: 1 days | End: 2024-09-24
Payer: MEDICARE

## 2024-09-24 ENCOUNTER — APPOINTMENT (OUTPATIENT)
Dept: NEUROLOGY | Facility: CLINIC | Age: 64
End: 2024-09-24
Payer: MEDICARE

## 2024-09-24 VITALS
RESPIRATION RATE: 18 BRPM | SYSTOLIC BLOOD PRESSURE: 131 MMHG | OXYGEN SATURATION: 95 % | BODY MASS INDEX: 13.27 KG/M2 | HEART RATE: 92 BPM | DIASTOLIC BLOOD PRESSURE: 82 MMHG | WEIGHT: 98 LBS | HEIGHT: 72 IN

## 2024-09-24 DIAGNOSIS — R51.9 HEADACHE, UNSPECIFIED: ICD-10-CM

## 2024-09-24 DIAGNOSIS — Z86.73 PERSONAL HISTORY OF TRANSIENT ISCHEMIC ATTACK (TIA), AND CEREBRAL INFARCTION W/OUT RESIDUAL DEFICITS: ICD-10-CM

## 2024-09-24 DIAGNOSIS — R07.9 CHEST PAIN, UNSPECIFIED: ICD-10-CM

## 2024-09-24 DIAGNOSIS — R29.898 OTHER SYMPTOMS AND SIGNS INVOLVING THE MUSCULOSKELETAL SYSTEM: ICD-10-CM

## 2024-09-24 DIAGNOSIS — G56.00 CARPAL TUNNEL SYNDROME, UNSPECIFIED UPPER LIMB: ICD-10-CM

## 2024-09-24 PROCEDURE — 99214 OFFICE O/P EST MOD 30 MIN: CPT

## 2024-09-24 PROCEDURE — 71046 X-RAY EXAM CHEST 2 VIEWS: CPT | Mod: 26

## 2024-09-24 PROCEDURE — 71046 X-RAY EXAM CHEST 2 VIEWS: CPT

## 2024-09-24 NOTE — PHYSICAL EXAM
[FreeTextEntry1] : General Appearance - Well groomed, Not in acute distress. Build & Nutrition - Well nourished.  Neuropsychiatric Mental status exam performed with findings of - no evidence of hallucinations, delusions, obsessions or homicidal/suicidal ideation.  Head and Neck Head - normocephalic, atraumatic with no lesions or palpable masses. Neck Global Assessment - no bruit auscultated on the right, no bruit auscultated on the left.  Neurologic Mental Status Affect - normal. Speech - Normal. Thought content/perception - Normal. Cognitive function - knew year, month, day of week. Repeated 3/3 well. In 5 minutes, recalled 2/3. Substituted word of bed by couch. Cranial Nerves II Optic - Visual fields - Normal. III Oculomotor - Normal Bilaterally. IV Trochlear - Bilateral - Normal - Bilateral. V Trigeminal - normal. VI Abducens - Bilateral - Normal - Bilateral. VII Facial - Normal Bilaterally. VIII Acoustic - Bilateral - Hearing normal - Bilateral. IX Glossopharyngeal / X Vagus - Normal. XI Accessory - Normal Bilaterally. XII Hypoglossal - Bilateral - Normal - Bilateral. Sensory - normal. Motor - Normal bulk, tone. 5-/5 right arm and leg. Reflexes (Dermatomes) 2/2 Normal - Right Bicep (C5-6), Left Bicep (C5-6), Right Brachioradialis (C5-6), Left Brachioradialis (C5-6), Right Triceps (C7-8), Left Triceps (C7-8) and Right Achilles (L5-S2). 3/2 Brisk - Right Knee (L2-4), Left Knee (L2-4) and Left Achilles (L5-S2). Plantar Reflexes (L4-S2) - Bilateral - Flexion - Bilateral. Coordination - Romberg sign positive. Gait - good sized steps and narrowed-based. Frontal Release - Meyerson Sign.  Neuropsychiatric Mental status exam performed with findings of - no evidence of hallucinations, delusions, obsessions or homicidal/suicidal ideation.
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
## 2024-09-24 NOTE — DISCUSSION/SUMMARY
[FreeTextEntry1] : Doing well in treatment medical conditions leading to his strokes in the past. Headache is controlled with current dosage of topiramate. No evidence of side effect. No active PNS issue. Continue same dosage of topiramate. Check level prior to follow up.

## 2024-09-24 NOTE — PROCEDURE
[FreeTextEntry1] : Labs: Note: 2/16/23 topiramate 3.1 (200mg/day). 8/29/23 normal CBC, CO2 16, low ferritin 10/13/23 topiramate not detectable. 3/13/24 topiramate 10.9 (300mg/day)  Imaging: Imaging - MRI - Note: 12/12/21 brain: reported normal. Images reviewed. 1/30/24 brain MRI: reported no acute intracranial pathology. Images reviewed. 1/30/2024 brain MRI: reported no acute changes. Images reviewed. There was a small area of left lateral frontal region with evidence of small encephalomalacia. 2/1/24 CTA head and neck: reported unremarkable.  Neurophysiological Testing - Note: 9/20/22 EEG: normal. 12/14/22 NCS/EMG: mild right ulnar nerve entrapment at the wrist, 3rd interdigital branch of right median nerve, mild left median nerve, all branches.10/16/23 REEG: reported normal.

## 2024-09-25 DIAGNOSIS — R07.9 CHEST PAIN, UNSPECIFIED: ICD-10-CM

## 2024-09-30 ENCOUNTER — LABORATORY RESULT (OUTPATIENT)
Age: 64
End: 2024-09-30

## 2024-10-02 ENCOUNTER — APPOINTMENT (OUTPATIENT)
Dept: CARDIOLOGY | Facility: CLINIC | Age: 64
End: 2024-10-02
Payer: MEDICARE

## 2024-10-02 VITALS
SYSTOLIC BLOOD PRESSURE: 118 MMHG | HEART RATE: 73 BPM | WEIGHT: 197 LBS | HEIGHT: 72 IN | DIASTOLIC BLOOD PRESSURE: 72 MMHG | BODY MASS INDEX: 26.68 KG/M2

## 2024-10-02 DIAGNOSIS — I44.2 ATRIOVENTRICULAR BLOCK, COMPLETE: ICD-10-CM

## 2024-10-02 DIAGNOSIS — I10 ESSENTIAL (PRIMARY) HYPERTENSION: ICD-10-CM

## 2024-10-02 DIAGNOSIS — Z95.0 PRESENCE OF CARDIAC PACEMAKER: ICD-10-CM

## 2024-10-02 DIAGNOSIS — J44.9 CHRONIC OBSTRUCTIVE PULMONARY DISEASE, UNSPECIFIED: ICD-10-CM

## 2024-10-02 PROCEDURE — 99214 OFFICE O/P EST MOD 30 MIN: CPT | Mod: 25

## 2024-10-02 PROCEDURE — 93000 ELECTROCARDIOGRAM COMPLETE: CPT

## 2024-10-02 NOTE — DISCUSSION/SUMMARY
[EKG obtained to assist in diagnosis and management of assessed problem(s)] : EKG obtained to assist in diagnosis and management of assessed problem(s) [FreeTextEntry1] : Patient was instructed to target his T. Cholesterol to less than 200 mg/dl and LDL cholesterol to less than 100 mg/dl. He was advised on his recent lab results. He had blood work with Hematology and it did not include a lipid panel. Exercise and weight loss was advised. TTE results were reviewed with the patient and a copy of the results was provided to him. EKG: Paced beats,rate of 73 bpm. Maintain cardiac medications.  Patient was advised to repeat a BMP, CBC , fasting lipid profile and hepatic panel. Patient was taken off OAC by BRIGIDO SANCHEZ in 6 months

## 2024-10-02 NOTE — REASON FOR VISIT
[FreeTextEntry1] : Patient presents for a  follow up visit. He denies any complaints. His BP is well controlled. He is here to review his TTE results and lab results as well. He underwent a Mohs procedure on his nose for basal cell cancer.

## 2024-10-02 NOTE — HISTORY OF PRESENT ILLNESS
Patient refuses CPAP. Order changed to PRN.   [FreeTextEntry1] : Congenital heart disease unknown by the patient and his wife, s/p open heart surgery as an infant at [facility], no records are available.\par  Patent coronaries on cardiac catheterization in 2019  LVEF=60%.\par  PPM for complete heart block\par  Poorly visualized RV and TV, on 2D echo Doppler performed in 11/18,\par  Former cigarette smoker for 40 years\par  \par  Severe emphysema\par  Pt. is disabled. He was diagnosed with stage IV CLL, on chemotherapy, followed by Dr. Rider\par  Hypertension\par  Hyperlipidemia\par  Prior cardiac catheterization performed 10 years ago by elena reported as no coronary artery disease.\par  Patient worked as a  for [employer] prior to becoming disabled.\par

## 2024-10-02 NOTE — PHYSICAL EXAM
[General Appearance - Well Developed] : well developed [Normal Appearance] : normal appearance [Well Groomed] : well groomed [General Appearance - Well Nourished] : well nourished [No Deformities] : no deformities [General Appearance - In No Acute Distress] : no acute distress [Heart Rate And Rhythm] : heart rate and rhythm were normal [Heart Sounds] : normal S1 and S2 [Murmurs] : no murmurs present [Arterial Pulses Normal] : the arterial pulses were normal [Respiration, Rhythm And Depth] : normal respiratory rhythm and effort [Exaggerated Use Of Accessory Muscles For Inspiration] : no accessory muscle use [Auscultation Breath Sounds / Voice Sounds] : lungs were clear to auscultation bilaterally [Clean] : clean [Dry] : dry [Well-Healed] : well-healed [Abdomen Soft] : soft [Abdomen Tenderness] : non-tender [Abdomen Mass (___ Cm)] : no abdominal mass palpated [Nail Clubbing] : no clubbing of the fingernails [Cyanosis, Localized] : no localized cyanosis [Petechial Hemorrhages (___cm)] : no petechial hemorrhages [] : no ischemic changes [Normal Conjunctiva] : the conjunctiva exhibited no abnormalities [Eyelids - No Xanthelasma] : the eyelids demonstrated no xanthelasmas [Normal Oropharynx] : normal oropharynx [Normal Jugular Venous A Waves Present] : normal jugular venous A waves present [Abnormal Walk] : normal gait [Skin Color & Pigmentation] : normal skin color and pigmentation [Skin Turgor] : normal skin turgor [No Anxiety] : not feeling anxious [FreeTextEntry1] : S/P Mohs procedure on nose

## 2024-10-18 DIAGNOSIS — E66.01 MORBID (SEVERE) OBESITY DUE TO EXCESS CALORIES: ICD-10-CM

## 2024-11-26 ENCOUNTER — APPOINTMENT (OUTPATIENT)
Dept: CARDIOLOGY | Facility: CLINIC | Age: 64
End: 2024-11-26
Payer: MEDICARE

## 2024-11-26 ENCOUNTER — NON-APPOINTMENT (OUTPATIENT)
Age: 64
End: 2024-11-26

## 2024-11-26 PROCEDURE — 93296 REM INTERROG EVL PM/IDS: CPT

## 2024-11-26 PROCEDURE — 93294 REM INTERROG EVL PM/LDLS PM: CPT

## 2024-12-23 NOTE — OCCUPATIONAL THERAPY INITIAL EVALUATION ADULT - LEVEL OF INDEPENDENCE, OT EVAL
Patient called in requesting his provider to interpret his ankle X-ray and what follow up steps he would recommend. Please review and send to RN pool to discuss with patient.    Jasmine Bowers RN  [facility]     maximum assist (25% patients effort)

## 2025-01-06 NOTE — ED PROVIDER NOTE - DATE/TIME 1
01-Feb-2024 13:00 Cibinqo Counseling: I discussed with the patient the risks of Cibinqo therapy including but not limited to common cold, nausea, headache, cold sores, increased blood CPK levels, dizziness, UTIs, fatigue, acne, and vomitting. Live vaccines should be avoided.  This medication has been linked to serious infections; higher rate of mortality; malignancy and lymphoproliferative disorders; major adverse cardiovascular events; thrombosis; thrombocytopenia and lymphopenia; lipid elevations; and retinal detachment. Wartpeel Counseling:  I discussed with the patient the risks of Wartpeel including but not limited to erythema, scaling, itching, weeping, crusting, and pain. Erythromycin Counseling:  I discussed with the patient the risks of erythromycin including but not limited to GI upset, allergic reaction, drug rash, diarrhea, increase in liver enzymes, and yeast infections. Cosentyx Pregnancy And Lactation Text: This medication is Pregnancy Category B and is considered safe during pregnancy. It is unknown if this medication is excreted in breast milk. Tranexamic Acid Counseling:  Patient advised of the small risk of bleeding problems with tranexamic acid. They were also instructed to call if they developed any nausea, vomiting or diarrhea. All of the patient's questions and concerns were addressed. Cellcept Pregnancy And Lactation Text: This medication is Pregnancy Category D and isn't considered safe during pregnancy. It is unknown if this medication is excreted in breast milk. Hydroquinone Pregnancy And Lactation Text: This medication has not been assigned a Pregnancy Risk Category but animal studies failed to show danger with the topical medication. It is unknown if the medication is excreted in breast milk. Cimetidine Counseling:  I discussed with the patient the risks of Cimetidine including but not limited to gynecomastia, headache, diarrhea, nausea, drowsiness, arrhythmias, pancreatitis, skin rashes, psychosis, bone marrow suppression and kidney toxicity. Niacinamide Pregnancy And Lactation Text: These medications are considered safe during pregnancy. Dupixent Counseling: I discussed with the patient the risks of dupilumab including but not limited to eye infection and irritation, cold sores, injection site reactions, worsening of asthma, allergic reactions and increased risk of parasitic infection.  Live vaccines should be avoided while taking dupilumab. Dupilumab will also interact with certain medications such as warfarin and cyclosporine. The patient understands that monitoring is required and they must alert us or the primary physician if symptoms of infection or other concerning signs are noted. Cyclophosphamide Counseling:  I discussed with the patient the risks of cyclophosphamide including but not limited to hair loss, hormonal abnormalities, decreased fertility, abdominal pain, diarrhea, nausea and vomiting, bone marrow suppression and infection. The patient understands that monitoring is required while taking this medication. Solaraze Counseling:  I discussed with the patient the risks of Solaraze including but not limited to erythema, scaling, itching, weeping, crusting, and pain. Finasteride Pregnancy And Lactation Text: This medication is absolutely contraindicated during pregnancy. It is unknown if it is excreted in breast milk. Ivermectin Pregnancy And Lactation Text: This medication is Pregnancy Category C and it isn't known if it is safe during pregnancy. It is also excreted in breast milk. Siliq Counseling:  I discussed with the patient the risks of Siliq including but not limited to new or worsening depression, suicidal thoughts and behavior, immunosuppression, malignancy, posterior leukoencephalopathy syndrome, and serious infections.  The patient understands that monitoring is required including a PPD at baseline and must alert us or the primary physician if symptoms of infection or other concerning signs are noted. There is also a special program designed to monitor depression which is required with Siliq. Cibinqo Pregnancy And Lactation Text: It is unknown if this medication will adversely affect pregnancy or breast feeding.  You should not take this medication if you are currently pregnant or planning a pregnancy or while breastfeeding. Siliq Pregnancy And Lactation Text: The risk during pregnancy and breastfeeding is uncertain with this medication. Opioid Counseling: I discussed with the patient the potential side effects of opioids including but not limited to addiction, altered mental status, and depression. I stressed avoiding alcohol, benzodiazepines, muscle relaxants and sleep aids unless specifically okayed by a physician. The patient verbalized understanding of the proper use and possible adverse effects of opioids. All of the patient's questions and concerns were addressed. They were instructed to flush the remaining pills down the toilet if they did not need them for pain. Erythromycin Pregnancy And Lactation Text: This medication is Pregnancy Category B and is considered safe during pregnancy. It is also excreted in breast milk. Tranexamic Acid Pregnancy And Lactation Text: It is unknown if this medication is safe during pregnancy or breast feeding. Imiquimod Counseling:  I discussed with the patient the risks of imiquimod including but not limited to erythema, scaling, itching, weeping, crusting, and pain.  Patient understands that the inflammatory response to imiquimod is variable from person to person and was educated regarded proper titration schedule.  If flu-like symptoms develop, patient knows to discontinue the medication and contact us. Wartpeel Pregnancy And Lactation Text: This medication is Pregnancy Category X and contraindicated in pregnancy and in women who may become pregnant. It is unknown if this medication is excreted in breast milk. Cimetidine Pregnancy And Lactation Text: This medication is Pregnancy Category B and is considered safe during pregnancy. It is also excreted in breast milk and breast feeding isn't recommended. Nsaids Counseling: NSAID Counseling: I discussed with the patient that NSAIDs should be taken with food. Prolonged use of NSAIDs can result in the development of stomach ulcers.  Patient advised to stop taking NSAIDs if abdominal pain occurs.  The patient verbalized understanding of the proper use and possible adverse effects of NSAIDs.  All of the patient's questions and concerns were addressed. Dupixent Pregnancy And Lactation Text: This medication likely crosses the placenta but the risk for the fetus is uncertain. This medication is excreted in breast milk. Cyclophosphamide Pregnancy And Lactation Text: This medication is Pregnancy Category D and it isn't considered safe during pregnancy. This medication is excreted in breast milk. Valtrex Counseling: I discussed with the patient the risks of valacyclovir including but not limited to kidney damage, nausea, vomiting and severe allergy.  The patient understands that if the infection seems to be worsening or is not improving, they are to call. Birth Control Pills Counseling: Birth Control Pill Counseling: I discussed with the patient the potential side effects of OCPs including but not limited to increased risk of stroke, heart attack, thrombophlebitis, deep venous thrombosis, hepatic adenomas, breast changes, GI upset, headaches, and depression.  The patient verbalized understanding of the proper use and possible adverse effects of OCPs. All of the patient's questions and concerns were addressed. Solaraze Pregnancy And Lactation Text: This medication is Pregnancy Category B and is considered safe. There is some data to suggest avoiding during the third trimester. It is unknown if this medication is excreted in breast milk. Soolantra Counseling: I discussed with the patients the risks of topial Soolantra. This is a medicine which decreases the number of mites and inflammation in the skin. You experience burning, stinging, eye irritation or allergic reactions.  Please call our office if you develop any problems from using this medication. Simlandi Counseling:  I discussed with the patient the risks of adalimumab including but not limited to myelosuppression, immunosuppression, autoimmune hepatitis, demyelinating diseases, lymphoma, and serious infections.  The patient understands that monitoring is required including a PPD at baseline and must alert us or the primary physician if symptoms of infection or other concerning signs are noted. Birth Control Pills Pregnancy And Lactation Text: This medication should be avoided if pregnant and for the first 30 days post-partum. Metronidazole Counseling:  I discussed with the patient the risks of metronidazole including but not limited to seizures, nausea/vomiting, a metallic taste in the mouth, nausea/vomiting and severe allergy. Doxepin Counseling:  Patient advised that the medication is sedating and not to drive a car after taking this medication. Patient informed of potential adverse effects including but not limited to dry mouth, urinary retention, and blurry vision.  The patient verbalized understanding of the proper use and possible adverse effects of doxepin.  All of the patient's questions and concerns were addressed. Imiquimod Pregnancy And Lactation Text: This medication is Pregnancy Category C. It is unknown if this medication is excreted in breast milk. Winlevi Counseling:  I discussed with the patient the risks of topical clascoterone including but not limited to erythema, scaling, itching, and stinging. Patient voiced their understanding. Litfulo Counseling: I discussed with the patient the risks of Litfulo therapy including but not limited to upper respiratory tract infections, shingles, cold sores, and nausea. Live vaccines should be avoided.  This medication has been linked to serious infections; higher rate of mortality; malignancy and lymphoproliferative disorders; major adverse cardiovascular events; thrombosis; gastrointestinal perforations; neutropenia; lymphopenia; anemia; liver enzyme elevations; and lipid elevations. Metronidazole Pregnancy And Lactation Text: This medication is Pregnancy Category B and considered safe during pregnancy.  It is also excreted in breast milk. Litfulo Pregnancy And Lactation Text: Based on animal studies, Lifulo may cause embryo-fetal harm when administered to pregnant women.  The medication should not be used in pregnancy.  Breastfeeding is not recommended during treatment. Doxepin Pregnancy And Lactation Text: This medication is Pregnancy Category C and it isn't known if it is safe during pregnancy. It is also excreted in breast milk and breast feeding isn't recommended. Klisyri Counseling:  I discussed with the patient the risks of Klisyri including but not limited to erythema, scaling, itching, weeping, crusting, and pain. Ebglyss Counseling: I discussed with the patient the risks of lebrikizumab including but not limited to eye inflammation and irritation, cold sores, injection site reactions, allergic reactions and increased risk of parasitic infection. The patient understands that monitoring is required and they must alert us or the primary physician if symptoms of infection or other concerning signs are noted. Opioid Pregnancy And Lactation Text: These medications can lead to premature delivery and should be avoided during pregnancy. These medications are also present in breast milk in small amounts. Valtrex Pregnancy And Lactation Text: this medication is Pregnancy Category B and is considered safe during pregnancy. This medication is not directly found in breast milk but it's metabolite acyclovir is present. Cyclosporine Counseling:  I discussed with the patient the risks of cyclosporine including but not limited to hypertension, gingival hyperplasia,myelosuppression, immunosuppression, liver damage, kidney damage, neurotoxicity, lymphoma, and serious infections. The patient understands that monitoring is required including baseline blood pressure, CBC, CMP, lipid panel and uric acid, and then 1-2 times monthly CMP and blood pressure. Ozempic Counseling: I reviewed the possible side effects including: thyroid tumors, kidney disease, gallbladder disease, abdominal pain, constipation, diarrhea, nausea, vomiting and pancreatitis. Do not take this medication if you have a history or family history of multiple endocrine neoplasia syndrome type 2. Side effects reviewed, pt to contact office should one occur. Nsaids Pregnancy And Lactation Text: These medications are considered safe up to 30 weeks gestation. It is excreted in breast milk. Spironolactone Counseling: Patient advised regarding risks of diarrhea, abdominal pain, hyperkalemia, birth defects (for female patients), liver toxicity and renal toxicity. The patient may need blood work to monitor liver and kidney function and potassium levels while on therapy. The patient verbalized understanding of the proper use and possible adverse effects of spironolactone.  All of the patient's questions and concerns were addressed. Soolantra Pregnancy And Lactation Text: This medication is Pregnancy Category C. This medication is considered safe during breast feeding. Acitretin Counseling:  I discussed with the patient the risks of acitretin including but not limited to hair loss, dry lips/skin/eyes, liver damage, hyperlipidemia, depression/suicidal ideation, photosensitivity.  Serious rare side effects can include but are not limited to pancreatitis, pseudotumor cerebri, bony changes, clot formation/stroke/heart attack.  Patient understands that alcohol is contraindicated since it can result in liver toxicity and significantly prolong the elimination of the drug by many years. Olanzapine Counseling- I discussed with the patient the common side effects of olanzapine including but are not limited to: lack of energy, dry mouth, increased appetite, sleepiness, tremor, constipation, dizziness, changes in behavior, or restlessness.  Explained that teenagers are more likely to experience headaches, abdominal pain, pain in the arms or legs, tiredness, and sleepiness.  Serious side effects include but are not limited: increased risk of death in elderly patients who are confused, have memory loss, or dementia-related psychosis; hyperglycemia; increased cholesterol and triglycerides; and weight gain. Ebglyss Pregnancy And Lactation Text: This medication likely crosses the placenta but the risk for the fetus is uncertain. It is unknown if this medication is excreted in breast milk. Winlevi Pregnancy And Lactation Text: This medication is considered safe during pregnancy and breastfeeding. Olumiant Counseling: I discussed with the patient the risks of Olumiant therapy including but not limited to upper respiratory tract infections, shingles, cold sores, and nausea. Live vaccines should be avoided.  This medication has been linked to serious infections; higher rate of mortality; malignancy and lymphoproliferative disorders; major adverse cardiovascular events; thrombosis; gastrointestinal perforations; neutropenia; lymphopenia; anemia; liver enzyme elevations; and lipid elevations. VTAMA Counseling: I discussed with the patient that VTAMA is not for use in the eyes, mouth or mouth. They should call the office if they develop any signs of allergic reactions to VTAMA. The patient verbalized understanding of the proper use and possible adverse effects of VTAMA.  All of the patient's questions and concerns were addressed. Use Enhanced Medication Counseling?: No Minocycline Counseling: Patient advised regarding possible photosensitivity and discoloration of the teeth, skin, lips, tongue and gums.  Patient instructed to avoid sunlight, if possible.  When exposed to sunlight, patients should wear protective clothing, sunglasses, and sunscreen.  The patient was instructed to call the office immediately if the following severe adverse effects occur:  hearing changes, easy bruising/bleeding, severe headache, or vision changes.  The patient verbalized understanding of the proper use and possible adverse effects of minocycline.  All of the patient's questions and concerns were addressed. Detail Level: Simple Cyclosporine Pregnancy And Lactation Text: This medication is Pregnancy Category C and it isn't know if it is safe during pregnancy. This medication is excreted in breast milk. Oxempic Pregnancy And Lactation Text: The fetal risk of this medication is unknown and taking while pregnant is not recommended. It is unknown if this medication is present in breast milk. Hydroxyzine Counseling: Patient advised that the medication is sedating and not to drive a car after taking this medication.  Patient informed of potential adverse effects including but not limited to dry mouth, urinary retention, and blurry vision.  The patient verbalized understanding of the proper use and possible adverse effects of hydroxyzine.  All of the patient's questions and concerns were addressed. Klisyri Pregnancy And Lactation Text: It is unknown if this medication can harm a developing fetus or if it is excreted in breast milk. Hydroxychloroquine Counseling:  I discussed with the patient that a baseline ophthalmologic exam is needed at the start of therapy and every year thereafter while on therapy. A CBC may also be warranted for monitoring.  The side effects of this medication were discussed with the patient, including but not limited to agranulocytosis, aplastic anemia, seizures, rashes, retinopathy, and liver toxicity. Patient instructed to call the office should any adverse effect occur.  The patient verbalized understanding of the proper use and possible adverse effects of Plaquenil.  All the patient's questions and concerns were addressed. Protopic Pregnancy And Lactation Text: This medication is Pregnancy Category C. It is unknown if this medication is excreted in breast milk when applied topically. Terbinafine Counseling: Patient counseling regarding adverse effects of terbinafine including but not limited to headache, diarrhea, rash, upset stomach, liver function test abnormalities, itching, taste/smell disturbance, nausea, abdominal pain, and flatulence.  There is a rare possibility of liver failure that can occur when taking terbinafine.  The patient understands that a baseline LFT and kidney function test may be required. The patient verbalized understanding of the proper use and possible adverse effects of terbinafine.  All of the patient's questions and concerns were addressed. Odomzo Counseling- I discussed with the patient the risks of Odomzo including but not limited to nausea, vomiting, diarrhea, constipation, weight loss, changes in the sense of taste, decreased appetite, muscle spasms, and hair loss.  The patient verbalized understanding of the proper use and possible adverse effects of Odomzo.  All of the patient's questions and concerns were addressed. Topical Metronidazole Pregnancy And Lactation Text: This medication is Pregnancy Category B and considered safe during pregnancy.  It is also considered safe to use while breastfeeding. Topical Steroids Counseling: I discussed with the patient that prolonged use of topical steroids can result in the increased appearance of superficial blood vessels (telangiectasias), lightening (hypopigmentation) and thinning of the skin (atrophy).  Patient understands to avoid using high potency steroids in skin folds, the groin or the face.  The patient verbalized understanding of the proper use and possible adverse effects of topical steroids.  All of the patient's questions and concerns were addressed. Clindamycin Counseling: I counseled the patient regarding use of clindamycin as an antibiotic for prophylactic and/or therapeutic purposes. Clindamycin is active against numerous classes of bacteria, including skin bacteria. Side effects may include nausea, diarrhea, gastrointestinal upset, rash, hives, yeast infections, and in rare cases, colitis. Tremfya Counseling: I discussed with the patient the risks of guselkumab including but not limited to immunosuppression, serious infections, and drug reactions.  The patient understands that monitoring is required including a PPD at baseline and must alert us or the primary physician if symptoms of infection or other concerning signs are noted. Bimzelx Pregnancy And Lactation Text: This medication crosses the placenta and the safety is uncertain during pregnancy. It is unknown if this medication is present in breast milk. SSKI Counseling:  I discussed with the patient the risks of SSKI including but not limited to thyroid abnormalities, metallic taste, GI upset, fever, headache, acne, arthralgias, paraesthesias, lymphadenopathy, easy bleeding, arrhythmias, and allergic reaction. Arava Pregnancy And Lactation Text: This medication is Pregnancy Category X and is absolutely contraindicated during pregnancy. It is unknown if it is excreted in breast milk. Calcipotriene Pregnancy And Lactation Text: The use of this medication during pregnancy or lactation is not recommended as there is insufficient data. Hydroxychloroquine Pregnancy And Lactation Text: This medication has been shown to cause fetal harm but it isn't assigned a Pregnancy Risk Category. There are small amounts excreted in breast milk. Sski Pregnancy And Lactation Text: This medication is Pregnancy Category D and isn't considered safe during pregnancy. It is excreted in breast milk. Cimzia Counseling:  I discussed with the patient the risks of Cimzia including but not limited to immunosuppression, allergic reactions and infections.  The patient understands that monitoring is required including a PPD at baseline and must alert us or the primary physician if symptoms of infection or other concerning signs are noted. Qbrexza Counseling:  I discussed with the patient the risks of Qbrexza including but not limited to headache, mydriasis, blurred vision, dry eyes, nasal dryness, dry mouth, dry throat, dry skin, urinary hesitation, and constipation.  Local skin reactions including erythema, burning, stinging, and itching can also occur. Nemluvio Counseling: I discussed with the patient the risks of nemolizumab including but not limited to headache, gastrointestinal complaints, nasopharyngitis, musculoskeletal complaints, injection site reactions, and allergic reactions. The patient understands that monitoring is required and they must alert us or the primary physician if any side effects are noted. Nemluvio Pregnancy And Lactation Text: It is not known if Nemluvio causes fetal harm or is present in breast milk. Please proceed with caution if patients who are pregnant or breastfeeding. Clindamycin Pregnancy And Lactation Text: This medication can be used in pregnancy if certain situations. Clindamycin is also present in breast milk. Albendazole Counseling:  I discussed with the patient the risks of albendazole including but not limited to cytopenia, kidney damage, nausea/vomiting and severe allergy.  The patient understands that this medication is being used in an off-label manner. Topical Steroids Applications Pregnancy And Lactation Text: Most topical steroids are considered safe to use during pregnancy and lactation.  Any topical steroid applied to the breast or nipple should be washed off before breastfeeding. Eucrisa Counseling: Patient may experience a mild burning sensation during topical application. Eucrisa is not approved in children less than 3 months of age. Azathioprine Counseling:  I discussed with the patient the risks of azathioprine including but not limited to myelosuppression, immunosuppression, hepatotoxicity, lymphoma, and infections.  The patient understands that monitoring is required including baseline LFTs, Creatinine, possible TPMP genotyping and weekly CBCs for the first month and then every 2 weeks thereafter.  The patient verbalized understanding of the proper use and possible adverse effects of azathioprine.  All of the patient's questions and concerns were addressed. Clofazimine Counseling:  I discussed with the patient the risks of clofazimine including but not limited to skin and eye pigmentation, liver damage, nausea/vomiting, gastrointestinal bleeding and allergy. Low Dose Naltrexone Counseling- I discussed with the patient the potential risks and side effects of low dose naltrexone including but not limited to: more vivid dreams, headaches, nausea, vomiting, abdominal pain, fatigue, dizziness, and anxiety. Xolair Counseling:  Patient informed of potential adverse effects including but not limited to fever, muscle aches, rash and allergic reactions.  The patient verbalized understanding of the proper use and possible adverse effects of Xolair.  All of the patient's questions and concerns were addressed. Clofazimine Pregnancy And Lactation Text: This medication is Pregnancy Category C and isn't considered safe during pregnancy. It is excreted in breast milk. Thalidomide Counseling: I discussed with the patient the risks of thalidomide including but not limited to birth defects, anxiety, weakness, chest pain, dizziness, cough and severe allergy. Qbrexza Pregnancy And Lactation Text: There is no available data on Qbrexza use in pregnant women.  There is no available data on Qbrexza use in lactation. Cimzia Pregnancy And Lactation Text: This medication crosses the placenta but can be considered safe in certain situations. Cimzia may be excreted in breast milk. Dutasteride Pregnancy And Lactation Text: This medication is absolutely contraindicated in women, especially during pregnancy and breast feeding. Feminization of male fetuses is possible if taking while pregnant. Rituxan Counseling:  I discussed with the patient the risks of Rituxan infusions. Side effects can include infusion reactions, severe drug rashes including mucocutaneous reactions, reactivation of latent hepatitis and other infections and rarely progressive multifocal leukoencephalopathy.  All of the patient's questions and concerns were addressed. Finasteride Male Counseling: Finasteride Counseling:  I discussed with the patient the risks of use of finasteride including but not limited to decreased libido, decreased ejaculate volume, gynecomastia, and depression. Women should not handle medication.  All of the patient's questions and concerns were addressed. Doxycycline Counseling:  Patient counseled regarding possible photosensitivity and increased risk for sunburn.  Patient instructed to avoid sunlight, if possible.  When exposed to sunlight, patients should wear protective clothing, sunglasses, and sunscreen.  The patient was instructed to call the office immediately if the following severe adverse effects occur:  hearing changes, easy bruising/bleeding, severe headache, or vision changes.  The patient verbalized understanding of the proper use and possible adverse effects of doxycycline.  All of the patient's questions and concerns were addressed. Topical Sulfur Applications Counseling: Topical Sulfur Counseling: Patient counseled that this medication may cause skin irritation or allergic reactions.  In the event of skin irritation, the patient was advised to reduce the amount of the drug applied or use it less frequently.   The patient verbalized understanding of the proper use and possible adverse effects of topical sulfur application.  All of the patient's questions and concerns were addressed. Xolair Pregnancy And Lactation Text: This medication is Pregnancy Category B and is considered safe during pregnancy. This medication is excreted in breast milk. Doxycycline Pregnancy And Lactation Text: This medication is Pregnancy Category D and not consider safe during pregnancy. It is also excreted in breast milk but is considered safe for shorter treatment courses. Hydroquinone Counseling:  Patient advised that medication may result in skin irritation, lightening (hypopigmentation), dryness, and burning.  In the event of skin irritation, the patient was advised to reduce the amount of the drug applied or use it less frequently.  Rarely, spots that are treated with hydroquinone can become darker (pseudoochronosis).  Should this occur, patient instructed to stop medication and call the office. The patient verbalized understanding of the proper use and possible adverse effects of hydroquinone.  All of the patient's questions and concerns were addressed. Cosentyx Counseling:  I discussed with the patient the risks of Cosentyx including but not limited to worsening of Crohn's disease, immunosuppression, allergic reactions and infections.  The patient understands that monitoring is required including a PPD at baseline and must alert us or the primary physician if symptoms of infection or other concerning signs are noted. Rhofade Counseling: Rhofade is a topical medication which can decrease superficial blood flow where applied. Side effects are uncommon and include stinging, redness and allergic reactions. Cellcept Counseling:  I discussed with the patient the risks of mycophenolate mofetil including but not limited to infection/immunosuppression, GI upset, hypokalemia, hypercholesterolemia, bone marrow suppression, lymphoproliferative disorders, malignancy, GI ulceration/bleed/perforation, colitis, interstitial lung disease, kidney failure, progressive multifocal leukoencephalopathy, and birth defects.  The patient understands that monitoring is required including a baseline creatinine and regular CBC testing. In addition, patient must alert us immediately if symptoms of infection or other concerning signs are noted. Low Dose Naltrexone Pregnancy And Lactation Text: Naltrexone is pregnancy category C.  There have been no adequate and well-controlled studies in pregnant women.  It should be used in pregnancy only if the potential benefit justifies the potential risk to the fetus.   Limited data indicates that naltrexone is minimally excreted into breastmilk. Finasteride Female Counseling: Finasteride Counseling:  I discussed with the patient the risks of use of finasteride including but not limited to decreased libido and sexual dysfunction. Explained the teratogenic nature of the medication and stressed the importance of not getting pregnant during treatment. All of the patient's questions and concerns were addressed. Rhofade Pregnancy And Lactation Text: This medication has not been assigned a Pregnancy Risk Category. It is unknown if the medication is excreted in breast milk. Ivermectin Counseling:  Patient instructed to take medication on an empty stomach with a full glass of water.  Patient informed of potential adverse effects including but not limited to nausea, diarrhea, dizziness, itching, and swelling of the extremities or lymph nodes.  The patient verbalized understanding of the proper use and possible adverse effects of ivermectin.  All of the patient's questions and concerns were addressed. Rituxan Pregnancy And Lactation Text: This medication is Pregnancy Category C and it isn't know if it is safe during pregnancy. It is unknown if this medication is excreted in breast milk but similar antibodies are known to be excreted. Cantharidin Pregnancy And Lactation Text: This medication has not been proven safe during pregnancy. It is unknown if this medication is excreted in breast milk. Niacinamide Counseling: I recommended taking niacin or niacinamide, also know as vitamin B3, twice daily. Recent evidence suggests that taking vitamin B3 (500 mg twice daily) can reduce the risk of actinic keratoses and non-melanoma skin cancers. Side effects of vitamin B3 include flushing and headache. Topical Sulfur Applications Pregnancy And Lactation Text: This medication is considered safe during pregnancy and breast feeding secondary to limited systemic absorption. 5-Fu Counseling: 5-Fluorouracil Counseling:  I discussed with the patient the risks of 5-fluorouracil including but not limited to erythema, scaling, itching, weeping, crusting, and pain. Spevigo Pregnancy And Lactation Text: The risk during pregnancy and breastfeeding is uncertain with this medication. This medication does cross the placenta. It is unknown if this medication is found in breast milk. Rifampin Pregnancy And Lactation Text: This medication is Pregnancy Category C and it isn't know if it is safe during pregnancy. It is also excreted in breast milk and should not be used if you are breast feeding. Azithromycin Pregnancy And Lactation Text: This medication is considered safe during pregnancy and is also secreted in breast milk. Benzoyl Peroxide Counseling: Patient counseled that medicine may cause skin irritation and bleach clothing.  In the event of skin irritation, the patient was advised to reduce the amount of the drug applied or use it less frequently.   The patient verbalized understanding of the proper use and possible adverse effects of benzoyl peroxide.  All of the patient's questions and concerns were addressed. Opzelura Counseling:  I discussed with the patient the risks of Opzelura including but not limited to nasopharngitis, bronchitis, ear infection, eosinophila, hives, diarrhea, folliculitis, tonsillitis, and rhinorrhea.  Taken orally, this medication has been linked to serious infections; higher rate of mortality; malignancy and lymphoproliferative disorders; major adverse cardiovascular events; thrombosis; thrombocytopenia, anemia, and neutropenia; and lipid elevations. Otezla Pregnancy And Lactation Text: This medication is Pregnancy Category C and it isn't known if it is safe during pregnancy. It is unknown if it is excreted in breast milk. Sotyktu Pregnancy And Lactation Text: There is insufficient data to evaluate whether or not Sotyktu is safe to use during pregnancy.   It is not known if Sotyktu passes into breast milk and whether or not it is safe to use when breastfeeding.   Gabapentin Counseling: I discussed with the patient the risks of gabapentin including but not limited to dizziness, somnolence, fatigue and ataxia. Opzelura Pregnancy And Lactation Text: There is insufficient data to evaluate drug-associated risk for major birth defects, miscarriage, or other adverse maternal or fetal outcomes.  There is a pregnancy registry that monitors pregnancy outcomes in pregnant persons exposed to the medication during pregnancy.  It is unknown if this medication is excreted in breast milk.  Do not breastfeed during treatment and for about 4 weeks after the last dose. Itraconazole Counseling:  I discussed with the patient the risks of itraconazole including but not limited to liver damage, nausea/vomiting, neuropathy, and severe allergy.  The patient understands that this medication is best absorbed when taken with acidic beverages such as non-diet cola or ginger ale.  The patient understands that monitoring is required including baseline LFTs and repeat LFTs at intervals.  The patient understands that they are to contact us or the primary physician if concerning signs are noted. High Dose Vitamin A Counseling: Side effects reviewed, pt to contact office should one occur. Erivedge Counseling- I discussed with the patient the risks of Erivedge including but not limited to nausea, vomiting, diarrhea, constipation, weight loss, changes in the sense of taste, decreased appetite, muscle spasms, and hair loss.  The patient verbalized understanding of the proper use and possible adverse effects of Erivedge.  All of the patient's questions and concerns were addressed. Topical Clindamycin Pregnancy And Lactation Text: This medication is Pregnancy Category B and is considered safe during pregnancy. It is unknown if it is excreted in breast milk. Topical Ketoconazole Counseling: Patient counseled that this medication may cause skin irritation or allergic reactions.  In the event of skin irritation, the patient was advised to reduce the amount of the drug applied or use it less frequently.   The patient verbalized understanding of the proper use and possible adverse effects of ketoconazole.  All of the patient's questions and concerns were addressed. Bactrim Counseling:  I discussed with the patient the risks of sulfa antibiotics including but not limited to GI upset, allergic reaction, drug rash, diarrhea, dizziness, photosensitivity, and yeast infections.  Rarely, more serious reactions can occur including but not limited to aplastic anemia, agranulocytosis, methemoglobinemia, blood dyscrasias, liver or kidney failure, lung infiltrates or desquamative/blistering drug rashes. High Dose Vitamin A Pregnancy And Lactation Text: High dose vitamin A therapy is contraindicated during pregnancy and breast feeding. Stelara Counseling:  I discussed with the patient the risks of ustekinumab including but not limited to immunosuppression, malignancy, posterior leukoencephalopathy syndrome, and serious infections.  The patient understands that monitoring is required including a PPD at baseline and must alert us or the primary physician if symptoms of infection or other concerning signs are noted. Itraconazole Pregnancy And Lactation Text: This medication is Pregnancy Category C and it isn't know if it is safe during pregnancy. It is also excreted in breast milk. Oxybutynin Counseling:  I discussed with the patient the risks of oxybutynin including but not limited to skin rash, drowsiness, dry mouth, difficulty urinating, and blurred vision. Sarecycline Counseling: Patient advised regarding possible photosensitivity and discoloration of the teeth, skin, lips, tongue and gums.  Patient instructed to avoid sunlight, if possible.  When exposed to sunlight, patients should wear protective clothing, sunglasses, and sunscreen.  The patient was instructed to call the office immediately if the following severe adverse effects occur:  hearing changes, easy bruising/bleeding, severe headache, or vision changes.  The patient verbalized understanding of the proper use and possible adverse effects of sarecycline.  All of the patient's questions and concerns were addressed. Benzoyl Peroxide Pregnancy And Lactation Text: This medication is Pregnancy Category C. It is unknown if benzoyl peroxide is excreted in breast milk. Xeljanz Counseling: I discussed with the patient the risks of Xeljanz therapy including increased risk of infection, liver issues, headache, diarrhea, or cold symptoms. Live vaccines should be avoided. They were instructed to call if they have any problems. Sarecycline Pregnancy And Lactation Text: This medication is Pregnancy Category D and not consider safe during pregnancy. It is also excreted in breast milk. Carac Counseling:  I discussed with the patient the risks of Carac including but not limited to erythema, scaling, itching, weeping, crusting, and pain. Adbry Counseling: I discussed with the patient the risks of tralokinumab including but not limited to eye infection and irritation, cold sores, injection site reactions, worsening of asthma, allergic reactions and increased risk of parasitic infection.  Live vaccines should be avoided while taking tralokinumab. The patient understands that monitoring is required and they must alert us or the primary physician if symptoms of infection or other concerning signs are noted. Ilumya Counseling: I discussed with the patient the risks of tildrakizumab including but not limited to immunosuppression, malignancy, posterior leukoencephalopathy syndrome, and serious infections.  The patient understands that monitoring is required including a PPD at baseline and must alert us or the primary physician if symptoms of infection or other concerning signs are noted. Zepbound Counseling: I reviewed the possible side effects including: thyroid tumors, kidney disease, gallbladder disease, abdominal pain, constipation, diarrhea, nausea, vomiting and pancreatitis. Do not take this medication if you have a history or family history of multiple endocrine neoplasia syndrome type 2. Side effects reviewed, pt to contact office should one occur. Picato Counseling:  I discussed with the patient the risks of Picato including but not limited to erythema, scaling, itching, weeping, crusting, and pain. Bactrim Pregnancy And Lactation Text: This medication is Pregnancy Category D and is known to cause fetal risk.  It is also excreted in breast milk. Libtayo Counseling- I discussed with the patient the risks of Libtayo including but not limited to nausea, vomiting, diarrhea, and bone or muscle pain.  The patient verbalized understanding of the proper use and possible adverse effects of Libtayo.  All of the patient's questions and concerns were addressed. Xelvivianz Pregnancy And Lactation Text: This medication is Pregnancy Category D and is not considered safe during pregnancy.  The risk during breast feeding is also uncertain. Ketoconazole Counseling:   Patient counseled regarding improving absorption with orange juice.  Adverse effects include but are not limited to breast enlargement, headache, diarrhea, nausea, upset stomach, liver function test abnormalities, taste disturbance, and stomach pain.  There is a rare possibility of liver failure that can occur when taking ketoconazole. The patient understands that monitoring of LFTs may be required, especially at baseline. The patient verbalized understanding of the proper use and possible adverse effects of ketoconazole.  All of the patient's questions and concerns were addressed. Drysol Counseling:  I discussed with the patient the risks of drysol/aluminum chloride including but not limited to skin rash, itching, irritation, burning. Taltz Counseling: I discussed with the patient the risks of ixekizumab including but not limited to immunosuppression, serious infections, worsening of inflammatory bowel disease and drug reactions.  The patient understands that monitoring is required including a PPD at baseline and must alert us or the primary physician if symptoms of infection or other concerning signs are noted. Tetracycline Counseling: Patient counseled regarding possible photosensitivity and increased risk for sunburn.  Patient instructed to avoid sunlight, if possible.  When exposed to sunlight, patients should wear protective clothing, sunglasses, and sunscreen.  The patient was instructed to call the office immediately if the following severe adverse effects occur:  hearing changes, easy bruising/bleeding, severe headache, or vision changes.  The patient verbalized understanding of the proper use and possible adverse effects of tetracycline.  All of the patient's questions and concerns were addressed. Patient understands to avoid pregnancy while on therapy due to potential birth defects. Adbry Pregnancy And Lactation Text: It is unknown if this medication will adversely affect pregnancy or breast feeding. Propranolol Counseling:  I discussed with the patient the risks of propranolol including but not limited to low heart rate, low blood pressure, low blood sugar, restlessness and increased cold sensitivity. They should call the office if they experience any of these side effects. Glycopyrrolate Counseling:  I discussed with the patient the risks of glycopyrrolate including but not limited to skin rash, drowsiness, dry mouth, difficulty urinating, and blurred vision. Infliximab Counseling:  I discussed with the patient the risks of infliximab including but not limited to myelosuppression, immunosuppression, autoimmune hepatitis, demyelinating diseases, lymphoma, and serious infections.  The patient understands that monitoring is required including a PPD at baseline and must alert us or the primary physician if symptoms of infection or other concerning signs are noted. Cephalexin Counseling: I counseled the patient regarding use of cephalexin as an antibiotic for prophylactic and/or therapeutic purposes. Cephalexin (commonly prescribed under brand name Keflex) is a cephalosporin antibiotic which is active against numerous classes of bacteria, including most skin bacteria. Side effects may include nausea, diarrhea, gastrointestinal upset, rash, hives, yeast infections, and in rare cases, hepatitis, kidney disease, seizures, fever, confusion, neurologic symptoms, and others. Patients with severe allergies to penicillin medications are cautioned that there is about a 10% incidence of cross-reactivity with cephalosporins. When possible, patients with penicillin allergies should use alternatives to cephalosporins for antibiotic therapy. Drysol Pregnancy And Lactation Text: This medication is considered safe during pregnancy and breast feeding. Libtayo Pregnancy And Lactation Text: This medication is contraindicated in pregnancy and when breast feeding. Topical Metronidazole Counseling: Metronidazole is a topical antibiotic medication. You may experience burning, stinging, redness, or allergic reactions.  Please call our office if you develop any problems from using this medication. Ketoconazole Pregnancy And Lactation Text: This medication is Pregnancy Category C and it isn't know if it is safe during pregnancy. It is also excreted in breast milk and breast feeding isn't recommended. Elidel Counseling: Patient may experience a mild burning sensation during topical application. Elidel is not approved in children less than 2 years of age. There have been case reports of hematologic and skin malignancies in patients using topical calcineurin inhibitors although causality is questionable. Bimzelx Counseling:  I discussed with the patient the risks of Bimzelx including but not limited to depression, immunosuppression, allergic reactions and infections.  The patient understands that monitoring is required including a PPD at baseline and must alert us or the primary physician if symptoms of infection or other concerning signs are noted. Arava Counseling:  Patient counseled regarding adverse effects of Arava including but not limited to nausea, vomiting, abnormalities in liver function tests. Patients may develop mouth sores, rash, diarrhea, and abnormalities in blood counts. The patient understands that monitoring is required including LFTs and blood counts.  There is a rare possibility of scarring of the liver and lung problems that can occur when taking methotrexate. Persistent nausea, loss of appetite, pale stools, dark urine, cough, and shortness of breath should be reported immediately. Patient advised to discontinue Arava treatment and consult with a physician prior to attempting conception. The patient will have to undergo a treatment to eliminate Arava from the body prior to conception. Cephalexin Pregnancy And Lactation Text: This medication is Pregnancy Category B and considered safe during pregnancy.  It is also excreted in breast milk but can be used safely for shorter doses. Calcipotriene Counseling:  I discussed with the patient the risks of calcipotriene including but not limited to erythema, scaling, itching, and irritation. Protopic Counseling: Patient may experience a mild burning sensation during topical application. Protopic is not approved in children less than 2 years of age. There have been case reports of hematologic and skin malignancies in patients using topical calcineurin inhibitors although causality is questionable. Glycopyrrolate Pregnancy And Lactation Text: This medication is Pregnancy Category B and is considered safe during pregnancy. It is unknown if it is excreted breast milk. Propranolol Pregnancy And Lactation Text: This medication is Pregnancy Category C and it isn't known if it is safe during pregnancy. It is excreted in breast milk. Saxenda Counseling: I reviewed the possible side effects including: thyroid tumors, kidney disease, gallbladder disease, abdominal pain, constipation, diarrhea, nausea, vomiting and pancreatitis. Do not take this medication if you have a history or family history of multiple endocrine neoplasia syndrome type 2. Side effects reviewed, pt to contact office should one occur. Olanzapine Pregnancy And Lactation Text: This medication is pregnancy category C.   There are no adequate and well controlled trials with olanzapine in pregnant females.  Olanzapine should be used during pregnancy only if the potential benefit justifies the potential risk to the fetus.   In a study in lactating healthy women, olanzapine was excreted in breast milk.  It is recommended that women taking olanzapine should not breast feed. Acitretin Pregnancy And Lactation Text: This medication is Pregnancy Category X and should not be given to women who are pregnant or may become pregnant in the future. This medication is excreted in breast milk. Enbrel Counseling:  I discussed with the patient the risks of etanercept including but not limited to myelosuppression, immunosuppression, autoimmune hepatitis, demyelinating diseases, lymphoma, and infections.  The patient understands that monitoring is required including a PPD at baseline and must alert us or the primary physician if symptoms of infection or other concerning signs are noted. Methotrexate Counseling:  Patient counseled regarding adverse effects of methotrexate including but not limited to nausea, vomiting, abnormalities in liver function tests. Patients may develop mouth sores, rash, diarrhea, and abnormalities in blood counts. The patient understands that monitoring is required including LFT's and blood counts.  There is a rare possibility of scarring of the liver and lung problems that can occur when taking methotrexate. Persistent nausea, loss of appetite, pale stools, dark urine, cough, and shortness of breath should be reported immediately. Patient advised to discontinue methotrexate treatment at least three months before attempting to become pregnant.  I discussed the need for folate supplements while taking methotrexate.  These supplements can decrease side effects during methotrexate treatment. The patient verbalized understanding of the proper use and possible adverse effects of methotrexate.  All of the patient's questions and concerns were addressed. Topical Retinoid counseling:  Patient advised to apply a pea-sized amount only at bedtime and wait 30 minutes after washing their face before applying.  If too drying, patient may add a non-comedogenic moisturizer. The patient verbalized understanding of the proper use and possible adverse effects of retinoids.  All of the patient's questions and concerns were addressed. Spironolactone Pregnancy And Lactation Text: This medication can cause feminization of the male fetus and should be avoided during pregnancy. The active metabolite is also found in breast milk. Simponi Counseling:  I discussed with the patient the risks of golimumab including but not limited to myelosuppression, immunosuppression, autoimmune hepatitis, demyelinating diseases, lymphoma, and serious infections.  The patient understands that monitoring is required including a PPD at baseline and must alert us or the primary physician if symptoms of infection or other concerning signs are noted. Olumiant Pregnancy And Lactation Text: Based on animal studies, Olumiant may cause embryo-fetal harm when administered to pregnant women.  The medication should not be used in pregnancy.  Breastfeeding is not recommended during treatment. Aklief counseling:  Patient advised to apply a pea-sized amount only at bedtime and wait 30 minutes after washing their face before applying.  If too drying, patient may add a non-comedogenic moisturizer.  The most commonly reported side effects including irritation, redness, scaling, dryness, stinging, burning, itching, and increased risk of sunburn.  The patient verbalized understanding of the proper use and possible adverse effects of retinoids.  All of the patient's questions and concerns were addressed. Minoxidil Counseling: Minoxidil is a topical medication which can increase blood flow where it is applied. It is uncertain how this medication increases hair growth. Side effects are uncommon and include stinging and allergic reactions. Vtama Pregnancy And Lactation Text: It is unknown if this medication can cause problems during pregnancy and breastfeeding. Hydroxyzine Pregnancy And Lactation Text: This medication is not safe during pregnancy and should not be taken. It is also excreted in breast milk and breast feeding isn't recommended. Colchicine Counseling:  Patient counseled regarding adverse effects including but not limited to stomach upset (nausea, vomiting, stomach pain, or diarrhea).  Patient instructed to limit alcohol consumption while taking this medication.  Colchicine may reduce blood counts especially with prolonged use.  The patient understands that monitoring of kidney function and blood counts may be required, especially at baseline. The patient verbalized understanding of the proper use and possible adverse effects of colchicine.  All of the patient's questions and concerns were addressed. Oral Minoxidil Counseling- I discussed with the patient the risks of oral minoxidil including but not limited to shortness of breath, swelling of the feet or ankles, dizziness, lightheadedness, unwanted hair growth and allergic reaction.  The patient verbalized understanding of the proper use and possible adverse effects of oral minoxidil.  All of the patient's questions and concerns were addressed. Bexarotene Counseling:  I discussed with the patient the risks of bexarotene including but not limited to hair loss, dry lips/skin/eyes, liver abnormalities, hyperlipidemia, pancreatitis, depression/suicidal ideation, photosensitivity, drug rash/allergic reactions, hypothyroidism, anemia, leukopenia, infection, cataracts, and teratogenicity.  Patient understands that they will need regular blood tests to check lipid profile, liver function tests, white blood cell count, thyroid function tests and pregnancy test if applicable. Fluconazole Counseling:  Patient counseled regarding adverse effects of fluconazole including but not limited to headache, diarrhea, nausea, upset stomach, liver function test abnormalities, taste disturbance, and stomach pain.  There is a rare possibility of liver failure that can occur when taking fluconazole.  The patient understands that monitoring of LFTs and kidney function test may be required, especially at baseline. The patient verbalized understanding of the proper use and possible adverse effects of fluconazole.  All of the patient's questions and concerns were addressed. Methotrexate Pregnancy And Lactation Text: This medication is Pregnancy Category X and is known to cause fetal harm. This medication is excreted in breast milk. Tazorac Counseling:  Patient advised that medication is irritating and drying.  Patient may need to apply sparingly and wash off after an hour before eventually leaving it on overnight.  The patient verbalized understanding of the proper use and possible adverse effects of tazorac.  All of the patient's questions and concerns were addressed. Skyrizi Counseling: I discussed with the patient the risks of risankizumab-rzaa including but not limited to immunosuppression, and serious infections.  The patient understands that monitoring is required including a PPD at baseline and must alert us or the primary physician if symptoms of infection or other concerning signs are noted. Bexarotene Pregnancy And Lactation Text: This medication is Pregnancy Category X and should not be given to women who are pregnant or may become pregnant. This medication should not be used if you are breast feeding. Aklief Pregnancy And Lactation Text: It is unknown if this medication is safe to use during pregnancy.  It is unknown if this medication is excreted in breast milk.  Breastfeeding women should use the topical cream on the smallest area of the skin for the shortest time needed while breastfeeding.  Do not apply to nipple and areola. Quinolones Counseling:  I discussed with the patient the risks of fluoroquinolones including but not limited to GI upset, allergic reaction, drug rash, diarrhea, dizziness, photosensitivity, yeast infections, liver function test abnormalities, tendonitis/tendon rupture. Zoryve Counseling:  I discussed with the patient that Zoryve is not for use in the eyes, mouth or vagina. The most commonly reported side effects include diarrhea, headache, insomnia, application site pain, upper respiratory tract infections, and urinary tract infections.  All of the patient's questions and concerns were addressed. Rinvoq Counseling: I discussed with the patient the risks of Rinvoq therapy including but not limited to upper respiratory tract infections, shingles, cold sores, bronchitis, nausea, cough, fever, acne, and headache. Live vaccines should be avoided.  This medication has been linked to serious infections; higher rate of mortality; malignancy and lymphoproliferative disorders; major adverse cardiovascular events; thrombosis; thrombocytopenia, anemia, and neutropenia; lipid elevations; liver enzyme elevations; and gastrointestinal perforations. Azelaic Acid Counseling: Patient counseled that medicine may cause skin irritation and to avoid applying near the eyes.  In the event of skin irritation, the patient was advised to reduce the amount of the drug applied or use it less frequently.   The patient verbalized understanding of the proper use and possible adverse effects of azelaic acid.  All of the patient's questions and concerns were addressed. Dutasteride Male Counseling: Dustasteride Counseling:  I discussed with the patient the risks of use of dutasteride including but not limited to decreased libido, decreased ejaculate volume, and gynecomastia. Women who can become pregnant should not handle medication.  All of the patient's questions and concerns were addressed. Mirvaso Counseling: Mirvaso is a topical medication which can decrease superficial blood flow where applied. Side effects are uncommon and include stinging, redness and allergic reactions. Humira Counseling:  I discussed with the patient the risks of adalimumab including but not limited to myelosuppression, immunosuppression, autoimmune hepatitis, demyelinating diseases, lymphoma, and serious infections.  The patient understands that monitoring is required including a PPD at baseline and must alert us or the primary physician if symptoms of infection or other concerning signs are noted. Semaglutide Counseling: I reviewed the possible side effects including: thyroid tumors, kidney disease, gallbladder disease, abdominal pain, constipation, diarrhea, nausea, vomiting and pancreatitis. Do not take this medication if you have a history or family history of multiple endocrine neoplasia syndrome type 2. Side effects reviewed, pt to contact office should one occur. Prednisone Counseling:  I discussed with the patient the risks of prolonged use of prednisone including but not limited to weight gain, insomnia, osteoporosis, mood changes, diabetes, susceptibility to infection, glaucoma and high blood pressure.  In cases where prednisone use is prolonged, patients should be monitored with blood pressure checks, serum glucose levels and an eye exam.  Additionally, the patient may need to be placed on GI prophylaxis, PCP prophylaxis, and calcium and vitamin D supplementation and/or a bisphosphonate.  The patient verbalized understanding of the proper use and the possible adverse effects of prednisone.  All of the patient's questions and concerns were addressed. Oral Minoxidil Pregnancy And Lactation Text: This medication should only be used when clearly needed if you are pregnant, attempting to become pregnant or breast feeding. Tazorac Pregnancy And Lactation Text: This medication is not safe during pregnancy. It is unknown if this medication is excreted in breast milk. Isotretinoin Counseling: Patient should get monthly blood tests, not donate blood, not drive at night if vision affected, not share medication, and not undergo elective surgery for 6 months after tx completed. Side effects reviewed, pt to contact office should one occur. Griseofulvin Counseling:  I discussed with the patient the risks of griseofulvin including but not limited to photosensitivity, cytopenia, liver damage, nausea/vomiting and severe allergy.  The patient understands that this medication is best absorbed when taken with a fatty meal (e.g., ice cream or french fries). Rinvoq Pregnancy And Lactation Text: Based on animal studies, Rinvoq may cause embryo-fetal harm when administered to pregnant women.  The medication should not be used in pregnancy.  Breastfeeding is not recommended during treatment and for 6 days after the last dose. Sotyktu Counseling:  I discussed the most common side effects of Sotyktu including: common cold, sore throat, sinus infections, cold sores, canker sores, folliculitis, and acne.  I also discussed more serious side effects of Sotyktu including but not limited to: serious allergic reactions; increased risk for infections such as TB; cancers such as lymphomas; rhabdomyolysis and elevated CPK; and elevated triglycerides and liver enzymes.  Zyclara Counseling:  I discussed with the patient the risks of imiquimod including but not limited to erythema, scaling, itching, weeping, crusting, and pain.  Patient understands that the inflammatory response to imiquimod is variable from person to person and was educated regarded proper titration schedule.  If flu-like symptoms develop, patient knows to discontinue the medication and contact us. Spevigo Counseling: I discussed with the patient the risks of Spevigo including but not limited to fatigue, nasuea, vomiting, headache, pruritus, urinary tract infection, an infusion related reactions.  The patient understands that monitoring is required including screening for tuberculosis at baseline and yearly screening thereafter while continuing Spevigo therapy. They should contact us if symptoms of infection or other concerning signs are noted. Rifampin Counseling: I discussed with the patient the risks of rifampin including but not limited to liver damage, kidney damage, red-orange body fluids, nausea/vomiting and severe allergy. Dapsone Counseling: I discussed with the patient the risks of dapsone including but not limited to hemolytic anemia, agranulocytosis, rashes, methemoglobinemia, kidney failure, peripheral neuropathy, headaches, GI upset, and liver toxicity.  Patients who start dapsone require monitoring including baseline LFTs and weekly CBCs for the first month, then every month thereafter.  The patient verbalized understanding of the proper use and possible adverse effects of dapsone.  All of the patient's questions and concerns were addressed. Otezla Counseling: The side effects of Otezla were discussed with the patient, including but not limited to worsening or new depression, weight loss, diarrhea, nausea, upper respiratory tract infection, and headache. Patient instructed to call the office should any adverse effect occur.  The patient verbalized understanding of the proper use and possible adverse effects of Otezla.  All the patient's questions and concerns were addressed. Dutasteride Female Counseling: Dutasteride Counseling:  I discussed with the patient the risks of use of dutasteride including but not limited to decreased libido and sexual dysfunction. Explained the teratogenic nature of the medication and stressed the importance of not getting pregnant during treatment. All of the patient's questions and concerns were addressed. Wegovy Counseling: I reviewed the possible side effects including: thyroid tumors, kidney disease, gallbladder disease, abdominal pain, constipation, diarrhea, nausea, vomiting and pancreatitis. Do not take this medication if you have a history or family history of multiple endocrine neoplasia syndrome type 2. Side effects reviewed, pt to contact office should one occur. Hyrimoz Counseling:  I discussed with the patient the risks of adalimumab including but not limited to myelosuppression, immunosuppression, autoimmune hepatitis, demyelinating diseases, lymphoma, and serious infections.  The patient understands that monitoring is required including a PPD at baseline and must alert us or the primary physician if symptoms of infection or other concerning signs are noted. Dapsone Pregnancy And Lactation Text: This medication is Pregnancy Category C and is not considered safe during pregnancy or breast feeding. Isotretinoin Pregnancy And Lactation Text: This medication is Pregnancy Category X and is considered extremely dangerous during pregnancy. It is unknown if it is excreted in breast milk. Azithromycin Counseling:  I discussed with the patient the risks of azithromycin including but not limited to GI upset, allergic reaction, drug rash, diarrhea, and yeast infections. Cantharidin Counseling:  I discussed with the patient the risks of Cantharidin including but not limited to pain, redness, burning, itching, and blistering. Topical Clindamycin Counseling: Patient counseled that this medication may cause skin irritation or allergic reactions.  In the event of skin irritation, the patient was advised to reduce the amount of the drug applied or use it less frequently.   The patient verbalized understanding of the proper use and possible adverse effects of clindamycin.  All of the patient's questions and concerns were addressed. Griseofulvin Pregnancy And Lactation Text: This medication is Pregnancy Category X and is known to cause serious birth defects. It is unknown if this medication is excreted in breast milk but breast feeding should be avoided.

## 2025-01-17 ENCOUNTER — NON-APPOINTMENT (OUTPATIENT)
Age: 65
End: 2025-01-17

## 2025-02-12 NOTE — SPEECH LANGUAGE PATHOLOGY EVALUATION - RESONANCE
intact Bill 40705 For Specimen Handling/Conveyance To Laboratory?: no Venipuncture Paragraph: An alcohol pad was applied to the venipuncture site. Venipuncture was performed using a butterfly needle. Pressure and a bandaid was applied to the site. No complications were noted. Number Of Tubes Drawn: 3 Detail Level: None

## 2025-02-24 ENCOUNTER — NON-APPOINTMENT (OUTPATIENT)
Age: 65
End: 2025-02-24

## 2025-02-24 ENCOUNTER — APPOINTMENT (OUTPATIENT)
Dept: CARDIOLOGY | Facility: CLINIC | Age: 65
End: 2025-02-24
Payer: MEDICARE

## 2025-02-24 PROCEDURE — 93296 REM INTERROG EVL PM/IDS: CPT

## 2025-02-24 PROCEDURE — 93294 REM INTERROG EVL PM/LDLS PM: CPT

## 2025-03-21 ENCOUNTER — EMERGENCY (EMERGENCY)
Facility: HOSPITAL | Age: 65
LOS: 2 days | Discharge: HOME | End: 2025-03-24
Attending: EMERGENCY MEDICINE
Payer: MEDICARE

## 2025-03-21 VITALS
RESPIRATION RATE: 18 BRPM | TEMPERATURE: 98 F | OXYGEN SATURATION: 97 % | WEIGHT: 195.11 LBS | SYSTOLIC BLOOD PRESSURE: 113 MMHG | DIASTOLIC BLOOD PRESSURE: 73 MMHG | HEART RATE: 95 BPM | HEIGHT: 72 IN

## 2025-03-21 DIAGNOSIS — Z95.0 PRESENCE OF CARDIAC PACEMAKER: Chronic | ICD-10-CM

## 2025-03-21 DIAGNOSIS — Z98.890 OTHER SPECIFIED POSTPROCEDURAL STATES: Chronic | ICD-10-CM

## 2025-03-21 LAB
ALBUMIN SERPL ELPH-MCNC: 4.3 G/DL — SIGNIFICANT CHANGE UP (ref 3.5–5.2)
ALP SERPL-CCNC: 71 U/L — SIGNIFICANT CHANGE UP (ref 30–115)
ALT FLD-CCNC: 13 U/L — SIGNIFICANT CHANGE UP (ref 0–41)
ANION GAP SERPL CALC-SCNC: 12 MMOL/L — SIGNIFICANT CHANGE UP (ref 7–14)
AST SERPL-CCNC: 16 U/L — SIGNIFICANT CHANGE UP (ref 0–41)
BASOPHILS # BLD AUTO: 0.05 K/UL — SIGNIFICANT CHANGE UP (ref 0–0.2)
BASOPHILS NFR BLD AUTO: 0.9 % — SIGNIFICANT CHANGE UP (ref 0–1)
BILIRUB SERPL-MCNC: 0.9 MG/DL — SIGNIFICANT CHANGE UP (ref 0.2–1.2)
BUN SERPL-MCNC: 25 MG/DL — HIGH (ref 10–20)
CALCIUM SERPL-MCNC: 9.3 MG/DL — SIGNIFICANT CHANGE UP (ref 8.4–10.5)
CHLORIDE SERPL-SCNC: 107 MMOL/L — SIGNIFICANT CHANGE UP (ref 98–110)
CO2 SERPL-SCNC: 22 MMOL/L — SIGNIFICANT CHANGE UP (ref 17–32)
CREAT SERPL-MCNC: 1.3 MG/DL — SIGNIFICANT CHANGE UP (ref 0.7–1.5)
EGFR: 62 ML/MIN/1.73M2 — SIGNIFICANT CHANGE UP
EGFR: 62 ML/MIN/1.73M2 — SIGNIFICANT CHANGE UP
EOSINOPHIL # BLD AUTO: 0.16 K/UL — SIGNIFICANT CHANGE UP (ref 0–0.7)
EOSINOPHIL NFR BLD AUTO: 2.9 % — SIGNIFICANT CHANGE UP (ref 0–8)
GLUCOSE SERPL-MCNC: 241 MG/DL — HIGH (ref 70–99)
HCT VFR BLD CALC: 44.5 % — SIGNIFICANT CHANGE UP (ref 42–52)
HGB BLD-MCNC: 14.4 G/DL — SIGNIFICANT CHANGE UP (ref 14–18)
IMM GRANULOCYTES NFR BLD AUTO: 0.4 % — HIGH (ref 0.1–0.3)
LYMPHOCYTES # BLD AUTO: 1.08 K/UL — LOW (ref 1.2–3.4)
LYMPHOCYTES # BLD AUTO: 19.4 % — LOW (ref 20.5–51.1)
MCHC RBC-ENTMCNC: 29.1 PG — SIGNIFICANT CHANGE UP (ref 27–31)
MCHC RBC-ENTMCNC: 32.4 G/DL — SIGNIFICANT CHANGE UP (ref 32–37)
MCV RBC AUTO: 89.9 FL — SIGNIFICANT CHANGE UP (ref 80–94)
MONOCYTES # BLD AUTO: 0.58 K/UL — SIGNIFICANT CHANGE UP (ref 0.1–0.6)
MONOCYTES NFR BLD AUTO: 10.4 % — HIGH (ref 1.7–9.3)
NEUTROPHILS # BLD AUTO: 3.69 K/UL — SIGNIFICANT CHANGE UP (ref 1.4–6.5)
NEUTROPHILS NFR BLD AUTO: 66 % — SIGNIFICANT CHANGE UP (ref 42.2–75.2)
NRBC BLD AUTO-RTO: 0 /100 WBCS — SIGNIFICANT CHANGE UP (ref 0–0)
NT-PROBNP SERPL-SCNC: <36 PG/ML — SIGNIFICANT CHANGE UP (ref 0–300)
PLATELET # BLD AUTO: 164 K/UL — SIGNIFICANT CHANGE UP (ref 130–400)
PMV BLD: 10.9 FL — HIGH (ref 7.4–10.4)
POTASSIUM SERPL-MCNC: 4 MMOL/L — SIGNIFICANT CHANGE UP (ref 3.5–5)
POTASSIUM SERPL-SCNC: 4 MMOL/L — SIGNIFICANT CHANGE UP (ref 3.5–5)
PROT SERPL-MCNC: 7 G/DL — SIGNIFICANT CHANGE UP (ref 6–8)
RBC # BLD: 4.95 M/UL — SIGNIFICANT CHANGE UP (ref 4.7–6.1)
RBC # FLD: 13.4 % — SIGNIFICANT CHANGE UP (ref 11.5–14.5)
SODIUM SERPL-SCNC: 141 MMOL/L — SIGNIFICANT CHANGE UP (ref 135–146)
TROPONIN T, HIGH SENSITIVITY RESULT: 11 NG/L — SIGNIFICANT CHANGE UP (ref 6–21)
TROPONIN T, HIGH SENSITIVITY RESULT: 12 NG/L — SIGNIFICANT CHANGE UP (ref 6–21)
WBC # BLD: 5.58 K/UL — SIGNIFICANT CHANGE UP (ref 4.8–10.8)
WBC # FLD AUTO: 5.58 K/UL — SIGNIFICANT CHANGE UP (ref 4.8–10.8)

## 2025-03-21 PROCEDURE — 70496 CT ANGIOGRAPHY HEAD: CPT | Mod: MC

## 2025-03-21 PROCEDURE — 74177 CT ABD & PELVIS W/CONTRAST: CPT | Mod: 26

## 2025-03-21 PROCEDURE — 84484 ASSAY OF TROPONIN QUANT: CPT

## 2025-03-21 PROCEDURE — 86140 C-REACTIVE PROTEIN: CPT

## 2025-03-21 PROCEDURE — 80053 COMPREHEN METABOLIC PANEL: CPT

## 2025-03-21 PROCEDURE — A9500: CPT

## 2025-03-21 PROCEDURE — 85025 COMPLETE CBC W/AUTO DIFF WBC: CPT

## 2025-03-21 PROCEDURE — 99285 EMERGENCY DEPT VISIT HI MDM: CPT | Mod: 25

## 2025-03-21 PROCEDURE — 70551 MRI BRAIN STEM W/O DYE: CPT | Mod: MC

## 2025-03-21 PROCEDURE — 99223 1ST HOSP IP/OBS HIGH 75: CPT

## 2025-03-21 PROCEDURE — 96374 THER/PROPH/DIAG INJ IV PUSH: CPT | Mod: XU

## 2025-03-21 PROCEDURE — 71045 X-RAY EXAM CHEST 1 VIEW: CPT

## 2025-03-21 PROCEDURE — 82962 GLUCOSE BLOOD TEST: CPT

## 2025-03-21 PROCEDURE — 36415 COLL VENOUS BLD VENIPUNCTURE: CPT

## 2025-03-21 PROCEDURE — 70498 CT ANGIOGRAPHY NECK: CPT | Mod: MC

## 2025-03-21 PROCEDURE — 85652 RBC SED RATE AUTOMATED: CPT

## 2025-03-21 PROCEDURE — 71045 X-RAY EXAM CHEST 1 VIEW: CPT | Mod: 26

## 2025-03-21 PROCEDURE — 85730 THROMBOPLASTIN TIME PARTIAL: CPT

## 2025-03-21 PROCEDURE — 78452 HT MUSCLE IMAGE SPECT MULT: CPT | Mod: MC

## 2025-03-21 PROCEDURE — 70450 CT HEAD/BRAIN W/O DYE: CPT | Mod: MC

## 2025-03-21 PROCEDURE — 93017 CV STRESS TEST TRACING ONLY: CPT

## 2025-03-21 PROCEDURE — 83880 ASSAY OF NATRIURETIC PEPTIDE: CPT

## 2025-03-21 PROCEDURE — G0378: CPT

## 2025-03-21 PROCEDURE — 74177 CT ABD & PELVIS W/CONTRAST: CPT | Mod: MC

## 2025-03-21 PROCEDURE — 85610 PROTHROMBIN TIME: CPT

## 2025-03-21 PROCEDURE — 93005 ELECTROCARDIOGRAM TRACING: CPT

## 2025-03-21 PROCEDURE — 93010 ELECTROCARDIOGRAM REPORT: CPT

## 2025-03-21 PROCEDURE — 93306 TTE W/DOPPLER COMPLETE: CPT

## 2025-03-21 PROCEDURE — 0042T: CPT | Mod: MC

## 2025-03-21 RX ORDER — ATORVASTATIN CALCIUM 80 MG/1
80 TABLET, FILM COATED ORAL AT BEDTIME
Refills: 0 | Status: DISCONTINUED | OUTPATIENT
Start: 2025-03-21 | End: 2025-03-24

## 2025-03-21 RX ORDER — TOPIRAMATE 25 MG/1
300 TABLET, FILM COATED ORAL AT BEDTIME
Refills: 0 | Status: DISCONTINUED | OUTPATIENT
Start: 2025-03-21 | End: 2025-03-24

## 2025-03-21 RX ORDER — LOSARTAN POTASSIUM 100 MG/1
100 TABLET, FILM COATED ORAL DAILY
Refills: 0 | Status: DISCONTINUED | OUTPATIENT
Start: 2025-03-22 | End: 2025-03-24

## 2025-03-21 RX ORDER — REGADENOSON 0.08 MG/ML
0.4 INJECTION, SOLUTION INTRAVENOUS ONCE
Refills: 0 | Status: DISCONTINUED | OUTPATIENT
Start: 2025-03-21 | End: 2025-03-24

## 2025-03-21 RX ORDER — ASPIRIN 325 MG
325 TABLET ORAL DAILY
Refills: 0 | Status: DISCONTINUED | OUTPATIENT
Start: 2025-03-22 | End: 2025-03-24

## 2025-03-21 RX ORDER — KETOROLAC TROMETHAMINE 30 MG/ML
15 INJECTION, SOLUTION INTRAMUSCULAR; INTRAVENOUS ONCE
Refills: 0 | Status: DISCONTINUED | OUTPATIENT
Start: 2025-03-21 | End: 2025-03-21

## 2025-03-21 RX ADMIN — ATORVASTATIN CALCIUM 80 MILLIGRAM(S): 80 TABLET, FILM COATED ORAL at 22:10

## 2025-03-21 RX ADMIN — KETOROLAC TROMETHAMINE 15 MILLIGRAM(S): 30 INJECTION, SOLUTION INTRAMUSCULAR; INTRAVENOUS at 22:09

## 2025-03-21 RX ADMIN — TOPIRAMATE 300 MILLIGRAM(S): 25 TABLET, FILM COATED ORAL at 22:10

## 2025-03-21 NOTE — ED PROVIDER NOTE - CLINICAL SUMMARY MEDICAL DECISION MAKING FREE TEXT BOX
Patient presents with episode of chest pain and started this afternoon.  Labs EKG x-ray done.  No acute findings.  Results discussed.  Patient is agreeable to stay for observation unit for further cardiac testing.  Patient placed in observation unit.

## 2025-03-21 NOTE — ED CDU PROVIDER INITIAL DAY NOTE - PHYSICAL EXAMINATION
Gen: Alert, NAD, well appearing  Head: NC, AT, PERRL, Right eye deviated laterally,  normal lids/conjunctiva  ENT: normal hearing, patent oropharynx without erythema/exudate  Neck: +supple, no tenderness/meningismus,  Pulm: Bilateral BS, normal resp effort, no wheeze/stridor/retractions  CV: RRR  Abd: soft, +right sided abdominal tenderness (chronic per patient)  Mskel: no edema/erythema/cyanosis  Skin: no rash, warm/dry  Neuro: AAOx3, no sensory/motor deficits

## 2025-03-21 NOTE — ED CDU PROVIDER INITIAL DAY NOTE - DATA REVIEWED, MDM
Medication: pantoprazole (PROTONIX) 40 MG tablet   Last office visit date: 11/8/2021  Medication Refill Protocol Failed.  Failed criteria: Haven't seen provider in 12 months. Sent to clinician to review.    vital signs

## 2025-03-21 NOTE — ED CDU PROVIDER INITIAL DAY NOTE - OBJECTIVE STATEMENT
63yo M hx of CVA, pacemaker,  COPD, HTN, DM , Non hodgkin's lymphoma c/o left sided chest pain while showering today at 1:15pm.  Pain is described as a ache, "7" in severity, with no modifying factors. No SOB, n/v, abdominal pain, leg pains or leg swelling. No smoking. Mom with CAD in her 70's.

## 2025-03-21 NOTE — ED PROVIDER NOTE - OBJECTIVE STATEMENT
64-year-old male past med history of pacemaker for bradycardia, COPD, diabetes, hypertension, hyperlipidemia, non-Hodgkin's lymphoma in remission since 2001 presents with chest pain.  Patient reports that while taking a shower around 1 PM he developed midsternal chest pain rating to his left chest and arm.  Denies any shortness of breath or palpitations.  No nausea vomiting abdominal pain.  No similar episodes in the past.  Last CCTA was 5 years ago.  Cardiologist is Dr. More.

## 2025-03-21 NOTE — ED PROVIDER NOTE - ATTENDING APP SHARED VISIT CONTRIBUTION OF CARE
64-year-old male past med history of pacemaker for bradycardia, COPD, diabetes, hypertension, hyperlipidemia, non-Hodgkin's lymphoma in remission since 2001 presents with chest pain.  Patient reports that while taking a shower around 1 PM he developed midsternal chest pain rating to his left chest and arm.  Denies any shortness of breath or palpitations.  No nausea vomiting abdominal pain.  No similar episodes in the past.  Last CCTA was 5 years ago.  Cardiologist is Dr. More. Former smoker.    CONSTITUTIONAL: Well-developed; well-nourished; in no acute distress.   SKIN: warm, dry. no rashes.   HEAD: Normocephalic; atraumatic.  EYES: PERRL, EOMI, no conjunctival erythema  ENT: No nasal discharge; airway clear.  NECK: Supple; non tender.  CARD: S1, S2 normal;  Regular rate and rhythm.   RESP: No wheezes, rales or rhonchi.  ABD: soft non tender, non distended, no rebound or guarding  EXT: Normal ROM.  5/5 strength in all 4 extremities   LYMPH: No acute cervical adenopathy.  NEURO: Alert, oriented, grossly unremarkable. neurovascularly intact  PSYCH: Cooperative, appropriate.

## 2025-03-21 NOTE — ED PROVIDER NOTE - PHYSICAL EXAMINATION
VITAL SIGNS: I have reviewed nursing notes and confirm.  CONSTITUTIONAL: in no acute distress.  SKIN: Skin exam is warm and dry, no acute rash.  HEAD: Normocephalic; atraumatic.  EYES:  EOM intact; conjunctiva and sclera clear.  ENT: No nasal discharge; airway clear.  NECK: Supple; non tender.  CARD: S1, S2 normal; no murmurs, gallops, or rubs. Regular rate and rhythm.  RESP: No wheezes, rales or rhonchi. Speaking in full sentences.   ABD:  soft; non-distended; non-tender; No rebound or guarding. No CVA tenderness.  EXT: Normal ROM. No clubbing, cyanosis or edema.  NEURO: Alert, oriented. Grossly unremarkable. No focal deficits.

## 2025-03-22 LAB
APTT BLD: 30.5 SEC — SIGNIFICANT CHANGE UP (ref 27–39.2)
CRP SERPL-MCNC: <3 MG/L — SIGNIFICANT CHANGE UP
ERYTHROCYTE [SEDIMENTATION RATE] IN BLOOD: 10 MM/HR — SIGNIFICANT CHANGE UP (ref 0–10)
GLUCOSE BLDC GLUCOMTR-MCNC: 129 MG/DL — HIGH (ref 70–99)
GLUCOSE BLDC GLUCOMTR-MCNC: 167 MG/DL — HIGH (ref 70–99)
INR BLD: 0.98 RATIO — SIGNIFICANT CHANGE UP (ref 0.65–1.3)
PROTHROM AB SERPL-ACNC: 11.5 SEC — SIGNIFICANT CHANGE UP (ref 9.95–12.87)

## 2025-03-22 PROCEDURE — 70498 CT ANGIOGRAPHY NECK: CPT | Mod: 26

## 2025-03-22 PROCEDURE — 70450 CT HEAD/BRAIN W/O DYE: CPT | Mod: 26,77,XU

## 2025-03-22 PROCEDURE — 0042T: CPT

## 2025-03-22 PROCEDURE — 70496 CT ANGIOGRAPHY HEAD: CPT | Mod: 26

## 2025-03-22 PROCEDURE — 70450 CT HEAD/BRAIN W/O DYE: CPT | Mod: 26,XU

## 2025-03-22 PROCEDURE — 99282 EMERGENCY DEPT VISIT SF MDM: CPT

## 2025-03-22 PROCEDURE — 93018 CV STRESS TEST I&R ONLY: CPT

## 2025-03-22 PROCEDURE — 93010 ELECTROCARDIOGRAM REPORT: CPT

## 2025-03-22 PROCEDURE — 99233 SBSQ HOSP IP/OBS HIGH 50: CPT

## 2025-03-22 PROCEDURE — 78452 HT MUSCLE IMAGE SPECT MULT: CPT | Mod: 26

## 2025-03-22 PROCEDURE — 93016 CV STRESS TEST SUPVJ ONLY: CPT

## 2025-03-22 RX ORDER — ACETAMINOPHEN 500 MG/5ML
975 LIQUID (ML) ORAL ONCE
Refills: 0 | Status: COMPLETED | OUTPATIENT
Start: 2025-03-22 | End: 2025-03-22

## 2025-03-22 RX ADMIN — ATORVASTATIN CALCIUM 80 MILLIGRAM(S): 80 TABLET, FILM COATED ORAL at 22:20

## 2025-03-22 RX ADMIN — Medication 325 MILLIGRAM(S): at 12:17

## 2025-03-22 RX ADMIN — Medication 975 MILLIGRAM(S): at 00:59

## 2025-03-22 RX ADMIN — LOSARTAN POTASSIUM 100 MILLIGRAM(S): 100 TABLET, FILM COATED ORAL at 06:57

## 2025-03-22 RX ADMIN — TOPIRAMATE 300 MILLIGRAM(S): 25 TABLET, FILM COATED ORAL at 22:20

## 2025-03-22 RX ADMIN — Medication 975 MILLIGRAM(S): at 01:30

## 2025-03-22 NOTE — STROKE CODE NOTE - IV ALTEPLASE EXCLUSION ABS OTHER
Out of the window for IV thrombolytics. NIHSS 2 at baseline, new symptoms of left facial tingling is non disabling. Not a candidate for IA intervention.

## 2025-03-22 NOTE — ED CDU PROVIDER SUBSEQUENT DAY NOTE - ATTENDING CONTRIBUTION TO CARE
Signout required while patient was in ED OU.  I was told by ED OU PA that patient is undergoing ACS workup, will get nuclear stress test in the a.m., developed left cheek numbness.  Associated with mild frontal nonradiating headache, no other new symptoms.  Noncontrasted CT obtained without acute findings.  Patient's symptoms persisted and neurology was consulted.  They recommended stroke code.  Stroke alert activated.  Pending images and will reassess.

## 2025-03-22 NOTE — CONSULT NOTE ADULT - NS ATTEND AMEND GEN_ALL_CORE FT
Stable   no specific symptoms  CTA normal  follow MRI  if normal, clear by Neurology  Follow cardiology

## 2025-03-22 NOTE — ED ADULT NURSE REASSESSMENT NOTE - NS ED NURSE REASSESS COMMENT FT1
pt assessed on morning rounds NIH 2 , pt remained on cardiac monitor, VSS, plan of care discussed w. pt , pt verbalized understanding . labs drawn as per MD ORders

## 2025-03-22 NOTE — ED ADULT NURSE REASSESSMENT NOTE - NS ED NURSE REASSESS COMMENT FT1
Pt is alert and orientedx4, c/o feeling tingling nd numbness on his left side of the face. Pt said it started with headahce. SAMI Lua came to evaluates.. His NIH is 0, can move all his limbs. Tylenol given PO, will re evaluate.

## 2025-03-22 NOTE — CONSULT NOTE ADULT - SUBJECTIVE AND OBJECTIVE BOX
Neurology Consult    Patient is a 64y old  Male who presents with a chief complaint of left facial sensory deficit    HPI:   63y Male with PMHx of HTN, HLD, COPD, PPM NonHodgkin's Lymphoma pacemaker who presented to the ED due to chest discomfort. The patient has been seen by our service for multiple episodes of right sided weakness in the past, received IV thrombolytic twice, in November 2020 and in august 2020. He also had several MRIs at the time of symptoms which have all been negative for stroke. As per patient, has baseline right arm and right leg weakness from prior stroke. While in the ED reports feeling left facial sensory deficit and paresthesias described as tingling.         PAST MEDICAL & SURGICAL HISTORY:  HTN (hypertension)      High cholesterol      DM (diabetes mellitus)      Chronic obstructive pulmonary disease, unspecified COPD type      Non Hodgkin's lymphoma      Pacemaker      Artificial cardiac pacemaker      S/P transesophageal echocardiogram (ANITA)          FAMILY HISTORY:  Family history of breast cancer (Sibling)  in sister at the age of 59    Family history of cancer (Mother)        Social History: (-) x 3    Allergies    acyclovir (Anaphylaxis)  Bactrim (Anaphylaxis)    Intolerances        MEDICATIONS  (STANDING):  aspirin enteric coated 325 milliGRAM(s) Oral daily  atorvastatin 80 milliGRAM(s) Oral at bedtime  losartan 100 milliGRAM(s) Oral daily  regadenoson Injectable 0.4 milliGRAM(s) IV Push once  topiramate 300 milliGRAM(s) Oral at bedtime    MEDICATIONS  (PRN):    Vital Signs Last 24 Hrs  T(C): 36.3 (21 Mar 2025 23:37), Max: 36.4 (21 Mar 2025 14:32)  T(F): 97.3 (21 Mar 2025 23:37), Max: 97.5 (21 Mar 2025 14:32)  HR: 64 (21 Mar 2025 23:37) (64 - 95)  BP: 138/87 (21 Mar 2025 23:37) (113/73 - 138/87)  BP(mean): --  RR: 18 (21 Mar 2025 23:37) (18 - 18)  SpO2: 96% (21 Mar 2025 23:37) (96% - 97%)    Parameters below as of 21 Mar 2025 14:32  Patient On (Oxygen Delivery Method): room air        Examination:  General:  Appearance is consistent with chronologic age.  No abnormal facies.  Gross skin survey within normal limits.    Cognitive/Language:  The patient is oriented to person, place, time and date.  Recent and remote memory intact.  Fund of knowledge is intact and normal.  Language with normal repetition, comprehension and naming.  Nondysarthric.    Eyes: intact VA, VFF.  Chronic right eye abduction (strabisumus) No double vision.  PERRL.  No ptosis/weakness of eyelid closure.    Face: no facial asymmetry.    Formal Muscle Strength Testing: (MRC grade R/L) RUE 4+/5 RLE 4+/5 (reports as chronic, also moves stronger spontaneously than on exam)  Limited effort on RUE RLE when examined   Sensory examination:   Intact to light touch in left extremities. Chronic decreased sensation in RUE RLE. Reports left v1 paresthesias and decreased sensation.  Cerebellum:   FTN/HKS intact with normal REINALDO in all limbs.  No dysmetria or dysdiadokinesia.      NIHSS 3     Labs:   CBC Full  -  ( 21 Mar 2025 15:55 )  WBC Count : 5.58 K/uL  RBC Count : 4.95 M/uL  Hemoglobin : 14.4 g/dL  Hematocrit : 44.5 %  Platelet Count - Automated : 164 K/uL  Mean Cell Volume : 89.9 fL  Mean Cell Hemoglobin : 29.1 pg  Mean Cell Hemoglobin Concentration : 32.4 g/dL  Auto Neutrophil # : x  Auto Lymphocyte # : x  Auto Monocyte # : x  Auto Eosinophil # : x  Auto Basophil # : x  Auto Neutrophil % : x  Auto Lymphocyte % : x  Auto Monocyte % : x  Auto Eosinophil % : x  Auto Basophil % : x    03-21    141  |  107  |  25[H]  ----------------------------<  241[H]  4.0   |  22  |  1.3    Ca    9.3      21 Mar 2025 15:55    TPro  7.0  /  Alb  4.3  /  TBili  0.9  /  DBili  x   /  AST  16  /  ALT  13  /  AlkPhos  71  03-21    LIVER FUNCTIONS - ( 21 Mar 2025 15:55 )  Alb: 4.3 g/dL / Pro: 7.0 g/dL / ALK PHOS: 71 U/L / ALT: 13 U/L / AST: 16 U/L / GGT: x             Urinalysis Basic - ( 21 Mar 2025 15:55 )    Color: x / Appearance: x / SG: x / pH: x  Gluc: 241 mg/dL / Ketone: x  / Bili: x / Urobili: x   Blood: x / Protein: x / Nitrite: x   Leuk Esterase: x / RBC: x / WBC x   Sq Epi: x / Non Sq Epi: x / Bacteria: x          Neuroimaging:  NCHCT: CT Head No Cont:   ACC: [id number] EXAM:  CT BRAIN   ORDERED BY: YE VANCE     IMPRESSION:    No evidence of acute intracranial pathology.    --- End of Report ---            GRETCHEN CRAMER MD; Attending Radiologist  This document has been electronically signed. Mar 22 2025  2:47AM (03-22-25 @ 02:30)      03-22-25 @ 04:14       Neurology Consult    Patient is a 64y old  Male who presents with a chief complaint of left facial sensory deficit    HPI:   63y Male with PMHx of HTN, HLD, COPD, PPM NonHodgkin's Lymphoma pacemaker who presented to the ED due to chest discomfort. The patient has been seen by our service for multiple episodes of right sided weakness in the past, received IV thrombolytic twice, in November 2020 and in august 2020. He also had several MRIs at the time of symptoms which have all been negative for stroke. As per patient, has baseline right arm and right leg weakness from prior stroke. While in the ED reports feeling left facial sensory deficit and paresthesias described as tingling. Denies headache.         PAST MEDICAL & SURGICAL HISTORY:  HTN (hypertension)      High cholesterol      DM (diabetes mellitus)      Chronic obstructive pulmonary disease, unspecified COPD type      Non Hodgkin's lymphoma      Pacemaker      Artificial cardiac pacemaker      S/P transesophageal echocardiogram (ANITA)          FAMILY HISTORY:  Family history of breast cancer (Sibling)  in sister at the age of 59    Family history of cancer (Mother)        Social History: (-) x 3    Allergies    acyclovir (Anaphylaxis)  Bactrim (Anaphylaxis)    Intolerances        MEDICATIONS  (STANDING):  aspirin enteric coated 325 milliGRAM(s) Oral daily  atorvastatin 80 milliGRAM(s) Oral at bedtime  losartan 100 milliGRAM(s) Oral daily  regadenoson Injectable 0.4 milliGRAM(s) IV Push once  topiramate 300 milliGRAM(s) Oral at bedtime    MEDICATIONS  (PRN):    Vital Signs Last 24 Hrs  T(C): 36.3 (21 Mar 2025 23:37), Max: 36.4 (21 Mar 2025 14:32)  T(F): 97.3 (21 Mar 2025 23:37), Max: 97.5 (21 Mar 2025 14:32)  HR: 64 (21 Mar 2025 23:37) (64 - 95)  BP: 138/87 (21 Mar 2025 23:37) (113/73 - 138/87)  BP(mean): --  RR: 18 (21 Mar 2025 23:37) (18 - 18)  SpO2: 96% (21 Mar 2025 23:37) (96% - 97%)    Parameters below as of 21 Mar 2025 14:32  Patient On (Oxygen Delivery Method): room air        Examination:  General:  Appearance is consistent with chronologic age.  No abnormal facies.  Gross skin survey within normal limits.    Cognitive/Language:  The patient is oriented to person, place, time and date.  Recent and remote memory intact.  Fund of knowledge is intact and normal.  Language with normal repetition, comprehension and naming.  Nondysarthric.    Eyes: intact VA, VFF.  Chronic right eye abduction (strabisumus) No double vision.  PERRL.  No ptosis/weakness of eyelid closure.    Face: no facial asymmetry.    Formal Muscle Strength Testing: (MRC grade R/L) RUE 4+/5 RLE 4+/5 (reports as chronic, also moves stronger spontaneously than on exam)  Limited effort on RUE RLE when examined   Sensory examination:   Intact to light touch in left extremities. Chronic decreased sensation in RUE RLE. Reports left v1 paresthesias and decreased sensation.  Cerebellum:   FTN/HKS intact with normal REINALDO in all limbs.  No dysmetria or dysdiadokinesia.      NIHSS 3     Labs:   CBC Full  -  ( 21 Mar 2025 15:55 )  WBC Count : 5.58 K/uL  RBC Count : 4.95 M/uL  Hemoglobin : 14.4 g/dL  Hematocrit : 44.5 %  Platelet Count - Automated : 164 K/uL  Mean Cell Volume : 89.9 fL  Mean Cell Hemoglobin : 29.1 pg  Mean Cell Hemoglobin Concentration : 32.4 g/dL  Auto Neutrophil # : x  Auto Lymphocyte # : x  Auto Monocyte # : x  Auto Eosinophil # : x  Auto Basophil # : x  Auto Neutrophil % : x  Auto Lymphocyte % : x  Auto Monocyte % : x  Auto Eosinophil % : x  Auto Basophil % : x    03-21    141  |  107  |  25[H]  ----------------------------<  241[H]  4.0   |  22  |  1.3    Ca    9.3      21 Mar 2025 15:55    TPro  7.0  /  Alb  4.3  /  TBili  0.9  /  DBili  x   /  AST  16  /  ALT  13  /  AlkPhos  71  03-21    LIVER FUNCTIONS - ( 21 Mar 2025 15:55 )  Alb: 4.3 g/dL / Pro: 7.0 g/dL / ALK PHOS: 71 U/L / ALT: 13 U/L / AST: 16 U/L / GGT: x             Urinalysis Basic - ( 21 Mar 2025 15:55 )    Color: x / Appearance: x / SG: x / pH: x  Gluc: 241 mg/dL / Ketone: x  / Bili: x / Urobili: x   Blood: x / Protein: x / Nitrite: x   Leuk Esterase: x / RBC: x / WBC x   Sq Epi: x / Non Sq Epi: x / Bacteria: x          Neuroimaging:  NCHCT: CT Head No Cont:   ACC: [id number] EXAM:  CT BRAIN   ORDERED BY: YE VANCE     IMPRESSION:    No evidence of acute intracranial pathology.    --- End of Report ---            GRETCHEN CRAMER MD; Attending Radiologist  This document has been electronically signed. Mar 22 2025  2:47AM (03-22-25 @ 02:30)      03-22-25 @ 04:14

## 2025-03-22 NOTE — CONSULT NOTE ADULT - ASSESSMENT
Impression:   63y Male with PMHx of HTN, HLD, COPD, PPM NonHodgkin's Lymphoma pacemaker who presented to the ED due to chest discomfort. The patient has been seen by our service for multiple episodes of right sided weakness in the past, received IV thrombolytic twice, in November 2020 and in august 2020. He also had several MRIs at the time of symptoms which have all been negative for stroke. As per patient, has baseline right arm and right leg weakness from prior stroke. While in the ED reports feeling left facial sensory deficit and paresthesias described as tingling beginning at 2330. Out of the window for IV thrombolytics, non disabling paresthesias not a candidate for IA intervention. Etiology of symptoms unknown, will have a better understanding post workup.     Suggestion:  Follow up CTA  Can get MRI brain without ludmila  Telemetry monitoring  Q4 neuro checks  Medical workup for chest discomfort.     Impression:   63y Male with PMHx of HTN, HLD, COPD, PPM NonHodgkin's Lymphoma pacemaker who presented to the ED due to chest discomfort. The patient has been seen by our service for multiple episodes of right sided weakness in the past, received IV thrombolytic twice, in November 2020 and in august 2020. He also had several MRIs at the time of symptoms which have all been negative for stroke. As per patient, has baseline right arm and right leg weakness from prior stroke. While in the ED reports feeling left facial sensory deficit and paresthesias described as tingling beginning at 2330. Seen as a routine consult and called code stroke. CTH was done after symptom onset, without acute intracranial pathology. Out of the window for IV thrombolytics, non disabling paresthesias not a candidate for IA intervention. Etiology of symptoms unknown, will have a better understanding post workup.     Suggestion:  Follow up CTA  Can get MRI brain without ludmila  Telemetry monitoring  Q4 neuro checks  ESR CRP  ED observational unit  Medical workup for chest discomfort.

## 2025-03-22 NOTE — ED CDU PROVIDER SUBSEQUENT DAY NOTE - PROGRESS NOTE DETAILS
patient signed out from yunior terrazas, no complaint awaiting nuclear and mri, will continue to monitor patient comfortable, will continue to monitor Trop stable. Nuc stress shows now acute ischemia. MRI not obtainable due to Biotronik tech not available to change setting of PPM. Neurology recc repeat non con CTH. I had extensive conversation with neurology attending Dr. Pride.  Repeat CT head negative for acute pathology.  Given this, he does not think that patient's symptoms are caused by acute CVA.  On reassessment patient continues to have left-sided facial numbness.  This finding was discussed with Dr. Pride.  He recommends loading with Plavix, and discharge for outpatient neurology follow-up and MRI within 1 week. However given that there is no formal pathway for patients to have expedited neurology follow-up and MRI, and given difficulty that the patient has already had with obtaining prior MRIs, it is doubtful whether the patient can actually obtain neurology and MRI follow-up in the recommended timeframe.  Additionally, given history of prior right atrial thrombus, and use of anticoagulants, patient requires further assessment for appropriate medical optimization..  I discussed admitting the patient to neurology.  Dr. Pride declined. I discussed the case with ED admin on-call Dr. Ashraf, who agrees that given these issues of access to care and medical optimization, that it is safer for the patient to remain in the hospital.  Patient to remain in OBS.

## 2025-03-22 NOTE — ED CDU PROVIDER SUBSEQUENT DAY NOTE - PHYSICAL EXAMINATION
GENERAL: Well-nourished, Well-developed. NAD.  HEAD: No visible or palpable bumps or hematomas. No ecchymosis behind ears B/L.  Eyes: PERRLA, EOMI.   ENMT: MMM.   CVS: Normal S1,S2. No murmurs appreciated on auscultation   RESP: No use of accessory muscles. Chest rise symmetrical with good expansion. Lungs clear to auscultation B/L. No wheezing, rales, or rhonchi auscultated.  Skin: Warm, Dry. No rashes or lesions. Good cap refill < 2 sec B/L.  EXT: Radial and pedal pulses present B/L. No calf tenderness or swelling B/L. No palpable cords. No pedal edema B/L.  Neuro: AA&O x 3. Speaking in full sentences. No slurring of speech. No facial droop. No tremors. (+)L cheek decreased sensation. Strength 5/5 B/L. Gait within normal limits. Negative Romberg and Pronator Drift. Normal Finger to nose exam. Visual fields intact.

## 2025-03-23 LAB
GLUCOSE BLDC GLUCOMTR-MCNC: 143 MG/DL — HIGH (ref 70–99)
GLUCOSE BLDC GLUCOMTR-MCNC: 158 MG/DL — HIGH (ref 70–99)

## 2025-03-23 PROCEDURE — 93306 TTE W/DOPPLER COMPLETE: CPT | Mod: 26

## 2025-03-23 RX ADMIN — ATORVASTATIN CALCIUM 80 MILLIGRAM(S): 80 TABLET, FILM COATED ORAL at 22:00

## 2025-03-23 RX ADMIN — Medication 325 MILLIGRAM(S): at 12:10

## 2025-03-23 RX ADMIN — TOPIRAMATE 300 MILLIGRAM(S): 25 TABLET, FILM COATED ORAL at 22:00

## 2025-03-23 RX ADMIN — KETOROLAC TROMETHAMINE 15 MILLIGRAM(S): 30 INJECTION, SOLUTION INTRAMUSCULAR; INTRAVENOUS at 23:15

## 2025-03-23 RX ADMIN — LOSARTAN POTASSIUM 100 MILLIGRAM(S): 100 TABLET, FILM COATED ORAL at 07:05

## 2025-03-23 NOTE — ED CDU PROVIDER SUBSEQUENT DAY NOTE - PROGRESS NOTE DETAILS
Per MRI tech Sanjuana, MRI will not be performed today as studies with pacemakers will not be performed due to lack of MRI rep. Patient comfortable, back from echo. Repeat CT head results noted. Neuro note indicates clearance for discharge. JG - after discussion with pt. will wait till tomorrow to get MRI. Pt is not able to get outpt MRI

## 2025-03-23 NOTE — ED ADULT NURSE REASSESSMENT NOTE - NS ED NURSE REASSESS COMMENT FT1
received pt rom outgoing RN, pt a/ox4, ambulatory. pt assessed, numbness noted to R facial area, but denies pain or discomfort. pt on cardiac monitor. VSS, call bell in reach. refusing bed alarm, education provided received pt from outgoing RN, pt a/ox4, ambulatory. pt assessed, numbness noted to R facial area, but denies pain or discomfort. pt on cardiac monitor. VSS, call bell in reach. refusing bed alarm, education provided received pt from outgoing RN, pt a/ox4, ambulatory. pt assessed, numbness noted to L facial area, but denies pain or discomfort. pt on cardiac monitor. VSS, call bell in reach. refusing bed alarm, education provided

## 2025-03-23 NOTE — ED ADULT NURSE REASSESSMENT NOTE - NS ED NURSE REASSESS COMMENT FT1
Pt assessed upon receiving report from previous RN. Pt currently a/o x4, c/o numbness on left side of face. Patent 18 gauge right forearm. Bed alarm activated and hospital attire provided. Pt instructed to call for assistance when necessary. Pt comfort and safety measures maintained. Family at bedside.

## 2025-03-23 NOTE — ED CDU PROVIDER SUBSEQUENT DAY NOTE - HISTORY
53 year old male, past medical history ppm, copd, dm, htn, hdl, nhl, who presented to ed with chest pain. patient placed in obs for cardiac workup, developed numbness, stroke code called, plan for mri. patient with biotronik ppm, approved and cleared for mri.
pt reassessed over night. endorsed mild frontal headache and L cheek numbness at 1am, neurologically intact, mild decreased sensation isolated to L cheek without other deficit. CT head performed. Neurology consulted and recommending further stroke work up

## 2025-03-24 VITALS
RESPIRATION RATE: 18 BRPM | SYSTOLIC BLOOD PRESSURE: 128 MMHG | DIASTOLIC BLOOD PRESSURE: 78 MMHG | HEART RATE: 82 BPM | TEMPERATURE: 97 F | OXYGEN SATURATION: 97 %

## 2025-03-24 LAB
GLUCOSE BLDC GLUCOMTR-MCNC: 160 MG/DL — HIGH (ref 70–99)
GLUCOSE BLDC GLUCOMTR-MCNC: 178 MG/DL — HIGH (ref 70–99)

## 2025-03-24 PROCEDURE — 70551 MRI BRAIN STEM W/O DYE: CPT | Mod: 26

## 2025-03-24 PROCEDURE — 99239 HOSP IP/OBS DSCHRG MGMT >30: CPT

## 2025-03-24 RX ADMIN — LOSARTAN POTASSIUM 100 MILLIGRAM(S): 100 TABLET, FILM COATED ORAL at 05:06

## 2025-03-24 NOTE — ED CDU PROVIDER DISPOSITION NOTE - CARE PROVIDER_API CALL
Ravi Power  Interventional Cardiology  [address] [place] [address], [address]  [place] [address]  Phone: [phone]  Fax: [phone]  Follow Up Time: 4-6 Days    Erick Wells  Neurology  [address] [place] [id number]  Phone: [phone]  Fax: [phone]  Follow Up Time: 4-6 Days

## 2025-03-24 NOTE — ED CDU PROVIDER DISPOSITION NOTE - CLINICAL COURSE
labs ekg imaging reviewed. sp neuro eval. Aware of all results, given a copy of all available results, comfortable with discharge and follow-up outpatient, strict return precautions given. Endorses understanding of all of this and aware that they can return at any time for new or concerning symptoms. No further questions or concerns at this time

## 2025-03-24 NOTE — ED CDU PROVIDER DISPOSITION NOTE - CARE PROVIDERS DIRECT ADDRESSES
[email or web address]

## 2025-03-24 NOTE — CHART NOTE - NSCHARTNOTEFT_GEN_A_CORE
Neurology re called bc MRI unable to be performed at this time due to PPM. Would recommend repeat CTH. If CTH is stable w no acute pathology, can follow up out-pt w general neurology.     Discussed w Dr. Pride
can discharge  follow Neurology as outpatient  will inform office  unlikely vascular event
Went over MRI brain imaging and equivocal signal in medulla (right).    Went over imaging with Radiologist and if clinical not consistent the likely not ischemic.  Would discharge and continue aspirin 81mg QD  Follow up as out patient in neurology clinic in 1-2 weeks

## 2025-03-24 NOTE — ED ADULT NURSE REASSESSMENT NOTE - NS ED NURSE REASSESS COMMENT FT1
received patient from previous RN A/O timesc4 VS stable see flow sheet  denies chest pain ,denies SOB, no dizziness   at rest ,with C/O left side facial numbness on Neuro assessment ,right side extremity weakness  with  less sensation from previous Stroke ,comfort provide on the bed HOB elevated 35 degree ,explained to patient to maya for help  if he had to go to the bathroom and  other  need ,order for MRI today ,patient verbalize understanding of instruction given ,undressed  placed on hospital britton ,Ed attending made aware of patient status ,on going nursing observation .

## 2025-03-24 NOTE — ED CDU PROVIDER DISPOSITION NOTE - NSFOLLOWUPINSTRUCTIONS_ED_ALL_ED_FT
Chest Pain    Chest pain can be caused by many different conditions which may or may not be dangerous. Causes include heartburn, lung infections, heart attack, blood clot in lungs, skin infections, strain or damage to muscle, cartilage, or bones, etc. In addition to a history and physical examination, an electrocardiogram (ECG) or other lab tests may have been performed to determine the cause of your chest pain. Follow up with your primary care provider or with a cardiologist as instructed.     SEEK IMMEDIATE MEDICAL CARE IF YOU HAVE ANY OF THE FOLLOWING SYMPTOMS: worsening chest pain, coughing up blood, unexplained back/neck/jaw pain, severe abdominal pain, dizziness or lightheadedness, fainting, shortness of breath, sweaty or clammy skin, vomiting, or racing heart beat. These symptoms may represent a serious problem that is an emergency. Do not wait to see if the symptoms will go away. Get medical help right away. Call 911 and do not drive yourself to the hospital.     Paresthesia    WHAT YOU NEED TO KNOW:    Paresthesia is numbness, tingling, or burning. It can happen in any part of your body, but usually occurs in your legs, feet, arms, or hands.    DISCHARGE INSTRUCTIONS:    Return to the emergency department if:     You have severe pain along with numbness and tingling.      Your legs suddenly become cold. You have trouble moving your legs, and they ache.      You have increased weakness in a part of your body.      You have uncontrolled movements.    Contact your healthcare provider or neurologist if:     Your symptoms do not improve.      You have symptoms in more than one part of your body.      You have questions or concerns about your condition or care.    Manage paresthesia:     Protect the area from injury. You may injure or burn yourself if you lose feeling in the area. Be careful when you touch anything that could be hot. Wear sturdy shoes to protect your feet. Ask about other ways to protect yourself.       Go to physical or occupational therapy if directed. Your provider may recommend therapy if you have a condition such as carpal tunnel syndrome. A physical therapist can teach you exercises to help strengthen the area or increase your ability to move it. An occupational therapist can help you find new ways to do your daily activities.      Manage health conditions that can cause paresthesia. Work with your diabetes specialist if you have uncontrolled diabetes. A dietitian or your healthcare provider can help you create a meal plan if you have low vitamin B levels. Your provider can help you manage your health if you have multiple sclerosis or you had a stroke. It is important to manage health conditions to stop paresthesia or prevent it from getting worse.    Follow up with your healthcare provider or neurologist as directed: Your healthcare provider may refer you to a specialist. Write down your questions so you remember to ask them during your visits.       © Copyright [employer] 2019 All illustrations and images included in CareNotes are the copyrighted property of [employer] or [employer].

## 2025-03-24 NOTE — ED ADULT NURSE REASSESSMENT NOTE - NS ED NURSE REASSESS COMMENT FT1
65 y/o male presented to ED with chest pain. Pt placed on Obs and developed left facial numbness. NIH latest score 1. MRI planned for today. Pacemaker Biotronik PPM approved and cleared for MRI. Pt currently a/o x4, airway patent, breathing adequate, skin intact. Patent 18 gauge right AC noted. Pt ambulates w/o assist. Phmx COPD, DM, HDL, HTN, Non Hodgkin's lymphoma, Pacemaker. Allergies to Acyclovir and Bactrim. Pt currently on cardiac and pulse ox monitoring. Awaiting MRI. 65 y/o male presented to ED with chest pain. Pt placed on Obs and developed left facial numbness. NIH latest score 1. Pacemaker Biotronik PPM approved and cleared for MRI. Pt currently a/o x4, airway patent, breathing adequate, skin intact. Patent 18 gauge right AC noted. Pt ambulates w/o assist. Phmx COPD, DM, HDL, HTN, Non Hodgkin's lymphoma, Pacemaker. Allergies to Acyclovir and Bactrim. Pt currently on cardiac and pulse ox monitoring. Awaiting MRI planned for today.

## 2025-03-24 NOTE — ED CDU PROVIDER DISPOSITION NOTE - PATIENT PORTAL LINK FT
You can access the FollowMyHealth Patient Portal offered by [facility] by registering at the following website: [email or web address]. By joining [facility]’s FollowMyHealth portal, you will also be able to view your health information using other applications (apps) compatible with our system.

## 2025-03-24 NOTE — ED CDU PROVIDER DISPOSITION NOTE - PROVIDER TOKENS
PROVIDER:[TOKEN:[[id number]],FOLLOWUP:[4-6 Days]],PROVIDER:[TOKEN:[[id number]],FOLLOWUP:[4-6 Days]]

## 2025-04-02 ENCOUNTER — APPOINTMENT (OUTPATIENT)
Dept: CARDIOLOGY | Facility: CLINIC | Age: 65
End: 2025-04-02
Payer: MEDICARE

## 2025-04-02 ENCOUNTER — NON-APPOINTMENT (OUTPATIENT)
Age: 65
End: 2025-04-02

## 2025-04-02 VITALS
WEIGHT: 188 LBS | SYSTOLIC BLOOD PRESSURE: 126 MMHG | BODY MASS INDEX: 25.47 KG/M2 | HEIGHT: 72 IN | DIASTOLIC BLOOD PRESSURE: 80 MMHG | HEART RATE: 85 BPM

## 2025-04-02 DIAGNOSIS — E78.5 HYPERLIPIDEMIA, UNSPECIFIED: ICD-10-CM

## 2025-04-02 DIAGNOSIS — Z95.0 PRESENCE OF CARDIAC PACEMAKER: ICD-10-CM

## 2025-04-02 DIAGNOSIS — I44.2 ATRIOVENTRICULAR BLOCK, COMPLETE: ICD-10-CM

## 2025-04-02 DIAGNOSIS — J43.2 CENTRILOBULAR EMPHYSEMA: ICD-10-CM

## 2025-04-02 DIAGNOSIS — I10 ESSENTIAL (PRIMARY) HYPERTENSION: ICD-10-CM

## 2025-04-02 PROCEDURE — 93000 ELECTROCARDIOGRAM COMPLETE: CPT

## 2025-04-02 PROCEDURE — 99214 OFFICE O/P EST MOD 30 MIN: CPT | Mod: 25

## 2025-04-04 NOTE — ED CDU PROVIDER SUBSEQUENT DAY NOTE - CLINICAL SUMMARY MEDICAL DECISION MAKING FREE TEXT BOX
Pt in EDOU for CP, pending nuc stress. Stroke code activated and imaging order. No acute stroke seen and mri head was ordered. Plan is to continue to acs and stroke workup.
mri
Patient placed in OBS for chest pain.  Troponins stable, stress nuc shows no acute ischemia.  See progress notes

## 2025-04-04 NOTE — ED CDU PROVIDER SUBSEQUENT DAY NOTE - NS ED ATTENDING STATEMENT MOD
I have seen and examined this patient and fully participated in the care of this patient as the teaching attending.  The service was shared with the NORM.  I reviewed and verified the documentation.
This was a shared visit with the NORM. I reviewed and verified the documentation.
This was a shared visit with the NORM. I reviewed and verified the documentation.

## 2025-04-04 NOTE — ED CDU PROVIDER SUBSEQUENT DAY NOTE - ATTENDING APP SHARED VISIT CONTRIBUTION OF CARE
Pt presented for evaluation of CP. Placed in EDOU for nuc stress test. Pt complained of facial paresthesias and cth done. No findings and neurology consulted and they recommended stroke code activation, obtain more images including an mri.
pt pending MRI.

## 2025-04-24 ENCOUNTER — APPOINTMENT (OUTPATIENT)
Dept: NEUROLOGY | Facility: CLINIC | Age: 65
End: 2025-04-24
Payer: MEDICARE

## 2025-04-24 VITALS
WEIGHT: 194 LBS | SYSTOLIC BLOOD PRESSURE: 135 MMHG | RESPIRATION RATE: 16 BRPM | HEIGHT: 72 IN | BODY MASS INDEX: 26.28 KG/M2 | DIASTOLIC BLOOD PRESSURE: 72 MMHG | HEART RATE: 72 BPM | OXYGEN SATURATION: 97 %

## 2025-04-24 DIAGNOSIS — R51.9 HEADACHE, UNSPECIFIED: ICD-10-CM

## 2025-04-24 DIAGNOSIS — I69.30 UNSPECIFIED SEQUELAE OF CEREBRAL INFARCTION: ICD-10-CM

## 2025-04-24 DIAGNOSIS — Z87.898 PERSONAL HISTORY OF OTHER SPECIFIED CONDITIONS: ICD-10-CM

## 2025-04-24 DIAGNOSIS — Z87.39 PERSONAL HISTORY OF OTHER DISEASES OF THE MUSCULOSKELETAL SYSTEM AND CONNECTIVE TISSUE: ICD-10-CM

## 2025-04-24 DIAGNOSIS — G56.21 LESION OF ULNAR NERVE, RIGHT UPPER LIMB: ICD-10-CM

## 2025-04-24 PROCEDURE — 99214 OFFICE O/P EST MOD 30 MIN: CPT

## 2025-04-25 LAB — TOPIRAMATE SERPL-MCNC: 8.3 MCG/ML

## 2025-04-30 ENCOUNTER — APPOINTMENT (OUTPATIENT)
Dept: ELECTROPHYSIOLOGY | Facility: CLINIC | Age: 65
End: 2025-04-30

## 2025-04-30 VITALS
SYSTOLIC BLOOD PRESSURE: 115 MMHG | DIASTOLIC BLOOD PRESSURE: 60 MMHG | WEIGHT: 193 LBS | HEART RATE: 80 BPM | HEIGHT: 72 IN | BODY MASS INDEX: 26.14 KG/M2

## 2025-04-30 PROCEDURE — 99214 OFFICE O/P EST MOD 30 MIN: CPT | Mod: 25

## 2025-04-30 PROCEDURE — 93283 PRGRMG EVAL IMPLANTABLE DFB: CPT

## 2025-04-30 PROCEDURE — 93000 ELECTROCARDIOGRAM COMPLETE: CPT | Mod: 59

## 2025-04-30 PROCEDURE — 93290 INTERROG DEV EVAL ICPMS IP: CPT

## 2025-05-07 ENCOUNTER — INPATIENT (INPATIENT)
Facility: HOSPITAL | Age: 65
LOS: 5 days | Discharge: HOME | DRG: 813 | End: 2025-05-13
Attending: INTERNAL MEDICINE | Admitting: STUDENT IN AN ORGANIZED HEALTH CARE EDUCATION/TRAINING PROGRAM
Payer: MEDICARE

## 2025-05-07 VITALS
TEMPERATURE: 98 F | SYSTOLIC BLOOD PRESSURE: 145 MMHG | OXYGEN SATURATION: 99 % | WEIGHT: 190.04 LBS | HEART RATE: 84 BPM | DIASTOLIC BLOOD PRESSURE: 81 MMHG | RESPIRATION RATE: 18 BRPM | HEIGHT: 72 IN

## 2025-05-07 DIAGNOSIS — Z88.2 ALLERGY STATUS TO SULFONAMIDES: ICD-10-CM

## 2025-05-07 DIAGNOSIS — E11.65 TYPE 2 DIABETES MELLITUS WITH HYPERGLYCEMIA: ICD-10-CM

## 2025-05-07 DIAGNOSIS — D69.6 THROMBOCYTOPENIA, UNSPECIFIED: ICD-10-CM

## 2025-05-07 DIAGNOSIS — G44.89 OTHER HEADACHE SYNDROME: ICD-10-CM

## 2025-05-07 DIAGNOSIS — Z91.018 ALLERGY TO OTHER FOODS: ICD-10-CM

## 2025-05-07 DIAGNOSIS — Z95.0 PRESENCE OF CARDIAC PACEMAKER: Chronic | ICD-10-CM

## 2025-05-07 DIAGNOSIS — I69.351 HEMIPLEGIA AND HEMIPARESIS FOLLOWING CEREBRAL INFARCTION AFFECTING RIGHT DOMINANT SIDE: ICD-10-CM

## 2025-05-07 DIAGNOSIS — Z79.85 LONG-TERM (CURRENT) USE OF INJECTABLE NON-INSULIN ANTIDIABETIC DRUGS: ICD-10-CM

## 2025-05-07 DIAGNOSIS — Z79.899 OTHER LONG TERM (CURRENT) DRUG THERAPY: ICD-10-CM

## 2025-05-07 DIAGNOSIS — I10 ESSENTIAL (PRIMARY) HYPERTENSION: ICD-10-CM

## 2025-05-07 DIAGNOSIS — T38.0X5A ADVERSE EFFECT OF GLUCOCORTICOIDS AND SYNTHETIC ANALOGUES, INITIAL ENCOUNTER: ICD-10-CM

## 2025-05-07 DIAGNOSIS — Z79.82 LONG TERM (CURRENT) USE OF ASPIRIN: ICD-10-CM

## 2025-05-07 DIAGNOSIS — C88.41: ICD-10-CM

## 2025-05-07 DIAGNOSIS — D69.3 IMMUNE THROMBOCYTOPENIC PURPURA: ICD-10-CM

## 2025-05-07 DIAGNOSIS — J44.9 CHRONIC OBSTRUCTIVE PULMONARY DISEASE, UNSPECIFIED: ICD-10-CM

## 2025-05-07 DIAGNOSIS — Z98.890 OTHER SPECIFIED POSTPROCEDURAL STATES: Chronic | ICD-10-CM

## 2025-05-07 DIAGNOSIS — Z88.8 ALLERGY STATUS TO OTHER DRUGS, MEDICAMENTS AND BIOLOGICAL SUBSTANCES: ICD-10-CM

## 2025-05-07 DIAGNOSIS — Z95.0 PRESENCE OF CARDIAC PACEMAKER: ICD-10-CM

## 2025-05-07 DIAGNOSIS — E78.00 PURE HYPERCHOLESTEROLEMIA, UNSPECIFIED: ICD-10-CM

## 2025-05-07 DIAGNOSIS — Z87.74 PERSONAL HISTORY OF (CORRECTED) CONGENITAL MALFORMATIONS OF HEART AND CIRCULATORY SYSTEM: ICD-10-CM

## 2025-05-07 LAB
ALBUMIN SERPL ELPH-MCNC: 4.4 G/DL — SIGNIFICANT CHANGE UP (ref 3.5–5.2)
ALP SERPL-CCNC: 54 U/L — SIGNIFICANT CHANGE UP (ref 30–115)
ALT FLD-CCNC: 12 U/L — SIGNIFICANT CHANGE UP (ref 0–41)
ANION GAP SERPL CALC-SCNC: 12 MMOL/L — SIGNIFICANT CHANGE UP (ref 7–14)
ANISOCYTOSIS BLD QL: SLIGHT — SIGNIFICANT CHANGE UP
AST SERPL-CCNC: 37 U/L — SIGNIFICANT CHANGE UP (ref 0–41)
BASOPHILS # BLD AUTO: 0.16 K/UL — SIGNIFICANT CHANGE UP (ref 0–0.2)
BASOPHILS NFR BLD AUTO: 3.5 % — HIGH (ref 0–1)
BILIRUB SERPL-MCNC: 1.1 MG/DL — SIGNIFICANT CHANGE UP (ref 0.2–1.2)
BLD GP AB SCN SERPL QL: SIGNIFICANT CHANGE UP
BUN SERPL-MCNC: 13 MG/DL — SIGNIFICANT CHANGE UP (ref 10–20)
CALCIUM SERPL-MCNC: 9.3 MG/DL — SIGNIFICANT CHANGE UP (ref 8.4–10.5)
CHLORIDE SERPL-SCNC: 109 MMOL/L — SIGNIFICANT CHANGE UP (ref 98–110)
CO2 SERPL-SCNC: 20 MMOL/L — SIGNIFICANT CHANGE UP (ref 17–32)
CREAT SERPL-MCNC: 1.2 MG/DL — SIGNIFICANT CHANGE UP (ref 0.7–1.5)
EGFR: 68 ML/MIN/1.73M2 — SIGNIFICANT CHANGE UP
EGFR: 68 ML/MIN/1.73M2 — SIGNIFICANT CHANGE UP
EOSINOPHIL # BLD AUTO: 0.12 K/UL — SIGNIFICANT CHANGE UP (ref 0–0.7)
EOSINOPHIL NFR BLD AUTO: 2.7 % — SIGNIFICANT CHANGE UP (ref 0–8)
GIANT PLATELETS BLD QL SMEAR: PRESENT — SIGNIFICANT CHANGE UP
GLUCOSE SERPL-MCNC: 106 MG/DL — HIGH (ref 70–99)
HCT VFR BLD CALC: 37.9 % — LOW (ref 42–52)
HGB BLD-MCNC: 11.9 G/DL — LOW (ref 14–18)
LYMPHOCYTES # BLD AUTO: 1.49 K/UL — SIGNIFICANT CHANGE UP (ref 1.2–3.4)
LYMPHOCYTES # BLD AUTO: 32.7 % — SIGNIFICANT CHANGE UP (ref 20.5–51.1)
MACROCYTES BLD QL: SLIGHT — SIGNIFICANT CHANGE UP
MANUAL SMEAR VERIFICATION: SIGNIFICANT CHANGE UP
MCHC RBC-ENTMCNC: 27.2 PG — SIGNIFICANT CHANGE UP (ref 27–31)
MCHC RBC-ENTMCNC: 31.4 G/DL — LOW (ref 32–37)
MCV RBC AUTO: 86.7 FL — SIGNIFICANT CHANGE UP (ref 80–94)
MICROCYTES BLD QL: SLIGHT — SIGNIFICANT CHANGE UP
MONOCYTES # BLD AUTO: 0.4 K/UL — SIGNIFICANT CHANGE UP (ref 0.1–0.6)
MONOCYTES NFR BLD AUTO: 8.8 % — SIGNIFICANT CHANGE UP (ref 1.7–9.3)
NEUTROPHILS # BLD AUTO: 1.74 K/UL — SIGNIFICANT CHANGE UP (ref 1.4–6.5)
NEUTROPHILS NFR BLD AUTO: 38.1 % — LOW (ref 42.2–75.2)
OVALOCYTES BLD QL SMEAR: SLIGHT — SIGNIFICANT CHANGE UP
PLAT MORPH BLD: ABNORMAL
PLATELET # BLD AUTO: 15 K/UL — CRITICAL LOW (ref 130–400)
PMV BLD: SIGNIFICANT CHANGE UP (ref 7.4–10.4)
POIKILOCYTOSIS BLD QL AUTO: SLIGHT — SIGNIFICANT CHANGE UP
POLYCHROMASIA BLD QL SMEAR: SLIGHT — SIGNIFICANT CHANGE UP
POTASSIUM SERPL-MCNC: 4.7 MMOL/L — SIGNIFICANT CHANGE UP (ref 3.5–5)
POTASSIUM SERPL-SCNC: 4.7 MMOL/L — SIGNIFICANT CHANGE UP (ref 3.5–5)
PROT SERPL-MCNC: 7.3 G/DL — SIGNIFICANT CHANGE UP (ref 6–8)
RBC # BLD: 4.37 M/UL — LOW (ref 4.7–6.1)
RBC # FLD: 13.5 % — SIGNIFICANT CHANGE UP (ref 11.5–14.5)
RBC BLD AUTO: ABNORMAL
SODIUM SERPL-SCNC: 141 MMOL/L — SIGNIFICANT CHANGE UP (ref 135–146)
VARIANT LYMPHS # BLD: 14.2 % — HIGH (ref 0–5)
VARIANT LYMPHS NFR BLD MANUAL: 14.2 % — HIGH (ref 0–5)
WBC # BLD: 4.56 K/UL — LOW (ref 4.8–10.8)
WBC # FLD AUTO: 4.56 K/UL — LOW (ref 4.8–10.8)

## 2025-05-07 PROCEDURE — 88184 FLOWCYTOMETRY/ TC 1 MARKER: CPT

## 2025-05-07 PROCEDURE — 86334 IMMUNOFIX E-PHORESIS SERUM: CPT

## 2025-05-07 PROCEDURE — 84155 ASSAY OF PROTEIN SERUM: CPT

## 2025-05-07 PROCEDURE — 99285 EMERGENCY DEPT VISIT HI MDM: CPT

## 2025-05-07 PROCEDURE — 83735 ASSAY OF MAGNESIUM: CPT

## 2025-05-07 PROCEDURE — 88285 CHROMOSOME COUNT ADDITIONAL: CPT

## 2025-05-07 PROCEDURE — 85027 COMPLETE CBC AUTOMATED: CPT

## 2025-05-07 PROCEDURE — 83010 ASSAY OF HAPTOGLOBIN QUANT: CPT

## 2025-05-07 PROCEDURE — 83521 IG LIGHT CHAINS FREE EACH: CPT

## 2025-05-07 PROCEDURE — 88185 FLOWCYTOMETRY/TC ADD-ON: CPT

## 2025-05-07 PROCEDURE — 88311 DECALCIFY TISSUE: CPT

## 2025-05-07 PROCEDURE — 80053 COMPREHEN METABOLIC PANEL: CPT

## 2025-05-07 PROCEDURE — 88305 TISSUE EXAM BY PATHOLOGIST: CPT

## 2025-05-07 PROCEDURE — 86850 RBC ANTIBODY SCREEN: CPT

## 2025-05-07 PROCEDURE — 87205 SMEAR GRAM STAIN: CPT

## 2025-05-07 PROCEDURE — 85610 PROTHROMBIN TIME: CPT

## 2025-05-07 PROCEDURE — 94640 AIRWAY INHALATION TREATMENT: CPT

## 2025-05-07 PROCEDURE — 88342 IMHCHEM/IMCYTCHM 1ST ANTB: CPT

## 2025-05-07 PROCEDURE — 80048 BASIC METABOLIC PNL TOTAL CA: CPT

## 2025-05-07 PROCEDURE — 85025 COMPLETE CBC W/AUTO DIFF WBC: CPT

## 2025-05-07 PROCEDURE — 86880 COOMBS TEST DIRECT: CPT

## 2025-05-07 PROCEDURE — 0225U NFCT DS DNA&RNA 21 SARSCOV2: CPT

## 2025-05-07 PROCEDURE — 88237 TISSUE CULTURE BONE MARROW: CPT

## 2025-05-07 PROCEDURE — 86900 BLOOD TYPING SEROLOGIC ABO: CPT

## 2025-05-07 PROCEDURE — 71260 CT THORAX DX C+: CPT

## 2025-05-07 PROCEDURE — 77012 CT SCAN FOR NEEDLE BIOPSY: CPT

## 2025-05-07 PROCEDURE — 85045 AUTOMATED RETICULOCYTE COUNT: CPT

## 2025-05-07 PROCEDURE — 85730 THROMBOPLASTIN TIME PARTIAL: CPT

## 2025-05-07 PROCEDURE — 36430 TRANSFUSION BLD/BLD COMPNT: CPT

## 2025-05-07 PROCEDURE — 86901 BLOOD TYPING SEROLOGIC RH(D): CPT

## 2025-05-07 PROCEDURE — 88313 SPECIAL STAINS GROUP 2: CPT

## 2025-05-07 PROCEDURE — 83036 HEMOGLOBIN GLYCOSYLATED A1C: CPT

## 2025-05-07 PROCEDURE — 88264 CHROMOSOME ANALYSIS 20-25: CPT

## 2025-05-07 PROCEDURE — P9073: CPT

## 2025-05-07 PROCEDURE — 38222 DX BONE MARROW BX & ASPIR: CPT | Mod: RT

## 2025-05-07 PROCEDURE — 74177 CT ABD & PELVIS W/CONTRAST: CPT

## 2025-05-07 PROCEDURE — 82962 GLUCOSE BLOOD TEST: CPT

## 2025-05-07 PROCEDURE — 83615 LACTATE (LD) (LDH) ENZYME: CPT

## 2025-05-07 PROCEDURE — 84165 PROTEIN E-PHORESIS SERUM: CPT

## 2025-05-07 PROCEDURE — 36415 COLL VENOUS BLD VENIPUNCTURE: CPT

## 2025-05-07 PROCEDURE — 88341 IMHCHEM/IMCYTCHM EA ADD ANTB: CPT

## 2025-05-07 PROCEDURE — 82784 ASSAY IGA/IGD/IGG/IGM EACH: CPT

## 2025-05-07 NOTE — ED PROVIDER NOTE - OBJECTIVE STATEMENT
64-year-old male history of COPD, lymphoma, CVA, diabetes, pacemaker placement presenting to ED for evaluation of low platelets on outpatient lab work yesterday.  Patient was called and told that his platelet count today was 15 and that he needed to come to the hospital for evaluation.  States that he takes aspirin daily endorsing some mild bruising to his left hand but denies any bleeding, N, V, CP, SOB, fever, chills, abdominal pain

## 2025-05-07 NOTE — ED PROVIDER NOTE - PHYSICAL EXAMINATION
VITAL SIGNS: I have reviewed nursing notes and confirm.  CONSTITUTIONAL: in no acute distress.  SKIN: Skin exam is warm and dry, bruising to left hand  HEAD: Normocephalic; atraumatic.  EYES:  EOM intact; conjunctiva and sclera clear.  ENT: No nasal discharge; airway clear.   NECK: Supple; non tender.  CARD: S1, S2 normal; no murmurs, gallops, or rubs. Regular rate and rhythm.  RESP: No wheezes, rales or rhonchi. Speaking in full sentences.   ABD:  soft; non-distended; non-tender; No rebound or guarding. No CVA tenderness.  EXT: Normal ROM. No clubbing, cyanosis or edema.  NEURO: Alert, oriented. Grossly unremarkable. No focal deficits.

## 2025-05-07 NOTE — ED ADULT NURSE NOTE - NSFALLHARMRISKINTERV_ED_ALL_ED

## 2025-05-07 NOTE — ED PROVIDER NOTE - ATTENDING APP SHARED VISIT CONTRIBUTION OF CARE
64-year-old male PMH HTN, elevated cholesterol, history of CVA, pacemaker, non-Hodgkin's lymphoma in remission for years, COPD, diabetes send by his PCP for evaluation.  Patient had routine blood work done recently,  was found to have  platelets of 15.  No prior history of thrombocytopenia, no recent illness.  Patient reports bruising especially on his left hand/arm, but denies headache, bloody stools, bloody urine or any other additional complaints. Patient appears well in no acute distress, PERRL, pink conjunctiva, MMM, speaking full sentences, lungs clear to auscultation bilaterally, equal air entry, abdomen is nondistended, no lower leg edema or calf tenderness to palpation, there are several ecchymosis on the left upper extremity, patient is awake and alert, normal mood and affect, no gross neurodeficits.  Will check CBC.

## 2025-05-07 NOTE — ED PROVIDER NOTE - CHIEF COMPLAINT
The patient is a 64y Male complaining of abnormal lab result. Sarecycline Counseling: Patient advised regarding possible photosensitivity and discoloration of the teeth, skin, lips, tongue and gums.  Patient instructed to avoid sunlight, if possible.  When exposed to sunlight, patients should wear protective clothing, sunglasses, and sunscreen.  The patient was instructed to call the office immediately if the following severe adverse effects occur:  hearing changes, easy bruising/bleeding, severe headache, or vision changes.  The patient verbalized understanding of the proper use and possible adverse effects of sarecycline.  All of the patient's questions and concerns were addressed.

## 2025-05-07 NOTE — ED PROVIDER NOTE - CLINICAL SUMMARY MEDICAL DECISION MAKING FREE TEXT BOX
64-year-old male with thrombocytopenia.  No associated symptoms.  On physical exam scattered left upper ecchymosis noted. Patient in no distress, awake and alert x 3.  Attempted to reach out to hematology, no callback as of the time of admission.  Patient endorsed to the medical team, advised platelet transfusion.  Admitted for further workup.

## 2025-05-08 LAB
A1C WITH ESTIMATED AVERAGE GLUCOSE RESULT: 7.1 % — HIGH (ref 4–5.6)
ALBUMIN SERPL ELPH-MCNC: 4.2 G/DL — SIGNIFICANT CHANGE UP (ref 3.5–5.2)
ALP SERPL-CCNC: 59 U/L — SIGNIFICANT CHANGE UP (ref 30–115)
ALT FLD-CCNC: 10 U/L — SIGNIFICANT CHANGE UP (ref 0–41)
ANION GAP SERPL CALC-SCNC: 10 MMOL/L — SIGNIFICANT CHANGE UP (ref 7–14)
AST SERPL-CCNC: 15 U/L — SIGNIFICANT CHANGE UP (ref 0–41)
BASOPHILS # BLD AUTO: 0.05 K/UL — SIGNIFICANT CHANGE UP (ref 0–0.2)
BASOPHILS # BLD AUTO: 0.07 K/UL — SIGNIFICANT CHANGE UP (ref 0–0.2)
BASOPHILS NFR BLD AUTO: 1.2 % — HIGH (ref 0–1)
BASOPHILS NFR BLD AUTO: 1.5 % — HIGH (ref 0–1)
BILIRUB SERPL-MCNC: 1.1 MG/DL — SIGNIFICANT CHANGE UP (ref 0.2–1.2)
BUN SERPL-MCNC: 14 MG/DL — SIGNIFICANT CHANGE UP (ref 10–20)
CALCIUM SERPL-MCNC: 9.5 MG/DL — SIGNIFICANT CHANGE UP (ref 8.4–10.5)
CHLORIDE SERPL-SCNC: 111 MMOL/L — HIGH (ref 98–110)
CO2 SERPL-SCNC: 24 MMOL/L — SIGNIFICANT CHANGE UP (ref 17–32)
CREAT SERPL-MCNC: 1.1 MG/DL — SIGNIFICANT CHANGE UP (ref 0.7–1.5)
EGFR: 75 ML/MIN/1.73M2 — SIGNIFICANT CHANGE UP
EGFR: 75 ML/MIN/1.73M2 — SIGNIFICANT CHANGE UP
EOSINOPHIL # BLD AUTO: 0.19 K/UL — SIGNIFICANT CHANGE UP (ref 0–0.7)
EOSINOPHIL # BLD AUTO: 0.21 K/UL — SIGNIFICANT CHANGE UP (ref 0–0.7)
EOSINOPHIL NFR BLD AUTO: 4.5 % — SIGNIFICANT CHANGE UP (ref 0–8)
EOSINOPHIL NFR BLD AUTO: 4.6 % — SIGNIFICANT CHANGE UP (ref 0–8)
ESTIMATED AVERAGE GLUCOSE: 157 MG/DL — HIGH (ref 68–114)
GLUCOSE BLDC GLUCOMTR-MCNC: 104 MG/DL — HIGH (ref 70–99)
GLUCOSE BLDC GLUCOMTR-MCNC: 158 MG/DL — HIGH (ref 70–99)
GLUCOSE BLDC GLUCOMTR-MCNC: 175 MG/DL — HIGH (ref 70–99)
GLUCOSE BLDC GLUCOMTR-MCNC: 183 MG/DL — HIGH (ref 70–99)
GLUCOSE SERPL-MCNC: 105 MG/DL — HIGH (ref 70–99)
HCT VFR BLD CALC: 36.8 % — LOW (ref 42–52)
HCT VFR BLD CALC: 37.1 % — LOW (ref 42–52)
HGB BLD-MCNC: 11.5 G/DL — LOW (ref 14–18)
HGB BLD-MCNC: 11.9 G/DL — LOW (ref 14–18)
IMM GRANULOCYTES NFR BLD AUTO: 0.2 % — SIGNIFICANT CHANGE UP (ref 0.1–0.3)
IMM GRANULOCYTES NFR BLD AUTO: 0.2 % — SIGNIFICANT CHANGE UP (ref 0.1–0.3)
LYMPHOCYTES # BLD AUTO: 1.42 K/UL — SIGNIFICANT CHANGE UP (ref 1.2–3.4)
LYMPHOCYTES # BLD AUTO: 1.52 K/UL — SIGNIFICANT CHANGE UP (ref 1.2–3.4)
LYMPHOCYTES # BLD AUTO: 32.3 % — SIGNIFICANT CHANGE UP (ref 20.5–51.1)
LYMPHOCYTES # BLD AUTO: 34.5 % — SIGNIFICANT CHANGE UP (ref 20.5–51.1)
MCHC RBC-ENTMCNC: 26.9 PG — LOW (ref 27–31)
MCHC RBC-ENTMCNC: 27.1 PG — SIGNIFICANT CHANGE UP (ref 27–31)
MCHC RBC-ENTMCNC: 31.3 G/DL — LOW (ref 32–37)
MCHC RBC-ENTMCNC: 32.1 G/DL — SIGNIFICANT CHANGE UP (ref 32–37)
MCV RBC AUTO: 84.5 FL — SIGNIFICANT CHANGE UP (ref 80–94)
MCV RBC AUTO: 86.2 FL — SIGNIFICANT CHANGE UP (ref 80–94)
MONOCYTES # BLD AUTO: 0.53 K/UL — SIGNIFICANT CHANGE UP (ref 0.1–0.6)
MONOCYTES # BLD AUTO: 0.55 K/UL — SIGNIFICANT CHANGE UP (ref 0.1–0.6)
MONOCYTES NFR BLD AUTO: 11.7 % — HIGH (ref 1.7–9.3)
MONOCYTES NFR BLD AUTO: 12.9 % — HIGH (ref 1.7–9.3)
NEUTROPHILS # BLD AUTO: 1.92 K/UL — SIGNIFICANT CHANGE UP (ref 1.4–6.5)
NEUTROPHILS # BLD AUTO: 2.34 K/UL — SIGNIFICANT CHANGE UP (ref 1.4–6.5)
NEUTROPHILS NFR BLD AUTO: 46.6 % — SIGNIFICANT CHANGE UP (ref 42.2–75.2)
NEUTROPHILS NFR BLD AUTO: 49.8 % — SIGNIFICANT CHANGE UP (ref 42.2–75.2)
NRBC BLD AUTO-RTO: 0 /100 WBCS — SIGNIFICANT CHANGE UP (ref 0–0)
NRBC BLD AUTO-RTO: 0 /100 WBCS — SIGNIFICANT CHANGE UP (ref 0–0)
PLATELET # BLD AUTO: 15 K/UL — CRITICAL LOW (ref 130–400)
PLATELET # BLD AUTO: 15 K/UL — CRITICAL LOW (ref 130–400)
PMV BLD: SIGNIFICANT CHANGE UP (ref 7.4–10.4)
PMV BLD: SIGNIFICANT CHANGE UP (ref 7.4–10.4)
POTASSIUM SERPL-MCNC: 3.9 MMOL/L — SIGNIFICANT CHANGE UP (ref 3.5–5)
POTASSIUM SERPL-SCNC: 3.9 MMOL/L — SIGNIFICANT CHANGE UP (ref 3.5–5)
PROT SERPL-MCNC: 6.9 G/DL — SIGNIFICANT CHANGE UP (ref 6–8)
RBC # BLD: 4.27 M/UL — LOW (ref 4.7–6.1)
RBC # BLD: 4.39 M/UL — LOW (ref 4.7–6.1)
RBC # FLD: 13.2 % — SIGNIFICANT CHANGE UP (ref 11.5–14.5)
RBC # FLD: 13.6 % — SIGNIFICANT CHANGE UP (ref 11.5–14.5)
SODIUM SERPL-SCNC: 145 MMOL/L — SIGNIFICANT CHANGE UP (ref 135–146)
WBC # BLD: 4.12 K/UL — LOW (ref 4.8–10.8)
WBC # BLD: 4.7 K/UL — LOW (ref 4.8–10.8)
WBC # FLD AUTO: 4.12 K/UL — LOW (ref 4.8–10.8)
WBC # FLD AUTO: 4.7 K/UL — LOW (ref 4.8–10.8)

## 2025-05-08 PROCEDURE — 99223 1ST HOSP IP/OBS HIGH 75: CPT

## 2025-05-08 PROCEDURE — 88189 FLOWCYTOMETRY/READ 16 & >: CPT | Mod: 59

## 2025-05-08 PROCEDURE — 99222 1ST HOSP IP/OBS MODERATE 55: CPT

## 2025-05-08 RX ORDER — DEXTROSE 50 % IN WATER 50 %
12.5 SYRINGE (ML) INTRAVENOUS ONCE
Refills: 0 | Status: DISCONTINUED | OUTPATIENT
Start: 2025-05-08 | End: 2025-05-13

## 2025-05-08 RX ORDER — DEXTROSE 50 % IN WATER 50 %
15 SYRINGE (ML) INTRAVENOUS ONCE
Refills: 0 | Status: DISCONTINUED | OUTPATIENT
Start: 2025-05-08 | End: 2025-05-13

## 2025-05-08 RX ORDER — ATORVASTATIN CALCIUM 80 MG/1
80 TABLET, FILM COATED ORAL AT BEDTIME
Refills: 0 | Status: DISCONTINUED | OUTPATIENT
Start: 2025-05-08 | End: 2025-05-13

## 2025-05-08 RX ORDER — GLUCAGON 3 MG/1
1 POWDER NASAL ONCE
Refills: 0 | Status: DISCONTINUED | OUTPATIENT
Start: 2025-05-08 | End: 2025-05-13

## 2025-05-08 RX ORDER — DEXTROSE 50 % IN WATER 50 %
25 SYRINGE (ML) INTRAVENOUS ONCE
Refills: 0 | Status: DISCONTINUED | OUTPATIENT
Start: 2025-05-08 | End: 2025-05-13

## 2025-05-08 RX ORDER — ACETAMINOPHEN 500 MG/5ML
650 LIQUID (ML) ORAL EVERY 6 HOURS
Refills: 0 | Status: DISCONTINUED | OUTPATIENT
Start: 2025-05-08 | End: 2025-05-13

## 2025-05-08 RX ORDER — TIOTROPIUM BROMIDE INHALATION SPRAY 3.12 UG/1
2 SPRAY, METERED RESPIRATORY (INHALATION) DAILY
Refills: 0 | Status: DISCONTINUED | OUTPATIENT
Start: 2025-05-08 | End: 2025-05-13

## 2025-05-08 RX ORDER — DEXAMETHASONE 0.5 MG/1
40 TABLET ORAL DAILY
Refills: 0 | Status: COMPLETED | OUTPATIENT
Start: 2025-05-08 | End: 2025-05-12

## 2025-05-08 RX ORDER — TOPIRAMATE 25 MG/1
300 TABLET, FILM COATED ORAL AT BEDTIME
Refills: 0 | Status: DISCONTINUED | OUTPATIENT
Start: 2025-05-08 | End: 2025-05-13

## 2025-05-08 RX ORDER — AMLODIPINE BESYLATE 10 MG/1
5 TABLET ORAL DAILY
Refills: 0 | Status: DISCONTINUED | OUTPATIENT
Start: 2025-05-08 | End: 2025-05-13

## 2025-05-08 RX ORDER — SODIUM CHLORIDE 9 G/1000ML
1000 INJECTION, SOLUTION INTRAVENOUS
Refills: 0 | Status: DISCONTINUED | OUTPATIENT
Start: 2025-05-08 | End: 2025-05-13

## 2025-05-08 RX ORDER — IRBESARTAN 75 MG/1
1 TABLET ORAL
Refills: 0 | DISCHARGE

## 2025-05-08 RX ORDER — MELATONIN 5 MG
3 TABLET ORAL AT BEDTIME
Refills: 0 | Status: DISCONTINUED | OUTPATIENT
Start: 2025-05-08 | End: 2025-05-13

## 2025-05-08 RX ORDER — INSULIN LISPRO 100 U/ML
INJECTION, SOLUTION INTRAVENOUS; SUBCUTANEOUS
Refills: 0 | Status: DISCONTINUED | OUTPATIENT
Start: 2025-05-08 | End: 2025-05-11

## 2025-05-08 RX ORDER — TAMSULOSIN HYDROCHLORIDE 0.4 MG/1
0.4 CAPSULE ORAL
Refills: 0 | Status: DISCONTINUED | OUTPATIENT
Start: 2025-05-08 | End: 2025-05-13

## 2025-05-08 RX ORDER — TOPIRAMATE 25 MG/1
3 TABLET, FILM COATED ORAL
Refills: 0 | DISCHARGE

## 2025-05-08 RX ADMIN — Medication 1 DOSE(S): at 19:38

## 2025-05-08 RX ADMIN — Medication 650 MILLIGRAM(S): at 20:36

## 2025-05-08 RX ADMIN — Medication 1 DOSE(S): at 17:09

## 2025-05-08 RX ADMIN — INSULIN LISPRO 1: 100 INJECTION, SOLUTION INTRAVENOUS; SUBCUTANEOUS at 17:10

## 2025-05-08 RX ADMIN — INSULIN LISPRO 1: 100 INJECTION, SOLUTION INTRAVENOUS; SUBCUTANEOUS at 11:38

## 2025-05-08 RX ADMIN — Medication 650 MILLIGRAM(S): at 19:36

## 2025-05-08 RX ADMIN — ATORVASTATIN CALCIUM 80 MILLIGRAM(S): 80 TABLET, FILM COATED ORAL at 21:07

## 2025-05-08 RX ADMIN — DEXAMETHASONE 40 MILLIGRAM(S): 0.5 TABLET ORAL at 21:58

## 2025-05-08 RX ADMIN — AMLODIPINE BESYLATE 5 MILLIGRAM(S): 10 TABLET ORAL at 05:44

## 2025-05-08 RX ADMIN — TIOTROPIUM BROMIDE INHALATION SPRAY 2 PUFF(S): 3.12 SPRAY, METERED RESPIRATORY (INHALATION) at 17:09

## 2025-05-08 RX ADMIN — Medication 1 APPLICATION(S): at 11:39

## 2025-05-08 RX ADMIN — TOPIRAMATE 300 MILLIGRAM(S): 25 TABLET, FILM COATED ORAL at 21:07

## 2025-05-08 NOTE — PATIENT PROFILE ADULT - FALL HARM RISK - RISK INTERVENTIONS

## 2025-05-08 NOTE — H&P ADULT - NSHPLABSRESULTS_GEN_ALL_CORE
Spoke to patient in regards to abnormal labs.    CBC Full  -  ( 08 May 2025 06:37 )  WBC Count : 4.70 K/uL  Hemoglobin : 11.5 g/dL  Hematocrit : 36.8 %  Platelet Count - Automated : 15 K/uL  Mean Cell Volume : 86.2 fL  Mean Cell Hemoglobin : 26.9 pg  Mean Cell Hemoglobin Concentration : 31.3 g/dL  Auto Neutrophil # : x  Auto Lymphocyte # : x  Auto Monocyte # : x  Auto Eosinophil # : x  Auto Basophil # : x  Auto Neutrophil % : x  Auto Lymphocyte % : x  Auto Monocyte % : x  Auto Eosinophil % : x  Auto Basophil % : x    BMP:    05-08 @ 06:37    Blood Urea Nitrogen - 14  Calcium - 9.5  Carbond Dioxide - 24  Chloride - 111  Creatinine - 1.1  Glucose - 105  Potassium - 3.9  Sodium - 145      Hemoglobin A1c -     Urine Culture:    Home Medications:  Breztri Aerosphere inhalation aerosol: 2 puff(s) inhaled 2 times a day (08 May 2025 01:56)  irbesartan 300 mg oral tablet: 1 tab(s) orally once a day (08 May 2025 01:56)  pioglitazone 30 mg oral tablet: 1 tab(s) orally once a day (08 May 2025 01:56)  Synjardy 12.5 mg-1000 mg oral tablet: 1 tab(s) orally 2 times a day (08 May 2025 01:56)  topiramate 100 mg oral tablet: 3 tab(s) orally once a day (at bedtime) (08 May 2025 01:56)  Trulicity Pen 3 mg/0.5 mL subcutaneous solution:  (08 May 2025 01:56)

## 2025-05-08 NOTE — H&P ADULT - HISTORY OF PRESENT ILLNESS
64-year-old male history of COPD, non-Hodgkin's lymphoma in remission for years, CVA, diabetes, pacemaker placement placement for CHB presenting to ED for evaluation of low platelets on outpatient lab work yesterday. Patient was called and told that his platelet count today was 15 and that he needed to come to the hospital for evaluation. States that he takes aspirin daily endorsing some mild bruising to his left hand but denies any bleeding, N, V, CP, SOB, fever, chills, abdominal pain     No prior history of thrombocytopenia, no recent illness.  Patient reports bruising especially on his left hand/arm, several ecchymosis on the left upper extremity.   but denies headache, bloody stools, bloody urine or any other additional complaints.      ED Course:   Vitals:  145 mm Hg/ 81 mm Hg, 84 /min, 18 /min, 97.6 Degrees F, 99 % room air   Labs: WBC 4.56, Hb 11.9, PC 15.    Meds: 1U Platelet      Patient being admitted for evaluation and management of severe thrombocytopenia.

## 2025-05-08 NOTE — CONSULT NOTE ADULT - ATTENDING COMMENTS
Amrik Montoya is seen by the hematology consultation service.  In 2018 he was diagnosed with a marginal zone lymphoma involving the lymph nodes of the axilla and neck region.  He received rituximab and Benzamycin followed by rituximab maintenance for 2 years which ended in 2020.  He was last seen in the office on 2023.  He had a low platelet count on outpatient labs performed by his primary care doctor.  He states that he is lost about 20 pounds over the last few months.  He has had some chills.  There are no night sweats.  He has had fatigue.    His platelet count is 15,000.  He did receive platelet transfusion last night, with no increase in his platelet count.  The peripheral blood smear was reviewed.  There are some lymphocytes which may be part of a malignant clone.  We recommend that he have peripheral blood flow cytometry performed.  The peripheral blood smear also showed some enlarged platelets and occasional promyelocyte.  No nucleated red blood cells were seen.    Marginal zone lymphoma is often associated with paraproteinemias, so we suggest that he have quantitative immunoglobulins, serum protein electrophoresis, reticulocyte count, LDH and haptoglobin for the possibility of autoimmune hemolytic anemia along with a direct antiglobulin test.  These are somewhat common with low-grade lymphomas.  I agree with the assessment and plan as stated above in the note from Dr. Lopez, hematology fellow  Dez Deleon MD  Hematology attending

## 2025-05-08 NOTE — CONSULT NOTE ADULT - SUBJECTIVE AND OBJECTIVE BOX
Hematology Consult Note    HPI:  64-year-old male history of COPD, non-Hodgkin's lymphoma in remission for years, CVA, diabetes, pacemaker placement placement for CHB presenting to ED for evaluation of low platelets on outpatient lab work yesterday. Patient was called and told that his platelet count today was 15 and that he needed to come to the hospital for evaluation. States that he takes aspirin daily endorsing some mild bruising to his left hand but denies any bleeding, N, V, CP, SOB, fever, chills, abdominal pain     No prior history of thrombocytopenia, no recent illness.  Patient reports bruising especially on his left hand/arm, several ecchymosis on the left upper extremity.   but denies headache, bloody stools, bloody urine or any other additional complaints.      ED Course:   Vitals:  145 mm Hg/ 81 mm Hg, 84 /min, 18 /min, 97.6 Degrees F, 99 % room air   Labs: WBC 4.56, Hb 11.9, PC 15.    Meds: 1U Platelet      Patient being admitted for evaluation and management of severe thrombocytopenia.  (08 May 2025 01:57)      Allergies    Bactrim (Anaphylaxis)  acyclovir (Anaphylaxis)    Intolerances        MEDICATIONS  (STANDING):  amLODIPine   Tablet 5 milliGRAM(s) Oral daily  atorvastatin 80 milliGRAM(s) Oral at bedtime  chlorhexidine 2% Cloths 1 Application(s) Topical <User Schedule>  dextrose 5%. 1000 milliLiter(s) (50 mL/Hr) IV Continuous <Continuous>  dextrose 5%. 1000 milliLiter(s) (100 mL/Hr) IV Continuous <Continuous>  dextrose 50% Injectable 25 Gram(s) IV Push once  dextrose 50% Injectable 12.5 Gram(s) IV Push once  dextrose 50% Injectable 25 Gram(s) IV Push once  fluticasone propionate/ salmeterol 250-50 MICROgram(s) Diskus 1 Dose(s) Inhalation two times a day  glucagon  Injectable 1 milliGRAM(s) IntraMuscular once  insulin lispro (ADMELOG) corrective regimen sliding scale   SubCutaneous three times a day before meals  tamsulosin 0.4 milliGRAM(s) Oral two times a day  tiotropium 2.5 MICROgram(s) Inhaler 2 Puff(s) Inhalation daily  topiramate 300 milliGRAM(s) Oral at bedtime    MEDICATIONS  (PRN):  dextrose Oral Gel 15 Gram(s) Oral once PRN Blood Glucose LESS THAN 70 milliGRAM(s)/deciliter  melatonin 3 milliGRAM(s) Oral at bedtime PRN Insomnia      PAST MEDICAL & SURGICAL HISTORY:  HTN (hypertension)      High cholesterol      DM (diabetes mellitus)      Chronic obstructive pulmonary disease, unspecified COPD type      Non Hodgkin's lymphoma      Pacemaker      Artificial cardiac pacemaker      S/P transesophageal echocardiogram (ANITA)          FAMILY HISTORY:  Family history of breast cancer (Sibling)  in sister at the age of 59    Family history of cancer (Mother)        SOCIAL HISTORY: No EtOH, no tobacco    REVIEW OF SYSTEMS:    CONSTITUTIONAL: No weakness, fevers or chills  EYES/ENT: No visual changes;  No vertigo or throat pain   NECK: No pain or stiffness  RESPIRATORY: No cough, wheezing, hemoptysis; No shortness of breath  CARDIOVASCULAR: No chest pain or palpitations  GASTROINTESTINAL: No abdominal or epigastric pain. No nausea, vomiting, or hematemesis; No diarrhea or constipation. No melena or hematochezia.  GENITOURINARY: No dysuria, frequency or hematuria  NEUROLOGICAL: No numbness or weakness  SKIN: No itching, burning, rashes, or lesions   All other review of systems is negative unless indicated above.    Height (cm): 182.9 (05-07 @ 17:50)  Weight (kg): 86.2 (05-07 @ 17:50)  BMI (kg/m2): 25.8 (05-07 @ 17:50)  BSA (m2): 2.08 (05-07 @ 17:50)    T(F): 97.7 (05-08-25 @ 13:02), Max: 98.5 (05-08-25 @ 02:45)  HR: 74 (05-08-25 @ 13:02)  BP: 145/82 (05-08-25 @ 13:02)  RR: 18 (05-08-25 @ 13:02)  SpO2: 98% (05-08-25 @ 13:02)  Wt(kg): --    GENERAL: NAD, well-developed  CHEST/LUNG: Clear to auscultation bilaterally;   HEART: Regular rate and rhythm;   ABDOMEN: Soft, Tender RUQ   EXTREMITIES:  No edema, LUE ecchymosis, bruising on legs as well  NEUROLOGY: non-focal                            11.5   4.70  )-----------( 15       ( 08 May 2025 06:37 )             36.8       05-08    145  |  111[H]  |  14  ----------------------------<  105[H]  3.9   |  24  |  1.1    Ca    9.5      08 May 2025 06:37    TPro  6.9  /  Alb  4.2  /  TBili  1.1  /  DBili  x   /  AST  15  /  ALT  10  /  AlkPhos  59  05-08           Hematology Consult Note    HPI:  64-year-old male history of COPD, non-Hodgkin's lymphoma in remission for years, CVA, diabetes, pacemaker placement placement for CHB presenting to ED for evaluation of low platelets on outpatient lab work yesterday. Patient was called and told that his platelet count today was 15 and that he needed to come to the hospital for evaluation. States that he takes aspirin daily endorsing some mild bruising to his left hand but denies any bleeding, N, V, CP, SOB, fever, chills, abdominal pain     No prior history of thrombocytopenia, no recent illness.  Patient reports bruising especially on his left hand/arm, several ecchymosis on the left upper extremity.   but denies headache, bloody stools, bloody urine or any other additional complaints.      ED Course:   Vitals:  145 mm Hg/ 81 mm Hg, 84 /min, 18 /min, 97.6 Degrees F, 99 % room air   Labs: WBC 4.56, Hb 11.9, PC 15.    Meds: 1U Platelet      Patient being admitted for evaluation and management of severe thrombocytopenia.  (08 May 2025 01:57)      Allergies    Bactrim (Anaphylaxis)  acyclovir (Anaphylaxis)    Intolerances        MEDICATIONS  (STANDING):  amLODIPine   Tablet 5 milliGRAM(s) Oral daily  atorvastatin 80 milliGRAM(s) Oral at bedtime  chlorhexidine 2% Cloths 1 Application(s) Topical <User Schedule>  dextrose 5%. 1000 milliLiter(s) (50 mL/Hr) IV Continuous <Continuous>  dextrose 5%. 1000 milliLiter(s) (100 mL/Hr) IV Continuous <Continuous>  dextrose 50% Injectable 25 Gram(s) IV Push once  dextrose 50% Injectable 12.5 Gram(s) IV Push once  dextrose 50% Injectable 25 Gram(s) IV Push once  fluticasone propionate/ salmeterol 250-50 MICROgram(s) Diskus 1 Dose(s) Inhalation two times a day  glucagon  Injectable 1 milliGRAM(s) IntraMuscular once  insulin lispro (ADMELOG) corrective regimen sliding scale   SubCutaneous three times a day before meals  tamsulosin 0.4 milliGRAM(s) Oral two times a day  tiotropium 2.5 MICROgram(s) Inhaler 2 Puff(s) Inhalation daily  topiramate 300 milliGRAM(s) Oral at bedtime    MEDICATIONS  (PRN):  dextrose Oral Gel 15 Gram(s) Oral once PRN Blood Glucose LESS THAN 70 milliGRAM(s)/deciliter  melatonin 3 milliGRAM(s) Oral at bedtime PRN Insomnia      PAST MEDICAL & SURGICAL HISTORY:  HTN (hypertension)      High cholesterol      DM (diabetes mellitus)      Chronic obstructive pulmonary disease, unspecified COPD type      Non Hodgkin's lymphoma      Pacemaker      Artificial cardiac pacemaker      S/P transesophageal echocardiogram (ANITA)          FAMILY HISTORY:  Family history of breast cancer (Sibling)  in sister at the age of 59    Family history of cancer (Mother)        SOCIAL HISTORY: No EtOH, no tobacco    REVIEW OF SYSTEMS:    CONSTITUTIONAL: No weakness, fevers or chills  EYES/ENT: No visual changes;  No vertigo or throat pain   NECK: No pain or stiffness  RESPIRATORY: No cough, wheezing, hemoptysis; No shortness of breath  CARDIOVASCULAR: No chest pain or palpitations  GASTROINTESTINAL: No abdominal or epigastric pain. No nausea, vomiting, or hematemesis; No diarrhea or constipation. No melena or hematochezia.  GENITOURINARY: No dysuria, frequency or hematuria  NEUROLOGICAL: No numbness or weakness  SKIN: No itching, burning, rashes, or lesions   All other review of systems is negative unless indicated above.    Height (cm): 182.9 (05-07 @ 17:50)  Weight (kg): 86.2 (05-07 @ 17:50)  BMI (kg/m2): 25.8 (05-07 @ 17:50)  BSA (m2): 2.08 (05-07 @ 17:50)    T(F): 97.7 (05-08-25 @ 13:02), Max: 98.5 (05-08-25 @ 02:45)  HR: 74 (05-08-25 @ 13:02)  BP: 145/82 (05-08-25 @ 13:02)  RR: 18 (05-08-25 @ 13:02)  SpO2: 98% (05-08-25 @ 13:02)  Wt(kg): --    GENERAL: NAD, well-developed. No palpable adenopathy  CHEST/LUNG: Clear to auscultation bilaterally;   HEART: Regular rate and rhythm;   ABDOMEN: Soft, Tender RUQ   EXTREMITIES:  No edema, LUE ecchymosis, bruising on legs as well  NEUROLOGY: non-focal                            11.5   4.70  )-----------( 15       ( 08 May 2025 06:37 )             36.8       05-08    145  |  111[H]  |  14  ----------------------------<  105[H]  3.9   |  24  |  1.1    Ca    9.5      08 May 2025 06:37    TPro  6.9  /  Alb  4.2  /  TBili  1.1  /  DBili  x   /  AST  15  /  ALT  10  /  AlkPhos  59  05-08

## 2025-05-08 NOTE — H&P ADULT - ASSESSMENT
64-year-old male history of COPD, non-Hodgkin's lymphoma in remission for years, CVA, diabetes, pacemaker placement placement for CHB presenting to ED for evaluation of low platelets on outpatient lab work yesterday. Patient being admitted for evaluation and management of severe thrombocytopenia.      # Severe Thrombocytopenia   PC on admission 15   Platelet Count: 03/21/25  156 -> 164    No prior history of thrombocytopenia   Bruising especially on his left hand/arm which he states resolves on its own and recurs   Several ecchymosis +nt on the left upper extremity.    No melena, hematochezia, hematemesis, hemoptysis, mucosal bleeding or epistaxis   No recent exposure to heparin products   Peripheral Smear: No clumping, Giant Platelets seen in 2 power fields.    s/p 1U Platelets in ED   Monitor Platelet count and for Bleeding.    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 ?   Last Rituxan May 2020   Followed with Dr Meredith in the past     # Hx of stroke with residual mild right sided weakness.   # Chronic mild right frontal headache   Continue with topiramate 300mg/day.   Follows w/ Neurology outpatient    On Aspirin 325 mg daily     # PPM s/p CHB   Congenital heart disease unknown by the patient and his wife, s/p open heart surgery as an infant at [facility], no records are available.   Follows w/ Dr Monique     # T2 diabetes mellitus     Home med: Synjardy 12.5-1000mg, pioglitazone 30 mg, Trulicity q1 week  ISS    # HLD   C/w atorvastatin 80 mg qhs     # HTN (hypertension)   Irbesartan 300 mg daily   Amlodipine 5 mg daily     # COPD    c/w tiotropium 18 mcg inhalation capsule: 1 cap(s) inhaled once a day   c/w breztri Aerosphere inhalation aerosol: 2 puff(s) inhaled 2 times a day     Miscellaneous:  DVT prophylaxis: Holding iso severe thrombocytopenia  Bowel  - Feeds: DASH/TLC, CC  - Prophylaxis: None  - Regimen: None  Activity: AAT  MedRec: Confirmed w/ Patient   Code status: Full   Disposition: Med-Surg  Handoff: Monitor PC, f/u Hem-Onc

## 2025-05-08 NOTE — H&P ADULT - ATTENDING COMMENTS
HPI as above.  Interval history: Pt seen and examined at bedside. No cp or sob.  denies any inciting factor  Vital Signs (24 Hrs):  T(C): 36.5 (05-08-25 @ 13:02), Max: 36.9 (05-08-25 @ 02:45)  HR: 74 (05-08-25 @ 13:02) (74 - 98)  BP: 145/82 (05-08-25 @ 13:02) (131/75 - 145/82)  RR: 18 (05-08-25 @ 13:02) (17 - 18)  SpO2: 98% (05-08-25 @ 13:02) (98% - 99%)  Wt(kg): --  Daily Height in cm: 182.88 (07 May 2025 17:50)    Daily     I&O's Summary    PHYSICAL EXAM:  GENERAL: NAD, well-developed  HEAD:  Atraumatic, Normocephalic  EYES: EOMI, PERRLA, conjunctiva and sclera clear  NECK: Supple, No JVD  CHEST/LUNG: Clear to auscultation bilaterally; No wheeze  HEART: Regular rate and rhythm; No murmurs, rubs, or gallops  ABDOMEN: Soft, Nontender, Nondistended; Bowel sounds present  EXTREMITIES:  2+ Peripheral Pulses, No clubbing, cyanosis, or edema  PSYCH: AAOx3  NEUROLOGY: non-focal  SKIN: bruising   Labs reviewed  Imaging reviewed independently and reviewed read    Plan  64-year-old male history of COPD, non-Hodgkin's lymphoma in remission for years, CVA, diabetes, pacemaker placement placement for CHB presenting to ED for evaluation of low platelets on outpatient lab work yesterday. Patient being admitted for evaluation and management of severe thrombocytopenia.      # Severe Thrombocytopenia   PC on admission 15   Platelet Count: 03/21/25  156 -> 164    No prior history of thrombocytopenia   Bruising especially on his left hand/arm which he states resolves on its own and recurs   Several ecchymosis +nt on the left upper extremity.    No melena, hematochezia, hematemesis, hemoptysis, mucosal bleeding or epistaxis   No recent exposure to heparin products   Peripheral Smear: No clumping, Giant Platelets seen in 2 power fields.    s/p 1U Platelets in ED   Monitor Platelet count and for Bleeding.    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 ?   Last Rituxan May 2020   Followed with Dr Meredith in the past     # Hx of stroke with residual mild right sided weakness.   # Chronic mild right frontal headache   Continue with topiramate 300mg/day.   Follows w/ Neurology outpatient    On Aspirin 325 mg daily     # PPM s/p CHB   Congenital heart disease unknown by the patient and his wife, s/p open heart surgery as an infant at [facility], no records are available.   Follows w/ Dr Monique     # T2 diabetes mellitus     Home med: Synjardy 12.5-1000mg, pioglitazone 30 mg, Trulicity q1 week    #Progress Note Handoff  Pending (specify):  follow up hemeonc, labs above  Family discussion: house staff updated pt family  Disposition: home  Decision to admit the pt is based on acuity as above

## 2025-05-08 NOTE — CONSULT NOTE ADULT - ASSESSMENT
63 y/o M PMHx non-Hodgkin's lymphoma in remission for years, HERSON presented to ED for evaluation of low platelets on outpatient lab work requested by PCP. He reports having fatigue and generalized weakness over the past 2 months along with chills, weight loss 20 lbs over few months.    # New onset thrombocytopenia, concern for ITP  # H/o Kimberly marginal zone lymphoma  He was diagnosed with kimberly marginal zone lymphoma (cervical node biopsy) in 4.2018 he was treated with Bendamustine/Rituximab x3 cycles at the time followed by maintenance Rituximab for 2 years, last Rituximab in May 2020.   His last PET scan done 4.2023 showed no evidence of disease, he last followed up with his oncologist in 9.2023.  He is now presenting with generalized weakness, chills, weight loss, noted to have new onset thrombocytopenia with plt count 15 (normal in 3.2025 164) that did not respond to platelet transfusion. Noted also anemia and leucopenia (new since 3.2025)  No evidence of active bleeding, noted mild ecchymosis on upper and lower extremities.  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  Peripheral smear reviewed notable for giant platelets, few promyelocytes     Plan:  Please send peripheral flow cytometry (form placed in the chart and informed the resident)  Start Dexamethasone 40 mg PO x 4 days, will follow up on platelet count tomorrow and assess need to add IVIG  Check SPEP, quantitative immunoglobulin levels  Check retics, LDH, haptoglobin, coomb's (r/o AIHA)

## 2025-05-09 LAB
ALBUMIN SERPL ELPH-MCNC: 4.5 G/DL — SIGNIFICANT CHANGE UP (ref 3.5–5.2)
ALP SERPL-CCNC: 64 U/L — SIGNIFICANT CHANGE UP (ref 30–115)
ALT FLD-CCNC: 11 U/L — SIGNIFICANT CHANGE UP (ref 0–41)
ANION GAP SERPL CALC-SCNC: 13 MMOL/L — SIGNIFICANT CHANGE UP (ref 7–14)
AST SERPL-CCNC: 14 U/L — SIGNIFICANT CHANGE UP (ref 0–41)
BILIRUB SERPL-MCNC: 1.2 MG/DL — SIGNIFICANT CHANGE UP (ref 0.2–1.2)
BUN SERPL-MCNC: 21 MG/DL — HIGH (ref 10–20)
CALCIUM SERPL-MCNC: 9.7 MG/DL — SIGNIFICANT CHANGE UP (ref 8.4–10.5)
CHLORIDE SERPL-SCNC: 106 MMOL/L — SIGNIFICANT CHANGE UP (ref 98–110)
CO2 SERPL-SCNC: 21 MMOL/L — SIGNIFICANT CHANGE UP (ref 17–32)
CREAT SERPL-MCNC: 1.1 MG/DL — SIGNIFICANT CHANGE UP (ref 0.7–1.5)
DIR ANTIGLOB POLYSPECIFIC INTERPRETATION: SIGNIFICANT CHANGE UP
EGFR: 75 ML/MIN/1.73M2 — SIGNIFICANT CHANGE UP
EGFR: 75 ML/MIN/1.73M2 — SIGNIFICANT CHANGE UP
GLUCOSE BLDC GLUCOMTR-MCNC: 148 MG/DL — HIGH (ref 70–99)
GLUCOSE BLDC GLUCOMTR-MCNC: 234 MG/DL — HIGH (ref 70–99)
GLUCOSE BLDC GLUCOMTR-MCNC: 269 MG/DL — HIGH (ref 70–99)
GLUCOSE BLDC GLUCOMTR-MCNC: 306 MG/DL — HIGH (ref 70–99)
GLUCOSE SERPL-MCNC: 253 MG/DL — HIGH (ref 70–99)
HAPTOGLOB SERPL-MCNC: 95 MG/DL — SIGNIFICANT CHANGE UP (ref 34–200)
HCT VFR BLD CALC: 39.4 % — LOW (ref 42–52)
HGB BLD-MCNC: 12.5 G/DL — LOW (ref 14–18)
IGA FLD-MCNC: 299 MG/DL — SIGNIFICANT CHANGE UP (ref 84–499)
IGG FLD-MCNC: 1086 MG/DL — SIGNIFICANT CHANGE UP (ref 610–1660)
IGM SERPL-MCNC: 124 MG/DL — SIGNIFICANT CHANGE UP (ref 35–242)
KAPPA LC SER QL IFE: 2.36 MG/DL — HIGH (ref 0.33–1.94)
KAPPA/LAMBDA FREE LIGHT CHAIN RATIO, SERUM: 0.94 RATIO — SIGNIFICANT CHANGE UP (ref 0.26–1.65)
LAMBDA LC SER QL IFE: 2.51 MG/DL — SIGNIFICANT CHANGE UP (ref 0.57–2.63)
LDH SERPL L TO P-CCNC: 178 U/L — SIGNIFICANT CHANGE UP (ref 50–242)
MANUAL DIF COMMENT BLD-IMP: SIGNIFICANT CHANGE UP
MCHC RBC-ENTMCNC: 27.2 PG — SIGNIFICANT CHANGE UP (ref 27–31)
MCHC RBC-ENTMCNC: 31.7 G/DL — LOW (ref 32–37)
MCV RBC AUTO: 85.7 FL — SIGNIFICANT CHANGE UP (ref 80–94)
NRBC BLD AUTO-RTO: 0 /100 WBCS — SIGNIFICANT CHANGE UP (ref 0–0)
PLATELET # BLD AUTO: 20 K/UL — LOW (ref 130–400)
PMV BLD: 12.7 FL — HIGH (ref 7.4–10.4)
POTASSIUM SERPL-MCNC: 4.2 MMOL/L — SIGNIFICANT CHANGE UP (ref 3.5–5)
POTASSIUM SERPL-SCNC: 4.2 MMOL/L — SIGNIFICANT CHANGE UP (ref 3.5–5)
PROT SERPL-MCNC: 8.2 G/DL — HIGH (ref 6–8)
PROT SERPL-MCNC: 8.2 G/DL — SIGNIFICANT CHANGE UP (ref 6–8.3)
RAPID RVP RESULT: SIGNIFICANT CHANGE UP
RBC # BLD: 4.6 M/UL — LOW (ref 4.7–6.1)
RBC # BLD: 4.6 M/UL — LOW (ref 4.7–6.1)
RBC # FLD: 13.5 % — SIGNIFICANT CHANGE UP (ref 11.5–14.5)
RETICS #: 82.3 K/UL — SIGNIFICANT CHANGE UP (ref 25–125)
RETICS/RBC NFR: 1.8 % — HIGH (ref 0.5–1.5)
SARS-COV-2 RNA SPEC QL NAA+PROBE: SIGNIFICANT CHANGE UP
SODIUM SERPL-SCNC: 140 MMOL/L — SIGNIFICANT CHANGE UP (ref 135–146)
WBC # BLD: 4.98 K/UL — SIGNIFICANT CHANGE UP (ref 4.8–10.8)
WBC # FLD AUTO: 4.98 K/UL — SIGNIFICANT CHANGE UP (ref 4.8–10.8)

## 2025-05-09 PROCEDURE — 74177 CT ABD & PELVIS W/CONTRAST: CPT | Mod: 26

## 2025-05-09 PROCEDURE — 99233 SBSQ HOSP IP/OBS HIGH 50: CPT

## 2025-05-09 PROCEDURE — 71260 CT THORAX DX C+: CPT | Mod: 26

## 2025-05-09 RX ORDER — IMMUNE GLOBULIN (HUMAN) 10 G/100ML
40 INJECTION INTRAVENOUS; SUBCUTANEOUS DAILY
Refills: 0 | Status: DISCONTINUED | OUTPATIENT
Start: 2025-05-09 | End: 2025-05-10

## 2025-05-09 RX ORDER — LIDOCAINE HYDROCHLORIDE 20 MG/ML
1 JELLY TOPICAL EVERY 24 HOURS
Refills: 0 | Status: DISCONTINUED | OUTPATIENT
Start: 2025-05-09 | End: 2025-05-13

## 2025-05-09 RX ORDER — IOHEXOL 350 MG/ML
30 INJECTION, SOLUTION INTRAVENOUS ONCE
Refills: 0 | Status: COMPLETED | OUTPATIENT
Start: 2025-05-09 | End: 2025-05-09

## 2025-05-09 RX ADMIN — Medication 1 APPLICATION(S): at 06:06

## 2025-05-09 RX ADMIN — TIOTROPIUM BROMIDE INHALATION SPRAY 2 PUFF(S): 3.12 SPRAY, METERED RESPIRATORY (INHALATION) at 08:34

## 2025-05-09 RX ADMIN — INSULIN LISPRO 3: 100 INJECTION, SOLUTION INTRAVENOUS; SUBCUTANEOUS at 17:05

## 2025-05-09 RX ADMIN — LIDOCAINE HYDROCHLORIDE 1 PATCH: 20 JELLY TOPICAL at 17:03

## 2025-05-09 RX ADMIN — INSULIN LISPRO 2: 100 INJECTION, SOLUTION INTRAVENOUS; SUBCUTANEOUS at 08:29

## 2025-05-09 RX ADMIN — ATORVASTATIN CALCIUM 80 MILLIGRAM(S): 80 TABLET, FILM COATED ORAL at 21:30

## 2025-05-09 RX ADMIN — LIDOCAINE HYDROCHLORIDE 1 PATCH: 20 JELLY TOPICAL at 17:04

## 2025-05-09 RX ADMIN — TOPIRAMATE 300 MILLIGRAM(S): 25 TABLET, FILM COATED ORAL at 21:30

## 2025-05-09 RX ADMIN — Medication 1 DOSE(S): at 08:34

## 2025-05-09 RX ADMIN — TAMSULOSIN HYDROCHLORIDE 0.4 MILLIGRAM(S): 0.4 CAPSULE ORAL at 17:03

## 2025-05-09 RX ADMIN — IMMUNE GLOBULIN (HUMAN) 800 GRAM(S): 10 INJECTION INTRAVENOUS; SUBCUTANEOUS at 17:04

## 2025-05-09 RX ADMIN — IOHEXOL 30 MILLILITER(S): 350 INJECTION, SOLUTION INTRAVENOUS at 20:27

## 2025-05-09 RX ADMIN — AMLODIPINE BESYLATE 5 MILLIGRAM(S): 10 TABLET ORAL at 06:06

## 2025-05-09 RX ADMIN — INSULIN LISPRO 4: 100 INJECTION, SOLUTION INTRAVENOUS; SUBCUTANEOUS at 12:12

## 2025-05-09 NOTE — PROGRESS NOTE ADULT - SUBJECTIVE AND OBJECTIVE BOX
SUBJECTIVE:    Patient is a 64y old Male who presents with a chief complaint of Thrombocytopenia (09 May 2025 10:15)    Currently admitted to medicine with the primary diagnosis of Low platelet count       Today is hospital day 2d. This morning he is resting comfortably in bed and reports no new issues or overnight events.     PAST MEDICAL & SURGICAL HISTORY  HTN (hypertension)    High cholesterol    DM (diabetes mellitus)    Chronic obstructive pulmonary disease, unspecified COPD type    Non Hodgkin's lymphoma    Pacemaker    Artificial cardiac pacemaker    S/P transesophageal echocardiogram (ANITA)      SOCIAL HISTORY:  Negative for smoking/alcohol/drug use.     ALLERGIES:  Bactrim (Anaphylaxis)  acyclovir (Anaphylaxis)    MEDICATIONS:  STANDING MEDICATIONS  amLODIPine   Tablet 5 milliGRAM(s) Oral daily  atorvastatin 80 milliGRAM(s) Oral at bedtime  chlorhexidine 2% Cloths 1 Application(s) Topical <User Schedule>  dexAMETHasone     Tablet 40 milliGRAM(s) Oral daily  dextrose 5%. 1000 milliLiter(s) IV Continuous <Continuous>  dextrose 5%. 1000 milliLiter(s) IV Continuous <Continuous>  dextrose 50% Injectable 25 Gram(s) IV Push once  dextrose 50% Injectable 12.5 Gram(s) IV Push once  dextrose 50% Injectable 25 Gram(s) IV Push once  fluticasone propionate/ salmeterol 250-50 MICROgram(s) Diskus 1 Dose(s) Inhalation two times a day  glucagon  Injectable 1 milliGRAM(s) IntraMuscular once  immune   globulin 10% (GAMMAGARD) IVPB 40 Gram(s) IV Intermittent daily  insulin lispro (ADMELOG) corrective regimen sliding scale   SubCutaneous three times a day before meals  lidocaine   4% Patch 1 Patch Transdermal every 24 hours  lidocaine   4% Patch 1 Patch Transdermal every 24 hours  tamsulosin 0.4 milliGRAM(s) Oral two times a day  tiotropium 2.5 MICROgram(s) Inhaler 2 Puff(s) Inhalation daily  topiramate 300 milliGRAM(s) Oral at bedtime    PRN MEDICATIONS  acetaminophen     Tablet .. 650 milliGRAM(s) Oral every 6 hours PRN  dextrose Oral Gel 15 Gram(s) Oral once PRN  melatonin 3 milliGRAM(s) Oral at bedtime PRN    VITALS:   T(F): 97.4  HR: 81  BP: 135/76  RR: 18  SpO2: 97%    LABS:                        12.5   4.98  )-----------( 20       ( 09 May 2025 07:30 )             39.4     05-09    140  |  106  |  21[H]  ----------------------------<  253[H]  4.2   |  21  |  1.1    Ca    9.7      09 May 2025 07:30    TPro  8.2[H]  /  Alb  4.5  /  TBili  1.2  /  DBili  x   /  AST  14  /  ALT  11  /  AlkPhos  64  05-09      Urinalysis Basic - ( 09 May 2025 07:30 )    Color: x / Appearance: x / SG: x / pH: x  Gluc: 253 mg/dL / Ketone: x  / Bili: x / Urobili: x   Blood: x / Protein: x / Nitrite: x   Leuk Esterase: x / RBC: x / WBC x   Sq Epi: x / Non Sq Epi: x / Bacteria: x              PHYSICAL EXAM:  GENERAL: NAD, well-developed. No palpable adenopathy  CHEST/LUNG: Clear to auscultation bilaterally;   HEART: Regular rate and rhythm;   ABDOMEN: Soft, Tender RUQ   EXTREMITIES:  No edema, LUE ecchymosis, bruising on legs as well  NEUROLOGY: non-focal

## 2025-05-09 NOTE — PROGRESS NOTE ADULT - ASSESSMENT
64-year-old male history of COPD, non-Hodgkin's lymphoma in remission for years, CVA, diabetes, pacemaker placement placement for CHB presenting to ED for evaluation of low platelets on outpatient lab work yesterday. Patient being admitted for evaluation and management of severe thrombocytopenia.      # Severe Thrombocytopenia   PC on admission 15   Platelet Count: 03/21/25  156 -> 164    No prior history of thrombocytopenia   Bruising especially on his left hand/arm which he states resolves on its own and recurs   Several ecchymosis +nt on the left upper extremity.    No melena, hematochezia, hematemesis, hemoptysis, mucosal bleeding or epistaxis   No recent exposure to heparin products   Peripheral Smear: No clumping, Giant Platelets seen in 2 power fields.    Monitor Platelet count and for Bleeding.    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 ?   Last Rituxan May 2020   Followed with Dr Meredith in the past     # Hx of stroke with residual mild right sided weakness.   # Chronic mild right frontal headache   Continue with topiramate 300mg/day.   Follows w/ Neurology outpatient    On Aspirin 325 mg daily     # PPM s/p CHB   Congenital heart disease unknown by the patient and his wife, s/p open heart surgery as an infant at [facility], no records are available.   Follows w/ Dr Monique     # T2 diabetes mellitus     Home med: Synjardy 12.5-1000mg, pioglitazone 30 mg, Trulicity q1 week  ISS    # HLD   C/w atorvastatin 80 mg qhs     # HTN (hypertension)   Irbesartan 300 mg daily   Amlodipine 5 mg daily     # COPD    c/w tiotropium 18 mcg inhalation capsule: 1 cap(s) inhaled once a day   c/w breztri Aerosphere inhalation aerosol: 2 puff(s) inhaled 2 times a day     Miscellaneous:  DVT prophylaxis: Holding iso severe thrombocytopenia  Bowel  - Feeds: DASH/TLC, CC  - Prophylaxis: None  - Regimen: None  Activity: AAT  MedRec: Confirmed w/ Patient   Code status: Full   Disposition: Med-Surg  Handoff: Monitor PC, f/u Hem-Onc 64-year-old male history of COPD, non-Hodgkin's lymphoma in remission for years, CVA, diabetes, pacemaker placement placement for CHB presenting to ED for evaluation of low platelets on outpatient lab work yesterday. Patient being admitted for evaluation and management of severe thrombocytopenia.      # Severe Thrombocytopenia   PC on admission 15   Platelet Count: 03/21/25  156 -> 164    No prior history of thrombocytopenia   Bruising especially on his left hand/arm which he states resolves on its own and recurs   Several ecchymosis +nt on the left upper extremity.    No melena, hematochezia, hematemesis, hemoptysis, mucosal bleeding or epistaxis   No recent exposure to heparin products   Peripheral Smear: No clumping, Giant Platelets seen in 2 power fields.    Monitor Platelet count and for Bleeding.    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 ?   Last Rituxan May 2020   Followed with Dr Meredith in the past     # Hx of stroke with residual mild right sided weakness.   # Chronic mild right frontal headache   Continue with topiramate 300mg/day.   Follows w/ Neurology outpatient    On Aspirin 325 mg daily     # PPM s/p CHB   Congenital heart disease unknown by the patient and his wife, s/p open heart surgery as an infant at [facility], no records are available.   Follows w/ Dr Monique     # T2 diabetes mellitus     Home med: Synjardy 12.5-1000mg, pioglitazone 30 mg, Trulicity q1 week  ISS    # HLD   C/w atorvastatin 80 mg qhs     # HTN (hypertension)   Irbesartan 300 mg daily   Amlodipine 5 mg daily     # COPD    c/w tiotropium 18 mcg inhalation capsule: 1 cap(s) inhaled once a day   c/w breztri Aerosphere inhalation aerosol: 2 puff(s) inhaled 2 times a day     Miscellaneous:  DVT prophylaxis: Holding iso severe thrombocytopenia  Bowel  - Feeds: DASH/TLC, CC  - Prophylaxis: None  - Regimen: None  Activity: AAT  MedRec: Confirmed w/ Patient   Code status: Full   Disposition: Med-Surg  Handoff: Monitor PC, f/u Hem-Onc 64-year-old male history of COPD, non-Hodgkin's lymphoma in remission for years, CVA, diabetes, pacemaker placement placement for CHB presenting to ED for evaluation of low platelets on outpatient lab work yesterday. Patient being admitted for evaluation and management of severe thrombocytopenia.      # Severe Thrombocytopenia   PC on admission 15   Platelet Count: 03/21/25  156 -> 164    No prior history of thrombocytopenia   Bruising especially on his left hand/arm which he states resolves on its own and recurs   Several ecchymosis +nt on the left upper extremity.    No melena, hematochezia, hematemesis, hemoptysis, mucosal bleeding or epistaxis   No recent exposure to heparin products   Peripheral Smear: No clumping, Giant Platelets seen in 2 power fields.    Monitor Platelet count and for Bleeding.    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 ?   Last Rituxan May 2020   Followed with Dr Meredith in the past     # Hx of stroke with residual mild right sided weakness.   # Chronic mild right frontal headache   Continue with topiramate 300mg/day.   Follows w/ Neurology outpatient    On Aspirin 325 mg daily     # PPM s/p CHB   Congenital heart disease unknown by the patient and his wife, s/p open heart surgery as an infant at [facility], no records are available.   Follows w/ Dr Monique     # T2 diabetes mellitus     Home med: Synjardy 12.5-1000mg, pioglitazone 30 mg, Trulicity q1 week  ISS    # HLD   C/w atorvastatin 80 mg qhs     # HTN (hypertension)   Irbesartan 300 mg daily   Amlodipine 5 mg daily     # COPD    c/w tiotropium 18 mcg inhalation capsule: 1 cap(s) inhaled once a day   c/w breztri Aerosphere inhalation aerosol: 2 puff(s) inhaled 2 times a day     Miscellaneous:  DVT prophylaxis: Holding iso severe thrombocytopenia  Bowel  - Feeds: DASH/TLC, CC  - Prophylaxis: None  - Regimen: None  Activity: AAT  Code status: Full   Disposition: Med-Surg    Handoff: Monitor PC, f/u Hem-Onc recs for IVIG, f/u thrombocytopenia workup

## 2025-05-09 NOTE — PROGRESS NOTE ADULT - SUBJECTIVE AND OBJECTIVE BOX
24H events:    Patient is a 64y old Male who presents with a chief complaint of Thrombocytopenia (08 May 2025 17:23)    Primary diagnosis of Low platelet count        Today is 2d of hospitalization. This morning patient was seen and examined at bedside, resting comfortably in bed.    No acute or major events overnight.      PAST MEDICAL & SURGICAL HISTORY  HTN (hypertension)    High cholesterol    DM (diabetes mellitus)    Chronic obstructive pulmonary disease, unspecified COPD type    Non Hodgkin's lymphoma    Pacemaker    Artificial cardiac pacemaker    S/P transesophageal echocardiogram (ANITA)      SOCIAL HISTORY:  Social History:      ALLERGIES:  Bactrim (Anaphylaxis)  acyclovir (Anaphylaxis)    MEDICATIONS:  STANDING MEDICATIONS  amLODIPine   Tablet 5 milliGRAM(s) Oral daily  atorvastatin 80 milliGRAM(s) Oral at bedtime  chlorhexidine 2% Cloths 1 Application(s) Topical <User Schedule>  dexAMETHasone     Tablet 40 milliGRAM(s) Oral daily  dextrose 5%. 1000 milliLiter(s) IV Continuous <Continuous>  dextrose 5%. 1000 milliLiter(s) IV Continuous <Continuous>  dextrose 50% Injectable 25 Gram(s) IV Push once  dextrose 50% Injectable 12.5 Gram(s) IV Push once  dextrose 50% Injectable 25 Gram(s) IV Push once  fluticasone propionate/ salmeterol 250-50 MICROgram(s) Diskus 1 Dose(s) Inhalation two times a day  glucagon  Injectable 1 milliGRAM(s) IntraMuscular once  insulin lispro (ADMELOG) corrective regimen sliding scale   SubCutaneous three times a day before meals  tamsulosin 0.4 milliGRAM(s) Oral two times a day  tiotropium 2.5 MICROgram(s) Inhaler 2 Puff(s) Inhalation daily  topiramate 300 milliGRAM(s) Oral at bedtime    PRN MEDICATIONS  acetaminophen     Tablet .. 650 milliGRAM(s) Oral every 6 hours PRN  dextrose Oral Gel 15 Gram(s) Oral once PRN  melatonin 3 milliGRAM(s) Oral at bedtime PRN    VITALS:   T(F): 97.6  HR: 82  BP: 138/75  RR: 18  SpO2: 97%    PHYSICAL EXAM:  GENERAL:   ( x) NAD, lying in bed comfortably     (  ) obtunded     (  ) lethargic     (  ) somnolent      NECK:  (x) Supple     (  ) neck stiffness     (  ) nuchal rigidity     (  )  no JVD     (  ) JVD present ( -- cm)    HEART:  Rate -->     (x) normal rate     (  ) bradycardic     (  ) tachycardic  Rhythm -->     (x) regular     (  ) regularly irregular     (  ) irregularly irregular  Murmurs -->     (x) normal s1s2     (  ) systolic murmur     (  ) diastolic murmur     (  ) continuous murmur      (  ) S3 present     (  ) S4 present    LUNGS:   ( x)Unlabored respirations     (  ) tachypnea  ( x) B/L air entry     (  ) decreased breath sounds in:  (location     )    ( x) no adventitious sound     (  ) crackles     (  ) wheezing      (  ) rhonchi      (specify location:       )  (  ) chest wall tenderness (specify location:       )    ABDOMEN:   ( x) Soft     (  ) tense   |   (  ) nondistended     (  ) distended   |   (  ) +BS     (  ) hypoactive bowel sounds     (  ) hyperactive bowel sounds  ( x) nontender     (  ) RUQ tenderness     (  ) RLQ tenderness     (  ) LLQ tenderness     (  ) epigastric tenderness     (  ) diffuse tenderness  (  ) Splenomegaly      (  ) Hepatomegaly      (  ) Jaundice     (  ) ecchymosis     EXTREMITIES:  ( x) Normal     (  ) Rash     (  ) ecchymosis     (  ) varicose veins      (  ) pitting edema     (  ) non-pitting edema   (  ) ulceration     (  ) gangrene:     (location:     )    NERVOUS SYSTEM:    ( x) A&Ox3     (  ) confused     (  ) lethargic  CN II-XII:     ( x) Intact     (  ) deficits found     (Specify:     )   Upper extremities:     (  ) no sensorimotor deficits     (  ) weakness     (  ) loss of proprioception/vibration     (  ) loss of touch/temperature (specify:    )  Lower extremities:     (  ) no sensorimotor deficits     (  ) weakness     (  ) loss of proprioception/vibration     (  ) loss of touch/temperature (specify:    )    SKIN:   (  ) No rashes or lesions     (  ) maculopapular rash     (  ) pustules     (  ) vesicles     (  ) ulcer     (  ) ecchymosis     (specify location:     )      LABS:                        12.5   4.98  )-----------( 20       ( 09 May 2025 07:30 )             39.4     05-09    140  |  106  |  21[H]  ----------------------------<  253[H]  4.2   |  21  |  1.1    Ca    9.7      09 May 2025 07:30    TPro  8.2[H]  /  Alb  4.5  /  TBili  1.2  /  DBili  x   /  AST  14  /  ALT  11  /  AlkPhos  64  05-09      Urinalysis Basic - ( 09 May 2025 07:30 )    Color: x / Appearance: x / SG: x / pH: x  Gluc: 253 mg/dL / Ketone: x  / Bili: x / Urobili: x   Blood: x / Protein: x / Nitrite: x   Leuk Esterase: x / RBC: x / WBC x   Sq Epi: x / Non Sq Epi: x / Bacteria: x

## 2025-05-09 NOTE — PROGRESS NOTE ADULT - ASSESSMENT
63 y/o M PMHx non-Hodgkin's lymphoma in remission for years, HERSON presented to ED for evaluation of low platelets on outpatient lab work requested by PCP. He reports having fatigue and generalized weakness over the past 2 months along with chills, weight loss 20 lbs over few months.    # New onset thrombocytopenia, concern for ITP  # H/o Kimberly marginal zone lymphoma  He was diagnosed with kimberly marginal zone lymphoma (cervical node biopsy) in 4.2018 he was treated with Bendamustine/Rituximab x3 cycles at the time followed by maintenance Rituximab for 2 years, last Rituximab in May 2020.   His last PET scan done 4.2023 showed no evidence of disease, he last followed up with his oncologist in 9.2023.  He is now presenting with generalized weakness, chills, weight loss, noted to have new onset thrombocytopenia with plt count 15 (normal in 3.2025 164) that did not respond to platelet transfusion. Noted also anemia and leucopenia (new since 3.2025). Coomb's negative, LDH normal 178, retic low   No evidence of active bleeding, noted mild ecchymosis on upper and lower extremities.  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  Peripheral smear reviewed notable for giant platelets, few promyelocytes       Plan:  Follow up peripheral flow cytometry (sent 5.8.25)  Continue dexamethasone 40 mg PO x 4 days (start date 5.8.25), no improvement in plt count, start IVIG 500 mg/kg daily x 4 days   Follow up SPEP and quantitative immunoglobulin levels  Follow up haptoglobin  Discussed with patient if no improvement in his counts, we will consider a bone marrow biopsy    65 y/o M PMHx non-Hodgkin's lymphoma in remission for years, HERSON presented to ED for evaluation of low platelets on outpatient lab work requested by PCP. He reports having fatigue and generalized weakness over the past 2 months along with chills, weight loss 20 lbs over few months.    # New onset thrombocytopenia, concern for ITP  # H/o Kimberly marginal zone lymphoma  He was diagnosed with kimberly marginal zone lymphoma (cervical node biopsy) in 4.2018 he was treated with Bendamustine/Rituximab x3 cycles at the time followed by maintenance Rituximab for 2 years, last Rituximab in May 2020.   His last PET scan done 4.2023 showed no evidence of disease, he last followed up with his oncologist in 9.2023.  He is now presenting with generalized weakness, chills, weight loss, noted to have new onset thrombocytopenia with plt count 15 (normal in 3.2025 164) that did not respond to platelet transfusion. Noted also anemia and leucopenia (new since 3.2025). Coomb's negative, LDH normal 178, retic low   No evidence of active bleeding, noted mild ecchymosis on upper and lower extremities.  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  Peripheral smear reviewed notable for giant platelets, few promyelocytes       Plan:  Follow up peripheral flow cytometry (sent 5.8.25)  Continue dexamethasone 40 mg PO x 4 days (start date 5.8.25), no improvement in plt count, start IVIG 500 mg/kg daily x 4 days   Follow up SPEP and quantitative immunoglobulin levels  Follow up haptoglobin  Discussed with patient if no improvement in his counts, we will consider a bone marrow biopsy   Please obtain CT C/A/P with IV contrast

## 2025-05-10 LAB
ANION GAP SERPL CALC-SCNC: 11 MMOL/L — SIGNIFICANT CHANGE UP (ref 7–14)
BASOPHILS # BLD AUTO: 0.02 K/UL — SIGNIFICANT CHANGE UP (ref 0–0.2)
BASOPHILS NFR BLD AUTO: 0.3 % — SIGNIFICANT CHANGE UP (ref 0–1)
BUN SERPL-MCNC: 27 MG/DL — HIGH (ref 10–20)
CALCIUM SERPL-MCNC: 9.5 MG/DL — SIGNIFICANT CHANGE UP (ref 8.4–10.5)
CHLORIDE SERPL-SCNC: 103 MMOL/L — SIGNIFICANT CHANGE UP (ref 98–110)
CO2 SERPL-SCNC: 22 MMOL/L — SIGNIFICANT CHANGE UP (ref 17–32)
CREAT SERPL-MCNC: 1.2 MG/DL — SIGNIFICANT CHANGE UP (ref 0.7–1.5)
EGFR: 68 ML/MIN/1.73M2 — SIGNIFICANT CHANGE UP
EGFR: 68 ML/MIN/1.73M2 — SIGNIFICANT CHANGE UP
EOSINOPHIL # BLD AUTO: 0.03 K/UL — SIGNIFICANT CHANGE UP (ref 0–0.7)
EOSINOPHIL NFR BLD AUTO: 0.5 % — SIGNIFICANT CHANGE UP (ref 0–8)
GLUCOSE BLDC GLUCOMTR-MCNC: 203 MG/DL — HIGH (ref 70–99)
GLUCOSE BLDC GLUCOMTR-MCNC: 292 MG/DL — HIGH (ref 70–99)
GLUCOSE BLDC GLUCOMTR-MCNC: 353 MG/DL — HIGH (ref 70–99)
GLUCOSE BLDC GLUCOMTR-MCNC: 396 MG/DL — HIGH (ref 70–99)
GLUCOSE SERPL-MCNC: 215 MG/DL — HIGH (ref 70–99)
HCT VFR BLD CALC: 38.4 % — LOW (ref 42–52)
HGB BLD-MCNC: 12.1 G/DL — LOW (ref 14–18)
IMM GRANULOCYTES NFR BLD AUTO: 0.7 % — HIGH (ref 0.1–0.3)
LYMPHOCYTES # BLD AUTO: 0.65 K/UL — LOW (ref 1.2–3.4)
LYMPHOCYTES # BLD AUTO: 11.3 % — LOW (ref 20.5–51.1)
MAGNESIUM SERPL-MCNC: 2.4 MG/DL — SIGNIFICANT CHANGE UP (ref 1.8–2.4)
MCHC RBC-ENTMCNC: 26.8 PG — LOW (ref 27–31)
MCHC RBC-ENTMCNC: 31.5 G/DL — LOW (ref 32–37)
MCV RBC AUTO: 85 FL — SIGNIFICANT CHANGE UP (ref 80–94)
MONOCYTES # BLD AUTO: 0.24 K/UL — SIGNIFICANT CHANGE UP (ref 0.1–0.6)
MONOCYTES NFR BLD AUTO: 4.2 % — SIGNIFICANT CHANGE UP (ref 1.7–9.3)
NEUTROPHILS # BLD AUTO: 4.79 K/UL — SIGNIFICANT CHANGE UP (ref 1.4–6.5)
NEUTROPHILS NFR BLD AUTO: 83 % — HIGH (ref 42.2–75.2)
NRBC BLD AUTO-RTO: 0 /100 WBCS — SIGNIFICANT CHANGE UP (ref 0–0)
PLATELET # BLD AUTO: 63 K/UL — LOW (ref 130–400)
PMV BLD: SIGNIFICANT CHANGE UP (ref 7.4–10.4)
POTASSIUM SERPL-MCNC: 4.4 MMOL/L — SIGNIFICANT CHANGE UP (ref 3.5–5)
POTASSIUM SERPL-SCNC: 4.4 MMOL/L — SIGNIFICANT CHANGE UP (ref 3.5–5)
RBC # BLD: 4.52 M/UL — LOW (ref 4.7–6.1)
RBC # FLD: 13.6 % — SIGNIFICANT CHANGE UP (ref 11.5–14.5)
SODIUM SERPL-SCNC: 136 MMOL/L — SIGNIFICANT CHANGE UP (ref 135–146)
WBC # BLD: 5.77 K/UL — SIGNIFICANT CHANGE UP (ref 4.8–10.8)
WBC # FLD AUTO: 5.77 K/UL — SIGNIFICANT CHANGE UP (ref 4.8–10.8)

## 2025-05-10 PROCEDURE — 99232 SBSQ HOSP IP/OBS MODERATE 35: CPT

## 2025-05-10 RX ORDER — POLYETHYLENE GLYCOL 3350 17 G/17G
17 POWDER, FOR SOLUTION ORAL DAILY
Refills: 0 | Status: DISCONTINUED | OUTPATIENT
Start: 2025-05-10 | End: 2025-05-13

## 2025-05-10 RX ORDER — INSULIN LISPRO 100 U/ML
4 INJECTION, SOLUTION INTRAVENOUS; SUBCUTANEOUS ONCE
Refills: 0 | Status: COMPLETED | OUTPATIENT
Start: 2025-05-10 | End: 2025-05-10

## 2025-05-10 RX ORDER — SENNA 187 MG
2 TABLET ORAL AT BEDTIME
Refills: 0 | Status: DISCONTINUED | OUTPATIENT
Start: 2025-05-10 | End: 2025-05-13

## 2025-05-10 RX ORDER — IMMUNE GLOBULIN (HUMAN) 10 G/100ML
40 INJECTION INTRAVENOUS; SUBCUTANEOUS DAILY
Refills: 0 | Status: COMPLETED | OUTPATIENT
Start: 2025-05-10 | End: 2025-05-12

## 2025-05-10 RX ADMIN — Medication 1 DOSE(S): at 21:09

## 2025-05-10 RX ADMIN — INSULIN LISPRO 5: 100 INJECTION, SOLUTION INTRAVENOUS; SUBCUTANEOUS at 17:34

## 2025-05-10 RX ADMIN — Medication 1 APPLICATION(S): at 06:05

## 2025-05-10 RX ADMIN — LIDOCAINE HYDROCHLORIDE 1 PATCH: 20 JELLY TOPICAL at 16:49

## 2025-05-10 RX ADMIN — INSULIN LISPRO 2: 100 INJECTION, SOLUTION INTRAVENOUS; SUBCUTANEOUS at 08:28

## 2025-05-10 RX ADMIN — ATORVASTATIN CALCIUM 80 MILLIGRAM(S): 80 TABLET, FILM COATED ORAL at 21:08

## 2025-05-10 RX ADMIN — Medication 1 DOSE(S): at 12:39

## 2025-05-10 RX ADMIN — INSULIN LISPRO 5: 100 INJECTION, SOLUTION INTRAVENOUS; SUBCUTANEOUS at 12:05

## 2025-05-10 RX ADMIN — TIOTROPIUM BROMIDE INHALATION SPRAY 2 PUFF(S): 3.12 SPRAY, METERED RESPIRATORY (INHALATION) at 07:55

## 2025-05-10 RX ADMIN — TAMSULOSIN HYDROCHLORIDE 0.4 MILLIGRAM(S): 0.4 CAPSULE ORAL at 05:57

## 2025-05-10 RX ADMIN — TAMSULOSIN HYDROCHLORIDE 0.4 MILLIGRAM(S): 0.4 CAPSULE ORAL at 17:35

## 2025-05-10 RX ADMIN — DEXAMETHASONE 40 MILLIGRAM(S): 0.5 TABLET ORAL at 05:57

## 2025-05-10 RX ADMIN — IMMUNE GLOBULIN (HUMAN) 200 GRAM(S): 10 INJECTION INTRAVENOUS; SUBCUTANEOUS at 11:34

## 2025-05-10 RX ADMIN — AMLODIPINE BESYLATE 5 MILLIGRAM(S): 10 TABLET ORAL at 05:57

## 2025-05-10 RX ADMIN — INSULIN LISPRO 4 UNIT(S): 100 INJECTION, SOLUTION INTRAVENOUS; SUBCUTANEOUS at 17:34

## 2025-05-10 RX ADMIN — TOPIRAMATE 300 MILLIGRAM(S): 25 TABLET, FILM COATED ORAL at 21:08

## 2025-05-10 RX ADMIN — Medication 2 TABLET(S): at 21:08

## 2025-05-10 NOTE — PROGRESS NOTE ADULT - ASSESSMENT
64-year-old male history of COPD, non-Hodgkin's lymphoma in remission for years, CVA, diabetes, pacemaker placement placement for CHB presenting to ED for evaluation of low platelets on outpatient lab work yesterday. Patient being admitted for evaluation and management of severe thrombocytopenia.      # Severe Thrombocytopenia   PC on admission 15   Platelet Count: 03/21/25  156 -> 164    No prior history of thrombocytopenia   Bruising especially on his left hand/arm which he states resolves on its own and recurs   Several ecchymosis +nt on the left upper extremity.    No melena, hematochezia, hematemesis, hemoptysis, mucosal bleeding or epistaxis   No recent exposure to heparin products   Peripheral Smear: No clumping, Giant Platelets seen in 2 power fields.    Monitor Platelet count and for Bleeding.    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 ?   Last Rituxan May 2020   Followed with Dr Meredith in the past     # Hx of stroke with residual mild right sided weakness.   # Chronic mild right frontal headache   Continue with topiramate 300mg/day.   Follows w/ Neurology outpatient    On Aspirin 325 mg daily     # PPM s/p CHB   Congenital heart disease unknown by the patient and his wife, s/p open heart surgery as an infant at [facility], no records are available.   Follows w/ Dr Monique     # T2 diabetes mellitus     Home med: Synjardy 12.5-1000mg, pioglitazone 30 mg, Trulicity q1 week  ISS    # HLD   C/w atorvastatin 80 mg qhs     # HTN (hypertension)   Irbesartan 300 mg daily   Amlodipine 5 mg daily     # COPD    c/w tiotropium 18 mcg inhalation capsule: 1 cap(s) inhaled once a day   c/w breztri Aerosphere inhalation aerosol: 2 puff(s) inhaled 2 times a day     Miscellaneous:  DVT prophylaxis: Holding iso severe thrombocytopenia  Bowel  - Feeds: DASH/TLC, CC  - Prophylaxis: None  - Regimen: None  Activity: AAT  Code status: Full   Disposition: Med-Surg    Handoff: Monitor PC, f/u Hem-Onc recs for IVIG, f/u thrombocytopenia workup; tentative biopsy Monday 5/12

## 2025-05-10 NOTE — PROGRESS NOTE ADULT - SUBJECTIVE AND OBJECTIVE BOX
24H events:    Patient is a 64y old Male who presents with a chief complaint of Thrombocytopenia (10 May 2025 08:52)    Primary diagnosis of Low platelet count        Today is 3d of hospitalization. This morning patient was seen and examined at bedside, resting comfortably in bed.    No acute or major events overnight.      PAST MEDICAL & SURGICAL HISTORY  HTN (hypertension)    High cholesterol    DM (diabetes mellitus)    Chronic obstructive pulmonary disease, unspecified COPD type    Non Hodgkin's lymphoma    Pacemaker    Artificial cardiac pacemaker    S/P transesophageal echocardiogram ([facility])      SOCIAL HISTORY:  Social History:      ALLERGIES:  Bactrim (Anaphylaxis)  acyclovir (Anaphylaxis)    MEDICATIONS:  STANDING MEDICATIONS  amLODIPine   Tablet 5 milliGRAM(s) Oral daily  atorvastatin 80 milliGRAM(s) Oral at bedtime  chlorhexidine 2% Cloths 1 Application(s) Topical <User Schedule>  dexAMETHasone     Tablet 40 milliGRAM(s) Oral daily  dextrose 5%. 1000 milliLiter(s) IV Continuous <Continuous>  dextrose 5%. 1000 milliLiter(s) IV Continuous <Continuous>  dextrose 50% Injectable 25 Gram(s) IV Push once  dextrose 50% Injectable 12.5 Gram(s) IV Push once  dextrose 50% Injectable 25 Gram(s) IV Push once  fluticasone propionate/ salmeterol 250-50 MICROgram(s) Diskus 1 Dose(s) Inhalation two times a day  glucagon  Injectable 1 milliGRAM(s) IntraMuscular once  immune   globulin 10% (GAMMAGARD) IVPB 40 Gram(s) IV Intermittent daily  insulin lispro (ADMELOG) corrective regimen sliding scale   SubCutaneous three times a day before meals  lidocaine   4% Patch 1 Patch Transdermal every 24 hours  lidocaine   4% Patch 1 Patch Transdermal every 24 hours  tamsulosin 0.4 milliGRAM(s) Oral two times a day  tiotropium 2.5 MICROgram(s) Inhaler 2 Puff(s) Inhalation daily  topiramate 300 milliGRAM(s) Oral at bedtime    PRN MEDICATIONS  acetaminophen     Tablet .. 650 milliGRAM(s) Oral every 6 hours PRN  dextrose Oral Gel 15 Gram(s) Oral once PRN  melatonin 3 milliGRAM(s) Oral at bedtime PRN    VITALS:   T(F): 97.7  HR: 75  BP: 118/71  RR: 18  SpO2: 97%    PHYSICAL EXAM:  GENERAL:   ( x) NAD, lying in bed comfortably     (  ) obtunded     (  ) lethargic     (  ) somnolent      NECK:  (x) Supple     (  ) neck stiffness     (  ) nuchal rigidity     (  )  no JVD     (  ) JVD present ( -- cm)    HEART:  Rate -->     (x) normal rate     (  ) bradycardic     (  ) tachycardic  Rhythm -->     (x) regular     (  ) regularly irregular     (  ) irregularly irregular  Murmurs -->     (x) normal s1s2     (  ) systolic murmur     (  ) diastolic murmur     (  ) continuous murmur      (  ) S3 present     (  ) S4 present    LUNGS:   ( x)Unlabored respirations     (  ) tachypnea  ( x) B/L air entry     (  ) decreased breath sounds in:  (location     )    ( x) no adventitious sound     (  ) crackles     (  ) wheezing      (  ) rhonchi      (specify location:       )  (  ) chest wall tenderness (specify location:       )    ABDOMEN:   ( x) Soft     (  ) tense   |   (  ) nondistended     (  ) distended   |   (  ) +BS     (  ) hypoactive bowel sounds     (  ) hyperactive bowel sounds  ( x) nontender     (  ) RUQ tenderness     (  ) RLQ tenderness     (  ) LLQ tenderness     (  ) epigastric tenderness     (  ) diffuse tenderness  (  ) Splenomegaly      (  ) Hepatomegaly      (  ) Jaundice     (  ) ecchymosis     EXTREMITIES:  ( x) Normal     (  ) Rash     (  ) ecchymosis     (  ) varicose veins      (  ) pitting edema     (  ) non-pitting edema   (  ) ulceration     (  ) gangrene:     (location:     )    NERVOUS SYSTEM:    ( x) A&Ox3     (  ) confused     (  ) lethargic  CN II-XII:     ( x) Intact     (  ) deficits found     (Specify:     )   Upper extremities:     (  ) no sensorimotor deficits     (  ) weakness     (  ) loss of proprioception/vibration     (  ) loss of touch/temperature (specify:    )  Lower extremities:     (  ) no sensorimotor deficits     (  ) weakness     (  ) loss of proprioception/vibration     (  ) loss of touch/temperature (specify:    )    SKIN:   (  ) No rashes or lesions     (  ) maculopapular rash     (  ) pustules     (  ) vesicles     (  ) ulcer     (  ) ecchymosis     (specify location:     )      LABS:                        12.1   5.77  )-----------( 63       ( 10 May 2025 08:49 )             38.4     05-10    136  |  103  |  27[H]  ----------------------------<  215[H]  4.4   |  22  |  1.2    Ca    9.5      10 May 2025 08:49  Mg     2.4     05-10    TPro  8.2[H]  /  Alb  4.5  /  TBili  1.2  /  DBili  x   /  AST  14  /  ALT  11  /  AlkPhos  64  05-09      Urinalysis Basic - ( 10 May 2025 08:49 )    Color: x / Appearance: x / SG: x / pH: x  Gluc: 215 mg/dL / Ketone: x  / Bili: x / Urobili: x   Blood: x / Protein: x / Nitrite: x   Leuk Esterase: x / RBC: x / WBC x   Sq Epi: x / Non Sq Epi: x / Bacteria: x

## 2025-05-10 NOTE — PROGRESS NOTE ADULT - SUBJECTIVE AND OBJECTIVE BOX
LONG GEE 64y Male  MRN#: [id number]   Hospital Day: 3d    SUBJECTIVE  Patient is a 64y old Male who presents with a chief complaint of Thrombocytopenia (09 May 2025 17:09)  Currently admitted to medicine with the primary diagnosis of Low platelet count      INTERVAL HPI AND OVERNIGHT EVENTS:      REVIEW OF SYMPTOMS:      OBJECTIVE  PAST MEDICAL & SURGICAL HISTORY  HTN (hypertension)    High cholesterol    DM (diabetes mellitus)    Chronic obstructive pulmonary disease, unspecified COPD type    Non Hodgkin's lymphoma    Pacemaker    Artificial cardiac pacemaker    S/P transesophageal echocardiogram ([facility])      ALLERGIES:  Bactrim (Anaphylaxis)  acyclovir (Anaphylaxis)    MEDICATIONS:  STANDING MEDICATIONS  amLODIPine   Tablet 5 milliGRAM(s) Oral daily  atorvastatin 80 milliGRAM(s) Oral at bedtime  chlorhexidine 2% Cloths 1 Application(s) Topical <User Schedule>  dexAMETHasone     Tablet 40 milliGRAM(s) Oral daily  dextrose 5%. 1000 milliLiter(s) IV Continuous <Continuous>  dextrose 5%. 1000 milliLiter(s) IV Continuous <Continuous>  dextrose 50% Injectable 25 Gram(s) IV Push once  dextrose 50% Injectable 12.5 Gram(s) IV Push once  dextrose 50% Injectable 25 Gram(s) IV Push once  fluticasone propionate/ salmeterol 250-50 MICROgram(s) Diskus 1 Dose(s) Inhalation two times a day  glucagon  Injectable 1 milliGRAM(s) IntraMuscular once  immune   globulin 10% (GAMMAGARD) IVPB 40 Gram(s) IV Intermittent daily  insulin lispro (ADMELOG) corrective regimen sliding scale   SubCutaneous three times a day before meals  lidocaine   4% Patch 1 Patch Transdermal every 24 hours  lidocaine   4% Patch 1 Patch Transdermal every 24 hours  tamsulosin 0.4 milliGRAM(s) Oral two times a day  tiotropium 2.5 MICROgram(s) Inhaler 2 Puff(s) Inhalation daily  topiramate 300 milliGRAM(s) Oral at bedtime    PRN MEDICATIONS  acetaminophen     Tablet .. 650 milliGRAM(s) Oral every 6 hours PRN  dextrose Oral Gel 15 Gram(s) Oral once PRN  melatonin 3 milliGRAM(s) Oral at bedtime PRN      VITAL SIGNS: Last 24 Hours  T(C): 36.5 (10 May 2025 05:59), Max: 36.5 (09 May 2025 19:43)  T(F): 97.7 (10 May 2025 05:59), Max: 97.7 (09 May 2025 19:43)  HR: 75 (10 May 2025 05:59) (73 - 85)  BP: 118/71 (10 May 2025 05:59) (118/71 - 158/91)  BP(mean): 101 (09 May 2025 19:43) (101 - 114)  RR: 18 (10 May 2025 05:59) (18 - 18)  SpO2: 97% (10 May 2025 05:59) (96% - 97%)    LABS:                        12.5   4.98  )-----------( 20       ( 09 May 2025 07:30 )             39.4     05-09    140  |  106  |  21[H]  ----------------------------<  253[H]  4.2   |  21  |  1.1    Ca    9.7      09 May 2025 07:30    TPro  8.2[H]  /  Alb  4.5  /  TBili  1.2  /  DBili  x   /  AST  14  /  ALT  11  /  AlkPhos  64  05-09      Urinalysis Basic - ( 09 May 2025 07:30 )    Color: x / Appearance: x / SG: x / pH: x  Gluc: 253 mg/dL / Ketone: x  / Bili: x / Urobili: x   Blood: x / Protein: x / Nitrite: x   Leuk Esterase: x / RBC: x / WBC x   Sq Epi: x / Non Sq Epi: x / Bacteria: x      PHYSICAL EXAM:     LONG GEE 64y Male  MRN#: [id number]   Hospital Day: 3d    SUBJECTIVE  Patient is a 64y old Male who presents with a chief complaint of Thrombocytopenia (09 May 2025 17:09)  Currently admitted to medicine with the primary diagnosis of Low platelet count      INTERVAL HPI AND OVERNIGHT EVENTS:  Looks comfortable in his bed    REVIEW OF SYMPTOMS:  Denies any bleeding    OBJECTIVE  PAST MEDICAL & SURGICAL HISTORY  HTN (hypertension)    High cholesterol    DM (diabetes mellitus)    Chronic obstructive pulmonary disease, unspecified COPD type    Non Hodgkin's lymphoma    Pacemaker    Artificial cardiac pacemaker    S/P transesophageal echocardiogram ([facility])      ALLERGIES:  Bactrim (Anaphylaxis)  acyclovir (Anaphylaxis)    MEDICATIONS:  STANDING MEDICATIONS  amLODIPine   Tablet 5 milliGRAM(s) Oral daily  atorvastatin 80 milliGRAM(s) Oral at bedtime  chlorhexidine 2% Cloths 1 Application(s) Topical <User Schedule>  dexAMETHasone     Tablet 40 milliGRAM(s) Oral daily  dextrose 5%. 1000 milliLiter(s) IV Continuous <Continuous>  dextrose 5%. 1000 milliLiter(s) IV Continuous <Continuous>  dextrose 50% Injectable 25 Gram(s) IV Push once  dextrose 50% Injectable 12.5 Gram(s) IV Push once  dextrose 50% Injectable 25 Gram(s) IV Push once  fluticasone propionate/ salmeterol 250-50 MICROgram(s) Diskus 1 Dose(s) Inhalation two times a day  glucagon  Injectable 1 milliGRAM(s) IntraMuscular once  immune   globulin 10% (GAMMAGARD) IVPB 40 Gram(s) IV Intermittent daily  insulin lispro (ADMELOG) corrective regimen sliding scale   SubCutaneous three times a day before meals  lidocaine   4% Patch 1 Patch Transdermal every 24 hours  lidocaine   4% Patch 1 Patch Transdermal every 24 hours  tamsulosin 0.4 milliGRAM(s) Oral two times a day  tiotropium 2.5 MICROgram(s) Inhaler 2 Puff(s) Inhalation daily  topiramate 300 milliGRAM(s) Oral at bedtime    PRN MEDICATIONS  acetaminophen     Tablet .. 650 milliGRAM(s) Oral every 6 hours PRN  dextrose Oral Gel 15 Gram(s) Oral once PRN  melatonin 3 milliGRAM(s) Oral at bedtime PRN      VITAL SIGNS: Last 24 Hours  T(C): 36.5 (10 May 2025 05:59), Max: 36.5 (09 May 2025 19:43)  T(F): 97.7 (10 May 2025 05:59), Max: 97.7 (09 May 2025 19:43)  HR: 75 (10 May 2025 05:59) (73 - 85)  BP: 118/71 (10 May 2025 05:59) (118/71 - 158/91)  BP(mean): 101 (09 May 2025 19:43) (101 - 114)  RR: 18 (10 May 2025 05:59) (18 - 18)  SpO2: 97% (10 May 2025 05:59) (96% - 97%)    LABS:                        12.5   4.98  )-----------( 20       ( 09 May 2025 07:30 )             39.4     05-09    140  |  106  |  21[H]  ----------------------------<  253[H]  4.2   |  21  |  1.1    Ca    9.7      09 May 2025 07:30    TPro  8.2[H]  /  Alb  4.5  /  TBili  1.2  /  DBili  x   /  AST  14  /  ALT  11  /  AlkPhos  64  05-09      Urinalysis Basic - ( 09 May 2025 07:30 )    Color: x / Appearance: x / SG: x / pH: x  Gluc: 253 mg/dL / Ketone: x  / Bili: x / Urobili: x   Blood: x / Protein: x / Nitrite: x   Leuk Esterase: x / RBC: x / WBC x   Sq Epi: x / Non Sq Epi: x / Bacteria: x      PHYSICAL EXAM:  No acute distress  Chest: B/l Clear  Abdomen: Soft non tender  Skin: Small bruise on upper extremity  Extremity: No edema

## 2025-05-10 NOTE — PROGRESS NOTE ADULT - ASSESSMENT
65 y/o M PMHx non-Hodgkin's lymphoma in remission for years, HERSON presented to ED for evaluation of low platelets on outpatient lab work requested by PCP. He reports having fatigue and generalized weakness over the past 2 months along with chills, weight loss 20 lbs over few months.    # New onset thrombocytopenia, concern for ITP  # H/o Kimberly marginal zone lymphoma  He was diagnosed with kimberly marginal zone lymphoma (cervical node biopsy) in 4.2018 he was treated with Bendamustine/Rituximab x3 cycles at the time followed by maintenance Rituximab for 2 years, last Rituximab in May 2020.   His last PET scan done 4.2023 showed no evidence of disease, he last followed up with his oncologist in 9.2023.  He is now presenting with generalized weakness, chills, weight loss, noted to have new onset thrombocytopenia with plt count 15 (normal in 3.2025 164) that did not respond to platelet transfusion. Noted also anemia and leucopenia (new since 3.2025). Coomb's negative, LDH normal 178, retic low   No evidence of active bleeding, noted mild ecchymosis on upper and lower extremities.  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  Peripheral smear reviewed notable for giant platelets, few promyelocytes       Plan:  Follow up peripheral flow cytometry (sent 5.8.25)  Continue dexamethasone 40 mg PO x 4 days (start date 5.8.25),  started IVIG 500 mg/kg daily x 4 days   Follow up SPEP and quantitative immunoglobulin levels  Follow up haptoglobin  Discussed with patient if no improvement in his counts, we will consider a bone marrow biopsy   Please obtain CT C/A/P with IV contrast    65 y/o M PMHx non-Hodgkin's lymphoma in remission for years, HERSON presented to ED for evaluation of low platelets on outpatient lab work requested by PCP. He reports having fatigue and generalized weakness over the past 2 months along with chills, weight loss 20 lbs over few months.    # New onset thrombocytopenia, concern for ITP  # H/o Kimberly marginal zone lymphoma  He was diagnosed with kimberly marginal zone lymphoma (cervical node biopsy) in 4.2018 he was treated with Bendamustine/Rituximab x3 cycles at the time followed by maintenance Rituximab for 2 years, last Rituximab in May 2020.   His last PET scan done 4.2023 showed no evidence of disease, he last followed up with his oncologist in 9.2023.  He is now presenting with generalized weakness, chills, weight loss, noted to have new onset thrombocytopenia with plt count 15 (normal in 3.2025 164) that did not respond to platelet transfusion. Noted also anemia and leucopenia (new since 3.2025). Coomb's negative, LDH normal 178, retic low   No evidence of active bleeding, noted mild ecchymosis on upper and lower extremities.  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  Peripheral smear reviewed notable for giant platelets, few promyelocytes       Plan:  Follow up peripheral flow cytometry (sent 5.8.25)  Continue dexamethasone 40 mg PO x 4 days (start date 5.8.25),  started IVIG 500 mg/kg daily x 4 days   Follow up SPEP and quantitative immunoglobulin levels  Follow up haptoglobin  Follow up CT C/A/P with IV contrast   Platelet improving today  We recommended bone marrow biopsy, he wants to do it with IR. Please consult IR for bone marrow biopsy

## 2025-05-10 NOTE — PROGRESS NOTE ADULT - TIME BILLING
charting, resident teaching rounds and interdisciplinary planning.
charting, resident teaching rounds, and interdisciplinary planning.

## 2025-05-11 LAB
ANION GAP SERPL CALC-SCNC: 11 MMOL/L — SIGNIFICANT CHANGE UP (ref 7–14)
BASOPHILS # BLD AUTO: 0.01 K/UL — SIGNIFICANT CHANGE UP (ref 0–0.2)
BASOPHILS NFR BLD AUTO: 0.1 % — SIGNIFICANT CHANGE UP (ref 0–1)
BUN SERPL-MCNC: 34 MG/DL — HIGH (ref 10–20)
CALCIUM SERPL-MCNC: 9.6 MG/DL — SIGNIFICANT CHANGE UP (ref 8.4–10.5)
CHLORIDE SERPL-SCNC: 106 MMOL/L — SIGNIFICANT CHANGE UP (ref 98–110)
CO2 SERPL-SCNC: 20 MMOL/L — SIGNIFICANT CHANGE UP (ref 17–32)
CREAT SERPL-MCNC: 1.2 MG/DL — SIGNIFICANT CHANGE UP (ref 0.7–1.5)
EGFR: 68 ML/MIN/1.73M2 — SIGNIFICANT CHANGE UP
EGFR: 68 ML/MIN/1.73M2 — SIGNIFICANT CHANGE UP
EOSINOPHIL # BLD AUTO: 0 K/UL — SIGNIFICANT CHANGE UP (ref 0–0.7)
EOSINOPHIL NFR BLD AUTO: 0 % — SIGNIFICANT CHANGE UP (ref 0–8)
GLUCOSE BLDC GLUCOMTR-MCNC: 224 MG/DL — HIGH (ref 70–99)
GLUCOSE BLDC GLUCOMTR-MCNC: 231 MG/DL — HIGH (ref 70–99)
GLUCOSE BLDC GLUCOMTR-MCNC: 405 MG/DL — HIGH (ref 70–99)
GLUCOSE BLDC GLUCOMTR-MCNC: 442 MG/DL — HIGH (ref 70–99)
GLUCOSE SERPL-MCNC: 208 MG/DL — HIGH (ref 70–99)
HCT VFR BLD CALC: 37.5 % — LOW (ref 42–52)
HGB BLD-MCNC: 11.8 G/DL — LOW (ref 14–18)
IMM GRANULOCYTES NFR BLD AUTO: 0.4 % — HIGH (ref 0.1–0.3)
LYMPHOCYTES # BLD AUTO: 1 K/UL — LOW (ref 1.2–3.4)
LYMPHOCYTES # BLD AUTO: 14.7 % — LOW (ref 20.5–51.1)
MAGNESIUM SERPL-MCNC: 2.3 MG/DL — SIGNIFICANT CHANGE UP (ref 1.8–2.4)
MCHC RBC-ENTMCNC: 26.8 PG — LOW (ref 27–31)
MCHC RBC-ENTMCNC: 31.5 G/DL — LOW (ref 32–37)
MCV RBC AUTO: 85 FL — SIGNIFICANT CHANGE UP (ref 80–94)
MONOCYTES # BLD AUTO: 0.62 K/UL — HIGH (ref 0.1–0.6)
MONOCYTES NFR BLD AUTO: 9.1 % — SIGNIFICANT CHANGE UP (ref 1.7–9.3)
NEUTROPHILS # BLD AUTO: 5.15 K/UL — SIGNIFICANT CHANGE UP (ref 1.4–6.5)
NEUTROPHILS NFR BLD AUTO: 75.7 % — HIGH (ref 42.2–75.2)
NRBC BLD AUTO-RTO: 0 /100 WBCS — SIGNIFICANT CHANGE UP (ref 0–0)
PLATELET # BLD AUTO: 114 K/UL — LOW (ref 130–400)
PMV BLD: 12.7 FL — HIGH (ref 7.4–10.4)
POTASSIUM SERPL-MCNC: 3.8 MMOL/L — SIGNIFICANT CHANGE UP (ref 3.5–5)
POTASSIUM SERPL-SCNC: 3.8 MMOL/L — SIGNIFICANT CHANGE UP (ref 3.5–5)
RBC # BLD: 4.41 M/UL — LOW (ref 4.7–6.1)
RBC # FLD: 13.7 % — SIGNIFICANT CHANGE UP (ref 11.5–14.5)
SODIUM SERPL-SCNC: 137 MMOL/L — SIGNIFICANT CHANGE UP (ref 135–146)
WBC # BLD: 6.81 K/UL — SIGNIFICANT CHANGE UP (ref 4.8–10.8)
WBC # FLD AUTO: 6.81 K/UL — SIGNIFICANT CHANGE UP (ref 4.8–10.8)

## 2025-05-11 PROCEDURE — 99232 SBSQ HOSP IP/OBS MODERATE 35: CPT | Mod: GC

## 2025-05-11 PROCEDURE — 99232 SBSQ HOSP IP/OBS MODERATE 35: CPT

## 2025-05-11 RX ORDER — INSULIN LISPRO 100 U/ML
INJECTION, SOLUTION INTRAVENOUS; SUBCUTANEOUS
Refills: 0 | Status: DISCONTINUED | OUTPATIENT
Start: 2025-05-11 | End: 2025-05-13

## 2025-05-11 RX ADMIN — Medication 1 DOSE(S): at 12:42

## 2025-05-11 RX ADMIN — INSULIN LISPRO 12: 100 INJECTION, SOLUTION INTRAVENOUS; SUBCUTANEOUS at 17:41

## 2025-05-11 RX ADMIN — TOPIRAMATE 300 MILLIGRAM(S): 25 TABLET, FILM COATED ORAL at 21:43

## 2025-05-11 RX ADMIN — DEXAMETHASONE 40 MILLIGRAM(S): 0.5 TABLET ORAL at 05:40

## 2025-05-11 RX ADMIN — INSULIN LISPRO 2: 100 INJECTION, SOLUTION INTRAVENOUS; SUBCUTANEOUS at 08:21

## 2025-05-11 RX ADMIN — TIOTROPIUM BROMIDE INHALATION SPRAY 2 PUFF(S): 3.12 SPRAY, METERED RESPIRATORY (INHALATION) at 07:52

## 2025-05-11 RX ADMIN — Medication 1 APPLICATION(S): at 05:44

## 2025-05-11 RX ADMIN — TAMSULOSIN HYDROCHLORIDE 0.4 MILLIGRAM(S): 0.4 CAPSULE ORAL at 17:41

## 2025-05-11 RX ADMIN — Medication 1 DOSE(S): at 20:45

## 2025-05-11 RX ADMIN — INSULIN LISPRO 6: 100 INJECTION, SOLUTION INTRAVENOUS; SUBCUTANEOUS at 12:42

## 2025-05-11 RX ADMIN — TAMSULOSIN HYDROCHLORIDE 0.4 MILLIGRAM(S): 0.4 CAPSULE ORAL at 05:41

## 2025-05-11 RX ADMIN — POLYETHYLENE GLYCOL 3350 17 GRAM(S): 17 POWDER, FOR SOLUTION ORAL at 12:43

## 2025-05-11 RX ADMIN — ATORVASTATIN CALCIUM 80 MILLIGRAM(S): 80 TABLET, FILM COATED ORAL at 21:43

## 2025-05-11 RX ADMIN — AMLODIPINE BESYLATE 5 MILLIGRAM(S): 10 TABLET ORAL at 05:41

## 2025-05-11 RX ADMIN — IMMUNE GLOBULIN (HUMAN) 200 GRAM(S): 10 INJECTION INTRAVENOUS; SUBCUTANEOUS at 12:43

## 2025-05-11 RX ADMIN — Medication 2 TABLET(S): at 21:43

## 2025-05-11 NOTE — PROGRESS NOTE ADULT - ASSESSMENT
64-year-old male history of COPD, non-Hodgkin's lymphoma in remission for years, CVA, diabetes, pacemaker placement placement for CHB presenting to ED for evaluation of low platelets on outpatient lab work yesterday. Patient being admitted for evaluation and management of severe thrombocytopenia.      # Severe Thrombocytopenia   - Plts on admission 15 ->20->63--> 114  - haptoglobin 95  -   - Platelet Count: 03/21/25  156 -> 164    - No prior history of thrombocytopenia   - Bruising especially on his left hand/arm which he states resolves on its own and recurs   - Several ecchymosis +nt on the left upper extremity.    - No melena, hematochezia, hematemesis, hemoptysis, mucosal bleeding or epistaxis   - No recent exposure to heparin products   - Peripheral Smear: No clumping, Giant Platelets seen in 2 power fields.    - Monitor Platelet count and for Bleeding.    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 ?   Last Rituxan May 2020   Followed with Dr Meredith in the past     # Hx of stroke with residual mild right sided weakness.   # Chronic mild right frontal headache   - Continue with topiramate 300mg/day.   - Follows w/ Neurology outpatient    - On Aspirin 325 mg daily     # PPM s/p CHB   - Congenital heart disease unknown by the patient and his wife, s/p open heart surgery as an infant at [facility], no records are available.   - Follows w/ Dr Monique     # T2 diabetes mellitus     - Home med: Synjardy 12.5-1000mg, pioglitazone 30 mg, Trulicity q1 week  - ISS    # HLD   - C/w atorvastatin 80 mg qhs     # HTN (hypertension)   - Irbesartan 300 mg daily   - Amlodipine 5 mg daily     # COPD    - c/w tiotropium 18 mcg inhalation capsule: 1 cap(s) inhaled once a day   - c/w breztri Aerosphere inhalation aerosol: 2 puff(s) inhaled 2 times a day       DVT prophylaxis: Holding iso severe thrombocytopenia      #Progress Note Handoff  Pending (specify): bone marrow bx pending, IR consult placed       Total time spent to complete patient's bedside assessment, review medical chart, discuss medical plan of care with covering medical team was more than 35 minutes  with >50% of time spent face to face with patient, discussion with patient/family and/or coordination of care. My note supersedes them medical resident note in case of discrepancy.      Tamie Bravo, DO

## 2025-05-11 NOTE — PROGRESS NOTE ADULT - ASSESSMENT
63 y/o M PMHx non-Hodgkin's lymphoma in remission for years, HERSON presented to ED for evaluation of low platelets on outpatient lab work requested by PCP. He reports having fatigue and generalized weakness over the past 2 months along with chills, weight loss 20 lbs over few months.    # New onset thrombocytopenia, concern for ITP  # H/o Kimberly marginal zone lymphoma  He was diagnosed with kimberly marginal zone lymphoma (cervical node biopsy) in 4.2018 he was treated with Bendamustine/Rituximab x3 cycles at the time followed by maintenance Rituximab for 2 years, last Rituximab in May 2020.   His last PET scan done 4.2023 showed no evidence of disease, he last followed up with his oncologist in 9.2023.  He is now presenting with generalized weakness, chills, weight loss, noted to have new onset thrombocytopenia with plt count 15 (normal in 3.2025 164) that did not respond to platelet transfusion. Noted also anemia and leucopenia (new since 3.2025). Coomb's negative, LDH normal 178, retic low   No evidence of active bleeding, noted mild ecchymosis on upper and lower extremities.  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  Peripheral smear reviewed notable for giant platelets, few promyelocytes       Plan:  Follow up peripheral flow cytometry (sent 5.8.25)  Continue dexamethasone 40 mg PO x 4 days (start date 5.8.25),  started IVIG 500 mg/kg daily x 4 days   Follow up SPEP and quantitative immunoglobulin levels  CT C/A/P with IV contrast : no acute pathology/ lymphadenopathy  Platelet improving today  Patient prefers IR to do bone marrow biopsy, consult IR for bone marrow biopsy.

## 2025-05-11 NOTE — PROGRESS NOTE ADULT - ATTENDING COMMENTS
***My note supersedes any discrepancies that may be above in the resident's note***    64-year-old male history of COPD, non-Hodgkin's lymphoma in remission for years, CVA, diabetes, pacemaker placement placement for CHB presenting to ED for evaluation of low platelets on outpatient lab work yesterday. Patient being admitted for evaluation and management of severe thrombocytopenia.      # Severe Thrombocytopenia   - Plts on admission 15 ->20->63  - haptoglobin 95  -   - Platelet Count: 03/21/25  156 -> 164    - No prior history of thrombocytopenia   - Bruising especially on his left hand/arm which he states resolves on its own and recurs   - Several ecchymosis +nt on the left upper extremity.    - No melena, hematochezia, hematemesis, hemoptysis, mucosal bleeding or epistaxis   - No recent exposure to heparin products   - Peripheral Smear: No clumping, Giant Platelets seen in 2 power fields.    - Monitor Platelet count and for Bleeding.    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 ?   Last Rituxan May 2020   Followed with Dr Meredith in the past     # Hx of stroke with residual mild right sided weakness.   # Chronic mild right frontal headache   - Continue with topiramate 300mg/day.   - Follows w/ Neurology outpatient    - On Aspirin 325 mg daily     # PPM s/p CHB   - Congenital heart disease unknown by the patient and his wife, s/p open heart surgery as an infant at [facility], no records are available.   - Follows w/ Dr Monique     # T2 diabetes mellitus     - Home med: Synjardy 12.5-1000mg, pioglitazone 30 mg, Trulicity q1 week  - ISS    # HLD   - C/w atorvastatin 80 mg qhs     # HTN (hypertension)   - Irbesartan 300 mg daily   - Amlodipine 5 mg daily     # COPD    - c/w tiotropium 18 mcg inhalation capsule: 1 cap(s) inhaled once a day   - c/w breztri Aerosphere inhalation aerosol: 2 puff(s) inhaled 2 times a day     Miscellaneous:  DVT prophylaxis: Holding iso severe thrombocytopenia  Bowel  - Feeds: DASH/TLC, CC  - Prophylaxis: None  - Regimen: None  Activity: AAT  Code status: Full   Disposition: Med-Surg    #Progress Note Handoff  Pending (specify):  thrombocytopenia work up  Disposition: Unknown at this time________ .
Patient was seen and examined with fellow during inpatient hematology oncology rounds covering service.  Agree as per note.    Started on Dexamethasone and IVIG.  Improving thrombocytopenia.  Continue to monitor labs.  Images. Bone marrow biopsy tentatively on 5/12/2025.  Please provide hematology clinic appointment once stable for discharge.
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   Dr Sadler suggested a BMBx, and he wants it with sedation. He never had a BMBx and I explained the procedure to him and how we perform it routinely in the outpatient setting. The response to IVIG makes the marrow less significant. I agree with the assessment and plan as set forth in the fellow's note from Dr Janis Deleon MD  Hematology Attending
Patient was seen and examined with fellow during inpatient hematology oncology rounds covering service.  Agree as per note.      Patient with known history of nisreen marginal zone lymphoma treated with chemotherapy followed by Rituxan maintenance completed in 2020.  Presented with severe thrombocytopenia of unclear etiology. Patient was started on Dexamethasone by inpatient hematology oncology team for suspected ITP without significant improvement in thrombocytopenia.  Remains asymptomatic.  Denied having significant bleeding, visual changes, headaches, bruising.    Case was discussed with patient and his wife extensively today.  Continue Dexamethasone per previous recommendations. Initiate IVIG daily x 4 days. Bone marrow biopsy tentatively on 5/12/2025.  CT C/A/P with contrast.  Hematology will follow.
***My note supersedes any discrepancies that may be above in the resident's note***    64-year-old male history of COPD, non-Hodgkin's lymphoma in remission for years, CVA, diabetes, pacemaker placement placement for CHB presenting to ED for evaluation of low platelets on outpatient lab work yesterday. Patient being admitted for evaluation and management of severe thrombocytopenia.      # Severe Thrombocytopenia   - Plts on admission 15   - Platelet Count: 03/21/25  156 -> 164    - No prior history of thrombocytopenia   - Bruising especially on his left hand/arm which he states resolves on its own and recurs   - Several ecchymosis +nt on the left upper extremity.    - No melena, hematochezia, hematemesis, hemoptysis, mucosal bleeding or epistaxis   - No recent exposure to heparin products   - Peripheral Smear: No clumping, Giant Platelets seen in 2 power fields.    - Monitor Platelet count and for Bleeding.    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 ?   Last Rituxan May 2020   Followed with Dr Meredith in the past     # Hx of stroke with residual mild right sided weakness.   # Chronic mild right frontal headache   - Continue with topiramate 300mg/day.   - Follows w/ Neurology outpatient    - On Aspirin 325 mg daily     # PPM s/p CHB   - Congenital heart disease unknown by the patient and his wife, s/p open heart surgery as an infant at [facility], no records are available.   - Follows w/ Dr Monique     # T2 diabetes mellitus     - Home med: Synjardy 12.5-1000mg, pioglitazone 30 mg, Trulicity q1 week  - ISS    # HLD   - C/w atorvastatin 80 mg qhs     # HTN (hypertension)   - Irbesartan 300 mg daily   - Amlodipine 5 mg daily     # COPD    - c/w tiotropium 18 mcg inhalation capsule: 1 cap(s) inhaled once a day   - c/w breztri Aerosphere inhalation aerosol: 2 puff(s) inhaled 2 times a day     Miscellaneous:  DVT prophylaxis: Holding iso severe thrombocytopenia  Bowel  - Feeds: DASH/TLC, CC  - Prophylaxis: None  - Regimen: None  Activity: AAT  Code status: Full   Disposition: Med-Surg    #Progress Note Handoff  Pending (specify):  thrombocytopenia work up  Disposition: Unknown at this time________

## 2025-05-11 NOTE — PROGRESS NOTE ADULT - SUBJECTIVE AND OBJECTIVE BOX
GERARDLONG  64y, Male  Allergy: Bactrim (Anaphylaxis)  acyclovir (Anaphylaxis)    Hospital Day: 4d    Patient seen and examined earlier today. Feeling well no complaints, platelet count improving, pending IR for bone marrow bx .     PMH/PSH:  PAST MEDICAL & SURGICAL HISTORY:  HTN (hypertension)      High cholesterol      DM (diabetes mellitus)      Chronic obstructive pulmonary disease, unspecified COPD type      Non Hodgkin's lymphoma      Pacemaker      Artificial cardiac pacemaker      S/P transesophageal echocardiogram (ANITA)          LAST 24-Hr EVENTS:    VITALS:  T(F): 97.6 (05-11-25 @ 05:52), Max: 97.9 (05-10-25 @ 19:22)  HR: 81 (05-11-25 @ 05:52)  BP: 136/82 (05-11-25 @ 06:31) (114/68 - 136/82)  RR: 18 (05-11-25 @ 05:52)  SpO2: --        TESTS & MEASUREMENTS:  Weight (Kg):   BMI (kg/m2): 25.8 (05-07)                          11.8   6.81  )-----------( 114      ( 11 May 2025 06:24 )             37.5       05-11    137  |  106  |  34[H]  ----------------------------<  208[H]  3.8   |  20  |  1.2    Ca    9.6      11 May 2025 06:24  Mg     2.3     05-11              Urinalysis Basic - ( 11 May 2025 06:24 )    Color: x / Appearance: x / SG: x / pH: x  Gluc: 208 mg/dL / Ketone: x  / Bili: x / Urobili: x   Blood: x / Protein: x / Nitrite: x   Leuk Esterase: x / RBC: x / WBC x   Sq Epi: x / Non Sq Epi: x / Bacteria: x                  RADIOLOGY, ECG, & ADDITIONAL TESTS:      RECENT DIAGNOSTIC ORDERS:  Immunofixation, Serum: 07:30 (05-11-25 @ 11:34)      MEDICATIONS:  MEDICATIONS  (STANDING):  amLODIPine   Tablet 5 milliGRAM(s) Oral daily  atorvastatin 80 milliGRAM(s) Oral at bedtime  chlorhexidine 2% Cloths 1 Application(s) Topical <User Schedule>  dexAMETHasone     Tablet 40 milliGRAM(s) Oral daily  dextrose 5%. 1000 milliLiter(s) (50 mL/Hr) IV Continuous <Continuous>  dextrose 5%. 1000 milliLiter(s) (100 mL/Hr) IV Continuous <Continuous>  dextrose 50% Injectable 25 Gram(s) IV Push once  dextrose 50% Injectable 12.5 Gram(s) IV Push once  dextrose 50% Injectable 25 Gram(s) IV Push once  fluticasone propionate/ salmeterol 250-50 MICROgram(s) Diskus 1 Dose(s) Inhalation two times a day  glucagon  Injectable 1 milliGRAM(s) IntraMuscular once  immune   globulin 10% (GAMMAGARD) IVPB 40 Gram(s) IV Intermittent daily  insulin lispro (ADMELOG) corrective regimen sliding scale   SubCutaneous three times a day before meals  lidocaine   4% Patch 1 Patch Transdermal every 24 hours  lidocaine   4% Patch 1 Patch Transdermal every 24 hours  polyethylene glycol 3350 17 Gram(s) Oral daily  senna 2 Tablet(s) Oral at bedtime  tamsulosin 0.4 milliGRAM(s) Oral two times a day  tiotropium 2.5 MICROgram(s) Inhaler 2 Puff(s) Inhalation daily  topiramate 300 milliGRAM(s) Oral at bedtime    MEDICATIONS  (PRN):  acetaminophen     Tablet .. 650 milliGRAM(s) Oral every 6 hours PRN Mild Pain (1 - 3)  dextrose Oral Gel 15 Gram(s) Oral once PRN Blood Glucose LESS THAN 70 milliGRAM(s)/deciliter  melatonin 3 milliGRAM(s) Oral at bedtime PRN Insomnia      HOME MEDICATIONS:  Breztri Aerosphere inhalation aerosol (05-08)  irbesartan 300 mg oral tablet (05-08)  pioglitazone 30 mg oral tablet (05-08)  Synjardy 12.5 mg-1000 mg oral tablet (05-08)  topiramate 100 mg oral tablet (05-08)  Trulicity Pen 3 mg/0.5 mL subcutaneous solution (05-08)      PHYSICAL EXAM:  GENERAL: A&O x3,  in NAD  HNENT: EOMI, PERRLA    NECK: No LAD/swelling  CHEST/LUNG:  CTAB no wheezes/rales/ronchi  HEART: RRR, No murmurs  ABDOMEN: Soft, NT, ND,  Bowel sounds present  EXTREMITIES:  Warm well perfused, no edema

## 2025-05-11 NOTE — PROGRESS NOTE ADULT - SUBJECTIVE AND OBJECTIVE BOX
LONG GEE 64y Male  MRN#: [id number]   Hospital Day: 4d    SUBJECTIVE  Patient is a 64y old Male who presents with a chief complaint of Thrombocytopenia (10 May 2025 10:07)  Currently admitted to medicine with the primary diagnosis of Low platelet count      INTERVAL HPI AND OVERNIGHT EVENTS:  Patient was examined and seen at bedside.    REVIEW OF SYMPTOMS:  No bleeding, bruise    OBJECTIVE  PAST MEDICAL & SURGICAL HISTORY  HTN (hypertension)    High cholesterol    DM (diabetes mellitus)    Chronic obstructive pulmonary disease, unspecified COPD type    Non Hodgkin's lymphoma    Pacemaker    Artificial cardiac pacemaker    S/P transesophageal echocardiogram (ANITA)      ALLERGIES:  Bactrim (Anaphylaxis)  acyclovir (Anaphylaxis)    MEDICATIONS:  STANDING MEDICATIONS  amLODIPine   Tablet 5 milliGRAM(s) Oral daily  atorvastatin 80 milliGRAM(s) Oral at bedtime  chlorhexidine 2% Cloths 1 Application(s) Topical <User Schedule>  dexAMETHasone     Tablet 40 milliGRAM(s) Oral daily  dextrose 5%. 1000 milliLiter(s) IV Continuous <Continuous>  dextrose 5%. 1000 milliLiter(s) IV Continuous <Continuous>  dextrose 50% Injectable 25 Gram(s) IV Push once  dextrose 50% Injectable 12.5 Gram(s) IV Push once  dextrose 50% Injectable 25 Gram(s) IV Push once  fluticasone propionate/ salmeterol 250-50 MICROgram(s) Diskus 1 Dose(s) Inhalation two times a day  glucagon  Injectable 1 milliGRAM(s) IntraMuscular once  immune   globulin 10% (GAMMAGARD) IVPB 40 Gram(s) IV Intermittent daily  insulin lispro (ADMELOG) corrective regimen sliding scale   SubCutaneous three times a day before meals  lidocaine   4% Patch 1 Patch Transdermal every 24 hours  lidocaine   4% Patch 1 Patch Transdermal every 24 hours  polyethylene glycol 3350 17 Gram(s) Oral daily  senna 2 Tablet(s) Oral at bedtime  tamsulosin 0.4 milliGRAM(s) Oral two times a day  tiotropium 2.5 MICROgram(s) Inhaler 2 Puff(s) Inhalation daily  topiramate 300 milliGRAM(s) Oral at bedtime    PRN MEDICATIONS  acetaminophen     Tablet .. 650 milliGRAM(s) Oral every 6 hours PRN  dextrose Oral Gel 15 Gram(s) Oral once PRN  melatonin 3 milliGRAM(s) Oral at bedtime PRN      VITAL SIGNS: Last 24 Hours  T(C): 36.4 (11 May 2025 05:52), Max: 36.6 (10 May 2025 19:22)  T(F): 97.6 (11 May 2025 05:52), Max: 97.9 (10 May 2025 19:22)  HR: 81 (11 May 2025 05:52) (81 - 91)  BP: 136/82 (11 May 2025 06:31) (114/68 - 136/82)  BP(mean): --  RR: 18 (11 May 2025 05:52) (18 - 18)  SpO2: --    LABS:                        11.8   6.81  )-----------( 114      ( 11 May 2025 06:24 )             37.5     05-11    137  |  106  |  34[H]  ----------------------------<  208[H]  3.8   |  20  |  1.2    Ca    9.6      11 May 2025 06:24  Mg     2.3     05-11        Urinalysis Basic - ( 11 May 2025 06:24 )    Color: x / Appearance: x / SG: x / pH: x  Gluc: 208 mg/dL / Ketone: x  / Bili: x / Urobili: x   Blood: x / Protein: x / Nitrite: x   Leuk Esterase: x / RBC: x / WBC x   Sq Epi: x / Non Sq Epi: x / Bacteria: x      PHYSICAL EXAM:  CONSTITUTIONAL: No acute distress, well-developed, well-groomed, AAOx3  PULMONARY: Clear to auscultation bilaterally; no wheezes, rales, or rhonchi  CARDIOVASCULAR: Regular rate and rhythm; no murmurs, rubs, or gallops  GASTROINTESTINAL: Soft, non-tender, non-distended; bowel sounds present  MUSCULOSKELETAL: 2+ peripheral pulses; no clubbing, no cyanosis, no edema  NEUROLOGY: non-focal  SKIN: No rashes or lesions; warm and dry

## 2025-05-12 LAB
ALBUMIN SERPL ELPH-MCNC: 3.7 G/DL — SIGNIFICANT CHANGE UP (ref 3.5–5.2)
ALP SERPL-CCNC: 62 U/L — SIGNIFICANT CHANGE UP (ref 30–115)
ALT FLD-CCNC: 15 U/L — SIGNIFICANT CHANGE UP (ref 0–41)
ANION GAP SERPL CALC-SCNC: 11 MMOL/L — SIGNIFICANT CHANGE UP (ref 7–14)
ANION GAP SERPL CALC-SCNC: 9 MMOL/L — SIGNIFICANT CHANGE UP (ref 7–14)
APTT BLD: 22.8 SEC — CRITICAL LOW (ref 27–39.2)
AST SERPL-CCNC: 12 U/L — SIGNIFICANT CHANGE UP (ref 0–41)
BASOPHILS # BLD AUTO: 0.01 K/UL — SIGNIFICANT CHANGE UP (ref 0–0.2)
BASOPHILS # BLD AUTO: 0.01 K/UL — SIGNIFICANT CHANGE UP (ref 0–0.2)
BASOPHILS NFR BLD AUTO: 0.2 % — SIGNIFICANT CHANGE UP (ref 0–1)
BASOPHILS NFR BLD AUTO: 0.2 % — SIGNIFICANT CHANGE UP (ref 0–1)
BILIRUB SERPL-MCNC: 1 MG/DL — SIGNIFICANT CHANGE UP (ref 0.2–1.2)
BUN SERPL-MCNC: 33 MG/DL — HIGH (ref 10–20)
BUN SERPL-MCNC: 40 MG/DL — HIGH (ref 10–20)
CALCIUM SERPL-MCNC: 9.1 MG/DL — SIGNIFICANT CHANGE UP (ref 8.4–10.5)
CALCIUM SERPL-MCNC: 9.1 MG/DL — SIGNIFICANT CHANGE UP (ref 8.4–10.5)
CHLORIDE SERPL-SCNC: 100 MMOL/L — SIGNIFICANT CHANGE UP (ref 98–110)
CHLORIDE SERPL-SCNC: 104 MMOL/L — SIGNIFICANT CHANGE UP (ref 98–110)
CO2 SERPL-SCNC: 18 MMOL/L — SIGNIFICANT CHANGE UP (ref 17–32)
CO2 SERPL-SCNC: 19 MMOL/L — SIGNIFICANT CHANGE UP (ref 17–32)
CREAT SERPL-MCNC: 1.1 MG/DL — SIGNIFICANT CHANGE UP (ref 0.7–1.5)
CREAT SERPL-MCNC: 1.2 MG/DL — SIGNIFICANT CHANGE UP (ref 0.7–1.5)
EGFR: 68 ML/MIN/1.73M2 — SIGNIFICANT CHANGE UP
EGFR: 68 ML/MIN/1.73M2 — SIGNIFICANT CHANGE UP
EGFR: 75 ML/MIN/1.73M2 — SIGNIFICANT CHANGE UP
EGFR: 75 ML/MIN/1.73M2 — SIGNIFICANT CHANGE UP
EOSINOPHIL # BLD AUTO: 0 K/UL — SIGNIFICANT CHANGE UP (ref 0–0.7)
EOSINOPHIL # BLD AUTO: 0.01 K/UL — SIGNIFICANT CHANGE UP (ref 0–0.7)
EOSINOPHIL NFR BLD AUTO: 0 % — SIGNIFICANT CHANGE UP (ref 0–8)
EOSINOPHIL NFR BLD AUTO: 0.2 % — SIGNIFICANT CHANGE UP (ref 0–8)
GLUCOSE BLDC GLUCOMTR-MCNC: 224 MG/DL — HIGH (ref 70–99)
GLUCOSE BLDC GLUCOMTR-MCNC: 322 MG/DL — HIGH (ref 70–99)
GLUCOSE BLDC GLUCOMTR-MCNC: 335 MG/DL — HIGH (ref 70–99)
GLUCOSE BLDC GLUCOMTR-MCNC: 388 MG/DL — HIGH (ref 70–99)
GLUCOSE SERPL-MCNC: 210 MG/DL — HIGH (ref 70–99)
GLUCOSE SERPL-MCNC: 398 MG/DL — HIGH (ref 70–99)
HCT VFR BLD CALC: 34.4 % — LOW (ref 42–52)
HCT VFR BLD CALC: 34.4 % — LOW (ref 42–52)
HGB BLD-MCNC: 11.1 G/DL — LOW (ref 14–18)
HGB BLD-MCNC: 11.4 G/DL — LOW (ref 14–18)
IMM GRANULOCYTES NFR BLD AUTO: 0.4 % — HIGH (ref 0.1–0.3)
IMM GRANULOCYTES NFR BLD AUTO: 0.5 % — HIGH (ref 0.1–0.3)
INR BLD: 0.98 RATIO — SIGNIFICANT CHANGE UP (ref 0.65–1.3)
LYMPHOCYTES # BLD AUTO: 0.66 K/UL — LOW (ref 1.2–3.4)
LYMPHOCYTES # BLD AUTO: 0.87 K/UL — LOW (ref 1.2–3.4)
LYMPHOCYTES # BLD AUTO: 11.6 % — LOW (ref 20.5–51.1)
LYMPHOCYTES # BLD AUTO: 17.9 % — LOW (ref 20.5–51.1)
MAGNESIUM SERPL-MCNC: 2.2 MG/DL — SIGNIFICANT CHANGE UP (ref 1.8–2.4)
MAGNESIUM SERPL-MCNC: 2.2 MG/DL — SIGNIFICANT CHANGE UP (ref 1.8–2.4)
MCHC RBC-ENTMCNC: 27 PG — SIGNIFICANT CHANGE UP (ref 27–31)
MCHC RBC-ENTMCNC: 27.6 PG — SIGNIFICANT CHANGE UP (ref 27–31)
MCHC RBC-ENTMCNC: 32.3 G/DL — SIGNIFICANT CHANGE UP (ref 32–37)
MCHC RBC-ENTMCNC: 33.1 G/DL — SIGNIFICANT CHANGE UP (ref 32–37)
MCV RBC AUTO: 83.3 FL — SIGNIFICANT CHANGE UP (ref 80–94)
MCV RBC AUTO: 83.7 FL — SIGNIFICANT CHANGE UP (ref 80–94)
MONOCYTES # BLD AUTO: 0.47 K/UL — SIGNIFICANT CHANGE UP (ref 0.1–0.6)
MONOCYTES # BLD AUTO: 0.56 K/UL — SIGNIFICANT CHANGE UP (ref 0.1–0.6)
MONOCYTES NFR BLD AUTO: 9.7 % — HIGH (ref 1.7–9.3)
MONOCYTES NFR BLD AUTO: 9.8 % — HIGH (ref 1.7–9.3)
NEUTROPHILS # BLD AUTO: 3.48 K/UL — SIGNIFICANT CHANGE UP (ref 1.4–6.5)
NEUTROPHILS # BLD AUTO: 4.45 K/UL — SIGNIFICANT CHANGE UP (ref 1.4–6.5)
NEUTROPHILS NFR BLD AUTO: 71.6 % — SIGNIFICANT CHANGE UP (ref 42.2–75.2)
NEUTROPHILS NFR BLD AUTO: 77.9 % — HIGH (ref 42.2–75.2)
NRBC BLD AUTO-RTO: 0 /100 WBCS — SIGNIFICANT CHANGE UP (ref 0–0)
NRBC BLD AUTO-RTO: 0 /100 WBCS — SIGNIFICANT CHANGE UP (ref 0–0)
PLATELET # BLD AUTO: 154 K/UL — SIGNIFICANT CHANGE UP (ref 130–400)
PLATELET # BLD AUTO: 199 K/UL — SIGNIFICANT CHANGE UP (ref 130–400)
PMV BLD: 12.3 FL — HIGH (ref 7.4–10.4)
PMV BLD: 12.3 FL — HIGH (ref 7.4–10.4)
POTASSIUM SERPL-MCNC: 4 MMOL/L — SIGNIFICANT CHANGE UP (ref 3.5–5)
POTASSIUM SERPL-MCNC: 4.3 MMOL/L — SIGNIFICANT CHANGE UP (ref 3.5–5)
POTASSIUM SERPL-SCNC: 4 MMOL/L — SIGNIFICANT CHANGE UP (ref 3.5–5)
POTASSIUM SERPL-SCNC: 4.3 MMOL/L — SIGNIFICANT CHANGE UP (ref 3.5–5)
PROT SERPL-MCNC: 9.6 G/DL — HIGH (ref 6–8)
PROTHROM AB SERPL-ACNC: 11.5 SEC — SIGNIFICANT CHANGE UP (ref 9.95–12.87)
RBC # BLD: 4.11 M/UL — LOW (ref 4.7–6.1)
RBC # BLD: 4.13 M/UL — LOW (ref 4.7–6.1)
RBC # FLD: 13.6 % — SIGNIFICANT CHANGE UP (ref 11.5–14.5)
RBC # FLD: 13.6 % — SIGNIFICANT CHANGE UP (ref 11.5–14.5)
SODIUM SERPL-SCNC: 129 MMOL/L — LOW (ref 135–146)
SODIUM SERPL-SCNC: 132 MMOL/L — LOW (ref 135–146)
WBC # BLD: 4.86 K/UL — SIGNIFICANT CHANGE UP (ref 4.8–10.8)
WBC # BLD: 5.71 K/UL — SIGNIFICANT CHANGE UP (ref 4.8–10.8)
WBC # FLD AUTO: 4.86 K/UL — SIGNIFICANT CHANGE UP (ref 4.8–10.8)
WBC # FLD AUTO: 5.71 K/UL — SIGNIFICANT CHANGE UP (ref 4.8–10.8)

## 2025-05-12 PROCEDURE — 99232 SBSQ HOSP IP/OBS MODERATE 35: CPT

## 2025-05-12 RX ORDER — DEXTROSE 50 % IN WATER 50 %
15 SYRINGE (ML) INTRAVENOUS ONCE
Refills: 0 | Status: DISCONTINUED | OUTPATIENT
Start: 2025-05-12 | End: 2025-05-13

## 2025-05-12 RX ORDER — INSULIN GLARGINE-YFGN 100 [IU]/ML
10 INJECTION, SOLUTION SUBCUTANEOUS ONCE
Refills: 0 | Status: COMPLETED | OUTPATIENT
Start: 2025-05-12 | End: 2025-05-12

## 2025-05-12 RX ADMIN — Medication 1 DOSE(S): at 21:12

## 2025-05-12 RX ADMIN — TAMSULOSIN HYDROCHLORIDE 0.4 MILLIGRAM(S): 0.4 CAPSULE ORAL at 17:55

## 2025-05-12 RX ADMIN — Medication 1 APPLICATION(S): at 05:57

## 2025-05-12 RX ADMIN — LIDOCAINE HYDROCHLORIDE 1 PATCH: 20 JELLY TOPICAL at 19:36

## 2025-05-12 RX ADMIN — LIDOCAINE HYDROCHLORIDE 1 PATCH: 20 JELLY TOPICAL at 17:54

## 2025-05-12 RX ADMIN — INSULIN GLARGINE-YFGN 10 UNIT(S): 100 INJECTION, SOLUTION SUBCUTANEOUS at 13:47

## 2025-05-12 RX ADMIN — DEXAMETHASONE 40 MILLIGRAM(S): 0.5 TABLET ORAL at 05:54

## 2025-05-12 RX ADMIN — INSULIN LISPRO 8: 100 INJECTION, SOLUTION INTRAVENOUS; SUBCUTANEOUS at 17:54

## 2025-05-12 RX ADMIN — AMLODIPINE BESYLATE 5 MILLIGRAM(S): 10 TABLET ORAL at 05:54

## 2025-05-12 RX ADMIN — IMMUNE GLOBULIN (HUMAN) 200 GRAM(S): 10 INJECTION INTRAVENOUS; SUBCUTANEOUS at 13:46

## 2025-05-12 RX ADMIN — TOPIRAMATE 300 MILLIGRAM(S): 25 TABLET, FILM COATED ORAL at 21:11

## 2025-05-12 RX ADMIN — POLYETHYLENE GLYCOL 3350 17 GRAM(S): 17 POWDER, FOR SOLUTION ORAL at 12:05

## 2025-05-12 RX ADMIN — INSULIN LISPRO 8: 100 INJECTION, SOLUTION INTRAVENOUS; SUBCUTANEOUS at 12:02

## 2025-05-12 RX ADMIN — ATORVASTATIN CALCIUM 80 MILLIGRAM(S): 80 TABLET, FILM COATED ORAL at 21:11

## 2025-05-12 RX ADMIN — INSULIN LISPRO 4: 100 INJECTION, SOLUTION INTRAVENOUS; SUBCUTANEOUS at 08:29

## 2025-05-12 RX ADMIN — TAMSULOSIN HYDROCHLORIDE 0.4 MILLIGRAM(S): 0.4 CAPSULE ORAL at 05:53

## 2025-05-12 RX ADMIN — Medication 1 DOSE(S): at 08:30

## 2025-05-12 NOTE — PROGRESS NOTE ADULT - ASSESSMENT
63 y/o male history of COPD, non-Hodgkin's lymphoma in remission for years, CVA, diabetes, pacemaker placement placement for CHB presenting to ED for evaluation of low platelets on outpatient lab work yesterday. Patient being admitted for evaluation and management of severe thrombocytopenia.      # Severe Thrombocytopenia   - Plts on admission 15 ->20->63--> 114  - haptoglobin 95  -   - Platelet Count: 03/21/25  156 -> 164    - No prior history of thrombocytopenia   - Bruising especially on his left hand/arm which he states resolves on its own and recurs   - Several ecchymosis +nt on the left upper extremity.    - No melena, hematochezia, hematemesis, hemoptysis, mucosal bleeding or epistaxis   - No recent exposure to heparin products   - Peripheral Smear: No clumping, Giant Platelets seen in 2 power fields.    - Monitor Platelet count and for Bleeding.    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 ?   Last Rituxan May 2020   Followed with Dr Meredith in the past     # Hx of stroke with residual mild right sided weakness.   # Chronic mild right frontal headache   - Continue with topiramate 300mg/day.   - Follows w/ Neurology outpatient    - On Aspirin 325 mg daily     # PPM s/p CHB   - Congenital heart disease unknown by the patient and his wife, s/p open heart surgery as an infant at [facility], no records are available.   - Follows w/ Dr Monique     # T2 diabetes mellitus     - Home med: Synjardy 12.5-1000mg, pioglitazone 30 mg, Trulicity q1 week  - ISS    # HLD   - C/w atorvastatin 80 mg qhs     # HTN (hypertension)   - Irbesartan 300 mg daily   - Amlodipine 5 mg daily     # COPD    - c/w tiotropium 18 mcg inhalation capsule: 1 cap(s) inhaled once a day   - c/w breztri Aerosphere inhalation aerosol: 2 puff(s) inhaled 2 times a day       DVT prophylaxis: Holding iso severe thrombocytopenia      #Progress Note Handoff  Pending (specify): bone marrow bx pending, IR consult placed       Total time spent to complete patient's bedside assessment, review medical chart, discuss medical plan of care with covering medical team was more than 35 minutes  with >50% of time spent face to face with patient, discussion with patient/family and/or coordination of care. My note supersedes them medical resident note in case of discrepancy.      Tamie Bravo, DO        65 y/o man with PMH of COPD, non-Hodgkin's lymphoma in remission for years, CVA, DM type 2 and s/p PPM for CHB presented to ED for evaluation of low platelets on outpatient lab work the day prior to admission.     1. Severe Thrombocytopenia   platelets on admission 15 ->20  haptoglobin 95,   Platelet Count: 03/21/25  156 -> 164    No prior history of thrombocytopenia   Bruising especially on his left hand/arm which he states resolves on its own and recurs   some gum bleeding with brushing of teeth  No other bleeding per the pt    No recent exposure to heparin products   HemOnc consult and f/u appreciated  Peripheral Smear: No clumping, Giant Platelets seen in 2 power fields.    transfuse platelets if <10   dexamethasone 40mg x 4 days and IVIG x4 days (completed 5/12)  platelets responded well - now 154 on 5/12 - ITP suspected  Follow up peripheral flow cytometry (sent 5.8.25) and SPEP  CT abdomen and pelvis with IV contrast - no suspicious adenopathy  IR consulted for BM biopsy - scheduled for 5/13 - NPO after midnight  CBC in AM    2. 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 ?   Last Rituxan May 2020   Followed with Dr Meredith in the past     3. Hx of stroke with residual mild right sided weakness  Chronic mild right frontal headache   on topiramate 300mg/day.   holding ASA for now    4. PPM s/p CHB   h/o Congenital heart disease unknown by the patient and his wife, s/p open heart surgery as an infant at [facility], no records are available.   Follows w/ Dr Monique     5. DM type 2 with hyperglycemia due to steroids  last dose of decadron today - gave lantus 10 units SQ x 1 today and monitor FS  carb consistent diet, avoid concentrated sweets, continue insulin   A1C 7  Home med: Synjardy 12.5-1000mg, pioglitazone 30 mg, Trulicity q1 week    6. Hyperlipidemia on statin    7. HTN on   on amlodipine   resume ARB on discharge    8. COPD - stable - on Advair, spiriva    9. DVT prophylaxis - OOB and ambulating      full code  guarded prognosis but improved    Progress Note Handoff  Pending (specify): CBC in AM, BM biopsy by IR on 5/13  Family discussion: with pt and wife today  Disposition: home after BM biopsy and HemOnc f/u on 5/13

## 2025-05-12 NOTE — CONSULT NOTE ADULT - SUBJECTIVE AND OBJECTIVE BOX
INTERVENTIONAL RADIOLOGY CONSULT:     Procedure Requested: Bone Marrow Biopsy     HPI:  64-year-old male history of COPD, non-Hodgkin's lymphoma in remission for years, CVA, diabetes, pacemaker placement placement for CHB presenting to ED for evaluation of low platelets on outpatient lab work yesterday. Patient was called and told that his platelet count today was 15 and that he needed to come to the hospital for evaluation. States that he takes aspirin daily endorsing some mild bruising to his left hand but denies any bleeding, N, V, CP, SOB, fever, chills, abdominal pain     No prior history of thrombocytopenia, no recent illness.  Patient reports bruising especially on his left hand/arm, several ecchymosis on the left upper extremity.   but denies headache, bloody stools, bloody urine or any other additional complaints.      ED Course:   Vitals:  145 mm Hg/ 81 mm Hg, 84 /min, 18 /min, 97.6 Degrees F, 99 % room air   Labs: WBC 4.56, Hb 11.9, PC 15.    Meds: 1U Platelet      Patient being admitted for evaluation and management of severe thrombocytopenia.  (08 May 2025 01:57)    PAST MEDICAL & SURGICAL HISTORY:  HTN (hypertension)    High cholesterol    DM (diabetes mellitus)    Chronic obstructive pulmonary disease, unspecified COPD type    Non Hodgkin's lymphoma    Pacemaker    Artificial cardiac pacemaker    S/P transesophageal echocardiogram (ANITA)        MEDICATIONS  (STANDING):  amLODIPine   Tablet 5 milliGRAM(s) Oral daily  atorvastatin 80 milliGRAM(s) Oral at bedtime  chlorhexidine 2% Cloths 1 Application(s) Topical <User Schedule>  dextrose 5%. 1000 milliLiter(s) (50 mL/Hr) IV Continuous <Continuous>  dextrose 5%. 1000 milliLiter(s) (100 mL/Hr) IV Continuous <Continuous>  dextrose 50% Injectable 25 Gram(s) IV Push once  dextrose 50% Injectable 12.5 Gram(s) IV Push once  dextrose 50% Injectable 25 Gram(s) IV Push once  fluticasone propionate/ salmeterol 250-50 MICROgram(s) Diskus 1 Dose(s) Inhalation two times a day  glucagon  Injectable 1 milliGRAM(s) IntraMuscular once  immune   globulin 10% (GAMMAGARD) IVPB 40 Gram(s) IV Intermittent daily  insulin lispro (ADMELOG) corrective regimen sliding scale   SubCutaneous three times a day before meals  lidocaine   4% Patch 1 Patch Transdermal every 24 hours  lidocaine   4% Patch 1 Patch Transdermal every 24 hours  polyethylene glycol 3350 17 Gram(s) Oral daily  senna 2 Tablet(s) Oral at bedtime  tamsulosin 0.4 milliGRAM(s) Oral two times a day  tiotropium 2.5 MICROgram(s) Inhaler 2 Puff(s) Inhalation daily  topiramate 300 milliGRAM(s) Oral at bedtime    MEDICATIONS  (PRN):  acetaminophen     Tablet .. 650 milliGRAM(s) Oral every 6 hours PRN Mild Pain (1 - 3)  dextrose Oral Gel 15 Gram(s) Oral once PRN Blood Glucose LESS THAN 70 milliGRAM(s)/deciliter  melatonin 3 milliGRAM(s) Oral at bedtime PRN Insomnia    Allergies    Bactrim (Anaphylaxis)  acyclovir (Anaphylaxis)    Intolerances      FAMILY HISTORY:  Family history of breast cancer (Sibling)  in sister at the age of 59    Family history of cancer (Mother)        Physical Exam:   Vital Signs Last 24 Hrs  T(C): 36.5 (12 May 2025 04:54), Max: 36.5 (12 May 2025 04:54)  T(F): 97.7 (12 May 2025 04:54), Max: 97.7 (12 May 2025 04:54)  HR: 66 (12 May 2025 04:54) (66 - 72)  BP: 110/60 (12 May 2025 04:54) (110/60 - 127/75)  BP(mean): --  RR: 18 (12 May 2025 04:54) (18 - 18)  SpO2: 99% (11 May 2025 20:03) (99% - 99%)          Labs:                         11.8   6.81  )-----------( 114      ( 11 May 2025 06:24 )             37.5     05-11    137  |  106  |  34[H]  ----------------------------<  208[H]  3.8   |  20  |  1.2    Ca    9.6      11 May 2025 06:24  Mg     2.3     05-11          Pertinent labs:                      11.8   6.81  )-----------( 114      ( 11 May 2025 06:24 )             37.5       05-11    137  |  106  |  34[H]  ----------------------------<  208[H]  3.8   |  20  |  1.2    Ca    9.6      11 May 2025 06:24  Mg     2.3     05-11            Radiology & Additional Studies:     Radiology imaging reviewed.       ASSESSMENT AND PLAN:  -Patient scheduled for a bone marrow biopsy with sedation tomorrow 5/13/25  -NPO after midnight  -draw CBC/CMP/Coags prior to procedure  -hold anticoagulation until after procedure    Please call Interventional Radiology with questions or concerns:   - M-[phone]: [phone]   - All other hours: [phone]

## 2025-05-12 NOTE — PROGRESS NOTE ADULT - ASSESSMENT
64-year-old male history of COPD, non-Hodgkin's lymphoma in remission for years, CVA, diabetes, pacemaker placement placement for CHB presenting to ED for evaluation of low platelets on outpatient lab work yesterday. Patient being admitted for evaluation and management of severe thrombocytopenia.      # Severe Thrombocytopenia   PC on admission 15   Platelet Count: 03/21/25  156 -> 164    No prior history of thrombocytopenia   Bruising especially on his left hand/arm which he states resolves on its own and recurs   Several ecchymosis +nt on the left upper extremity.    No melena, hematochezia, hematemesis, hemoptysis, mucosal bleeding or epistaxis   No recent exposure to heparin products   Peripheral Smear: No clumping, Giant Platelets seen in 2 power fields.    Monitor Platelet count and for Bleeding.    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 ?   Last Rituxan May 2020   Followed with Dr Meredith in the past     # Hx of stroke with residual mild right sided weakness.   # Chronic mild right frontal headache   Continue with topiramate 300mg/day.   Follows w/ Neurology outpatient    On Aspirin 325 mg daily     # PPM s/p CHB   Congenital heart disease unknown by the patient and his wife, s/p open heart surgery as an infant at [facility], no records are available.   Follows w/ Dr Monique     # T2 diabetes mellitus     Home med: Synjardy 12.5-1000mg, pioglitazone 30 mg, Trulicity q1 week  ISS    # HLD   C/w atorvastatin 80 mg qhs     # HTN (hypertension)   Irbesartan 300 mg daily   Amlodipine 5 mg daily     # COPD    c/w tiotropium 18 mcg inhalation capsule: 1 cap(s) inhaled once a day   c/w breztri Aerosphere inhalation aerosol: 2 puff(s) inhaled 2 times a day     Miscellaneous:  DVT prophylaxis: Holding iso severe thrombocytopenia  Bowel  - Feeds: DASH/TLC, CC  - Prophylaxis: None  - Regimen: None  Activity: AAT  Code status: Full   Disposition: Med-Surg    Handoff: Monitor PC, f/u Hem-Onc recs, tentative biopsy 5/13 with IR (bone marrow)

## 2025-05-12 NOTE — PROGRESS NOTE ADULT - SUBJECTIVE AND OBJECTIVE BOX
24H events:    Patient is a 64y old Male who presents with a chief complaint of Thrombocytopenia (12 May 2025 07:07)    Primary diagnosis of Low platelet count        Today is 5d of hospitalization. This morning patient was seen and examined at bedside, resting comfortably in bed.    No acute or major events overnight.      PAST MEDICAL & SURGICAL HISTORY  HTN (hypertension)    High cholesterol    DM (diabetes mellitus)    Chronic obstructive pulmonary disease, unspecified COPD type    Non Hodgkin's lymphoma    Pacemaker    Artificial cardiac pacemaker    S/P transesophageal echocardiogram (ANITA)      SOCIAL HISTORY:  Social History:      ALLERGIES:  Bactrim (Anaphylaxis)  acyclovir (Anaphylaxis)    MEDICATIONS:  STANDING MEDICATIONS  amLODIPine   Tablet 5 milliGRAM(s) Oral daily  atorvastatin 80 milliGRAM(s) Oral at bedtime  chlorhexidine 2% Cloths 1 Application(s) Topical <User Schedule>  dextrose 5%. 1000 milliLiter(s) IV Continuous <Continuous>  dextrose 5%. 1000 milliLiter(s) IV Continuous <Continuous>  dextrose 50% Injectable 25 Gram(s) IV Push once  dextrose 50% Injectable 12.5 Gram(s) IV Push once  dextrose 50% Injectable 25 Gram(s) IV Push once  fluticasone propionate/ salmeterol 250-50 MICROgram(s) Diskus 1 Dose(s) Inhalation two times a day  glucagon  Injectable 1 milliGRAM(s) IntraMuscular once  immune   globulin 10% (GAMMAGARD) IVPB 40 Gram(s) IV Intermittent daily  insulin glargine Injectable (LANTUS) 10 Unit(s) SubCutaneous once  insulin lispro (ADMELOG) corrective regimen sliding scale   SubCutaneous three times a day before meals  lidocaine   4% Patch 1 Patch Transdermal every 24 hours  lidocaine   4% Patch 1 Patch Transdermal every 24 hours  polyethylene glycol 3350 17 Gram(s) Oral daily  senna 2 Tablet(s) Oral at bedtime  tamsulosin 0.4 milliGRAM(s) Oral two times a day  tiotropium 2.5 MICROgram(s) Inhaler 2 Puff(s) Inhalation daily  topiramate 300 milliGRAM(s) Oral at bedtime    PRN MEDICATIONS  acetaminophen     Tablet .. 650 milliGRAM(s) Oral every 6 hours PRN  dextrose Oral Gel 15 Gram(s) Oral once PRN  dextrose Oral Gel 15 Gram(s) Oral once PRN  melatonin 3 milliGRAM(s) Oral at bedtime PRN    VITALS:   T(F): 97.7  HR: 66  BP: 110/60  RR: 18  SpO2: 99%    PHYSICAL EXAM:  GENERAL:   ( x) NAD, lying in bed comfortably     (  ) obtunded     (  ) lethargic     (  ) somnolent      NECK:  (x) Supple     (  ) neck stiffness     (  ) nuchal rigidity     (  )  no JVD     (  ) JVD present ( -- cm)    HEART:  Rate -->     (x) normal rate     (  ) bradycardic     (  ) tachycardic  Rhythm -->     (x) regular     (  ) regularly irregular     (  ) irregularly irregular  Murmurs -->     (x) normal s1s2     (  ) systolic murmur     (  ) diastolic murmur     (  ) continuous murmur      (  ) S3 present     (  ) S4 present    LUNGS:   ( x)Unlabored respirations     (  ) tachypnea  ( x) B/L air entry     (  ) decreased breath sounds in:  (location     )    ( x) no adventitious sound     (  ) crackles     (  ) wheezing      (  ) rhonchi      (specify location:       )  (  ) chest wall tenderness (specify location:       )    ABDOMEN:   ( x) Soft     (  ) tense   |   (  ) nondistended     (  ) distended   |   (  ) +BS     (  ) hypoactive bowel sounds     (  ) hyperactive bowel sounds  ( x) nontender     (  ) RUQ tenderness     (  ) RLQ tenderness     (  ) LLQ tenderness     (  ) epigastric tenderness     (  ) diffuse tenderness  (  ) Splenomegaly      (  ) Hepatomegaly      (  ) Jaundice     (  ) ecchymosis     EXTREMITIES:  ( x) Normal     (  ) Rash     (  ) ecchymosis     (  ) varicose veins      (  ) pitting edema     (  ) non-pitting edema   (  ) ulceration     (  ) gangrene:     (location:     )    NERVOUS SYSTEM:    ( x) A&Ox3     (  ) confused     (  ) lethargic  CN II-XII:     ( x) Intact     (  ) deficits found     (Specify:     )   Upper extremities:     (  ) no sensorimotor deficits     (  ) weakness     (  ) loss of proprioception/vibration     (  ) loss of touch/temperature (specify:    )  Lower extremities:     (  ) no sensorimotor deficits     (  ) weakness     (  ) loss of proprioception/vibration     (  ) loss of touch/temperature (specify:    )    SKIN:   (  ) No rashes or lesions     (  ) maculopapular rash     (  ) pustules     (  ) vesicles     (  ) ulcer     (  ) ecchymosis     (specify location:     )      LABS:                        11.1   4.86  )-----------( 154      ( 12 May 2025 07:38 )             34.4     05-12    132[L]  |  104  |  33[H]  ----------------------------<  210[H]  4.0   |  19  |  1.1    Ca    9.1      12 May 2025 07:38  Mg     2.2     05-12        Urinalysis Basic - ( 12 May 2025 07:38 )    Color: x / Appearance: x / SG: x / pH: x  Gluc: 210 mg/dL / Ketone: x  / Bili: x / Urobili: x   Blood: x / Protein: x / Nitrite: x   Leuk Esterase: x / RBC: x / WBC x   Sq Epi: x / Non Sq Epi: x / Bacteria: x

## 2025-05-12 NOTE — PROGRESS NOTE ADULT - SUBJECTIVE AND OBJECTIVE BOX
GERARD LONG  64y Male    INTERVAL HPI/OVERNIGHT EVENTS:    pt with some gum bleeding  no other bleeding   no fever, pain, other complaints    T(F): 98 (05-12-25 @ 12:48), Max: 98 (05-12-25 @ 12:48)  HR: 84 (05-12-25 @ 12:48) (66 - 84)  BP: 125/79 (05-12-25 @ 12:48) (110/60 - 127/75)  RR: 19 (05-12-25 @ 12:48) (18 - 19)  SpO2: 99% (05-11-25 @ 20:03) (99% - 99%)  I&O's Summary    CAPILLARY BLOOD GLUCOSE      POCT Blood Glucose.: 322 mg/dL (12 May 2025 11:32)  POCT Blood Glucose.: 224 mg/dL (12 May 2025 07:30)  POCT Blood Glucose.: 224 mg/dL (11 May 2025 21:50)  POCT Blood Glucose.: 442 mg/dL (11 May 2025 17:29)        PHYSICAL EXAM:  GENERAL: NAD  HEAD:  Normocephalic  EYES:  conjunctiva and sclera clear  ENMT: Moist mucous membranes  NERVOUS SYSTEM:  Alert, awake, Good concentration  CHEST/LUNG: CTA b/l  HEART: Regular rate and rhythm  ABDOMEN: Soft, Nontender, Nondistended; Bowel sounds present  EXTREMITIES:   No edema  SKIN: ecchymoses    Consultant(s) Notes Reviewed:  [x ] YES  [ ] NO  Care Discussed with Consultants/Other Providers [ x] YES  [ ] NO    MEDICATIONS  (STANDING):  amLODIPine   Tablet 5 milliGRAM(s) Oral daily  atorvastatin 80 milliGRAM(s) Oral at bedtime  chlorhexidine 2% Cloths 1 Application(s) Topical <User Schedule>  dextrose 5%. 1000 milliLiter(s) (50 mL/Hr) IV Continuous <Continuous>  dextrose 5%. 1000 milliLiter(s) (100 mL/Hr) IV Continuous <Continuous>  dextrose 50% Injectable 25 Gram(s) IV Push once  dextrose 50% Injectable 12.5 Gram(s) IV Push once  dextrose 50% Injectable 25 Gram(s) IV Push once  fluticasone propionate/ salmeterol 250-50 MICROgram(s) Diskus 1 Dose(s) Inhalation two times a day  glucagon  Injectable 1 milliGRAM(s) IntraMuscular once  immune   globulin 10% (GAMMAGARD) IVPB 40 Gram(s) IV Intermittent daily  insulin glargine Injectable (LANTUS) 10 Unit(s) SubCutaneous once  insulin lispro (ADMELOG) corrective regimen sliding scale   SubCutaneous three times a day before meals  lidocaine   4% Patch 1 Patch Transdermal every 24 hours  lidocaine   4% Patch 1 Patch Transdermal every 24 hours  polyethylene glycol 3350 17 Gram(s) Oral daily  senna 2 Tablet(s) Oral at bedtime  tamsulosin 0.4 milliGRAM(s) Oral two times a day  tiotropium 2.5 MICROgram(s) Inhaler 2 Puff(s) Inhalation daily  topiramate 300 milliGRAM(s) Oral at bedtime    MEDICATIONS  (PRN):  acetaminophen     Tablet .. 650 milliGRAM(s) Oral every 6 hours PRN Mild Pain (1 - 3)  dextrose Oral Gel 15 Gram(s) Oral once PRN Blood Glucose LESS THAN 70 milliGRAM(s)/deciliter  dextrose Oral Gel 15 Gram(s) Oral once PRN Blood Glucose LESS THAN 70 milliGRAM(s)/deciliter  melatonin 3 milliGRAM(s) Oral at bedtime PRN Insomnia      LABS:                        11.1   4.86  )-----------( 154      ( 12 May 2025 07:38 )             34.4     05-12    132[L]  |  104  |  33[H]  ----------------------------<  210[H]  4.0   |  19  |  1.1    Ca    9.1      12 May 2025 07:38  Mg     2.2     05-12                  RADIOLOGY & ADDITIONAL TESTS:    Imaging report Personally Reviewed:  [x ] YES  [ ] NO    < from: CT Abdomen and Pelvis w/ Oral Cont and w/ IV Cont (05.09.25 @ 23:13) >  IMPRESSION:    Since April 28, 2023    No CT evidence of acute thoracic abdominal pelvic pathology    No suspicious adenopathy.    < end of copied text >        < from: TTE Echo Complete w/o Contrast w/ Doppler (03.23.25 @ 08:36) >  Summary:   1. LV Ejection Fraction by Chávez's Method with a biplane EF of 50 %.   2. The left ventricular diastolic function could not be assessed in this   study.   3. Normal left atrial size.   4. Normal right atrial size.   5. No evidence of mitral valve regurgitation.    < end of copied text >              Case discussed with residents and RN on rounds today    Care discussed with pt and family

## 2025-05-13 ENCOUNTER — TRANSCRIPTION ENCOUNTER (OUTPATIENT)
Age: 65
End: 2025-05-13

## 2025-05-13 ENCOUNTER — RESULT REVIEW (OUTPATIENT)
Age: 65
End: 2025-05-13

## 2025-05-13 VITALS
RESPIRATION RATE: 17 BRPM | OXYGEN SATURATION: 98 % | DIASTOLIC BLOOD PRESSURE: 73 MMHG | SYSTOLIC BLOOD PRESSURE: 126 MMHG | TEMPERATURE: 97 F | HEART RATE: 64 BPM

## 2025-05-13 LAB
% ALBUMIN: 56.8 % — SIGNIFICANT CHANGE UP
% ALPHA 1: 3.5 % — SIGNIFICANT CHANGE UP
% ALPHA 2: 11 % — SIGNIFICANT CHANGE UP
% BETA: 11.7 % — SIGNIFICANT CHANGE UP
% GAMMA: 17 % — SIGNIFICANT CHANGE UP
% M SPIKE: 4.9 % — SIGNIFICANT CHANGE UP
ALBUMIN SERPL ELPH-MCNC: 4.7 G/DL — SIGNIFICANT CHANGE UP (ref 3.6–5.5)
ALBUMIN/GLOB SERPL ELPH: 1.3 RATIO — SIGNIFICANT CHANGE UP
ALPHA1 GLOB SERPL ELPH-MCNC: 0.3 G/DL — SIGNIFICANT CHANGE UP (ref 0.1–0.4)
ALPHA2 GLOB SERPL ELPH-MCNC: 0.9 G/DL — SIGNIFICANT CHANGE UP (ref 0.5–1)
ANION GAP SERPL CALC-SCNC: 11 MMOL/L — SIGNIFICANT CHANGE UP (ref 7–14)
B-GLOBULIN SERPL ELPH-MCNC: 1 G/DL — SIGNIFICANT CHANGE UP (ref 0.5–1)
BASOPHILS # BLD AUTO: 0.01 K/UL — SIGNIFICANT CHANGE UP (ref 0–0.2)
BASOPHILS NFR BLD AUTO: 0.2 % — SIGNIFICANT CHANGE UP (ref 0–1)
BUN SERPL-MCNC: 34 MG/DL — HIGH (ref 10–20)
CALCIUM SERPL-MCNC: 9.9 MG/DL — SIGNIFICANT CHANGE UP (ref 8.4–10.5)
CHLORIDE SERPL-SCNC: 103 MMOL/L — SIGNIFICANT CHANGE UP (ref 98–110)
CO2 SERPL-SCNC: 20 MMOL/L — SIGNIFICANT CHANGE UP (ref 17–32)
CREAT SERPL-MCNC: 1.2 MG/DL — SIGNIFICANT CHANGE UP (ref 0.7–1.5)
EGFR: 68 ML/MIN/1.73M2 — SIGNIFICANT CHANGE UP
EGFR: 68 ML/MIN/1.73M2 — SIGNIFICANT CHANGE UP
EOSINOPHIL # BLD AUTO: 0.01 K/UL — SIGNIFICANT CHANGE UP (ref 0–0.7)
EOSINOPHIL NFR BLD AUTO: 0.2 % — SIGNIFICANT CHANGE UP (ref 0–8)
FLOW CYTOMETRY FINAL REPORT: SIGNIFICANT CHANGE UP
GAMMA GLOBULIN: 1.4 G/DL — SIGNIFICANT CHANGE UP (ref 0.6–1.6)
GLUCOSE BLDC GLUCOMTR-MCNC: 224 MG/DL — HIGH (ref 70–99)
GLUCOSE SERPL-MCNC: 212 MG/DL — HIGH (ref 70–99)
HCT VFR BLD CALC: 38.6 % — LOW (ref 42–52)
HGB BLD-MCNC: 12.4 G/DL — LOW (ref 14–18)
IMM GRANULOCYTES NFR BLD AUTO: 0.5 % — HIGH (ref 0.1–0.3)
LYMPHOCYTES # BLD AUTO: 1.53 K/UL — SIGNIFICANT CHANGE UP (ref 1.2–3.4)
LYMPHOCYTES # BLD AUTO: 25.7 % — SIGNIFICANT CHANGE UP (ref 20.5–51.1)
M-SPIKE: 0.4 G/DL — HIGH (ref 0–0)
MAGNESIUM SERPL-MCNC: 2.4 MG/DL — SIGNIFICANT CHANGE UP (ref 1.8–2.4)
MCHC RBC-ENTMCNC: 26.7 PG — LOW (ref 27–31)
MCHC RBC-ENTMCNC: 32.1 G/DL — SIGNIFICANT CHANGE UP (ref 32–37)
MCV RBC AUTO: 83 FL — SIGNIFICANT CHANGE UP (ref 80–94)
MONOCYTES # BLD AUTO: 0.66 K/UL — HIGH (ref 0.1–0.6)
MONOCYTES NFR BLD AUTO: 11.1 % — HIGH (ref 1.7–9.3)
NEUTROPHILS # BLD AUTO: 3.71 K/UL — SIGNIFICANT CHANGE UP (ref 1.4–6.5)
NEUTROPHILS NFR BLD AUTO: 62.3 % — SIGNIFICANT CHANGE UP (ref 42.2–75.2)
NRBC BLD AUTO-RTO: 0 /100 WBCS — SIGNIFICANT CHANGE UP (ref 0–0)
PLATELET # BLD AUTO: 230 K/UL — SIGNIFICANT CHANGE UP (ref 130–400)
PMV BLD: 12 FL — HIGH (ref 7.4–10.4)
POTASSIUM SERPL-MCNC: 3.9 MMOL/L — SIGNIFICANT CHANGE UP (ref 3.5–5)
POTASSIUM SERPL-SCNC: 3.9 MMOL/L — SIGNIFICANT CHANGE UP (ref 3.5–5)
PROT PATTERN SERPL ELPH-IMP: SIGNIFICANT CHANGE UP
PROT SERPL-MCNC: 8.2 G/DL — SIGNIFICANT CHANGE UP (ref 6–8.3)
RBC # BLD: 4.65 M/UL — LOW (ref 4.7–6.1)
RBC # FLD: 13.7 % — SIGNIFICANT CHANGE UP (ref 11.5–14.5)
SODIUM SERPL-SCNC: 134 MMOL/L — LOW (ref 135–146)
WBC # BLD: 5.95 K/UL — SIGNIFICANT CHANGE UP (ref 4.8–10.8)
WBC # FLD AUTO: 5.95 K/UL — SIGNIFICANT CHANGE UP (ref 4.8–10.8)

## 2025-05-13 PROCEDURE — 88305 TISSUE EXAM BY PATHOLOGIST: CPT | Mod: 26

## 2025-05-13 PROCEDURE — 88311 DECALCIFY TISSUE: CPT | Mod: 26

## 2025-05-13 PROCEDURE — 88291 CYTO/MOLECULAR REPORT: CPT

## 2025-05-13 PROCEDURE — 88342 IMHCHEM/IMCYTCHM 1ST ANTB: CPT | Mod: 26

## 2025-05-13 PROCEDURE — 77012 CT SCAN FOR NEEDLE BIOPSY: CPT | Mod: 26

## 2025-05-13 PROCEDURE — 88189 FLOWCYTOMETRY/READ 16 & >: CPT | Mod: 59

## 2025-05-13 PROCEDURE — 88341 IMHCHEM/IMCYTCHM EA ADD ANTB: CPT | Mod: 26

## 2025-05-13 PROCEDURE — 88313 SPECIAL STAINS GROUP 2: CPT | Mod: 26

## 2025-05-13 PROCEDURE — 38222 DX BONE MARROW BX & ASPIR: CPT | Mod: RT

## 2025-05-13 PROCEDURE — 99239 HOSP IP/OBS DSCHRG MGMT >30: CPT

## 2025-05-13 RX ORDER — TAMSULOSIN HYDROCHLORIDE 0.4 MG/1
1 CAPSULE ORAL
Qty: 0 | Refills: 0 | DISCHARGE
Start: 2025-05-13

## 2025-05-13 RX ORDER — ACETAMINOPHEN 500 MG/5ML
2 LIQUID (ML) ORAL
Qty: 0 | Refills: 0 | DISCHARGE
Start: 2025-05-13

## 2025-05-13 RX ADMIN — Medication 650 MILLIGRAM(S): at 14:22

## 2025-05-13 RX ADMIN — LIDOCAINE HYDROCHLORIDE 1 PATCH: 20 JELLY TOPICAL at 06:26

## 2025-05-13 RX ADMIN — Medication 1 APPLICATION(S): at 06:15

## 2025-05-13 RX ADMIN — INSULIN LISPRO 4: 100 INJECTION, SOLUTION INTRAVENOUS; SUBCUTANEOUS at 08:45

## 2025-05-13 RX ADMIN — Medication 1 DOSE(S): at 08:00

## 2025-05-13 NOTE — DISCHARGE NOTE NURSING/CASE MANAGEMENT/SOCIAL WORK - PATIENT PORTAL LINK FT
You can access the FollowMyHealth Patient Portal offered by [facility] by registering at the following website: [email or web address]. By joining [employer]’s FollowMyHealth portal, you will also be able to view your health information using other applications (apps) compatible with our system.

## 2025-05-13 NOTE — PROGRESS NOTE ADULT - SUBJECTIVE AND OBJECTIVE BOX
INTERVENTIONAL RADIOLOGY BRIEF-OPERATIVE NOTE    Procedure:  CT-directed bone marrow biopsy posterior right iliac bone    Pre-Op Diagnosis:  Thrombocytopenia; h/o NHL    Post-Op Diagnosis:  Same    Attending:  Alejo (IR) / Ev (Anaesthesia)  Hem-Onc Brant:  Kylah    Anesthesia (type):  [ ] General Anesthesia  [X] Sedation-- see anaesthesia record  [ ] Spinal Anesthesia  [X] Local/Regional-- 1% Lidocaine, SQ, 8 cc    Contrast:  None    Estimated Blood Loss:  3 cc    Condition:     [ ] Critical  [ ] Serious  [ ] Fair   [X] Good    Findings/Follow up Plan of Care:  Posterior right iliac bone biopsied with 10/12G, 6/10 cm psicofxp bone biopsy system using CT-guidance:  20 cc of marrow aspirate and a single 12G tissue core obtained.  Patient tolerated very well, without incident.    Specimens Removed:  As above    Implants:  None    Complications:  None immediate    Disposition:  Back to floor; f/u pathology      Please call Interventional Radiology [phone] with any questions, concerns, or issues.

## 2025-05-13 NOTE — DISCHARGE NOTE NURSING/CASE MANAGEMENT/SOCIAL WORK - FINANCIAL ASSISTANCE
[facility] provides services at a reduced cost to those who are determined to be eligible through [facility]’s financial assistance program. Information regarding [facility]’s financial assistance program can be found by going to [email or web address] or by calling [phone].

## 2025-05-13 NOTE — DISCHARGE NOTE PROVIDER - NSDCCPCAREPLAN_GEN_ALL_CORE_FT
PRINCIPAL DISCHARGE DIAGNOSIS  Diagnosis: Low platelet count  Assessment and Plan of Treatment: You have been experiencing severe thrombocytopenia. However, following treatment with dexamethasone and IVIG, your platelets have shown significant improvement. Lab results indicate that your haptoglobin and LDH levels are within normal ranges, and a peripheral smear didn't show clumping but did reveal giant platelets. You’ve mentioned bruising, especially on your left hand and arm, which seems to resolve and recur without other bleeding symptoms. A Hematology-Oncology specialist has been involved in your care, and additional tests like peripheral flow cytometry and serum protein electrophoresis have been conducted to gather more information. A CT scan of your abdomen and pelvis did not show any suspicious adenopathy, and a bone marrow biopsy was performed on May 13.   You will follow up with your oncologist to go over the labs and the bone marrow bx results. be sure to follow up with your primary care provider as well.     PRINCIPAL DISCHARGE DIAGNOSIS  Diagnosis: Low platelet count  Assessment and Plan of Treatment: You have been experiencing severe thrombocytopenia. However, following treatment with dexamethasone and IVIG, your platelets have shown significant improvement. Lab results indicate that your haptoglobin and LDH levels are within normal ranges, and a peripheral smear didn't show clumping but did reveal giant platelets. You’ve mentioned bruising, especially on your left hand and arm, which seems to resolve and recur without other bleeding symptoms. A Hematology-Oncology specialist has been involved in your care, and additional tests like peripheral flow cytometry and serum protein electrophoresis have been conducted to gather more information. A CT scan of your abdomen and pelvis did not show any suspicious adenopathy, and a bone marrow biopsy was performed on May 13.   You will follow up with your oncologist to go over the labs and the bone marrow bx results. be sure to follow up with your primary care provider as well.  *Speak to your oncologist when you can resume aspirin which is being held at this time*

## 2025-05-13 NOTE — DISCHARGE NOTE NURSING/CASE MANAGEMENT/SOCIAL WORK - NSDCVIVACCINE_GEN_ALL_CORE_FT
influenza, injectable, quadrivalent, preservative free; 06-Nov-2020 12:36; Radha Alcocer (RN); [employer]; 33bn3 (Exp. Date: 30-Jun-2001); IntraMuscular; Deltoid Left.; 0.5 milliLiter(s); VIS (VIS Published: 15-Aug-2019, VIS Presented: 06-Nov-2020);

## 2025-05-13 NOTE — DISCHARGE NOTE PROVIDER - HOSPITAL COURSE
64-year-old male history of COPD, non-Hodgkin's lymphoma in remission for years, CVA, diabetes, pacemaker placement placement for CHB presenting to ED for evaluation of low platelets on outpatient lab work yesterday. Patient was called and told that his platelet count today was 15 and that he needed to come to the hospital for evaluation. States that he takes aspirin daily endorsing some mild bruising to his left hand but denies any bleeding, N, V, CP, SOB, fever, chills, abdominal pain     No prior history of thrombocytopenia, no recent illness.  Patient reports bruising especially on his left hand/arm, several ecchymosis on the left upper extremity.   but denies headache, bloody stools, bloody urine or any other additional complaints.      ED Course:   Vitals:  145 mm Hg/ 81 mm Hg, 84 /min, 18 /min, 97.6 Degrees F, 99 % room air   Labs: WBC 4.56, Hb 11.9, PC 15.    Meds: 1U Platelet      Patient being admitted for evaluation and management of severe thrombocytopenia.     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    dexamethasone 40mg x 4 days and IVIG x4 days (completed 5/12)  platelets responded well - now 154 on 5/12 - ITP suspected  Follow up peripheral flow cytometry (sent 5.8.25) and SPEP  CT abdomen and pelvis with IV contrast - no suspicious adenopathy  IR performed bone marrow bx on 5/13    2. 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 ?   Last Rituxan May 2020   Followed with Dr Meredith in the past     3. Hx of stroke with residual mild right sided weakness  Chronic mild right frontal headache   on topiramate 300mg/day.     4. PPM s/p CHB   h/o Congenital heart disease unknown by the patient and his wife, s/p open heart surgery as an infant at [facility], no records are available.   Follows w/ Dr Monique     5. DM type 2 with hyperglycemia due to steroids  s/p decadron  carb consistent diet, avoid concentrated sweets, continue insulin   A1C 7  Home med: Synjardy 12.5-1000mg, pioglitazone 30 mg, Trulicity q1 week    6. Hyperlipidemia on statin    7. HTN on   on amlodipine   resume ARB on discharge    8. COPD - stable - on Advair, spiriva   64-year-old male history of COPD, non-Hodgkin's lymphoma in remission for years, CVA, diabetes, pacemaker placement placement for CHB presenting to ED for evaluation of low platelets on outpatient lab work yesterday. Patient was called and told that his platelet count today was 15 and that he needed to come to the hospital for evaluation. States that he takes aspirin daily endorsing some mild bruising to his left hand but denies any bleeding, N, V, CP, SOB, fever, chills, abdominal pain     No prior history of thrombocytopenia, no recent illness.  Patient reports bruising especially on his left hand/arm, several ecchymosis on the left upper extremity.   but denies headache, bloody stools, bloody urine or any other additional complaints.      ED Course:   Vitals:  145 mm Hg/ 81 mm Hg, 84 /min, 18 /min, 97.6 Degrees F, 99 % room air   Labs: WBC 4.56, Hb 11.9, PC 15.    Meds: 1U Platelet      Patient being admitted for evaluation and management of severe thrombocytopenia.     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    dexamethasone 40mg x 4 days and IVIG x4 days (completed 5/12)  platelets responded well - now 154 on 5/12 - ITP suspected  Follow up peripheral flow cytometry (sent 5.8.25) and SPEP  CT abdomen and pelvis with IV contrast - no suspicious adenopathy  IR performed bone marrow bx on 5/13    2. 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 ?   Last Rituxan May 2020   Followed with Dr Meredith in the past     3. Hx of stroke with residual mild right sided weakness  Chronic mild right frontal headache   on topiramate 300mg/day.     4. PPM s/p CHB   h/o Congenital heart disease unknown by the patient and his wife, s/p open heart surgery as an infant at [facility], no records are available.   Follows w/ Dr Monique     5. DM type 2 with hyperglycemia due to steroids  s/p decadron  carb consistent diet, avoid concentrated sweets, continue insulin   A1C 7  Home med: Synjardy 12.5-1000mg, pioglitazone 30 mg, Trulicity q1 week -resume on discharge    6. Hyperlipidemia on statin    7. HTN on   on amlodipine   resume ARB on discharge    8. COPD - stable - on Advair, spiriva

## 2025-05-13 NOTE — PROGRESS NOTE ADULT - PROVIDER SPECIALTY LIST ADULT
Heme/Onc
Internal Medicine
Internal Medicine
Heme/Onc
Intervent Radiology
Heme/Onc
Hospitalist
Hospitalist
Internal Medicine
Internal Medicine

## 2025-05-13 NOTE — PROGRESS NOTE ADULT - SUBJECTIVE AND OBJECTIVE BOX
GERARDLONG  64y Male    INTERVAL HPI/OVERNIGHT EVENTS:    some gum bleeding with brushing his teeth  no other bleeding or complaints  no fever  now s/p IR BM biopsy today    T(F): 97.1 (05-13-25 @ 12:20), Max: 98 (05-12-25 @ 12:48)  HR: 68 (05-13-25 @ 12:20) (65 - 84)  BP: 137/76 (05-13-25 @ 12:20) (125/79 - 153/74)  RR: 17 (05-13-25 @ 12:20) (17 - 19)  SpO2: 98% (05-13-25 @ 12:20) (96% - 100%)  I&O's Summary    CAPILLARY BLOOD GLUCOSE      POCT Blood Glucose.: 224 mg/dL (13 May 2025 07:31)  POCT Blood Glucose.: 388 mg/dL (12 May 2025 21:01)  POCT Blood Glucose.: 335 mg/dL (12 May 2025 16:13)        PHYSICAL EXAM:  GENERAL: NAD  HEAD:  Normocephalic  EYES:  conjunctiva and sclera clear  ENMT: Moist mucous membranes  NERVOUS SYSTEM:  Alert, awake, Good concentration  CHEST/LUNG: CTA b/l  HEART: Regular rate and rhythm  ABDOMEN: Soft, Nontender, Nondistended  EXTREMITIES:   No edema LE  SKIN: warm, dry  some ecchymoses    Consultant(s) Notes Reviewed:  [x ] YES  [ ] NO  Care Discussed with Consultants/Other Providers [ x] YES  [ ] NO    MEDICATIONS  (STANDING):  amLODIPine   Tablet 5 milliGRAM(s) Oral daily  atorvastatin 80 milliGRAM(s) Oral at bedtime  chlorhexidine 2% Cloths 1 Application(s) Topical <User Schedule>  dextrose 5%. 1000 milliLiter(s) (50 mL/Hr) IV Continuous <Continuous>  dextrose 5%. 1000 milliLiter(s) (100 mL/Hr) IV Continuous <Continuous>  dextrose 50% Injectable 25 Gram(s) IV Push once  dextrose 50% Injectable 12.5 Gram(s) IV Push once  dextrose 50% Injectable 25 Gram(s) IV Push once  fluticasone propionate/ salmeterol 250-50 MICROgram(s) Diskus 1 Dose(s) Inhalation two times a day  glucagon  Injectable 1 milliGRAM(s) IntraMuscular once  insulin lispro (ADMELOG) corrective regimen sliding scale   SubCutaneous three times a day before meals  lidocaine   4% Patch 1 Patch Transdermal every 24 hours  lidocaine   4% Patch 1 Patch Transdermal every 24 hours  polyethylene glycol 3350 17 Gram(s) Oral daily  senna 2 Tablet(s) Oral at bedtime  tamsulosin 0.4 milliGRAM(s) Oral two times a day  tiotropium 2.5 MICROgram(s) Inhaler 2 Puff(s) Inhalation daily  topiramate 300 milliGRAM(s) Oral at bedtime    MEDICATIONS  (PRN):  acetaminophen     Tablet .. 650 milliGRAM(s) Oral every 6 hours PRN Mild Pain (1 - 3)  dextrose Oral Gel 15 Gram(s) Oral once PRN Blood Glucose LESS THAN 70 milliGRAM(s)/deciliter  dextrose Oral Gel 15 Gram(s) Oral once PRN Blood Glucose LESS THAN 70 milliGRAM(s)/deciliter  melatonin 3 milliGRAM(s) Oral at bedtime PRN Insomnia      LABS:                        12.4   5.95  )-----------( 230      ( 13 May 2025 07:02 )             38.6     05-13    134[L]  |  103  |  34[H]  ----------------------------<  212[H]  3.9   |  20  |  1.2    Ca    9.9      13 May 2025 07:02  Mg     2.4     05-13    TPro  9.6[H]  /  Alb  3.7  /  TBili  1.0  /  DBili  x   /  AST  12  /  ALT  15  /  AlkPhos  62  05-12    PT/INR - ( 12 May 2025 21:35 )   PT: 11.50 sec;   INR: 0.98 ratio         PTT - ( 12 May 2025 21:35 )  PTT:22.8 sec                Case discussed with residents and RN on rounds today    Care discussed with pt

## 2025-05-13 NOTE — DISCHARGE NOTE PROVIDER - NSDCFUSCHEDAPPT_GEN_ALL_CORE_FT
[facility] Physician [facility]  CARDIOLOGY [address] [facility]  Scheduled Appointment: 05/27/2025

## 2025-05-13 NOTE — DISCHARGE NOTE PROVIDER - CARE PROVIDER_API CALL
Dez Deleon  Medical Oncology  [address] [place] [id number]  Phone: [phone]  Fax: [phone]  Follow Up Time:     Demarcus Herron  [address] [address]  Phone: [phone]  Fax: [phone]  Follow Up Time:    Demarcus Herron  Family Medicine  [address] [place] [id number]  Phone: [phone]  Fax: [phone]  Follow Up Time: 2 weeks    Tarun Rider  Medical Oncology  [address] [place] [address]  Phone: [phone]  Fax: [phone]  Established Patient  Follow Up Time: 1 week

## 2025-05-13 NOTE — DISCHARGE NOTE PROVIDER - CARE PROVIDERS DIRECT ADDRESSES
[email or web address]

## 2025-05-13 NOTE — DISCHARGE NOTE PROVIDER - PROVIDER TOKENS
PROVIDER:[TOKEN:[[id number]]],PROVIDER:[TOKEN:[[id number]]] PROVIDER:[TOKEN:[[id number]],FOLLOWUP:[2 weeks]],PROVIDER:[TOKEN:[[id number]],FOLLOWUP:[1 week],ESTABLISHEDPATIENT:[T]]

## 2025-05-13 NOTE — PROGRESS NOTE ADULT - ASSESSMENT
63 y/o man with PMH of COPD, non-Hodgkin's lymphoma in remission for years, CVA, DM type 2 and s/p PPM for CHB presented to ED for evaluation of low platelets on outpatient lab work the day prior to admission.     1. Severe Thrombocytopenia   platelets on admission 15 ->20  haptoglobin 95,   Platelet Count: 03/21/25  156 -> 164    No prior history of thrombocytopenia   Bruising especially on his left hand/arm which he states resolves on its own and recurs   some gum bleeding with brushing of teeth  No other bleeding per the pt    No recent exposure to heparin products   HemOnc consult and f/u appreciated  Peripheral Smear: No clumping, Giant Platelets seen in 2 power fields.    transfuse platelets if <10   dexamethasone 40mg x 4 days and IVIG x4 days (completed 5/12)  platelets responded well - now 154 on 5/12 - ITP suspected  Follow up peripheral flow cytometry (sent 5.8.25)   SPEP: IgG lambda band detected  CT abdomen and pelvis with IV contrast - no suspicious adenopathy  s/p BM biopsy by IR on 5/13  pt will f/u with Dr. Rider for results and further management    2. 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 ?   Last Rituxan May 2020   Followed with Dr Rider in the past     3. Hx of stroke with residual mild right sided weakness  Chronic mild right frontal headache   on topiramate 300mg/day.   holding ASA for now    4. PPM s/p CHB   h/o Congenital heart disease unknown by the patient and his wife, s/p open heart surgery as an infant at [facility], no records are available.   Follows w/ Dr Monique     5. DM type 2 with hyperglycemia due to steroids  last dose of steroids was on 5/12  carb consistent diet, avoid concentrated sweets, continue insulin while inpt  A1C 7  Home med: Synjardy 12.5-1000mg, pioglitazone 30 mg, Trulicity q1 week - resume on discharge and outpt f/u with PMD    6. Hyperlipidemia on statin    7. HTN on   on amlodipine   resume ARB on discharge    8. COPD - stable - on Advair, spiriva    9. DVT prophylaxis - OOB and ambulating      full code      Disposition: home after BM biopsy today    med rec and discharge papers reviewed by me    spent 35 minutes on the discharge process of this pt

## 2025-05-13 NOTE — PRE PROCEDURE NOTE - PRE PROCEDURE EVALUATION
Vascular & Interventional Radiology Pre-Procedure Note    Procedure Name:     HPI: HPI:  64-year-old male history of COPD, non-Hodgkin's lymphoma in remission for years, CVA, diabetes, pacemaker placement placement for CHB presents to IR for image guided bone marrow biopsy with sedation/anesthesia    Allergies: Bactrim (Anaphylaxis)  sulfa drugs (Anaphylaxis)  acyclovir (Anaphylaxis)    Medications (Abx/Cardiac/Anticoagulation/Blood Products)  amLODIPine   Tablet: 5 milliGRAM(s) Oral (05-12 @ 05:54)    Data:    T(C): 36.3  HR: 71  BP: 132/81  RR: 18  SpO2: 96%    Exam  General: NAD, AAO x3, no distress  Chest: breathing comfortably on room air, CTAB  Abdomen: soft, non-tender, non- distended   Extremities: positive pulses bilaterally x4      Labs:   -WBC 5.95 / HgB 12.4 / Hct 38.6 / Plt 230  -Na 134 / Cl 103 / BUN 34 / Glucose 212  -K 3.9 / CO2 20 / Cr 1.2  -ALT -- / Alk Phos -- / T.Bili --  -INR0.98        Consentable: [x ] Yes   [ ] No     Plan:   -64y Male presents for image guided bone marrow biopsy with sedation/anesthesia  -Risks/Benefits/alternatives explained with the patient and/or healthcare proxy and witnessed informed consent obtained.

## 2025-05-13 NOTE — DISCHARGE NOTE PROVIDER - NSDCMRMEDTOKEN_GEN_ALL_CORE_FT
amLODIPine 5 mg oral tablet: 1 tab(s) orally once a day -for blood pressure   aspirin 325 mg oral tablet: 1 tab(s) orally once a day  atorvastatin 80 mg oral tablet: 1 tab(s) orally once a day (at bedtime)  Breztri Aerosphere inhalation aerosol: 2 puff(s) inhaled 2 times a day  ipratropium-albuterol 0.5 mg-2.5 mg/3 mLinhalation solution: 3 milliliter(s) inhaled every 6 hours, As needed, Shortness of Breath and/or Wheezing  irbesartan 300 mg oral tablet: 1 tab(s) orally once a day  pioglitazone 30 mg oral tablet: 1 tab(s) orally once a day  Synjardy 12.5 mg-1000 mg oral tablet: 1 tab(s) orally 2 times a day  tiotropium 18 mcg inhalation capsule: 1 cap(s) inhaled once a day  topiramate 100 mg oral tablet: 3 tab(s) orally once a day (at bedtime)  Trulicity Pen 3 mg/0.5 mL subcutaneous solution:    acetaminophen 325 mg oral tablet: 2 tab(s) orally every 6 hours As needed Mild Pain (1 - 3)  amLODIPine 5 mg oral tablet: 1 tab(s) orally once a day -for blood pressure   atorvastatin 80 mg oral tablet: 1 tab(s) orally once a day (at bedtime)  Breztri Aerosphere inhalation aerosol: 2 puff(s) inhaled 2 times a day  ipratropium-albuterol 0.5 mg-2.5 mg/3 mLinhalation solution: 3 milliliter(s) inhaled every 6 hours, As needed, Shortness of Breath and/or Wheezing  irbesartan 300 mg oral tablet: 1 tab(s) orally once a day  pioglitazone 30 mg oral tablet: 1 tab(s) orally once a day  Synjardy 12.5 mg-1000 mg oral tablet: 1 tab(s) orally 2 times a day  tamsulosin 0.4 mg oral capsule: 1 cap(s) orally 2 times a day  tiotropium 18 mcg inhalation capsule: 1 cap(s) inhaled once a day  topiramate 100 mg oral tablet: 3 tab(s) orally once a day (at bedtime)  Trulicity Pen 3 mg/0.5 mL subcutaneous solution:

## 2025-05-13 NOTE — DISCHARGE NOTE PROVIDER - NSDCFUADDINST_GEN_ALL_CORE_FT
Avoid concentrated sweets at this time  Your glucose levels were elevated due to short course of steroids which is now stopped

## 2025-05-14 ENCOUNTER — NON-APPOINTMENT (OUTPATIENT)
Age: 65
End: 2025-05-14

## 2025-05-22 LAB
FLOW CYTOMETRY FINAL REPORT: SIGNIFICANT CHANGE UP
HEMATOPATHOLOGY REPORT: SIGNIFICANT CHANGE UP

## 2025-05-27 LAB — CHROM ANALY OVERALL INTERP SPEC-IMP: SIGNIFICANT CHANGE UP

## 2025-05-28 NOTE — PATIENT PROFILE ADULT - CENTRAL VENOUS CATHETER/PICC LINE
Loop recorder implant Dr. Perdue   Arrive one hour prior to procedure  No Ivs  No dietary restrictions  Do not hold any medications and can continue Eliquis  Okay to drive self to procedure  Take a shower morning of procedure using antibacterial soap such as (dial).     no

## 2025-05-30 ENCOUNTER — APPOINTMENT (OUTPATIENT)
Age: 65
End: 2025-05-30
Payer: MEDICARE

## 2025-05-30 ENCOUNTER — OUTPATIENT (OUTPATIENT)
Dept: OUTPATIENT SERVICES | Facility: HOSPITAL | Age: 65
LOS: 1 days | End: 2025-05-30
Payer: MEDICARE

## 2025-05-30 VITALS
RESPIRATION RATE: 16 BRPM | SYSTOLIC BLOOD PRESSURE: 117 MMHG | BODY MASS INDEX: 25.87 KG/M2 | DIASTOLIC BLOOD PRESSURE: 78 MMHG | WEIGHT: 191 LBS | HEIGHT: 72 IN | HEART RATE: 70 BPM | OXYGEN SATURATION: 98 % | TEMPERATURE: 98.2 F

## 2025-05-30 DIAGNOSIS — D64.9 ANEMIA, UNSPECIFIED: ICD-10-CM

## 2025-05-30 DIAGNOSIS — Z95.0 PRESENCE OF CARDIAC PACEMAKER: Chronic | ICD-10-CM

## 2025-05-30 DIAGNOSIS — Z98.890 OTHER SPECIFIED POSTPROCEDURAL STATES: Chronic | ICD-10-CM

## 2025-05-30 PROBLEM — D69.3 CHRONIC ITP (IDIOPATHIC THROMBOCYTOPENIA): Status: ACTIVE | Noted: 2025-05-30

## 2025-05-30 LAB
AUTO BASOPHILS #: 0.06 K/UL
AUTO BASOPHILS %: 1.2 %
AUTO EOSINOPHILS #: 0.1 K/UL
AUTO EOSINOPHILS %: 2 %
AUTO IMMATURE GRANULOCYTES #: 0.01 K/UL
AUTO LYMPHOCYTES #: 1.24 K/UL
AUTO LYMPHOCYTES %: 24.6 %
AUTO MONOCYTES #: 0.44 K/UL
AUTO MONOCYTES %: 8.7 %
AUTO NEUTROPHILS #: 3.2 K/UL
AUTO NEUTROPHILS %: 63.3 %
AUTO NRBC #: 0 K/UL
HCT VFR BLD CALC: 38.5 %
HGB BLD-MCNC: 12.2 G/DL
IMM GRANULOCYTES NFR BLD AUTO: 0.2 %
IMMATURE PLATELET FRACTION #: 7.8 K/UL
IMMATURE PLATELET FRACTION %: 8.5 %
MAN DIFF?: NORMAL
MCHC RBC-ENTMCNC: 26.5 PG
MCHC RBC-ENTMCNC: 31.7 G/DL
MCV RBC AUTO: 83.5 FL
PLATELET # BLD AUTO: 92 K/UL
PMV BLD AUTO: 0 /100 WBCS
PMV BLD: 11.7 FL
RBC # BLD: 4.61 M/UL
RBC # FLD: 14.5 %
WBC # FLD AUTO: 5.05 K/UL

## 2025-05-30 PROCEDURE — 84165 PROTEIN E-PHORESIS SERUM: CPT

## 2025-05-30 PROCEDURE — 99214 OFFICE O/P EST MOD 30 MIN: CPT

## 2025-05-30 PROCEDURE — 86038 ANTINUCLEAR ANTIBODIES: CPT

## 2025-05-30 PROCEDURE — 80053 COMPREHEN METABOLIC PANEL: CPT

## 2025-05-30 PROCEDURE — 86431 RHEUMATOID FACTOR QUANT: CPT

## 2025-05-30 PROCEDURE — 82784 ASSAY IGA/IGD/IGG/IGM EACH: CPT

## 2025-05-30 PROCEDURE — 86334 IMMUNOFIX E-PHORESIS SERUM: CPT

## 2025-05-30 PROCEDURE — 83521 IG LIGHT CHAINS FREE EACH: CPT

## 2025-05-30 PROCEDURE — 84155 ASSAY OF PROTEIN SERUM: CPT

## 2025-05-30 PROCEDURE — 85025 COMPLETE CBC W/AUTO DIFF WBC: CPT

## 2025-05-30 RX ORDER — PREDNISONE 20 MG/1
20 TABLET ORAL DAILY
Qty: 30 | Refills: 0 | Status: ACTIVE | COMMUNITY
Start: 2025-05-30 | End: 1900-01-01

## 2025-05-30 NOTE — ED PROVIDER NOTE - CONDITION AT DISCHARGE:
Patient called stating he was returning a call from [employer].    In regard to TAVR  Please call and advise.  [phone]   Improved

## 2025-05-31 DIAGNOSIS — D64.9 ANEMIA, UNSPECIFIED: ICD-10-CM

## 2025-05-31 LAB
ALBUMIN SERPL ELPH-MCNC: 4.2 G/DL
ALP BLD-CCNC: 58 U/L
ALT SERPL-CCNC: 16 U/L
ANION GAP SERPL CALC-SCNC: 9 MMOL/L
AST SERPL-CCNC: 20 U/L
BILIRUB SERPL-MCNC: 0.8 MG/DL
BUN SERPL-MCNC: 22 MG/DL
CALCIUM SERPL-MCNC: 9.8 MG/DL
CHLORIDE SERPL-SCNC: 107 MMOL/L
CO2 SERPL-SCNC: 22 MMOL/L
CREAT SERPL-MCNC: 1.2 MG/DL
EGFRCR SERPLBLD CKD-EPI 2021: 68 ML/MIN/1.73M2
GLUCOSE SERPL-MCNC: 129 MG/DL
POTASSIUM SERPL-SCNC: 5.1 MMOL/L
PROT SERPL-MCNC: 7.7 G/DL
RHEUMATOID FACT SER QL: <10 IU/ML
SODIUM SERPL-SCNC: 138 MMOL/L

## 2025-06-02 LAB
ANA PAT FLD IF-IMP: ABNORMAL
ANA SER IF-ACNC: ABNORMAL

## 2025-06-03 LAB
ALBUMIN MFR SERPL ELPH: 51.6 %
ALBUMIN SERPL-MCNC: 4.1 G/DL
ALBUMIN/GLOB SERPL: 1.1 RATIO
ALPHA1 GLOB MFR SERPL ELPH: 3 %
ALPHA1 GLOB SERPL ELPH-MCNC: 0.2 G/DL
ALPHA2 GLOB MFR SERPL ELPH: 10.9 %
ALPHA2 GLOB SERPL ELPH-MCNC: 0.9 G/DL
B-GLOBULIN MFR SERPL ELPH: 11.3 %
B-GLOBULIN SERPL ELPH-MCNC: 0.9 G/DL
DEPRECATED KAPPA LC FREE/LAMBDA SER: 1 RATIO
GAMMA GLOB FLD ELPH-MCNC: 1.8 G/DL
GAMMA GLOB MFR SERPL ELPH: 23.2 %
IGA SERPL-MCNC: 286 MG/DL
IGM SERPL-MCNC: 125 MG/DL
INTERPRETATION SERPL IEP-IMP: NORMAL
KAPPA LC CSF-MCNC: 2.33 MG/DL
KAPPA LC SERPL-MCNC: 2.34 MG/DL
M PROTEIN MFR SERPL ELPH: NORMAL
M PROTEIN SPEC IFE-MCNC: NORMAL
MONOCLON BAND OBS SERPL: NORMAL
PROT SERPL-MCNC: 7.9 G/DL
PROT SERPL-MCNC: 7.9 G/DL

## 2025-06-06 ENCOUNTER — APPOINTMENT (OUTPATIENT)
Age: 65
End: 2025-06-06
Payer: MEDICARE

## 2025-06-06 ENCOUNTER — OUTPATIENT (OUTPATIENT)
Dept: OUTPATIENT SERVICES | Facility: HOSPITAL | Age: 65
LOS: 1 days | End: 2025-06-06
Payer: MEDICARE

## 2025-06-06 VITALS
BODY MASS INDEX: 25.33 KG/M2 | SYSTOLIC BLOOD PRESSURE: 111 MMHG | RESPIRATION RATE: 16 BRPM | OXYGEN SATURATION: 94 % | TEMPERATURE: 98 F | DIASTOLIC BLOOD PRESSURE: 72 MMHG | HEART RATE: 82 BPM | WEIGHT: 187 LBS | HEIGHT: 72 IN

## 2025-06-06 DIAGNOSIS — Z98.890 OTHER SPECIFIED POSTPROCEDURAL STATES: Chronic | ICD-10-CM

## 2025-06-06 DIAGNOSIS — Z95.0 PRESENCE OF CARDIAC PACEMAKER: Chronic | ICD-10-CM

## 2025-06-06 DIAGNOSIS — D64.9 ANEMIA, UNSPECIFIED: ICD-10-CM

## 2025-06-06 LAB
AUTO BASOPHILS #: 0.04 K/UL
AUTO BASOPHILS %: 0.7 %
AUTO EOSINOPHILS #: 0.13 K/UL
AUTO EOSINOPHILS %: 2.4 %
AUTO IMMATURE GRANULOCYTES #: 0.03 K/UL
AUTO LYMPHOCYTES #: 1.29 K/UL
AUTO LYMPHOCYTES %: 24 %
AUTO MONOCYTES #: 0.53 K/UL
AUTO MONOCYTES %: 9.9 %
AUTO NEUTROPHILS #: 3.36 K/UL
AUTO NEUTROPHILS %: 62.4 %
AUTO NRBC #: 0 K/UL
HCT VFR BLD CALC: 39.2 %
HGB BLD-MCNC: 13 G/DL
IMM GRANULOCYTES NFR BLD AUTO: 0.6 %
IMMATURE PLATELET FRACTION #: 8.4 K/UL
IMMATURE PLATELET FRACTION %: 7 %
MAN DIFF?: NORMAL
MCHC RBC-ENTMCNC: 26.5 PG
MCHC RBC-ENTMCNC: 33.2 G/DL
MCV RBC AUTO: 79.8 FL
PLATELET # BLD AUTO: 120 K/UL
PMV BLD AUTO: 0 /100 WBCS
PMV BLD: 11.7 FL
RBC # BLD: 4.91 M/UL
RBC # FLD: 14.7 %
WBC # FLD AUTO: 5.38 K/UL

## 2025-06-06 PROCEDURE — 85025 COMPLETE CBC W/AUTO DIFF WBC: CPT

## 2025-06-06 PROCEDURE — 99213 OFFICE O/P EST LOW 20 MIN: CPT

## 2025-06-06 RX ORDER — PREDNISONE 5 MG/1
5 TABLET ORAL DAILY
Qty: 42 | Refills: 0 | Status: ACTIVE | COMMUNITY
Start: 2025-06-06 | End: 1900-01-01

## 2025-06-07 DIAGNOSIS — D64.9 ANEMIA, UNSPECIFIED: ICD-10-CM

## 2025-06-09 NOTE — PATIENT PROFILE ADULT - NSPROPASSIVESMOKEEXPOSURE_GEN_A_NUR
[FreeTextEntry1] : Ear hygiene reviewed in detail.  Follow up recommended if symptoms persist or progresses.  Routine follow up for cerumen management suggested.  
No

## 2025-06-17 ENCOUNTER — NON-APPOINTMENT (OUTPATIENT)
Age: 65
End: 2025-06-17

## 2025-06-17 PROBLEM — R94.2 RESTRICTIVE VENTILATORY DEFECT: Status: ACTIVE | Noted: 2025-06-17

## 2025-06-17 PROBLEM — Z83.3 FAMILY HISTORY OF DIABETES MELLITUS: Status: ACTIVE | Noted: 2025-06-17

## 2025-06-17 PROBLEM — J40 BRONCHITIS: Status: ACTIVE | Noted: 2025-06-17

## 2025-06-17 PROBLEM — R05.9 COUGH: Status: ACTIVE | Noted: 2025-06-17

## 2025-06-17 RX ORDER — AZITHROMYCIN 250 MG/1
250 TABLET, FILM COATED ORAL
Refills: 0 | Status: ACTIVE | COMMUNITY

## 2025-06-17 RX ORDER — ALBUTEROL SULFATE 90 UG/1
108 (90 BASE) AEROSOL, METERED RESPIRATORY (INHALATION) EVERY 4 HOURS
Refills: 0 | Status: ACTIVE | COMMUNITY

## 2025-06-20 ENCOUNTER — APPOINTMENT (OUTPATIENT)
Age: 65
End: 2025-06-20

## 2025-06-20 ENCOUNTER — OUTPATIENT (OUTPATIENT)
Dept: OUTPATIENT SERVICES | Facility: HOSPITAL | Age: 65
LOS: 1 days | End: 2025-06-20
Payer: MEDICARE

## 2025-06-20 DIAGNOSIS — D64.9 ANEMIA, UNSPECIFIED: ICD-10-CM

## 2025-06-20 DIAGNOSIS — Z98.890 OTHER SPECIFIED POSTPROCEDURAL STATES: Chronic | ICD-10-CM

## 2025-06-20 DIAGNOSIS — Z95.0 PRESENCE OF CARDIAC PACEMAKER: Chronic | ICD-10-CM

## 2025-06-20 LAB
AUTO BASOPHILS #: 0.06 K/UL
AUTO BASOPHILS %: 0.8 %
AUTO EOSINOPHILS #: 0.22 K/UL
AUTO EOSINOPHILS %: 2.8 %
AUTO IMMATURE GRANULOCYTES #: 0.02 K/UL
AUTO LYMPHOCYTES #: 0.94 K/UL
AUTO LYMPHOCYTES %: 12.1 %
AUTO MONOCYTES #: 0.44 K/UL
AUTO MONOCYTES %: 5.6 %
AUTO NEUTROPHILS #: 6.11 K/UL
AUTO NEUTROPHILS %: 78.4 %
AUTO NRBC #: 0 K/UL
HCT VFR BLD CALC: 40.7 %
HGB BLD-MCNC: 12.7 G/DL
IMM GRANULOCYTES NFR BLD AUTO: 0.3 %
MAN DIFF?: NORMAL
MCHC RBC-ENTMCNC: 25.9 PG
MCHC RBC-ENTMCNC: 31.2 G/DL
MCV RBC AUTO: 82.9 FL
PLATELET # BLD AUTO: 131 K/UL
PMV BLD AUTO: 0 /100 WBCS
PMV BLD: 10.5 FL
RBC # BLD: 4.91 M/UL
RBC # FLD: 14.5 %
WBC # FLD AUTO: 7.79 K/UL

## 2025-06-20 PROCEDURE — 36416 COLLJ CAPILLARY BLOOD SPEC: CPT

## 2025-06-20 PROCEDURE — 85025 COMPLETE CBC W/AUTO DIFF WBC: CPT

## 2025-07-24 ENCOUNTER — APPOINTMENT (OUTPATIENT)
Age: 65
End: 2025-07-24

## 2025-07-24 LAB
ALBUMIN SERPL ELPH-MCNC: 4.4 G/DL
ALP BLD-CCNC: 57 U/L
ALT SERPL-CCNC: 13 U/L
ANION GAP SERPL CALC-SCNC: 12 MMOL/L
AST SERPL-CCNC: 16 U/L
AUTO BASOPHILS #: 0.04 K/UL
AUTO BASOPHILS %: 0.6 %
AUTO EOSINOPHILS #: 0.17 K/UL
AUTO EOSINOPHILS %: 2.4 %
AUTO IMMATURE GRANULOCYTES #: 0.02 K/UL
AUTO LYMPHOCYTES #: 1.15 K/UL
AUTO LYMPHOCYTES %: 16.5 %
AUTO MONOCYTES #: 0.57 K/UL
AUTO MONOCYTES %: 8.2 %
AUTO NEUTROPHILS #: 5.04 K/UL
AUTO NEUTROPHILS %: 72 %
AUTO NRBC #: 0 K/UL
BILIRUB SERPL-MCNC: 1.4 MG/DL
BUN SERPL-MCNC: 19 MG/DL
CALCIUM SERPL-MCNC: 9.2 MG/DL
CHLORIDE SERPL-SCNC: 103 MMOL/L
CO2 SERPL-SCNC: 21 MMOL/L
CREAT SERPL-MCNC: 1.1 MG/DL
EGFRCR SERPLBLD CKD-EPI 2021: 75 ML/MIN/1.73M2
GLUCOSE SERPL-MCNC: 197 MG/DL
HCT VFR BLD CALC: 39.3 %
HGB BLD-MCNC: 12.4 G/DL
IMM GRANULOCYTES NFR BLD AUTO: 0.3 %
MAN DIFF?: NORMAL
MCHC RBC-ENTMCNC: 25.3 PG
MCHC RBC-ENTMCNC: 31.6 G/DL
MCV RBC AUTO: 80 FL
PLATELET # BLD AUTO: 177 K/UL
PMV BLD AUTO: 0 /100 WBCS
PMV BLD: 10.4 FL
POTASSIUM SERPL-SCNC: 4.1 MMOL/L
PROT SERPL-MCNC: 6.8 G/DL
RBC # BLD: 4.91 M/UL
RBC # FLD: 15.9 %
SODIUM SERPL-SCNC: 136 MMOL/L
WBC # FLD AUTO: 6.99 K/UL

## 2025-07-30 ENCOUNTER — NON-APPOINTMENT (OUTPATIENT)
Age: 65
End: 2025-07-30

## 2025-07-30 ENCOUNTER — APPOINTMENT (OUTPATIENT)
Dept: CARDIOLOGY | Facility: CLINIC | Age: 65
End: 2025-07-30
Payer: MEDICARE

## 2025-07-30 PROCEDURE — 93296 REM INTERROG EVL PM/IDS: CPT

## 2025-07-30 PROCEDURE — 93294 REM INTERROG EVL PM/LDLS PM: CPT

## 2025-08-21 ENCOUNTER — APPOINTMENT (OUTPATIENT)
Age: 65
End: 2025-08-21
Payer: MEDICARE

## 2025-08-21 VITALS
DIASTOLIC BLOOD PRESSURE: 79 MMHG | HEART RATE: 85 BPM | SYSTOLIC BLOOD PRESSURE: 147 MMHG | WEIGHT: 193 LBS | TEMPERATURE: 98.4 F | OXYGEN SATURATION: 95 % | HEIGHT: 72 IN | BODY MASS INDEX: 26.14 KG/M2

## 2025-08-21 DIAGNOSIS — D69.3 IMMUNE THROMBOCYTOPENIC PURPURA: ICD-10-CM

## 2025-08-21 DIAGNOSIS — C85.90 NON-HODGKIN LYMPHOMA, UNSPECIFIED, UNSPECIFIED SITE: ICD-10-CM

## 2025-08-21 LAB
AUTO BASOPHILS #: 0.04 K/UL
AUTO BASOPHILS %: 0.5 %
AUTO EOSINOPHILS #: 0.13 K/UL
AUTO EOSINOPHILS %: 1.7 %
AUTO IMMATURE GRANULOCYTES #: 0.02 K/UL
AUTO LYMPHOCYTES #: 1.24 K/UL
AUTO LYMPHOCYTES %: 16.4 %
AUTO MONOCYTES #: 0.62 K/UL
AUTO MONOCYTES %: 8.2 %
AUTO NEUTROPHILS #: 5.49 K/UL
AUTO NEUTROPHILS %: 72.9 %
AUTO NRBC #: 0 K/UL
HCT VFR BLD CALC: 42.2 %
HGB BLD-MCNC: 13.2 G/DL
IMM GRANULOCYTES NFR BLD AUTO: 0.3 %
MAN DIFF?: NORMAL
MCHC RBC-ENTMCNC: 25 PG
MCHC RBC-ENTMCNC: 31.3 G/DL
MCV RBC AUTO: 80.1 FL
PLATELET # BLD AUTO: 188 K/UL
PMV BLD AUTO: 0 /100 WBCS
PMV BLD: 10.2 FL
RBC # BLD: 5.27 M/UL
RBC # FLD: 17.3 %
WBC # FLD AUTO: 7.54 K/UL

## 2025-08-21 PROCEDURE — 99213 OFFICE O/P EST LOW 20 MIN: CPT

## 2025-08-26 ENCOUNTER — RX RENEWAL (OUTPATIENT)
Age: 65
End: 2025-08-26

## 2025-08-26 RX ORDER — DULAGLUTIDE 3 MG/.5ML
3 INJECTION, SOLUTION SUBCUTANEOUS
Qty: 3 | Refills: 2 | Status: ACTIVE | COMMUNITY
Start: 2025-08-26 | End: 1900-01-01

## 2025-08-27 ENCOUNTER — APPOINTMENT (OUTPATIENT)
Dept: ENDOCRINOLOGY | Facility: CLINIC | Age: 65
End: 2025-08-27
Payer: MEDICARE

## 2025-08-27 ENCOUNTER — OUTPATIENT (OUTPATIENT)
Dept: OUTPATIENT SERVICES | Facility: HOSPITAL | Age: 65
LOS: 1 days | End: 2025-08-27
Payer: MEDICARE

## 2025-08-27 VITALS
DIASTOLIC BLOOD PRESSURE: 84 MMHG | HEIGHT: 72 IN | BODY MASS INDEX: 26.55 KG/M2 | SYSTOLIC BLOOD PRESSURE: 143 MMHG | WEIGHT: 196 LBS

## 2025-08-27 DIAGNOSIS — E78.5 HYPERLIPIDEMIA, UNSPECIFIED: ICD-10-CM

## 2025-08-27 DIAGNOSIS — Z00.00 ENCOUNTER FOR GENERAL ADULT MEDICAL EXAMINATION WITHOUT ABNORMAL FINDINGS: ICD-10-CM

## 2025-08-27 DIAGNOSIS — Z98.890 OTHER SPECIFIED POSTPROCEDURAL STATES: Chronic | ICD-10-CM

## 2025-08-27 DIAGNOSIS — Z95.0 PRESENCE OF CARDIAC PACEMAKER: Chronic | ICD-10-CM

## 2025-08-27 DIAGNOSIS — E11.65 TYPE 2 DIABETES MELLITUS WITH HYPERGLYCEMIA: ICD-10-CM

## 2025-08-27 PROCEDURE — 99204 OFFICE O/P NEW MOD 45 MIN: CPT

## 2025-09-11 ENCOUNTER — APPOINTMENT (OUTPATIENT)
Age: 65
End: 2025-09-11

## 2025-09-11 LAB
AUTO BASOPHILS #: 0.05 K/UL
AUTO BASOPHILS %: 1 %
AUTO EOSINOPHILS #: 0.21 K/UL
AUTO EOSINOPHILS %: 4.1 %
AUTO IMMATURE GRANULOCYTES #: 0.02 K/UL
AUTO LYMPHOCYTES #: 1.37 K/UL
AUTO LYMPHOCYTES %: 27 %
AUTO MONOCYTES #: 0.57 K/UL
AUTO MONOCYTES %: 11.2 %
AUTO NEUTROPHILS #: 2.86 K/UL
AUTO NEUTROPHILS %: 56.3 %
AUTO NRBC #: 0 K/UL
HCT VFR BLD CALC: 42.8 %
HGB BLD-MCNC: 13.5 G/DL
IMM GRANULOCYTES NFR BLD AUTO: 0.4 %
MAN DIFF?: NORMAL
MCHC RBC-ENTMCNC: 25.1 PG
MCHC RBC-ENTMCNC: 31.5 G/DL
MCV RBC AUTO: 79.6 FL
PLATELET # BLD AUTO: 184 K/UL
PMV BLD AUTO: 0 /100 WBCS
PMV BLD: 10.9 FL
RBC # BLD: 5.38 M/UL
RBC # FLD: 17.3 %
WBC # FLD AUTO: 5.08 K/UL